# Patient Record
Sex: MALE | Race: WHITE | NOT HISPANIC OR LATINO | Employment: OTHER | URBAN - METROPOLITAN AREA
[De-identification: names, ages, dates, MRNs, and addresses within clinical notes are randomized per-mention and may not be internally consistent; named-entity substitution may affect disease eponyms.]

---

## 2017-01-10 ENCOUNTER — ALLSCRIPTS OFFICE VISIT (OUTPATIENT)
Dept: OTHER | Facility: OTHER | Age: 82
End: 2017-01-10

## 2017-02-15 ENCOUNTER — ALLSCRIPTS OFFICE VISIT (OUTPATIENT)
Dept: OTHER | Facility: OTHER | Age: 82
End: 2017-02-15

## 2017-04-17 ENCOUNTER — ALLSCRIPTS OFFICE VISIT (OUTPATIENT)
Dept: OTHER | Facility: OTHER | Age: 82
End: 2017-04-17

## 2017-07-06 ENCOUNTER — TRANSCRIBE ORDERS (OUTPATIENT)
Dept: ADMINISTRATIVE | Facility: HOSPITAL | Age: 82
End: 2017-07-06

## 2017-07-06 ENCOUNTER — APPOINTMENT (OUTPATIENT)
Dept: LAB | Facility: HOSPITAL | Age: 82
End: 2017-07-06
Attending: INTERNAL MEDICINE
Payer: COMMERCIAL

## 2017-07-06 DIAGNOSIS — I10 ESSENTIAL (PRIMARY) HYPERTENSION: ICD-10-CM

## 2017-07-06 DIAGNOSIS — E11.9 TYPE 2 DIABETES MELLITUS WITHOUT COMPLICATIONS (HCC): ICD-10-CM

## 2017-07-06 DIAGNOSIS — J44.9 CHRONIC OBSTRUCTIVE PULMONARY DISEASE (HCC): ICD-10-CM

## 2017-07-06 LAB
ALBUMIN SERPL BCP-MCNC: 3.6 G/DL (ref 3.5–5)
ALP SERPL-CCNC: 53 U/L (ref 46–116)
ALT SERPL W P-5'-P-CCNC: 33 U/L (ref 12–78)
ANION GAP SERPL CALCULATED.3IONS-SCNC: 10 MMOL/L (ref 4–13)
AST SERPL W P-5'-P-CCNC: 17 U/L (ref 5–45)
BILIRUB SERPL-MCNC: 0.6 MG/DL (ref 0.2–1)
BUN SERPL-MCNC: 15 MG/DL (ref 5–25)
CALCIUM SERPL-MCNC: 9 MG/DL (ref 8.3–10.1)
CHLORIDE SERPL-SCNC: 107 MMOL/L (ref 100–108)
CHOLEST SERPL-MCNC: 165 MG/DL (ref 50–200)
CO2 SERPL-SCNC: 25 MMOL/L (ref 21–32)
CREAT SERPL-MCNC: 1.23 MG/DL (ref 0.6–1.3)
CREAT UR-MCNC: 133 MG/DL
ERYTHROCYTE [DISTWIDTH] IN BLOOD BY AUTOMATED COUNT: 14.9 % (ref 11.6–15.1)
EST. AVERAGE GLUCOSE BLD GHB EST-MCNC: 140 MG/DL
GFR SERPL CREATININE-BSD FRML MDRD: 56.5 ML/MIN/1.73SQ M
GLUCOSE P FAST SERPL-MCNC: 143 MG/DL (ref 65–99)
HBA1C MFR BLD: 6.5 % (ref 4.2–6.3)
HCT VFR BLD AUTO: 47.7 % (ref 36.5–49.3)
HDLC SERPL-MCNC: 51 MG/DL (ref 40–60)
HGB BLD-MCNC: 15.7 G/DL (ref 12–17)
LDLC SERPL CALC-MCNC: 96 MG/DL (ref 0–100)
MCH RBC QN AUTO: 28.7 PG (ref 26.8–34.3)
MCHC RBC AUTO-ENTMCNC: 32.9 G/DL (ref 31.4–37.4)
MCV RBC AUTO: 87 FL (ref 82–98)
MICROALBUMIN UR-MCNC: 146 MG/L (ref 0–20)
MICROALBUMIN/CREAT 24H UR: 110 MG/G CREATININE (ref 0–30)
PLATELET # BLD AUTO: 201 THOUSANDS/UL (ref 149–390)
PMV BLD AUTO: 10.2 FL (ref 8.9–12.7)
POTASSIUM SERPL-SCNC: 4.1 MMOL/L (ref 3.5–5.3)
PROT SERPL-MCNC: 6.8 G/DL (ref 6.4–8.2)
RBC # BLD AUTO: 5.47 MILLION/UL (ref 3.88–5.62)
SODIUM SERPL-SCNC: 142 MMOL/L (ref 136–145)
TRIGL SERPL-MCNC: 91 MG/DL
TSH SERPL DL<=0.05 MIU/L-ACNC: 0.87 UIU/ML (ref 0.36–3.74)
WBC # BLD AUTO: 7.22 THOUSAND/UL (ref 4.31–10.16)

## 2017-07-06 PROCEDURE — 83036 HEMOGLOBIN GLYCOSYLATED A1C: CPT

## 2017-07-06 PROCEDURE — 84443 ASSAY THYROID STIM HORMONE: CPT

## 2017-07-06 PROCEDURE — 80061 LIPID PANEL: CPT

## 2017-07-06 PROCEDURE — 85027 COMPLETE CBC AUTOMATED: CPT

## 2017-07-06 PROCEDURE — 36415 COLL VENOUS BLD VENIPUNCTURE: CPT

## 2017-07-06 PROCEDURE — 82043 UR ALBUMIN QUANTITATIVE: CPT

## 2017-07-06 PROCEDURE — 80053 COMPREHEN METABOLIC PANEL: CPT

## 2017-07-06 PROCEDURE — 82570 ASSAY OF URINE CREATININE: CPT

## 2017-07-11 ENCOUNTER — ALLSCRIPTS OFFICE VISIT (OUTPATIENT)
Dept: OTHER | Facility: OTHER | Age: 82
End: 2017-07-11

## 2017-07-11 DIAGNOSIS — E11.9 TYPE 2 DIABETES MELLITUS WITHOUT COMPLICATIONS (HCC): ICD-10-CM

## 2017-07-11 DIAGNOSIS — J44.9 CHRONIC OBSTRUCTIVE PULMONARY DISEASE (HCC): ICD-10-CM

## 2017-08-01 ENCOUNTER — TRANSCRIBE ORDERS (OUTPATIENT)
Dept: ADMINISTRATIVE | Facility: HOSPITAL | Age: 82
End: 2017-08-01

## 2017-08-01 ENCOUNTER — HOSPITAL ENCOUNTER (OUTPATIENT)
Dept: RADIOLOGY | Facility: HOSPITAL | Age: 82
Discharge: HOME | End: 2017-08-01
Attending: INTERNAL MEDICINE
Payer: COMMERCIAL

## 2017-08-01 DIAGNOSIS — J44.9 CHRONIC OBSTRUCTIVE PULMONARY DISEASE (HCC): ICD-10-CM

## 2017-08-01 PROCEDURE — 71020 HB CHEST X-RAY 2VW FRONTAL&LATL: CPT

## 2017-08-03 ENCOUNTER — GENERIC CONVERSION - ENCOUNTER (OUTPATIENT)
Dept: OTHER | Facility: OTHER | Age: 82
End: 2017-08-03

## 2017-10-16 ENCOUNTER — ALLSCRIPTS OFFICE VISIT (OUTPATIENT)
Dept: OTHER | Facility: OTHER | Age: 82
End: 2017-10-16

## 2017-11-01 NOTE — PROGRESS NOTES
Assessment    1  Chronic obstructive pulmonary disease (496) (J44 9)    Plan  Chronic obstructive pulmonary disease    · Advair Diskus 250-50 MCG/DOSE Inhalation Aerosol Powder Breath Activated;INHALE 1 PUFF EVERY 12 HOURS   Rx By: Sydnee Montejo; Dispense: 90 Days ; #:3 X 60 Aerosol Powder Breath Activated Disp Pack; Refill: 3;For: Chronic obstructive pulmonary disease; CHELLE = N; Verified Transmission to [facility] #[id number] Last Updated By: System, [employer]; 10/16/2017 11:46:29 AM    Results/Data  * XR CHEST PA & LATERAL 01Aug2017 08:19AM [facility] Order Number: [id number]     Test Name Result Flag Reference   XR CHEST PA & LATERAL (Report)       CHEST DUAL ENERGY   INDICATION: COPD  Former smoker quit 15 years ago  COMPARISON: 12/27/2016   VIEWS: PA (including soft tissue and bone algorithms) and lateral projections; 5 images   FINDINGS:      Cardiomediastinal silhouette appears unremarkable  Minor left basilar atelectasis  Previously seen left mid lung field infiltrative density has resolved  No pneumothorax or pleural effusion  Visualized osseous structures appear within normal limits for the patient's age  IMPRESSION:   Minor left basilar atelectasis  Previously seen left mid lung field infiltrative density has resolved  Workstation performed: [id number]   Signed by: Joseph North MD  8/2/17     Discussion/Summary  Discussion Summary:   Mr Donna Dominguez is doing well, his vitals are stable and his symptoms as well  He does not complain of any wheezing or dyspnea except with exertion  He will continue on the Advair and Spiriva and use his rescue as needed  He is up to date on his pneumonia vaccine and got his flu shot last week  I will see him in 4 months or sooner if needed  Counseling Documentation With Imm: The patient was counseled regarding  Goals and Barriers: The patient has the current Goals: Maintain health  The patent has the current Barriers: None identified     Patient's Capacity to Self-Care: Patient is able to Self-Care  Active Problems    1  Active advance directive (V49 89) (Z78 9)   2  Chronic obstructive pulmonary disease (496) (J44 9)   3  Diabetes mellitus type 2, uncomplicated (799 82) (R95 8)   4  History of BPH (V13 89) (Z87 438)   5  HTN (hypertension) (401 9) (I10)   6  Denied: History of Mental health problem   7  Need for immunization against influenza (V04 81) (Z23)   8  CHI (obstructive sleep apnea) (327 23) (G47 33)   9  Denied: History of Substance abuse    Chief Complaint  Chief Complaint Chronic Condition [facility]: Patient is here today for follow up of chronic conditions described in HPI  History of Present Illness  HPI: Mr Gamal De Souza is doing fairly well with regrads to his breathing  He is still struggling with dyspnea on exertion and feels it is worse than 2-3 years  He uses the Advair and Spiriva every day faithfully  He does need to use his rescue inhaler occasionally in the early AM when he awakens  he is not awakening in the Am or at night  He is using the oxygen at night occasionally when he has nasal congestion  He denies any leg swelling or chest pain  Review of Systems  Complete-Male Pulm:  Constitutional: No fever or chills, feels well, no tiredness, no recent weight gain or weight loss  Eyes: no complaints of vision problems  ENT: no rhinitis, no PND, no epistaxis  Cardiovascular: no palpitations, no chest pain  Respiratory: as noted in HPI  Gastrointestinal: no complaints of esophageal reflux, no abdominal pain  Genitourinary: no urinary retention  Musculoskeletal: no arthralgias, no joint swelling, no myalgias  Integumentary: no rash, no lesions  Neurological: no headache, no fainting, no weakness  Psychiatric: no anxiety, no depression  Hematologic/Lymphatic: no complaints of swollen glands  ROS Reviewed:   ROS reviewed  Past Medical History  1  History of herpes zoster (V12 09) (Z86 19)   2   History of hypertension (V12 59) (Z86 79)   3  History of mycoplasma pneumonia (V12 61) (Z87 01)   4  History of obesity (V13 89) (Z86 39)   5  History of osteoarthritis (V13 4) (Z87 39)   6  History of renal calculi (V13 01) (Z87 442)   7  Denied: History of Mental health problem   8  History of Screening for colon cancer (V76 51) (Z12 11)   9  History of Screening for prostate cancer (V76 44) (Z12 5)   10  Denied: History of Substance abuse    Surgical History  1  History of Hemorrhoidectomy   2  History of Intracapsular Cataract Extract W/ Insert Intraocular Lens Prosthesis   3  History of Neck Surgery  Surgical History Reviewed: The surgical history was reviewed and updated today  Family History  Mother    1  Family history of Breast Cancer (V16 3)   2  Family history of Family Health Status Of Mother -    3  Family history of cerebral aneurysm (V17 1) (Z82 49)  Father    4  Family history of Father  At Age 80  Family History    5  Denied: Family history of substance abuse   6  Denied: Family history of Mental problem  Family History Reviewed: The family history was reviewed and updated today  Social History     · Active advance directive (V49 89) (Z78 9)   · Alcohol Use (History)   · 2 to 3 beers a month   · Cultural background   ·    · Daily Coffee Consumption (1  Cups/Day)   · Denied: History of Exercise habits   · Former smoker (S53 72) (J81 508)   · Quit 15 years ago ()   Smoked for 30 years a pack to a pack and a half of cigarettes a    day  · Good dental hygiene   · Living Situation: Supportive and safe   · Racial background   · white   · Spouse/family support  Social History Reviewed: The social history was reviewed and updated today  The social history was reviewed and is unchanged  Current Meds   1  Advair Diskus 250-50 MCG/DOSE Inhalation Aerosol Powder Breath Activated; USE 1 INHALATION TWICE A DAY  RINSE MOUTH AFTER USE;  Therapy: 59PPL1952 to (86 585002)  Requested for: 82PUL2041; Last Rx:37Qdm5152 Ordered   2  Amlodipine Besy-Benazepril HCl - 5-20 MG Oral Capsule; TAKE ONE CAPSULE BY MOUTH TWICE A DAY; Therapy: 74QTJ4305 to (Evaluate:25Apr2018)  Requested for: 30Apr2017; Last Rx:30Apr2017 Ordered   3  Doxazosin Mesylate 8 MG Oral Tablet; take 1 tablet by mouth every day; Therapy: 98BDH8576 to (Evaluate:05Jan2018)  Requested for: 03FAD6821; Last Rx:10Jan2017 Ordered   4  Furosemide 40 MG Oral Tablet; Take 1 or 2 tabs daily; Therapy: 64ETR2164 to (Evaluate:26Mar2017)  Requested for: 76EIG3437; Last Rx:73Nge0732 Ordered   5  Labetalol HCl - 300 MG Oral Tablet; take 1 tablet by mouth twice a day; Therapy: 60BVP6393 to )  Requested for: 32QVL4281; Last Rx:73Oes0828 Ordered   6  MetFORMIN HCl - 500 MG Oral Tablet; Take 1 tablet daily; Therapy: 21Jan2016 to (Evaluate:05Jan2018)  Requested for: 59PBE4434; Last Rx:10Jan2017 Ordered   7  ProAir  (90 Base) MCG/ACT Inhalation Aerosol Solution; INHALE 2 PUFFS EVERY 4-6 HOURS AS NEEDED; Therapy: 77RVQ9647 to (Last Rx:59Nsx8253)  Requested for: 77CJW2858 Ordered   8  Spiriva Respimat 2 5 MCG/ACT Inhalation Aerosol Solution; INHALE 2 PUFFS ONCE DAILY; Therapy: 61AVT1804 to (Yvrose Oconnor)  Requested for: 36Jat6341; Last Rx:87Ujh0350 Ordered  Medication List Reviewed: The medication list was reviewed and updated today  Allergies  1  No Known Drug Allergies  2  No Known Environmental Allergies   3  No Known Food Allergies    Vitals  Vital Signs    Recorded: 48PTL5655 11:16AM   Temperature 97 6 F   Heart Rate 85   Respiration 18   Systolic 686   Diastolic 84   Height 6 ft 1 5 in   Weight 313 lb    BMI Calculated 40 74   BSA Calculated 2 62   O2 Saturation 92, RA       Physical Exam   Constitutional  General appearance: No acute distress, well appearing and well nourished  Ears, Nose, Mouth, and Throat  Nasal mucosa, septum, and turbinates: Normal without edema or erythema     Lips, teeth, and gums: Normal, good dentition  Oropharynx: Normal with no erythema, edema, exudate or lesions  Neck  Neck: Supple, symmetric, trachea midline, no masses  Jugular veins: Normal    Pulmonary  Auscultation of lungs: Clear to auscultation, no rales, no crackles, no wheezing  Cardiovascular  Auscultation of heart: Normal rate and rhythm, normal S1 and S2, no murmurs  Examination of extremities for edema and/or varicosities: Normal    Abdomen  Abdomen: Soft, non-tender  Lymphatic  Palpation of lymph nodes in neck: No lymphadenopathy  Musculoskeletal  Gait and station: Normal    Digits and nails: Normal without clubbing or cyanosis  Neurologic  Mental Status: Normal  Not confused, no evidence of dementia, good comprehension, good concentration  Skin  Skin and subcutaneous tissue: Limited exam shows no rash     Psychiatric  Orientation to person, place and time: Normal    Mood and affect: Normal        Signatures   Electronically signed by : Wali Meneses DO; Oct 16 2017 11:48AM EST                       (Author)

## 2017-11-08 ENCOUNTER — GENERIC CONVERSION - ENCOUNTER (OUTPATIENT)
Dept: OTHER | Facility: OTHER | Age: 82
End: 2017-11-08

## 2017-12-27 ENCOUNTER — GENERIC CONVERSION - ENCOUNTER (OUTPATIENT)
Dept: OTHER | Facility: OTHER | Age: 82
End: 2017-12-27

## 2018-01-09 NOTE — RESULT NOTES
Verified Results  * XR CHEST PA & LATERAL 67EQQ2770 10:14AM Gillian Has Order Number: [id number]     Test Name Result Flag Reference   XR CHEST PA & LATERAL (Report)     CHEST     INDICATION: Shortness of breath, COPD  COMPARISON: 1/18/2016     VIEWS: Frontal (2) and lateral projections; 3 images     FINDINGS:     The cardiomediastinal silhouette is unremarkable  The lungs are slightly hyperinflated compatible with COPD  There is linear density in the left upper lobe in the mid lung zone which is favored to represent atelectasis as opposed to pneumonia  Confluent shadows versus infiltrate in the posterior left    lung base on lateral view only  The lungs are otherwise clear  No pleural effusions  Bony thorax unremarkable  IMPRESSION:     COPD  Confluent shadows versus infiltrate posterior left lung base and linear density in the left midlung, favored to represent atelectasis  Short interval follow-up chest films in 1-2 weeks recommended         ##sigslh##sigslh     ##fuslh01##fuslh01       Workstation performed: [id number]     Signed by:   Shanti Braga DO   12/27/16

## 2018-01-10 ENCOUNTER — TRANSCRIBE ORDERS (OUTPATIENT)
Dept: ADMINISTRATIVE | Facility: HOSPITAL | Age: 83
End: 2018-01-10

## 2018-01-10 ENCOUNTER — APPOINTMENT (OUTPATIENT)
Dept: LAB | Facility: HOSPITAL | Age: 83
End: 2018-01-10
Attending: INTERNAL MEDICINE
Payer: COMMERCIAL

## 2018-01-10 DIAGNOSIS — E11.9 TYPE 2 DIABETES MELLITUS WITHOUT COMPLICATIONS (HCC): ICD-10-CM

## 2018-01-10 DIAGNOSIS — E13.8 DIABETES MELLITUS OF OTHER TYPE WITH COMPLICATION, UNSPECIFIED LONG TERM INSULIN USE STATUS: ICD-10-CM

## 2018-01-10 DIAGNOSIS — E13.8 DIABETES MELLITUS OF OTHER TYPE WITH COMPLICATION, UNSPECIFIED LONG TERM INSULIN USE STATUS: Primary | ICD-10-CM

## 2018-01-10 LAB
ALBUMIN SERPL BCP-MCNC: 3.7 G/DL (ref 3.5–5)
ALP SERPL-CCNC: 56 U/L (ref 46–116)
ALT SERPL W P-5'-P-CCNC: 45 U/L (ref 12–78)
ANION GAP SERPL CALCULATED.3IONS-SCNC: 7 MMOL/L (ref 4–13)
AST SERPL W P-5'-P-CCNC: 11 U/L (ref 5–45)
BILIRUB SERPL-MCNC: 0.6 MG/DL (ref 0.2–1)
BUN SERPL-MCNC: 14 MG/DL (ref 5–25)
CALCIUM SERPL-MCNC: 9.1 MG/DL (ref 8.3–10.1)
CHLORIDE SERPL-SCNC: 105 MMOL/L (ref 100–108)
CHOLEST SERPL-MCNC: 181 MG/DL (ref 50–200)
CO2 SERPL-SCNC: 28 MMOL/L (ref 21–32)
CREAT SERPL-MCNC: 1.21 MG/DL (ref 0.6–1.3)
CREAT UR-MCNC: 190 MG/DL
ERYTHROCYTE [DISTWIDTH] IN BLOOD BY AUTOMATED COUNT: 14.3 % (ref 11.6–15.1)
EST. AVERAGE GLUCOSE BLD GHB EST-MCNC: 154 MG/DL
GFR SERPL CREATININE-BSD FRML MDRD: 56 ML/MIN/1.73SQ M
GLUCOSE P FAST SERPL-MCNC: 160 MG/DL (ref 65–99)
HBA1C MFR BLD: 7 % (ref 4.2–6.3)
HCT VFR BLD AUTO: 49.4 % (ref 36.5–49.3)
HDLC SERPL-MCNC: 52 MG/DL (ref 40–60)
HGB BLD-MCNC: 16.2 G/DL (ref 12–17)
LDLC SERPL CALC-MCNC: 107 MG/DL (ref 0–100)
MCH RBC QN AUTO: 28.8 PG (ref 26.8–34.3)
MCHC RBC AUTO-ENTMCNC: 32.8 G/DL (ref 31.4–37.4)
MCV RBC AUTO: 88 FL (ref 82–98)
MICROALBUMIN UR-MCNC: 259 MG/L (ref 0–20)
MICROALBUMIN/CREAT 24H UR: 136 MG/G CREATININE (ref 0–30)
PLATELET # BLD AUTO: 216 THOUSANDS/UL (ref 149–390)
PMV BLD AUTO: 10 FL (ref 8.9–12.7)
POTASSIUM SERPL-SCNC: 3.9 MMOL/L (ref 3.5–5.3)
PROT SERPL-MCNC: 7.1 G/DL (ref 6.4–8.2)
RBC # BLD AUTO: 5.63 MILLION/UL (ref 3.88–5.62)
SODIUM SERPL-SCNC: 140 MMOL/L (ref 136–145)
TRIGL SERPL-MCNC: 111 MG/DL
WBC # BLD AUTO: 7.88 THOUSAND/UL (ref 4.31–10.16)

## 2018-01-10 PROCEDURE — 80053 COMPREHEN METABOLIC PANEL: CPT

## 2018-01-10 PROCEDURE — 85027 COMPLETE CBC AUTOMATED: CPT

## 2018-01-10 PROCEDURE — 82043 UR ALBUMIN QUANTITATIVE: CPT | Performed by: INTERNAL MEDICINE

## 2018-01-10 PROCEDURE — 80061 LIPID PANEL: CPT

## 2018-01-10 PROCEDURE — 83036 HEMOGLOBIN GLYCOSYLATED A1C: CPT

## 2018-01-10 PROCEDURE — 36415 COLL VENOUS BLD VENIPUNCTURE: CPT

## 2018-01-10 PROCEDURE — 82570 ASSAY OF URINE CREATININE: CPT | Performed by: INTERNAL MEDICINE

## 2018-01-10 NOTE — RESULT NOTES
Message   Call patient, labs OK        Verified Results  (1) CBC/ PLT (NO DIFF) 20IFL8353 07:30AM [facility]     Test Name Result Flag Reference   HEMATOCRIT 46 0 %  36 5-49 3   HEMOGLOBIN 15 0 g/dL  12 0-17 0   MCHC 32 6 g/dL  31 4-37 4   MCH 28 5 pg  26 8-34 3   MCV 87 fL  82-98   PLATELET COUNT 877 Thousands/uL  149-390   RBC COUNT 5 27 Million/uL  3 88-5 62   RDW 14 7 %  11 6-15 1   WBC COUNT 6 55 Thousand/uL  4 31-10 16   MPV 10 1 fL  8 9-12 7     (1) COMPREHENSIVE METABOLIC PANEL 85PRV5397 06:08EY [employer]     Test Name Result Flag Reference   GLUCOSE,RANDM 135 mg/dL     If the patient is fasting, the ADA then defines impaired fasting glucose as > 100 mg/dL and diabetes as > or equal to 123 mg/dL  SODIUM 141 mmol/L  136-145   POTASSIUM 3 7 mmol/L  3 5-5 3   CHLORIDE 106 mmol/L  100-108   CARBON DIOXIDE 24 mmol/L  21-32   ANION GAP (CALC) 11 mmol/L  4-13   BLOOD UREA NITROGEN 17 mg/dL  5-25   CREATININE 1 02 mg/dL  0 60-1 30   Standardized to IDMS reference method   CALCIUM 8 7 mg/dL  8 3-10 1   BILI, TOTAL 0 50 mg/dL  0 20-1 00   ALK PHOSPHATAS 48 U/L     ALT (SGPT) 33 U/L  12-78   AST(SGOT) 18 U/L  5-45   ALBUMIN 3 6 g/dL  3 5-5 0   TOTAL PROTEIN 6 6 g/dL  6 4-8 2   eGFR Non-African American      >60 0 ml/min/1 73sq    Number: [id number] Patient Instructions: This bloodwork is non-fasting  Please drink two glasses of water morning of bloodwork  National Kidney Disease Education Program recommendations are as follows:  GFR calculation is accurate only with a steady state creatinine  Chronic Kidney disease less than 60 ml/min/1 73 sq  meters  Kidney failure less than 15 ml/min/1 73 sq  meters       (1) MICROALBUMIN CREATININE RATIO, RANDOM URINE 83Vlu3041 07:30AM [employer]     Test Name Result Flag Reference   MICROALBUMIN/ CREAT R 43 mg/g creatinine H 0-30   MICROALBUMIN,URINE 64 5 mg/L H 0 0-20 0   CREATININE URINE 149 0 mg/dL       (1) LIPID PANEL FASTING W DIRECT LDL REFLEX 40NFB3481 07:30AM Christi Stover     Test Name Result Flag Reference   CHOLESTEROL 182 mg/dL     LDL CHOLESTEROL CALCULATED 115 mg/dL H 0-100   - Patient Instructions: This is a fasting blood test  Water, black tea or black coffee only after 9:00pm the night before test   Drink 2 glasses of water the morning of test      Number: [id number] Patient Instructions: This bloodwork is non-fasting  Please drink two glasses of water morning of bloodwork  Triglyceride:         Normal              <150 mg/dl       Borderline High    150-199 mg/dl       High               200-499 mg/dl       Very High          >499 mg/dl  Cholesterol:         Desirable        <200 mg/dl      Borderline High  200-239 mg/dl      High             >239 mg/dl  HDL Cholesterol:        High    >59 mg/dL      Low     <41 mg/dL  LDL Cholesterol:        Optimal          <100 mg/dl        Near Optimal     100-129 mg/dl        Above Optimal          Borderline High   130-159 mg/dl          High              160-189 mg/dl          Very High        >189 mg/dl  LDL CALCULATED:    This screening LDL is a calculated result  It does not have the accuracy of the Direct Measured LDL in the monitoring of patients with hyperlipidemia and/or statin therapy  Direct Measure LDL (MJI757) must be ordered separately in these patients  TRIGLYCERIDES 99 mg/dL  <=150   Specimen collection should occur prior to N-Acetylcysteine or Metamizole administration due to the potential for falsely depressed results  HDL,DIRECT 47 mg/dL  40-60   Specimen collection should occur prior to Metamizole administration due to the potential for falsely depressed results  (1) TSH 56LPW7556 07:30AM Christi Stover     Test Name Result Flag Reference   TSH 0 821 uIU/mL  0 358-3 740   - Patient Instructions: This bloodwork is non-fasting  Please drink two glasses of water morning of bloodwork  Number: [id number] Patient Instructions:  This bloodwork is non-fasting  Please drink two glasses of water morning of bloodwork  Patients undergoing fluorescein dye angiography may retain small amounts of fluorescein in the body for 48-72 hours post procedure  Samples containing fluorescein can produce falsely depressed TSH values  If the patient had this procedure,a specimen should be resubmitted post fluorescein clearance

## 2018-01-11 NOTE — PROGRESS NOTES
Plan    1  DSMT/MNT Time Record; Status:Complete;   Done: 61JAI4170 12:00AM    Discussion/Summary    PATIENT EDUCATION RECORD   Living Well with Diabetes Class 2 Education Content: Macronutrients, Carbohydrate sources, What one serving of carbohydrate equals, Effects of diet on blood glucose levels, effects of carbohydrates on blood glucose levels, Basics of meal planning: balance, portions, and meal times, Measurements, Reading food labels to determine carbohydrates       History of Present Illness  Patient attended Living Well with Diabetes class 2        Results/Data  Encounter Results   DSMT/MNT Time Record 39Dcq5216 12:00AM Sasha Scott     Test Name Result Flag Reference   Date of Service 4/13/2016     Start - Stop Time 6:00-8:00PM     Total MInutes 120 minutes     Group Or Individual Instruction DSMT-G       Future Appointments    Date/Time Provider Specialty Site   04/20/2016 06:00 PM Trung Gallardo RD Diabetes Educator [facility] ENDOCRINOLOGY   04/27/2016 06:00 PM Sasha Scott RD, [place] Diabetes Educator [facility] ENDOCRINOLOGY     Signatures   Electronically signed by : Natalie Askew, [facility]; Apr 14 2016 11:19AM EST                       (Author)    Electronically signed by : Winton Mortimer, M D ; Apr 14 2016  1:46PM EST

## 2018-01-12 NOTE — PROGRESS NOTES
Plan    1  DSMT/MNT Time Record; Status:Complete;   Done: 31NSS9615 06:00PM    Discussion/Summary    PATIENT EDUCATION RECORD   Living Well with Diabetes Class 1 Education Content: What is diabetes, Types of diabetes, How is diabetes diagnosed, Management skills, Role of exercise, Glucose monitoring, Target range goals        History of Present Illness  Patient attended LWD class 1 this evening  End of Encounter Meds    1  Advair Diskus 250-50 MCG/DOSE Inhalation Aerosol Powder Breath Activated; USE 1   INHALATION TWICE A DAY  RINSE MOUTH AFTER USE; Therapy: 50SLZ7500 to (Evaluate:13Oct2015)  Requested for: 63Vgn8505; Last   Rx:37Axz2563 Ordered   2  Advair Diskus 250-50 MCG/DOSE Inhalation Aerosol Powder Breath Activated; USE 1   PUFF EVERY 12 HOURS; Therapy: 00FAZ9876 to (Evaluate:29Jan2017)  Requested for: 52TNR1014; Last   Rx:71Xva9098 Ordered   3  Albuterol Sulfate (2 5 MG/3ML) 0 083% Inhalation Nebulization Solution; USE 1 UNIT   DOSE EVERY 4-6 HOURS AS NEEDED FOR WHEEZING ; Therapy: 49PMC3981 to (Burgess Santana)  Requested for: 14XUU2153; Last   Rx:12Jun2015 Ordered   4  Spiriva HandiHaler 18 MCG Inhalation Capsule; INHALE CONTENTS OF 1 CAPSULE   ONCE DAILY; Therapy: 55Xjm7094 to (Evaluate:05Jan2017)  Requested for: 93OPN1883; Last   Rx:11Jan2016 Ordered   5  Spiriva Respimat 2 5 MCG/ACT Inhalation Aerosol Solution; INHALE 2 PUFFS ONCE   DAILY; Therapy: 97RZI3115 to (Evaluate:29Jan2017)  Requested for: 75NOM6044; Last   Rx:69Fmw0338 Ordered   6  Ventolin  (90 Base) MCG/ACT Inhalation Aerosol Solution; INHALE 2 PUFFS   EVERY 4-6 HOURS AS NEEDED; Therapy: 98URR6660 to [phone])  Requested for: 29BZJ4307; Last   Rx:50Xvx1118 Ordered    7  MetFORMIN HCl - 500 MG Oral Tablet; Take 1 tablet daily; Therapy: 21Jan2016 to (Evaluate:19Jan2017)  Requested for: 39ZJJ6565; Last   Rx:25Jan2016 Ordered    8   Doxazosin Mesylate 8 MG Oral Tablet; take 1 tablet by mouth every day; Therapy: 06RFR7475 to (Evaluate:97Qtv2015)  Requested for: 69MSK8484; Last   Rx:69Otq5496 Ordered    9  Amlodipine Besy-Benazepril HCl - 5-20 MG Oral Capsule; TAKE ONE CAPSULE BY   MOUTH TWICE A DAY; Therapy: 49EST3199 to (Maple Bless)  Requested for: 28UCD8325; Last   Rx:91Tiz7003 Ordered   10  Labetalol HCl - 300 MG Oral Tablet; take 1 tablet by mouth twice a day; Therapy: 93MPH9986 to (Precilla Spark)  Requested for: 05OOQ5604; Last    Rx:03Mar2016 Ordered    11  Furosemide 40 MG Oral Tablet; Take 1 or 2 tabs daily  Requested for: 27Vfs9893;  Last    Rx:11Juj2951 Ordered    Future Appointments    Date/Time Provider Specialty Site   04/20/2016 06:00 PM Tayler Bejarano RD Diabetes Educator [facility] ENDOCRINOLOGY   04/13/2016 06:00 PM Grecia Boudreaux RD, [place] Diabetes Educator [facility] ENDOCRINOLOGY   04/27/2016 06:00 PM Grecia Boudreaux RD, [place] Diabetes Educator [facility] ENDOCRINOLOGY     Signatures   Electronically signed by : Corinne Yuan RD; Apr 7 2016 11:10AM EST                       (Author)    Electronically signed by : JONES Chavez ; Apr 7 2016  1:02PM EST

## 2018-01-12 NOTE — MISCELLANEOUS
Message  Pt called the office this morning complaining of increased cough and chest congestion  He notes his oxygen saturations were 83-84% last evening and RA sats at appointment last week were 93%  He sounded short of breath over the phone at times with conversation  He also wore his oxygen all night and even some this morning, which seemed to help  I recommended he be seen today for evaluation  He was given an appointment for 1PM with Dr John Mary  I have asked he have a CXR prior  He was instructed to go to the ER if his symptoms worsened  Plan  Shortness of breath    · * XR CHEST PA & LATERAL; Status:Active;  Requested for:86Hld0610;     Signatures   Electronically signed by : Yumiko Enriquez, [facility]; Dec 27 2016  9:35AM EST                       (Author)

## 2018-01-13 NOTE — RESULT NOTES
Verified Results  (1) HEMOGLOBIN A1C 62IPG0791 07:37AM Yasmeen Rios   TW Order Number: [id number]      5 7-6 4% impaired fasting glucose  >=6 5% diagnosis of diabetes    Falsely low levels are seen in conditions linked to short RBC life span-  hemolytic anemia, and splenomegaly  Falsely elevated levels are seen in situations where there is an increased production of RBC- receipt of erythropoietin or blood transfusions  Adopted from ADA-Clinical Practice Recommendations     Test Name Result Flag Reference   HEMOGLOBIN A1C 6 9 % H 4 0-5 6   EST  AVG   GLUCOSE 151 mg/dl

## 2018-01-13 NOTE — PROGRESS NOTES
Plan    1  DSMT/MNT Time Record; Status:Complete;   Done: 92KSP1082 10:24AM    Discussion/Summary    PATIENT EDUCATION RECORD   Living Well with Diabetes Class 4 Education Content: Types of cholesterol, Dietary sources of cholesterol, Types of fat, Types of fiber, Reading food labels- in depth, Healthy choices when dining out        History of Present Illness  Patient attended Living Well with Diabetes class 4        Results/Data  Encounter Results   DSMT/MNT Time Record 05JTY3794 10:24AM Mehul Houser     Test Name Result Flag Reference   Date of Service 4/27/2016     Start - Stop Time 6:00-7:30PM     Total MInutes 90 minutes     Group Or Individual Instruction DSMT-G       Signatures   Electronically signed by : Reva Flaherty; Apr 28 2016 10:26AM EST                       (Author)    Electronically signed by : JONES Foley ; Apr 28 2016 10:55AM EST

## 2018-01-13 NOTE — RESULT NOTES
Verified Results  * XR CHEST PA & LATERAL 33Pwo7675 08:19AM Vicky LOPEZ Order Number: [id number]     Test Name Result Flag Reference   XR CHEST PA & LATERAL (Report)     CHEST DUAL ENERGY     INDICATION: COPD  Former smoker quit 15 years ago  COMPARISON: 12/27/2016     VIEWS: PA (including soft tissue and bone algorithms) and lateral projections; 5 images     FINDINGS:        Cardiomediastinal silhouette appears unremarkable  Minor left basilar atelectasis  Previously seen left mid lung field infiltrative density has resolved  No pneumothorax or pleural effusion  Visualized osseous structures appear within normal limits for the patient's age  IMPRESSION:     Minor left basilar atelectasis  Previously seen left mid lung field infiltrative density has resolved  Workstation performed: [id number]     Signed by:    Joseph North MD   8/2/17

## 2018-01-13 NOTE — RESULT NOTES
Message   Call patient, labs OK   glyco     Verified Results  (1) HEMOGLOBIN A1C 58Pju6685 07:30AM Kaley Hart Order Number: [id number]   Order Number: [id number]     Test Name Result Flag Reference   HEMOGLOBIN A1C 6 6 % H 4 2-6 3   EST  AVG   GLUCOSE 143 mg/dl

## 2018-01-14 VITALS
HEART RATE: 72 BPM | BODY MASS INDEX: 38.83 KG/M2 | SYSTOLIC BLOOD PRESSURE: 120 MMHG | DIASTOLIC BLOOD PRESSURE: 78 MMHG | HEIGHT: 73 IN | TEMPERATURE: 98.2 F | WEIGHT: 293 LBS

## 2018-01-14 VITALS
BODY MASS INDEX: 39.36 KG/M2 | WEIGHT: 297 LBS | HEIGHT: 73 IN | DIASTOLIC BLOOD PRESSURE: 78 MMHG | SYSTOLIC BLOOD PRESSURE: 132 MMHG | HEART RATE: 76 BPM

## 2018-01-14 VITALS
HEIGHT: 74 IN | BODY MASS INDEX: 40.17 KG/M2 | SYSTOLIC BLOOD PRESSURE: 144 MMHG | TEMPERATURE: 97.6 F | DIASTOLIC BLOOD PRESSURE: 84 MMHG | RESPIRATION RATE: 18 BRPM | HEART RATE: 85 BPM | OXYGEN SATURATION: 92 % | WEIGHT: 313 LBS

## 2018-01-14 VITALS
DIASTOLIC BLOOD PRESSURE: 78 MMHG | BODY MASS INDEX: 38.51 KG/M2 | HEIGHT: 74 IN | SYSTOLIC BLOOD PRESSURE: 120 MMHG | WEIGHT: 300.06 LBS | HEART RATE: 80 BPM | TEMPERATURE: 98 F

## 2018-01-15 VITALS
RESPIRATION RATE: 18 BRPM | SYSTOLIC BLOOD PRESSURE: 120 MMHG | TEMPERATURE: 96.8 F | HEIGHT: 74 IN | OXYGEN SATURATION: 95 % | DIASTOLIC BLOOD PRESSURE: 80 MMHG | WEIGHT: 300 LBS | HEART RATE: 87 BPM | BODY MASS INDEX: 38.5 KG/M2

## 2018-01-16 ENCOUNTER — ALLSCRIPTS OFFICE VISIT (OUTPATIENT)
Dept: OTHER | Facility: OTHER | Age: 83
End: 2018-01-16

## 2018-01-16 NOTE — PROGRESS NOTES
Plan    1  DSMT/MNT Time Record; Status:Complete;   Done: 20Apr2016 06:00PM    Discussion/Summary    PATIENT EDUCATION RECORD   Living Well with Diabetes Class 3 Education Content:Oral/Injectable medication, Prevention, Short-term complications, Long-term complications, Foot care, Sick day management (illness and stress), Travel       Chief Complaint  Magali Escalante attended class this evening  End of Encounter Meds    1  Advair Diskus 250-50 MCG/DOSE Inhalation Aerosol Powder Breath Activated; USE 1   INHALATION TWICE A DAY  RINSE MOUTH AFTER USE; Therapy: 34JNH6499 to (Evaluate:13Oct2015)  Requested for: 01Hwm5454; Last   Rx:34Myb2626 Ordered   2  Advair Diskus 250-50 MCG/DOSE Inhalation Aerosol Powder Breath Activated; USE 1   PUFF EVERY 12 HOURS; Therapy: 66KPJ3729 to (Evaluate:29Jan2017)  Requested for: 65GXQ3735; Last   Rx:15For6562 Ordered   3  Albuterol Sulfate (2 5 MG/3ML) 0 083% Inhalation Nebulization Solution; USE 1 UNIT   DOSE EVERY 4-6 HOURS AS NEEDED FOR WHEEZING ; Therapy: 32GHE9168 to (Flora Camarillo)  Requested for: 77THJ1604; Last   Rx:12Jun2015 Ordered   4  Spiriva HandiHaler 18 MCG Inhalation Capsule; INHALE CONTENTS OF 1 CAPSULE   ONCE DAILY; Therapy: 09Hew6325 to (Evaluate:05Jan2017)  Requested for: 37WLA2481; Last   Rx:11Jan2016 Ordered   5  Spiriva Respimat 2 5 MCG/ACT Inhalation Aerosol Solution; INHALE 2 PUFFS ONCE   DAILY; Therapy: 99GFK5771 to (Evaluate:29Jan2017)  Requested for: 28IJS6207; Last   Rx:27Rsx2509 Ordered   6  Ventolin  (90 Base) MCG/ACT Inhalation Aerosol Solution; INHALE 2 PUFFS   EVERY 4-6 HOURS AS NEEDED; Therapy: 01KGN5002 to )  Requested for: 72XOH5118; Last   Rx:79Yab1805 Ordered    7  MetFORMIN HCl - 500 MG Oral Tablet; Take 1 tablet daily; Therapy: 21Jan2016 to (Evaluate:19Jan2017)  Requested for: 05ZNV2813; Last   Rx:25Jan2016 Ordered    8  Doxazosin Mesylate 8 MG Oral Tablet; take 1 tablet by mouth every day;    Therapy: 08XDA9979 to (Evaluate:49Mhz0662)  Requested for: 03TWS1720; Last   Rx:45Mkm9709 Ordered    9  Amlodipine Besy-Benazepril HCl - 5-20 MG Oral Capsule; TAKE ONE CAPSULE BY   MOUTH TWICE A DAY; Therapy: 86OID5523 to (Elizabeth Morgan)  Requested for: 53IVF1772; Last   Rx:35Cad8545 Ordered   10  Labetalol HCl - 300 MG Oral Tablet; take 1 tablet by mouth twice a day; Therapy: 46JGX2868 to ([id number])  Requested for: 08VHM9756; Last    Rx:03Mar2016 Ordered    11  Furosemide 40 MG Oral Tablet; Take 1 or 2 tabs daily  Requested for: 99Kmq5883;  Last    Rx:95Wvp9006 Ordered    Future Appointments    Date/Time Provider Specialty Site   04/27/2016 06:00 PM Lauryn Alejo RD, LDN Diabetes Educator [facility]     Signatures   Electronically signed by : Alona Jones RD; Apr 27 2016  8:04AM EST                       (Author)    Electronically signed by : JONES Young ; Apr 27 2016 10:23AM EST

## 2018-01-16 NOTE — PROGRESS NOTES
Plan    1  DSMT/MNT Time Record; Status:Complete;   Done: 45KCQ9156 02:26PM    Discussion/Summary    PATIENT EDUCATION RECORD   Indication for Services: type 2 Diabetes Mellitus  He is ready to learn  He has no barriers to learning  Diabetes Disease Process:   He understands the pathophysiology of diabetes: Method: Instruction  Response: Verbalizes Understanding   Discussed patient's type of diabetes: Method: Instruction  Response: Verbalizes Understanding   Discussed diagnosis criteria: Method: Instruction  Response: Verbalizes Understanding   Discussed treatment goals: Method: Instruction  Response: Verbalizes Understanding   Healthy Eating:   Discussed general nutrition topics: Method: Instruction  Response: Needs Review   Being Active:   Discussed limited activity due to COPD  Method:      Reviewed meter options: Method: Instruction  His blood glucose targets are: Pre-meal target ,130, Post-meal target < 180 and HS target <140  Monitoring:   Discussed option for monitoring SMBG: Method: Instruction  Response: Verbalizes Understanding  Discussed option for monitoring HgbA1c: Method: Instruction  Response: Verbalizes Understanding  Discussed target blood glucose ranges: Method: Instruction  Response: Verbalizes Understanding  Discussed target hemoglobin A1c: Method: Instruction  Response: Verbalizes Understanding  Response: Verbalizes Understanding  Discussed Finger stick testing: Method: Instruction  Response: Verbalizes Understanding  Discussed proper stick techniques: Method: Instruction  Response: Verbalizes Understanding  Discussed alternate site testing: Method: Instruction  Response: Verbalizes Understanding  Discussed testing times: Method: Instruction  Response: Verbalizes Understanding  Discussed safe lancet disposal: Method: Instruction  Response: Verbalizes Understanding  Discussed meter : Method: Instruction  Response: Verbalizes Understanding     Discussed options for record keeping: Method: Instruction  Response: Verbalizes Understanding  Discussed reporting of readings to M D : Method: Instruction  Response: Verbalizes Understanding  He was given a Verio meter  He was instructed how to use the meter  Taking Medication:   Discussed Oral Medication[de-identified] Method: Instruction  Response: Needs Review  Stated name,dose, and timing of oral meds  Method: Instruction  Response: Needs Review  Stated side effects/precautions with diabetes meds  Method: Instruction  Response: Needs Review  Discussed medication safety  Method: Instruction  Response: Needs Review  He is taking  biguanides  Education Plan/Path:  He needs the Living Well Class  Will attend April class at [facility] Complaint  Recently diagnosed with type 2 diabetes  History of Present Illness  History of COPD, sleep apnea  Recently diagnosed with type 2 diabetes  He has been started on Metformin  He is upset about diagnosis of diabetes but is willing to make changes  Recent A1c 6 9  He physical activity is severly limited can walk 100 ft only then he is short of breath  He does have an O2 concentrator that he uses occasionally  He was resistant to test his glucose but is no agree able to test a few times a week at various times  He will be attending class in April at the [facility] location  Active Problems    1  Chest pain, atypical (786 59) (R07 89)   2  Chronic obstructive pulmonary disease (496) (J44 9)   3  Diabetes mellitus type 2, uncomplicated (635 98) (V68 9)   4  Easy fatigability (780 79) (R53 83)   5  Elevated fasting blood sugar (790 21) (R73 01)   6  History of BPH (V13 89) (Z87 438)   7  HTN (hypertension) (401 9) (I10)   8  CHI (obstructive sleep apnea) (327 23) (G47 33)   9  Shortness of breath (786 05) (R06 02)   10  Watery eyes (375 20) (H04 203)    Past Medical History    1  History of herpes zoster (V12 09) (Z86 19)   2   History of hypertension (V12 59) (Z86 79)   3  History of mycoplasma pneumonia (V12 61) (Z87 01)   4  History of obesity (V13 89) (Z86 39)   5  History of osteoarthritis (V13 4) (Z87 39)   6  History of renal calculi (V13 01) (Z87 442)   7  History of Screening for colon cancer (V76 51) (Z12 11)   8  History of Screening for prostate cancer (V76 44) (Z12 5)    Surgical History    1  History of Hemorrhoidectomy   2  History of Intracapsular Cataract Extract W/ Insert Intraocular Lens Prosthesis   3  History of Neck Surgery    Family History    1  Family history of Breast Cancer (V16 3)   2  Family history of Family Health Status Of Mother -    3  Family history of cerebral aneurysm (V17 1) (Z82 49)    4  Family history of Father  At Age 80    Social History    · Alcohol Use (History)   · Cultural background   · Daily Coffee Consumption (1  Cups/Day)   · Former smoker (L07 92) (T36 568)   · Racial background    Current Meds   1  Advair Diskus 250-50 MCG/DOSE Inhalation Aerosol Powder Breath Activated; USE 1   INHALATION TWICE A DAY  RINSE MOUTH AFTER USE; Therapy: 34NPL1636 to (Evaluate:2015)  Requested for: 58Mxq9759; Last   Rx:11Yuj5257 Ordered   2  Advair Diskus 250-50 MCG/DOSE Inhalation Aerosol Powder Breath Activated; USE 1   PUFF EVERY 12 HOURS; Therapy: 92OUF4557 to (Evaluate:2017)  Requested for: 30ORC3180; Last   Rx:70Qrw6027 Ordered   3  Albuterol Sulfate (2 5 MG/3ML) 0 083% Inhalation Nebulization Solution; USE 1 UNIT   DOSE EVERY 4-6 HOURS AS NEEDED FOR WHEEZING ; Therapy: 76QUJ0104 to (Annmarie Parker)  Requested for: 05HHC4164; Last   Rx:2015 Ordered   4  Amlodipine Besy-Benazepril HCl - 5-20 MG Oral Capsule; TAKE ONE CAPSULE BY   MOUTH TWICE A DAY; Therapy: 17UDM5127 to (Maximilian Myles)  Requested for: 08MQW1467; Last   Rx:32Qnf7715 Ordered   5  Doxazosin Mesylate 8 MG Oral Tablet; take 1 tablet by mouth every day;    Therapy: 35MAZ3964 to (Evaluate:2017)  Requested for: 64JKK2980; Last   Rx:78Kwu5537 Ordered   6  Furosemide 40 MG Oral Tablet; Take 1 or 2 tabs daily  Requested for: 34Brs5502; Last   Rx:71Huh2849 Ordered   7  Labetalol HCl - 300 MG Oral Tablet; take 1 tablet by mouth twice a day; Therapy: 82COI9684 to (Matt Coy)  Requested for: 35BSS1919; Last   Rx:13Mar2015 Ordered   8  MetFORMIN HCl - 500 MG Oral Tablet; Take 1 tablet daily; Therapy: 21Jan2016 to (Evaluate:19Jan2017)  Requested for: 17IGE0727; Last   Rx:25Jan2016 Ordered   9  Spiriva HandiHaler 18 MCG Inhalation Capsule; INHALE CONTENTS OF 1 CAPSULE   ONCE DAILY; Therapy: 15Owg3466 to (Evaluate:05Jan2017)  Requested for: 85UJP5067; Last   Rx:11Jan2016 Ordered   10  Spiriva Respimat 2 5 MCG/ACT Inhalation Aerosol Solution; INHALE 2 PUFFS ONCE    DAILY; Therapy: 66TNJ6870 to (Evaluate:29Jan2017)  Requested for: 13EDU7907; Last    Rx:08Jzn3299 Ordered   11  Ventolin  (90 Base) MCG/ACT Inhalation Aerosol Solution; INHALE 2 PUFFS    EVERY 4-6 HOURS AS NEEDED; Therapy: 99ARD1670 to )  Requested for: 51HFG0346; Last    Rx:77Mrc2828 Ordered    Allergies    1  No Known Drug Allergies    2  No Known Environmental Allergies   3  No Known Food Allergies    Results/Data  Encounter Results   DSMT/MNT Time Record 88UBK2653 02:26PM Haily Crown     Test Name Result Flag Reference   Date of Service 3/2/16     Start - Stop Time 9:45 am - 10 am     Total MInutes 75     Group Or Individual Instruction ind       End of Encounter Meds    1  Advair Diskus 250-50 MCG/DOSE Inhalation Aerosol Powder Breath Activated; USE 1   INHALATION TWICE A DAY  RINSE MOUTH AFTER USE; Therapy: 49VSW6402 to (Evaluate:13Oct2015)  Requested for: 17Bww3332; Last   Rx:58Iav4564 Ordered   2  Advair Diskus 250-50 MCG/DOSE Inhalation Aerosol Powder Breath Activated; USE 1   PUFF EVERY 12 HOURS; Therapy: 34DKX7505 to (Evaluate:29Jan2017)  Requested for: 45VZF2737; Last   Rx:87Wcq3543 Ordered   3   Albuterol Sulfate (2 5 MG/3ML) 0 083% Inhalation Nebulization Solution; USE 1 UNIT   DOSE EVERY 4-6 HOURS AS NEEDED FOR WHEEZING ; Therapy: 38RIB0244 to (Leodan Gee)  Requested for: 90AON3915; Last   Rx:12Jun2015 Ordered   4  Spiriva HandiHaler 18 MCG Inhalation Capsule; INHALE CONTENTS OF 1 CAPSULE   ONCE DAILY; Therapy: 15Wxg8251 to (Evaluate:05Jan2017)  Requested for: 58GRK0558; Last   Rx:11Jan2016 Ordered   5  Spiriva Respimat 2 5 MCG/ACT Inhalation Aerosol Solution; INHALE 2 PUFFS ONCE   DAILY; Therapy: 32KAJ4953 to (Evaluate:29Jan2017)  Requested for: 89IXH3221; Last   Rx:16Ree7796 Ordered   6  Ventolin  (90 Base) MCG/ACT Inhalation Aerosol Solution; INHALE 2 PUFFS   EVERY 4-6 HOURS AS NEEDED; Therapy: 47UYL3907 to )  Requested for: 95TMZ5622; Last   Rx:28Wok4608 Ordered    7  MetFORMIN HCl - 500 MG Oral Tablet; Take 1 tablet daily; Therapy: 21Jan2016 to (Evaluate:19Jan2017)  Requested for: 17DCR1406; Last   Rx:25Jan2016 Ordered    8  Doxazosin Mesylate 8 MG Oral Tablet; take 1 tablet by mouth every day; Therapy: 49ASQ6326 to (Evaluate:28Jan2017)  Requested for: 08RMN0219; Last   Rx:13Esg0235 Ordered    9  Amlodipine Besy-Benazepril HCl - 5-20 MG Oral Capsule; TAKE ONE CAPSULE BY   MOUTH TWICE A DAY; Therapy: 31PYP2018 to (Rachid Parsons)  Requested for: 02BZU8933; Last   Rx:44Amb6147 Ordered   10  Labetalol HCl - 300 MG Oral Tablet; take 1 tablet by mouth twice a day; Therapy: 29TOA3554 to (Meri Burkitt)  Requested for: 69AAW4294; Last    Rx:13Mar2015 Ordered    11  Furosemide 40 MG Oral Tablet; Take 1 or 2 tabs daily  Requested for: 69Utp3474; Last    Rx:38Uto1248 Ordered    Signatures   Electronically signed by : TERRY Mcmullen;  Mar  2 2016  2:46PM EST                       (Author)    Electronically signed by : JONES Martinez ; Mar  2 2016  4:10PM EST

## 2018-01-17 NOTE — PROGRESS NOTES
Assessment   1  Chronic obstructive pulmonary disease, unspecified COPD type (496) (J44 9)   2  Diabetes mellitus type 2, uncomplicated (233 43) (G59 3)   3  HTN (hypertension) (401 9) (I10)   4  CHI (obstructive sleep apnea) (327 23) (G47 33)   · does not use jask-has some O2 for hs    5  Cervicalgia (723 1) (M54 2)    Plan   Cervicalgia    · DiazePAM 5 MG Oral Tablet; TAKE 1 TABLET  AS NEEDED for neck spasm  Chronic obstructive pulmonary disease, unspecified COPD type    · Follow-up visit in 6 months Evaluation and Treatment  Follow-up  Status: Hold For - Scheduling     Requested for: 89NTP6267  Diabetes mellitus type 2, uncomplicated    · (1) HEMOGLOBIN A1C; Status:Active; Requested URU:52SNH1808;   PMH: History of shortness of breath    · Furosemide 40 MG Oral Tablet; Take 1 or 2 tabs daily    Discussion/Summary   Self Referrals:    Self Referrals: Yes      Chief Complaint   Chief Complaint Free Text Note Form: Pt here for f/u today  I sent in his lasix to pharm  cur labs in chart  dk   sl pulm red martin since last here  History of Present Illness   HPI: Diabetes-good control  Does not check sugars  does watch diet       is stable  perhaps a bit worse than a few years ago  Up to date on shots     reviewed with the patient    Obstructive Sleep Apnea (Follow-Up): The patient is being seen for follow-up of obstructive sleep apnea and does not wear sleep mask  wear O2 at bedtime ;  The patient reports doing well  Hypertension (Follow-Up): The patient presents for follow-up of essential hypertension  The patient states he has been doing well with his blood pressure control since the last visit  Symptoms:      Review of Systems   Complete-Male:      Constitutional: No fever or chills, feels well, no tiredness, no recent weight gain or weight loss  Cardiovascular: No complaints of slow heart rate, no fast heart rate, no chest pain, no palpitations, no leg claudication, no lower extremity  Respiratory: as noted in HPI  Gastrointestinal: No complaints of abdominal pain, no constipation, no nausea or vomiting, no diarrhea or bloody stools  Genitourinary: No complaints of dysuria, no incontinence, no hesitancy, no nocturia, no genital lesion, no testicular pain  Integumentary: Gets spasm in the neck for which he has diazepam in the past       Active Problems   1  Active advance directive (V49 89) (Z78 9)   2  Chronic obstructive pulmonary disease, unspecified COPD type (496) (J44 9)   3  Diabetes mellitus type 2, uncomplicated (547 58) (V70 9)   4  History of BPH (V13 89) (Z87 438)   5  HTN (hypertension) (401 9) (I10)   6  CHI (obstructive sleep apnea) (327 23) (G47 33)    Past Medical History   1  History of herpes zoster (V12 09) (Z86 19)   2  History of hypertension (V12 59) (Z86 79)   3  History of mycoplasma pneumonia (V12 61) (Z87 01)   4  History of obesity (V12 29) (Z86 39)   5  History of osteoarthritis (V13 4) (Z87 39)   6  History of renal calculi (V13 01) (Z87 442)   7  History of Screening for colon cancer (V76 51) (Z12 11)   8  History of Screening for prostate cancer (V76 44) (Z12 5)    Surgical History   1  History of Hemorrhoidectomy   2  History of Intracapsular Cataract Extract W/ Insert Intraocular Lens Prosthesis   3  History of Neck Surgery    Family History   Mother    1  Family history of Breast Cancer (V16 3)   2  Family history of Family Health Status Of Mother -    3  Family history of cerebral aneurysm (V17 1) (Z82 49)  Father    4  Family history of Father  At Age 80  Family History    5  Denied: Family history of substance abuse   6   Denied: Family history of Mental problem    Social History    · Active advance directive (V49 89) (Z78 9)   · Alcohol Use (History)   · Cultural background   · Daily Coffee Consumption (1  Cups/Day)   · Denied: History of Exercise habits   · Former smoker (V15 82) (K 179)   · Good dental hygiene   · Living Situation: Supportive and safe   · Racial background   · Spouse/family support    Current Meds    1  Advair Diskus 250-50 MCG/DOSE Inhalation Aerosol Powder Breath Activated; INHALE 1 PUFF     EVERY 12 HOURS; Therapy: 95HVH9883 to (Evaluate:11Oct2018)  Requested for: 24CJX1685; Last Rx:16Oct2017     Ordered   2  Amlodipine Besy-Benazepril HCl - 5-20 MG Oral Capsule; TAKE ONE CAPSULE BY MOUTH TWICE A     DAY; Therapy: 02BBO2632 to (Evaluate:25Apr2018)  Requested for: 90Vez8281; Last Rx:30Apr2017     Ordered   3  Doxazosin Mesylate 8 MG Oral Tablet; take 1 tablet by mouth every day; Therapy: 53SPV6396 to (Evaluate:05Jan2018)  Requested for: 35ISL1676; Last Rx:10Jan2017     Ordered   4  Furosemide 40 MG Oral Tablet; Take 1 or 2 tabs daily; Therapy: 81MGI9621 to (Evaluate:26Mar2017)  Requested for: 87GDL1344; Last Rx:71Toi4884     Ordered   5  Labetalol HCl - 300 MG Oral Tablet; take 1 tablet by mouth twice a day; Therapy: 42MSF0553 to 02) [phone])  Requested for: 40EEY6645; Last Rx:08Nha1005     Ordered   6  MetFORMIN HCl - 500 MG Oral Tablet; Take 1 tablet daily; Therapy: 79LBE2970 to (Evaluate:03Jan2019)  Requested for: 10DOT0771; Last Rx:08Jan2018     Ordered   7  ProAir  (90 Base) MCG/ACT Inhalation Aerosol Solution; INHALE 2 PUFFS EVERY 4-6     HOURS AS NEEDED; Therapy: 50LKZ3911 to (Last Rx:02Cmc7187)  Requested for: 78HJK9576 Ordered   8  Spiriva Respimat 2 5 MCG/ACT Inhalation Aerosol Solution; INHALE 2 PUFFS ONCE DAILY; Therapy: 43JGF2380 to (Evaluate:91Hwt7618)  Requested for: 82Qao5969; Last Rx:69Nhd1693     Ordered  Medication List Reviewed: The medication list was reviewed and updated today  Allergies   1  No Known Drug Allergies  2  No Known Environmental Allergies   3   No Known Food Allergies    Vitals   Vital Signs    Recorded: 97MHC9145 09:12AM Recorded: 24TVV2065 08:56AM   Heart Rate  74   Respiration  16   Systolic 465 829, LUE, Sitting Diastolic 78 78, LUE, Sitting   BP CUFF SIZE  Large   Height  6 ft 1 5 in   Weight  306 lb    BMI Calculated  39 82   BSA Calculated  2 59     Physical Exam        Constitutional      General appearance: No acute distress, well appearing and well nourished  Pulmonary      Auscultation of lungs: Abnormal  -- dec BS  Cardiovascular      Auscultation of heart: Normal rate and rhythm, normal S1 and S2, without murmurs  -- plus 1 edema  Abdomen      Abdomen: Non-tender, no masses  Liver and spleen: No hepatomegaly or splenomegaly  Musculoskeletal      Inspection/palpation of joints, bones, and muscles: Abnormal  -- some dwec ROM of the neck  Additional Exam:  no thyromegaly or bruits           Signatures    Electronically signed by : JONES Maya ; Jan 16 2018  9:18AM EST                       (Author)

## 2018-01-18 NOTE — RESULT NOTES
Message   Call chest x-ray okay I'm still waiting to see the hemoglobin A1c     Verified Results  * XR CHEST PA & LATERAL 34DAR7530 05:23PM Sammie Singh Order Number: [id number]   Performing Comments: Right-sided chest pain     Test Name Result Flag Reference   XR CHEST PA & LATERAL (Report)     CHEST      INDICATION: Right-sided chest pain for 10 days  History of COPD  COMPARISON: 6/10/2015     VIEWS: Frontal and lateral projections; 2 images     FINDINGS:        Cardiomediastinal silhouette appears unremarkable  The lungs are hyperlucent suggesting COPD  Stable calcified granuloma in the left lower lung zone  Stable elevated left hemidiaphragm  No pneumothorax or pleural effusion  Visualized osseous structures appear within normal limits for the patient's age  IMPRESSION:     COPD  No active pulmonary disease  Signed by:    Maria Esther Wade MD   1/18/16

## 2018-01-19 NOTE — PROGRESS NOTES
Assessment   1  Chronic obstructive pulmonary disease, unspecified COPD type (496) (J44 9)   2  Diabetes mellitus type 2, uncomplicated (224 75) (Z26 6)   3  HTN (hypertension) (401 9) (I10)   4  CHI (obstructive sleep apnea) (327 23) (G47 33)   · does not use jask-has some O2 for hs    5  Cervicalgia (723 1) (M54 2)    Plan   Cervicalgia    · DiazePAM 5 MG Oral Tablet; TAKE 1 TABLET  AS NEEDED for neck spasm  Diabetes mellitus type 2, uncomplicated    · (1) HEMOGLOBIN A1C; Status:Active; Requested DANITA:[id number];   PMH: History of shortness of breath    · Furosemide 40 MG Oral Tablet; Take 1 or 2 tabs daily    Active Problems   1  Active advance directive (V49 89) (Z78 9)   2  Cervicalgia (723 1) (M54 2)   3  Chronic obstructive pulmonary disease, unspecified COPD type (496) (J44 9)   4  Diabetes mellitus type 2, uncomplicated (418 07) (N69 4)   5  History of BPH (V13 89) (Z87 438)   6  HTN (hypertension) (401 9) (I10)   7   CHI (obstructive sleep apnea) (327 23) (G47 33)    Past Medical History    · History of herpes zoster (V12 09) (Z86 19)   · History of hypertension (V12 59) (Z86 79)   · History of mycoplasma pneumonia (V12 61) (Z87 01)   · History of obesity (V12 29) (Z86 39)   · History of osteoarthritis (V13 4) (Z87 39)   · History of renal calculi (V13 01) (Z87 442)   · History of Screening for colon cancer (V76 51) (Z12 11)   · History of Screening for prostate cancer (V76 44) (Z12 5)    Surgical History    · History of Hemorrhoidectomy   · History of Intracapsular Cataract Extract W/ Insert Intraocular Lens Prosthesis   · History of Neck Surgery    Family History   Mother    · Family history of Breast Cancer (V16 3)   · Family history of Family Health Status Of Mother -    · Family history of cerebral aneurysm (V17 1) (Z82 49)  Father    · Family history of Father  At Age 80  Family History    · Denied: Family history of substance abuse   · Denied: Family history of Mental problem    Social History    · Active advance directive (V49 89) (Z78 9)   · Alcohol Use (History)   · 2 to 3 beers a month   · Cultural background   ·    · Daily Coffee Consumption (1  Cups/Day)   · Denied: History of Exercise habits   · Former smoker (M44 36) (O83 535)   · Quit 15 years ago (1998)   Smoked for 30 years a pack to a pack and a half of cigarettes a        day  · Good dental hygiene   · Living Situation: Supportive and safe   · Racial background   · white   · Spouse/family support    Current Meds    1  Advair Diskus 250-50 MCG/DOSE Inhalation Aerosol Powder Breath Activated; INHALE     1 PUFF EVERY 12 HOURS; Therapy: 78RXA1220 to (Evaluate:11Oct2018)  Requested for: 13JDW1222; Last     Rx:16Oct2017 Ordered   2  Amlodipine Besy-Benazepril HCl - 5-20 MG Oral Capsule; TAKE ONE CAPSULE BY     MOUTH TWICE A DAY; Therapy: 95ABA8544 to (Evaluate:25Apr2018)  Requested for: 30Apr2017; Last     Rx:30Apr2017 Ordered   3  Doxazosin Mesylate 8 MG Oral Tablet; take 1 tablet by mouth every day; Therapy: 63BVY8591 to (Evaluate:05Jan2018)  Requested for: 99NAQ2215; Last     Rx:10Jan2017 Ordered   4  Furosemide 40 MG Oral Tablet; Take 1 or 2 tabs daily; Therapy: 17BVI7824 to (Evaluate:26Mar2017)  Requested for: 03UZN1099; Last     Rx:01Gal6431 Ordered   5  Labetalol HCl - 300 MG Oral Tablet; take 1 tablet by mouth twice a day; Therapy: 30ORP3416 to [phone])  Requested for: 32DSM4093; Last     Rx:04Trx3174 Ordered   6  MetFORMIN HCl - 500 MG Oral Tablet; Take 1 tablet daily; Therapy: 52VFB1526 to (Evaluate:03Jan2019)  Requested for: 07XPN0646; Last     Rx:08Jan2018 Ordered   7  ProAir  (90 Base) MCG/ACT Inhalation Aerosol Solution; INHALE 2 PUFFS     EVERY 4-6 HOURS AS NEEDED; Therapy: 19ZEB7091 to (Last Rx:81Fuy8253)  Requested for: 20NFA6951 Ordered   8  Spiriva Respimat 2 5 MCG/ACT Inhalation Aerosol Solution; INHALE 2 PUFFS ONCE     DAILY;      Therapy: 17MGK5695 to (Evaluate:32Fuk1242)  Requested for: 94Gpw6185; Last     Rx:00Tec7454 Ordered    Allergies   1  No Known Drug Allergies  2  No Known Environmental Allergies   3  No Known Food Allergies    Vitals    Recorded: 74BZC5016 09:12AM Recorded: 49YAU6317 08:56AM   Heart Rate  74   Respiration  16   Systolic 506 352, LUE, Sitting   Diastolic 78 78, LUE, Sitting   BP CUFF SIZE  Large   Height  6 ft 1 5 in   Weight  306 lb    BMI Calculated  39 82   BSA Calculated  2 59     End of Encounter Meds   1  DiazePAM 5 MG Oral Tablet; TAKE 1 TABLET  AS NEEDED for neck spasm; Therapy: 33XKV1995 to (Evaluate:15Feb2018); Last Rx:16Jan2018 Ordered  2  MetFORMIN HCl - 500 MG Oral Tablet; Take 1 tablet daily; Therapy: 58HMY6290 to (Evaluate:03Jan2019)  Requested for: 50XSG0672; Last     Rx:08Jan2018 Ordered  3  Doxazosin Mesylate 8 MG Oral Tablet; take 1 tablet by mouth every day; Therapy: 93JQZ7175 to (Evaluate:05Jan2018)  Requested for: 56MJK4151; Last     Rx:10Jan2017 Ordered  4  Amlodipine Besy-Benazepril HCl - 5-20 MG Oral Capsule; TAKE ONE CAPSULE BY     MOUTH TWICE A DAY; Therapy: 41VSO4851 to (Evaluate:25Apr2018)  Requested for: 21Hzj3334; Last     Rx:30Apr2017 Ordered   5  Labetalol HCl - 300 MG Oral Tablet; take 1 tablet by mouth twice a day; Therapy: 38RNS0171 to 9397 8822)  Requested for: 76NFW5661; Last     Rx:47Muh5703 Ordered  6  Advair Diskus 250-50 MCG/DOSE Inhalation Aerosol Powder Breath Activated; INHALE     1 PUFF EVERY 12 HOURS; Therapy: 58AAX1889 to (Evaluate:11Oct2018)  Requested for: 10UWG9198; Last     Rx:16Oct2017 Ordered   7  ProAir  (90 Base) MCG/ACT Inhalation Aerosol Solution; INHALE 2 PUFFS     EVERY 4-6 HOURS AS NEEDED; Therapy: 98ZBU1836 to (Last Rx:83Zaw8982)  Requested for: 51FXH2377 Ordered   8  Spiriva Respimat 2 5 MCG/ACT Inhalation Aerosol Solution; INHALE 2 PUFFS ONCE     DAILY;      Therapy: 03YNF1473 to (Evaluate:06Rqa7846)  Requested for: [phone]; Last Rx: 13OEO2024 Ordered  9  Furosemide 40 MG Oral Tablet; Take 1 or 2 tabs daily; Therapy: 94EKU2917 to (Evaluate:11Jan2019)  Requested for: 44APY5147;  Last     Rx:16Jan2018 Ordered    Signatures    Electronically signed by : JONES Levin ; Jan 18 2018  6:03PM EST

## 2018-01-22 ENCOUNTER — GENERIC CONVERSION - ENCOUNTER (OUTPATIENT)
Dept: OTHER | Facility: OTHER | Age: 83
End: 2018-01-22

## 2018-01-23 VITALS
DIASTOLIC BLOOD PRESSURE: 78 MMHG | HEIGHT: 74 IN | WEIGHT: 306 LBS | RESPIRATION RATE: 16 BRPM | SYSTOLIC BLOOD PRESSURE: 130 MMHG | HEART RATE: 74 BPM | BODY MASS INDEX: 39.27 KG/M2

## 2018-01-23 NOTE — MISCELLANEOUS
Message   Date: 27 Dec 2017 9:30 AM EST, Recorded By: Joana Brown For: Jennifer Loser: Yamila Ross, Self   Phone: [phone] (Home)   Reason: Renew Medication   this is an FYI - PT called yelling at us because he's calling stating he's out of his Spiriva and it's what keeping him ALIVE i informed him that from the time the patient calls me it can take UPTO 72 hrs for the medication to go thru he states in 3 yrs he has always called when he's out of meds and it has never been an issue of waiting 3 days they just immediately call it in  i informed him that this has always been the policy  He states that he will have some words for the Dr  on his next visit   Gregory did send his medication         Signatures   Electronically signed by : Yu Dunaway, ; Dec 27 2017  9:44AM EST                       (Author)

## 2018-01-23 NOTE — CONSULTS
Active Problems    1  Active advance directive (V49 89) (Z78 9)   2  Cervicalgia (723 1) (M54 2)   3  Chronic obstructive pulmonary disease, unspecified COPD type (496) (J44 9)   4  Diabetes mellitus type 2, uncomplicated (899 85) (I14 3)   5  History of BPH (V13 89) (Z87 438)   6  HTN (hypertension) (401 9) (I10)   7  CHI (obstructive sleep apnea) (327 23) (G47 33)    Past Medical History    · History of herpes zoster (V12 09) (Z86 19)   · History of hypertension (V12 59) (Z86 79)   · History of mycoplasma pneumonia (V12 61) (Z87 01)   · History of obesity (V12 29) (Z86 39)   · History of osteoarthritis (V13 4) (Z87 39)   · History of renal calculi (V13 01) (Z87 442)   · History of Screening for colon cancer (V76 51) (Z12 11)   · History of Screening for prostate cancer (V76 44) (Z12 5)    Surgical History    · History of Hemorrhoidectomy   · History of Intracapsular Cataract Extract W/ Insert Intraocular Lens Prosthesis   · History of Neck Surgery    Family History    · Family history of Breast Cancer (V16 3)   · Family history of Family Health Status Of Mother -    · Family history of cerebral aneurysm (V17 1) (Z82 49)    · Family history of Father  At Age 80    · Denied: Family history of substance abuse   · Denied: Family history of Mental problem    Social History    · Active advance directive (V49 89) (Z78 9)   · Alcohol Use (History)   · 2 to 3 beers a month   · Cultural background   ·    · Daily Coffee Consumption (1  Cups/Day)   · Denied: History of Exercise habits   · Former smoker (V15 82) (M4 198)   · Quit 15 years ago ()   Smoked for 30 years a pack to a pack and a half of cigarettes a      day  · Good dental hygiene   · Living Situation: Supportive and safe   · Racial background   · white   · Spouse/family support    Current Meds   1  Advair Diskus 250-50 MCG/DOSE Inhalation Aerosol Powder Breath Activated; INHALE 1   PUFF EVERY 12 HOURS;    Therapy: 17UNW6175 to (Evaluate:11Oct2018)  Requested for: 70BLC3505; Last   Rx:37Sdj6134 Ordered   2  Amlodipine Besy-Benazepril HCl - 5-20 MG Oral Capsule; TAKE ONE CAPSULE BY   MOUTH TWICE A DAY; Therapy: 74YUH3351 to (Evaluate:25Apr2018)  Requested for: 73Sfe3081; Last   Rx:30Apr2017 Ordered   3  Doxazosin Mesylate 8 MG Oral Tablet; take 1 tablet by mouth every day; Therapy: 01SHJ4741 to (Evaluate:05Jan2018)  Requested for: 27ROV8717; Last   Rx:10Jan2017 Ordered   4  Furosemide 40 MG Oral Tablet; Take 1 or 2 tabs daily; Therapy: 28EHO4317 to (Evaluate:26Mar2017)  Requested for: 05NPH8559; Last   Rx:69Snn8963 Ordered   5  Labetalol HCl - 300 MG Oral Tablet; take 1 tablet by mouth twice a day; Therapy: 88BKT0683 to )  Requested for: 36PBR1126; Last   Rx:70Dod1613 Ordered   6  MetFORMIN HCl - 500 MG Oral Tablet; Take 1 tablet daily; Therapy: 66BCI7318 to (Evaluate:03Jan2019)  Requested for: 11OLV1777; Last   Rx:08Jan2018 Ordered   7  ProAir  (90 Base) MCG/ACT Inhalation Aerosol Solution; INHALE 2 PUFFS   EVERY 4-6 HOURS AS NEEDED; Therapy: 10XZR8360 to (Last Rx:24Efh6776)  Requested for: 96CUM3563 Ordered   8  Spiriva Respimat 2 5 MCG/ACT Inhalation Aerosol Solution; INHALE 2 PUFFS ONCE   DAILY; Therapy: 68ECS0021 to (Evaluate:23Sep2018)  Requested for: 74Pwv5907; Last   Rx:58Suj5511 Ordered    Allergies    1  No Known Drug Allergies    2  No Known Environmental Allergies   3  No Known Food Allergies    Vitals  Signs    Systolic: 841  Diastolic: 78   Heart Rate: 74  Respiration: 16  Systolic: 523, LUE, Sitting  Diastolic: 78, LUE, Sitting  BP Cuff Size: Large  Height: 6 ft 1 5 in  Weight: 306 lb   BMI Calculated: 39 82  BSA Calculated: 2 59    Assessment    1  Chronic obstructive pulmonary disease, unspecified COPD type (496) (J44 9)   2  Diabetes mellitus type 2, uncomplicated (728 67) (A33 3)   3  HTN (hypertension) (401 9) (I10)   4   CHI (obstructive sleep apnea) (327 23) (G47 33)   · does not use jask-has some O2 for hs    5  Cervicalgia (723 1) (M54 2)    Plan  Cervicalgia    · DiazePAM 5 MG Oral Tablet; TAKE 1 TABLET  AS NEEDED for neck spasm  Diabetes mellitus type 2, uncomplicated    · (1) HEMOGLOBIN A1C; Status:Active; Requested IGX:34RCC9947;   PMH: History of shortness of breath    · Furosemide 40 MG Oral Tablet; Take 1 or 2 tabs daily    End of Encounter Meds    1  DiazePAM 5 MG Oral Tablet; TAKE 1 TABLET  AS NEEDED for neck spasm; Therapy: 83CGN4484 to (Evaluate:41Jmd9297); Last Rx:16Jan2018 Ordered    2  MetFORMIN HCl - 500 MG Oral Tablet; Take 1 tablet daily; Therapy: 75SQM0931 to (Evaluate:03Jan2019)  Requested for: 89QMP0123; Last   Rx:08Jan2018 Ordered    3  Doxazosin Mesylate 8 MG Oral Tablet; take 1 tablet by mouth every day; Therapy: 83OVA0720 to (Evaluate:05Jan2018)  Requested for: 44BQE5084; Last   Rx:10Jan2017 Ordered    4  Amlodipine Besy-Benazepril HCl - 5-20 MG Oral Capsule; TAKE ONE CAPSULE BY   MOUTH TWICE A DAY; Therapy: 93VTZ9872 to (Evaluate:25Apr2018)  Requested for: 23Nka3244; Last   Rx:30Apr2017 Ordered   5  Labetalol HCl - 300 MG Oral Tablet; take 1 tablet by mouth twice a day; Therapy: 81ASC6276 to 9397 8822)  Requested for: 37SRG5075; Last   Rx:00Wcm9267 Ordered    6  Advair Diskus 250-50 MCG/DOSE Inhalation Aerosol Powder Breath Activated; INHALE 1   PUFF EVERY 12 HOURS; Therapy: 22BKZ6910 to (Evaluate:11Oct2018)  Requested for: 52ZSX0579; Last   Rx:16Oct2017 Ordered   7  ProAir  (90 Base) MCG/ACT Inhalation Aerosol Solution; INHALE 2 PUFFS   EVERY 4-6 HOURS AS NEEDED; Therapy: 48QKX4648 to (Last Rx:01Mcg0192)  Requested for: 96MFI2217 Ordered   8  Spiriva Respimat 2 5 MCG/ACT Inhalation Aerosol Solution; INHALE 2 PUFFS ONCE   DAILY; Therapy: 12TGD7575 to (Evaluate:83Awe5810)  Requested for: 37Gby1917; Last   Rx:23Ous1894 Ordered    9  Furosemide 40 MG Oral Tablet; Take 1 or 2 tabs daily;    Therapy: 09GUX4086 to (Evaluate:11Jan2019) Requested for: 88TIE8418;  Last   Rx:17Liy6209 Ordered    Signatures   Electronically signed by : JONES Del Angel ; Jan 18 2018  6:03PM EST

## 2018-01-29 DIAGNOSIS — I10 ESSENTIAL HYPERTENSION: Primary | ICD-10-CM

## 2018-01-29 RX ORDER — DOXAZOSIN 8 MG/1
8 TABLET ORAL
Qty: 90 TABLET | Refills: 3 | Status: SHIPPED | OUTPATIENT
Start: 2018-01-29 | End: 2018-11-01 | Stop reason: SDUPTHER

## 2018-02-23 DIAGNOSIS — I10 ESSENTIAL HYPERTENSION, BENIGN: Primary | ICD-10-CM

## 2018-02-23 RX ORDER — LABETALOL 300 MG/1
300 TABLET, FILM COATED ORAL 2 TIMES DAILY
Qty: 180 TABLET | Refills: 3 | Status: SHIPPED | OUTPATIENT
Start: 2018-02-23 | End: 2019-02-21 | Stop reason: SDUPTHER

## 2018-03-14 ENCOUNTER — OFFICE VISIT (OUTPATIENT)
Dept: PULMONOLOGY | Facility: HOSPITAL | Age: 83
End: 2018-03-14
Payer: COMMERCIAL

## 2018-03-14 VITALS
RESPIRATION RATE: 16 BRPM | TEMPERATURE: 97.2 F | DIASTOLIC BLOOD PRESSURE: 84 MMHG | HEIGHT: 73 IN | WEIGHT: 312 LBS | BODY MASS INDEX: 41.35 KG/M2 | HEART RATE: 94 BPM | SYSTOLIC BLOOD PRESSURE: 142 MMHG | OXYGEN SATURATION: 93 %

## 2018-03-14 DIAGNOSIS — J44.9 CHRONIC OBSTRUCTIVE PULMONARY DISEASE, UNSPECIFIED COPD TYPE (HCC): Primary | ICD-10-CM

## 2018-03-14 PROCEDURE — 99214 OFFICE O/P EST MOD 30 MIN: CPT | Performed by: INTERNAL MEDICINE

## 2018-03-14 RX ORDER — DIAZEPAM 5 MG/1
TABLET ORAL
COMMUNITY
Start: 2018-01-16 | End: 2021-03-02

## 2018-03-14 RX ORDER — AMLODIPINE BESYLATE AND BENAZEPRIL HYDROCHLORIDE 5; 20 MG/1; MG/1
1 CAPSULE ORAL 2 TIMES DAILY
COMMUNITY
Start: 2015-05-15 | End: 2018-04-27 | Stop reason: SDUPTHER

## 2018-03-14 RX ORDER — LABETALOL 300 MG/1
1 TABLET, FILM COATED ORAL 2 TIMES DAILY
COMMUNITY
Start: 2015-03-13 | End: 2018-07-26 | Stop reason: SDUPTHER

## 2018-03-14 RX ORDER — DOXAZOSIN 8 MG/1
1 TABLET ORAL DAILY
COMMUNITY
Start: 2015-02-09 | End: 2018-07-26 | Stop reason: SDUPTHER

## 2018-03-14 RX ORDER — FUROSEMIDE 40 MG/1
TABLET ORAL
COMMUNITY
Start: 2016-09-27 | End: 2019-06-05 | Stop reason: SDUPTHER

## 2018-03-14 NOTE — PROGRESS NOTES
Assessment/Plan:    Chronic obstructive pulmonary disease [facility])  Mr Syl Herrera and I spent time today discussing alternatives to his current inhaled regimen  We discussed the staples of COPD therapy including inhaled corticosteroid long-acting beta agonist and long-acting muscarinic agents  We discussed the differences between Advair and Spiriva versus Anoro  At this time is quite stable and has had no exacerbations despite his severe disease we decided to stay on Advair and Spiriva  Will follow up with him in 6 months  He is up-to-date on his flu and pneumonia vaccine       Diagnoses and all orders for this visit:    Chronic obstructive pulmonary disease, unspecified COPD type ([facility] [address] )    Other orders  -     fluticasone-salmeterol (ADVAIR DISKUS) 250-50 mcg/dose inhaler; Inhale  -     labetalol (NORMODYNE) 300 mg tablet; Take 1 tablet by mouth 2 (two) times a day  -     doxazosin (CARDURA) 8 MG tablet; Take 1 tablet by mouth daily  -     Tiotropium Bromide Monohydrate (SPIRIVA RESPIMAT) 2 5 MCG/ACT AERS; Inhale 2 puffs daily  -     metFORMIN (GLUCOPHAGE) 500 mg tablet; Take 1 tablet by mouth daily  -     furosemide (LASIX) 40 mg tablet; Take by mouth  -     diazepam (VALIUM) 5 mg tablet; Take by mouth  -     amLODIPine-benazepril (LOTREL 5-20) 5-20 MG per capsule; Take 1 capsule by mouth 2 (two) times a day          Subjective:      Patient ID: Lupillo Culver is a 81 y o  male  HPI    The following portions of the patient's history were reviewed and updated as appropriate: allergies, current medications, past family history, past medical history, past social history, past surgical history and problem list     Review of Systems   Constitutional: Negative  HENT: Negative  Eyes: Negative  Respiratory: Positive for shortness of breath  Cardiovascular: Negative  Gastrointestinal: Negative  Endocrine: Negative  Genitourinary: Negative  Allergic/Immunologic: Negative  Neurological: Negative  Hematological: Negative  Psychiatric/Behavioral: Negative  Objective:      /84 (BP Location: Left arm, Patient Position: Sitting, Cuff Size: Adult)   Pulse 94   Temp (!) 97 2 °F (36 2 °C) (Tympanic)   Resp 16   Ht 6' 1" (1 854 m)   Wt (!) 142 kg (312 lb)   SpO2 93% Comment: ra  BMI 41 16 kg/m²          Physical Exam   Constitutional: He is oriented to person, place, and time  He appears well-developed and well-nourished  HENT:   Head: Normocephalic  Eyes: Pupils are equal, round, and reactive to light  Neck: Neck supple  Cardiovascular: Normal rate  Pulmonary/Chest: Effort normal  No respiratory distress  He has no wheezes  He has no rales  Abdominal: Soft  Musculoskeletal: Normal range of motion  He exhibits edema  Neurological: He is alert and oriented to person, place, and time  Skin: Skin is warm and dry

## 2018-03-14 NOTE — ASSESSMENT & PLAN NOTE
Mr  Domingo Ramirez and I spent time today discussing alternatives to his current inhaled regimen  We discussed the staples of COPD therapy including inhaled corticosteroid long-acting beta agonist and long-acting muscarinic agents  We discussed the differences between Advair and Spiriva versus Anoro  At this time is quite stable and has had no exacerbations despite his severe disease we decided to stay on Advair and Spiriva  Will follow up with him in 6 months    He is up-to-date on his flu and pneumonia vaccine

## 2018-04-27 DIAGNOSIS — I10 ESSENTIAL HYPERTENSION: Primary | ICD-10-CM

## 2018-04-27 RX ORDER — AMLODIPINE BESYLATE AND BENAZEPRIL HYDROCHLORIDE 5; 20 MG/1; MG/1
CAPSULE ORAL
Qty: 180 CAPSULE | Refills: 3 | Status: SHIPPED | OUTPATIENT
Start: 2018-04-27 | End: 2019-04-18 | Stop reason: SDUPTHER

## 2018-07-23 ENCOUNTER — TRANSCRIBE ORDERS (OUTPATIENT)
Dept: ADMINISTRATIVE | Facility: HOSPITAL | Age: 83
End: 2018-07-23

## 2018-07-23 ENCOUNTER — APPOINTMENT (OUTPATIENT)
Dept: LAB | Facility: HOSPITAL | Age: 83
End: 2018-07-23
Attending: INTERNAL MEDICINE
Payer: COMMERCIAL

## 2018-07-23 DIAGNOSIS — E13.9 DIABETES MELLITUS OF OTHER TYPE WITHOUT COMPLICATION, UNSPECIFIED WHETHER LONG TERM INSULIN USE (HCC): ICD-10-CM

## 2018-07-23 DIAGNOSIS — E13.9 DIABETES MELLITUS OF OTHER TYPE WITHOUT COMPLICATION, UNSPECIFIED WHETHER LONG TERM INSULIN USE (HCC): Primary | ICD-10-CM

## 2018-07-23 LAB
EST. AVERAGE GLUCOSE BLD GHB EST-MCNC: 166 MG/DL
HBA1C MFR BLD: 7.4 % (ref 4.2–6.3)

## 2018-07-23 PROCEDURE — 36415 COLL VENOUS BLD VENIPUNCTURE: CPT

## 2018-07-23 PROCEDURE — 83036 HEMOGLOBIN GLYCOSYLATED A1C: CPT

## 2018-07-26 ENCOUNTER — OFFICE VISIT (OUTPATIENT)
Dept: FAMILY MEDICINE CLINIC | Facility: HOSPITAL | Age: 83
End: 2018-07-26
Payer: COMMERCIAL

## 2018-07-26 VITALS
HEIGHT: 73 IN | DIASTOLIC BLOOD PRESSURE: 80 MMHG | SYSTOLIC BLOOD PRESSURE: 122 MMHG | WEIGHT: 301 LBS | BODY MASS INDEX: 39.89 KG/M2 | HEART RATE: 77 BPM | OXYGEN SATURATION: 92 %

## 2018-07-26 DIAGNOSIS — J43.8 OTHER EMPHYSEMA (HCC): Primary | ICD-10-CM

## 2018-07-26 DIAGNOSIS — I10 ESSENTIAL HYPERTENSION: ICD-10-CM

## 2018-07-26 DIAGNOSIS — E11.9 TYPE 2 DIABETES MELLITUS WITHOUT COMPLICATION, WITHOUT LONG-TERM CURRENT USE OF INSULIN (HCC): ICD-10-CM

## 2018-07-26 DIAGNOSIS — G47.33 OSA (OBSTRUCTIVE SLEEP APNEA): ICD-10-CM

## 2018-07-26 PROCEDURE — 1101F PT FALLS ASSESS-DOCD LE1/YR: CPT | Performed by: INTERNAL MEDICINE

## 2018-07-26 PROCEDURE — 3725F SCREEN DEPRESSION PERFORMED: CPT | Performed by: INTERNAL MEDICINE

## 2018-07-26 PROCEDURE — 99214 OFFICE O/P EST MOD 30 MIN: CPT | Performed by: INTERNAL MEDICINE

## 2018-07-26 PROCEDURE — 3079F DIAST BP 80-89 MM HG: CPT | Performed by: INTERNAL MEDICINE

## 2018-07-26 PROCEDURE — 3074F SYST BP LT 130 MM HG: CPT | Performed by: INTERNAL MEDICINE

## 2018-07-26 RX ORDER — ALBUTEROL SULFATE 90 UG/1
2 AEROSOL, METERED RESPIRATORY (INHALATION)
COMMUNITY
Start: 2017-07-11 | End: 2018-10-08 | Stop reason: SDUPTHER

## 2018-07-26 NOTE — PROGRESS NOTES
Assessment/Plan:       Diagnoses and all orders for this visit:    Other emphysema ([address] )    Type 2 diabetes mellitus without complication, without long-term current use of insulin (HCC)  -     metFORMIN (GLUCOPHAGE) 500 mg tablet; Take 1 tablet (500 mg total) by mouth 2 (two) times a day with meals  -     Hemoglobin A1C; Future  -     Basic metabolic panel; Future  -     Ambulatory referral to Ophthalmology; Future    Essential hypertension    CHI (obstructive sleep apnea)    Other orders  -     albuterol (PROAIR HFA) 90 mcg/act inhaler; Inhale 2 puffs          All of the above diagnoses have been assessed  Additional COMMENTS/PLAN: Need to get better control of DM  Will see back in 3 mo    Attention diabetes  Eye exam referral has been given as well  Subjective:      Patient ID: Chris Gary is a 81 y o  male  HPI     DM-Not checking sugars  Has not been less careful with diet  Will be doubling the metformen  Claims really not watch his diet as well  COPD-This has been somehwat worse the last few months  He is holding on to some  More edema  Has not been using every day  Not wearing O2 at night  Hypertension  Patient is here for follow-up of elevated blood pressure  He is  adherent to a low-salt diet  Patient denies headache, dizziness or lightheadedness Cardiovascular risk factors: diabetes mellitus  History of target organ damage: none  The following portions of the patient's history were revised and updated as appropriate: Problem list, allergies, med list, FH, SH, Past medical and surgical histories      Current Outpatient Prescriptions   Medication Sig Dispense Refill    albuterol (PROAIR HFA) 90 mcg/act inhaler Inhale 2 puffs      amLODIPine-benazepril (LOTREL 5-20) 5-20 MG per capsule TAKE ONE CAPSULE BY MOUTH TWICE A  capsule 3    diazepam (VALIUM) 5 mg tablet Take by mouth      doxazosin (CARDURA) 8 MG tablet Take 1 tablet (8 mg total) by mouth daily at bedtime 90 tablet 3    fluticasone-salmeterol (ADVAIR DISKUS) 250-50 mcg/dose inhaler Inhale      furosemide (LASIX) 40 mg tablet Take by mouth      labetalol (NORMODYNE) 300 mg tablet Take 1 tablet (300 mg total) by mouth 2 (two) times a day 180 tablet 3    metFORMIN (GLUCOPHAGE) 500 mg tablet Take 1 tablet (500 mg total) by mouth 2 (two) times a day with meals 90 tablet 3    Tiotropium Bromide Monohydrate (SPIRIVA RESPIMAT) 2 5 MCG/ACT AERS Inhale 2 puffs daily       No current facility-administered medications for this visit  Review of Systems   Constitutional: Negative  Respiratory: Negative  Cardiovascular: Negative  Gastrointestinal: Negative  Genitourinary: Negative  Objective:    /80 (BP Location: Left arm, Patient Position: Sitting, Cuff Size: Large)   Pulse 77   Ht 6' 1" (1 854 m)   Wt (!) 137 kg (301 lb)   SpO2 92%   BMI 39 71 kg/m²      Physical Exam   Constitutional: He appears well-developed and well-nourished  Neck: No JVD present  Cardiovascular: Normal rate and regular rhythm  Pulmonary/Chest:   Dec bs -no wheezes   Abdominal: Soft  Bowel sounds are normal    Musculoskeletal: He exhibits edema (plus 2)  Vitals reviewed          Appointment on 07/23/2018   Component Date Value Ref Range Status    Hemoglobin A1C 07/23/2018 7 4* 4 2 - 6 3 % Final    EAG 07/23/2018 166  mg/dl Final         Natalia Manuel MD

## 2018-08-07 LAB
LEFT EYE DIABETIC RETINOPATHY: NORMAL
RIGHT EYE DIABETIC RETINOPATHY: NORMAL

## 2018-08-27 ENCOUNTER — OFFICE VISIT (OUTPATIENT)
Dept: FAMILY MEDICINE CLINIC | Facility: HOSPITAL | Age: 83
End: 2018-08-27
Payer: COMMERCIAL

## 2018-08-27 ENCOUNTER — APPOINTMENT (OUTPATIENT)
Dept: LAB | Facility: HOSPITAL | Age: 83
End: 2018-08-27
Attending: INTERNAL MEDICINE
Payer: COMMERCIAL

## 2018-08-27 VITALS
TEMPERATURE: 98.2 F | WEIGHT: 302 LBS | BODY MASS INDEX: 40.02 KG/M2 | HEART RATE: 80 BPM | HEIGHT: 73 IN | RESPIRATION RATE: 16 BRPM | SYSTOLIC BLOOD PRESSURE: 148 MMHG | DIASTOLIC BLOOD PRESSURE: 78 MMHG

## 2018-08-27 DIAGNOSIS — M79.671 RIGHT FOOT PAIN: Primary | ICD-10-CM

## 2018-08-27 DIAGNOSIS — E11.9 TYPE 2 DIABETES MELLITUS WITHOUT COMPLICATION, WITHOUT LONG-TERM CURRENT USE OF INSULIN (HCC): ICD-10-CM

## 2018-08-27 DIAGNOSIS — M10.9 GOUT OF RIGHT FOOT, UNSPECIFIED CAUSE, UNSPECIFIED CHRONICITY: ICD-10-CM

## 2018-08-27 DIAGNOSIS — R60.9 EDEMA, UNSPECIFIED TYPE: ICD-10-CM

## 2018-08-27 DIAGNOSIS — E11.9 CONTROLLED TYPE 2 DIABETES MELLITUS WITHOUT COMPLICATION, WITHOUT LONG-TERM CURRENT USE OF INSULIN (HCC): ICD-10-CM

## 2018-08-27 LAB
ANION GAP SERPL CALCULATED.3IONS-SCNC: 6 MMOL/L (ref 4–13)
BUN SERPL-MCNC: 17 MG/DL (ref 5–25)
CALCIUM SERPL-MCNC: 9.1 MG/DL (ref 8.3–10.1)
CHLORIDE SERPL-SCNC: 104 MMOL/L (ref 100–108)
CO2 SERPL-SCNC: 28 MMOL/L (ref 21–32)
CREAT SERPL-MCNC: 1.36 MG/DL (ref 0.6–1.3)
EST. AVERAGE GLUCOSE BLD GHB EST-MCNC: 163 MG/DL
GFR SERPL CREATININE-BSD FRML MDRD: 48 ML/MIN/1.73SQ M
GLUCOSE P FAST SERPL-MCNC: 168 MG/DL (ref 65–99)
HBA1C MFR BLD: 7.3 % (ref 4.2–6.3)
POTASSIUM SERPL-SCNC: 3.9 MMOL/L (ref 3.5–5.3)
SODIUM SERPL-SCNC: 138 MMOL/L (ref 136–145)
URATE SERPL-MCNC: 7.6 MG/DL (ref 4.2–8)

## 2018-08-27 PROCEDURE — 83036 HEMOGLOBIN GLYCOSYLATED A1C: CPT

## 2018-08-27 PROCEDURE — 84550 ASSAY OF BLOOD/URIC ACID: CPT

## 2018-08-27 PROCEDURE — 1160F RVW MEDS BY RX/DR IN RCRD: CPT | Performed by: PHYSICIAN ASSISTANT

## 2018-08-27 PROCEDURE — 3008F BODY MASS INDEX DOCD: CPT | Performed by: PHYSICIAN ASSISTANT

## 2018-08-27 PROCEDURE — 36415 COLL VENOUS BLD VENIPUNCTURE: CPT

## 2018-08-27 PROCEDURE — 80048 BASIC METABOLIC PNL TOTAL CA: CPT

## 2018-08-27 PROCEDURE — 99213 OFFICE O/P EST LOW 20 MIN: CPT | Performed by: PHYSICIAN ASSISTANT

## 2018-08-27 RX ORDER — BLOOD-GLUCOSE METER
EACH MISCELLANEOUS 2 TIMES DAILY
Qty: 1 KIT | Refills: 1 | Status: SHIPPED | OUTPATIENT
Start: 2018-08-27 | End: 2021-06-03

## 2018-08-27 RX ORDER — LANCETS 30 GAUGE
EACH MISCELLANEOUS 2 TIMES DAILY
Qty: 100 EACH | Refills: 5 | Status: SHIPPED | OUTPATIENT
Start: 2018-08-27 | End: 2021-06-03

## 2018-08-27 NOTE — PROGRESS NOTES
Assessment/Plan:         Diagnoses and all orders for this visit:    Foot pain/edema-  DO NOT SUSPECT GOUT  Controlled type 2 diabetes mellitus without complication, without long-term current use of insulin ([facility])  -     Blood Glucose Monitoring Suppl (Rachel Mejia) w/Device KIT; by Does not apply route 2 (two) times a day  -     glucose blood (ONETOUCH VERIO) test strip; 1 each by Other route 2 (two) times a day Use as instructed  -     Lancets MISC; by Does not apply route 2 (two) times a day          Subjective:      Patient ID: Bebe Palafox is a 81 y o  male  81year old white male c/o swelling affecting both feet past few months  Swelling increased past weekend, and had small toe  pain, right foot, since last night; No recent injuries  Went to diabetes clinic about 2 years ago  Does not have monitor for glucose testing  Just had blood test for HgA1C      Had burger Saturday, and chili last night  Review of Systems   Constitutional: Negative for chills, diaphoresis, fatigue and fever  Respiratory: Positive for cough and shortness of breath  Has COPD, admits to mouth breathing on occasion  Controlled with inhalers  Cardiovascular: Positive for leg swelling  Negative for chest pain and palpitations  Gastrointestinal: Negative for abdominal pain, nausea and vomiting  Musculoskeletal: Negative for back pain, neck pain and neck stiffness  Neurological: Positive for numbness  Objective:      /78   Pulse 80   Temp 98 2 °F (36 8 °C) (Tympanic)   Resp 16   Ht 6' 1" (1 854 m)   Wt (!) 137 kg (302 lb)   BMI 39 84 kg/m²          Physical Exam   Constitutional: He is oriented to person, place, and time  He appears well-developed and well-nourished  No distress  Very obese  HENT:   Head: Normocephalic and atraumatic  Cardiovascular: Normal rate, regular rhythm and normal heart sounds      Pulmonary/Chest: Effort normal and breath sounds normal  No respiratory distress  He has no wheezes  He has no rales  Musculoskeletal: He exhibits edema  He exhibits no tenderness  Peripheral pulses good, edema noted lower ext  1+  No increased erythema or tenderness affecting feet/toes-  Bilaterally  Neurological: He is alert and oriented to person, place, and time  Skin: He is not diaphoretic  Psychiatric:   Somewhat confused, had some memory deficits  Nursing note and vitals reviewed

## 2018-08-27 NOTE — PATIENT INSTRUCTIONS
Patient to continue Ibuprofen  Recommend taking Lasix regularly  Discussed possible causes of gout, although I do not suspect gout at this time, will still order uric acid level  Patient encouraged to monitor glucose  Do not suspect sx  Are side effects of any medications he is currently taking, patient re-assured  Recommend increasing water only for the next few days, and elevating feet, plus avoiding very hot weather  Follow up with Dr Ness Du in October, (patient under impression has November apt ), told to get apt  Card  Call for blood test results in one week

## 2018-10-02 ENCOUNTER — OFFICE VISIT (OUTPATIENT)
Dept: PULMONOLOGY | Facility: HOSPITAL | Age: 83
End: 2018-10-02
Payer: COMMERCIAL

## 2018-10-02 VITALS
TEMPERATURE: 97.9 F | SYSTOLIC BLOOD PRESSURE: 200 MMHG | OXYGEN SATURATION: 92 % | HEART RATE: 92 BPM | BODY MASS INDEX: 40.82 KG/M2 | WEIGHT: 308 LBS | HEIGHT: 73 IN | DIASTOLIC BLOOD PRESSURE: 126 MMHG

## 2018-10-02 DIAGNOSIS — J44.9 CHRONIC OBSTRUCTIVE PULMONARY DISEASE, UNSPECIFIED COPD TYPE (HCC): Primary | ICD-10-CM

## 2018-10-02 DIAGNOSIS — I10 ESSENTIAL HYPERTENSION: ICD-10-CM

## 2018-10-02 DIAGNOSIS — G47.33 OSA (OBSTRUCTIVE SLEEP APNEA): ICD-10-CM

## 2018-10-02 PROCEDURE — 94010 BREATHING CAPACITY TEST: CPT | Performed by: INTERNAL MEDICINE

## 2018-10-02 PROCEDURE — 99214 OFFICE O/P EST MOD 30 MIN: CPT | Performed by: INTERNAL MEDICINE

## 2018-10-02 NOTE — ASSESSMENT & PLAN NOTE
I reviewed Mr kolb spirometry from today and his FEV1 has actually increased to 49% predicted at 1 57 L  This is improved since 4 years ago when he was closer to 35% predicted  I am concerned that he has basilar crackles today as well as some increased leg edema and high blood pressure  I have ordered a 2D echocardiogram and he will follow up with Dr Ruby Bonds in November  I will notify him if anything needs to be done prior to that  He will continue on his diuretic regiment and his antihypertensives  I recheck his blood pressure prior to him leaving and was 164/82  He will continue on the Advair and Spiriva and uses rescue inhaler as needed  He is up-to-date on his flu and pneumonia vaccines

## 2018-10-02 NOTE — PROGRESS NOTES
Assessment/Plan:    Chronic obstructive pulmonary disease ([facility] 75 )  I reviewed Mr kolb spirometry from today and his FEV1 has actually increased to 49% predicted at 1 57 L  This is improved since 4 years ago when he was closer to 35% predicted  I am concerned that he has basilar crackles today as well as some increased leg edema and high blood pressure  I have ordered a 2D echocardiogram and he will follow up with Dr Leonid Waddell in November  I will notify him if anything needs to be done prior to that  He will continue on his diuretic regiment and his antihypertensives  I recheck his blood pressure prior to him leaving and was 164/82  He will continue on the Advair and Spiriva and uses rescue inhaler as needed  He is up-to-date on his flu and pneumonia vaccines  Diagnoses and all orders for this visit:    Chronic obstructive pulmonary disease, unspecified COPD type ([facility] )  -     POCT spirometry  -     Echo complete with contrast if indicated; Future  -     tiotropium (SPIRIVA RESPIMAT) 2 5 MCG/ACT AERS inhaler; Inhale 2 puffs daily  -     fluticasone-salmeterol (ADVAIR DISKUS) 250-50 mcg/dose inhaler; Inhale 1 puff 2 (two) times a day    CHI (obstructive sleep apnea)    Essential hypertension          Subjective:      Patient ID: Raphael Mooney is a 81 y o  male  Mr Regina Conway was visibly upset today about some things that happened to him throughout the course of the day  During the course of his visit he became less anxious and his blood pressure consequently decreased  He does state that he feels his breathing is worse this year than last but this is a very gradual decline  He notices it because he cannot vacuum his bedroom without resting and cannot make his whole bed without resting and he could do this last year  He denies any wheezing any frequent use of his rescue inhaler  He denies any mucus production cough    He does have leg swelling which gets better but does not go away with his Lasix         The following portions of the patient's history were reviewed and updated as appropriate: allergies, current medications, past family history, past medical history, past social history, past surgical history and problem list     Review of Systems   Constitutional: Negative  HENT: Negative  Eyes: Negative  Respiratory: Positive for shortness of breath  Cardiovascular: Positive for leg swelling  Gastrointestinal: Negative  Endocrine: Negative  Genitourinary: Negative  Allergic/Immunologic: Negative  Neurological: Negative  Hematological: Negative  Psychiatric/Behavioral: Negative  Objective:      BP (!) 200/126 (BP Location: Left arm, Patient Position: Sitting, Cuff Size: Standard)   Pulse 92   Temp 97 9 °F (36 6 °C) (Tympanic)   Ht 6' 1" (1 854 m)   Wt (!) 140 kg (308 lb)   SpO2 92%   BMI 40 64 kg/m²          Physical Exam   Constitutional: He is oriented to person, place, and time  He appears well-developed and well-nourished  HENT:   Head: Normocephalic  Eyes: Pupils are equal, round, and reactive to light  Neck: Neck supple  Cardiovascular: Normal rate  Pulmonary/Chest: Effort normal  No respiratory distress  He has no wheezes  He has rales  Abdominal: Soft  Musculoskeletal: Normal range of motion  He exhibits edema  Neurological: He is alert and oriented to person, place, and time  Skin: Skin is warm and dry

## 2018-10-05 ENCOUNTER — HOSPITAL ENCOUNTER (OUTPATIENT)
Dept: NON INVASIVE DIAGNOSTICS | Facility: HOSPITAL | Age: 83
Discharge: HOME | End: 2018-10-05
Attending: INTERNAL MEDICINE
Payer: COMMERCIAL

## 2018-10-05 DIAGNOSIS — J44.9 CHRONIC OBSTRUCTIVE PULMONARY DISEASE, UNSPECIFIED COPD TYPE (HCC): ICD-10-CM

## 2018-10-05 PROCEDURE — 93306 TTE W/DOPPLER COMPLETE: CPT

## 2018-10-05 PROCEDURE — 93306 TTE W/DOPPLER COMPLETE: CPT | Performed by: INTERNAL MEDICINE

## 2018-10-08 DIAGNOSIS — J44.9 CHRONIC OBSTRUCTIVE PULMONARY DISEASE, UNSPECIFIED COPD TYPE (HCC): Primary | ICD-10-CM

## 2018-10-08 RX ORDER — ALBUTEROL SULFATE 90 UG/1
2 AEROSOL, METERED RESPIRATORY (INHALATION) EVERY 4 HOURS PRN
Qty: 1 INHALER | Refills: 5 | Status: SHIPPED | OUTPATIENT
Start: 2018-10-08 | End: 2021-06-09 | Stop reason: SDUPTHER

## 2018-10-26 ENCOUNTER — TELEPHONE (OUTPATIENT)
Dept: FAMILY MEDICINE CLINIC | Facility: HOSPITAL | Age: 83
End: 2018-10-26

## 2018-10-26 DIAGNOSIS — E11.9 TYPE 2 DIABETES MELLITUS WITHOUT COMPLICATION, WITHOUT LONG-TERM CURRENT USE OF INSULIN (HCC): Primary | ICD-10-CM

## 2018-10-29 ENCOUNTER — APPOINTMENT (OUTPATIENT)
Dept: LAB | Facility: HOSPITAL | Age: 83
End: 2018-10-29
Attending: INTERNAL MEDICINE
Payer: COMMERCIAL

## 2018-10-29 DIAGNOSIS — E11.9 TYPE 2 DIABETES MELLITUS WITHOUT COMPLICATION, WITHOUT LONG-TERM CURRENT USE OF INSULIN (HCC): ICD-10-CM

## 2018-10-29 LAB
EST. AVERAGE GLUCOSE BLD GHB EST-MCNC: 157 MG/DL
HBA1C MFR BLD: 7.1 % (ref 4.2–6.3)

## 2018-10-29 PROCEDURE — 83036 HEMOGLOBIN GLYCOSYLATED A1C: CPT

## 2018-10-29 PROCEDURE — 36415 COLL VENOUS BLD VENIPUNCTURE: CPT

## 2018-11-01 ENCOUNTER — OFFICE VISIT (OUTPATIENT)
Dept: FAMILY MEDICINE CLINIC | Facility: HOSPITAL | Age: 83
End: 2018-11-01
Payer: COMMERCIAL

## 2018-11-01 VITALS
SYSTOLIC BLOOD PRESSURE: 120 MMHG | HEIGHT: 73 IN | WEIGHT: 295 LBS | BODY MASS INDEX: 39.1 KG/M2 | HEART RATE: 93 BPM | DIASTOLIC BLOOD PRESSURE: 82 MMHG

## 2018-11-01 DIAGNOSIS — Z23 NEED FOR PNEUMOCOCCAL VACCINATION: ICD-10-CM

## 2018-11-01 DIAGNOSIS — I10 ESSENTIAL HYPERTENSION: ICD-10-CM

## 2018-11-01 DIAGNOSIS — J41.0 SIMPLE CHRONIC BRONCHITIS (HCC): Primary | ICD-10-CM

## 2018-11-01 DIAGNOSIS — G47.33 OSA (OBSTRUCTIVE SLEEP APNEA): ICD-10-CM

## 2018-11-01 DIAGNOSIS — E11.9 TYPE 2 DIABETES MELLITUS WITHOUT COMPLICATION, WITHOUT LONG-TERM CURRENT USE OF INSULIN (HCC): ICD-10-CM

## 2018-11-01 PROCEDURE — 3008F BODY MASS INDEX DOCD: CPT | Performed by: INTERNAL MEDICINE

## 2018-11-01 PROCEDURE — G0009 ADMIN PNEUMOCOCCAL VACCINE: HCPCS

## 2018-11-01 PROCEDURE — 3074F SYST BP LT 130 MM HG: CPT | Performed by: INTERNAL MEDICINE

## 2018-11-01 PROCEDURE — 1036F TOBACCO NON-USER: CPT | Performed by: INTERNAL MEDICINE

## 2018-11-01 PROCEDURE — 90732 PPSV23 VACC 2 YRS+ SUBQ/IM: CPT

## 2018-11-01 PROCEDURE — 4040F PNEUMOC VAC/ADMIN/RCVD: CPT

## 2018-11-01 PROCEDURE — 3079F DIAST BP 80-89 MM HG: CPT | Performed by: INTERNAL MEDICINE

## 2018-11-01 PROCEDURE — 99214 OFFICE O/P EST MOD 30 MIN: CPT | Performed by: INTERNAL MEDICINE

## 2018-11-01 RX ORDER — DOXAZOSIN 8 MG/1
8 TABLET ORAL
Qty: 90 TABLET | Refills: 3 | Status: SHIPPED | OUTPATIENT
Start: 2018-11-01 | End: 2019-01-24 | Stop reason: SDUPTHER

## 2018-11-01 RX ORDER — DOXAZOSIN 8 MG/1
8 TABLET ORAL
Qty: 90 TABLET | Refills: 3 | Status: CANCELLED | OUTPATIENT
Start: 2018-11-01

## 2018-11-01 NOTE — PROGRESS NOTES
Assessment/Plan:       Diagnoses and all orders for this visit:    Simple chronic bronchitis (HCC)    Essential hypertension  -     doxazosin (CARDURA) 8 MG tablet; Take 1 tablet (8 mg total) by mouth daily at bedtime  -     CBC; Future  -     Comprehensive metabolic panel; Future    CHI (obstructive sleep apnea)    Need for pneumococcal vaccination  -     PNEUMOCOCCAL POLYSACCHARIDE VACCINE 23-VALENT =>1YO SQ IM    Type 2 diabetes mellitus without complication, without long-term current use of insulin (HCC)  -     Hemoglobin A1C; Future  -     Microalbumin / creatinine urine ratio  -     Lipid Panel with Direct LDL reflex; Future    Other orders  -     Cancel: doxazosin (CARDURA) 8 MG tablet; Take 1 tablet (8 mg total) by mouth daily at bedtime          All of the above diagnoses have been assessed  Additional COMMENTS/PLAN: Full BW and MC wellness at the next OV      Subjective:      Patient ID: Ahsan Luis is a 81 y o  male  HPI           Diabetes Type II - Current symptoms include: none  Patient denies hyperglycemia and hypoglycemia   Appropriate labs have been ordered and reviewed  Home sugars: BGs consistently in an acceptable range  Current treatment reviewed  Compliance with diet good  Exercise - no  NOt checking  Hypertension  Patient is here for follow-up of elevated blood pressure  He is  adherent to a low-salt diet  Patient denies headache, dizziness or lightheadedness Cardiovascular risk factors: diabetes mellitus  History of target organ damage: none  COPD  This has been stable  But, has noticed that does have some TRIMBLE  PFT has been stable      Current Outpatient Prescriptions   Medication Sig Dispense Refill    albuterol (PROAIR HFA) 90 mcg/act inhaler Inhale 2 puffs every 4 (four) hours as needed for wheezing 1 Inhaler 5    amLODIPine-benazepril (LOTREL 5-20) 5-20 MG per capsule TAKE ONE CAPSULE BY MOUTH TWICE A  capsule 3    Blood Glucose Monitoring Suppl ([facility]in Obrien) w/Device KIT by Does not apply route 2 (two) times a day 1 kit 1    diazepam (VALIUM) 5 mg tablet Take by mouth Keeps on hand for cervical neck pain as needed   doxazosin (CARDURA) 8 MG tablet Take 1 tablet (8 mg total) by mouth daily at bedtime 90 tablet 3    fluticasone-salmeterol (ADVAIR DISKUS) 250-50 mcg/dose inhaler Inhale 1 puff 2 (two) times a day 3 Inhaler 3    furosemide (LASIX) 40 mg tablet Take by mouth 1 tab as needed, but takes most days       glucose blood (ONETOUCH VERIO) test strip 1 each by Other route 2 (two) times a day Use as instructed 100 each 5    labetalol (NORMODYNE) 300 mg tablet Take 1 tablet (300 mg total) by mouth 2 (two) times a day 180 tablet 3    Lancets MISC by Does not apply route 2 (two) times a day 100 each 5    metFORMIN (GLUCOPHAGE) 500 mg tablet Take 1 tablet (500 mg total) by mouth 2 (two) times a day with meals 90 tablet 3    tiotropium (SPIRIVA RESPIMAT) 2 5 MCG/ACT AERS inhaler Inhale 2 puffs daily 3 Inhaler 3     No current facility-administered medications for this visit  Review of Systems   Musculoskeletal:        Edema   All other systems reviewed and are negative  Objective:    /82 (BP Location: Left arm, Patient Position: Sitting, Cuff Size: Large)   Pulse 93   Ht 6' 1" (1 854 m)   Wt 134 kg (295 lb)   BMI 38 92 kg/m²      Physical Exam   Constitutional: He appears well-developed and well-nourished  obese   Neck: JVD present  Cardiovascular: Normal rate and regular rhythm  Pulmonary/Chest: Effort normal and breath sounds normal    Dec BS   Abdominal: Soft  Bowel sounds are normal    Musculoskeletal: He exhibits no edema  Vitals reviewed          Appointment on 10/29/2018   Component Date Value Ref Range Status    Hemoglobin A1C 10/29/2018 7 1* 4 2 - 6 3 % Final    EAG 10/29/2018 157  mg/dl Final         Neftaly Jackson MD

## 2019-01-24 DIAGNOSIS — I10 ESSENTIAL HYPERTENSION: ICD-10-CM

## 2019-01-24 RX ORDER — DOXAZOSIN 8 MG/1
8 TABLET ORAL
Qty: 90 TABLET | Refills: 3 | Status: SHIPPED | OUTPATIENT
Start: 2019-01-24 | End: 2019-11-18 | Stop reason: SDUPTHER

## 2019-02-21 ENCOUNTER — TELEPHONE (OUTPATIENT)
Dept: OTHER | Facility: OTHER | Age: 84
End: 2019-02-21

## 2019-02-21 DIAGNOSIS — E11.9 TYPE 2 DIABETES MELLITUS WITHOUT COMPLICATION, WITHOUT LONG-TERM CURRENT USE OF INSULIN (HCC): ICD-10-CM

## 2019-02-21 DIAGNOSIS — I10 ESSENTIAL HYPERTENSION, BENIGN: ICD-10-CM

## 2019-02-21 RX ORDER — LABETALOL 300 MG/1
300 TABLET, FILM COATED ORAL 2 TIMES DAILY
Qty: 180 TABLET | Refills: 3 | Status: SHIPPED | OUTPATIENT
Start: 2019-02-21 | End: 2020-02-28

## 2019-03-14 ENCOUNTER — APPOINTMENT (OUTPATIENT)
Dept: LAB | Facility: HOSPITAL | Age: 84
End: 2019-03-14
Attending: INTERNAL MEDICINE
Payer: COMMERCIAL

## 2019-03-14 DIAGNOSIS — I10 ESSENTIAL HYPERTENSION: ICD-10-CM

## 2019-03-14 DIAGNOSIS — E11.9 TYPE 2 DIABETES MELLITUS WITHOUT COMPLICATION, WITHOUT LONG-TERM CURRENT USE OF INSULIN (HCC): ICD-10-CM

## 2019-03-14 LAB
ALBUMIN SERPL BCP-MCNC: 3.7 G/DL (ref 3.5–5)
ALP SERPL-CCNC: 62 U/L (ref 46–116)
ALT SERPL W P-5'-P-CCNC: 41 U/L (ref 12–78)
ANION GAP SERPL CALCULATED.3IONS-SCNC: 9 MMOL/L (ref 4–13)
AST SERPL W P-5'-P-CCNC: 19 U/L (ref 5–45)
BILIRUB SERPL-MCNC: 0.6 MG/DL (ref 0.2–1)
BUN SERPL-MCNC: 20 MG/DL (ref 5–25)
CALCIUM SERPL-MCNC: 8.6 MG/DL (ref 8.3–10.1)
CHLORIDE SERPL-SCNC: 102 MMOL/L (ref 100–108)
CHOLEST SERPL-MCNC: 185 MG/DL (ref 50–200)
CO2 SERPL-SCNC: 29 MMOL/L (ref 21–32)
CREAT SERPL-MCNC: 1.21 MG/DL (ref 0.6–1.3)
CREAT UR-MCNC: 183 MG/DL
ERYTHROCYTE [DISTWIDTH] IN BLOOD BY AUTOMATED COUNT: 13.8 % (ref 11.6–15.1)
EST. AVERAGE GLUCOSE BLD GHB EST-MCNC: 169 MG/DL
GFR SERPL CREATININE-BSD FRML MDRD: 55 ML/MIN/1.73SQ M
GLUCOSE P FAST SERPL-MCNC: 160 MG/DL (ref 65–99)
HBA1C MFR BLD: 7.5 % (ref 4.2–6.3)
HCT VFR BLD AUTO: 48.9 % (ref 36.5–49.3)
HDLC SERPL-MCNC: 47 MG/DL (ref 40–60)
HGB BLD-MCNC: 15.9 G/DL (ref 12–17)
LDLC SERPL CALC-MCNC: 117 MG/DL (ref 0–100)
MCH RBC QN AUTO: 29.3 PG (ref 26.8–34.3)
MCHC RBC AUTO-ENTMCNC: 32.5 G/DL (ref 31.4–37.4)
MCV RBC AUTO: 90 FL (ref 82–98)
MICROALBUMIN UR-MCNC: 326 MG/L (ref 0–20)
MICROALBUMIN/CREAT 24H UR: 178 MG/G CREATININE (ref 0–30)
PLATELET # BLD AUTO: 213 THOUSANDS/UL (ref 149–390)
PMV BLD AUTO: 10.3 FL (ref 8.9–12.7)
POTASSIUM SERPL-SCNC: 4.4 MMOL/L (ref 3.5–5.3)
PROT SERPL-MCNC: 6.9 G/DL (ref 6.4–8.2)
RBC # BLD AUTO: 5.43 MILLION/UL (ref 3.88–5.62)
SODIUM SERPL-SCNC: 140 MMOL/L (ref 136–145)
TRIGL SERPL-MCNC: 105 MG/DL
WBC # BLD AUTO: 7.43 THOUSAND/UL (ref 4.31–10.16)

## 2019-03-14 PROCEDURE — 36415 COLL VENOUS BLD VENIPUNCTURE: CPT

## 2019-03-14 PROCEDURE — 80053 COMPREHEN METABOLIC PANEL: CPT

## 2019-03-14 PROCEDURE — 85027 COMPLETE CBC AUTOMATED: CPT

## 2019-03-14 PROCEDURE — 80061 LIPID PANEL: CPT

## 2019-03-14 PROCEDURE — 82043 UR ALBUMIN QUANTITATIVE: CPT | Performed by: INTERNAL MEDICINE

## 2019-03-14 PROCEDURE — 82570 ASSAY OF URINE CREATININE: CPT | Performed by: INTERNAL MEDICINE

## 2019-03-14 PROCEDURE — 83036 HEMOGLOBIN GLYCOSYLATED A1C: CPT

## 2019-03-18 ENCOUNTER — OFFICE VISIT (OUTPATIENT)
Dept: PULMONOLOGY | Facility: HOSPITAL | Age: 84
End: 2019-03-18
Payer: COMMERCIAL

## 2019-03-18 VITALS
SYSTOLIC BLOOD PRESSURE: 124 MMHG | HEIGHT: 73 IN | WEIGHT: 305 LBS | DIASTOLIC BLOOD PRESSURE: 82 MMHG | TEMPERATURE: 97.5 F | BODY MASS INDEX: 40.42 KG/M2 | OXYGEN SATURATION: 94 % | HEART RATE: 81 BPM

## 2019-03-18 DIAGNOSIS — E11.9 TYPE 2 DIABETES MELLITUS WITHOUT COMPLICATION, WITHOUT LONG-TERM CURRENT USE OF INSULIN (HCC): Primary | ICD-10-CM

## 2019-03-18 DIAGNOSIS — G47.33 OSA (OBSTRUCTIVE SLEEP APNEA): ICD-10-CM

## 2019-03-18 PROBLEM — J44.9 COPD (CHRONIC OBSTRUCTIVE PULMONARY DISEASE) (HCC): Status: ACTIVE | Noted: 2019-03-18

## 2019-03-18 PROCEDURE — 99214 OFFICE O/P EST MOD 30 MIN: CPT | Performed by: INTERNAL MEDICINE

## 2019-03-18 NOTE — PROGRESS NOTES
Assessment/Plan:    COPD (chronic obstructive pulmonary disease) ([facility] )  Mr Colton Pratt is stable with regards to his COPD  His symptoms are doing quite well on his current regimen of Advair and Spiriva  We talked a lot today about cost unfortunately these 2 medications are costing him about 500 hours a month  I explained to him the Advair and at his next refill we can order and the generic form  We also talked about Trelogy as an alternative  He took information and will talk to his pharmacist about cost   He is up-to-date on his flu and pneumonia shots  His blood pressure is much better in the office today and he has no edema  Diagnoses and all orders for this visit:    Type 2 diabetes mellitus without complication, without long-term current use of insulin ([facility])    CHI (obstructive sleep apnea)          Subjective:      Patient ID: Merline Broach is a 81 y o  male  Mr Colton Pratt is doing quite well, he denies any shortness of breath cough  He denies any chest pain or leg swelling  He has been taking his Advair and Spiriva as prescribed  He denies any use of rescue inhalers  The following portions of the patient's history were reviewed and updated as appropriate: allergies, current medications, past family history, past medical history, past social history, past surgical history and problem list     Review of Systems   Constitutional: Negative  HENT: Negative  Eyes: Negative  Respiratory: Negative for shortness of breath  Cardiovascular: Negative  Gastrointestinal: Negative  Endocrine: Negative  Genitourinary: Negative  Allergic/Immunologic: Negative  Neurological: Negative  Hematological: Negative  Psychiatric/Behavioral: Negative            Objective:      /82 (BP Location: Left arm, Patient Position: Sitting, Cuff Size: Standard)   Pulse 81   Temp 97 5 °F (36 4 °C) (Tympanic)   Ht 6' 1" (1 854 m)   Wt (!) 138 kg (305 lb)   SpO2 94%   BMI 40 24 kg/m² Physical Exam   Constitutional: He is oriented to person, place, and time  He appears well-developed and well-nourished  HENT:   Head: Normocephalic  Eyes: Pupils are equal, round, and reactive to light  Neck: Normal range of motion  Neck supple  Cardiovascular: Normal rate  No murmur heard  Pulmonary/Chest: Effort normal and breath sounds normal  No respiratory distress  He has no wheezes  He has no rales  Abdominal: Soft  Musculoskeletal: Normal range of motion  He exhibits no edema  Neurological: He is alert and oriented to person, place, and time  Skin: Skin is warm and dry

## 2019-03-18 NOTE — ASSESSMENT & PLAN NOTE
Mr  Lacie Noyola is stable with regards to his COPD  His symptoms are doing quite well on his current regimen of Advair and Spiriva  We talked a lot today about cost unfortunately these 2 medications are costing him about 500 hours a month  I explained to him the Advair and at his next refill we can order and the generic form  We also talked about Trelogy as an alternative  He took information and will talk to his pharmacist about cost   He is up-to-date on his flu and pneumonia shots  His blood pressure is much better in the office today and he has no edema

## 2019-03-19 ENCOUNTER — OFFICE VISIT (OUTPATIENT)
Dept: FAMILY MEDICINE CLINIC | Facility: HOSPITAL | Age: 84
End: 2019-03-19
Payer: COMMERCIAL

## 2019-03-19 VITALS
OXYGEN SATURATION: 93 % | HEIGHT: 73 IN | BODY MASS INDEX: 39.76 KG/M2 | WEIGHT: 300 LBS | SYSTOLIC BLOOD PRESSURE: 130 MMHG | DIASTOLIC BLOOD PRESSURE: 80 MMHG | HEART RATE: 72 BPM

## 2019-03-19 DIAGNOSIS — E11.9 TYPE 2 DIABETES MELLITUS WITHOUT COMPLICATION, WITHOUT LONG-TERM CURRENT USE OF INSULIN (HCC): ICD-10-CM

## 2019-03-19 DIAGNOSIS — I10 ESSENTIAL HYPERTENSION: ICD-10-CM

## 2019-03-19 DIAGNOSIS — L03.116 CELLULITIS OF LEFT LOWER EXTREMITY: ICD-10-CM

## 2019-03-19 DIAGNOSIS — E66.09 CLASS 2 OBESITY DUE TO EXCESS CALORIES WITHOUT SERIOUS COMORBIDITY WITH BODY MASS INDEX (BMI) OF 39.0 TO 39.9 IN ADULT: Primary | ICD-10-CM

## 2019-03-19 DIAGNOSIS — J41.0 SIMPLE CHRONIC BRONCHITIS (HCC): ICD-10-CM

## 2019-03-19 PROBLEM — J44.9 COPD (CHRONIC OBSTRUCTIVE PULMONARY DISEASE) (HCC): Status: RESOLVED | Noted: 2019-03-18 | Resolved: 2019-03-19

## 2019-03-19 PROBLEM — E66.812 CLASS 2 OBESITY DUE TO EXCESS CALORIES WITHOUT SERIOUS COMORBIDITY WITH BODY MASS INDEX (BMI) OF 39.0 TO 39.9 IN ADULT: Status: ACTIVE | Noted: 2019-03-19

## 2019-03-19 PROCEDURE — 3075F SYST BP GE 130 - 139MM HG: CPT | Performed by: INTERNAL MEDICINE

## 2019-03-19 PROCEDURE — 3079F DIAST BP 80-89 MM HG: CPT | Performed by: INTERNAL MEDICINE

## 2019-03-19 PROCEDURE — 1036F TOBACCO NON-USER: CPT | Performed by: INTERNAL MEDICINE

## 2019-03-19 PROCEDURE — 99214 OFFICE O/P EST MOD 30 MIN: CPT | Performed by: INTERNAL MEDICINE

## 2019-03-19 RX ORDER — CEPHALEXIN 500 MG/1
500 CAPSULE ORAL EVERY 8 HOURS SCHEDULED
Qty: 15 CAPSULE | Refills: 0 | Status: SHIPPED | OUTPATIENT
Start: 2019-03-19 | End: 2019-03-24

## 2019-03-19 NOTE — PROGRESS NOTES
Assessment/Plan:       Diagnoses and all orders for this visit:    Class 2 obesity due to excess calories without serious comorbidity with body mass index (BMI) of 39 0 to 39 9 in adult    Simple chronic bronchitis (HCC)    Essential hypertension    Type 2 diabetes mellitus without complication, without long-term current use of insulin (HCC)  -     Hemoglobin A1C; Future    Cellulitis of left lower extremity  -     cephalexin (KEFLEX) 500 mg capsule; Take 1 capsule (500 mg total) by mouth every 8 (eight) hours for 5 days          All of the above diagnoses have been assessed  Additional COMMENTS/PLAN: Labs reviewed and discussed with the patient  Subjective:      Patient ID: Tien Sutton is a 81 y o  male  HPI     DM-this is fair control-does not check sugars  Will try to watch diet  COPD-this is stable over the winter  No sign SOB or phlegm  HTN-compliant with diet and salt restirction  Obesity-will try to dec calories  The following portions of the patient's history were revised and updated as appropriate: Problem list, allergies, med list, FH, SH, Past medical and surgical histories  Current Outpatient Medications   Medication Sig Dispense Refill    albuterol (PROAIR HFA) 90 mcg/act inhaler Inhale 2 puffs every 4 (four) hours as needed for wheezing 1 Inhaler 5    amLODIPine-benazepril (LOTREL 5-20) 5-20 MG per capsule TAKE ONE CAPSULE BY MOUTH TWICE A  capsule 3    Blood Glucose Monitoring Suppl ([facility]) w/Device KIT by Does not apply route 2 (two) times a day 1 kit 1    diazepam (VALIUM) 5 mg tablet Take by mouth Keeps on hand for cervical neck pain as needed         doxazosin (CARDURA) 8 MG tablet Take 1 tablet (8 mg total) by mouth daily at bedtime 90 tablet 3    fluticasone-salmeterol (ADVAIR DISKUS) 250-50 mcg/dose inhaler Inhale 1 puff 2 (two) times a day 3 Inhaler 3    furosemide (LASIX) 40 mg tablet Take by mouth 1 tab as needed, but takes most days       glucose blood (ONETOUCH VERIO) test strip 1 each by Other route 2 (two) times a day Use as instructed 100 each 5    labetalol (NORMODYNE) 300 mg tablet Take 1 tablet (300 mg total) by mouth 2 (two) times a day 180 tablet 3    Lancets MISC by Does not apply route 2 (two) times a day 100 each 5    metFORMIN (GLUCOPHAGE) 500 mg tablet Take 1 tablet (500 mg total) by mouth 2 (two) times a day with meals 180 tablet 3    tiotropium (SPIRIVA RESPIMAT) 2 5 MCG/ACT AERS inhaler Inhale 2 puffs daily 3 Inhaler 3    cephalexin (KEFLEX) 500 mg capsule Take 1 capsule (500 mg total) by mouth every 8 (eight) hours for 5 days 15 capsule 0     No current facility-administered medications for this visit  Review of Systems   Constitutional: Negative  Respiratory: Negative  Cardiovascular: Negative  Gastrointestinal: Negative  Genitourinary: Negative  Skin:        Has lesion on the left leg  Objective:    /80 (BP Location: Left arm, Patient Position: Sitting, Cuff Size: Large)   Pulse 72   Ht 6' 1" (1 854 m)   Wt 136 kg (300 lb)   SpO2 93%   BMI 39 58 kg/m²      Physical Exam   Constitutional: He appears well-developed and well-nourished  Neck: No thyromegaly present  Cardiovascular: Normal rate and regular rhythm  Pulmonary/Chest: Effort normal and breath sounds normal    Musculoskeletal: Edema: plus 2  Skin:   Left medial leg has abrasion with some smiles surrounding cellulitis   Vitals reviewed          Appointment on 03/14/2019   Component Date Value Ref Range Status    WBC 03/14/2019 7 43  4 31 - 10 16 Thousand/uL Final    RBC 03/14/2019 5 43  3 88 - 5 62 Million/uL Final    Hemoglobin 03/14/2019 15 9  12 0 - 17 0 g/dL Final    Hematocrit 03/14/2019 48 9  36 5 - 49 3 % Final    MCV 03/14/2019 90  82 - 98 fL Final    MCH 03/14/2019 29 3  26 8 - 34 3 pg Final    MCHC 03/14/2019 32 5  31 4 - 37 4 g/dL Final    RDW 03/14/2019 13 8  11 6 - 15 1 % Final    Platelets 31/86/1672 213  149 - 390 Thousands/uL Final    MPV 03/14/2019 10 3  8 9 - 12 7 fL Final    Sodium 03/14/2019 140  136 - 145 mmol/L Final    Potassium 03/14/2019 4 4  3 5 - 5 3 mmol/L Final    Chloride 03/14/2019 102  100 - 108 mmol/L Final    CO2 03/14/2019 29  21 - 32 mmol/L Final    ANION GAP 03/14/2019 9  4 - 13 mmol/L Final    BUN 03/14/2019 20  5 - 25 mg/dL Final    Creatinine 03/14/2019 1 21  0 60 - 1 30 mg/dL Final    Standardized to IDMS reference method    Glucose, Fasting 03/14/2019 160* 65 - 99 mg/dL Final      Specimen collection should occur prior to Sulfasalazine administration due to the potential for falsely depressed results  Specimen collection should occur prior to Sulfapyridine administration due to the potential for falsely elevated results   Calcium 03/14/2019 8 6  8 3 - 10 1 mg/dL Final    AST 03/14/2019 19  5 - 45 U/L Final      Specimen collection should occur prior to Sulfasalazine administration due to the potential for falsely depressed results   ALT 03/14/2019 41  12 - 78 U/L Final      Specimen collection should occur prior to Sulfasalazine administration due to the potential for falsely depressed results       Alkaline Phosphatase 03/14/2019 62  46 - 116 U/L Final    Total Protein 03/14/2019 6 9  6 4 - 8 2 g/dL Final    Albumin 03/14/2019 3 7  3 5 - 5 0 g/dL Final    Total Bilirubin 03/14/2019 0 60  0 20 - 1 00 mg/dL Final    eGFR 03/14/2019 55  ml/min/1 73sq m Final    Hemoglobin A1C 03/14/2019 7 5* 4 2 - 6 3 % Final    EAG 03/14/2019 169  mg/dl Final    Cholesterol 03/14/2019 185  50 - 200 mg/dL Final      Cholesterol:       Desirable         <200 mg/dl       Borderline         200-239 mg/dl       High              >239           Triglycerides 03/14/2019 105  <=150 mg/dL Final      Triglyceride:     Normal          <150 mg/dl     Borderline High 150-199 mg/dl     High            200-499 mg/dl        Very High       >499 mg/dl    Specimen collection should occur prior to N-Acetylcysteine or Metamizole administration due to the potential for falsely depressed results   HDL, Direct 03/14/2019 47  40 - 60 mg/dL Final      HDL Cholesterol:       High    >60 mg/dL       Low     <41 mg/dL  Specimen collection should occur prior to Metamizole administration due to the potential for falsley depressed results   LDL Calculated 03/14/2019 117* 0 - 100 mg/dL Final      LDL Cholesterol:     Optimal           <100 mg/dl     Near Optimal      100-129 mg/dl     Above Optimal       Borderline High 130-159 mg/dl       High            160-189 mg/dl       Very High       >189 mg/dl         This screening LDL is a calculated result  It does not have the accuracy of the Direct Measured LDL in the monitoring of patients with hyperlipidemia and/or statin therapy  Direct Measure LDL (HXD231) must be ordered separately in these patients           Benita Jaffe MD

## 2019-04-18 DIAGNOSIS — I10 ESSENTIAL HYPERTENSION: ICD-10-CM

## 2019-04-18 RX ORDER — AMLODIPINE BESYLATE AND BENAZEPRIL HYDROCHLORIDE 5; 20 MG/1; MG/1
1 CAPSULE ORAL 2 TIMES DAILY
Qty: 180 CAPSULE | Refills: 3 | Status: SHIPPED | OUTPATIENT
Start: 2019-04-18 | End: 2020-04-30

## 2019-04-18 RX ORDER — AMLODIPINE BESYLATE AND BENAZEPRIL HYDROCHLORIDE 5; 20 MG/1; MG/1
CAPSULE ORAL
Qty: 180 CAPSULE | Refills: 3 | Status: SHIPPED | OUTPATIENT
Start: 2019-04-18 | End: 2019-08-06 | Stop reason: SDUPTHER

## 2019-04-30 DIAGNOSIS — J44.9 CHRONIC OBSTRUCTIVE PULMONARY DISEASE, UNSPECIFIED COPD TYPE (HCC): Primary | ICD-10-CM

## 2019-06-05 DIAGNOSIS — I10 ESSENTIAL HYPERTENSION: Primary | ICD-10-CM

## 2019-06-05 RX ORDER — FUROSEMIDE 40 MG/1
TABLET ORAL
Qty: 180 TABLET | Refills: 1 | Status: SHIPPED | OUTPATIENT
Start: 2019-06-05 | End: 2019-11-17 | Stop reason: HOSPADM

## 2019-07-23 ENCOUNTER — OFFICE VISIT (OUTPATIENT)
Dept: PULMONOLOGY | Facility: HOSPITAL | Age: 84
End: 2019-07-23
Payer: COMMERCIAL

## 2019-07-23 VITALS
BODY MASS INDEX: 40.69 KG/M2 | WEIGHT: 307 LBS | HEART RATE: 104 BPM | HEIGHT: 73 IN | DIASTOLIC BLOOD PRESSURE: 86 MMHG | OXYGEN SATURATION: 89 % | SYSTOLIC BLOOD PRESSURE: 140 MMHG

## 2019-07-23 DIAGNOSIS — G47.33 OSA (OBSTRUCTIVE SLEEP APNEA): ICD-10-CM

## 2019-07-23 DIAGNOSIS — J44.9 MODERATE COPD (CHRONIC OBSTRUCTIVE PULMONARY DISEASE) (HCC): ICD-10-CM

## 2019-07-23 DIAGNOSIS — E11.9 TYPE 2 DIABETES MELLITUS WITHOUT COMPLICATION, WITHOUT LONG-TERM CURRENT USE OF INSULIN (HCC): Primary | ICD-10-CM

## 2019-07-23 PROCEDURE — 99214 OFFICE O/P EST MOD 30 MIN: CPT | Performed by: INTERNAL MEDICINE

## 2019-07-23 NOTE — ASSESSMENT & PLAN NOTE
Mr  Mal Boucher continues to remain noncompliant for his mild sleep apnea  He does uses oxygen at night on occasion when he is having a bad day with his breathing

## 2019-07-23 NOTE — PROGRESS NOTES
Assessment/Plan:    CHI (obstructive sleep apnea)  Mr Sulma Blas continues to remain noncompliant for his mild sleep apnea  He does uses oxygen at night on occasion when he is having a bad day with his breathing  Moderate COPD (chronic obstructive pulmonary disease) ([facility] )  Mr Sulma Blas is stable with regards to his breathing  He has had no escalation of symptoms since his last visit  He has transition to trilogy as monotherapy for his COPD and feels it is working nicely  He uses the ProAir very infrequently  He does take his Lasix from intermittently if he is going out  He denies any chest pain chest tightness or wheeze  Diagnoses and all orders for this visit:    Type 2 diabetes mellitus without complication, without long-term current use of insulin ([facility])    CHI (obstructive sleep apnea)    Moderate COPD (chronic obstructive pulmonary disease) ([facility])          Subjective:      Patient ID: Bebe Palafox is a 81 y o  male  Mr Sulma Blas is been doing well with regards to his breathing  He is a little bit short of breath today because he had to walk in the hot humid air 1st to our old office and 2nd from a non handicapped parking lot  He denies any wheeze chest pain or cough  He has been able to do his activities of normal living with some increased dyspnea over the past 5-7 years  The following portions of the patient's history were reviewed and updated as appropriate: allergies, current medications, past family history, past medical history, past social history, past surgical history and problem list     Review of Systems   Constitutional: Negative  HENT: Negative  Eyes: Negative  Respiratory: Positive for shortness of breath  Cardiovascular: Negative  Gastrointestinal: Negative  Endocrine: Negative  Genitourinary: Negative  Allergic/Immunologic: Negative  Neurological: Negative  Hematological: Negative  Psychiatric/Behavioral: Negative            Objective:      BP 140/86 (BP Location: Left arm, Patient Position: Sitting, Cuff Size: Standard)   Pulse 104   Ht 6' 1" (1 854 m)   Wt (!) 139 kg (307 lb)   SpO2 (!) 89%   BMI 40 50 kg/m²          Physical Exam   Constitutional: He is oriented to person, place, and time  He appears well-developed and well-nourished  HENT:   Head: Normocephalic  Eyes: Pupils are equal, round, and reactive to light  Neck: Neck supple  Cardiovascular: Normal rate  Pulmonary/Chest: Effort normal  No respiratory distress  He has no wheezes  He has no rales  Abdominal: Soft  Musculoskeletal: Normal range of motion  He exhibits no edema  Neurological: He is alert and oriented to person, place, and time  Skin: Skin is warm and dry

## 2019-07-23 NOTE — ASSESSMENT & PLAN NOTE
Mr Raul Quezada is stable with regards to his breathing  He has had no escalation of symptoms since his last visit  He has transition to trilogy as monotherapy for his COPD and feels it is working nicely  He uses the ProAir very infrequently  He does take his Lasix from intermittently if he is going out  He denies any chest pain chest tightness or wheeze

## 2019-08-02 ENCOUNTER — APPOINTMENT (OUTPATIENT)
Dept: LAB | Facility: HOSPITAL | Age: 84
End: 2019-08-02
Attending: INTERNAL MEDICINE
Payer: COMMERCIAL

## 2019-08-02 DIAGNOSIS — E11.9 TYPE 2 DIABETES MELLITUS WITHOUT COMPLICATION, WITHOUT LONG-TERM CURRENT USE OF INSULIN (HCC): ICD-10-CM

## 2019-08-02 LAB
EST. AVERAGE GLUCOSE BLD GHB EST-MCNC: 177 MG/DL
HBA1C MFR BLD: 7.8 % (ref 4.2–6.3)

## 2019-08-02 PROCEDURE — 36415 COLL VENOUS BLD VENIPUNCTURE: CPT

## 2019-08-02 PROCEDURE — 83036 HEMOGLOBIN GLYCOSYLATED A1C: CPT

## 2019-08-06 ENCOUNTER — OFFICE VISIT (OUTPATIENT)
Dept: FAMILY MEDICINE CLINIC | Facility: HOSPITAL | Age: 84
End: 2019-08-06
Payer: COMMERCIAL

## 2019-08-06 VITALS
HEIGHT: 71 IN | SYSTOLIC BLOOD PRESSURE: 120 MMHG | DIASTOLIC BLOOD PRESSURE: 78 MMHG | WEIGHT: 303.4 LBS | BODY MASS INDEX: 42.48 KG/M2 | OXYGEN SATURATION: 95 % | HEART RATE: 68 BPM

## 2019-08-06 DIAGNOSIS — J44.9 MODERATE COPD (CHRONIC OBSTRUCTIVE PULMONARY DISEASE) (HCC): ICD-10-CM

## 2019-08-06 DIAGNOSIS — I10 ESSENTIAL HYPERTENSION: Primary | ICD-10-CM

## 2019-08-06 DIAGNOSIS — E11.9 TYPE 2 DIABETES MELLITUS WITHOUT COMPLICATION, WITHOUT LONG-TERM CURRENT USE OF INSULIN (HCC): ICD-10-CM

## 2019-08-06 PROCEDURE — 99214 OFFICE O/P EST MOD 30 MIN: CPT | Performed by: INTERNAL MEDICINE

## 2019-08-06 PROCEDURE — 3074F SYST BP LT 130 MM HG: CPT | Performed by: INTERNAL MEDICINE

## 2019-08-06 PROCEDURE — G0439 PPPS, SUBSEQ VISIT: HCPCS | Performed by: INTERNAL MEDICINE

## 2019-08-06 PROCEDURE — 1125F AMNT PAIN NOTED PAIN PRSNT: CPT | Performed by: INTERNAL MEDICINE

## 2019-08-06 PROCEDURE — 3078F DIAST BP <80 MM HG: CPT | Performed by: INTERNAL MEDICINE

## 2019-08-06 PROCEDURE — 1170F FXNL STATUS ASSESSED: CPT | Performed by: INTERNAL MEDICINE

## 2019-08-06 PROCEDURE — 1036F TOBACCO NON-USER: CPT | Performed by: INTERNAL MEDICINE

## 2019-08-06 NOTE — PROGRESS NOTES
Assessment and Plan:     Problem List Items Addressed This Visit     None      Visit Diagnoses     Medicare annual wellness visit, subsequent    -  Primary         History of Present Illness:     Patient presents for Medicare Annual Wellness visit    Patient Care Team:  Sonia Villalobos MD as PCP - General  MD Sonia Marsh MD Estelita Bevels,      Problem List:     Patient Active Problem List   Diagnosis    Simple chronic bronchitis ([facility] )    Type 2 diabetes mellitus without complication, without long-term current use of insulin ([facility] )    Essential hypertension    CHI (obstructive sleep apnea)    Moderate COPD (chronic obstructive pulmonary disease) ([facility] )    Class 2 obesity due to excess calories without serious comorbidity with body mass index (BMI) of 39 0 to 39 9 in adult      Past Medical and Surgical History:     Past Medical History:   Diagnosis Date    Herpes zoster     Hypertension     Mycoplasma pneumonia     Obesity     Osteoarthritis     Renal calculi      Past Surgical History:   Procedure Laterality Date    CATARACT EXTRACTION      with insert intraoculat lens prosthesis    HEMORRHOID SURGERY      NECK SURGERY      for bone spur      Family History:     Family History   Problem Relation Age of Onset    Breast cancer Mother     Stroke Mother     Pneumonia Father     Substance Abuse Neg Hx     Mental illness Neg Hx       Social History:     Social History     Tobacco Use   Smoking Status Former Smoker    Packs/day: 2 00    Years: 40 00    Pack years: 80 00    Types: Cigarettes    Start date:     Last attempt to quit:     Years since quittin 6   Smokeless Tobacco Never Used     Social History     Substance and Sexual Activity   Alcohol Use Yes    Alcohol/week: 1 0 standard drinks    Types: 1 Cans of beer per week    Frequency: 2-4 times a month    Drinks per session: 1 or 2     Social History     Substance and Sexual Activity   Drug Use No Medications and Allergies:     Current Outpatient Medications   Medication Sig Dispense Refill    albuterol (PROAIR HFA) 90 mcg/act inhaler Inhale 2 puffs every 4 (four) hours as needed for wheezing 1 Inhaler 5    amLODIPine-benazepril (LOTREL 5-20) 5-20 MG per capsule Take 1 capsule by mouth 2 (two) times a day 180 capsule 3    Blood Glucose Monitoring Suppl (ONETOUCH VERIO) w/Device KIT by Does not apply route 2 (two) times a day 1 kit 1    diazepam (VALIUM) 5 mg tablet Take by mouth Keeps on hand for cervical neck pain as needed   doxazosin (CARDURA) 8 MG tablet Take 1 tablet (8 mg total) by mouth daily at bedtime 90 tablet 3    fluticasone-umeclidinium-vilanterol (TRELEGY ELLIPTA) 100-62 5-25 MCG/INH inhaler Inhale 1 puff daily Rinse mouth after use  3 Inhaler 3    furosemide (LASIX) 40 mg tablet TAKE 1 OR 2 TABS DAILY 180 tablet 1    glucose blood (ONETOUCH VERIO) test strip 1 each by Other route 2 (two) times a day Use as instructed 100 each 5    labetalol (NORMODYNE) 300 mg tablet Take 1 tablet (300 mg total) by mouth 2 (two) times a day 180 tablet 3    Lancets MISC by Does not apply route 2 (two) times a day 100 each 5    metFORMIN (GLUCOPHAGE) 500 mg tablet Take 1 tablet (500 mg total) by mouth 2 (two) times a day with meals 180 tablet 3     No current facility-administered medications for this visit  No Known Allergies   Immunizations:     Immunization History   Administered Date(s) Administered    INFLUENZA 10/09/2018    Influenza Split High Dose Preservative Free IM 09/14/2015, 01/14/2016, 10/13/2017    Influenza TIV (IM) 11/01/2013, 12/16/2014    Pneumococcal Conjugate 13-Valent 07/05/2016    Pneumococcal Polysaccharide PPV23 11/01/2018      Medicare Screening Tests and Risk Assessments:     Edda Saleem is here for his Subsequent Wellness visit  Health Risk Assessment:  Patient rates overall health as fair  Patient feels that their physical health rating is Same   Eyesight was rated as Same  Hearing was rated as Same  Patient feels that their emotional and mental health rating is Same  Pain experienced by patient in the last 7 days has been None  Patient states that he has experienced no weight loss or gain in last 6 months  (Additional comments: COPD is worse)    Emotional/Mental Health:  Patient has not been feeling nervous/anxious  PHQ-9 Depression Screening:    Frequency of the following problems over the past two weeks:      1  Little interest or pleasure in doing things: 0 - not at all      2  Feeling down, depressed, or hopeless: 0 - not at all  PHQ-2 Score: 0          Broken Bones/Falls: Fall Risk Assessment:    In the past year, patient has experienced: No history of falling in past year          Bladder/Bowel:  Patient has not leaked urine accidently in the last six months  Patient reports no loss of bowel control  Immunizations:  Patient has had a flu vaccination within the last year  Patient has received a pneumonia shot  Patient has received a shingles shot  Patient has not received tetanus/diphtheria shot  Home Safety:  Patient does not have trouble with stairs inside or outside of their home  Patient currently reports that there are no safety hazards present in home, working smoke alarms, no working carbon monoxide detectors  Preventative Screenings:   no prostate cancer screen performed, no colon cancer screen completed, cholesterol screen completed, 3/14/2019  glaucoma eye exam completed,     Nutrition:  Current diet: Regular and No Added Salt with servings of the following:    Medications:  Patient is currently taking over-the-counter supplements  List of OTC medications includes: nasal spray  Patient is able to manage medications  Lifestyle Choices:  Patient reports no tobacco use  Patient has smoked or used tobacco in the past   Patient has stopped his tobacco use          Tobacco use quit date: quit 20 years ago  Patient reports alcohol use         Alcohol use per week: 1-2 per week  Patient drives a vehicle  Patient wears seat belt  Current level of exercise of physical activity described by patient as: minimal due to COPD  Activities of Daily Living:  Can get out of bed by his or her self, able to dress self, able to make own meals, able to do own shopping, able to bathe self, can do own laundry/housekeeping, can manage own money, pay bills and track expenses    Previous Hospitalizations:  No hospitalization or ED visit in past 12 months        Advanced Directives:  Patient has decided on a power of   Patient has spoken to designated power of   Patient has completed advanced directive  Preventative Screening/Counseling:      Cardiovascular:      General: Screening Current          Diabetes:      General: Screening Current          Colorectal Cancer:      General: Screening Not Indicated          Prostate Cancer:      General: Screening Not Indicated          Osteoporosis:      General: Screening Not Indicated          AAA:      General: Screening Not Indicated          Glaucoma:      General: Screening Current          HIV:      General: Screening Not Indicated          Hepatitis C:      General: Screening Not Indicated        Advanced Directives:   Patient has living will for healthcare, has durable POA for healthcare, patient has an advanced directive  Immunizations:      Shingrix: Risks & Benefits Discussed            BMI Counseling: Body mass index is 42 01 kg/m²  Discussed the patient's BMI with him  The BMI is above average  BMI counseling and education was provided to the patient  Nutrition recommendations include reducing portion sizes

## 2019-08-06 NOTE — PROGRESS NOTES
Assessment/Plan:       Diagnoses and all orders for this visit:    Essential hypertension  -     Basic metabolic panel; Future    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)    Type 2 diabetes mellitus without complication, without long-term current use of insulin (HCC)  -     Hemoglobin A1C; Future  -     Basic metabolic panel; Future          All of the above diagnoses have been assessed  Additional COMMENTS/PLAN: Labs reviewed and discussed with the patient  Subjective:      Patient ID: Justin Randolph is a 81 y o  male  HPI     DM-this could be under better control  Admits to dietary indiscretion  Hypertension  Patient is here for follow-up of elevated blood pressure  He is  adherent to a low-salt diet  Patient denies headache, dizziness or lightheadedness Cardiovascular risk factors: diabetes mellitus and dyslipidemia  History of target organ damage: none  Hyperlipidemia- The patient is compliant with diet and taking meds  The patient understands the efficacy of the treatment in vascular prevention  The following portions of the patient's history were revised and updated as appropriate: Problem list, allergies, med list, FH, SH, Past medical and surgical histories  Current Outpatient Medications   Medication Sig Dispense Refill    albuterol (PROAIR HFA) 90 mcg/act inhaler Inhale 2 puffs every 4 (four) hours as needed for wheezing 1 Inhaler 5    amLODIPine-benazepril (LOTREL 5-20) 5-20 MG per capsule Take 1 capsule by mouth 2 (two) times a day 180 capsule 3    Blood Glucose Monitoring Suppl (ONETOUCH VERIO) w/Device KIT by Does not apply route 2 (two) times a day 1 kit 1    diazepam (VALIUM) 5 mg tablet Take by mouth Keeps on hand for cervical neck pain as needed         doxazosin (CARDURA) 8 MG tablet Take 1 tablet (8 mg total) by mouth daily at bedtime 90 tablet 3    fluticasone-umeclidinium-vilanterol (TRELEGY ELLIPTA) 100-62 5-25 MCG/INH inhaler Inhale 1 puff daily Rinse mouth after use  3 Inhaler 3    furosemide (LASIX) 40 mg tablet TAKE 1 OR 2 TABS DAILY 180 tablet 1    glucose blood (ONETOUCH VERIO) test strip 1 each by Other route 2 (two) times a day Use as instructed 100 each 5    labetalol (NORMODYNE) 300 mg tablet Take 1 tablet (300 mg total) by mouth 2 (two) times a day 180 tablet 3    Lancets MISC by Does not apply route 2 (two) times a day 100 each 5    metFORMIN (GLUCOPHAGE) 500 mg tablet Take 1 tablet (500 mg total) by mouth 2 (two) times a day with meals 180 tablet 3     No current facility-administered medications for this visit  Review of Systems   Respiratory: Shortness of breath: tyler  Cardiovascular: Positive for leg swelling  Musculoskeletal:        Some knee pain   All other systems reviewed and are negative  Objective:    /78 (BP Location: Left arm, Patient Position: Sitting, Cuff Size: Large)   Pulse 68   Ht 5' 11 26" (1 81 m)   Wt (!) 138 kg (303 lb 6 4 oz)   SpO2 95%   BMI 42 01 kg/m²     BP Readings from Last 3 Encounters:   08/06/19 120/78   07/23/19 140/86   03/19/19 130/80                  Wt Readings from Last 3 Encounters:   08/06/19 (!) 138 kg (303 lb 6 4 oz)   07/23/19 (!) 139 kg (307 lb)   03/19/19 136 kg (300 lb)         Physical Exam   Constitutional: He appears well-developed and well-nourished  Neck: No JVD present  Cardiovascular: Normal rate and regular rhythm  Pulmonary/Chest:   Dec BS-no wheezes   Abdominal: Soft  Bowel sounds are normal    Musculoskeletal: Edema: trace  Vitals reviewed          Appointment on 08/02/2019   Component Date Value Ref Range Status    Hemoglobin A1C 08/02/2019 7 8* 4 2 - 6 3 % Final    EAG 08/02/2019 177  mg/dl Final         Fan Landis MD

## 2019-11-15 ENCOUNTER — APPOINTMENT (EMERGENCY)
Dept: RADIOLOGY | Facility: HOSPITAL | Age: 84
DRG: 189 | End: 2019-11-15
Payer: COMMERCIAL

## 2019-11-15 ENCOUNTER — HOSPITAL ENCOUNTER (INPATIENT)
Facility: HOSPITAL | Age: 84
LOS: 2 days | Discharge: HOME | DRG: 189 | End: 2019-11-17
Attending: EMERGENCY MEDICINE | Admitting: FAMILY MEDICINE
Payer: COMMERCIAL

## 2019-11-15 DIAGNOSIS — J44.1 COPD WITH ACUTE EXACERBATION (HCC): Primary | ICD-10-CM

## 2019-11-15 DIAGNOSIS — I50.32 CHRONIC DIASTOLIC CONGESTIVE HEART FAILURE (HCC): ICD-10-CM

## 2019-11-15 PROBLEM — J96.01 ACUTE RESPIRATORY FAILURE WITH HYPOXEMIA (HCC): Status: ACTIVE | Noted: 2019-11-15

## 2019-11-15 LAB
ALBUMIN SERPL BCP-MCNC: 3.5 G/DL (ref 3.5–5)
ALP SERPL-CCNC: 70 U/L (ref 46–116)
ALT SERPL W P-5'-P-CCNC: 32 U/L (ref 12–78)
ANION GAP SERPL CALCULATED.3IONS-SCNC: 8 MMOL/L (ref 4–13)
AST SERPL W P-5'-P-CCNC: 17 U/L (ref 5–45)
ATRIAL RATE: 88 BPM
BASOPHILS # BLD AUTO: 0.05 THOUSANDS/ΜL (ref 0–0.1)
BASOPHILS NFR BLD AUTO: 1 % (ref 0–1)
BILIRUB SERPL-MCNC: 0.5 MG/DL (ref 0.2–1)
BUN SERPL-MCNC: 14 MG/DL (ref 5–25)
CALCIUM SERPL-MCNC: 8.8 MG/DL (ref 8.3–10.1)
CHLORIDE SERPL-SCNC: 105 MMOL/L (ref 100–108)
CO2 SERPL-SCNC: 26 MMOL/L (ref 21–32)
CREAT SERPL-MCNC: 1.24 MG/DL (ref 0.6–1.3)
EOSINOPHIL # BLD AUTO: 0.3 THOUSAND/ΜL (ref 0–0.61)
EOSINOPHIL NFR BLD AUTO: 4 % (ref 0–6)
ERYTHROCYTE [DISTWIDTH] IN BLOOD BY AUTOMATED COUNT: 13.4 % (ref 11.6–15.1)
GFR SERPL CREATININE-BSD FRML MDRD: 53 ML/MIN/1.73SQ M
GLUCOSE SERPL-MCNC: 200 MG/DL (ref 65–140)
GLUCOSE SERPL-MCNC: 226 MG/DL (ref 65–140)
GLUCOSE SERPL-MCNC: 238 MG/DL (ref 65–140)
HCT VFR BLD AUTO: 47.6 % (ref 36.5–49.3)
HGB BLD-MCNC: 15.4 G/DL (ref 12–17)
IMM GRANULOCYTES # BLD AUTO: 0.04 THOUSAND/UL (ref 0–0.2)
IMM GRANULOCYTES NFR BLD AUTO: 1 % (ref 0–2)
LYMPHOCYTES # BLD AUTO: 1.1 THOUSANDS/ΜL (ref 0.6–4.47)
LYMPHOCYTES NFR BLD AUTO: 14 % (ref 14–44)
MCH RBC QN AUTO: 28.2 PG (ref 26.8–34.3)
MCHC RBC AUTO-ENTMCNC: 32.4 G/DL (ref 31.4–37.4)
MCV RBC AUTO: 87 FL (ref 82–98)
MONOCYTES # BLD AUTO: 0.64 THOUSAND/ΜL (ref 0.17–1.22)
MONOCYTES NFR BLD AUTO: 8 % (ref 4–12)
NEUTROPHILS # BLD AUTO: 5.62 THOUSANDS/ΜL (ref 1.85–7.62)
NEUTS SEG NFR BLD AUTO: 72 % (ref 43–75)
NRBC BLD AUTO-RTO: 0 /100 WBCS
NT-PROBNP SERPL-MCNC: 259 PG/ML
P AXIS: 42 DEGREES
PLATELET # BLD AUTO: 261 THOUSANDS/UL (ref 149–390)
PLATELET # BLD AUTO: 266 THOUSANDS/UL (ref 149–390)
PMV BLD AUTO: 10 FL (ref 8.9–12.7)
PMV BLD AUTO: 9.8 FL (ref 8.9–12.7)
POTASSIUM SERPL-SCNC: 4.1 MMOL/L (ref 3.5–5.3)
PR INTERVAL: 152 MS
PROCALCITONIN SERPL-MCNC: <0.05 NG/ML
PROT SERPL-MCNC: 7.2 G/DL (ref 6.4–8.2)
QRS AXIS: 11 DEGREES
QRSD INTERVAL: 80 MS
QT INTERVAL: 358 MS
QTC INTERVAL: 433 MS
RBC # BLD AUTO: 5.46 MILLION/UL (ref 3.88–5.62)
SODIUM SERPL-SCNC: 139 MMOL/L (ref 136–145)
T WAVE AXIS: 49 DEGREES
TROPONIN I SERPL-MCNC: <0.02 NG/ML
VENTRICULAR RATE: 88 BPM
WBC # BLD AUTO: 7.75 THOUSAND/UL (ref 4.31–10.16)

## 2019-11-15 PROCEDURE — 99285 EMERGENCY DEPT VISIT HI MDM: CPT

## 2019-11-15 PROCEDURE — 93005 ELECTROCARDIOGRAM TRACING: CPT

## 2019-11-15 PROCEDURE — 71046 X-RAY EXAM CHEST 2 VIEWS: CPT

## 2019-11-15 PROCEDURE — 99223 1ST HOSP IP/OBS HIGH 75: CPT | Performed by: FAMILY MEDICINE

## 2019-11-15 PROCEDURE — 84484 ASSAY OF TROPONIN QUANT: CPT | Performed by: PHYSICIAN ASSISTANT

## 2019-11-15 PROCEDURE — 84145 PROCALCITONIN (PCT): CPT | Performed by: FAMILY MEDICINE

## 2019-11-15 PROCEDURE — 85049 AUTOMATED PLATELET COUNT: CPT | Performed by: FAMILY MEDICINE

## 2019-11-15 PROCEDURE — 94664 DEMO&/EVAL PT USE INHALER: CPT

## 2019-11-15 PROCEDURE — 94760 N-INVAS EAR/PLS OXIMETRY 1: CPT

## 2019-11-15 PROCEDURE — 93010 ELECTROCARDIOGRAM REPORT: CPT | Performed by: INTERNAL MEDICINE

## 2019-11-15 PROCEDURE — 83880 ASSAY OF NATRIURETIC PEPTIDE: CPT | Performed by: PHYSICIAN ASSISTANT

## 2019-11-15 PROCEDURE — 85025 COMPLETE CBC W/AUTO DIFF WBC: CPT | Performed by: PHYSICIAN ASSISTANT

## 2019-11-15 PROCEDURE — 96374 THER/PROPH/DIAG INJ IV PUSH: CPT

## 2019-11-15 PROCEDURE — 80053 COMPREHEN METABOLIC PANEL: CPT | Performed by: PHYSICIAN ASSISTANT

## 2019-11-15 PROCEDURE — 99285 EMERGENCY DEPT VISIT HI MDM: CPT | Performed by: PHYSICIAN ASSISTANT

## 2019-11-15 PROCEDURE — 94640 AIRWAY INHALATION TREATMENT: CPT

## 2019-11-15 PROCEDURE — 82948 REAGENT STRIP/BLOOD GLUCOSE: CPT

## 2019-11-15 PROCEDURE — 36415 COLL VENOUS BLD VENIPUNCTURE: CPT | Performed by: PHYSICIAN ASSISTANT

## 2019-11-15 RX ORDER — IPRATROPIUM BROMIDE AND ALBUTEROL SULFATE 2.5; .5 MG/3ML; MG/3ML
3 SOLUTION RESPIRATORY (INHALATION) ONCE
Status: COMPLETED | OUTPATIENT
Start: 2019-11-15 | End: 2019-11-15

## 2019-11-15 RX ORDER — METHYLPREDNISOLONE SODIUM SUCCINATE 40 MG/ML
40 INJECTION, POWDER, LYOPHILIZED, FOR SOLUTION INTRAMUSCULAR; INTRAVENOUS EVERY 8 HOURS SCHEDULED
Status: DISCONTINUED | OUTPATIENT
Start: 2019-11-15 | End: 2019-11-15

## 2019-11-15 RX ORDER — ALBUTEROL SULFATE 2.5 MG/3ML
2.5 SOLUTION RESPIRATORY (INHALATION) EVERY 4 HOURS PRN
Status: DISCONTINUED | OUTPATIENT
Start: 2019-11-15 | End: 2019-11-17 | Stop reason: HOSPADM

## 2019-11-15 RX ORDER — DOXAZOSIN MESYLATE 1 MG/1
8 TABLET ORAL
Status: DISCONTINUED | OUTPATIENT
Start: 2019-11-15 | End: 2019-11-17 | Stop reason: HOSPADM

## 2019-11-15 RX ORDER — LISINOPRIL 20 MG/1
20 TABLET ORAL DAILY
Status: DISCONTINUED | OUTPATIENT
Start: 2019-11-15 | End: 2019-11-17 | Stop reason: HOSPADM

## 2019-11-15 RX ORDER — METHYLPREDNISOLONE SODIUM SUCCINATE 125 MG/2ML
125 INJECTION, POWDER, LYOPHILIZED, FOR SOLUTION INTRAMUSCULAR; INTRAVENOUS ONCE
Status: COMPLETED | OUTPATIENT
Start: 2019-11-15 | End: 2019-11-15

## 2019-11-15 RX ORDER — METHYLPREDNISOLONE SODIUM SUCCINATE 40 MG/ML
40 INJECTION, POWDER, LYOPHILIZED, FOR SOLUTION INTRAMUSCULAR; INTRAVENOUS EVERY 8 HOURS SCHEDULED
Status: DISCONTINUED | OUTPATIENT
Start: 2019-11-15 | End: 2019-11-16

## 2019-11-15 RX ORDER — FUROSEMIDE 40 MG/1
40 TABLET ORAL DAILY
Status: DISCONTINUED | OUTPATIENT
Start: 2019-11-15 | End: 2019-11-17 | Stop reason: HOSPADM

## 2019-11-15 RX ORDER — AMLODIPINE BESYLATE 5 MG/1
5 TABLET ORAL DAILY
Status: DISCONTINUED | OUTPATIENT
Start: 2019-11-15 | End: 2019-11-17 | Stop reason: HOSPADM

## 2019-11-15 RX ADMIN — INSULIN LISPRO 3 UNITS: 100 INJECTION, SOLUTION INTRAVENOUS; SUBCUTANEOUS at 18:09

## 2019-11-15 RX ADMIN — DOXAZOSIN 8 MG: 1 TABLET ORAL at 21:26

## 2019-11-15 RX ADMIN — ENOXAPARIN SODIUM 40 MG: 40 INJECTION SUBCUTANEOUS at 14:01

## 2019-11-15 RX ADMIN — LABETALOL HCL 300 MG: 200 TABLET, FILM COATED ORAL at 18:08

## 2019-11-15 RX ADMIN — FUROSEMIDE 40 MG: 40 TABLET ORAL at 14:01

## 2019-11-15 RX ADMIN — METHYLPREDNISOLONE SODIUM SUCCINATE 125 MG: 125 INJECTION, POWDER, FOR SOLUTION INTRAMUSCULAR; INTRAVENOUS at 11:09

## 2019-11-15 RX ADMIN — AZITHROMYCIN MONOHYDRATE 500 MG: 500 INJECTION, POWDER, LYOPHILIZED, FOR SOLUTION INTRAVENOUS at 14:01

## 2019-11-15 RX ADMIN — IPRATROPIUM BROMIDE AND ALBUTEROL SULFATE 3 ML: 2.5; .5 SOLUTION RESPIRATORY (INHALATION) at 11:12

## 2019-11-15 RX ADMIN — METFORMIN HYDROCHLORIDE 500 MG: 500 TABLET ORAL at 18:08

## 2019-11-15 RX ADMIN — LISINOPRIL 20 MG: 20 TABLET ORAL at 14:38

## 2019-11-15 RX ADMIN — METHYLPREDNISOLONE SODIUM SUCCINATE 40 MG: 40 INJECTION, POWDER, FOR SOLUTION INTRAMUSCULAR; INTRAVENOUS at 21:35

## 2019-11-15 NOTE — H&P
H&P Exam - Alejandra Villegas 81 y o  male MRN: [id number]    Unit/Bed#: ANIYA Encounter: [id number]        Acute respiratory failure with hypoxemia [facility])  Assessment & Plan  Patient desaturated to 84% with ambulation in ED  Maintain o2 sats 90-92%   Ambulatory pulse ox in am     COPD with acute exacerbation (HCC)  Assessment & Plan  Initiate solumedrol 40mg IV q 12   Frequent neb treatments via respiratory protocol  Airway clearance protocol  Incentive janessa  Check and trend pro-calcitonin to rule out LRTI   Utilize Azithromycin for antiinflammatory properties     CHI (obstructive sleep apnea)  Assessment & Plan  Per Pulmonary outpatient notes, he's non compliant with treatment at home     Essential hypertension  Assessment & Plan  Continue prior to admission antihypertensive regimen in the form of   Labetalol 300mg PO BID  Lasix 40mg PO daily  Cardura 8 mg PO daily  Norvasc 5mg / Benazepril 20mg PO daily       Type 2 diabetes mellitus without complication, without long-term current use of insulin (HCC)  Assessment & Plan  Lab Results   Component Value Date    HGBA1C 7 8 (H) 08/02/2019     Well controlled  Continue metformin 500mg PO BID   Initiate insulin sliding scale with accuchecks ac and HS algorith 3 sliding scale coverage         History of Present Illness   Patient is a pleasant 64-year-old male with past medical history significant for COPD, and obstructive sleep apnea presents to the ED at the request of his podiatrist with a chief complaint of shortness of breath  Patient was at his Podiatry appointment when he noted dyspnea on exertion from his car to the office  He was referred to the emergency room for evaluation where he was found to be dyspneic, and desaturated to 84% on room air with ambulation  The patient states that he has been having increasing nasal congestion, he denies any productive cough or fevers/chills  Review of Systems   HENT: Positive for congestion      Respiratory: Positive for shortness of breath and wheezing  All other systems reviewed and are negative  Historical Information   Past Medical History:   Diagnosis Date    [facility]     Diabetes mellitus [facility]     Herpes zoster     Hypertension     Mycoplasma pneumonia     Obesity     Osteoarthritis     Renal calculi      Past Surgical History:   Procedure Laterality Date    CATARACT EXTRACTION      with insert intraoculat lens prosthesis    HEMORRHOID SURGERY      NECK SURGERY      for bone spur     Social History   Social History     Substance and Sexual Activity   Alcohol Use Yes    Alcohol/week: 1 0 standard drinks    Types: 1 Cans of beer per week    Frequency: 2-4 times a month    Drinks per session: 1 or 2     Social History     Substance and Sexual Activity   Drug Use No     Social History     Tobacco Use   Smoking Status Former Smoker    Packs/day: 2 00    Years: 40 00    Pack years: 80 00    Types: Cigarettes    Start date:     Last attempt to quit:     Years since quittin 8   Smokeless Tobacco Never Used     Family History: non-contributory    Meds/Allergies   all medications and allergies reviewed  No Known Allergies    Objective   First Vitals:   Blood Pressure: 135/85 (11/15/19 1005)  Pulse: 89 (11/15/19 1004)  Temperature: 98 °F (36 7 °C) (11/15/19 1027)  Respirations: 22 (11/15/19 1005)  SpO2: 90 % (11/15/19 1004)    Current Vitals:   Blood Pressure: (!) 175/84 (11/15/19 1300)  Pulse: 96 (11/15/19 1300)  Temperature: 98 °F (36 7 °C) (11/15/19 1027)  Respirations: 18 (11/15/19 1300)  SpO2: 93 % (11/15/19 1300)    No intake or output data in the 24 hours ending 11/15/19 1314    Invasive Devices     Peripheral Intravenous Line            Peripheral IV 11/15/19 Left Antecubital less than 1 day                Physical Exam   Constitutional: He is oriented to person, place, and time  He appears well-developed     HENT:   Head: Normocephalic and atraumatic  Eyes: No scleral icterus  Neck: Normal range of motion  Neck supple  Cardiovascular: Normal rate and regular rhythm  Exam reveals no friction rub  Pulmonary/Chest: He has wheezes  Abdominal: Soft  Bowel sounds are normal  He exhibits no distension  There is no tenderness  There is no guarding  Musculoskeletal: Normal range of motion  He exhibits no edema  Neurological: He is alert and oriented to person, place, and time  No cranial nerve deficit  Coordination normal    Skin: Skin is warm  He is not diaphoretic  No erythema  Lab Results:   Lab Results   Component Value Date    WBC 7 75 11/15/2019    HGB 15 4 11/15/2019    HCT 47 6 11/15/2019    MCV 87 11/15/2019     11/15/2019     Lab Results   Component Value Date     09/18/2015    SODIUM 139 11/15/2019    K 4 1 11/15/2019     11/15/2019    CO2 26 11/15/2019    ANIONGAP 11 09/18/2015    AGAP 8 11/15/2019    BUN 14 11/15/2019    CREATININE 1 24 11/15/2019    GLUC 200 (H) 11/15/2019    GLUF 160 (H) 03/14/2019    CALCIUM 8 8 11/15/2019    AST 17 11/15/2019    ALT 32 11/15/2019    ALKPHOS 70 11/15/2019    PROT 7 1 09/18/2015    TP 7 2 11/15/2019    BILITOT 0 52 09/18/2015    TBILI 0 50 11/15/2019    EGFR 53 11/15/2019       Imaging:   XR chest 2 views   ED Interpretation   Unremarkable chest      Final Result      No acute cardiopulmonary disease              Workstation performed: [id number]             EKG, Pathology, and Other Studies: Sinus    Code Status: No Order  Advance Directive and Living Will:      Power of :    POLST:      Counseling / Coordination of Care:

## 2019-11-15 NOTE — ASSESSMENT & PLAN NOTE
Lab Results   Component Value Date    HGBA1C 7 8 (H) 08/02/2019       Hemoglobin A1c of 7 8 in August of this year  Continue metformin  Initiate insulin sliding scale with accuchecks ac and HS algorith 3 sliding scale coverage   Blood sugar is elevated due to steroid-add mealtime insulin

## 2019-11-15 NOTE — PLAN OF CARE
Problem: PAIN - ADULT  Goal: Verbalizes/displays adequate comfort level or baseline comfort level  Description  Interventions:  - Encourage patient to monitor pain and request assistance  - Assess pain using appropriate pain scale  - Administer analgesics based on type and severity of pain and evaluate response  - Implement non-pharmacological measures as appropriate and evaluate response  - Consider cultural and social influences on pain and pain management  - Notify physician/advanced practitioner if interventions unsuccessful or patient reports new pain  Outcome: Progressing     Problem: SAFETY ADULT  Goal: Patient will remain free of falls  Description  INTERVENTIONS:  - Assess patient frequently for physical needs  -  Identify cognitive and physical deficits and behaviors that affect risk of falls    -  [place] fall precautions as indicated by assessment   - Educate patient/family on patient safety including physical limitations  - Instruct patient to call for assistance with activity based on assessment  - Modify environment to reduce risk of injury  - Consider OT/PT consult to assist with strengthening/mobility  Outcome: Progressing  Goal: Maintain or return to baseline ADL function  Description  INTERVENTIONS:  -  Assess patient's ability to carry out ADLs; assess patient's baseline for ADL function and identify physical deficits which impact ability to perform ADLs (bathing, care of mouth/teeth, toileting, grooming, dressing, etc )  - Assess/evaluate cause of self-care deficits   - Assess range of motion  - Assess patient's mobility; develop plan if impaired  - Assess patient's need for assistive devices and provide as appropriate  - Encourage maximum independence but intervene and supervise when necessary  - Involve family in performance of ADLs  - Assess for home care needs following discharge   - Consider OT consult to assist with ADL evaluation and planning for discharge  - Provide patient education as appropriate  Outcome: Progressing  Goal: Maintain or return mobility status to optimal level  Description  INTERVENTIONS:  - Assess patient's baseline mobility status (ambulation, transfers, stairs, etc )    - Identify cognitive and physical deficits and behaviors that affect mobility  - Identify mobility aids required to assist with transfers and/or ambulation (gait belt, sit-to-stand, lift, walker, cane, etc )  - [place] fall precautions as indicated by assessment  - Record patient progress and toleration of activity level on Mobility SBAR; progress patient to next Phase/Stage  - Instruct patient to call for assistance with activity based on assessment  - Consider rehabilitation consult to assist with strengthening/weightbearing, etc   Outcome: Progressing     Problem: DISCHARGE PLANNING  Goal: Discharge to home or other facility with appropriate resources  Description  INTERVENTIONS:  - Identify barriers to discharge w/patient and caregiver  - Arrange for needed discharge resources and transportation as appropriate  - Identify discharge learning needs (meds, wound care, etc )  - Arrange for interpretive services to assist at discharge as needed  - Refer to Case Management Department for coordinating discharge planning if the patient needs post-hospital services based on physician/advanced practitioner order or complex needs related to functional status, cognitive ability, or social support system  Outcome: Progressing     Problem: Knowledge Deficit  Goal: Patient/family/caregiver demonstrates understanding of disease process, treatment plan, medications, and discharge instructions  Description  Complete learning assessment and assess knowledge base    Interventions:  - Provide teaching at level of understanding  - Provide teaching via preferred learning methods  Outcome: Progressing     Problem: RESPIRATORY - ADULT  Goal: Achieves optimal ventilation and oxygenation  Description  INTERVENTIONS:  - Assess for changes in respiratory status  - Assess for changes in mentation and behavior  - Position to facilitate oxygenation and minimize respiratory effort  - Oxygen administered by appropriate delivery if ordered  - Initiate smoking cessation education as indicated  - Encourage broncho-pulmonary hygiene including cough, deep breathe, Incentive Spirometry  - Assess the need for suctioning and aspirate as needed  - Assess and instruct to report SOB or any respiratory difficulty  - Respiratory Therapy support as indicated  Outcome: Progressing

## 2019-11-15 NOTE — ASSESSMENT & PLAN NOTE
Initiate solumedrol 40mg IV q 8   Frequent neb treatments via respiratory protocol  Airway clearance protocol  Incentive janessa  Check and trend pro-calcitonin to rule out LRTI   Continue with the azithromycin  Pulmonology consult  Patient wants to go home as soon as possible-continue with the respiratory treatment and steroids for today and re-evaluate  It appears that the patient at baseline spends a lot of time sitting in a chair and as soon as his tries to move he gets short of breath-recommended pulmonary rehab as an outpatient

## 2019-11-15 NOTE — ASSESSMENT & PLAN NOTE
Per Pulmonary outpatient notes, he's non compliant with treatment at home   Patient reported that he is planning to changes diet and lose weight

## 2019-11-15 NOTE — ED PROVIDER NOTES
History  Chief Complaint   Patient presents with    Shortness of Breath     sob with walking     20-year-old male with history of COPD, non-insulin-dependent diabetes, hypertension, and obesity presents to the emergency department for evaluation of exertional shortness of breath beginning this morning  Patient states he presented to a routine outpatient podiatry visit where he discovered significant shortness of breath simply walking from his car to the lobby  He states that this has never happened before  He did not use his rescue inhaler, and admits to not taking his Lasix this morning  He denies associated fever, chills, sweats, chest pain, nausea, vomiting  Denies increase in lower leg edema  No recent ill contacts  Denies recent prolonged immobilization, international travel, or surgical procedures  On exam, patient is conscious, alert and oriented in no apparent distress  He displays no conversational dyspnea, although does appear to have occasional pursed lip breathing  Oxygen saturation is variable between 90 and 92% on room air, patient does not have a home oxygen requirement  Remainder of vital signs are unremarkable  Pulmonary exam reveals prolonged expiratory phase with mild wheezing, predominantly in the bibasilar regions  There are no rales or crackles  Cardiac exam is without murmurs, rubs, or gallops  He has trace pitting edema to bilateral lower extremities  Peripheral pulses are 2+ and symmetric globally  A/P:  Exertional dyspnea  Likely acute COPD exacerbation based on wheezing on lung exam, however will consider acute CHF in the presence of and not taking his daily Lasix  Will obtain cardiac workup, BNP, chest x-ray, EKG  Patient is in no distress while at rest, will consider ambulatory oxygen saturation and nebulized albuterol as needed          Prior to Admission Medications   Prescriptions Last Dose Informant Patient Reported? Taking?    Blood Glucose Monitoring Suppl (Mark Gamboa) w/Device KIT  Self No No   Sig: by Does not apply route 2 (two) times a day   Lancets MISC  Self No No   Sig: by Does not apply route 2 (two) times a day   albuterol (PROAIR HFA) 90 mcg/act inhaler  Self No No   Sig: Inhale 2 puffs every 4 (four) hours as needed for wheezing   amLODIPine-benazepril (LOTREL 5-20) 5-20 MG per capsule  Self No No   Sig: Take 1 capsule by mouth 2 (two) times a day   diazepam (VALIUM) 5 mg tablet  Self Yes No   Sig: Take by mouth Keeps on hand for cervical neck pain as needed  doxazosin (CARDURA) 8 MG tablet  Self No No   Sig: Take 1 tablet (8 mg total) by mouth daily at bedtime   fluticasone-umeclidinium-vilanterol (TRELEGY ELLIPTA) 100-62 5-25 MCG/INH inhaler  Self No No   Sig: Inhale 1 puff daily Rinse mouth after use  furosemide (LASIX) 40 mg tablet  Self No No   Sig: TAKE 1 OR 2 TABS DAILY   glucose blood (ONETOUCH VERIO) test strip  Self No No   Si each by Other route 2 (two) times a day Use as instructed   labetalol (NORMODYNE) 300 mg tablet  Self No No   Sig: Take 1 tablet (300 mg total) by mouth 2 (two) times a day   metFORMIN (GLUCOPHAGE) 500 mg tablet  Self No No   Sig: Take 1 tablet (500 mg total) by mouth 2 (two) times a day with meals      Facility-Administered Medications: None       Past Medical History:   Diagnosis Date    [facility]chronic [facility]     Diabetes mellitus [facility]     Herpes zoster     Hypertension     Mycoplasma pneumonia     Obesity     Osteoarthritis     Renal calculi        Past Surgical History:   Procedure Laterality Date    CATARACT EXTRACTION      with insert intraoculat lens prosthesis    HEMORRHOID SURGERY      NECK SURGERY      for bone spur       Family History   Problem Relation Age of Onset    Breast cancer Mother     Stroke Mother     Pneumonia Father     Substance Abuse Neg Hx     Mental illness Neg Hx      I have reviewed and agree with the history as documented      Social History     Tobacco Use    Smoking status: Former Smoker     Packs/day: 2 00     Years: 40 00     Pack years: 80 00     Types: Cigarettes     Start date:      Last attempt to quit:      Years since quittin 8    Smokeless tobacco: Never Used   Substance Use Topics    Alcohol use: Yes     Alcohol/week: 1 0 standard drinks     Types: 1 Cans of beer per week     Frequency: 2-4 times a month     Drinks per session: 1 or 2    Drug use: No        Review of Systems   Constitutional: Negative for chills, diaphoresis, fatigue and fever  HENT: Negative for congestion  Eyes: Negative for visual disturbance  Respiratory: Positive for shortness of breath (primarily exertional)  Negative for cough and chest tightness  Cardiovascular: Negative for chest pain, palpitations and leg swelling  Gastrointestinal: Negative for abdominal pain, diarrhea, nausea and vomiting  Genitourinary: Negative for dysuria, flank pain and frequency  Musculoskeletal: Negative for arthralgias and myalgias  Skin: Negative for color change, rash and wound  Allergic/Immunologic: Negative for immunocompromised state  Neurological: Negative for dizziness and light-headedness  Hematological: Does not bruise/bleed easily  Psychiatric/Behavioral: Negative for confusion  The patient is not nervous/anxious  Physical Exam  Physical Exam   Constitutional: He is oriented to person, place, and time  He appears well-developed and well-nourished  He does not appear ill  No distress  HENT:   Head: Normocephalic and atraumatic  Mouth/Throat: Oropharynx is clear and moist    Eyes: Pupils are equal, round, and reactive to light  No scleral icterus  Neck: No JVD present  Cardiovascular: Normal rate and regular rhythm  Exam reveals no gallop and no friction rub  No murmur heard  Pulmonary/Chest: No respiratory distress  He has wheezes in the right lower field and the left lower field  He has no rales     Prolonged expiratory phase   Abdominal: Soft  He exhibits no distension and no mass  There is no tenderness  There is no guarding  Musculoskeletal:        Right lower leg: He exhibits edema (trace)  Left lower leg: He exhibits edema (trace)  Neurological: He is alert and oriented to person, place, and time  Skin: Skin is warm and dry  Capillary refill takes less than 2 seconds  He is not diaphoretic  Psychiatric: He has a normal mood and affect  His behavior is normal  His mood appears not anxious  Vitals reviewed        Vital Signs  ED Triage Vitals   Temperature Pulse Respirations Blood Pressure SpO2   11/15/19 1027 11/15/19 1004 11/15/19 1005 11/15/19 1005 11/15/19 1004   98 °F (36 7 °C) 89 22 135/85 90 %      Temp Source Heart Rate Source Patient Position - Orthostatic VS BP Location FiO2 (%)   11/15/19 1320 11/15/19 1100 11/15/19 1100 11/15/19 1100 --   Oral Monitor Lying Right arm       Pain Score       --                  Vitals:    11/15/19 1230 11/15/19 1300 11/15/19 1320 11/15/19 1528   BP:  (!) 175/84 (!) 177/93 170/88   Pulse: 83 96 (!) 106 103   Patient Position - Orthostatic VS:  Lying Sitting Sitting         Visual Acuity      ED Medications  Medications   doxazosin (CARDURA) tablet 8 mg (has no administration in time range)   furosemide (LASIX) tablet 40 mg (40 mg Oral Given 11/15/19 1401)   labetalol (NORMODYNE) tablet 300 mg (has no administration in time range)   metFORMIN (GLUCOPHAGE) tablet 500 mg (has no administration in time range)   enoxaparin (LOVENOX) subcutaneous injection 40 mg (40 mg Subcutaneous Given 11/15/19 1401)   insulin lispro (HumaLOG) 100 units/mL subcutaneous injection 1-6 Units (has no administration in time range)   azithromycin (ZITHROMAX) 500 mg in sodium chloride 0 9% 250mL IVPB 500 mg (500 mg Intravenous New Bag 11/15/19 1401)   methylPREDNISolone sodium succinate (Solu-MEDROL) injection 40 mg (has no administration in time range)   amLODIPine (NORVASC) tablet 5 mg (5 mg Oral Not Given 11/15/19 1402)   lisinopril (ZESTRIL) tablet 20 mg (20 mg Oral Given 11/15/19 1438)   albuterol inhalation solution 2 5 mg (has no administration in time range)   ipratropium-albuterol (DUO-NEB) 0 5-2 5 mg/3 mL inhalation solution 3 mL (3 mL Nebulization Given 11/15/19 1112)   methylPREDNISolone sodium succinate (Solu-MEDROL) injection 125 mg (125 mg Intravenous Given 11/15/19 1109)       Diagnostic Studies  Results Reviewed     Procedure Component Value Units Date/Time    Procalcitonin [[id number]] Collected:  11/15/19 1430    Lab Status:   In process Specimen:  Blood from Arm, Right Updated:  11/15/19 1434    Platelet count [[id number]]  (Normal) Collected:  11/15/19 1413    Lab Status:  Final result Specimen:  Blood from Hand, Left Updated:  11/15/19 1426     Platelets 865 Thousands/uL      MPV 9 8 fL     NT-BNP PRO [[id number]]  (Normal) Collected:  11/15/19 1021    Lab Status:  Final result Specimen:  Blood from Arm, Left Updated:  11/15/19 1055     NT-proBNP 259 pg/mL     Comprehensive metabolic panel [[id number]]  (Abnormal) Collected:  11/15/19 1021    Lab Status:  Final result Specimen:  Blood from Arm, Left Updated:  11/15/19 1054     Sodium 139 mmol/L      Potassium 4 1 mmol/L      Chloride 105 mmol/L      CO2 26 mmol/L      ANION GAP 8 mmol/L      BUN 14 mg/dL      Creatinine 1 24 mg/dL      Glucose 200 mg/dL      Calcium 8 8 mg/dL      AST 17 U/L      ALT 32 U/L      Alkaline Phosphatase 70 U/L      Total Protein 7 2 g/dL      Albumin 3 5 g/dL      Total Bilirubin 0 50 mg/dL      eGFR 53 ml/min/1 73sq m     [place]:       Tim guidelines for Chronic Kidney Disease (CKD):     Stage 1 with normal or high GFR (GFR > 90 mL/min/1 73 square meters)    Stage 2 Mild CKD (GFR = 60-89 mL/min/1 73 square meters)    Stage 3A Moderate CKD (GFR = 45-59 mL/min/1 73 square meters)    Stage 3B Moderate CKD (GFR = 30-44 mL/min/1 73 square meters)    Stage 4 Severe CKD (GFR = 15-29 mL/min/1 73 square meters)    Stage 5 End Stage CKD (GFR <15 mL/min/1 73 square meters)  Note: GFR calculation is accurate only with a steady state creatinine    Troponin I [[id number]]  (Normal) Collected:  11/15/19 1021    Lab Status:  Final result Specimen:  Blood from Arm, Left Updated:  11/15/19 1051     Troponin I <0 02 ng/mL     CBC and differential [[id number]] Collected:  11/15/19 1021    Lab Status:  Final result Specimen:  Blood from Arm, Left Updated:  11/15/19 1032     WBC 7 75 Thousand/uL      RBC 5 46 Million/uL      Hemoglobin 15 4 g/dL      Hematocrit 47 6 %      MCV 87 fL      MCH 28 2 pg      MCHC 32 4 g/dL      RDW 13 4 %      MPV 10 0 fL      Platelets 411 Thousands/uL      nRBC 0 /100 WBCs      Neutrophils Relative 72 %      Immat GRANS % 1 %      Lymphocytes Relative 14 %      Monocytes Relative 8 %      Eosinophils Relative 4 %      Basophils Relative 1 %      Neutrophils Absolute 5 62 Thousands/µL      Immature Grans Absolute 0 04 Thousand/uL      Lymphocytes Absolute 1 10 Thousands/µL      Monocytes Absolute 0 64 Thousand/µL      Eosinophils Absolute 0 30 Thousand/µL      Basophils Absolute 0 05 Thousands/µL                  XR chest 2 views   ED Interpretation by Evelia Ricketts PA-C (11/15 1038)   Unremarkable chest      Final Result by Javier Nuñez MD (11/15 1143)      No acute cardiopulmonary disease              Workstation performed: [id number]                    Procedures  ECG 12 Lead Documentation Only  Date/Time: 11/15/2019 10:10 AM  Performed by: Evelia Ricketts PA-C  Authorized by: Evelia Ricketts PA-C     Indications / Diagnosis:  TRIMBLE  ECG reviewed by me, the ED Provider: yes    Patient location:  ED  Previous ECG:     Previous ECG:  Compared to current    Similarity:  No change  Interpretation:     Interpretation: normal    Rate:     ECG rate:  88    ECG rate assessment: normal    Rhythm:     Rhythm: sinus rhythm    Ectopy:     Ectopy: none    QRS:     QRS axis:  Normal QRS intervals:  Normal  Conduction:     Conduction: normal    ST segments:     ST segments:  Normal  T waves:     T waves: normal    Comments:      QTc 433           ED Course  ED Course as of Nov 15 1630   Fri Nov 15, 2019   1108 Patient notably dyspneic with exertion, lowest O2 sat 88% on RA  Will reassess after Duoneb and IV solumedrol      1256 Repeat ambulatory test: no improvement, still with significant increased WOB and tachypnea, o2 sat 84%  MDM  Number of Diagnoses or Management Options  COPD with acute exacerbation [facility]): new and requires workup  Diagnosis management comments: Patient remained persistently hypoxic with markedly increased work of breathing on exertion despite nebulizer treatments and IV steroids  Will admit to Medicine for ongoing evaluation and management  No clinical evidence of pneumonia, chest x-ray is clear  Amount and/or Complexity of Data Reviewed  Clinical lab tests: ordered and reviewed  Tests in the radiology section of CPT®: ordered and reviewed  Tests in the medicine section of CPT®: ordered and reviewed  Review and summarize past medical records: yes  Discuss the patient with other providers: yes  Independent visualization of images, tracings, or specimens: yes        Disposition  Final diagnoses:   COPD with acute exacerbation ([address] )     Time reflects when diagnosis was documented in both MDM as applicable and the Disposition within this note     Time User Action Codes Description Comment    11/15/2019  1:02 PM Liseth Wilson Add [J44 1] COPD with acute exacerbation [facility])       ED Disposition     ED Disposition Condition Date/Time Comment    Admit Stable Fri Nov 15, 2019  1:02 PM Case was discussed with Dr Alessio Cruz and the patient's admission status was agreed to be Admission Status: inpatient status to the service of Dr Alessio Cruz           Follow-up Information    None         Current Discharge Medication List      CONTINUE these medications which have NOT CHANGED    Details   albuterol (PROAIR HFA) 90 mcg/act inhaler Inhale 2 puffs every 4 (four) hours as needed for wheezing  Qty: 1 Inhaler, Refills: 5    Associated Diagnoses: Chronic obstructive pulmonary disease, unspecified COPD type ([facility])      amLODIPine-benazepril (LOTREL 5-20) 5-20 MG per capsule Take 1 capsule by mouth 2 (two) times a day  Qty: 180 capsule, Refills: 3    Associated Diagnoses: Essential hypertension      Blood Glucose Monitoring Suppl Darleen Santacruz) w/Device KIT by Does not apply route 2 (two) times a day  Qty: 1 kit, Refills: 1    Comments: E11 9  Dm type 2  Associated Diagnoses: Controlled type 2 diabetes mellitus without complication, without long-term current use of insulin ([facility])      diazepam (VALIUM) 5 mg tablet Take by mouth Keeps on hand for cervical neck pain as needed  doxazosin (CARDURA) 8 MG tablet Take 1 tablet (8 mg total) by mouth daily at bedtime  Qty: 90 tablet, Refills: 3    Associated Diagnoses: Essential hypertension      fluticasone-umeclidinium-vilanterol (TRELEGY ELLIPTA) 100-62 5-25 MCG/INH inhaler Inhale 1 puff daily Rinse mouth after use    Qty: 3 Inhaler, Refills: 3    Associated Diagnoses: Chronic obstructive pulmonary disease, unspecified COPD type ([facility])      furosemide (LASIX) 40 mg tablet TAKE 1 OR 2 TABS DAILY  Qty: 180 tablet, Refills: 1    Associated Diagnoses: Essential hypertension      glucose blood (ONETOUCH VERIO) test strip 1 each by Other route 2 (two) times a day Use as instructed  Qty: 100 each, Refills: 5    Comments: Dx type 2 DM    E11 9  Associated Diagnoses: Controlled type 2 diabetes mellitus without complication, without long-term current use of insulin ([facility])      labetalol (NORMODYNE) 300 mg tablet Take 1 tablet (300 mg total) by mouth 2 (two) times a day  Qty: 180 tablet, Refills: 3    Associated Diagnoses: Essential hypertension, benign      Lancets MISC by Does not apply route 2 (two) times a day  Qty: 100 each, Refills: 5    Comments: Dx---E11 9  Type 2 dm  GIVE LANCETS FOR ONE TOUCH VERIO MACHINE  Associated Diagnoses: Controlled type 2 diabetes mellitus without complication, without long-term current use of insulin ([facility])      metFORMIN (GLUCOPHAGE) 500 mg tablet Take 1 tablet (500 mg total) by mouth 2 (two) times a day with meals  Qty: 180 tablet, Refills: 3    Associated Diagnoses: Type 2 diabetes mellitus without complication, without long-term current use of insulin ([facility] )           No discharge procedures on file      ED Provider  Electronically Signed by           Alfredo Mai PA-C  11/15/19 5392

## 2019-11-15 NOTE — ASSESSMENT & PLAN NOTE
Continue prior to admission antihypertensive regimen in the form of   Labetalol 300mg PO BID  Lasix 40mg PO daily  Cardura 8 mg PO daily  Norvasc 5mg / Benazepril 20mg PO daily

## 2019-11-16 PROBLEM — I50.32 CHRONIC DIASTOLIC CONGESTIVE HEART FAILURE (HCC): Status: ACTIVE | Noted: 2019-11-16

## 2019-11-16 LAB
ANION GAP SERPL CALCULATED.3IONS-SCNC: 7 MMOL/L (ref 4–13)
BASOPHILS # BLD AUTO: 0.01 THOUSANDS/ΜL (ref 0–0.1)
BASOPHILS NFR BLD AUTO: 0 % (ref 0–1)
BUN SERPL-MCNC: 19 MG/DL (ref 5–25)
CALCIUM SERPL-MCNC: 9.1 MG/DL (ref 8.3–10.1)
CHLORIDE SERPL-SCNC: 103 MMOL/L (ref 100–108)
CO2 SERPL-SCNC: 26 MMOL/L (ref 21–32)
CREAT SERPL-MCNC: 1.23 MG/DL (ref 0.6–1.3)
EOSINOPHIL # BLD AUTO: 0 THOUSAND/ΜL (ref 0–0.61)
EOSINOPHIL NFR BLD AUTO: 0 % (ref 0–6)
ERYTHROCYTE [DISTWIDTH] IN BLOOD BY AUTOMATED COUNT: 13.2 % (ref 11.6–15.1)
GFR SERPL CREATININE-BSD FRML MDRD: 54 ML/MIN/1.73SQ M
GLUCOSE SERPL-MCNC: 149 MG/DL (ref 65–140)
GLUCOSE SERPL-MCNC: 197 MG/DL (ref 65–140)
GLUCOSE SERPL-MCNC: 269 MG/DL (ref 65–140)
GLUCOSE SERPL-MCNC: 278 MG/DL (ref 65–140)
GLUCOSE SERPL-MCNC: 287 MG/DL (ref 65–140)
HCT VFR BLD AUTO: 45.2 % (ref 36.5–49.3)
HGB BLD-MCNC: 14.5 G/DL (ref 12–17)
IMM GRANULOCYTES # BLD AUTO: 0.04 THOUSAND/UL (ref 0–0.2)
IMM GRANULOCYTES NFR BLD AUTO: 1 % (ref 0–2)
LYMPHOCYTES # BLD AUTO: 0.73 THOUSANDS/ΜL (ref 0.6–4.47)
LYMPHOCYTES NFR BLD AUTO: 9 % (ref 14–44)
MAGNESIUM SERPL-MCNC: 1.9 MG/DL (ref 1.6–2.6)
MCH RBC QN AUTO: 27.8 PG (ref 26.8–34.3)
MCHC RBC AUTO-ENTMCNC: 32.1 G/DL (ref 31.4–37.4)
MCV RBC AUTO: 87 FL (ref 82–98)
MONOCYTES # BLD AUTO: 0.26 THOUSAND/ΜL (ref 0.17–1.22)
MONOCYTES NFR BLD AUTO: 3 % (ref 4–12)
NEUTROPHILS # BLD AUTO: 6.92 THOUSANDS/ΜL (ref 1.85–7.62)
NEUTS SEG NFR BLD AUTO: 87 % (ref 43–75)
NRBC BLD AUTO-RTO: 0 /100 WBCS
PLATELET # BLD AUTO: 247 THOUSANDS/UL (ref 149–390)
PMV BLD AUTO: 10.3 FL (ref 8.9–12.7)
POTASSIUM SERPL-SCNC: 4.2 MMOL/L (ref 3.5–5.3)
PROCALCITONIN SERPL-MCNC: <0.05 NG/ML
RBC # BLD AUTO: 5.21 MILLION/UL (ref 3.88–5.62)
SODIUM SERPL-SCNC: 136 MMOL/L (ref 136–145)
WBC # BLD AUTO: 7.96 THOUSAND/UL (ref 4.31–10.16)

## 2019-11-16 PROCEDURE — G8980 MOBILITY D/C STATUS: HCPCS

## 2019-11-16 PROCEDURE — G8979 MOBILITY GOAL STATUS: HCPCS

## 2019-11-16 PROCEDURE — 99223 1ST HOSP IP/OBS HIGH 75: CPT | Performed by: INTERNAL MEDICINE

## 2019-11-16 PROCEDURE — 84145 PROCALCITONIN (PCT): CPT | Performed by: FAMILY MEDICINE

## 2019-11-16 PROCEDURE — 94761 N-INVAS EAR/PLS OXIMETRY MLT: CPT

## 2019-11-16 PROCEDURE — 85025 COMPLETE CBC W/AUTO DIFF WBC: CPT | Performed by: FAMILY MEDICINE

## 2019-11-16 PROCEDURE — G8978 MOBILITY CURRENT STATUS: HCPCS

## 2019-11-16 PROCEDURE — 99232 SBSQ HOSP IP/OBS MODERATE 35: CPT | Performed by: FAMILY MEDICINE

## 2019-11-16 PROCEDURE — 94760 N-INVAS EAR/PLS OXIMETRY 1: CPT

## 2019-11-16 PROCEDURE — 82948 REAGENT STRIP/BLOOD GLUCOSE: CPT

## 2019-11-16 PROCEDURE — 94668 MNPJ CHEST WALL SBSQ: CPT

## 2019-11-16 PROCEDURE — 80048 BASIC METABOLIC PNL TOTAL CA: CPT | Performed by: FAMILY MEDICINE

## 2019-11-16 PROCEDURE — 83735 ASSAY OF MAGNESIUM: CPT | Performed by: FAMILY MEDICINE

## 2019-11-16 PROCEDURE — 97163 PT EVAL HIGH COMPLEX 45 MIN: CPT

## 2019-11-16 RX ADMIN — DOXAZOSIN 8 MG: 1 TABLET ORAL at 21:26

## 2019-11-16 RX ADMIN — FUROSEMIDE 40 MG: 40 TABLET ORAL at 08:23

## 2019-11-16 RX ADMIN — LABETALOL HCL 300 MG: 200 TABLET, FILM COATED ORAL at 08:25

## 2019-11-16 RX ADMIN — METFORMIN HYDROCHLORIDE 500 MG: 500 TABLET ORAL at 17:19

## 2019-11-16 RX ADMIN — METHYLPREDNISOLONE SODIUM SUCCINATE 40 MG: 40 INJECTION, POWDER, FOR SOLUTION INTRAMUSCULAR; INTRAVENOUS at 05:04

## 2019-11-16 RX ADMIN — METFORMIN HYDROCHLORIDE 500 MG: 500 TABLET ORAL at 08:24

## 2019-11-16 RX ADMIN — ENOXAPARIN SODIUM 40 MG: 40 INJECTION SUBCUTANEOUS at 08:23

## 2019-11-16 RX ADMIN — INSULIN LISPRO 4 UNITS: 100 INJECTION, SOLUTION INTRAVENOUS; SUBCUTANEOUS at 08:26

## 2019-11-16 RX ADMIN — LISINOPRIL 20 MG: 20 TABLET ORAL at 08:25

## 2019-11-16 RX ADMIN — LABETALOL HCL 300 MG: 200 TABLET, FILM COATED ORAL at 17:19

## 2019-11-16 RX ADMIN — INSULIN LISPRO 4 UNITS: 100 INJECTION, SOLUTION INTRAVENOUS; SUBCUTANEOUS at 12:56

## 2019-11-16 RX ADMIN — AMLODIPINE BESYLATE 5 MG: 5 TABLET ORAL at 08:24

## 2019-11-16 NOTE — PLAN OF CARE
Problem: PAIN - ADULT  Goal: Verbalizes/displays adequate comfort level or baseline comfort level  Description  Interventions:  - Encourage patient to monitor pain and request assistance  - Assess pain using appropriate pain scale  - Administer analgesics based on type and severity of pain and evaluate response  - Implement non-pharmacological measures as appropriate and evaluate response  - Consider cultural and social influences on pain and pain management  - Notify physician/advanced practitioner if interventions unsuccessful or patient reports new pain  11/16/2019 0749 by Viji Griffin RN  Outcome: Progressing  11/15/2019 1830 by Viji Griffin RN  Outcome: Progressing     Problem: SAFETY ADULT  Goal: Patient will remain free of falls  Description  INTERVENTIONS:  - Assess patient frequently for physical needs  -  Identify cognitive and physical deficits and behaviors that affect risk of falls    -  [place] fall precautions as indicated by assessment   - Educate patient/family on patient safety including physical limitations  - Instruct patient to call for assistance with activity based on assessment  - Modify environment to reduce risk of injury  - Consider OT/PT consult to assist with strengthening/mobility  11/16/2019 0749 by Viji Griffin RN  Outcome: Progressing  11/15/2019 1830 by Viji Griffin RN  Outcome: Progressing  Goal: Maintain or return to baseline ADL function  Description  INTERVENTIONS:  -  Assess patient's ability to carry out ADLs; assess patient's baseline for ADL function and identify physical deficits which impact ability to perform ADLs (bathing, care of mouth/teeth, toileting, grooming, dressing, etc )  - Assess/evaluate cause of self-care deficits   - Assess range of motion  - Assess patient's mobility; develop plan if impaired  - Assess patient's need for assistive devices and provide as appropriate  - Encourage maximum independence but intervene and supervise when necessary  - Involve family in performance of ADLs  - Assess for home care needs following discharge   - Consider OT consult to assist with ADL evaluation and planning for discharge  - Provide patient education as appropriate  11/16/2019 0749 by Antonia Blake RN  Outcome: Progressing  11/15/2019 1830 by Antonia Blake RN  Outcome: Progressing  Goal: Maintain or return mobility status to optimal level  Description  INTERVENTIONS:  - Assess patient's baseline mobility status (ambulation, transfers, stairs, etc )    - Identify cognitive and physical deficits and behaviors that affect mobility  - Identify mobility aids required to assist with transfers and/or ambulation (gait belt, sit-to-stand, lift, walker, cane, etc )  - [place] fall precautions as indicated by assessment  - Record patient progress and toleration of activity level on Mobility SBAR; progress patient to next Phase/Stage  - Instruct patient to call for assistance with activity based on assessment  - Consider rehabilitation consult to assist with strengthening/weightbearing, etc   11/16/2019 0749 by Antonia Blake RN  Outcome: Progressing  11/15/2019 1830 by Antonia Blake RN  Outcome: Progressing     Problem: DISCHARGE PLANNING  Goal: Discharge to home or other facility with appropriate resources  Description  INTERVENTIONS:  - Identify barriers to discharge w/patient and caregiver  - Arrange for needed discharge resources and transportation as appropriate  - Identify discharge learning needs (meds, wound care, etc )  - Arrange for interpretive services to assist at discharge as needed  - Refer to Case Management Department for coordinating discharge planning if the patient needs post-hospital services based on physician/advanced practitioner order or complex needs related to functional status, cognitive ability, or social support system  11/16/2019 0749 by Antonia Blake RN  Outcome: Progressing  11/15/2019 1830 by Antonia Blake RN  Outcome: Progressing     Problem: Knowledge Deficit  Goal: Patient/family/caregiver demonstrates understanding of disease process, treatment plan, medications, and discharge instructions  Description  Complete learning assessment and assess knowledge base  Interventions:  - Provide teaching at level of understanding  - Provide teaching via preferred learning methods  11/16/2019 0749 by Brenda Nguyen RN  Outcome: Progressing  11/15/2019 1830 by Brenda Nguyen RN  Outcome: Progressing     Problem: RESPIRATORY - ADULT  Goal: Achieves optimal ventilation and oxygenation  Description  INTERVENTIONS:  - Assess for changes in respiratory status  - Assess for changes in mentation and behavior  - Position to facilitate oxygenation and minimize respiratory effort  - Oxygen administered by appropriate delivery if ordered  - Initiate smoking cessation education as indicated  - Encourage broncho-pulmonary hygiene including cough, deep breathe, Incentive Spirometry  - Assess the need for suctioning and aspirate as needed  - Assess and instruct to report SOB or any respiratory difficulty  - Respiratory Therapy support as indicated  11/16/2019 0749 by Brenda Nguyen RN  Outcome: Progressing  11/15/2019 1830 by Brenda Nguyen RN  Outcome: Progressing     Problem: Potential for Falls  Goal: Patient will remain free of falls  Description  INTERVENTIONS:  - Assess patient frequently for physical needs  -  Identify cognitive and physical deficits and behaviors that affect risk of falls    -  [place] fall precautions as indicated by assessment   - Educate patient/family on patient safety including physical limitations  - Instruct patient to call for assistance with activity based on assessment  - Modify environment to reduce risk of injury  - Consider OT/PT consult to assist with strengthening/mobility  11/16/2019 0749 by Brenda Nguyen RN  Outcome: Progressing  11/15/2019 1830 by Brenda Nguyen RN  Outcome: Progressing

## 2019-11-16 NOTE — RESPIRATORY THERAPY NOTE
Home Oxygen Qualifying Test       Patient name: Cynthia Osborne        : 1935   Date of Test:  2019  Diagnosis:      Home Oxygen Test:    Medicare Guidelines require item(s) 1-5 on all ambulatory patients or 1 and 2 on non-ambulatory patients  1   Baseline SPO2 on Room Air at rest 97 %  2   SPO2 during exercise on Room Air 97-85 %  During exercise monitor SpO2  If SPO2 increases >=89% with ambulation do not add supplemental             oxygen  If <= 88% on room air add O2 via NC and titrate patient  Patient must be ambulated with O2 and titrated to > 88% with exertion  3   SPO2 on Oxygen at rest 98 % 2 lpm     4   SPO2 during exercise on Oxygen  92% a liter flow of 4 lpm     5   Exercise performed:          walking          [x]  Supplemental Home Oxygen is indicated  []  Client does not qualify for home oxygen        Respiratory Additional Notes-   Casimiro Mireles, RT

## 2019-11-16 NOTE — PHYSICAL THERAPY NOTE
PT eval   11/16/19 1505   Note Type   Note type Eval only   Pain Assessment   Pain Assessment No/denies pain   Pain Score No Pain   Home Living   Type of Home House   Home Layout Two level; Able to live on main level with bedroom/bathroom   Home Equipment   (02 for night)   Prior Function   Level of [place] Independent with ADLs and functional mobility   Lives With Spouse   Receives Help From Family   ADL Assistance Independent   IADLs Needs assistance   Falls in the last 6 months 0   Vocational Retired   Comments mainly sedentary admitts to TRIMBLE   Restrictions/Precautions   [facility] Bearing Precautions Per Order No   Other Precautions O2   General   Additional Pertinent History copd, DM   Family/Caregiver Present No   Cognition   Overall Cognitive Status WFL   Arousal/Participation Alert   Orientation Level Oriented X4   Memory Within functional limits   Following Commands Follows one step commands without difficulty   RUE Assessment   RUE Assessment WFL   LUE Assessment   LUE Assessment WFL   RLE Assessment   RLE Assessment WFL   LLE Assessment   LLE Assessment WFL   Coordination   Movements are Fluid and Coordinated 1   Proprioception   RLE Proprioception Grossly intact   LLE Proprioception Grossly Intact   Bed Mobility   Rolling R 7  Independent   Rolling L 7  Independent   Supine to Sit 7  Independent   Sit to Supine 7  Independent   Transfers   Sit to Stand 7  Independent   Stand to Sit 7  Independent   Stand pivot 7  Independent   Ambulation/Elevation   Gait pattern Inconsistent anup  (elevated shoulders)   Gait Assistance 7  Independent   Assistive Device None   Distance 80'x2   Balance   Static Sitting Normal   Dynamic Sitting Normal   Static Standing Good   Dynamic Standing Good   Ambulatory Good   Endurance Deficit   Endurance Deficit Yes   Endurance Deficit Description 02 sat drops-requires 4L 02 for ambulation roomair sat at rest 92%   Activity Tolerance   Activity Tolerance Patient limited by fatigue   Nurse Made Aware yes   Assessment   Prognosis Good   Problem List Decreased endurance   Assessment Pt presents as a functional ambulator wihtout device for shor tlevel distances  Requires 02 for any exhertion but reluctant to wear same  Focused on lossing weight adn changing eating habits to better control DM  offered info on energy conservation adn home TO services pt declines  Relates only willing to focus on 1 cahgne at a time  Also recommended pulmonary rehab-pt also declines states he will consider at a later time once he loses weight    Delcines any furhter intervention  Hoping for d/c tomorrow   d/c PT   Barriers to Discharge   (medical status)   Goals   Patient Goals go home   PT Treatment Day   (d/c PT)   Plan   PT Frequency   (d/c PT)   Recommendation   Recommendation   (home OT for energy conservation -pt declines)   PT - OK to Discharge Yes   Barthel Index   Feeding 10   Bathing 5   Grooming Score 5   Dressing Score 10   Bladder Score 10   Bowels Score 10   Toilet Use Score 10   Transfers (Bed/Chair) Score 15   Mobility (Level Surface) Score 0   Stairs Score 5   Barthel Index Score 80   Tarsha Ulloa, PT

## 2019-11-16 NOTE — CONSULTS
Pulmonary Consultation   Daysi Prater 81 y o  male MRN: [id number]  Unit/Bed#: [address] [place] [address] [id number] Encounter: [id number]      Reason for consultation: COPD exacerbation    Requesting physician: Dr Bari Garza    Impression/Recommendations:   Pt is a 82 yo M w/ pmhx sig for moderate COPD, CHI not on NIMV who presents with shortness of breath which is chronic  The patient is chronically dyspneic with no acute changes  Moderate COPD - No acute exacerbation  -  HOME REGIMEN - Trelegy; ProAir  -  Discontinue azithromycin day 2/5  -  Discontinue Solumedrol 40mg IV q8h  -  Albuterol nebulizer q4h PRN  -  Encourage pulmonary rehab    Obstructive Sleep Apnea  -  Non compliant with NIMV  -  Uses supplemental oxygen occasionally    Diabetes  -  Elevated blood glucose; likely worsened by steroids  -  Management per primary team    Diastolic heart failure  -  Recommend continued diuresis  -  Outpatient echocardiogram    Plan of care discussed with SLIM  History of Present Illness   HPI:  Daysi Prater is a 81 y o  male with past medical history significant for moderate Chronic Obstructive Pulmonary Disease, , obesity, diastolic heart failure who presents with shortness of breath  The patient is accompanied by his son in the room who reports that he was presenting to his podiatrist who noted that he was dyspneic  The patient reports his breathing at the time of admission was no different than his typical breathing pattern  He reports he is typically able to walk 60-70 feet without becoming severely short of breath  He denies any new cough, congestion, fevers or chills  He does have lower extremity edema  The patient reports he is post use Lasix at home but uses approximately 5/7 days  He does not routinely weigh himself  Review of systems:  12 point review of systems was completed and was otherwise negative except as listed in HPI        Historical Information   Past Medical History:   Diagnosis Date    [facility] [address] [facility]     Diabetes mellitus ([facility] )     Herpes zoster     Hypertension     Mycoplasma pneumonia     Obesity     Osteoarthritis     Renal calculi      Past Surgical History:   Procedure Laterality Date    CATARACT EXTRACTION      with insert intraoculat lens prosthesis    HEMORRHOID SURGERY      NECK SURGERY      for bone spur     Family History   Problem Relation Age of Onset   [facility] Breast cancer Mother     Stroke Mother     Pneumonia Father     Substance Abuse Neg Hx     Mental illness Neg Hx      Social History     Socioeconomic History    Marital status: /Civil Union     Spouse name: None    Number of children: None    Years of education: None    Highest education level: None   Occupational History    None   Social Needs    Financial resource strain: None    Food insecurity:     Worry: None     Inability: None    Transportation needs:     Medical: None     Non-medical: None   Tobacco Use    Smoking status: Former Smoker     Packs/day: 2 00     Years: 40 00     Pack years: 80 00     Types: Cigarettes     Start date:      Last attempt to quit:      Years since quittin 8    Smokeless tobacco: Never Used   Substance and Sexual Activity    Alcohol use:  Yes     Alcohol/week: 1 0 standard drinks     Types: 1 Cans of beer per week     Frequency: 2-4 times a month     Drinks per session: 1 or 2    Drug use: No    Sexual activity: None   Lifestyle    Physical activity:     Days per week: None     Minutes per session: None    Stress: None   Relationships    Social connections:     Talks on phone: None     Gets together: None     Attends Holiness service: None     Active member of club or organization: None     Attends meetings of clubs or organizations: None     Relationship status: None    Intimate partner violence:     Fear of current or ex partner: None     Emotionally abused: None     Physically abused: None     Forced sexual activity: None   Other Topics Concern    None   Social History Narrative    Active advance directive    Daily coffee consumption, 1 cup/day    Denied exercise habits    Good dental hygiene    Supportive and safe    Active family support     Meds/Allergies   Current Facility-Administered Medications   Medication Dose Route Frequency    albuterol inhalation solution 2 5 mg  2 5 mg Nebulization Q4H PRN    amLODIPine (NORVASC) tablet 5 mg  5 mg Oral Daily    azithromycin (ZITHROMAX) 500 mg in sodium chloride 0 9% 250mL IVPB 500 mg  500 mg Intravenous Q24H    doxazosin (CARDURA) tablet 8 mg  8 mg Oral HS    enoxaparin (LOVENOX) subcutaneous injection 40 mg  40 mg Subcutaneous Daily    furosemide (LASIX) tablet 40 mg  40 mg Oral Daily    insulin lispro (HumaLOG) 100 units/mL subcutaneous injection 1-6 Units  1-6 Units Subcutaneous TID AC    labetalol (NORMODYNE) tablet 300 mg  300 mg Oral BID    lisinopril (ZESTRIL) tablet 20 mg  20 mg Oral Daily    metFORMIN (GLUCOPHAGE) tablet 500 mg  500 mg Oral BID With Meals    methylPREDNISolone sodium succinate (Solu-MEDROL) injection 40 mg  40 mg Intravenous Q8H [address]     Medications Prior to Admission   Medication    albuterol (PROAIR HFA) 90 mcg/act inhaler    amLODIPine-benazepril (LOTREL 5-20) 5-20 MG per capsule    Blood Glucose Monitoring Suppl (ONETOUCH VERIO) w/Device KIT    diazepam (VALIUM) 5 mg tablet    doxazosin (CARDURA) 8 MG tablet    fluticasone-umeclidinium-vilanterol (TRELEGY ELLIPTA) 100-62 5-25 MCG/INH inhaler    furosemide (LASIX) 40 mg tablet    glucose blood (ONETOUCH VERIO) test strip    labetalol (NORMODYNE) 300 mg tablet    Lancets MISC    metFORMIN (GLUCOPHAGE) 500 mg tablet     No Known Allergies    Vitals: Blood pressure (!) 173/83, pulse 92, temperature 97 5 °F (36 4 °C), temperature source Oral, resp  rate 18, SpO2 90 % , RA, There is no height or weight on file to calculate BMI        Intake/Output Summary (Last 24 hours) at 11/16/2019 1130  Last data filed at 11/16/2019 0506  Gross per 24 hour   Intake    Output 1125 ml   Net -1125 ml       Physical Exam  General: Pleasant, Awake alert and oriented x 3, conversant without conversational dyspnea, NAD, normal affect  HEENT:  PERRL, Sclera noninjected, nonicteric OU, Nares patent, no nasal flaring, no nasal drainage, Mucous membranes, moist, no oral lesions, normal dentition  NECK: Trachea midline, no accessory muscle use, no stridor, no cervical or supraclavicular adenopathy, JVP not elevated  CARDIAC: diminished breath sounds; s1/S2, no m/r/g  PULM: decreased breath sounds   CHEST: No gross deformities, equal chest expansion on inspiration bilaterally  ABD: Normoactive bowel sounds, soft nontender, nondistended, no rebound, no rigidity, no guarding; obese  EXT: No cyanosis, no clubbing, 2+ LE edema, normal capillary refill  SKIN:  No rashes, no lesions  NEURO: no focal neurologic deficits, AAOx3, moving all extremities appropriately    Labs: I have personally reviewed pertinent lab results  Results from last 7 days   Lab Units 11/16/19  0536 11/15/19  1413 11/15/19  1021   WBC Thousand/uL 7 96  --  7 75   HEMOGLOBIN g/dL 14 5  --  15 4   HEMATOCRIT % 45 2  --  47 6   PLATELETS Thousands/uL 247 261 266   NEUTROS PCT % 87*  --  72   MONOS PCT % 3*  --  8      Results from last 7 days   Lab Units 11/16/19  0536 11/15/19  1021   POTASSIUM mmol/L 4 2 4 1   CHLORIDE mmol/L 103 105   CO2 mmol/L 26 26   BUN mg/dL 19 14   CREATININE mg/dL 1 23 1 24   CALCIUM mg/dL 9 1 8 8   ALK PHOS U/L  --  70   ALT U/L  --  32   AST U/L  --  17     Results from last 7 days   Lab Units 11/16/19  0536   MAGNESIUM mg/dL 1 9                  0   Lab Value Date/Time    TROPONINI <0 02 11/15/2019 1021       Imaging and other studies: I have personally reviewed pertinent reports  and I have personally reviewed pertinent films in PACS  CXR 11/15/2019:  No acute cardiopulmonary disease        Pulmonary function testing: I have personally reviewed the results  10/2/2018:  FEV1 1 57 49%    EKG, Pathology, and Other Studies: I have personally reviewed pertinent reports  and I have personally reviewed pertinent films in PACS  ECHO 10/5/2018:  LEFT VENTRICLE: Size was normal  Systolic function was normal  Ejection fraction was estimated to be 60 %  There were no regional wall motion abnormalities  Wall thickness was mildly increased  No evidence of apical thrombus  DOPPLER: Left  ventricular diastolic function parameters were normal   RIGHT VENTRICLE: The size was normal  Systolic function was normal  Wall thickness was normal   LEFT ATRIUM: The atrium was mildly dilated  RIGHT ATRIUM: Size was normal      Code Status: Level 1 - Full Code    Polina Duvall

## 2019-11-16 NOTE — PROGRESS NOTES
Progress Note - Ian Cleary 1935, 81 y o  male MRN: [id number]    Unit/Bed#: [address] [place], [place] [id number] Encounter: [id number]    Primary Care Provider: Kali Powell MD   Date and time admitted to hospital: 11/15/2019 10:02 AM        * Acute respiratory failure with hypoxemia [facility])  Assessment & Plan  Patient desaturated to 84% with ambulation in ED  Maintain o2 sats above 88%  Patient uses oxygen intermittently at home-by reviewing the records it appears that he has to use oxygen at nighttime and he is not compliant with it for his obstructive sleep apnea  Patient reported that he is planning to lose weight and changes diet that may help with his respiratory status      Class 2 obesity due to excess calories without serious comorbidity with body mass index (BMI) of 39 0 to 39 9 in adult  Assessment & Plan  · Patient reported that he is planning to make dietary changes and plan to lose weight    COPD with acute exacerbation (HCC)  Assessment & Plan  Initiate solumedrol 40mg IV q 8   Frequent neb treatments via respiratory protocol  Airway clearance protocol  Incentive janessa  Check and trend pro-calcitonin to rule out LRTI   Continue with the azithromycin  Pulmonology consult  Patient wants to go home as soon as possible-continue with the respiratory treatment and steroids for today and re-evaluate  It appears that the patient at baseline spends a lot of time sitting in a chair and as soon as his tries to move he gets short of breath-recommended pulmonary rehab as an outpatient    CHI (obstructive sleep apnea)  Assessment & Plan  Per Pulmonary outpatient notes, he's non compliant with treatment at home   Patient reported that he is planning to changes diet and lose weight    Essential hypertension  Assessment & Plan  Continue prior to admission antihypertensive regimen in the form of   Labetalol 300mg PO BID  Lasix 40mg PO daily  Cardura 8 mg PO daily  Norvasc 5mg / Benazepril 20mg PO daily       Type 2 diabetes mellitus without complication, without long-term current use of insulin ([facility])  Assessment & Plan  Lab Results   Component Value Date    HGBA1C 7 8 (H) 2019       Hemoglobin A1c of 7 8 in August of this year  Continue metformin  Initiate insulin sliding scale with accuchecks ac and HS algorith 3 sliding scale coverage   Blood sugar is elevated due to steroid-add mealtime insulin      VTE Pharmacologic Prophylaxis:   Pharmacologic: Enoxaparin (Lovenox)  Mechanical VTE Prophylaxis in Place: Yes    Patient Centered Rounds: I have performed bedside rounds with nursing staff today  Discussions with Specialists or Other Care Team Provider pulmonology  Education and Discussions with Family / Patient[de-identified]  Family updated in the room      Time Spent for Care: 30 minutes  More than 50% of total time spent on counseling and coordination of care as described above  Current Length of Stay: 1 day(s)    Current Patient Status: Inpatient   Certification Statement: The patient will continue to require additional inpatient hospital stay due to COPD exacerbation    Discharge Plan:     Code Status: Level 1 - Full Code      Subjective:   Patient seen and examined  Patient reported that he has been on the oxygen for 18 hours and that was more he was on oxygen at all the times combined and he is not happy about it  Patient reported that usually spends a lot of time in chair and as soon as he starts to do something he gets short of breath  He had rehabilitation in the past and he does not know he got any better since he does not have a baseline at that time  According to the patient he is planning to change his diet and lose weight  Patient wants to go home as soon as possible  Patient also wants his trilegy inhaler and the his family is planning to bring it from home    Patient reported that he takes metformin at home    Objective:     Vitals:   Temp (24hrs), Av 7 °F (36 5 °C), Min:97 5 °F (36 4 °C), Max:97 9 °F (36 6 °C)    Temp:  [97 5 °F (36 4 °C)-97 9 °F (36 6 °C)] 97 5 °F (36 4 °C)  HR:  [] 92  Resp:  [17-20] 18  BP: (127-177)/(66-93) 173/83  SpO2:  [90 %-97 %] 90 %  There is no height or weight on file to calculate BMI  Input and Output Summary (last 24 hours): Intake/Output Summary (Last 24 hours) at 11/16/2019 1152  Last data filed at 11/16/2019 0506  Gross per 24 hour   Intake    Output 1125 ml   Net -1125 ml       Physical Exam:     Physical Exam   Constitutional: He appears well-developed  No distress  HENT:   Head: Normocephalic and atraumatic  Eyes: Left eye exhibits no discharge  Neck: No JVD present  Cardiovascular: Normal rate  Pulmonary/Chest: He has no wheezes  Severely Decreased breath sounds bilateral     Abdominal: Soft  Bowel sounds are normal  He exhibits no distension  Musculoskeletal: He exhibits no edema  Neurological: He is alert  No cranial nerve deficit  Coordination normal    Skin: Skin is warm  Additional Data:     Labs:    Results from last 7 days   Lab Units 11/16/19  0536   WBC Thousand/uL 7 96   HEMOGLOBIN g/dL 14 5   HEMATOCRIT % 45 2   PLATELETS Thousands/uL 247   NEUTROS PCT % 87*   LYMPHS PCT % 9*   MONOS PCT % 3*   EOS PCT % 0     Results from last 7 days   Lab Units 11/16/19  0536 11/15/19  1021   POTASSIUM mmol/L 4 2 4 1   CHLORIDE mmol/L 103 105   CO2 mmol/L 26 26   BUN mg/dL 19 14   CREATININE mg/dL 1 23 1 24   CALCIUM mg/dL 9 1 8 8   ALK PHOS U/L  --  70   ALT U/L  --  32   AST U/L  --  17           * I Have Reviewed All Lab Data Listed Above  * Additional Pertinent Lab Tests Reviewed:  [facility] Admission Reviewed    Imaging:    Imaging Reports Reviewed Today Include:   Imaging Personally Reviewed by Myself Includes:      Recent Cultures (last 7 days):           Last 24 Hours Medication List:     Current Facility-Administered Medications:  albuterol 2 5 mg Nebulization Q4H PRN Jody Benton MD   amLODIPine 5 mg Oral Daily Jody Jaylin Uribe MD   azithromycin 500 mg Intravenous Q24H Jody Benton MD   doxazosin 8 mg Oral HS Jody Benton MD   enoxaparin 40 mg Subcutaneous Daily Davion Chung MD   furosemide 40 mg Oral Daily Davion Chung MD   insulin lispro 1-6 Units Subcutaneous TID Stanford Lindsay MD   labetalol 300 mg Oral BID Jody Benton MD   lisinopril 20 mg Oral Daily Jody Benton MD   metFORMIN 500 mg Oral BID With Meals Davion Chung MD   methylPREDNISolone sodium succinate 40 mg Intravenous Eliazar Alicea MD        Today, Patient Was Seen By: Sherrie Nair MD    ** Please Note: Dictation voice to text software may have been used in the creation of this document   **

## 2019-11-16 NOTE — UTILIZATION REVIEW
Initial Clinical Review    Admission: Date/Time/Statement: Inpatient Admission Orders (From admission, onward)     Ordered        11/15/19 1303  Inpatient Admission (expected length of stay for this patient Order details is greater than two midnights)  Once                   Orders Placed This Encounter   Procedures    Inpatient Admission (expected length of stay for this patient Order details is greater than two midnights)     Standing Status:   Standing     Number of Occurrences:   1     Order Specific Question:   Admitting Physician     Answer:   Leo Chavez     Order Specific Question:   Level of Care     Answer:   Med Surg [16]     Order Specific Question:   Estimated length of stay     Answer:   More than 2 Midnights     Order Specific Question:   Certification     Answer:   I certify that inpatient services are medically necessary for this patient for a duration of greater than two midnights  See H&P and MD Progress Notes for additional information about the patient's course of treatment  ED Arrival Information     Expected Arrival Acuity Means of Arrival Escorted By Service Admission Type    - 11/15/2019 09:57 Urgent Wheelchair Other (Comment) General Medicine Urgent    Arrival Complaint    SOB        Chief Complaint   Patient presents with    Shortness of Breath     sob with walking     Assessment/Plan: 80 y/o male presents to ED from home with exertional SOB starting this morning  Reports not taking his lasix this morning  Occasional pursed lip breathing  RA O2 sat 90-92%  Hx COPD, not on home O2  On exam, B/L wheezes, prolonged expiratory phase, LE edema  Desaturated to 84% with ambulation  Admitted as inpatient due to acute respiratory failure with hypoxemia, acute COPD exacerbation  Continue IV solumedrol  Continue IV zithromax  Recheck ambulatory O2 sat in am   11/16  Continued SOB with minimal exertion  Severely decreased breath sounds on exam   Pulmonary consult    Continue IV steroids  Continues to desaturate to 85% on RA with ambulation      ED Triage Vitals   Temperature Pulse Respirations Blood Pressure SpO2   11/15/19 1027 11/15/19 1004 11/15/19 1005 11/15/19 1005 11/15/19 1004   98 °F (36 7 °C) 89 22 135/85 90 %      Temp Source Heart Rate Source Patient Position - Orthostatic VS BP Location FiO2 (%)   11/15/19 1320 11/15/19 1100 11/15/19 1100 11/15/19 1100 --   Oral Monitor Lying Right arm       Pain Score       11/15/19 1833       No Pain        Wt Readings from Last 1 Encounters:   08/06/19 (!) 138 kg (303 lb 6 4 oz)     Additional Vital Signs:   11/16/19 0730 97 5 °F (36 4 °C) 103 18 173/83Abnormal  94 % Nasal cannula 2L   11/15/19 1900 97 7 °F (36 5 °C) 106Abnormal  18 175/89Abnormal  93 % Nasal cannula 2L   11/15/19 1806  106Abnormal   145/85     11/15/19 1528 97 9 °F (36 6 °C) 103 18 170/88 92 % Nasal cannula 2L   11/15/19 1320 97 7 °F (36 5 °C) 106Abnormal  17 177/93Abnormal  90 % None (Room air)   11/15/19 1300  96 18 175/84Abnormal  93 % None (Room air)   11/15/19 1200  92 20 168/79 91 % None (Room air)   11/15/19 1130  78 20 139/67 97 % None (Room air)   11/15/19 1115  83 19 151/72 98 % Neb tx   11/15/19 1100  80 20 152/73 91 % None (Room air)       Pertinent Labs/Diagnostic Test Results:   Results from last 7 days   Lab Units 11/16/19  0536 11/15/19  1413 11/15/19  1021   WBC Thousand/uL 7 96  --  7 75   HEMOGLOBIN g/dL 14 5  --  15 4   HEMATOCRIT % 45 2  --  47 6   PLATELETS Thousands/uL 247 261 266   NEUTROS ABS Thousands/µL 6 92  --  5 62         Results from last 7 days   Lab Units 11/16/19  0536 11/15/19  1021   SODIUM mmol/L 136 139   POTASSIUM mmol/L 4 2 4 1   CHLORIDE mmol/L 103 105   CO2 mmol/L 26 26   ANION GAP mmol/L 7 8   BUN mg/dL 19 14   CREATININE mg/dL 1 23 1 24   EGFR ml/min/1 73sq m 54 53   CALCIUM mg/dL 9 1 8 8   MAGNESIUM mg/dL 1 9  --      Results from last 7 days   Lab Units 11/15/19  1021   AST U/L 17   ALT U/L 32   ALK PHOS U/L 70   TOTAL PROTEIN g/dL 7 2   ALBUMIN g/dL 3 5   TOTAL BILIRUBIN mg/dL 0 50     Results from last 7 days   Lab Units 11/16/19  1117 11/16/19  0722 11/15/19  1607 11/15/19  1346   POC GLUCOSE mg/dl 287* 278* 238* 226*     Results from last 7 days   Lab Units 11/16/19  0536 11/15/19  1021   GLUCOSE RANDOM mg/dL 269* 200*     Results from last 7 days   Lab Units 11/15/19  1021   TROPONIN I ng/mL <0 02     Results from last 7 days   Lab Units 11/15/19  1430   PROCALCITONIN ng/ml <0 05     Results from last 7 days   Lab Units 11/15/19  1021   NT-PRO BNP pg/mL 259     11/15 EKG:  Normal sinus rhythm  Nonspecific T wave abnormality    11/15 CXR:  Cardiomediastinal silhouette appears unremarkable    No acute disease  Redemonstration of calcified granuloma in the lingula and mild scarring in the left base      ED Treatment:   Medication Administration from 11/15/2019 0957 to 11/15/2019 1334       Date/Time Order Dose Route Action     11/15/2019 1112 ipratropium-albuterol (DUO-NEB) 0 5-2 5 mg/3 mL inhalation solution 3 mL 3 mL Nebulization Given     11/15/2019 1109 methylPREDNISolone sodium succinate (Solu-MEDROL) injection 125 mg 125 mg Intravenous Given        Past Medical History:   Diagnosis Date    COPD (chronic obstructive pulmonary disease) ([place] [address] )     Diabetes mellitus ([place] [address] )     Herpes zoster     Hypertension     Mycoplasma pneumonia     Obesity     Osteoarthritis     Renal calculi      Present on Admission:   Type 2 diabetes mellitus without complication, without long-term current use of insulin ([place] [address] )   COPD with acute exacerbation (HCC)   Essential hypertension   CHI (obstructive sleep apnea)      Admitting Diagnosis: SOB (shortness of breath) [R06 02]  COPD with acute exacerbation ([place] [address] ) [J44 1]  Age/Sex: 81 y o  male  Admission Orders:  Scheduled Medications:    Medications:  amLODIPine 5 mg Oral Daily   doxazosin 8 mg Oral HS   enoxaparin 40 mg Subcutaneous Daily   fluticasone-umeclidinium-vilanterol  Inhalation Daily   furosemide 40 mg Oral Daily   insulin lispro 1-6 Units Subcutaneous TID AC   labetalol 300 mg Oral BID   lisinopril 20 mg Oral Daily   metFORMIN 500 mg Oral BID With Meals   azithromycin (ZITHROMAX) 500 mg in sodium chloride 0 9% 250mL IVPB 500 mg   Dose: 500 mg  Freq: Every 24 hours Route: IV  Start: 11/15/19 1315 End: 11/16/19 1211  methylPREDNISolone sodium succinate (Solu-MEDROL) injection 40 mg   Dose: 40 mg  Freq: Every 8 hours scheduled Route: IV  Start: 11/15/19 2000 End: 11/16/19 1211    Continuous IV Infusions:     PRN Meds:    albuterol 2 5 mg Nebulization Q4H PRN       IP CONSULT TO PULMONOLOGY   Ambulatory O2 sat  VS  SCDs  PT/OT    Network Utilization Review Department  [email or web address]  ATTENTION: Please call with any questions or concerns to [phone] and carefully listen to the prompts so that you are directed to the right person  All voicemails are confidential   Caridad Mckinney all requests for admission clinical reviews, approved or denied determinations and any other requests to dedicated fax number below belonging to the campus where the patient is receiving treatment    FACILITY NAME UR FAX NUMBER   ADMISSION DENIALS (Administrative/Medical Necessity) [address] [facility] (Maternity/NICU/Pediatrics) [phone]   [facility] [address] [facility] [phone]   [facility] [address] [facility] [phone]   [place] [address] [facility] 500 [facility] [phone]

## 2019-11-17 VITALS
WEIGHT: 299.61 LBS | DIASTOLIC BLOOD PRESSURE: 78 MMHG | OXYGEN SATURATION: 93 % | TEMPERATURE: 97.5 F | HEART RATE: 85 BPM | RESPIRATION RATE: 18 BRPM | SYSTOLIC BLOOD PRESSURE: 142 MMHG | BODY MASS INDEX: 41.48 KG/M2

## 2019-11-17 LAB
GLUCOSE SERPL-MCNC: 180 MG/DL (ref 65–140)
GLUCOSE SERPL-MCNC: 196 MG/DL (ref 65–140)

## 2019-11-17 PROCEDURE — 99232 SBSQ HOSP IP/OBS MODERATE 35: CPT | Performed by: INTERNAL MEDICINE

## 2019-11-17 PROCEDURE — 94668 MNPJ CHEST WALL SBSQ: CPT

## 2019-11-17 PROCEDURE — 99239 HOSP IP/OBS DSCHRG MGMT >30: CPT | Performed by: INTERNAL MEDICINE

## 2019-11-17 PROCEDURE — 82948 REAGENT STRIP/BLOOD GLUCOSE: CPT

## 2019-11-17 RX ORDER — ECHINACEA PURPUREA EXTRACT 125 MG
1 TABLET ORAL AS NEEDED
Status: DISPENSED | OUTPATIENT
Start: 2019-11-17 | End: 2019-11-17

## 2019-11-17 RX ORDER — FUROSEMIDE 40 MG/1
40 TABLET ORAL 2 TIMES DAILY
Qty: 60 TABLET | Refills: 0 | Status: SHIPPED | OUTPATIENT
Start: 2019-11-17 | End: 2019-12-03

## 2019-11-17 RX ADMIN — INSULIN LISPRO 2 UNITS: 100 INJECTION, SOLUTION INTRAVENOUS; SUBCUTANEOUS at 08:13

## 2019-11-17 RX ADMIN — AMLODIPINE BESYLATE 5 MG: 5 TABLET ORAL at 08:12

## 2019-11-17 RX ADMIN — FUROSEMIDE 40 MG: 40 TABLET ORAL at 08:12

## 2019-11-17 RX ADMIN — LABETALOL HCL 300 MG: 200 TABLET, FILM COATED ORAL at 08:11

## 2019-11-17 RX ADMIN — METFORMIN HYDROCHLORIDE 500 MG: 500 TABLET ORAL at 08:12

## 2019-11-17 RX ADMIN — ENOXAPARIN SODIUM 40 MG: 40 INJECTION SUBCUTANEOUS at 08:11

## 2019-11-17 RX ADMIN — LISINOPRIL 20 MG: 20 TABLET ORAL at 08:12

## 2019-11-17 NOTE — ASSESSMENT & PLAN NOTE
Patient was treated with nebulizers, IV steroids for mild COPD exacerbation  HIs FEV1 was 49% as per his pulmonologist note on October 2, 2018  He has moderate COPD  Pulmonary consultant felt that bilateral time he saw patient,  the patient did not have COPD exacerbation  On day of discharge he has no expiratory wheezing or inspiratory crackles on physical examination  Oxygen saturation is 93% on room air  Patient has home oxygen that he uses with exertion  Patient is stable for discharge  I am discharging him with his usual inhalers and will ask him to see his primary care doctor within 1 week  I am not discharging him on tapering course of prednisone      I discussed the case with patient's wife and son at the bedside in detail

## 2019-11-17 NOTE — ASSESSMENT & PLAN NOTE
Patient was admitted with acute shortness of breath after he walked into the hospital to see his podiatrist   Patient was evaluated in the ER and after nebulizer treatments he was found to have persistently increased work of breathing and oxygen saturation of 84% on room air  He was placed on on oxygen, IV Solu-Medrol was given as well as azithromycin  The patient was admitted to the hospital   Chest x-ray showed no pneumonia, no focal infiltrates, no CHF, no pneumothorax  BNP was 259 and procalcitonins remained normal     In the hospital patient's condition improved and patient was seen by Pulmonary who felt that by that time patient had no COPD exacerbation  Antibiotics and steroids were discontinued

## 2019-11-17 NOTE — DISCHARGE SUMMARY
Discharge- Domenica [facility] 1935, 81 y o  male MRN: [id number]    Unit/Bed#: [address] [place], [place] [id number] Encounter: [id number]    Primary Care Provider: Joanne Mckinnon MD   Date and time admitted to hospital: 11/15/2019 10:02 AM        * Acute respiratory failure with hypoxemia [facility])  Assessment & Plan  Patient was admitted with acute shortness of breath after he walked into the hospital to see his podiatrist   Patient was evaluated in the ER and after nebulizer treatments he was found to have persistently increased work of breathing and oxygen saturation of 84% on room air  He was placed on on oxygen, IV Solu-Medrol was given as well as azithromycin  The patient was admitted to the hospital   Chest x-ray showed no pneumonia, no focal infiltrates, no CHF, no pneumothorax  BNP was 259 and procalcitonins remained normal     In the hospital patient's condition improved and patient was seen by Pulmonary who felt that by that time patient had no COPD exacerbation  Antibiotics and steroids were discontinued  COPD with acute exacerbation [facility])  Assessment & Plan  Patient was treated with nebulizers, IV steroids for mild COPD exacerbation  HIs FEV1 was 49% as per his pulmonologist note on October 2, 2018  He has moderate COPD  Pulmonary consultant felt that bilateral time he saw patient,  the patient did not have COPD exacerbation  On day of discharge he has no expiratory wheezing or inspiratory crackles on physical examination  Oxygen saturation is 93% on room air  Patient has home oxygen that he uses with exertion  Patient is stable for discharge  I am discharging him with his usual inhalers and will ask him to see his primary care doctor within 1 week  I am not discharging him on tapering course of prednisone      I discussed the case with patient's wife and son at the bedside in detail    Chronic diastolic congestive heart failure [facility])  Assessment & Plan  Wt Readings from Last 3 Encounters:   11/17/19 136 kg (299 lb 9 7 oz)   08/06/19 (!) 138 kg (303 lb 6 4 oz)   07/23/19 (!) 139 kg (307 lb)       Patient had trace lower leg edema on day of discharge  I asked patient to continue taking Lasix 40 mg p o  B i d  To avoid volume overload  Heart was regular rate and rhythm on day of discharge    Chest x-ray showed no CHF admission      Type 2 diabetes mellitus with stage 3 chronic kidney disease, without long-term current use of insulin [facility])  Assessment & Plan  Lab Results   Component Value Date    HGBA1C 7 8 (H) 08/02/2019       Hemoglobin A1c of 7 8 in August of this year  Patient had hyperglycemia in the hospital due to IV steroids that he was getting  Will Continue metformin as an outpatient  His EGFR is above 50, he has stage III chronic disease which is at baseline    Essential hypertension  Assessment & Plan  Blood pressure was controlled during the hospital stay,  Will continue Labetalol, Cardura, Norvasc and ACE-inhibitor as an outpatient                 Hospital Course:     Ivana Mckeon is a 81 y o  male patient who originally presented to the hospital on   Admission Orders (From admission, onward)     Ordered        11/15/19 1303  Inpatient Admission (expected length of stay for this patient Order details is greater than two midnights)  Once                  due to acute dyspnea after exertion    Please see above list of diagnoses and related plan for additional information  Condition at Discharge:  good      Discharge instructions/Information to patient and family:   See after visit summary for information provided to patient and family  Provisions for Follow-Up Care:  See after visit summary for information related to follow-up care and any pertinent home health orders  Disposition:     Home       Discharge Statement:  I spent 33 minutes discharging the patient  This time was spent on the day of discharge  I had direct contact with the patient on the day of discharge   Greater than 50% of the total time was spent examining patient, answering all patient questions, arranging and discussing plan of care with patient as well as directly providing post-discharge instructions  Additional time then spent on discharge activities  Discharge Medications:  See after visit summary for reconciled discharge medications provided to patient and family        ** Please Note: This note has been constructed using a voice recognition system **

## 2019-11-17 NOTE — PROGRESS NOTES
Pt non-compliant with O2  Pt pulled off O2  Spot check SPO2 = 88%  Replaced NC, pt stated I'm tired of this

## 2019-11-17 NOTE — ASSESSMENT & PLAN NOTE
Blood pressure was controlled during the hospital stay,  Will continue Labetalol, Cardura, Norvasc and ACE-inhibitor as an outpatient

## 2019-11-17 NOTE — ASSESSMENT & PLAN NOTE
Wt Readings from Last 3 Encounters:   11/17/19 136 kg (299 lb 9 7 oz)   08/06/19 (!) 138 kg (303 lb 6 4 oz)   07/23/19 (!) 139 kg (307 lb)       Patient had trace lower leg edema on day of discharge  I asked patient to continue taking Lasix 40 mg p o  B i d  To avoid volume overload      Heart was regular rate and rhythm on day of discharge    Chest x-ray showed no CHF admission

## 2019-11-17 NOTE — PROGRESS NOTES
Progress Note - Pulmonary   Denisha Crandall 81 y o  male MRN: [id number]  Unit/Bed#: [address] [place] [address] [id number] Encounter: [id number]    Assessment/Plan:  Pt is a 82 yo M w/ pmhx sig for moderate COPD, CHI not on NIMV who presents with shortness of breath which is chronic  The patient is chronically dyspneic with no acute changes       Moderate COPD - No acute exacerbation  -  HOME REGIMEN - Trelegy; ProAir  -  Continued on Trelegy  -  Albuterol nebulizer q4h PRN  -  Encourage pulmonary rehab following discharge  -  Pt does have supplemental oxygen at home currently; uses intermittently     Obstructive Sleep Apnea - mild  -  Non compliant with NIMV  -  Uses supplemental oxygen occasionally  -  May check nocturnal pulse oximetry as an outpatient     Diabetes  -  Elevated blood glucose; likely worsened by steroids   -  Improved since discontinuation of steroids  -  Management per primary team     Diastolic heart failure  -  Recommend continued diuresis  -  Outpatient echocardiogram  -  Consider checking daily weights; I/Os not recorded     Plan of care discussed with SLIM        Chief Complaint:   I can't walk as far as I used to  Subjective:   No acute events overnight  Pt had reported desaturations to 88%  He reports his breathing is stable  He denies chest pain, fevers, chills, nausea or vomiting  He feels his LE edema has improved  Objective:     Vitals: Blood pressure 142/78, pulse 85, temperature 97 5 °F (36 4 °C), temperature source Oral, resp  rate 18, weight 136 kg (299 lb 9 7 oz), SpO2 96 %  2L NC,Body mass index is 41 48 kg/m²  Intake/Output Summary (Last 24 hours) at 11/17/2019 0859  Last data filed at 11/17/2019 0101  Gross per 24 hour   Intake    Output 300 ml   Net -300 ml       Invasive Devices     Peripheral Intravenous Line            Peripheral IV 11/15/19 Left Antecubital 1 day                Physical Exam   Constitutional: He is oriented to person, place, and time   He appears well-developed and well-nourished  Non-toxic appearance  He does not appear ill  HENT:   Head: Normocephalic and atraumatic  Mouth/Throat: Oropharynx is clear and moist    Eyes: Pupils are equal, round, and reactive to light  EOM are normal    Neck: Normal range of motion  Neck supple  Cardiovascular: Normal rate, regular rhythm, normal heart sounds and intact distal pulses  Pulmonary/Chest: Effort normal  He has decreased breath sounds  He exhibits no tenderness and no laceration  Abdominal: Soft  There is no rebound and no guarding  obese   Musculoskeletal: Normal range of motion  Right lower leg: He exhibits edema  Left lower leg: He exhibits edema  Neurological: He is alert and oriented to person, place, and time  Skin: Skin is warm and dry  Psychiatric: He has a normal mood and affect  His behavior is normal    Vitals reviewed  Labs: I have personally reviewed pertinent lab results  Lab Results   Component Value Date    SODIUM 136 11/16/2019    K 4 2 11/16/2019     11/16/2019    CO2 26 11/16/2019    BUN 19 11/16/2019    CREATININE 1 23 11/16/2019    GLUC 269 (H) 11/16/2019    CALCIUM 9 1 11/16/2019     Lab Results   Component Value Date    WBC 7 96 11/16/2019    HGB 14 5 11/16/2019    HCT 45 2 11/16/2019    MCV 87 11/16/2019     11/16/2019     Imaging and other studies: I have personally reviewed pertinent reports  and I have personally reviewed pertinent films in PACS  CXR 11/15/2019:  No acute intrathoracic disease

## 2019-11-17 NOTE — PLAN OF CARE
Problem: PAIN - ADULT  Goal: Verbalizes/displays adequate comfort level or baseline comfort level  Description  Interventions:  - Encourage patient to monitor pain and request assistance  - Assess pain using appropriate pain scale  - Administer analgesics based on type and severity of pain and evaluate response  - Implement non-pharmacological measures as appropriate and evaluate response  - Consider cultural and social influences on pain and pain management  - Notify physician/advanced practitioner if interventions unsuccessful or patient reports new pain  Outcome: Progressing     Problem: SAFETY ADULT  Goal: Patient will remain free of falls  Description  INTERVENTIONS:  - Assess patient frequently for physical needs  -  Identify cognitive and physical deficits and behaviors that affect risk of falls    -  [place] fall precautions as indicated by assessment   - Educate patient/family on patient safety including physical limitations  - Instruct patient to call for assistance with activity based on assessment  - Modify environment to reduce risk of injury  - Consider OT/PT consult to assist with strengthening/mobility  Outcome: Progressing  Goal: Maintain or return to baseline ADL function  Description  INTERVENTIONS:  -  Assess patient's ability to carry out ADLs; assess patient's baseline for ADL function and identify physical deficits which impact ability to perform ADLs (bathing, care of mouth/teeth, toileting, grooming, dressing, etc )  - Assess/evaluate cause of self-care deficits   - Assess range of motion  - Assess patient's mobility; develop plan if impaired  - Assess patient's need for assistive devices and provide as appropriate  - Encourage maximum independence but intervene and supervise when necessary  - Involve family in performance of ADLs  - Assess for home care needs following discharge   - Consider OT consult to assist with ADL evaluation and planning for discharge  - Provide patient education as appropriate  Outcome: Progressing  Goal: Maintain or return mobility status to optimal level  Description  INTERVENTIONS:  - Assess patient's baseline mobility status (ambulation, transfers, stairs, etc )    - Identify cognitive and physical deficits and behaviors that affect mobility  - Identify mobility aids required to assist with transfers and/or ambulation (gait belt, sit-to-stand, lift, walker, cane, etc )  - [place] fall precautions as indicated by assessment  - Record patient progress and toleration of activity level on Mobility SBAR; progress patient to next Phase/Stage  - Instruct patient to call for assistance with activity based on assessment  - Consider rehabilitation consult to assist with strengthening/weightbearing, etc   Outcome: Progressing     Problem: DISCHARGE PLANNING  Goal: Discharge to home or other facility with appropriate resources  Description  INTERVENTIONS:  - Identify barriers to discharge w/patient and caregiver  - Arrange for needed discharge resources and transportation as appropriate  - Identify discharge learning needs (meds, wound care, etc )  - Arrange for interpretive services to assist at discharge as needed  - Refer to Case Management Department for coordinating discharge planning if the patient needs post-hospital services based on physician/advanced practitioner order or complex needs related to functional status, cognitive ability, or social support system  Outcome: Progressing     Problem: Knowledge Deficit  Goal: Patient/family/caregiver demonstrates understanding of disease process, treatment plan, medications, and discharge instructions  Description  Complete learning assessment and assess knowledge base    Interventions:  - Provide teaching at level of understanding  - Provide teaching via preferred learning methods  Outcome: Progressing     Problem: RESPIRATORY - ADULT  Goal: Achieves optimal ventilation and oxygenation  Description  INTERVENTIONS:  - Assess for changes in respiratory status  - Assess for changes in mentation and behavior  - Position to facilitate oxygenation and minimize respiratory effort  - Oxygen administered by appropriate delivery if ordered  - Initiate smoking cessation education as indicated  - Encourage broncho-pulmonary hygiene including cough, deep breathe, Incentive Spirometry  - Assess the need for suctioning and aspirate as needed  - Assess and instruct to report SOB or any respiratory difficulty  - Respiratory Therapy support as indicated  Outcome: Progressing     Problem: Potential for Falls  Goal: Patient will remain free of falls  Description  INTERVENTIONS:  - Assess patient frequently for physical needs  -  Identify cognitive and physical deficits and behaviors that affect risk of falls    -  [place] fall precautions as indicated by assessment   - Educate patient/family on patient safety including physical limitations  - Instruct patient to call for assistance with activity based on assessment  - Modify environment to reduce risk of injury  - Consider OT/PT consult to assist with strengthening/mobility  Outcome: Progressing

## 2019-11-17 NOTE — PLAN OF CARE
Problem: PAIN - ADULT  Goal: Verbalizes/displays adequate comfort level or baseline comfort level  Description  Interventions:  - Encourage patient to monitor pain and request assistance  - Assess pain using appropriate pain scale  - Administer analgesics based on type and severity of pain and evaluate response  - Implement non-pharmacological measures as appropriate and evaluate response  - Consider cultural and social influences on pain and pain management  - Notify physician/advanced practitioner if interventions unsuccessful or patient reports new pain  Outcome: Adequate for Discharge     Problem: SAFETY ADULT  Goal: Patient will remain free of falls  Description  INTERVENTIONS:  - Assess patient frequently for physical needs  -  Identify cognitive and physical deficits and behaviors that affect risk of falls    -  [place] fall precautions as indicated by assessment   - Educate patient/family on patient safety including physical limitations  - Instruct patient to call for assistance with activity based on assessment  - Modify environment to reduce risk of injury  - Consider OT/PT consult to assist with strengthening/mobility  Outcome: Adequate for Discharge  Goal: Maintain or return to baseline ADL function  Description  INTERVENTIONS:  -  Assess patient's ability to carry out ADLs; assess patient's baseline for ADL function and identify physical deficits which impact ability to perform ADLs (bathing, care of mouth/teeth, toileting, grooming, dressing, etc )  - Assess/evaluate cause of self-care deficits   - Assess range of motion  - Assess patient's mobility; develop plan if impaired  - Assess patient's need for assistive devices and provide as appropriate  - Encourage maximum independence but intervene and supervise when necessary  - Involve family in performance of ADLs  - Assess for home care needs following discharge   - Consider OT consult to assist with ADL evaluation and planning for discharge  - Provide patient education as appropriate  Outcome: Adequate for Discharge  Goal: Maintain or return mobility status to optimal level  Description  INTERVENTIONS:  - Assess patient's baseline mobility status (ambulation, transfers, stairs, etc )    - Identify cognitive and physical deficits and behaviors that affect mobility  - Identify mobility aids required to assist with transfers and/or ambulation (gait belt, sit-to-stand, lift, walker, cane, etc )  - [place] fall precautions as indicated by assessment  - Record patient progress and toleration of activity level on Mobility SBAR; progress patient to next Phase/Stage  - Instruct patient to call for assistance with activity based on assessment  - Consider rehabilitation consult to assist with strengthening/weightbearing, etc   Outcome: Adequate for Discharge     Problem: DISCHARGE PLANNING  Goal: Discharge to home or other facility with appropriate resources  Description  INTERVENTIONS:  - Identify barriers to discharge w/patient and caregiver  - Arrange for needed discharge resources and transportation as appropriate  - Identify discharge learning needs (meds, wound care, etc )  - Arrange for interpretive services to assist at discharge as needed  - Refer to Case Management Department for coordinating discharge planning if the patient needs post-hospital services based on physician/advanced practitioner order or complex needs related to functional status, cognitive ability, or social support system  Outcome: Adequate for Discharge     Problem: Knowledge Deficit  Goal: Patient/family/caregiver demonstrates understanding of disease process, treatment plan, medications, and discharge instructions  Description  Complete learning assessment and assess knowledge base    Interventions:  - Provide teaching at level of understanding  - Provide teaching via preferred learning methods  Outcome: Adequate for Discharge     Problem: RESPIRATORY - ADULT  Goal: Achieves optimal ventilation and oxygenation  Description  INTERVENTIONS:  - Assess for changes in respiratory status  - Assess for changes in mentation and behavior  - Position to facilitate oxygenation and minimize respiratory effort  - Oxygen administered by appropriate delivery if ordered  - Initiate smoking cessation education as indicated  - Encourage broncho-pulmonary hygiene including cough, deep breathe, Incentive Spirometry  - Assess the need for suctioning and aspirate as needed  - Assess and instruct to report SOB or any respiratory difficulty  - Respiratory Therapy support as indicated  Outcome: Adequate for Discharge     Problem: Potential for Falls  Goal: Patient will remain free of falls  Description  INTERVENTIONS:  - Assess patient frequently for physical needs  -  Identify cognitive and physical deficits and behaviors that affect risk of falls    -  [place] fall precautions as indicated by assessment   - Educate patient/family on patient safety including physical limitations  - Instruct patient to call for assistance with activity based on assessment  - Modify environment to reduce risk of injury  - Consider OT/PT consult to assist with strengthening/mobility  Outcome: Adequate for Discharge

## 2019-11-17 NOTE — ASSESSMENT & PLAN NOTE
Lab Results   Component Value Date    HGBA1C 7 8 (H) 08/02/2019       Hemoglobin A1c of 7 8 in August of this year  Patient had hyperglycemia in the hospital due to IV steroids that he was getting  Will Continue metformin as an outpatient    His EGFR is above 50, he has stage III chronic disease which is at baseline

## 2019-11-18 DIAGNOSIS — E11.9 CONTROLLED TYPE 2 DIABETES MELLITUS WITHOUT COMPLICATION, WITHOUT LONG-TERM CURRENT USE OF INSULIN (HCC): ICD-10-CM

## 2019-11-18 DIAGNOSIS — I10 ESSENTIAL HYPERTENSION: ICD-10-CM

## 2019-11-18 RX ORDER — DOXAZOSIN 8 MG/1
8 TABLET ORAL
Qty: 90 TABLET | Refills: 3 | Status: SHIPPED | OUTPATIENT
Start: 2019-11-18 | End: 2020-01-30

## 2019-11-19 ENCOUNTER — PATIENT OUTREACH (OUTPATIENT)
Dept: FAMILY MEDICINE CLINIC | Facility: HOSPITAL | Age: 84
End: 2019-11-19

## 2019-11-19 ENCOUNTER — TRANSITIONAL CARE MANAGEMENT (OUTPATIENT)
Dept: FAMILY MEDICINE CLINIC | Facility: HOSPITAL | Age: 84
End: 2019-11-19

## 2019-11-19 DIAGNOSIS — Z71.89 COMPLEX CARE COORDINATION: Primary | ICD-10-CM

## 2019-11-19 NOTE — UTILIZATION REVIEW
Notification of Discharge  This is a Notification of Discharge from our facility [address]  Please be advised that this patient has been discharge from our facility  Below you will find the admission and discharge date and time including the patients disposition  PRESENTATION DATE: 11/15/2019 10:02 AM  OBS ADMISSION DATE:   IP ADMISSION DATE: 11/15/19 1303   DISCHARGE DATE: 11/17/2019 12:26 PM  DISPOSITION: Home/Self Care Home/Self Care   Admission Orders listed below:  Admission Orders (From admission, onward)     Ordered        11/15/19 1303  Inpatient Admission (expected length of stay for this patient Order details is greater than two midnights)  Once                   Please contact the UR Department if additional information is required to close this patient's authorization/case  Bryan Aldrich  Network Utilization Review Department  Main: [phone] x carefully listen to the prompts  All voicemails are confidential   [email or web address]  Send all requests for admission clinical reviews, approved or denied determinations and any other requests to dedicated fax number below belonging to the campus where the patient is receiving treatment    List of dedicated fax numbers:  [address] DENIALS (Administrative/Medical Necessity) [phone]   [address] (Maternity/NICU/Pediatrics) [phone]   [facility] [phone]   Matt Ferris [phone]   Rosio Watkins [phone]   [facility] [facility] [phone]   Caroline Yosts [address] [phone]

## 2019-12-03 ENCOUNTER — OFFICE VISIT (OUTPATIENT)
Dept: FAMILY MEDICINE CLINIC | Facility: HOSPITAL | Age: 84
End: 2019-12-03
Payer: COMMERCIAL

## 2019-12-03 VITALS
WEIGHT: 291 LBS | BODY MASS INDEX: 40.74 KG/M2 | SYSTOLIC BLOOD PRESSURE: 130 MMHG | DIASTOLIC BLOOD PRESSURE: 78 MMHG | HEIGHT: 71 IN | HEART RATE: 102 BPM | OXYGEN SATURATION: 93 %

## 2019-12-03 DIAGNOSIS — J44.1 COPD WITH ACUTE EXACERBATION (HCC): Primary | ICD-10-CM

## 2019-12-03 DIAGNOSIS — N18.30 TYPE 2 DIABETES MELLITUS WITH STAGE 3 CHRONIC KIDNEY DISEASE, WITHOUT LONG-TERM CURRENT USE OF INSULIN (HCC): ICD-10-CM

## 2019-12-03 DIAGNOSIS — E11.22 TYPE 2 DIABETES MELLITUS WITH STAGE 3 CHRONIC KIDNEY DISEASE, WITHOUT LONG-TERM CURRENT USE OF INSULIN (HCC): ICD-10-CM

## 2019-12-03 DIAGNOSIS — I50.32 CHRONIC DIASTOLIC CONGESTIVE HEART FAILURE (HCC): ICD-10-CM

## 2019-12-03 PROBLEM — J96.01 ACUTE RESPIRATORY FAILURE WITH HYPOXEMIA (HCC): Status: RESOLVED | Noted: 2019-11-15 | Resolved: 2019-12-03

## 2019-12-03 PROCEDURE — 1160F RVW MEDS BY RX/DR IN RCRD: CPT | Performed by: INTERNAL MEDICINE

## 2019-12-03 PROCEDURE — 99214 OFFICE O/P EST MOD 30 MIN: CPT | Performed by: INTERNAL MEDICINE

## 2019-12-03 RX ORDER — FUROSEMIDE 80 MG
80 TABLET ORAL DAILY
Qty: 90 TABLET | Refills: 3 | Status: SHIPPED | OUTPATIENT
Start: 2019-12-03 | End: 2020-01-30

## 2019-12-03 NOTE — PROGRESS NOTES
Assessment/Plan:       Diagnoses and all orders for this visit:    COPD with acute exacerbation ([facility] [address] )    Type 2 diabetes mellitus with stage 3 chronic kidney disease, without long-term current use of insulin (HCC)  -     Hemoglobin A1C; Future    Chronic diastolic congestive heart failure (HCC)  -     Basic metabolic panel; Future  -     furosemide (LASIX) 80 mg tablet; Take 1 tablet (80 mg total) by mouth daily          All of the above diagnoses have been assessed  Additional COMMENTS/PLAN: will see back in 6 weeks with attn to BMP, glyco and weight      Subjective:      Patient ID: Dorn Koyanagi is a 81 y o  male  HPI     Seen back after admission for COPD exacerbation  Was admitted and is doing better  Off the steroids  Has not been doing many sugars  Has started on strict diet  Has lost sign weight  HTN-compliant with salt restriction and meds  The following portions of the patient's history were revised and updated as appropriate: Problem list, allergies, med list, FH, SH, Past medical and surgical histories  Current Outpatient Medications   Medication Sig Dispense Refill    albuterol (PROAIR HFA) 90 mcg/act inhaler Inhale 2 puffs every 4 (four) hours as needed for wheezing 1 Inhaler 5    amLODIPine-benazepril (LOTREL 5-20) 5-20 MG per capsule Take 1 capsule by mouth 2 (two) times a day 180 capsule 3    Blood Glucose Monitoring Suppl (ONETOUCH VERIO) w/Device KIT by Does not apply route 2 (two) times a day 1 kit 1    diazepam (VALIUM) 5 mg tablet Take by mouth Keeps on hand for cervical neck pain as needed   doxazosin (CARDURA) 8 MG tablet Take 1 tablet (8 mg total) by mouth daily at bedtime 90 tablet 3    fluticasone-umeclidinium-vilanterol (TRELEGY ELLIPTA) 100-62 5-25 MCG/INH inhaler Inhale 1 puff daily Rinse mouth after use   3 Inhaler 3    furosemide (LASIX) 80 mg tablet Take 1 tablet (80 mg total) by mouth daily 90 tablet 3    glucose blood (ONETOUCH VERIO) test strip 1 each by Other route 2 (two) times a day Use as instructed 100 each 11    labetalol (NORMODYNE) 300 mg tablet Take 1 tablet (300 mg total) by mouth 2 (two) times a day 180 tablet 3    Lancets MISC by Does not apply route 2 (two) times a day 100 each 5    metFORMIN (GLUCOPHAGE) 500 mg tablet Take 1 tablet (500 mg total) by mouth 2 (two) times a day with meals 180 tablet 3     No current facility-administered medications for this visit  Review of Systems   All other systems reviewed and are negative  Objective:    /78 (BP Location: Left arm, Patient Position: Sitting, Cuff Size: Large)   Pulse 102   Ht 5' 11" (1 803 m)   Wt 132 kg (291 lb)   SpO2 93%   BMI 40 59 kg/m²     BP Readings from Last 3 Encounters:   12/03/19 130/78   11/17/19 142/78   08/06/19 120/78                  Wt Readings from Last 3 Encounters:   12/03/19 132 kg (291 lb)   11/17/19 136 kg (299 lb 9 7 oz)   08/06/19 (!) 138 kg (303 lb 6 4 oz)         Physical Exam   Constitutional: He appears well-developed and well-nourished  Neck: No JVD present  Cardiovascular: Normal rate and regular rhythm  Pulmonary/Chest:   Dec BS -no wheezes   Abdominal: Soft  Bowel sounds are normal    Musculoskeletal: Edema: plus2  No visits with results within 2 Week(s) from this visit     Latest known visit with results is:   Admission on 11/15/2019, Discharged on 11/17/2019   Component Date Value Ref Range Status    WBC 11/15/2019 7 75  4 31 - 10 16 Thousand/uL Final    RBC 11/15/2019 5 46  3 88 - 5 62 Million/uL Final    Hemoglobin 11/15/2019 15 4  12 0 - 17 0 g/dL Final    Hematocrit 11/15/2019 47 6  36 5 - 49 3 % Final    MCV 11/15/2019 87  82 - 98 fL Final    MCH 11/15/2019 28 2  26 8 - 34 3 pg Final    MCHC 11/15/2019 32 4  31 4 - 37 4 g/dL Final    RDW 11/15/2019 13 4  11 6 - 15 1 % Final    MPV 11/15/2019 10 0  8 9 - 12 7 fL Final    Platelets 13/79/4774 266  149 - 390 Thousands/uL Final    nRBC 11/15/2019 0 /100 WBCs Final    Neutrophils Relative 11/15/2019 72  43 - 75 % Final    Immat GRANS % 11/15/2019 1  0 - 2 % Final    Lymphocytes Relative 11/15/2019 14  14 - 44 % Final    Monocytes Relative 11/15/2019 8  4 - 12 % Final    Eosinophils Relative 11/15/2019 4  0 - 6 % Final    Basophils Relative 11/15/2019 1  0 - 1 % Final    Neutrophils Absolute 11/15/2019 5 62  1 85 - 7 62 Thousands/µL Final    Immature Grans Absolute 11/15/2019 0 04  0 00 - 0 20 Thousand/uL Final    Lymphocytes Absolute 11/15/2019 1 10  0 60 - 4 47 Thousands/µL Final    Monocytes Absolute 11/15/2019 0 64  0 17 - 1 22 Thousand/µL Final    Eosinophils Absolute 11/15/2019 0 30  0 00 - 0 61 Thousand/µL Final    Basophils Absolute 11/15/2019 0 05  0 00 - 0 10 Thousands/µL Final    Sodium 11/15/2019 139  136 - 145 mmol/L Final    Potassium 11/15/2019 4 1  3 5 - 5 3 mmol/L Final    Chloride 11/15/2019 105  100 - 108 mmol/L Final    CO2 11/15/2019 26  21 - 32 mmol/L Final    ANION GAP 11/15/2019 8  4 - 13 mmol/L Final    BUN 11/15/2019 14  5 - 25 mg/dL Final    Creatinine 11/15/2019 1 24  0 60 - 1 30 mg/dL Final    Standardized to IDMS reference method    Glucose 11/15/2019 200* 65 - 140 mg/dL Final      If the patient is fasting, the ADA then defines impaired fasting glucose as > 100 mg/dL and diabetes as > or equal to 123 mg/dL  Specimen collection should occur prior to Sulfasalazine administration due to the potential for falsely depressed results  Specimen collection should occur prior to Sulfapyridine administration due to the potential for falsely elevated results   Calcium 11/15/2019 8 8  8 3 - 10 1 mg/dL Final    AST 11/15/2019 17  5 - 45 U/L Final      Specimen collection should occur prior to Sulfasalazine administration due to the potential for falsely depressed results       ALT 11/15/2019 32  12 - 78 U/L Final      Specimen collection should occur prior to Sulfasalazine administration due to the potential for falsely depressed results   Alkaline Phosphatase 11/15/2019 70  46 - 116 U/L Final    Total Protein 11/15/2019 7 2  6 4 - 8 2 g/dL Final    Albumin 11/15/2019 3 5  3 5 - 5 0 g/dL Final    Total Bilirubin 11/15/2019 0 50  0 20 - 1 00 mg/dL Final    eGFR 11/15/2019 53  ml/min/1 73sq m Final    Troponin I 11/15/2019 <0 02  <=0 04 ng/mL Final    3Autovalidation override  [employer] Chemistry analyzer 99% cutoff is > 0 04 ng/mL in network labs     o cTnI 99% cutoff is useful only when applied to patients in the clinical setting of myocardial ischemia   o cTnI 99% cutoff should be interpreted in the context of clinical history, ECG findings and possibly cardiac imaging to establish correct diagnosis  o cTnI 99% cutoff may be suggestive but clearly not indicative of a coronary event without the clinical setting of myocardial ischemia   NT-proBNP 11/15/2019 259  <450 pg/mL Final    Platelets 20/15/8560 261  149 - 390 Thousands/uL Final    MPV 11/15/2019 9 8  8 9 - 12 7 fL Final    Procalcitonin 11/15/2019 <0 05  <=0 25 ng/ml Final    Comment: Suspected Lower Respiratory Tract Infection (LRTI):  - LESS than or EQUAL to 0 25 ng/mL:   low likelihood for bacterial LRTI; antibiotics DISCOURAGED   - GREATER than 0 25 ng/mL:   increased likelihood for bacterial LRTI; antibiotics ENCOURAGED  Suspected Sepsis:  - Strongly consider initiating antibiotics in ALL UNSTABLE patients  - LESS than or EQUAL to 0 5 ng/mL:   low likelihood for bacterial sepsis; antibiotics DISCOURAGED   - GREATER than 0 5 ng/mL:   increased likelihood for bacterial sepsis; antibiotics ENCOURAGED   - GREATER than 2 ng/mL:   high risk for severe sepsis / septic shock; antibiotics strongly ENCOURAGED  Decisions on antibiotic use should not be based solely on Procalcitonin (PCT) levels  If PCT is low but uncertainty exists with stopping antibiotics, repeat PCT in 6-24 hours to confirm the low level   If antibiotics are administered (regardless if initial PCT was high or low), repeat PCT every 1-2 days to consider early antibiotic cessation (when GREATER                            than 80% decrease from the peak OR when PCT drops below designated cutoffs, whichever comes first), so long as the infection is NOT one that typically requires prolonged treatment durations (e g , bone/joint infections, endocarditis, Staph  aureus bacteremia)      Situations of FALSE-POSITIVE Procalcitonin values:  1) Newborns < 67 hours old  2) Massive stress from severe trauma / burns, major surgery, acute pancreatitis, cardiogenic / hemorrhagic shock, sickle cell crisis, or other organ perfusion abnormalities  3) Malaria and some Candidal infections  4) Treatment with agents that stimulate cytokines (e g , OKT3, anti-lymphocyte globulins, alemtuzumab, IL-2, granulocyte transfusion [NOT GCSFs])  5) Chronic renal disease causes elevated baseline levels (consider GREATER than 0 75 ng/mL as an abnormal cut-off); initiating HD/CRRT may cause transient decreases  6) Paraneoplastic syndromes from medullary thyroid or SCLC, some forms of vasculitis, and acute [id number]                            disease    Situations of FALSE-NEGATIVE Procalcitonin values:  1) Too early in clinical course for PCT to have reached its peak (may repeat in 6-24 hours to confirm low level)  2) Localized infection WITHOUT systemic (SIRS / sepsis) response (e g , an abscess, osteomyelitis, cystitis)  3) Mycobacteria (e g , Tuberculosis, MAC)  4) Cystic fibrosis exacerbations      POC Glucose 11/15/2019 226* 65 - 140 mg/dl Final    POC Glucose 11/15/2019 238* 65 - 140 mg/dl Final    Ventricular Rate 11/15/2019 88  BPM Final    Atrial Rate 11/15/2019 88  BPM Final    UT Interval 11/15/2019 152  ms Final    QRSD Interval 11/15/2019 80  ms Final    QT Interval 11/15/2019 358  ms Final    QTC Interval 11/15/2019 433  ms Final    P Axis 11/15/2019 42  degrees Final    QRS Axis 11/15/2019 11  degrees Final    T Wave Axis 11/15/2019 49  degrees Final    Sodium 11/16/2019 136  136 - 145 mmol/L Final    Potassium 11/16/2019 4 2  3 5 - 5 3 mmol/L Final    Chloride 11/16/2019 103  100 - 108 mmol/L Final    CO2 11/16/2019 26  21 - 32 mmol/L Final    ANION GAP 11/16/2019 7  4 - 13 mmol/L Final    BUN 11/16/2019 19  5 - 25 mg/dL Final    Creatinine 11/16/2019 1 23  0 60 - 1 30 mg/dL Final    Standardized to IDMS reference method    Glucose 11/16/2019 269* 65 - 140 mg/dL Final      If the patient is fasting, the ADA then defines impaired fasting glucose as > 100 mg/dL and diabetes as > or equal to 123 mg/dL  Specimen collection should occur prior to Sulfasalazine administration due to the potential for falsely depressed results  Specimen collection should occur prior to Sulfapyridine administration due to the potential for falsely elevated results      Calcium 11/16/2019 9 1  8 3 - 10 1 mg/dL Final    eGFR 11/16/2019 54  ml/min/1 73sq m Final    Magnesium 11/16/2019 1 9  1 6 - 2 6 mg/dL Final    WBC 11/16/2019 7 96  4 31 - 10 16 Thousand/uL Final    RBC 11/16/2019 5 21  3 88 - 5 62 Million/uL Final    Hemoglobin 11/16/2019 14 5  12 0 - 17 0 g/dL Final    Hematocrit 11/16/2019 45 2  36 5 - 49 3 % Final    MCV 11/16/2019 87  82 - 98 fL Final    MCH 11/16/2019 27 8  26 8 - 34 3 pg Final    MCHC 11/16/2019 32 1  31 4 - 37 4 g/dL Final    RDW 11/16/2019 13 2  11 6 - 15 1 % Final    MPV 11/16/2019 10 3  8 9 - 12 7 fL Final    Platelets 12/77/4853 247  149 - 390 Thousands/uL Final    nRBC 11/16/2019 0  /100 WBCs Final    Neutrophils Relative 11/16/2019 87* 43 - 75 % Final    Immat GRANS % 11/16/2019 1  0 - 2 % Final    Lymphocytes Relative 11/16/2019 9* 14 - 44 % Final    Monocytes Relative 11/16/2019 3* 4 - 12 % Final    Eosinophils Relative 11/16/2019 0  0 - 6 % Final    Basophils Relative 11/16/2019 0  0 - 1 % Final    Neutrophils Absolute 11/16/2019 6 92  1 85 - 7 62 Thousands/µL Final    Immature Grans Absolute 11/16/2019 0 04  0 00 - 0 20 Thousand/uL Final    Lymphocytes Absolute 11/16/2019 0 73  0 60 - 4 47 Thousands/µL Final    Monocytes Absolute 11/16/2019 0 26  0 17 - 1 22 Thousand/µL Final    Eosinophils Absolute 11/16/2019 0 00  0 00 - 0 61 Thousand/µL Final    Basophils Absolute 11/16/2019 0 01  0 00 - 0 10 Thousands/µL Final    Procalcitonin 11/16/2019 <0 05  <=0 25 ng/ml Final    Comment: Suspected Lower Respiratory Tract Infection (LRTI):  - LESS than or EQUAL to 0 25 ng/mL:   low likelihood for bacterial LRTI; antibiotics DISCOURAGED   - GREATER than 0 25 ng/mL:   increased likelihood for bacterial LRTI; antibiotics ENCOURAGED  Suspected Sepsis:  - Strongly consider initiating antibiotics in ALL UNSTABLE patients  - LESS than or EQUAL to 0 5 ng/mL:   low likelihood for bacterial sepsis; antibiotics DISCOURAGED   - GREATER than 0 5 ng/mL:   increased likelihood for bacterial sepsis; antibiotics ENCOURAGED   - GREATER than 2 ng/mL:   high risk for severe sepsis / septic shock; antibiotics strongly ENCOURAGED  Decisions on antibiotic use should not be based solely on Procalcitonin (PCT) levels  If PCT is low but uncertainty exists with stopping antibiotics, repeat PCT in 6-24 hours to confirm the low level  If antibiotics are administered (regardless if initial PCT was high or low), repeat PCT every 1-2 days to consider early antibiotic cessation (when GREATER                            than 80% decrease from the peak OR when PCT drops below designated cutoffs, whichever comes first), so long as the infection is NOT one that typically requires prolonged treatment durations (e g , bone/joint infections, endocarditis, Staph  aureus bacteremia)      Situations of FALSE-POSITIVE Procalcitonin values:  1) Newborns < 67 hours old  2) Massive stress from severe trauma / burns, major surgery, acute pancreatitis, cardiogenic / hemorrhagic shock, sickle cell crisis, or other organ perfusion abnormalities  3) Malaria and some Candidal infections  4) Treatment with agents that stimulate cytokines (e g , OKT3, anti-lymphocyte globulins, alemtuzumab, IL-2, granulocyte transfusion [NOT GCSFs])  5) Chronic renal disease causes elevated baseline levels (consider GREATER than 0 75 ng/mL as an abnormal cut-off); initiating HD/CRRT may cause transient decreases  6) Paraneoplastic syndromes from medullary thyroid or SCLC, some forms of vasculitis, and acute [id number]                            disease    Situations of FALSE-NEGATIVE Procalcitonin values:  1) Too early in clinical course for PCT to have reached its peak (may repeat in 6-24 hours to confirm low level)  2) Localized infection WITHOUT systemic (SIRS / sepsis) response (e g , an abscess, osteomyelitis, cystitis)  3) Mycobacteria (e g , Tuberculosis, MAC)  4) Cystic fibrosis exacerbations      POC Glucose 11/16/2019 278* 65 - 140 mg/dl Final    POC Glucose 11/16/2019 287* 65 - 140 mg/dl Final    POC Glucose 11/16/2019 149* 65 - 140 mg/dl Final    POC Glucose 11/16/2019 197* 65 - 140 mg/dl Final    POC Glucose 11/17/2019 196* 65 - 140 mg/dl Final    POC Glucose 11/17/2019 180* 65 - 140 mg/dl Final         Anna Ceron MD

## 2019-12-10 ENCOUNTER — OFFICE VISIT (OUTPATIENT)
Dept: PULMONOLOGY | Facility: HOSPITAL | Age: 84
End: 2019-12-10
Payer: COMMERCIAL

## 2019-12-10 VITALS
SYSTOLIC BLOOD PRESSURE: 116 MMHG | BODY MASS INDEX: 40.6 KG/M2 | HEIGHT: 71 IN | DIASTOLIC BLOOD PRESSURE: 80 MMHG | HEART RATE: 80 BPM | TEMPERATURE: 97.7 F | OXYGEN SATURATION: 93 % | WEIGHT: 290 LBS

## 2019-12-10 DIAGNOSIS — E11.22 TYPE 2 DIABETES MELLITUS WITH STAGE 3 CHRONIC KIDNEY DISEASE, WITHOUT LONG-TERM CURRENT USE OF INSULIN (HCC): ICD-10-CM

## 2019-12-10 DIAGNOSIS — G47.33 OSA (OBSTRUCTIVE SLEEP APNEA): ICD-10-CM

## 2019-12-10 DIAGNOSIS — I50.32 CHRONIC DIASTOLIC CONGESTIVE HEART FAILURE (HCC): Primary | ICD-10-CM

## 2019-12-10 DIAGNOSIS — N18.30 TYPE 2 DIABETES MELLITUS WITH STAGE 3 CHRONIC KIDNEY DISEASE, WITHOUT LONG-TERM CURRENT USE OF INSULIN (HCC): ICD-10-CM

## 2019-12-10 PROBLEM — J44.9 MODERATE COPD (CHRONIC OBSTRUCTIVE PULMONARY DISEASE) (HCC): Status: ACTIVE | Noted: 2019-12-10

## 2019-12-10 PROCEDURE — 99214 OFFICE O/P EST MOD 30 MIN: CPT | Performed by: INTERNAL MEDICINE

## 2019-12-10 NOTE — ASSESSMENT & PLAN NOTE
Mr Estela Brian had a brief hospitalization in November and since that time has recovered nicely  In fact he has use it as an opportunity to change his lifestyle  He started exercising daily and eating better and has lost 13 lb  This is improved his breathing quite a bit  He will maintain on trilogy Lasix and uses albuterol as needed  He is up-to-date on his flu and pneumonia vaccines  We did discuss goals of care today

## 2019-12-10 NOTE — PROGRESS NOTES
Assessment/Plan: Moderate COPD (chronic obstructive pulmonary disease) [facility])  Mr Curly Simons had a brief hospitalization in November and since that time has recovered nicely  In fact he has use it as an opportunity to change his lifestyle  He started exercising daily and eating better and has lost 13 lb  This is improved his breathing quite a bit  He will maintain on trilogy Lasix and uses albuterol as needed  He is up-to-date on his flu and pneumonia vaccines  We did discuss goals of care today  Diagnoses and all orders for this visit:    Chronic diastolic congestive heart failure ([address] )    Type 2 diabetes mellitus with stage 3 chronic kidney disease, without long-term current use of insulin ([facility])    CHI (obstructive sleep apnea)          Subjective:      Patient ID: Zev Luna is a 81 y o  male  Mr  Curly Simons was hospitalized for several days back in November for what appears to be a slight COPD exacerbation  Since that time he has used as a nidus for changing his lifestyle  He has watched his weight and start exercising and has successfully lost 13 lb  He is not having any shortness of breath is not complaining of any cough or wheeze  He is using his trilogy and rarely needs to uses rescue inhaler  He is using his Lasix 80 mg a day and maintaining good fluid balance  He wears his oxygen intermittently throughout the day and certainly at night  The following portions of the patient's history were reviewed and updated as appropriate: allergies, current medications, past family history, past medical history, past social history, past surgical history and problem list     Review of Systems   Constitutional: Negative  HENT: Negative  Eyes: Negative  Respiratory: Negative for shortness of breath  Cardiovascular: Negative  Gastrointestinal: Negative  Endocrine: Negative  Genitourinary: Negative  Allergic/Immunologic: Negative  Neurological: Negative      Hematological: Negative  Psychiatric/Behavioral: Negative  Objective:      /80 (BP Location: Left arm, Patient Position: Sitting, Cuff Size: Standard)   Pulse 80   Temp 97 7 °F (36 5 °C) (Tympanic)   Ht 5' 11" (1 803 m)   Wt 132 kg (290 lb)   SpO2 93%   BMI 40 45 kg/m²          Physical Exam   Constitutional: He is oriented to person, place, and time  He appears well-developed and well-nourished  HENT:   Head: Normocephalic  Eyes: Pupils are equal, round, and reactive to light  Neck: Neck supple  Cardiovascular: Normal rate  Pulmonary/Chest: Effort normal  No respiratory distress  He has no wheezes  He has no rales  Abdominal: Soft  Musculoskeletal: Normal range of motion  He exhibits no edema  Neurological: He is alert and oriented to person, place, and time  Skin: Skin is warm and dry

## 2020-01-28 ENCOUNTER — APPOINTMENT (OUTPATIENT)
Dept: LAB | Facility: HOSPITAL | Age: 85
End: 2020-01-28
Attending: INTERNAL MEDICINE
Payer: COMMERCIAL

## 2020-01-28 DIAGNOSIS — N18.30 TYPE 2 DIABETES MELLITUS WITH STAGE 3 CHRONIC KIDNEY DISEASE, WITHOUT LONG-TERM CURRENT USE OF INSULIN (HCC): Primary | ICD-10-CM

## 2020-01-28 DIAGNOSIS — E11.22 TYPE 2 DIABETES MELLITUS WITH STAGE 3 CHRONIC KIDNEY DISEASE, WITHOUT LONG-TERM CURRENT USE OF INSULIN (HCC): ICD-10-CM

## 2020-01-28 DIAGNOSIS — N18.30 TYPE 2 DIABETES MELLITUS WITH STAGE 3 CHRONIC KIDNEY DISEASE, WITHOUT LONG-TERM CURRENT USE OF INSULIN (HCC): ICD-10-CM

## 2020-01-28 DIAGNOSIS — I50.32 CHRONIC DIASTOLIC CONGESTIVE HEART FAILURE (HCC): ICD-10-CM

## 2020-01-28 DIAGNOSIS — E11.22 TYPE 2 DIABETES MELLITUS WITH STAGE 3 CHRONIC KIDNEY DISEASE, WITHOUT LONG-TERM CURRENT USE OF INSULIN (HCC): Primary | ICD-10-CM

## 2020-01-28 LAB
ANION GAP SERPL CALCULATED.3IONS-SCNC: 5 MMOL/L (ref 4–13)
BUN SERPL-MCNC: 40 MG/DL (ref 5–25)
CALCIUM SERPL-MCNC: 9.8 MG/DL (ref 8.3–10.1)
CHLORIDE SERPL-SCNC: 110 MMOL/L (ref 100–108)
CO2 SERPL-SCNC: 27 MMOL/L (ref 21–32)
CREAT SERPL-MCNC: 1.86 MG/DL (ref 0.6–1.3)
EST. AVERAGE GLUCOSE BLD GHB EST-MCNC: 151 MG/DL
GFR SERPL CREATININE-BSD FRML MDRD: 33 ML/MIN/1.73SQ M
GLUCOSE P FAST SERPL-MCNC: 134 MG/DL (ref 65–99)
HBA1C MFR BLD: 6.9 % (ref 4.2–6.3)
POTASSIUM SERPL-SCNC: 4.6 MMOL/L (ref 3.5–5.3)
SODIUM SERPL-SCNC: 142 MMOL/L (ref 136–145)

## 2020-01-28 PROCEDURE — 80048 BASIC METABOLIC PNL TOTAL CA: CPT

## 2020-01-28 PROCEDURE — 36415 COLL VENOUS BLD VENIPUNCTURE: CPT

## 2020-01-28 PROCEDURE — 83036 HEMOGLOBIN GLYCOSYLATED A1C: CPT

## 2020-01-30 ENCOUNTER — OFFICE VISIT (OUTPATIENT)
Dept: FAMILY MEDICINE CLINIC | Facility: HOSPITAL | Age: 85
End: 2020-01-30
Payer: COMMERCIAL

## 2020-01-30 VITALS
HEIGHT: 71 IN | DIASTOLIC BLOOD PRESSURE: 60 MMHG | HEART RATE: 87 BPM | BODY MASS INDEX: 38.5 KG/M2 | SYSTOLIC BLOOD PRESSURE: 102 MMHG | WEIGHT: 275 LBS

## 2020-01-30 DIAGNOSIS — E11.22 TYPE 2 DIABETES MELLITUS WITH STAGE 3 CHRONIC KIDNEY DISEASE, WITHOUT LONG-TERM CURRENT USE OF INSULIN (HCC): ICD-10-CM

## 2020-01-30 DIAGNOSIS — I10 ESSENTIAL HYPERTENSION: ICD-10-CM

## 2020-01-30 DIAGNOSIS — N18.30 TYPE 2 DIABETES MELLITUS WITH STAGE 3 CHRONIC KIDNEY DISEASE, WITHOUT LONG-TERM CURRENT USE OF INSULIN (HCC): ICD-10-CM

## 2020-01-30 DIAGNOSIS — J44.9 CHRONIC OBSTRUCTIVE PULMONARY DISEASE, UNSPECIFIED COPD TYPE (HCC): ICD-10-CM

## 2020-01-30 DIAGNOSIS — J44.9 MODERATE COPD (CHRONIC OBSTRUCTIVE PULMONARY DISEASE) (HCC): ICD-10-CM

## 2020-01-30 DIAGNOSIS — I50.32 CHRONIC DIASTOLIC CONGESTIVE HEART FAILURE (HCC): Primary | ICD-10-CM

## 2020-01-30 PROCEDURE — 3078F DIAST BP <80 MM HG: CPT | Performed by: INTERNAL MEDICINE

## 2020-01-30 PROCEDURE — 99214 OFFICE O/P EST MOD 30 MIN: CPT | Performed by: INTERNAL MEDICINE

## 2020-01-30 PROCEDURE — 3725F SCREEN DEPRESSION PERFORMED: CPT | Performed by: INTERNAL MEDICINE

## 2020-01-30 PROCEDURE — 3074F SYST BP LT 130 MM HG: CPT | Performed by: INTERNAL MEDICINE

## 2020-01-30 PROCEDURE — 1036F TOBACCO NON-USER: CPT | Performed by: INTERNAL MEDICINE

## 2020-01-30 PROCEDURE — 1160F RVW MEDS BY RX/DR IN RCRD: CPT | Performed by: INTERNAL MEDICINE

## 2020-01-30 RX ORDER — FUROSEMIDE 80 MG
40 TABLET ORAL DAILY
Qty: 90 TABLET | Refills: 3 | Status: SHIPPED | OUTPATIENT
Start: 2020-01-30 | End: 2020-10-13 | Stop reason: DRUGHIGH

## 2020-01-30 NOTE — PROGRESS NOTES
BMI Counseling: Body mass index is 38 35 kg/m²  The BMI is above normal  Nutrition recommendations include reducing portion sizes

## 2020-01-30 NOTE — PROGRESS NOTES
Assessment/Plan:       Diagnoses and all orders for this visit:    Chronic diastolic congestive heart failure (HCC)  -     furosemide (LASIX) 80 mg tablet; Take 0 5 tablets (40 mg total) by mouth daily  -     Basic metabolic panel; Future    Essential hypertension    Moderate COPD (chronic obstructive pulmonary disease) ([facility])    Type 2 diabetes mellitus with stage 3 chronic kidney disease, without long-term current use of insulin ([facility])    Chronic obstructive pulmonary disease, unspecified COPD type ([facility] )  -     fluticasone-umeclidinium-vilanterol (TRELEGY) 100-62 5-25 MCG/INH inhaler; Inhale 1 puff daily Rinse mouth after use  All of the above diagnoses have been assessed  Additional COMMENTS/PLAN: will need to cut meds  Will see back in 6 weeks with attn to BP      Subjective:      Patient ID: Zev Luna is a 81 y o  male  HPI     CHF-this is better-Has lost sign weight  COPD -this is stable  DM-this is under good control  Glyco good  The following portions of the patient's history were revised and updated as appropriate: Problem list, allergies, med list, FH, SH, Past medical and surgical histories  Current Outpatient Medications   Medication Sig Dispense Refill    albuterol (PROAIR HFA) 90 mcg/act inhaler Inhale 2 puffs every 4 (four) hours as needed for wheezing 1 Inhaler 5    amLODIPine-benazepril (LOTREL 5-20) 5-20 MG per capsule Take 1 capsule by mouth 2 (two) times a day 180 capsule 3    Blood Glucose Monitoring Suppl (ONETOUCH VERIO) w/Device KIT by Does not apply route 2 (two) times a day 1 kit 1    diazepam (VALIUM) 5 mg tablet Take by mouth Keeps on hand for cervical neck pain as needed   fluticasone-umeclidinium-vilanterol (TRELEGY) 100-62 5-25 MCG/INH inhaler Inhale 1 puff daily Rinse mouth after use   3 Inhaler 3    furosemide (LASIX) 80 mg tablet Take 0 5 tablets (40 mg total) by mouth daily 90 tablet 3    glucose blood (ONETOUCH VERIO) test strip 1 each by Other route 2 (two) times a day Use as instructed 100 each 11    labetalol (NORMODYNE) 300 mg tablet Take 1 tablet (300 mg total) by mouth 2 (two) times a day 180 tablet 3    Lancets MISC by Does not apply route 2 (two) times a day 100 each 5    metFORMIN (GLUCOPHAGE) 500 mg tablet Take 1 tablet (500 mg total) by mouth 2 (two) times a day with meals 180 tablet 3     No current facility-administered medications for this visit  Review of Systems   All other systems reviewed and are negative  Objective:    /60 (BP Location: Left arm, Patient Position: Sitting, Cuff Size: Large)   Pulse 87   Ht 5' 11" (1 803 m)   Wt 125 kg (275 lb)   BMI 38 35 kg/m²     BP Readings from Last 3 Encounters:   01/30/20 102/60   12/10/19 116/80   12/03/19 130/78                  Wt Readings from Last 3 Encounters:   01/30/20 125 kg (275 lb)   12/10/19 132 kg (290 lb)   12/03/19 132 kg (291 lb)         Physical Exam   Constitutional: He appears well-developed and well-nourished  Neck: No JVD present  Cardiovascular: Normal rate and regular rhythm  Pulmonary/Chest:   Dec BS- no wheezes  Abdominal: Soft  Bowel sounds are normal    Musculoskeletal: He exhibits no edema  Vitals reviewed  Appointment on 01/28/2020   Component Date Value Ref Range Status    Sodium 01/28/2020 142  136 - 145 mmol/L Final    Potassium 01/28/2020 4 6  3 5 - 5 3 mmol/L Final    Chloride 01/28/2020 110* 100 - 108 mmol/L Final    CO2 01/28/2020 27  21 - 32 mmol/L Final    ANION GAP 01/28/2020 5  4 - 13 mmol/L Final    BUN 01/28/2020 40* 5 - 25 mg/dL Final    Creatinine 01/28/2020 1 86* 0 60 - 1 30 mg/dL Final    Standardized to IDMS reference method    Glucose, Fasting 01/28/2020 134* 65 - 99 mg/dL Final      Specimen collection should occur prior to Sulfasalazine administration due to the potential for falsely depressed results   Specimen collection should occur prior to Sulfapyridine administration due to the potential for falsely elevated results      Calcium 01/28/2020 9 8  8 3 - 10 1 mg/dL Final    eGFR 01/28/2020 33  ml/min/1 73sq m Final    Hemoglobin A1C 01/28/2020 6 9* 4 2 - 6 3 % Final    EAG 01/28/2020 151  mg/dl Final         Jailyn Dewey MD

## 2020-01-30 NOTE — PATIENT INSTRUCTIONS
Stop the doxazosin at nighttime    Decrease furosemide (Lasix) to 40 mg daily  Repeat BMP in 2 weeks  Blood test    [facility]

## 2020-02-14 ENCOUNTER — LAB (OUTPATIENT)
Dept: LAB | Facility: HOSPITAL | Age: 85
End: 2020-02-14
Attending: INTERNAL MEDICINE
Payer: COMMERCIAL

## 2020-02-14 DIAGNOSIS — I50.32 CHRONIC DIASTOLIC CONGESTIVE HEART FAILURE (HCC): ICD-10-CM

## 2020-02-14 LAB
ANION GAP SERPL CALCULATED.3IONS-SCNC: 8 MMOL/L (ref 4–13)
BUN SERPL-MCNC: 34 MG/DL (ref 5–25)
CALCIUM SERPL-MCNC: 9.7 MG/DL (ref 8.3–10.1)
CHLORIDE SERPL-SCNC: 110 MMOL/L (ref 100–108)
CO2 SERPL-SCNC: 24 MMOL/L (ref 21–32)
CREAT SERPL-MCNC: 1.68 MG/DL (ref 0.6–1.3)
GFR SERPL CREATININE-BSD FRML MDRD: 37 ML/MIN/1.73SQ M
GLUCOSE P FAST SERPL-MCNC: 132 MG/DL (ref 65–99)
POTASSIUM SERPL-SCNC: 4.4 MMOL/L (ref 3.5–5.3)
SODIUM SERPL-SCNC: 142 MMOL/L (ref 136–145)

## 2020-02-14 PROCEDURE — 80048 BASIC METABOLIC PNL TOTAL CA: CPT

## 2020-02-14 PROCEDURE — 36415 COLL VENOUS BLD VENIPUNCTURE: CPT

## 2020-02-28 DIAGNOSIS — I10 ESSENTIAL HYPERTENSION, BENIGN: ICD-10-CM

## 2020-02-28 DIAGNOSIS — E11.9 TYPE 2 DIABETES MELLITUS WITHOUT COMPLICATION, WITHOUT LONG-TERM CURRENT USE OF INSULIN (HCC): ICD-10-CM

## 2020-02-28 RX ORDER — LABETALOL 300 MG/1
TABLET, FILM COATED ORAL
Qty: 180 TABLET | Refills: 3 | Status: SHIPPED | OUTPATIENT
Start: 2020-02-28 | End: 2021-02-03 | Stop reason: SDUPTHER

## 2020-03-27 DIAGNOSIS — I10 ESSENTIAL HYPERTENSION: ICD-10-CM

## 2020-03-27 RX ORDER — FUROSEMIDE 40 MG/1
TABLET ORAL
Qty: 180 TABLET | Refills: 1 | Status: SHIPPED | OUTPATIENT
Start: 2020-03-27 | End: 2020-10-13 | Stop reason: SDUPTHER

## 2020-04-20 DIAGNOSIS — J44.9 CHRONIC OBSTRUCTIVE PULMONARY DISEASE, UNSPECIFIED COPD TYPE (HCC): ICD-10-CM

## 2020-04-30 DIAGNOSIS — I10 ESSENTIAL HYPERTENSION: ICD-10-CM

## 2020-04-30 RX ORDER — AMLODIPINE BESYLATE AND BENAZEPRIL HYDROCHLORIDE 5; 20 MG/1; MG/1
CAPSULE ORAL
Qty: 180 CAPSULE | Refills: 3 | Status: SHIPPED | OUTPATIENT
Start: 2020-04-30 | End: 2021-04-05 | Stop reason: SDUPTHER

## 2020-05-12 ENCOUNTER — TELEPHONE (OUTPATIENT)
Dept: PULMONOLOGY | Facility: CLINIC | Age: 85
End: 2020-05-12

## 2020-05-21 ENCOUNTER — LAB (OUTPATIENT)
Dept: LAB | Facility: HOSPITAL | Age: 85
End: 2020-05-21
Attending: INTERNAL MEDICINE
Payer: COMMERCIAL

## 2020-05-21 ENCOUNTER — OFFICE VISIT (OUTPATIENT)
Dept: FAMILY MEDICINE CLINIC | Facility: HOSPITAL | Age: 85
End: 2020-05-21
Payer: COMMERCIAL

## 2020-05-21 VITALS
WEIGHT: 268 LBS | SYSTOLIC BLOOD PRESSURE: 122 MMHG | TEMPERATURE: 98.4 F | BODY MASS INDEX: 37.52 KG/M2 | DIASTOLIC BLOOD PRESSURE: 80 MMHG | HEART RATE: 80 BPM | HEIGHT: 71 IN

## 2020-05-21 DIAGNOSIS — J44.9 MODERATE COPD (CHRONIC OBSTRUCTIVE PULMONARY DISEASE) (HCC): ICD-10-CM

## 2020-05-21 DIAGNOSIS — N40.1 BENIGN PROSTATIC HYPERPLASIA WITH URINARY HESITANCY: ICD-10-CM

## 2020-05-21 DIAGNOSIS — E11.22 TYPE 2 DIABETES MELLITUS WITH STAGE 3 CHRONIC KIDNEY DISEASE, WITHOUT LONG-TERM CURRENT USE OF INSULIN (HCC): ICD-10-CM

## 2020-05-21 DIAGNOSIS — R39.11 BENIGN PROSTATIC HYPERPLASIA WITH URINARY HESITANCY: ICD-10-CM

## 2020-05-21 DIAGNOSIS — I10 ESSENTIAL HYPERTENSION: ICD-10-CM

## 2020-05-21 DIAGNOSIS — N18.30 TYPE 2 DIABETES MELLITUS WITH STAGE 3 CHRONIC KIDNEY DISEASE, WITHOUT LONG-TERM CURRENT USE OF INSULIN (HCC): ICD-10-CM

## 2020-05-21 DIAGNOSIS — N18.30 STAGE 3 CHRONIC KIDNEY DISEASE (HCC): ICD-10-CM

## 2020-05-21 DIAGNOSIS — J41.0 SIMPLE CHRONIC BRONCHITIS (HCC): Primary | ICD-10-CM

## 2020-05-21 PROBLEM — J44.1 COPD WITH ACUTE EXACERBATION (HCC): Status: RESOLVED | Noted: 2019-03-18 | Resolved: 2020-05-21

## 2020-05-21 LAB
ALBUMIN SERPL BCP-MCNC: 3.9 G/DL (ref 3.5–5)
ALP SERPL-CCNC: 64 U/L (ref 46–116)
ALT SERPL W P-5'-P-CCNC: 21 U/L (ref 12–78)
ANION GAP SERPL CALCULATED.3IONS-SCNC: 5 MMOL/L (ref 4–13)
AST SERPL W P-5'-P-CCNC: 10 U/L (ref 5–45)
BILIRUB SERPL-MCNC: 0.66 MG/DL (ref 0.2–1)
BUN SERPL-MCNC: 27 MG/DL (ref 5–25)
CALCIUM SERPL-MCNC: 9.4 MG/DL (ref 8.3–10.1)
CHLORIDE SERPL-SCNC: 106 MMOL/L (ref 100–108)
CHOLEST SERPL-MCNC: 195 MG/DL (ref 50–200)
CO2 SERPL-SCNC: 27 MMOL/L (ref 21–32)
CREAT SERPL-MCNC: 1.58 MG/DL (ref 0.6–1.3)
CREAT UR-MCNC: 148 MG/DL
ERYTHROCYTE [DISTWIDTH] IN BLOOD BY AUTOMATED COUNT: 13.4 % (ref 11.6–15.1)
EST. AVERAGE GLUCOSE BLD GHB EST-MCNC: 131 MG/DL
GFR SERPL CREATININE-BSD FRML MDRD: 40 ML/MIN/1.73SQ M
GLUCOSE P FAST SERPL-MCNC: 134 MG/DL (ref 65–99)
HBA1C MFR BLD: 6.2 %
HCT VFR BLD AUTO: 47.2 % (ref 36.5–49.3)
HDLC SERPL-MCNC: 48 MG/DL
HGB BLD-MCNC: 14.9 G/DL (ref 12–17)
LDLC SERPL CALC-MCNC: 122 MG/DL (ref 0–100)
MCH RBC QN AUTO: 28.5 PG (ref 26.8–34.3)
MCHC RBC AUTO-ENTMCNC: 31.6 G/DL (ref 31.4–37.4)
MCV RBC AUTO: 90 FL (ref 82–98)
MICROALBUMIN UR-MCNC: 144 MG/L (ref 0–20)
MICROALBUMIN/CREAT 24H UR: 97 MG/G CREATININE (ref 0–30)
PLATELET # BLD AUTO: 246 THOUSANDS/UL (ref 149–390)
PMV BLD AUTO: 10 FL (ref 8.9–12.7)
POTASSIUM SERPL-SCNC: 4 MMOL/L (ref 3.5–5.3)
PROT SERPL-MCNC: 7 G/DL (ref 6.4–8.2)
RBC # BLD AUTO: 5.22 MILLION/UL (ref 3.88–5.62)
SODIUM SERPL-SCNC: 138 MMOL/L (ref 136–145)
TRIGL SERPL-MCNC: 123 MG/DL
WBC # BLD AUTO: 8.89 THOUSAND/UL (ref 4.31–10.16)

## 2020-05-21 PROCEDURE — 4040F PNEUMOC VAC/ADMIN/RCVD: CPT | Performed by: INTERNAL MEDICINE

## 2020-05-21 PROCEDURE — 99214 OFFICE O/P EST MOD 30 MIN: CPT | Performed by: INTERNAL MEDICINE

## 2020-05-21 PROCEDURE — 3079F DIAST BP 80-89 MM HG: CPT | Performed by: INTERNAL MEDICINE

## 2020-05-21 PROCEDURE — 3066F NEPHROPATHY DOC TX: CPT | Performed by: INTERNAL MEDICINE

## 2020-05-21 PROCEDURE — 83036 HEMOGLOBIN GLYCOSYLATED A1C: CPT

## 2020-05-21 PROCEDURE — 82043 UR ALBUMIN QUANTITATIVE: CPT | Performed by: INTERNAL MEDICINE

## 2020-05-21 PROCEDURE — 36415 COLL VENOUS BLD VENIPUNCTURE: CPT

## 2020-05-21 PROCEDURE — 3074F SYST BP LT 130 MM HG: CPT | Performed by: INTERNAL MEDICINE

## 2020-05-21 PROCEDURE — 3008F BODY MASS INDEX DOCD: CPT | Performed by: INTERNAL MEDICINE

## 2020-05-21 PROCEDURE — 80053 COMPREHEN METABOLIC PANEL: CPT

## 2020-05-21 PROCEDURE — 3044F HG A1C LEVEL LT 7.0%: CPT | Performed by: INTERNAL MEDICINE

## 2020-05-21 PROCEDURE — 82570 ASSAY OF URINE CREATININE: CPT | Performed by: INTERNAL MEDICINE

## 2020-05-21 PROCEDURE — 80061 LIPID PANEL: CPT

## 2020-05-21 PROCEDURE — 85027 COMPLETE CBC AUTOMATED: CPT

## 2020-05-21 PROCEDURE — 1036F TOBACCO NON-USER: CPT | Performed by: INTERNAL MEDICINE

## 2020-05-21 PROCEDURE — 1160F RVW MEDS BY RX/DR IN RCRD: CPT | Performed by: INTERNAL MEDICINE

## 2020-05-21 RX ORDER — TAMSULOSIN HYDROCHLORIDE 0.4 MG/1
0.4 CAPSULE ORAL
Qty: 30 CAPSULE | Refills: 5 | Status: SHIPPED | OUTPATIENT
Start: 2020-05-21 | End: 2020-11-17 | Stop reason: SDUPTHER

## 2020-10-12 ENCOUNTER — LAB (OUTPATIENT)
Dept: LAB | Facility: HOSPITAL | Age: 85
End: 2020-10-12
Attending: INTERNAL MEDICINE
Payer: COMMERCIAL

## 2020-10-12 DIAGNOSIS — E11.22 TYPE 2 DIABETES MELLITUS WITH STAGE 3 CHRONIC KIDNEY DISEASE, WITHOUT LONG-TERM CURRENT USE OF INSULIN (HCC): ICD-10-CM

## 2020-10-12 DIAGNOSIS — N18.30 TYPE 2 DIABETES MELLITUS WITH STAGE 3 CHRONIC KIDNEY DISEASE, WITHOUT LONG-TERM CURRENT USE OF INSULIN (HCC): ICD-10-CM

## 2020-10-12 LAB
ANION GAP SERPL CALCULATED.3IONS-SCNC: 4 MMOL/L (ref 4–13)
BUN SERPL-MCNC: 28 MG/DL (ref 5–25)
CALCIUM SERPL-MCNC: 9.7 MG/DL (ref 8.3–10.1)
CHLORIDE SERPL-SCNC: 113 MMOL/L (ref 100–108)
CO2 SERPL-SCNC: 25 MMOL/L (ref 21–32)
CREAT SERPL-MCNC: 1.65 MG/DL (ref 0.6–1.3)
GFR SERPL CREATININE-BSD FRML MDRD: 38 ML/MIN/1.73SQ M
GLUCOSE P FAST SERPL-MCNC: 144 MG/DL (ref 65–99)
POTASSIUM SERPL-SCNC: 4.3 MMOL/L (ref 3.5–5.3)
SODIUM SERPL-SCNC: 142 MMOL/L (ref 136–145)

## 2020-10-12 PROCEDURE — 80048 BASIC METABOLIC PNL TOTAL CA: CPT

## 2020-10-12 PROCEDURE — 36415 COLL VENOUS BLD VENIPUNCTURE: CPT

## 2020-10-13 ENCOUNTER — OFFICE VISIT (OUTPATIENT)
Dept: FAMILY MEDICINE CLINIC | Facility: HOSPITAL | Age: 85
End: 2020-10-13
Payer: COMMERCIAL

## 2020-10-13 ENCOUNTER — LAB (OUTPATIENT)
Dept: LAB | Facility: HOSPITAL | Age: 85
End: 2020-10-13
Attending: INTERNAL MEDICINE
Payer: COMMERCIAL

## 2020-10-13 VITALS
WEIGHT: 281 LBS | BODY MASS INDEX: 39.34 KG/M2 | TEMPERATURE: 96.3 F | DIASTOLIC BLOOD PRESSURE: 80 MMHG | HEART RATE: 99 BPM | HEIGHT: 71 IN | SYSTOLIC BLOOD PRESSURE: 140 MMHG

## 2020-10-13 DIAGNOSIS — I50.32 CHRONIC DIASTOLIC CONGESTIVE HEART FAILURE (HCC): ICD-10-CM

## 2020-10-13 DIAGNOSIS — Z23 NEED FOR VACCINATION: Primary | ICD-10-CM

## 2020-10-13 DIAGNOSIS — N18.30 TYPE 2 DIABETES MELLITUS WITH STAGE 3 CHRONIC KIDNEY DISEASE, WITHOUT LONG-TERM CURRENT USE OF INSULIN, UNSPECIFIED WHETHER STAGE 3A OR 3B CKD (HCC): ICD-10-CM

## 2020-10-13 DIAGNOSIS — J41.0 SIMPLE CHRONIC BRONCHITIS (HCC): ICD-10-CM

## 2020-10-13 DIAGNOSIS — I10 ESSENTIAL HYPERTENSION: ICD-10-CM

## 2020-10-13 DIAGNOSIS — E11.22 TYPE 2 DIABETES MELLITUS WITH STAGE 3 CHRONIC KIDNEY DISEASE, WITHOUT LONG-TERM CURRENT USE OF INSULIN, UNSPECIFIED WHETHER STAGE 3A OR 3B CKD (HCC): ICD-10-CM

## 2020-10-13 LAB
EST. AVERAGE GLUCOSE BLD GHB EST-MCNC: 140 MG/DL
HBA1C MFR BLD: 6.5 %

## 2020-10-13 PROCEDURE — 90662 IIV NO PRSV INCREASED AG IM: CPT

## 2020-10-13 PROCEDURE — 1125F AMNT PAIN NOTED PAIN PRSNT: CPT | Performed by: INTERNAL MEDICINE

## 2020-10-13 PROCEDURE — 1036F TOBACCO NON-USER: CPT | Performed by: INTERNAL MEDICINE

## 2020-10-13 PROCEDURE — 1160F RVW MEDS BY RX/DR IN RCRD: CPT | Performed by: INTERNAL MEDICINE

## 2020-10-13 PROCEDURE — 83036 HEMOGLOBIN GLYCOSYLATED A1C: CPT

## 2020-10-13 PROCEDURE — 99214 OFFICE O/P EST MOD 30 MIN: CPT | Performed by: INTERNAL MEDICINE

## 2020-10-13 PROCEDURE — 1170F FXNL STATUS ASSESSED: CPT | Performed by: INTERNAL MEDICINE

## 2020-10-13 PROCEDURE — 3725F SCREEN DEPRESSION PERFORMED: CPT | Performed by: INTERNAL MEDICINE

## 2020-10-13 PROCEDURE — 36415 COLL VENOUS BLD VENIPUNCTURE: CPT

## 2020-10-13 PROCEDURE — 3079F DIAST BP 80-89 MM HG: CPT | Performed by: INTERNAL MEDICINE

## 2020-10-13 PROCEDURE — G0439 PPPS, SUBSEQ VISIT: HCPCS | Performed by: INTERNAL MEDICINE

## 2020-10-13 PROCEDURE — G0008 ADMIN INFLUENZA VIRUS VAC: HCPCS

## 2020-10-13 RX ORDER — FUROSEMIDE 40 MG/1
40 TABLET ORAL DAILY
Qty: 90 TABLET | Refills: 3 | Status: SHIPPED | OUTPATIENT
Start: 2020-10-13 | End: 2021-06-14

## 2020-11-17 DIAGNOSIS — R39.11 BENIGN PROSTATIC HYPERPLASIA WITH URINARY HESITANCY: ICD-10-CM

## 2020-11-17 DIAGNOSIS — N40.1 BENIGN PROSTATIC HYPERPLASIA WITH URINARY HESITANCY: ICD-10-CM

## 2020-11-18 RX ORDER — TAMSULOSIN HYDROCHLORIDE 0.4 MG/1
0.4 CAPSULE ORAL
Qty: 30 CAPSULE | Refills: 11 | Status: SHIPPED | OUTPATIENT
Start: 2020-11-18 | End: 2021-11-28

## 2021-01-20 DIAGNOSIS — Z23 ENCOUNTER FOR IMMUNIZATION: ICD-10-CM

## 2021-01-22 ENCOUNTER — IMMUNIZATIONS (OUTPATIENT)
Dept: FAMILY MEDICINE CLINIC | Facility: HOSPITAL | Age: 86
End: 2021-01-22

## 2021-01-22 DIAGNOSIS — Z23 ENCOUNTER FOR IMMUNIZATION: Primary | ICD-10-CM

## 2021-01-22 PROCEDURE — 91301 SARS-COV-2 / COVID-19 MRNA VACCINE (MODERNA) 100 MCG: CPT | Performed by: FAMILY MEDICINE

## 2021-01-22 PROCEDURE — 0011A SARS-COV-2 / COVID-19 MRNA VACCINE (MODERNA) 100 MCG: CPT | Performed by: FAMILY MEDICINE

## 2021-02-03 DIAGNOSIS — I10 ESSENTIAL HYPERTENSION, BENIGN: ICD-10-CM

## 2021-02-03 RX ORDER — LABETALOL 300 MG/1
300 TABLET, FILM COATED ORAL 2 TIMES DAILY
Qty: 180 TABLET | Refills: 3 | Status: SHIPPED | OUTPATIENT
Start: 2021-02-03 | End: 2021-03-02 | Stop reason: SDUPTHER

## 2021-02-17 ENCOUNTER — IMMUNIZATIONS (OUTPATIENT)
Dept: FAMILY MEDICINE CLINIC | Facility: HOSPITAL | Age: 86
End: 2021-02-17

## 2021-02-17 DIAGNOSIS — Z23 ENCOUNTER FOR IMMUNIZATION: Primary | ICD-10-CM

## 2021-02-17 PROCEDURE — 0012A SARS-COV-2 / COVID-19 MRNA VACCINE (MODERNA) 100 MCG: CPT

## 2021-02-17 PROCEDURE — 91301 SARS-COV-2 / COVID-19 MRNA VACCINE (MODERNA) 100 MCG: CPT

## 2021-02-18 ENCOUNTER — TELEPHONE (OUTPATIENT)
Dept: FAMILY MEDICINE CLINIC | Facility: HOSPITAL | Age: 86
End: 2021-02-18

## 2021-02-18 DIAGNOSIS — E11.22 TYPE 2 DIABETES MELLITUS WITH STAGE 3 CHRONIC KIDNEY DISEASE, WITHOUT LONG-TERM CURRENT USE OF INSULIN, UNSPECIFIED WHETHER STAGE 3A OR 3B CKD (HCC): Primary | ICD-10-CM

## 2021-02-18 DIAGNOSIS — N18.30 TYPE 2 DIABETES MELLITUS WITH STAGE 3 CHRONIC KIDNEY DISEASE, WITHOUT LONG-TERM CURRENT USE OF INSULIN, UNSPECIFIED WHETHER STAGE 3A OR 3B CKD (HCC): Primary | ICD-10-CM

## 2021-03-02 ENCOUNTER — OFFICE VISIT (OUTPATIENT)
Dept: FAMILY MEDICINE CLINIC | Facility: HOSPITAL | Age: 86
End: 2021-03-02
Payer: COMMERCIAL

## 2021-03-02 VITALS
SYSTOLIC BLOOD PRESSURE: 145 MMHG | WEIGHT: 287 LBS | BODY MASS INDEX: 40.18 KG/M2 | OXYGEN SATURATION: 94 % | DIASTOLIC BLOOD PRESSURE: 78 MMHG | HEART RATE: 105 BPM | HEIGHT: 71 IN

## 2021-03-02 DIAGNOSIS — E66.01 OBESITY, MORBID (HCC): ICD-10-CM

## 2021-03-02 DIAGNOSIS — I50.32 CHRONIC DIASTOLIC CONGESTIVE HEART FAILURE (HCC): ICD-10-CM

## 2021-03-02 DIAGNOSIS — E11.22 TYPE 2 DIABETES MELLITUS WITH STAGE 3 CHRONIC KIDNEY DISEASE, WITHOUT LONG-TERM CURRENT USE OF INSULIN, UNSPECIFIED WHETHER STAGE 3A OR 3B CKD (HCC): ICD-10-CM

## 2021-03-02 DIAGNOSIS — I10 ESSENTIAL HYPERTENSION, BENIGN: ICD-10-CM

## 2021-03-02 DIAGNOSIS — I10 ESSENTIAL HYPERTENSION: Primary | ICD-10-CM

## 2021-03-02 DIAGNOSIS — J44.9 CHRONIC OBSTRUCTIVE PULMONARY DISEASE, UNSPECIFIED COPD TYPE (HCC): ICD-10-CM

## 2021-03-02 DIAGNOSIS — N18.30 TYPE 2 DIABETES MELLITUS WITH STAGE 3 CHRONIC KIDNEY DISEASE, WITHOUT LONG-TERM CURRENT USE OF INSULIN, UNSPECIFIED WHETHER STAGE 3A OR 3B CKD (HCC): ICD-10-CM

## 2021-03-02 DIAGNOSIS — J44.9 MODERATE COPD (CHRONIC OBSTRUCTIVE PULMONARY DISEASE) (HCC): ICD-10-CM

## 2021-03-02 DIAGNOSIS — E11.9 TYPE 2 DIABETES MELLITUS WITHOUT COMPLICATION, WITHOUT LONG-TERM CURRENT USE OF INSULIN (HCC): ICD-10-CM

## 2021-03-02 PROCEDURE — 1036F TOBACCO NON-USER: CPT | Performed by: INTERNAL MEDICINE

## 2021-03-02 PROCEDURE — 99214 OFFICE O/P EST MOD 30 MIN: CPT | Performed by: INTERNAL MEDICINE

## 2021-03-02 PROCEDURE — 3078F DIAST BP <80 MM HG: CPT | Performed by: INTERNAL MEDICINE

## 2021-03-02 PROCEDURE — 1160F RVW MEDS BY RX/DR IN RCRD: CPT | Performed by: INTERNAL MEDICINE

## 2021-03-02 PROCEDURE — 3077F SYST BP >= 140 MM HG: CPT | Performed by: INTERNAL MEDICINE

## 2021-03-02 RX ORDER — LABETALOL 300 MG/1
300 TABLET, FILM COATED ORAL 2 TIMES DAILY
Qty: 180 TABLET | Refills: 3 | Status: SHIPPED | OUTPATIENT
Start: 2021-03-02 | End: 2022-02-21

## 2021-03-02 NOTE — PROGRESS NOTES
BMI Counseling: Body mass index is 40 03 kg/m²  The BMI is above normal  Nutrition recommendations include reducing portion sizes

## 2021-03-02 NOTE — PROGRESS NOTES
Assessment/Plan:       Diagnoses and all orders for this visit:    Essential hypertension    Type 2 diabetes mellitus without complication, without long-term current use of insulin ([facility])  -     metFORMIN (GLUCOPHAGE) 500 mg tablet; Take 1 tablet (500 mg total) by mouth 2 (two) times a day with meals  -     CBC; Future  -     Comprehensive metabolic panel; Future  -     Hemoglobin A1C; Future  -     Microalbumin / creatinine urine ratio    Essential hypertension, benign  -     labetalol (NORMODYNE) 300 mg tablet; Take 1 tablet (300 mg total) by mouth 2 (two) times a day    Chronic obstructive pulmonary disease, unspecified COPD type ([facility] )  -     fluticasone-umeclidinium-vilanterol (TRELEGY) 100-62 5-25 MCG/INH inhaler; Inhale 1 puff daily Rinse mouth after use  Chronic diastolic congestive heart failure (HCC)    Type 2 diabetes mellitus with stage 3 chronic kidney disease, without long-term current use of insulin, unspecified whether stage 3a or 3b CKD ([facility])    Obesity, morbid ([facility])    Moderate COPD (chronic obstructive pulmonary disease) ([facility] )          All of the above diagnoses have been assessed  Additional COMMENTS/PLAN: needs to lose weight  Subjective:      Patient ID: Arnulfo Katz is a 81 y o  male  HPI     DM-compliant with meds and diet  Did not get glyco     Congestive Heart Failure  Patient presents for re-evaluation of congestive heart failure  The patient is compliant with dietary restriction of fluids and sodium  There is self-monitoring of weight  The patient denies significant dyspnea, orthopnea or edema  Has gained weight back  COPD-compliant with meds  Perhaps sl worse  The following portions of the patient's history were revised and updated as appropriate: Problem list, allergies, med list, FH, SH, Past medical and surgical histories      Current Outpatient Medications   Medication Sig Dispense Refill    albuterol (PROAIR HFA) 90 mcg/act inhaler Inhale 2 puffs every 4 (four) hours as needed for wheezing 1 Inhaler 5    amLODIPine-benazepril (LOTREL 5-20) 5-20 MG per capsule TAKE 1 CAPSULE BY MOUTH TWICE A  capsule 3    Blood Glucose Monitoring Suppl (Sania Godwin) w/Device KIT by Does not apply route 2 (two) times a day 1 kit 1    fluticasone-umeclidinium-vilanterol (TRELEGY) 100-62 5-25 MCG/INH inhaler Inhale 1 puff daily Rinse mouth after use  3 Inhaler 3    furosemide (LASIX) 40 mg tablet Take 1 tablet (40 mg total) by mouth daily 90 tablet 3    glucose blood (ONETOUCH VERIO) test strip 1 each by Other route 2 (two) times a day Use as instructed 100 each 11    labetalol (NORMODYNE) 300 mg tablet Take 1 tablet (300 mg total) by mouth 2 (two) times a day 180 tablet 3    Lancets MISC by Does not apply route 2 (two) times a day 100 each 5    metFORMIN (GLUCOPHAGE) 500 mg tablet Take 1 tablet (500 mg total) by mouth 2 (two) times a day with meals 180 tablet 3    tamsulosin (FLOMAX) 0 4 mg Take 1 capsule (0 4 mg total) by mouth daily with dinner 30 capsule 11     No current facility-administered medications for this visit  Review of Systems   All other systems reviewed and are negative  Objective:    BP (!) 180/110 (BP Location: Left arm, Patient Position: Sitting, Cuff Size: Standard)   Pulse 105   Ht 5' 11" (1 803 m)   Wt 130 kg (287 lb)   SpO2 94%   BMI 40 03 kg/m²     BP Readings from Last 3 Encounters:   03/02/21 (!) 180/110   10/13/20 140/80   05/21/20 122/80                  Wt Readings from Last 3 Encounters:   03/02/21 130 kg (287 lb)   10/13/20 127 kg (281 lb)   05/21/20 122 kg (268 lb)         Physical Exam  Vitals signs reviewed  Constitutional:       Appearance: He is obese  Cardiovascular:      Rate and Rhythm: Normal rate and regular rhythm  Pulses:           Dorsalis pedis pulses are 1+ on the right side and 1+ on the left side  Posterior tibial pulses are 1+ on the right side and 1+ on the left side     Pulmonary: Effort: Pulmonary effort is normal       Comments: Dec BS  Abdominal:      General: Abdomen is flat  Bowel sounds are normal       Palpations: Abdomen is soft  Musculoskeletal:      Comments: Plus 2 edema   Feet:      Right foot:      Skin integrity: No ulcer, skin breakdown, erythema, warmth, callus or dry skin  Left foot:      Skin integrity: No ulcer, skin breakdown, erythema, warmth, callus or dry skin  Neurological:      Mental Status: He is alert  Patient's shoes and socks removed  Right Foot/Ankle   Right Foot Inspection  Skin Exam: skin normal and skin intact no dry skin, no warmth, no callus, no erythema, no maceration, no abnormal color, no pre-ulcer, no ulcer and no callus                          Toe Exam: ROM and strength within normal limits  Sensory       Monofilament testing: intact  Vascular    The right DP pulse is 1+  The right PT pulse is 1+  Left Foot/Ankle  Left Foot Inspection  Skin Exam: skin normal and skin intactno dry skin, no warmth, no erythema, no maceration, normal color, no pre-ulcer, no ulcer and no callus                         Toe Exam: ROM and strength within normal limits                   Sensory       Monofilament: intact  Vascular    The left DP pulse is 1+  The left PT pulse is 1+  Assign Risk Category:  ; ;        Risk: 0      No visits with results within 2 Week(s) from this visit  Latest known visit with results is:   Lab on 10/13/2020   Component Date Value Ref Range Status    Hemoglobin A1C 10/13/2020 6 5* Normal 3 8-5 6%; PreDiabetic 5 7-6 4%; Diabetic >=6 5%; Glycemic control for adults with diabetes <7 0% % Final    EAG 10/13/2020 140  mg/dl Final         Meredith Kat MD    Some or all of this note was generated with a voice recognition dictation system and therefore my contain grammatical or spelling errors

## 2021-03-09 ENCOUNTER — TELEPHONE (OUTPATIENT)
Dept: FAMILY MEDICINE CLINIC | Facility: HOSPITAL | Age: 86
End: 2021-03-09

## 2021-04-05 DIAGNOSIS — I10 ESSENTIAL HYPERTENSION: ICD-10-CM

## 2021-04-05 RX ORDER — AMLODIPINE BESYLATE AND BENAZEPRIL HYDROCHLORIDE 5; 20 MG/1; MG/1
CAPSULE ORAL
Qty: 180 CAPSULE | Refills: 3 | Status: SHIPPED | OUTPATIENT
Start: 2021-04-05 | End: 2021-08-03

## 2021-05-19 ENCOUNTER — OFFICE VISIT (OUTPATIENT)
Dept: FAMILY MEDICINE CLINIC | Facility: HOSPITAL | Age: 86
End: 2021-05-19
Payer: COMMERCIAL

## 2021-05-19 VITALS
HEART RATE: 84 BPM | WEIGHT: 285 LBS | OXYGEN SATURATION: 93 % | DIASTOLIC BLOOD PRESSURE: 80 MMHG | BODY MASS INDEX: 39.9 KG/M2 | SYSTOLIC BLOOD PRESSURE: 150 MMHG | RESPIRATION RATE: 20 BRPM | HEIGHT: 71 IN

## 2021-05-19 DIAGNOSIS — R60.9 EDEMA, UNSPECIFIED TYPE: ICD-10-CM

## 2021-05-19 DIAGNOSIS — E11.22 TYPE 2 DIABETES MELLITUS WITH STAGE 3 CHRONIC KIDNEY DISEASE, WITHOUT LONG-TERM CURRENT USE OF INSULIN, UNSPECIFIED WHETHER STAGE 3A OR 3B CKD (HCC): Primary | ICD-10-CM

## 2021-05-19 DIAGNOSIS — N18.30 TYPE 2 DIABETES MELLITUS WITH STAGE 3 CHRONIC KIDNEY DISEASE, WITHOUT LONG-TERM CURRENT USE OF INSULIN, UNSPECIFIED WHETHER STAGE 3A OR 3B CKD (HCC): Primary | ICD-10-CM

## 2021-05-19 DIAGNOSIS — I50.32 CHRONIC DIASTOLIC CONGESTIVE HEART FAILURE (HCC): ICD-10-CM

## 2021-05-19 LAB — SL AMB POCT HEMOGLOBIN AIC: 7.1 (ref ?–6.5)

## 2021-05-19 PROCEDURE — 83036 HEMOGLOBIN GLYCOSYLATED A1C: CPT | Performed by: INTERNAL MEDICINE

## 2021-05-19 PROCEDURE — 99214 OFFICE O/P EST MOD 30 MIN: CPT | Performed by: INTERNAL MEDICINE

## 2021-05-19 NOTE — ASSESSMENT & PLAN NOTE
Wt Readings from Last 3 Encounters:   05/19/21 129 kg (285 lb)   03/02/21 130 kg (287 lb)   10/13/20 127 kg (281 lb)       Weight is 285 today and has +1 edema of feet  Usual weight acc to patient is 280  He does not weigh daily  See AVS for recommendations to patient  Double the dose of lasix for 7 days  Recheck in office soon

## 2021-05-19 NOTE — PROGRESS NOTES
Subjective:   Chief Complaint   Patient presents with    Edema     bilat feet edema        Patient ID: Aida Carpio is a 81 y o  male  Acute visit today  Patient notes feet and ankles swelling over past number of days  He denies any CP and states he is chronically short of breath due to COPD  He has prior hx of CHF both systolic and diastolic  He is sedentary due to weight and lung disease  He was concerned about blood sugars and A1C was done  Result is 7 1 which is acceptable for elderly patient and he was encouraged to continue usual regimen  The following portions of the patient's history were reviewed and updated as appropriate: allergies, current medications, past family history, past medical history, past social history, past surgical history and problem list     Review of Systems   Constitutional: Positive for fatigue  Respiratory: Positive for shortness of breath  Negative for chest tightness  Cardiovascular: Positive for leg swelling  Negative for chest pain  All other systems reviewed and are negative  Current Outpatient Medications on File Prior to Visit   Medication Sig Dispense Refill    albuterol (PROAIR HFA) 90 mcg/act inhaler Inhale 2 puffs every 4 (four) hours as needed for wheezing 1 Inhaler 5    amLODIPine-benazepril (LOTREL 5-20) 5-20 MG per capsule 1 po  capsule 3    fluticasone-umeclidinium-vilanterol (TRELEGY) 100-62 5-25 MCG/INH inhaler Inhale 1 puff daily Rinse mouth after use   3 Inhaler 3    furosemide (LASIX) 40 mg tablet Take 1 tablet (40 mg total) by mouth daily 90 tablet 3    labetalol (NORMODYNE) 300 mg tablet Take 1 tablet (300 mg total) by mouth 2 (two) times a day 180 tablet 3    metFORMIN (GLUCOPHAGE) 500 mg tablet Take 1 tablet (500 mg total) by mouth 2 (two) times a day with meals 180 tablet 3    tamsulosin (FLOMAX) 0 4 mg Take 1 capsule (0 4 mg total) by mouth daily with dinner 30 capsule 11    Blood Glucose Monitoring Suppl (Carla Hurtado) w/Device KIT by Does not apply route 2 (two) times a day 1 kit 1    glucose blood (ONETOUCH VERIO) test strip 1 each by Other route 2 (two) times a day Use as instructed 100 each 11    Lancets MISC by Does not apply route 2 (two) times a day 100 each 5     No current facility-administered medications on file prior to visit  Objective:  Vitals:    05/19/21 0825   BP: 150/80   Pulse: 84   Resp: 20   SpO2: 93%   Weight: 129 kg (285 lb)   Height: 5' 11" (1 803 m)      Physical Exam  Constitutional:       General: He is not in acute distress  HENT:      Head: Normocephalic  Cardiovascular:      Rate and Rhythm: Normal rate and regular rhythm  Pulmonary:      Effort: Pulmonary effort is normal       Breath sounds: No wheezing, rhonchi or rales  Comments: Decreased breath sounds bilateral bases  Abdominal:      Palpations: Abdomen is soft  Comments: obese   Musculoskeletal: Normal range of motion  General: Swelling (both feet and shins ) present  Skin:     Findings: No rash  Neurological:      Mental Status: He is alert  Assessment/Plan:    Type 2 diabetes mellitus with stage 3 chronic kidney disease, without long-term current use of insulin (HCC)    Lab Results   Component Value Date    HGBA1C 7 1 (A) 05/19/2021   done in office  Acceptable  No change to regimen    Chronic diastolic congestive heart failure (HCC)  Wt Readings from Last 3 Encounters:   05/19/21 129 kg (285 lb)   03/02/21 130 kg (287 lb)   10/13/20 127 kg (281 lb)       Weight is 285 today and has +1 edema of feet  Usual weight acc to patient is 280  He does not weigh daily  See AVS for recommendations to patient  Double the dose of lasix for 7 days  Recheck in office soon         Diagnoses and all orders for this visit:    Type 2 diabetes mellitus with stage 3 chronic kidney disease, without long-term current use of insulin, unspecified whether stage 3a or 3b CKD (HCC)  -     POCT hemoglobin A1c    Edema, unspecified type    Chronic diastolic congestive heart failure ([facility] )

## 2021-05-19 NOTE — ASSESSMENT & PLAN NOTE
Lab Results   Component Value Date    HGBA1C 7 1 (A) 05/19/2021   done in office  Acceptable    No change to regimen

## 2021-05-19 NOTE — PATIENT INSTRUCTIONS
For swelling of feet and legs, no salt,  Stop all salt in diet  Walk, exercise and move muscles in the legs  Compression hose can help  For now, increase furosemide to 40 mg twice a day for 1 week, then resume 40 mg daily  HgA1c is 7 1  This is acceptable  No change to diabetes regimen

## 2021-06-02 ENCOUNTER — TELEPHONE (OUTPATIENT)
Dept: FAMILY MEDICINE CLINIC | Facility: HOSPITAL | Age: 86
End: 2021-06-02

## 2021-06-02 ENCOUNTER — HOSPITAL ENCOUNTER (EMERGENCY)
Facility: HOSPITAL | Age: 86
Discharge: HOME | End: 2021-06-02
Attending: EMERGENCY MEDICINE
Payer: COMMERCIAL

## 2021-06-02 VITALS
SYSTOLIC BLOOD PRESSURE: 178 MMHG | OXYGEN SATURATION: 94 % | BODY MASS INDEX: 39.9 KG/M2 | RESPIRATION RATE: 18 BRPM | TEMPERATURE: 98.5 F | WEIGHT: 285 LBS | HEIGHT: 71 IN | HEART RATE: 97 BPM | DIASTOLIC BLOOD PRESSURE: 88 MMHG

## 2021-06-02 DIAGNOSIS — I10 HYPERTENSION: ICD-10-CM

## 2021-06-02 DIAGNOSIS — R09.81 CHRONIC NASAL CONGESTION: Primary | ICD-10-CM

## 2021-06-02 LAB — SARS-COV-2 RNA RESP QL NAA+PROBE: NEGATIVE

## 2021-06-02 PROCEDURE — U0003 INFECTIOUS AGENT DETECTION BY NUCLEIC ACID (DNA OR RNA); SEVERE ACUTE RESPIRATORY SYNDROME CORONAVIRUS 2 (SARS-COV-2) (CORONAVIRUS DISEASE [COVID-19]), AMPLIFIED PROBE TECHNIQUE, MAKING USE OF HIGH THROUGHPUT TECHNOLOGIES AS DESCRIBED BY CMS-2020-01-R: HCPCS | Performed by: EMERGENCY MEDICINE

## 2021-06-02 PROCEDURE — 99285 EMERGENCY DEPT VISIT HI MDM: CPT

## 2021-06-02 PROCEDURE — U0005 INFEC AGEN DETEC AMPLI PROBE: HCPCS | Performed by: EMERGENCY MEDICINE

## 2021-06-02 PROCEDURE — 99284 EMERGENCY DEPT VISIT MOD MDM: CPT | Performed by: EMERGENCY MEDICINE

## 2021-06-02 RX ORDER — FLUTICASONE PROPIONATE 50 MCG
1 SPRAY, SUSPENSION (ML) NASAL ONCE
Status: COMPLETED | OUTPATIENT
Start: 2021-06-02 | End: 2021-06-02

## 2021-06-02 RX ORDER — FLUTICASONE PROPIONATE 50 MCG
1 SPRAY, SUSPENSION (ML) NASAL DAILY
Status: DISCONTINUED | OUTPATIENT
Start: 2021-06-02 | End: 2021-06-02

## 2021-06-02 RX ADMIN — FLUTICASONE PROPIONATE 1 SPRAY: 50 SPRAY, METERED NASAL at 13:43

## 2021-06-02 NOTE — ED PROVIDER NOTES
History  Chief Complaint   Patient presents with    Shortness of Breath     Patient comes to the ER with a "stuffy nose" and being short of breath  Patient has history of COPD and is concerned that he has been using too much Afrin for the past several weeks  Patient is a 81-year-old male with a past medical history significant for hypertension, diabetes, chronic kidney disease stage 3, moderate chronic obstructive pulmonary disease, chronic diastolic congestive heart failure, morbid obesity who presents with chronic congestion  Patient reports that for over 1 month he has been using Afrin daily for nasal congestion  Recently, he read the back of the bottle which stated that he shouldn't use Afrin for more than 3 days secondary to the potential for rebound congestion that will be worse  As such, patient stopped using the afrin a few days ago and his congestion became markedly worsened  Patient states that he struggles the most at night because he sleep flat, but when he is so congested, he has trouble breathing which is already problematic at baseline from his COPD  The congestion is clear  Denies fevers, cough, change in color of sputum, headache, neck pain or stiffness  Review of medical records shows that the patient has an appointment on 6/3 with his PCP, but did call earlier today and was asking for recommendations for treatment of congestion as he believes his chronic use of afrin is making his problem worse  Per notes, he was recommended to try coricidin as well as warm water and saline flushes  Prior to Admission Medications   Prescriptions Last Dose Informant Patient Reported? Taking?    Blood Glucose Monitoring Suppl (ONETOUCH VERIO) w/Device KIT  Self No No   Sig: by Does not apply route 2 (two) times a day   Lancets MISC  Self No No   Sig: by Does not apply route 2 (two) times a day   albuterol (PROAIR HFA) 90 mcg/act inhaler  Self No No   Sig: Inhale 2 puffs every 4 (four) hours as needed for wheezing   amLODIPine-benazepril (LOTREL 5-20) 5-20 MG per capsule   No No   Si po BID   fluticasone-umeclidinium-vilanterol (TRELEGY) 100-62 5-25 MCG/INH inhaler   No No   Sig: Inhale 1 puff daily Rinse mouth after use  furosemide (LASIX) 40 mg tablet   No No   Sig: Take 1 tablet (40 mg total) by mouth daily   glucose blood (ONETOUCH VERIO) test strip  Self No No   Si each by Other route 2 (two) times a day Use as instructed   labetalol (NORMODYNE) 300 mg tablet   No No   Sig: Take 1 tablet (300 mg total) by mouth 2 (two) times a day   metFORMIN (GLUCOPHAGE) 500 mg tablet   No No   Sig: Take 1 tablet (500 mg total) by mouth 2 (two) times a day with meals   tamsulosin (FLOMAX) 0 4 mg   No No   Sig: Take 1 capsule (0 4 mg total) by mouth daily with dinner      Facility-Administered Medications: None       Past Medical History:   Diagnosis Date    [facility]     [facility]     Herpes zoster     Hypertension     Mycoplasma pneumonia     Obesity     Osteoarthritis     Renal calculi        Past Surgical History:   Procedure Laterality Date    CATARACT EXTRACTION      with insert intraoculat lens prosthesis    HEMORRHOID SURGERY      NECK SURGERY      for bone spur       Family History   Problem Relation Age of Onset    Breast cancer Mother     Stroke Mother     Pneumonia Father     Substance Abuse Neg Hx     Mental illness Neg Hx      I have reviewed and agree with the history as documented  E-Cigarette/Vaping    E-Cigarette Use Never User      E-Cigarette/Vaping Substances     Social History     Tobacco Use    Smoking status: Former Smoker     Packs/day: 2 00     Years: 40 00     Pack years: 80 00     Types: Cigarettes     Start date:      Quit date:      Years since quittin 4    Smokeless tobacco: Never Used   Substance Use Topics    Alcohol use:  Yes     Alcohol/week: 1 0 standard drinks     Types: 1 Cans of beer per week     Frequency: 2-4 times a month     Drinks per session: 1 or 2    Drug use: No       Review of Systems   Constitutional: Negative for chills and fever  HENT: Positive for congestion and postnasal drip  Negative for rhinorrhea, sinus pressure and sinus pain  Eyes: Negative for photophobia and visual disturbance  Respiratory: Positive for shortness of breath (at nighttime when laying flat)  Negative for cough and wheezing  Cardiovascular: Negative for chest pain and palpitations  Gastrointestinal: Negative for abdominal pain, constipation, diarrhea, nausea and vomiting  Genitourinary: Negative for dysuria, flank pain and hematuria  Musculoskeletal: Negative for back pain and neck pain  Skin: Negative for color change and pallor  Neurological: Negative for dizziness, weakness, light-headedness, numbness and headaches  Physical Exam  Physical Exam  Vitals signs and nursing note reviewed  Constitutional:       General: He is not in acute distress  Appearance: Normal appearance  He is well-developed  He is obese  He is not ill-appearing, toxic-appearing or diaphoretic  HENT:      Head: Normocephalic and atraumatic  Nose: Mucosal edema, congestion and rhinorrhea present  Rhinorrhea is clear  Right Nostril: No septal hematoma  Left Nostril: No septal hematoma  Right Turbinates: Enlarged and swollen  Not pale  Left Turbinates: Enlarged and swollen  Not pale  Right Sinus: No maxillary sinus tenderness or frontal sinus tenderness  Left Sinus: No maxillary sinus tenderness or frontal sinus tenderness  Mouth/Throat:      Mouth: Mucous membranes are moist    Eyes:      Conjunctiva/sclera: Conjunctivae normal       Pupils: Pupils are equal, round, and reactive to light  Neck:      Musculoskeletal: Normal range of motion and neck supple  Cardiovascular:      Rate and Rhythm: Normal rate and regular rhythm  Pulses: Normal pulses  Heart sounds: Normal heart sounds  No murmur  Pulmonary:      Effort: Pulmonary effort is normal  No respiratory distress  Breath sounds: Normal breath sounds  No stridor  No decreased breath sounds, wheezing, rhonchi or rales  Chest:      Chest wall: No tenderness  Abdominal:      General: Bowel sounds are normal  There is no distension  Palpations: Abdomen is soft  Tenderness: There is no abdominal tenderness  There is no guarding or rebound  Musculoskeletal:      Right lower leg: Edema (1+ BL LE edema) present  Left lower leg: Edema present  Skin:     General: Skin is warm and dry  Neurological:      General: No focal deficit present  Mental Status: He is alert and oriented to person, place, and time  Mental status is at baseline  Psychiatric:         Mood and Affect: Mood normal          Behavior: Behavior normal          Vital Signs  ED Triage Vitals [06/02/21 1309]   Temperature Pulse Respirations Blood Pressure SpO2   98 5 °F (36 9 °C) 96 20 (!) 200/91 92 %      Temp Source Heart Rate Source Patient Position - Orthostatic VS BP Location FiO2 (%)   Oral Monitor Lying Right arm --      Pain Score       No Pain           Vitals:    06/02/21 1309 06/02/21 1330   BP: (!) 200/91 (!) 178/88   Pulse: 96 97   Patient Position - Orthostatic VS: Lying Sitting         Visual Acuity      ED Medications  Medications   fluticasone (FLONASE) 50 mcg/act nasal spray 1 spray (1 spray Each Nare Given 6/2/21 1343)       Diagnostic Studies  Results Reviewed     Procedure Component Value Units Date/Time    Novel Coronavirus (Covid-19),PCR [facility] - 24 Hour Routine [[id number]] Collected: 06/02/21 1333    Lab Status:  In process Specimen: Nares from Nose Updated: 06/02/21 1337                 No orders to display              Procedures  Procedures         ED Course                                           MDM  Number of Diagnoses or Management Options  Chronic nasal congestion:   Hypertension: Diagnosis management comments: Assessment and Plan:   81year old M presenting with self-induced congestion with friable and irritated nasal mucosa secondary to chronic afrin use  Recommended continued discontinuation of afrin, starting nasal saline spray and flonase, using a humidifier, and sleeping propped with a few pillows to prevent such significant post-nasal drip  Secondary to how irritated patient's nasal mucosa is bilaterally (R>L) recommended follow up with ENT  Patient already has an appointment with PCP tomorrow which I recommended he keep  Will screen for COVID with the chronic congestion, though unlikely  Does not appear to be in acute COPD exacerbation  Did discuss chronic HTN with patient as his BP is elevated in the ER, but he is also currently anxious and upset regarding the situation- will be following up with PCP tomorrow  Disposition  Final diagnoses:   Chronic nasal congestion   Hypertension     Time reflects when diagnosis was documented in both MDM as applicable and the Disposition within this note     Time User Action Codes Description Comment    6/2/2021  1:27 PM Alissa Watkins [R09 81] Chronic nasal congestion     6/2/2021  1:35 PM Alissa Watkins [I10] Hypertension       ED Disposition     ED Disposition Condition Date/Time Comment    Discharge Stable Wed Jun 2, 2021  1:27 PM Cesar Damon discharge to home/self care              Follow-up Information     Follow up With Specialties Details Why Contact Info Additional Information    Timi Petersen MD Internal Medicine Go in 1 day for re-evaluation [facility]  [address] [facility] [address] [address] [facility] Emergency Department Emergency Medicine Go to  As needed, If symptoms worsen, for re-evaluation [address] [place],[id number]  [address] [facility] Emergency Department, [address] [place] [address], [facility] l[facility] Stan [address] Any Noriega MD Otolaryngology Schedule an appointment as soon as possible for a visit in 1 week for re-evaluation [address] [place] [address] Dr Ondina Dyer [address] [facility]  [phone]             Discharge Medication List as of 6/2/2021  1:38 PM      CONTINUE these medications which have NOT CHANGED    Details   albuterol (PROAIR HFA) 90 mcg/act inhaler Inhale 2 puffs every 4 (four) hours as needed for wheezing, Starting Mon 10/8/2018, Normal      amLODIPine-benazepril (LOTREL 5-20) 5-20 MG per capsule 1 po BID, Normal      Blood Glucose Monitoring Suppl (Luis Steven) w/Device KIT by Does not apply route 2 (two) times a day, Starting Mon 8/27/2018, Normal      fluticasone-umeclidinium-vilanterol (TRELEGY) 100-62 5-25 MCG/INH inhaler Inhale 1 puff daily Rinse mouth after use , Starting Tue 3/2/2021, Normal      furosemide (LASIX) 40 mg tablet Take 1 tablet (40 mg total) by mouth daily, Starting Tue 10/13/2020, Normal      glucose blood (ONETOUCH VERIO) test strip 1 each by Other route 2 (two) times a day Use as instructed, Starting Mon 11/18/2019, Normal      labetalol (NORMODYNE) 300 mg tablet Take 1 tablet (300 mg total) by mouth 2 (two) times a day, Starting Tue 3/2/2021, Normal      Lancets MISC by Does not apply route 2 (two) times a day, Starting Mon 8/27/2018, Print      metFORMIN (GLUCOPHAGE) 500 mg tablet Take 1 tablet (500 mg total) by mouth 2 (two) times a day with meals, Starting Tue 3/2/2021, Normal      tamsulosin (FLOMAX) 0 4 mg Take 1 capsule (0 4 mg total) by mouth daily with dinner, Starting Wed 11/18/2020, Normal           No discharge procedures on file      PDMP Review     None          ED Provider  Electronically Signed by           Cathi Austin DO  06/02/21 6109

## 2021-06-02 NOTE — TELEPHONE ENCOUNTER
Try coracedin for congestion, he can ask the pharmacist as well  Also warm water and salt nasal flushes, please discuss with patient

## 2021-06-02 NOTE — DISCHARGE INSTRUCTIONS
STOP using AFRIN  Please use the flonase and saline nasal spray to keep your nasal mucosa moist and minimally irritated  It will take time for your congestion to improve secondary to the chronic afrin use  Please follow up with the Ear, Nose, and Throat doctor for further evaluation and keep your appointment with your primary care doctor tomorrow  You were also swabbed for COVID today and you will be called with results in the next 24-48 hours  Please socially isolate until your results return

## 2021-06-02 NOTE — TELEPHONE ENCOUNTER
pt called he has appt 6/3 with carlita  He said he has been dealing with alot of congestion and has been using afrin but he read on the box that afrin can make the congestion worse and cause your body to get addicted to the afrin  Patient is asking if another dr here can suggest something he can do before he sees carlita tomorrow to help him with the congestion       Gela Bardales

## 2021-06-03 ENCOUNTER — OFFICE VISIT (OUTPATIENT)
Dept: FAMILY MEDICINE CLINIC | Facility: HOSPITAL | Age: 86
End: 2021-06-03
Payer: COMMERCIAL

## 2021-06-03 VITALS
SYSTOLIC BLOOD PRESSURE: 130 MMHG | BODY MASS INDEX: 39.76 KG/M2 | WEIGHT: 284 LBS | HEART RATE: 102 BPM | OXYGEN SATURATION: 95 % | DIASTOLIC BLOOD PRESSURE: 78 MMHG | HEIGHT: 71 IN

## 2021-06-03 DIAGNOSIS — I50.32 CHRONIC DIASTOLIC CONGESTIVE HEART FAILURE (HCC): ICD-10-CM

## 2021-06-03 DIAGNOSIS — J44.9 MODERATE COPD (CHRONIC OBSTRUCTIVE PULMONARY DISEASE) (HCC): ICD-10-CM

## 2021-06-03 DIAGNOSIS — J31.0 RHINITIS MEDICAMENTOSA: ICD-10-CM

## 2021-06-03 DIAGNOSIS — J41.0 SIMPLE CHRONIC BRONCHITIS (HCC): Primary | ICD-10-CM

## 2021-06-03 DIAGNOSIS — E66.01 OBESITY, MORBID (HCC): ICD-10-CM

## 2021-06-03 DIAGNOSIS — N18.30 TYPE 2 DIABETES MELLITUS WITH STAGE 3 CHRONIC KIDNEY DISEASE, WITHOUT LONG-TERM CURRENT USE OF INSULIN, UNSPECIFIED WHETHER STAGE 3A OR 3B CKD (HCC): ICD-10-CM

## 2021-06-03 DIAGNOSIS — E11.22 TYPE 2 DIABETES MELLITUS WITH STAGE 3 CHRONIC KIDNEY DISEASE, WITHOUT LONG-TERM CURRENT USE OF INSULIN, UNSPECIFIED WHETHER STAGE 3A OR 3B CKD (HCC): ICD-10-CM

## 2021-06-03 DIAGNOSIS — T48.5X5A RHINITIS MEDICAMENTOSA: ICD-10-CM

## 2021-06-03 PROCEDURE — 99214 OFFICE O/P EST MOD 30 MIN: CPT | Performed by: INTERNAL MEDICINE

## 2021-06-03 RX ORDER — FLUTICASONE PROPIONATE 50 MCG
1 SPRAY, SUSPENSION (ML) NASAL 2 TIMES DAILY
COMMUNITY
End: 2021-06-03 | Stop reason: SDUPTHER

## 2021-06-03 RX ORDER — FLUTICASONE PROPIONATE 50 MCG
1 SPRAY, SUSPENSION (ML) NASAL 2 TIMES DAILY
Qty: 15.8 ML | Refills: 3 | Status: SHIPPED | OUTPATIENT
Start: 2021-06-03 | End: 2021-06-10 | Stop reason: SDUPTHER

## 2021-06-03 NOTE — PATIENT INSTRUCTIONS
OTC Claritin take in AM    flonase 2 times per day    IF weight goes up with fluid or you have swelling, double the Lasix for next 2 days

## 2021-06-03 NOTE — PROGRESS NOTES
Assessment/Plan:       Diagnoses and all orders for this visit:    Simple chronic bronchitis ([address] )    Type 2 diabetes mellitus with stage 3 chronic kidney disease, without long-term current use of insulin, unspecified whether stage 3a or 3b CKD (HCC)    Obesity, morbid (HCC)    Chronic diastolic congestive heart failure (HCC)    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)    Rhinitis medicamentosa  -     fluticasone (FLONASE) 50 mcg/act nasal spray; 1 spray into each nostril 2 (two) times a day    Other orders  -     Discontinue: fluticasone (FLONASE) 50 mcg/act nasal spray; 1 spray into each nostril 2 (two) times a day          All of the above diagnoses have been assessed  Additional COMMENTS/PLAN: pt has apt next month, may needs to go to j80 mg lasix  Subjective:      Patient ID: Hrapreet Goldman is a 81 y o  male  HPI     Patient in the emergency room yesterday with significant rhinorrhea  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   Nasal spray during the allergy season  ALso has noted weight gain  Some edema,some PND  The following portions of the patient's history were revised and updated as appropriate: Problem list, allergies, med list, FH, SH, Past medical and surgical histories  Current Outpatient Medications   Medication Sig Dispense Refill    albuterol (PROAIR HFA) 90 mcg/act inhaler Inhale 2 puffs every 4 (four) hours as needed for wheezing 1 Inhaler 5    amLODIPine-benazepril (LOTREL 5-20) 5-20 MG per capsule 1 po  capsule 3    fluticasone (FLONASE) 50 mcg/act nasal spray 1 spray into each nostril 2 (two) times a day 15 8 mL 3    fluticasone-umeclidinium-vilanterol (TRELEGY) 100-62 5-25 MCG/INH inhaler Inhale 1 puff daily Rinse mouth after use   3 Inhaler 3    furosemide (LASIX) 40 mg tablet Take 1 tablet (40 mg total) by mouth daily 90 tablet 3    labetalol (NORMODYNE) 300 mg tablet Take 1 tablet (300 mg total) by mouth 2 (two) times a day 180 tablet 3    metFORMIN (GLUCOPHAGE) 500 mg tablet Take 1 tablet (500 mg total) by mouth 2 (two) times a day with meals 180 tablet 3    tamsulosin (FLOMAX) 0 4 mg Take 1 capsule (0 4 mg total) by mouth daily with dinner 30 capsule 11     No current facility-administered medications for this visit  Review of Systems   All other systems reviewed and are negative  Objective:    /78 (BP Location: Left arm, Patient Position: Sitting, Cuff Size: Large)   Pulse 102   Ht 5' 11" (1 803 m)   Wt 129 kg (284 lb)   SpO2 95%   BMI 39 61 kg/m²     BP Readings from Last 3 Encounters:   06/03/21 130/78   06/02/21 (!) 178/88   05/19/21 150/80                  Wt Readings from Last 3 Encounters:   06/03/21 129 kg (284 lb)   06/02/21 129 kg (285 lb)   05/19/21 129 kg (285 lb)         Physical Exam  Constitutional:       Appearance: He is obese  Cardiovascular:      Rate and Rhythm: Normal rate and regular rhythm  Pulmonary:      Comments: Dec BS  Abdominal:      General: Abdomen is flat  Bowel sounds are normal       Palpations: Abdomen is soft  Musculoskeletal:      Comments: Plus 2 edema   Neurological:      Mental Status: He is alert  Admission on 06/02/2021, Discharged on 06/02/2021   Component Date Value Ref Range Status    SARS-CoV-2 06/02/2021 Negative  Negative Final         Chip Smith MD    Some or all of this note was generated with a voice recognition dictation system and therefore my contain grammatical or spelling errors

## 2021-06-09 DIAGNOSIS — J44.9 CHRONIC OBSTRUCTIVE PULMONARY DISEASE, UNSPECIFIED COPD TYPE (HCC): ICD-10-CM

## 2021-06-09 RX ORDER — ALBUTEROL SULFATE 90 UG/1
2 AEROSOL, METERED RESPIRATORY (INHALATION) EVERY 4 HOURS PRN
Qty: 18 G | Refills: 3 | Status: SHIPPED | OUTPATIENT
Start: 2021-06-09 | End: 2022-07-19

## 2021-06-10 DIAGNOSIS — T48.5X5A RHINITIS MEDICAMENTOSA: ICD-10-CM

## 2021-06-10 DIAGNOSIS — J31.0 RHINITIS MEDICAMENTOSA: ICD-10-CM

## 2021-06-10 RX ORDER — FLUTICASONE PROPIONATE 50 MCG
1 SPRAY, SUSPENSION (ML) NASAL 2 TIMES DAILY
Qty: 15.8 ML | Refills: 3 | Status: SHIPPED | OUTPATIENT
Start: 2021-06-10 | End: 2021-09-02 | Stop reason: SDUPTHER

## 2021-06-14 ENCOUNTER — HOSPITAL ENCOUNTER (OUTPATIENT)
Dept: RADIOLOGY | Facility: HOSPITAL | Age: 86
Discharge: HOME | End: 2021-06-14
Attending: INTERNAL MEDICINE
Payer: COMMERCIAL

## 2021-06-14 ENCOUNTER — OFFICE VISIT (OUTPATIENT)
Dept: FAMILY MEDICINE CLINIC | Facility: HOSPITAL | Age: 86
End: 2021-06-14
Payer: COMMERCIAL

## 2021-06-14 VITALS
BODY MASS INDEX: 40.74 KG/M2 | HEIGHT: 71 IN | SYSTOLIC BLOOD PRESSURE: 142 MMHG | HEART RATE: 88 BPM | OXYGEN SATURATION: 95 % | WEIGHT: 291 LBS | DIASTOLIC BLOOD PRESSURE: 80 MMHG

## 2021-06-14 DIAGNOSIS — I50.33 ACUTE ON CHRONIC DIASTOLIC (CONGESTIVE) HEART FAILURE (HCC): ICD-10-CM

## 2021-06-14 DIAGNOSIS — J44.9 MODERATE COPD (CHRONIC OBSTRUCTIVE PULMONARY DISEASE) (HCC): Primary | ICD-10-CM

## 2021-06-14 DIAGNOSIS — I50.32 CHRONIC DIASTOLIC CONGESTIVE HEART FAILURE (HCC): ICD-10-CM

## 2021-06-14 DIAGNOSIS — I10 ESSENTIAL HYPERTENSION: ICD-10-CM

## 2021-06-14 PROCEDURE — 71046 X-RAY EXAM CHEST 2 VIEWS: CPT

## 2021-06-14 PROCEDURE — 3077F SYST BP >= 140 MM HG: CPT | Performed by: INTERNAL MEDICINE

## 2021-06-14 PROCEDURE — 1160F RVW MEDS BY RX/DR IN RCRD: CPT | Performed by: INTERNAL MEDICINE

## 2021-06-14 PROCEDURE — 3079F DIAST BP 80-89 MM HG: CPT | Performed by: INTERNAL MEDICINE

## 2021-06-14 PROCEDURE — 99214 OFFICE O/P EST MOD 30 MIN: CPT | Performed by: INTERNAL MEDICINE

## 2021-06-14 PROCEDURE — 1036F TOBACCO NON-USER: CPT | Performed by: INTERNAL MEDICINE

## 2021-06-14 RX ORDER — FUROSEMIDE 40 MG/1
80 TABLET ORAL DAILY
Qty: 90 TABLET | Refills: 3 | Status: SHIPPED | OUTPATIENT
Start: 2021-06-14 | End: 2021-06-14

## 2021-06-14 RX ORDER — POTASSIUM CHLORIDE 20 MEQ/1
20 TABLET, EXTENDED RELEASE ORAL DAILY
Qty: 30 TABLET | Refills: 3 | Status: SHIPPED | OUTPATIENT
Start: 2021-06-14 | End: 2021-09-07 | Stop reason: SDUPTHER

## 2021-06-14 RX ORDER — FUROSEMIDE 80 MG
80 TABLET ORAL DAILY
Qty: 90 TABLET | Refills: 3 | Status: SHIPPED | OUTPATIENT
Start: 2021-06-14 | End: 2021-10-27 | Stop reason: HOSPADM

## 2021-06-14 NOTE — PROGRESS NOTES
Assessment/Plan:       Diagnoses and all orders for this visit:    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)    Essential hypertension  -     Discontinue: furosemide (LASIX) 40 mg tablet; Take 2 tablets (80 mg total) by mouth daily  -     furosemide (LASIX) 80 mg tablet; Take 1 tablet (80 mg total) by mouth daily    Chronic diastolic congestive heart failure (HCC)    Acute on chronic diastolic (congestive) heart failure (HCC)  -     CBC; Future  -     Basic metabolic panel; Future  -     NT-BNP PRO; Future  -     XR chest pa & lateral; Future  -     furosemide (LASIX) 80 mg tablet; Take 1 tablet (80 mg total) by mouth daily  -     potassium chloride (K-DUR,KLOR-CON) 20 mEq tablet; Take 1 tablet (20 mEq total) by mouth daily          All of the above diagnoses have been assessed  Additional COMMENTS/PLAN:  Will see the patient back in 1 week      Subjective:      Patient ID: Merline Broach is a 81 y o  male  HPI     Pt has had increasing SOB for the last 2 weeks  Pt has noted weight gain  No no productive cough  Has developed PND and orthopnea  Perhaps has some dietary indiscretion over the last couple days  But generally speaking compliant with that  Has noted increasing edema  The following portions of the patient's history were revised and updated as appropriate: Problem list, allergies, med list, FH, SH, Past medical and surgical histories  Current Outpatient Medications   Medication Sig Dispense Refill    albuterol (ProAir HFA) 90 mcg/act inhaler Inhale 2 puffs every 4 (four) hours as needed for wheezing 18 g 3    amLODIPine-benazepril (LOTREL 5-20) 5-20 MG per capsule 1 po  capsule 3    fluticasone (FLONASE) 50 mcg/act nasal spray 1 spray into each nostril 2 (two) times a day 15 8 mL 3    fluticasone-umeclidinium-vilanterol (TRELEGY) 100-62 5-25 MCG/INH inhaler Inhale 1 puff daily Rinse mouth after use   3 Inhaler 3    furosemide (LASIX) 80 mg tablet Take 1 tablet (80 mg total) by mouth daily 90 tablet 3    labetalol (NORMODYNE) 300 mg tablet Take 1 tablet (300 mg total) by mouth 2 (two) times a day 180 tablet 3    metFORMIN (GLUCOPHAGE) 500 mg tablet Take 1 tablet (500 mg total) by mouth 2 (two) times a day with meals 180 tablet 3    tamsulosin (FLOMAX) 0 4 mg Take 1 capsule (0 4 mg total) by mouth daily with dinner 30 capsule 11    potassium chloride (K-DUR,KLOR-CON) 20 mEq tablet Take 1 tablet (20 mEq total) by mouth daily 30 tablet 3     No current facility-administered medications for this visit  Review of Systems   All other systems reviewed and are negative  Objective:    /80 (BP Location: Left arm, Patient Position: Sitting, Cuff Size: Standard)   Pulse 88   Ht 5' 11" (1 803 m)   Wt 132 kg (291 lb)   SpO2 95%   BMI 40 59 kg/m²     BP Readings from Last 3 Encounters:   06/14/21 142/80   06/03/21 130/78   06/02/21 (!) 178/88                  Wt Readings from Last 3 Encounters:   06/14/21 132 kg (291 lb)   06/03/21 129 kg (284 lb)   06/02/21 129 kg (285 lb)         Physical Exam  Vitals reviewed  Constitutional:       Appearance: He is obese  Neck:      Comments: jvd  Cardiovascular:      Rate and Rhythm: Normal rate and regular rhythm  Pulmonary:      Breath sounds: Rales present  Abdominal:      General: Abdomen is flat  Bowel sounds are normal       Palpations: Abdomen is soft  Musculoskeletal:      Comments: Plus three edema   Neurological:      Mental Status: He is alert  Admission on 06/02/2021, Discharged on 06/02/2021   Component Date Value Ref Range Status    SARS-CoV-2 06/02/2021 Negative  Negative Final         Timi Petersen MD    Some or all of this note was generated with a voice recognition dictation system and therefore my contain grammatical or spelling errors

## 2021-06-14 NOTE — PATIENT INSTRUCTIONS
Get blood work and chest x-ray now    Make Lasix 80 mg twice a day for the next 2 days then 80 mg daily    Get potassium prescription and  Lasix prescription

## 2021-06-15 ENCOUNTER — LAB (OUTPATIENT)
Dept: LAB | Facility: HOSPITAL | Age: 86
End: 2021-06-15
Attending: INTERNAL MEDICINE
Payer: COMMERCIAL

## 2021-06-15 DIAGNOSIS — I50.33 ACUTE ON CHRONIC DIASTOLIC (CONGESTIVE) HEART FAILURE (HCC): ICD-10-CM

## 2021-06-15 LAB
ANION GAP SERPL CALCULATED.3IONS-SCNC: 6 MMOL/L (ref 4–13)
BUN SERPL-MCNC: 35 MG/DL (ref 5–25)
CALCIUM SERPL-MCNC: 9.3 MG/DL (ref 8.3–10.1)
CHLORIDE SERPL-SCNC: 110 MMOL/L (ref 100–108)
CO2 SERPL-SCNC: 23 MMOL/L (ref 21–32)
CREAT SERPL-MCNC: 1.7 MG/DL (ref 0.6–1.3)
ERYTHROCYTE [DISTWIDTH] IN BLOOD BY AUTOMATED COUNT: 13.8 % (ref 11.6–15.1)
GFR SERPL CREATININE-BSD FRML MDRD: 36 ML/MIN/1.73SQ M
GLUCOSE SERPL-MCNC: 139 MG/DL (ref 65–140)
HCT VFR BLD AUTO: 43.9 % (ref 36.5–49.3)
HGB BLD-MCNC: 14.2 G/DL (ref 12–17)
MCH RBC QN AUTO: 29.2 PG (ref 26.8–34.3)
MCHC RBC AUTO-ENTMCNC: 32.3 G/DL (ref 31.4–37.4)
MCV RBC AUTO: 90 FL (ref 82–98)
NT-PROBNP SERPL-MCNC: 172 PG/ML
PLATELET # BLD AUTO: 220 THOUSANDS/UL (ref 149–390)
PMV BLD AUTO: 10.4 FL (ref 8.9–12.7)
POTASSIUM SERPL-SCNC: 4.4 MMOL/L (ref 3.5–5.3)
RBC # BLD AUTO: 4.86 MILLION/UL (ref 3.88–5.62)
SODIUM SERPL-SCNC: 139 MMOL/L (ref 136–145)
WBC # BLD AUTO: 7.53 THOUSAND/UL (ref 4.31–10.16)

## 2021-06-15 PROCEDURE — 36415 COLL VENOUS BLD VENIPUNCTURE: CPT

## 2021-06-15 PROCEDURE — 83880 ASSAY OF NATRIURETIC PEPTIDE: CPT

## 2021-06-15 PROCEDURE — 80048 BASIC METABOLIC PNL TOTAL CA: CPT

## 2021-06-15 PROCEDURE — 85027 COMPLETE CBC AUTOMATED: CPT

## 2021-06-16 ENCOUNTER — TELEPHONE (OUTPATIENT)
Dept: FAMILY MEDICINE CLINIC | Facility: HOSPITAL | Age: 86
End: 2021-06-16

## 2021-06-16 NOTE — TELEPHONE ENCOUNTER
----- Message from Chris Uriarte MD sent at 6/16/2021  2:27 PM EDT -----  Regarding: FW:  Call car and blood work OK   Ask how he is   ----- Message -----  From: Interface, Radiology Results In  Sent: 6/15/2021   9:30 AM EDT  To: Chris Uriarte MD

## 2021-06-16 NOTE — TELEPHONE ENCOUNTER
Patient states that he is doing a little bit better  The Lasix is helping and he is also using his exercise machine 3-4 times per day

## 2021-06-17 ENCOUNTER — RA CDI HCC (OUTPATIENT)
Dept: OTHER | Facility: HOSPITAL | Age: 86
End: 2021-06-17

## 2021-06-17 NOTE — PROGRESS NOTES
[facility]  coding opportunities             Chart reviewed, (number of) suggestions sent to provider: 2        Provider Rejected Suggestions for: E11 65, I11 0            Patients insurance company: [employer]     Visit status: Patient arrived for their scheduled appointment        [place] [address]  coding opportunities        6/21     Chart reviewed, (number of) suggestions sent to provider: 2    E11 65  Type 2 diabetes mellitus with hyperglycemia - most recent A1c in 5/21 was 7 1    I11 0  Hypertensive heart disease with heart failure-  Combination code per ICD10 denoting the link  Between hypertension and CHF                  Patients insurance company: [employer]

## 2021-06-21 ENCOUNTER — OFFICE VISIT (OUTPATIENT)
Dept: FAMILY MEDICINE CLINIC | Facility: HOSPITAL | Age: 86
End: 2021-06-21
Payer: COMMERCIAL

## 2021-06-21 VITALS
OXYGEN SATURATION: 93 % | BODY MASS INDEX: 39.9 KG/M2 | HEART RATE: 85 BPM | HEIGHT: 71 IN | SYSTOLIC BLOOD PRESSURE: 140 MMHG | WEIGHT: 285 LBS | DIASTOLIC BLOOD PRESSURE: 78 MMHG

## 2021-06-21 DIAGNOSIS — J41.0 SIMPLE CHRONIC BRONCHITIS (HCC): Primary | ICD-10-CM

## 2021-06-21 DIAGNOSIS — J44.9 MODERATE COPD (CHRONIC OBSTRUCTIVE PULMONARY DISEASE) (HCC): ICD-10-CM

## 2021-06-21 PROCEDURE — 99214 OFFICE O/P EST MOD 30 MIN: CPT | Performed by: INTERNAL MEDICINE

## 2021-06-21 RX ORDER — GLIMEPIRIDE 2 MG/1
2 TABLET ORAL
Qty: 30 TABLET | Refills: 5 | Status: SHIPPED | OUTPATIENT
Start: 2021-06-21 | End: 2021-12-06

## 2021-06-21 RX ORDER — PREDNISONE 10 MG/1
10 TABLET ORAL
Qty: 60 TABLET | Refills: 2 | Status: SHIPPED | OUTPATIENT
Start: 2021-06-21 | End: 2021-09-20 | Stop reason: SDUPTHER

## 2021-06-21 NOTE — PROGRESS NOTES
Assessment/Plan:       Diagnoses and all orders for this visit:    Simple chronic bronchitis (HCC)  -     predniSONE 10 mg tablet; Take 1 tablet (10 mg total) by mouth titrated Three daily for 3 days 2 daily for 3 days then 1 and half tabs daily  -     glimepiride (AMARYL) 2 mg tablet; Take 1 tablet (2 mg total) by mouth daily with breakfast    Moderate COPD (chronic obstructive pulmonary disease) ([facility] )  -     Ambulatory referral to Pulmonology; Future  -     predniSONE 10 mg tablet; Take 1 tablet (10 mg total) by mouth titrated Three daily for 3 days 2 daily for 3 days then 1 and half tabs daily  -     glimepiride (AMARYL) 2 mg tablet; Take 1 tablet (2 mg total) by mouth daily with breakfast          All of the above diagnoses have been assessed  Additional COMMENTS/PLAN: will place order for ASAP consult with his previous pulmonologist     Will place on steroids as well as add sulfonylurea and have him monitor his blood sugars b i d   Patient does have appointment about 3-4 weeks with me  Subjective:      Patient ID: Monica Cintron is a 81 y o  male  HPI     Pt still worse with regards to breathing  Still SOB  BNP was normal CXR unremarkable  Pt has lost 6 lbs  The following portions of the patient's history were revised and updated as appropriate: Problem list, allergies, med list, FH, SH, Past medical and surgical histories  Current Outpatient Medications   Medication Sig Dispense Refill    albuterol (ProAir HFA) 90 mcg/act inhaler Inhale 2 puffs every 4 (four) hours as needed for wheezing 18 g 3    amLODIPine-benazepril (LOTREL 5-20) 5-20 MG per capsule 1 po  capsule 3    fluticasone (FLONASE) 50 mcg/act nasal spray 1 spray into each nostril 2 (two) times a day 15 8 mL 3    fluticasone-umeclidinium-vilanterol (TRELEGY) 100-62 5-25 MCG/INH inhaler Inhale 1 puff daily Rinse mouth after use   3 Inhaler 3    furosemide (LASIX) 80 mg tablet Take 1 tablet (80 mg total) by mouth daily 90 tablet 3    labetalol (NORMODYNE) 300 mg tablet Take 1 tablet (300 mg total) by mouth 2 (two) times a day 180 tablet 3    metFORMIN (GLUCOPHAGE) 500 mg tablet Take 1 tablet (500 mg total) by mouth 2 (two) times a day with meals 180 tablet 3    potassium chloride (K-DUR,KLOR-CON) 20 mEq tablet Take 1 tablet (20 mEq total) by mouth daily 30 tablet 3    tamsulosin (FLOMAX) 0 4 mg Take 1 capsule (0 4 mg total) by mouth daily with dinner 30 capsule 11    glimepiride (AMARYL) 2 mg tablet Take 1 tablet (2 mg total) by mouth daily with breakfast 30 tablet 5    predniSONE 10 mg tablet Take 1 tablet (10 mg total) by mouth titrated Three daily for 3 days 2 daily for 3 days then 1 and half tabs daily 60 tablet 2     No current facility-administered medications for this visit  Review of Systems   All other systems reviewed and are negative  Objective:    /78 (BP Location: Left arm, Patient Position: Sitting, Cuff Size: Standard)   Pulse 85   Ht 5' 11" (1 803 m)   Wt 129 kg (285 lb)   SpO2 93%   BMI 39 75 kg/m²     BP Readings from Last 3 Encounters:   06/21/21 140/78   06/14/21 142/80   06/03/21 130/78                  Wt Readings from Last 3 Encounters:   06/21/21 129 kg (285 lb)   06/14/21 132 kg (291 lb)   06/03/21 129 kg (284 lb)         Physical Exam  Vitals reviewed  Constitutional:       Appearance: He is obese  Neck:      Comments: No JVD  Cardiovascular:      Rate and Rhythm: Normal rate and regular rhythm  Pulmonary:      Comments: Dec BS-few forced end exp wheezes  Musculoskeletal:      Right lower leg: No edema             Lab on 06/15/2021   Component Date Value Ref Range Status    WBC 06/15/2021 7 53  4 31 - 10 16 Thousand/uL Final    RBC 06/15/2021 4 86  3 88 - 5 62 Million/uL Final    Hemoglobin 06/15/2021 14 2  12 0 - 17 0 g/dL Final    Hematocrit 06/15/2021 43 9  36 5 - 49 3 % Final    MCV 06/15/2021 90  82 - 98 fL Final    MCH 06/15/2021 29 2  26 8 - 34 3 pg Final    MCHC 06/15/2021 32 3  31 4 - 37 4 g/dL Final    RDW 06/15/2021 13 8  11 6 - 15 1 % Final    Platelets 79/40/5404 220  149 - 390 Thousands/uL Final    MPV 06/15/2021 10 4  8 9 - 12 7 fL Final    Sodium 06/15/2021 139  136 - 145 mmol/L Final    Potassium 06/15/2021 4 4  3 5 - 5 3 mmol/L Final    Chloride 06/15/2021 110* 100 - 108 mmol/L Final    CO2 06/15/2021 23  21 - 32 mmol/L Final    ANION GAP 06/15/2021 6  4 - 13 mmol/L Final    BUN 06/15/2021 35* 5 - 25 mg/dL Final    Creatinine 06/15/2021 1 70* 0 60 - 1 30 mg/dL Final    Standardized to IDMS reference method    Glucose 06/15/2021 139  65 - 140 mg/dL Final    If the patient is fasting, the ADA then defines impaired fasting glucose as > 100 mg/dL and diabetes as > or equal to 123 mg/dL  Specimen collection should occur prior to Sulfasalazine administration due to the potential for falsely depressed results  Specimen collection should occur prior to Sulfapyridine administration due to the potential for falsely elevated results   Calcium 06/15/2021 9 3  8 3 - 10 1 mg/dL Final    eGFR 06/15/2021 36  ml/min/1 73sq m Final    NT-proBNP 06/15/2021 172  <450 pg/mL Final         Karin Lou MD    Some or all of this note was generated with a voice recognition dictation system and therefore my contain grammatical or spelling errors

## 2021-06-23 ENCOUNTER — OFFICE VISIT (OUTPATIENT)
Dept: PULMONOLOGY | Facility: CLINIC | Age: 86
End: 2021-06-23
Payer: COMMERCIAL

## 2021-06-23 VITALS
BODY MASS INDEX: 40.04 KG/M2 | HEIGHT: 71 IN | SYSTOLIC BLOOD PRESSURE: 150 MMHG | WEIGHT: 286 LBS | OXYGEN SATURATION: 92 % | TEMPERATURE: 96.5 F | DIASTOLIC BLOOD PRESSURE: 76 MMHG | HEART RATE: 99 BPM

## 2021-06-23 DIAGNOSIS — N18.30 TYPE 2 DIABETES MELLITUS WITH STAGE 3 CHRONIC KIDNEY DISEASE, WITHOUT LONG-TERM CURRENT USE OF INSULIN, UNSPECIFIED WHETHER STAGE 3A OR 3B CKD (HCC): ICD-10-CM

## 2021-06-23 DIAGNOSIS — J44.9 MODERATE COPD (CHRONIC OBSTRUCTIVE PULMONARY DISEASE) (HCC): Primary | ICD-10-CM

## 2021-06-23 DIAGNOSIS — E11.22 TYPE 2 DIABETES MELLITUS WITH STAGE 3 CHRONIC KIDNEY DISEASE, WITHOUT LONG-TERM CURRENT USE OF INSULIN, UNSPECIFIED WHETHER STAGE 3A OR 3B CKD (HCC): ICD-10-CM

## 2021-06-23 DIAGNOSIS — G47.33 OSA (OBSTRUCTIVE SLEEP APNEA): ICD-10-CM

## 2021-06-23 PROCEDURE — 1160F RVW MEDS BY RX/DR IN RCRD: CPT | Performed by: INTERNAL MEDICINE

## 2021-06-23 PROCEDURE — 3077F SYST BP >= 140 MM HG: CPT | Performed by: INTERNAL MEDICINE

## 2021-06-23 PROCEDURE — 3078F DIAST BP <80 MM HG: CPT | Performed by: INTERNAL MEDICINE

## 2021-06-23 PROCEDURE — 99214 OFFICE O/P EST MOD 30 MIN: CPT | Performed by: INTERNAL MEDICINE

## 2021-06-23 PROCEDURE — 1036F TOBACCO NON-USER: CPT | Performed by: INTERNAL MEDICINE

## 2021-06-23 NOTE — PATIENT INSTRUCTIONS
Continue the prednisone as prescribed  Continue Trelegy once a day  Use nebulizer with albuterol at night if short of breath or decreased oxygen  Increase Flonase to 2 sprays twice a day IF nasal congestions returns

## 2021-06-23 NOTE — ASSESSMENT & PLAN NOTE
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 which is very difficult to overcome  During that time frame he also had a COPD exacerbation  All of this has led to him sleeping in a chair which is exacerbated his lower extremity swelling  I reviewed his most recent x-ray and BNP which showed no evidence of heart failure  He has upped his Lasix and is keeping an eye on his creatinine with lab work  With regards to his COPD exacerbation his lungs are clear today I think he is adequately treated with a new courage him to continue his prednisone taper as prescribed  His nasal symptoms have much improved and he will continue taking Flonase indefinitely for now  I told him he could increase his Flonase to 2 sprays in each nostril twice a day if his nasal congestion worsens  I told him he could increase his albuterol to q h s  if his shortness of breath throughout the day this exacerbated despite trilogy and/or his oxygen levels decreased despite his trilogy  He will maintain his follow-up appointment in August so we can assess his response to this treatment

## 2021-06-23 NOTE — PROGRESS NOTES
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 which is very difficult to overcome  During that time frame he also had a COPD exacerbation  All of this has led to him sleeping in a chair which is exacerbated his lower extremity swelling  I reviewed his most recent x-ray and BNP which showed no evidence of heart failure  He has upped his Lasix and is keeping an eye on his creatinine with lab work  With regards to his COPD exacerbation his lungs are clear today I think he is adequately treated with a new courage him to continue his prednisone taper as prescribed  His nasal symptoms have much improved and he will continue taking Flonase indefinitely for now  I told him he could increase his Flonase to 2 sprays in each nostril twice a day if his nasal congestion worsens  I told him he could increase his albuterol to q h s  if his shortness of breath throughout the day this exacerbated despite trilogy and/or his oxygen levels decreased despite his trilogy  He will maintain his follow-up appointment in August so we can assess his response to this treatment  Type 2 diabetes mellitus with stage 3 chronic kidney disease, without long-term current use of insulin ([facility])    Lab Results   Component Value Date    HGBA1C 7 1 (A) 05/19/2021     Patient is aware that steroids can increase his glucose levels and is currently taking Amaryl per his family doctor to counteract this         Diagnoses and all orders for this visit:    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)    CHI (obstructive sleep apnea)    Type 2 diabetes mellitus with stage 3 chronic kidney disease, without long-term current use of insulin, unspecified whether stage 3a or 3b CKD ([facility] )          Subjective:      Patient ID: Tena Ortega is a 81 y o  male  Mr Syl Herrera is suffering with what sounds like a COPD exacerbation over the past several weeks  It all started with upper respiratory infection and he was unable to breathe through his nose  He took over-the-counter Afrin but was unable to stop using it  When he finally realized that his nose was addicted to [address] he tried to stop and had  Severe rebound and increased shortness of breath  He had wheezing and some mild hypoxia  He recently saw his family doctor started him on prednisone and Flonase which has helped him dramatically  The following portions of the patient's history were reviewed and updated as appropriate: allergies, current medications, past family history, past medical history, past social history, past surgical history and problem list     Review of Systems   Constitutional: Negative  HENT: Negative  Eyes: Negative  Respiratory: Negative for shortness of breath  Cardiovascular: Negative  Gastrointestinal: Negative  Endocrine: Negative  Genitourinary: Negative  Allergic/Immunologic: Negative  Neurological: Negative  Hematological: Negative  Psychiatric/Behavioral: Negative  Objective:      /76 (BP Location: Left arm, Patient Position: Sitting, Cuff Size: Standard)   Pulse 99   Temp (!) 96 5 °F (35 8 °C) (Tympanic)   Ht 5' 11" (1 803 m)   Wt 130 kg (286 lb)   SpO2 92%   BMI 39 89 kg/m²          Physical Exam  Constitutional:       Appearance: He is well-developed  HENT:      Head: Normocephalic  Eyes:      Pupils: Pupils are equal, round, and reactive to light  Cardiovascular:      Rate and Rhythm: Normal rate  Pulmonary:      Effort: Pulmonary effort is normal  No respiratory distress  Breath sounds: No wheezing or rales  Abdominal:      Palpations: Abdomen is soft  Musculoskeletal:         General: Normal range of motion  Cervical back: Neck supple  Skin:     General: Skin is warm and dry  Neurological:      Mental Status: He is alert and oriented to person, place, and time

## 2021-06-23 NOTE — ASSESSMENT & PLAN NOTE
Lab Results   Component Value Date    HGBA1C 7 1 (A) 05/19/2021     Patient is aware that steroids can increase his glucose levels and is currently taking Amaryl per his family doctor to counteract this

## 2021-06-24 ENCOUNTER — TELEPHONE (OUTPATIENT)
Dept: FAMILY MEDICINE CLINIC | Facility: HOSPITAL | Age: 86
End: 2021-06-24

## 2021-07-12 ENCOUNTER — RA CDI HCC (OUTPATIENT)
Dept: OTHER | Facility: HOSPITAL | Age: 86
End: 2021-07-12

## 2021-07-15 ENCOUNTER — OFFICE VISIT (OUTPATIENT)
Dept: FAMILY MEDICINE CLINIC | Facility: HOSPITAL | Age: 86
End: 2021-07-15
Payer: COMMERCIAL

## 2021-07-15 VITALS
HEIGHT: 71 IN | BODY MASS INDEX: 41.27 KG/M2 | SYSTOLIC BLOOD PRESSURE: 120 MMHG | OXYGEN SATURATION: 93 % | WEIGHT: 294.8 LBS | HEART RATE: 86 BPM | DIASTOLIC BLOOD PRESSURE: 78 MMHG

## 2021-07-15 DIAGNOSIS — R60.0 LOCALIZED EDEMA: ICD-10-CM

## 2021-07-15 DIAGNOSIS — I50.32 CHRONIC DIASTOLIC CONGESTIVE HEART FAILURE (HCC): ICD-10-CM

## 2021-07-15 DIAGNOSIS — J44.9 MODERATE COPD (CHRONIC OBSTRUCTIVE PULMONARY DISEASE) (HCC): ICD-10-CM

## 2021-07-15 DIAGNOSIS — N18.30 TYPE 2 DIABETES MELLITUS WITH STAGE 3 CHRONIC KIDNEY DISEASE, WITHOUT LONG-TERM CURRENT USE OF INSULIN, UNSPECIFIED WHETHER STAGE 3A OR 3B CKD (HCC): ICD-10-CM

## 2021-07-15 DIAGNOSIS — J41.0 SIMPLE CHRONIC BRONCHITIS (HCC): Primary | ICD-10-CM

## 2021-07-15 DIAGNOSIS — E11.22 TYPE 2 DIABETES MELLITUS WITH STAGE 3 CHRONIC KIDNEY DISEASE, WITHOUT LONG-TERM CURRENT USE OF INSULIN, UNSPECIFIED WHETHER STAGE 3A OR 3B CKD (HCC): ICD-10-CM

## 2021-07-15 PROCEDURE — 99214 OFFICE O/P EST MOD 30 MIN: CPT | Performed by: INTERNAL MEDICINE

## 2021-07-15 NOTE — PROGRESS NOTES
Assessment/Plan:       Diagnoses and all orders for this visit:    Simple chronic bronchitis (HCC)    Chronic diastolic congestive heart failure (HCC)  -     CBC; Future  -     Comprehensive metabolic panel; Future  -     Microalbumin / creatinine urine ratio  -     Hemoglobin A1C; Future    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)    Localized edema    Type 2 diabetes mellitus with stage 3 chronic kidney disease, without long-term current use of insulin, unspecified whether stage 3a or 3b CKD ([facility] )  -     CBC; Future  -     Comprehensive metabolic panel; Future  -     Microalbumin / creatinine urine ratio  -     Hemoglobin A1C; Future          All of the above diagnoses have been assessed  Additional COMMENTS/PLAN:  Patient is instructed on fluid restriction  He does need to resume his edema  He has an appoint with Pulmonary in a few weeks and let them determine what to do with prednisone in the meantime will keep 10mg daily  Medicare wellness exam next time with full blood work prior to that  Subjective:      Patient ID: Servando Swain is a 81 y o  male  HPI     COPD -to f/u on this  Is better since being placed on steroids  Has minded the humidity  Is on 10mg prednisone  Has gained some weight  CHF-is on 80mg   Would prefer to be on this dose  The following portions of the patient's history were revised and updated as appropriate: Problem list, allergies, med list, FH, SH, Past medical and surgical histories      Current Outpatient Medications   Medication Sig Dispense Refill    albuterol (ProAir HFA) 90 mcg/act inhaler Inhale 2 puffs every 4 (four) hours as needed for wheezing 18 g 3    amLODIPine-benazepril (LOTREL 5-20) 5-20 MG per capsule 1 po  capsule 3    fluticasone (FLONASE) 50 mcg/act nasal spray 1 spray into each nostril 2 (two) times a day 15 8 mL 3    fluticasone-umeclidinium-vilanterol (TRELEGY) 100-62 5-25 MCG/INH inhaler Inhale 1 puff daily Rinse mouth after use  3 Inhaler 3    furosemide (LASIX) 80 mg tablet Take 1 tablet (80 mg total) by mouth daily 90 tablet 3    glimepiride (AMARYL) 2 mg tablet Take 1 tablet (2 mg total) by mouth daily with breakfast 30 tablet 5    labetalol (NORMODYNE) 300 mg tablet Take 1 tablet (300 mg total) by mouth 2 (two) times a day 180 tablet 3    metFORMIN (GLUCOPHAGE) 500 mg tablet Take 1 tablet (500 mg total) by mouth 2 (two) times a day with meals 180 tablet 3    potassium chloride (K-DUR,KLOR-CON) 20 mEq tablet Take 1 tablet (20 mEq total) by mouth daily 30 tablet 3    predniSONE 10 mg tablet Take 1 tablet (10 mg total) by mouth titrated Three daily for 3 days 2 daily for 3 days then 1 and half tabs daily 60 tablet 2    tamsulosin (FLOMAX) 0 4 mg Take 1 capsule (0 4 mg total) by mouth daily with dinner 30 capsule 11     No current facility-administered medications for this visit  Review of Systems   All other systems reviewed and are negative  Objective:    /78   Pulse 86   Ht 5' 11" (1 803 m)   Wt 134 kg (294 lb 12 8 oz)   SpO2 93%   BMI 41 12 kg/m²     BP Readings from Last 3 Encounters:   07/15/21 120/78   06/23/21 150/76   06/21/21 140/78                  Wt Readings from Last 3 Encounters:   07/15/21 134 kg (294 lb 12 8 oz)   06/23/21 130 kg (286 lb)   06/21/21 129 kg (285 lb)         Physical Exam  Vitals reviewed  Cardiovascular:      Rate and Rhythm: Normal rate and regular rhythm  Pulmonary:      Effort: Pulmonary effort is normal       Comments: Dec bs but no wheezes  Musculoskeletal:      Right lower leg: Right lower leg edema: plus 2  No visits with results within 2 Week(s) from this visit     Latest known visit with results is:   Lab on 06/15/2021   Component Date Value Ref Range Status    WBC 06/15/2021 7 53  4 31 - 10 16 Thousand/uL Final    RBC 06/15/2021 4 86  3 88 - 5 62 Million/uL Final    Hemoglobin 06/15/2021 14 2  12 0 - 17 0 g/dL Final    Hematocrit 06/15/2021 43 9  36 5 - 49 3 % Final    MCV 06/15/2021 90  82 - 98 fL Final    MCH 06/15/2021 29 2  26 8 - 34 3 pg Final    MCHC 06/15/2021 32 3  31 4 - 37 4 g/dL Final    RDW 06/15/2021 13 8  11 6 - 15 1 % Final    Platelets 26/27/5013 220  149 - 390 Thousands/uL Final    MPV 06/15/2021 10 4  8 9 - 12 7 fL Final    Sodium 06/15/2021 139  136 - 145 mmol/L Final    Potassium 06/15/2021 4 4  3 5 - 5 3 mmol/L Final    Chloride 06/15/2021 110* 100 - 108 mmol/L Final    CO2 06/15/2021 23  21 - 32 mmol/L Final    ANION GAP 06/15/2021 6  4 - 13 mmol/L Final    BUN 06/15/2021 35* 5 - 25 mg/dL Final    Creatinine 06/15/2021 1 70* 0 60 - 1 30 mg/dL Final    Standardized to IDMS reference method    Glucose 06/15/2021 139  65 - 140 mg/dL Final    If the patient is fasting, the ADA then defines impaired fasting glucose as > 100 mg/dL and diabetes as > or equal to 123 mg/dL  Specimen collection should occur prior to Sulfasalazine administration due to the potential for falsely depressed results  Specimen collection should occur prior to Sulfapyridine administration due to the potential for falsely elevated results   Calcium 06/15/2021 9 3  8 3 - 10 1 mg/dL Final    eGFR 06/15/2021 36  ml/min/1 73sq m Final    NT-proBNP 06/15/2021 172  <450 pg/mL Final         Adri Saravia MD    Some or all of this note was generated with a voice recognition dictation system and therefore my contain grammatical or spelling errors

## 2021-07-30 ENCOUNTER — TELEPHONE (OUTPATIENT)
Dept: PULMONOLOGY | Facility: CLINIC | Age: 86
End: 2021-07-30

## 2021-07-30 NOTE — TELEPHONE ENCOUNTER
Patient called having difficulty breathing more than usual  He would like to be seen sooner than his 8/24 appointment   Please advise [phone]

## 2021-07-30 NOTE — TELEPHONE ENCOUNTER
Spoke with patient  He said he has been SOB on exertion for a couple weeks now  He said he is unable to lay flat at night and has been sleeping sitting up in a chair  He said he can only sleep for about 3 hours  He said he will wheeze from time to time when he is out of breath  Denies cough and chest tightness  He has oxygen to use as needed, but says he has not needed it because when he becomes SOB he will rest for one to two minutes and then he is fine  He has been using his Trelegy and he is on Prednisone 15 mg daily  He is wondering if he maybe he needs something to take BID  He does not have a nebulizer  He does have an appt Monday with his PCP  He is asking for something sooner with Dr Collin Wang  I advised him she does not have anything soon, but that I would send a note over to see if she wants him to come in and be seen with someone else of just order something and then follow up at his appt

## 2021-08-02 ENCOUNTER — OFFICE VISIT (OUTPATIENT)
Dept: FAMILY MEDICINE CLINIC | Facility: HOSPITAL | Age: 86
End: 2021-08-02
Payer: COMMERCIAL

## 2021-08-02 ENCOUNTER — TELEPHONE (OUTPATIENT)
Dept: FAMILY MEDICINE CLINIC | Facility: HOSPITAL | Age: 86
End: 2021-08-02

## 2021-08-02 ENCOUNTER — APPOINTMENT (OUTPATIENT)
Dept: LAB | Facility: HOSPITAL | Age: 86
End: 2021-08-02
Attending: INTERNAL MEDICINE
Payer: COMMERCIAL

## 2021-08-02 VITALS
HEART RATE: 95 BPM | DIASTOLIC BLOOD PRESSURE: 78 MMHG | RESPIRATION RATE: 20 BRPM | HEIGHT: 71 IN | OXYGEN SATURATION: 95 % | WEIGHT: 295 LBS | SYSTOLIC BLOOD PRESSURE: 148 MMHG | BODY MASS INDEX: 41.3 KG/M2

## 2021-08-02 DIAGNOSIS — S81.802A OPEN WOUND OF LEFT LOWER LEG, INITIAL ENCOUNTER: Primary | ICD-10-CM

## 2021-08-02 DIAGNOSIS — E11.22 TYPE 2 DIABETES MELLITUS WITH STAGE 3 CHRONIC KIDNEY DISEASE, WITHOUT LONG-TERM CURRENT USE OF INSULIN, UNSPECIFIED WHETHER STAGE 3A OR 3B CKD (HCC): ICD-10-CM

## 2021-08-02 DIAGNOSIS — N18.30 TYPE 2 DIABETES MELLITUS WITH STAGE 3 CHRONIC KIDNEY DISEASE, WITHOUT LONG-TERM CURRENT USE OF INSULIN, UNSPECIFIED WHETHER STAGE 3A OR 3B CKD (HCC): ICD-10-CM

## 2021-08-02 DIAGNOSIS — J44.9 MODERATE COPD (CHRONIC OBSTRUCTIVE PULMONARY DISEASE) (HCC): ICD-10-CM

## 2021-08-02 DIAGNOSIS — E11.9 TYPE 2 DIABETES MELLITUS WITHOUT COMPLICATION, WITHOUT LONG-TERM CURRENT USE OF INSULIN (HCC): ICD-10-CM

## 2021-08-02 DIAGNOSIS — I50.32 CHRONIC DIASTOLIC CONGESTIVE HEART FAILURE (HCC): ICD-10-CM

## 2021-08-02 LAB
ALBUMIN SERPL BCP-MCNC: 3.6 G/DL (ref 3.5–5)
ALP SERPL-CCNC: 64 U/L (ref 46–116)
ALT SERPL W P-5'-P-CCNC: 30 U/L (ref 12–78)
ANION GAP SERPL CALCULATED.3IONS-SCNC: 8 MMOL/L (ref 4–13)
AST SERPL W P-5'-P-CCNC: 11 U/L (ref 5–45)
BILIRUB SERPL-MCNC: 0.71 MG/DL (ref 0.2–1)
BUN SERPL-MCNC: 40 MG/DL (ref 5–25)
CALCIUM SERPL-MCNC: 9.4 MG/DL (ref 8.3–10.1)
CHLORIDE SERPL-SCNC: 106 MMOL/L (ref 100–108)
CO2 SERPL-SCNC: 24 MMOL/L (ref 21–32)
CREAT SERPL-MCNC: 1.95 MG/DL (ref 0.6–1.3)
CREAT UR-MCNC: 110 MG/DL
ERYTHROCYTE [DISTWIDTH] IN BLOOD BY AUTOMATED COUNT: 14.4 % (ref 11.6–15.1)
EST. AVERAGE GLUCOSE BLD GHB EST-MCNC: 131 MG/DL
GFR SERPL CREATININE-BSD FRML MDRD: 30 ML/MIN/1.73SQ M
GLUCOSE P FAST SERPL-MCNC: 132 MG/DL (ref 65–99)
HBA1C MFR BLD: 6.2 %
HCT VFR BLD AUTO: 46.2 % (ref 36.5–49.3)
HGB BLD-MCNC: 14.7 G/DL (ref 12–17)
MCH RBC QN AUTO: 29.2 PG (ref 26.8–34.3)
MCHC RBC AUTO-ENTMCNC: 31.8 G/DL (ref 31.4–37.4)
MCV RBC AUTO: 92 FL (ref 82–98)
MICROALBUMIN UR-MCNC: 91.2 MG/L (ref 0–20)
MICROALBUMIN/CREAT 24H UR: 83 MG/G CREATININE (ref 0–30)
PLATELET # BLD AUTO: 235 THOUSANDS/UL (ref 149–390)
PMV BLD AUTO: 10 FL (ref 8.9–12.7)
POTASSIUM SERPL-SCNC: 5 MMOL/L (ref 3.5–5.3)
PROT SERPL-MCNC: 7.1 G/DL (ref 6.4–8.2)
RBC # BLD AUTO: 5.03 MILLION/UL (ref 3.88–5.62)
SODIUM SERPL-SCNC: 138 MMOL/L (ref 136–145)
WBC # BLD AUTO: 9.65 THOUSAND/UL (ref 4.31–10.16)

## 2021-08-02 PROCEDURE — 99214 OFFICE O/P EST MOD 30 MIN: CPT | Performed by: PHYSICIAN ASSISTANT

## 2021-08-02 PROCEDURE — 80053 COMPREHEN METABOLIC PANEL: CPT

## 2021-08-02 PROCEDURE — 82570 ASSAY OF URINE CREATININE: CPT | Performed by: INTERNAL MEDICINE

## 2021-08-02 PROCEDURE — 36415 COLL VENOUS BLD VENIPUNCTURE: CPT

## 2021-08-02 PROCEDURE — 82043 UR ALBUMIN QUANTITATIVE: CPT | Performed by: INTERNAL MEDICINE

## 2021-08-02 PROCEDURE — 83036 HEMOGLOBIN GLYCOSYLATED A1C: CPT

## 2021-08-02 PROCEDURE — 85027 COMPLETE CBC AUTOMATED: CPT

## 2021-08-02 NOTE — PROGRESS NOTES
Assessment/Plan:           Problem List Items Addressed This Visit        Endocrine    Type 2 diabetes mellitus with stage 3 chronic kidney disease, without long-term current use of insulin ([facility] ) - Primary    Relevant Orders    Ambulatory referral to Wound Care       Respiratory    Moderate COPD (chronic obstructive pulmonary disease) ([facility] )    Relevant Orders    Ambulatory referral to Wound Care      Other Visit Diagnoses     Open wound of left lower leg, initial encounter   Area cleaned with peroxide, Mupirocin placed on wound,   Then area bandaged  Patient can remove bandage   Tomorrow afternoon, and keep open for rest of day   Can cover on occasion or while sleeping-  F/u   With wound care  Relevant Orders    Ambulatory referral to Wound Care            Subjective:      Patient ID: Norbert Ashley is a 81 y o  male  C/o open wound, lower left leg since past Friday  No known injuries, or recent falls  Recently started prednisone for COPD sx  Has been more fatigued, and having more SOB  Sees pulmonary specialist          Review of Systems   Constitutional: Positive for fatigue  Negative for chills, diaphoresis and fever  Respiratory: Positive for chest tightness and shortness of breath  Negative for cough  Neurological: Negative for dizziness, light-headedness, numbness and headaches  Objective:      /78   Pulse 95   Resp 20   Ht 5' 11" (1 803 m)   Wt 134 kg (295 lb)   SpO2 95%   BMI 41 14 kg/m²          Physical Exam  Vitals and nursing note reviewed  Constitutional:       General: He is not in acute distress  Appearance: He is obese  He is not ill-appearing, toxic-appearing or diaphoretic  Pulmonary:      Effort: Pulmonary effort is normal       Comments: Gets SOB when walking around, with mask on; claims mask is causing worsening SOB  Skin:     Findings: Erythema present        Comments: Open wound noted left lower leg, with swelling of entire lower left ext  And left foot  No skin discoloration around open wound, and no abnormal discharge noted  Wound is about 4 cm in diameter, circular  Neurological:      Mental Status: He is alert

## 2021-08-03 ENCOUNTER — OFFICE VISIT (OUTPATIENT)
Dept: FAMILY MEDICINE CLINIC | Facility: HOSPITAL | Age: 86
End: 2021-08-03
Payer: COMMERCIAL

## 2021-08-03 VITALS
DIASTOLIC BLOOD PRESSURE: 78 MMHG | WEIGHT: 295 LBS | HEART RATE: 91 BPM | BODY MASS INDEX: 41.14 KG/M2 | SYSTOLIC BLOOD PRESSURE: 122 MMHG | OXYGEN SATURATION: 95 %

## 2021-08-03 DIAGNOSIS — I10 ESSENTIAL HYPERTENSION: ICD-10-CM

## 2021-08-03 DIAGNOSIS — S81.802D WOUND OF LEFT LOWER EXTREMITY, SUBSEQUENT ENCOUNTER: Primary | ICD-10-CM

## 2021-08-03 DIAGNOSIS — R60.0 LOCALIZED EDEMA: ICD-10-CM

## 2021-08-03 PROBLEM — S81.802A WOUND OF LEFT LEG: Status: ACTIVE | Noted: 2021-08-03

## 2021-08-03 PROCEDURE — 1160F RVW MEDS BY RX/DR IN RCRD: CPT | Performed by: INTERNAL MEDICINE

## 2021-08-03 PROCEDURE — 3074F SYST BP LT 130 MM HG: CPT | Performed by: INTERNAL MEDICINE

## 2021-08-03 PROCEDURE — 3078F DIAST BP <80 MM HG: CPT | Performed by: INTERNAL MEDICINE

## 2021-08-03 PROCEDURE — 1036F TOBACCO NON-USER: CPT | Performed by: INTERNAL MEDICINE

## 2021-08-03 PROCEDURE — 1101F PT FALLS ASSESS-DOCD LE1/YR: CPT | Performed by: INTERNAL MEDICINE

## 2021-08-03 PROCEDURE — 3288F FALL RISK ASSESSMENT DOCD: CPT | Performed by: INTERNAL MEDICINE

## 2021-08-03 PROCEDURE — 99214 OFFICE O/P EST MOD 30 MIN: CPT | Performed by: INTERNAL MEDICINE

## 2021-08-03 RX ORDER — VALSARTAN 320 MG/1
320 TABLET ORAL DAILY
Qty: 30 TABLET | Refills: 5 | Status: SHIPPED | OUTPATIENT
Start: 2021-08-03 | End: 2021-10-21 | Stop reason: ALTCHOICE

## 2021-08-03 NOTE — PROGRESS NOTES
Assessment/Plan:       Diagnoses and all orders for this visit:    Wound of left lower extremity, subsequent encounter  -     mupirocin (BACTROBAN) 2 % ointment; Apply topically daily    Localized edema    Essential hypertension  -     valsartan (DIOVAN) 320 MG tablet; Take 1 tablet (320 mg total) by mouth daily          All of the above diagnoses have been assessed  Additional COMMENTS/PLAN: will see back in 1 week       Subjective:      Patient ID: Ramon Choudhury is a 81 y o  male  HPI     Here to f/u on wound of LLE  Dx by my PA yesterday  The following portions of the patient's history were revised and updated as appropriate: Problem list, allergies, med list, FH, SH, Past medical and surgical histories  Current Outpatient Medications   Medication Sig Dispense Refill    albuterol (ProAir HFA) 90 mcg/act inhaler Inhale 2 puffs every 4 (four) hours as needed for wheezing 18 g 3    fluticasone (FLONASE) 50 mcg/act nasal spray 1 spray into each nostril 2 (two) times a day 15 8 mL 3    fluticasone-umeclidinium-vilanterol (TRELEGY) 100-62 5-25 MCG/INH inhaler Inhale 1 puff daily Rinse mouth after use   3 Inhaler 3    furosemide (LASIX) 80 mg tablet Take 1 tablet (80 mg total) by mouth daily 90 tablet 3    glimepiride (AMARYL) 2 mg tablet Take 1 tablet (2 mg total) by mouth daily with breakfast 30 tablet 5    labetalol (NORMODYNE) 300 mg tablet Take 1 tablet (300 mg total) by mouth 2 (two) times a day 180 tablet 3    metFORMIN (GLUCOPHAGE) 500 mg tablet Take 1 tablet (500 mg total) by mouth 2 (two) times a day with meals 180 tablet 3    potassium chloride (K-DUR,KLOR-CON) 20 mEq tablet Take 1 tablet (20 mEq total) by mouth daily 30 tablet 3    predniSONE 10 mg tablet Take 1 tablet (10 mg total) by mouth titrated Three daily for 3 days 2 daily for 3 days then 1 and half tabs daily 60 tablet 2    tamsulosin (FLOMAX) 0 4 mg Take 1 capsule (0 4 mg total) by mouth daily with dinner 30 capsule 11    mupirocin (BACTROBAN) 2 % ointment Apply topically daily 30 g 1    valsartan (DIOVAN) 320 MG tablet Take 1 tablet (320 mg total) by mouth daily 30 tablet 5     No current facility-administered medications for this visit  Review of Systems   All other systems reviewed and are negative  Objective:    /78   Pulse 91   Wt 134 kg (295 lb)   SpO2 95%   BMI 41 14 kg/m²     BP Readings from Last 3 Encounters:   08/03/21 122/78   08/02/21 148/78   07/15/21 120/78                  Wt Readings from Last 3 Encounters:   08/03/21 134 kg (295 lb)   08/02/21 134 kg (295 lb)   07/15/21 134 kg (294 lb 12 8 oz)         Physical Exam  Musculoskeletal:      Comments: There is +2 edema both lower extremities   Skin:     Comments: Examination of the left leg reveals approximately 4 cm blister that has been denuded  There is no evidence of cellulitis  Appointment on 08/02/2021   Component Date Value Ref Range Status    Hemoglobin A1C 08/02/2021 6 2* Normal 3 8-5 6%; PreDiabetic 5 7-6 4%;  Diabetic >=6 5%; Glycemic control for adults with diabetes <7 0% % Final    EAG 08/02/2021 131  mg/dl Final    WBC 08/02/2021 9 65  4 31 - 10 16 Thousand/uL Final    RBC 08/02/2021 5 03  3 88 - 5 62 Million/uL Final    Hemoglobin 08/02/2021 14 7  12 0 - 17 0 g/dL Final    Hematocrit 08/02/2021 46 2  36 5 - 49 3 % Final    MCV 08/02/2021 92  82 - 98 fL Final    MCH 08/02/2021 29 2  26 8 - 34 3 pg Final    MCHC 08/02/2021 31 8  31 4 - 37 4 g/dL Final    RDW 08/02/2021 14 4  11 6 - 15 1 % Final    Platelets 91/85/3442 235  149 - 390 Thousands/uL Final    MPV 08/02/2021 10 0  8 9 - 12 7 fL Final    Sodium 08/02/2021 138  136 - 145 mmol/L Final    Potassium 08/02/2021 5 0  3 5 - 5 3 mmol/L Final    Chloride 08/02/2021 106  100 - 108 mmol/L Final    CO2 08/02/2021 24  21 - 32 mmol/L Final    ANION GAP 08/02/2021 8  4 - 13 mmol/L Final    BUN 08/02/2021 40* 5 - 25 mg/dL Final    Creatinine 08/02/2021 1 95* 0 60 - 1 30 mg/dL Final    Standardized to IDMS reference method    Glucose, Fasting 08/02/2021 132* 65 - 99 mg/dL Final    Specimen collection should occur prior to Sulfasalazine administration due to the potential for falsely depressed results  Specimen collection should occur prior to Sulfapyridine administration due to the potential for falsely elevated results   Calcium 08/02/2021 9 4  8 3 - 10 1 mg/dL Final    AST 08/02/2021 11  5 - 45 U/L Final    Specimen collection should occur prior to Sulfasalazine administration due to the potential for falsely depressed results   ALT 08/02/2021 30  12 - 78 U/L Final    Specimen collection should occur prior to Sulfasalazine and/or Sulfapyridine administration due to the potential for falsely depressed results   Alkaline Phosphatase 08/02/2021 64  46 - 116 U/L Final    Total Protein 08/02/2021 7 1  6 4 - 8 2 g/dL Final    Albumin 08/02/2021 3 6  3 5 - 5 0 g/dL Final    Total Bilirubin 08/02/2021 0 71  0 20 - 1 00 mg/dL Final    Use of this assay is not recommended for patients undergoing treatment with eltrombopag due to the potential for falsely elevated results   eGFR 08/02/2021 30  ml/min/1 73sq m Final         Vinicius Edwards MD    Some or all of this note was generated with a voice recognition dictation system and therefore my contain grammatical or spelling errors

## 2021-08-12 ENCOUNTER — OFFICE VISIT (OUTPATIENT)
Dept: FAMILY MEDICINE CLINIC | Facility: HOSPITAL | Age: 86
End: 2021-08-12
Payer: COMMERCIAL

## 2021-08-12 VITALS
SYSTOLIC BLOOD PRESSURE: 138 MMHG | DIASTOLIC BLOOD PRESSURE: 78 MMHG | WEIGHT: 289 LBS | BODY MASS INDEX: 40.31 KG/M2 | HEART RATE: 101 BPM | OXYGEN SATURATION: 95 %

## 2021-08-12 DIAGNOSIS — S81.802D WOUND OF LEFT LOWER EXTREMITY, SUBSEQUENT ENCOUNTER: Primary | ICD-10-CM

## 2021-08-12 PROCEDURE — 99213 OFFICE O/P EST LOW 20 MIN: CPT | Performed by: INTERNAL MEDICINE

## 2021-08-12 PROCEDURE — 3725F SCREEN DEPRESSION PERFORMED: CPT | Performed by: INTERNAL MEDICINE

## 2021-08-12 NOTE — PROGRESS NOTES
Assessment/Plan:       Diagnoses and all orders for this visit:    Wound of left lower extremity, subsequent encounter          All of the above diagnoses have been assessed  Additional COMMENTS/PLAN:  Will try Vaseline gauze and reassess in 3 weeks  Subjective:      Patient ID: Bebe Palafox is a 81 y o  male  HPI     Here to f/u on wound  This is better  Has lost weight and less edema  The following portions of the patient's history were revised and updated as appropriate: Problem list, allergies, med list, FH, SH, Past medical and surgical histories  Current Outpatient Medications   Medication Sig Dispense Refill    albuterol (ProAir HFA) 90 mcg/act inhaler Inhale 2 puffs every 4 (four) hours as needed for wheezing 18 g 3    fluticasone (FLONASE) 50 mcg/act nasal spray 1 spray into each nostril 2 (two) times a day 15 8 mL 3    fluticasone-umeclidinium-vilanterol (TRELEGY) 100-62 5-25 MCG/INH inhaler Inhale 1 puff daily Rinse mouth after use   3 Inhaler 3    furosemide (LASIX) 80 mg tablet Take 1 tablet (80 mg total) by mouth daily 90 tablet 3    glimepiride (AMARYL) 2 mg tablet Take 1 tablet (2 mg total) by mouth daily with breakfast 30 tablet 5    labetalol (NORMODYNE) 300 mg tablet Take 1 tablet (300 mg total) by mouth 2 (two) times a day 180 tablet 3    metFORMIN (GLUCOPHAGE) 500 mg tablet Take 1 tablet (500 mg total) by mouth 2 (two) times a day with meals 180 tablet 3    mupirocin (BACTROBAN) 2 % ointment Apply topically daily 30 g 1    potassium chloride (K-DUR,KLOR-CON) 20 mEq tablet Take 1 tablet (20 mEq total) by mouth daily 30 tablet 3    predniSONE 10 mg tablet Take 1 tablet (10 mg total) by mouth titrated Three daily for 3 days 2 daily for 3 days then 1 and half tabs daily 60 tablet 2    tamsulosin (FLOMAX) 0 4 mg Take 1 capsule (0 4 mg total) by mouth daily with dinner 30 capsule 11    valsartan (DIOVAN) 320 MG tablet Take 1 tablet (320 mg total) by mouth daily 30 tablet 5     No current facility-administered medications for this visit  Review of Systems   All other systems reviewed and are negative  Objective:    /78   Pulse 101   Wt 131 kg (289 lb)   SpO2 95%   BMI 40 31 kg/m²     BP Readings from Last 3 Encounters:   08/12/21 138/78   08/03/21 122/78   08/02/21 148/78                  Wt Readings from Last 3 Encounters:   08/12/21 131 kg (289 lb)   08/03/21 134 kg (295 lb)   08/02/21 134 kg (295 lb)         Physical Exam  Skin:     Comments: Examination of the wound in left medial ankle shows it to be clean on sagittal is no evidence of infection  There is some granulation around the periphery of the ulcer  Is still about 2 and half by 3 cm  Appointment on 08/02/2021   Component Date Value Ref Range Status    Hemoglobin A1C 08/02/2021 6 2* Normal 3 8-5 6%; PreDiabetic 5 7-6 4%;  Diabetic >=6 5%; Glycemic control for adults with diabetes <7 0% % Final    EAG 08/02/2021 131  mg/dl Final    WBC 08/02/2021 9 65  4 31 - 10 16 Thousand/uL Final    RBC 08/02/2021 5 03  3 88 - 5 62 Million/uL Final    Hemoglobin 08/02/2021 14 7  12 0 - 17 0 g/dL Final    Hematocrit 08/02/2021 46 2  36 5 - 49 3 % Final    MCV 08/02/2021 92  82 - 98 fL Final    MCH 08/02/2021 29 2  26 8 - 34 3 pg Final    MCHC 08/02/2021 31 8  31 4 - 37 4 g/dL Final    RDW 08/02/2021 14 4  11 6 - 15 1 % Final    Platelets 90/88/6459 235  149 - 390 Thousands/uL Final    MPV 08/02/2021 10 0  8 9 - 12 7 fL Final    Sodium 08/02/2021 138  136 - 145 mmol/L Final    Potassium 08/02/2021 5 0  3 5 - 5 3 mmol/L Final    Chloride 08/02/2021 106  100 - 108 mmol/L Final    CO2 08/02/2021 24  21 - 32 mmol/L Final    ANION GAP 08/02/2021 8  4 - 13 mmol/L Final    BUN 08/02/2021 40* 5 - 25 mg/dL Final    Creatinine 08/02/2021 1 95* 0 60 - 1 30 mg/dL Final    Standardized to IDMS reference method    Glucose, Fasting 08/02/2021 132* 65 - 99 mg/dL Final    Specimen collection should occur prior to Sulfasalazine administration due to the potential for falsely depressed results  Specimen collection should occur prior to Sulfapyridine administration due to the potential for falsely elevated results   Calcium 08/02/2021 9 4  8 3 - 10 1 mg/dL Final    AST 08/02/2021 11  5 - 45 U/L Final    Specimen collection should occur prior to Sulfasalazine administration due to the potential for falsely depressed results   ALT 08/02/2021 30  12 - 78 U/L Final    Specimen collection should occur prior to Sulfasalazine and/or Sulfapyridine administration due to the potential for falsely depressed results   Alkaline Phosphatase 08/02/2021 64  46 - 116 U/L Final    Total Protein 08/02/2021 7 1  6 4 - 8 2 g/dL Final    Albumin 08/02/2021 3 6  3 5 - 5 0 g/dL Final    Total Bilirubin 08/02/2021 0 71  0 20 - 1 00 mg/dL Final    Use of this assay is not recommended for patients undergoing treatment with eltrombopag due to the potential for falsely elevated results   eGFR 08/02/2021 30  ml/min/1 73sq m Final         Radha Beasley MD    Some or all of this note was generated with a voice recognition dictation system and therefore my contain grammatical or spelling errors

## 2021-08-24 ENCOUNTER — OFFICE VISIT (OUTPATIENT)
Dept: PULMONOLOGY | Facility: CLINIC | Age: 86
End: 2021-08-24
Payer: COMMERCIAL

## 2021-08-24 VITALS
WEIGHT: 289 LBS | HEART RATE: 87 BPM | HEIGHT: 71 IN | TEMPERATURE: 97.4 F | BODY MASS INDEX: 40.46 KG/M2 | OXYGEN SATURATION: 97 % | DIASTOLIC BLOOD PRESSURE: 70 MMHG | SYSTOLIC BLOOD PRESSURE: 154 MMHG

## 2021-08-24 DIAGNOSIS — T48.5X5A RHINITIS MEDICAMENTOSA: ICD-10-CM

## 2021-08-24 DIAGNOSIS — J43.9 PULMONARY EMPHYSEMA, UNSPECIFIED EMPHYSEMA TYPE (HCC): Primary | ICD-10-CM

## 2021-08-24 DIAGNOSIS — J31.0 RHINITIS MEDICAMENTOSA: ICD-10-CM

## 2021-08-24 DIAGNOSIS — J44.9 MODERATE COPD (CHRONIC OBSTRUCTIVE PULMONARY DISEASE) (HCC): ICD-10-CM

## 2021-08-24 DIAGNOSIS — I50.32 CHRONIC DIASTOLIC CONGESTIVE HEART FAILURE (HCC): ICD-10-CM

## 2021-08-24 PROCEDURE — 99215 OFFICE O/P EST HI 40 MIN: CPT | Performed by: INTERNAL MEDICINE

## 2021-08-24 RX ORDER — FLUTICASONE FUROATE, UMECLIDINIUM BROMIDE AND VILANTEROL TRIFENATATE 200; 62.5; 25 UG/1; UG/1; UG/1
1 POWDER RESPIRATORY (INHALATION) DAILY
Qty: 60 BLISTER | Refills: 5 | Status: SHIPPED | OUTPATIENT
Start: 2021-08-24 | End: 2021-10-19 | Stop reason: SDUPTHER

## 2021-08-24 NOTE — ASSESSMENT & PLAN NOTE
Mr Tapan Kaba is back to his baseline after his deescalation from Afrin  Unfortunately he still suffers from some significant chronic shortness of breath  This is likely multifactorial but could be related to some inadequately treated COPD  I escalated his trilogy to the 200 dose to take once daily  Unfortunately he is decompensated with regards to his heart failure but has chronic kidney disease at precludes increasing his diuretic  His primary care physician manages his kidney disease and chronic heart failure

## 2021-08-24 NOTE — PROGRESS NOTES
Assessment/Plan: Moderate COPD (chronic obstructive pulmonary disease) ([facility] [address] )    Mr Raul Quezada is back to his baseline after his deescalation from Afrin  Unfortunately he still suffers from some significant chronic shortness of breath  This is likely multifactorial but could be related to some inadequately treated COPD  I escalated his trilogy to the 200 dose to take once daily  Unfortunately he is decompensated with regards to his heart failure but has chronic kidney disease at precludes increasing his diuretic  His primary care physician manages his kidney disease and chronic heart failure  Chronic diastolic congestive heart failure (HCC)  Wt Readings from Last 3 Encounters:   08/24/21 131 kg (289 lb)   08/12/21 131 kg (289 lb)   08/03/21 134 kg (295 lb)         Lung sounds are clear most recent chest x-ray was normal, patient has increased lower extremity swelling maintained on Lasix  Rhinitis medicamentosa    I asked Mr Raul Quezada to escalate his fluticasone to 2 sprays in each nostril twice daily  Diagnoses and all orders for this visit:    Pulmonary emphysema, unspecified emphysema type ([facility] [address] )  -     fluticasone-umeclidinium-vilanterol (Trelegy Ellipta) 200-62 5-25 MCG/INH AEPB inhaler; Inhale 1 puff daily Rinse mouth after use  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)    Chronic diastolic congestive heart failure (HCC)    Rhinitis medicamentosa          Subjective:      Patient ID: Regla Ellington is a 81 y o  male  Mr Raul Quezada is having some increased shortness of breath over the past several months  He feels short of breath when he walks  He is using his trilogy 1 puff daily and not needing to use his rescue inhaler nebulizer  He has lower extremity swelling and nasal congestion        The following portions of the patient's history were reviewed and updated as appropriate: allergies, current medications, past family history, past medical history, past social history, past surgical history and problem list     Review of Systems   Constitutional: Negative  HENT: Negative  Eyes: Negative  Respiratory: Negative for shortness of breath  Cardiovascular: Negative  Gastrointestinal: Negative  Endocrine: Negative  Genitourinary: Negative  Allergic/Immunologic: Negative  Neurological: Negative  Hematological: Negative  Psychiatric/Behavioral: Negative  Objective:      /70 (BP Location: Left arm, Patient Position: Sitting)   Pulse 87   Temp (!) 97 4 °F (36 3 °C)   Ht 5' 11" (1 803 m)   Wt 131 kg (289 lb)   SpO2 97%   BMI 40 31 kg/m²          Physical Exam  Constitutional:       Appearance: He is well-developed  HENT:      Head: Normocephalic  Eyes:      Pupils: Pupils are equal, round, and reactive to light  Cardiovascular:      Rate and Rhythm: Normal rate  Pulmonary:      Effort: Pulmonary effort is normal  No respiratory distress  Breath sounds: No wheezing or rales  Abdominal:      Palpations: Abdomen is soft  Musculoskeletal:         General: Normal range of motion  Cervical back: Neck supple  Skin:     General: Skin is warm and dry  Neurological:      Mental Status: He is alert and oriented to person, place, and time

## 2021-08-24 NOTE — PATIENT INSTRUCTIONS
Increase the Trelegy to 200mg one puff once a day  Increase fluticasone nasal spray to 2 sprays in each nostril twice a day

## 2021-08-24 NOTE — ASSESSMENT & PLAN NOTE
Wt Readings from Last 3 Encounters:   08/24/21 131 kg (289 lb)   08/12/21 131 kg (289 lb)   08/03/21 134 kg (295 lb)         Lung sounds are clear most recent chest x-ray was normal, patient has increased lower extremity swelling maintained on Lasix

## 2021-09-02 ENCOUNTER — OFFICE VISIT (OUTPATIENT)
Dept: FAMILY MEDICINE CLINIC | Facility: HOSPITAL | Age: 86
End: 2021-09-02
Payer: COMMERCIAL

## 2021-09-02 VITALS
HEART RATE: 96 BPM | BODY MASS INDEX: 41.56 KG/M2 | OXYGEN SATURATION: 96 % | WEIGHT: 298 LBS | DIASTOLIC BLOOD PRESSURE: 78 MMHG | SYSTOLIC BLOOD PRESSURE: 122 MMHG

## 2021-09-02 DIAGNOSIS — E11.22 TYPE 2 DIABETES MELLITUS WITH STAGE 3 CHRONIC KIDNEY DISEASE, WITHOUT LONG-TERM CURRENT USE OF INSULIN, UNSPECIFIED WHETHER STAGE 3A OR 3B CKD (HCC): Primary | ICD-10-CM

## 2021-09-02 DIAGNOSIS — J41.0 SIMPLE CHRONIC BRONCHITIS (HCC): ICD-10-CM

## 2021-09-02 DIAGNOSIS — I50.32 CHRONIC DIASTOLIC CONGESTIVE HEART FAILURE (HCC): ICD-10-CM

## 2021-09-02 DIAGNOSIS — J31.0 RHINITIS MEDICAMENTOSA: ICD-10-CM

## 2021-09-02 DIAGNOSIS — I10 ESSENTIAL HYPERTENSION: ICD-10-CM

## 2021-09-02 DIAGNOSIS — T48.5X5A RHINITIS MEDICAMENTOSA: ICD-10-CM

## 2021-09-02 DIAGNOSIS — N18.30 TYPE 2 DIABETES MELLITUS WITH STAGE 3 CHRONIC KIDNEY DISEASE, WITHOUT LONG-TERM CURRENT USE OF INSULIN, UNSPECIFIED WHETHER STAGE 3A OR 3B CKD (HCC): Primary | ICD-10-CM

## 2021-09-02 PROCEDURE — 99214 OFFICE O/P EST MOD 30 MIN: CPT | Performed by: INTERNAL MEDICINE

## 2021-09-02 PROCEDURE — 1160F RVW MEDS BY RX/DR IN RCRD: CPT | Performed by: INTERNAL MEDICINE

## 2021-09-02 PROCEDURE — 1036F TOBACCO NON-USER: CPT | Performed by: INTERNAL MEDICINE

## 2021-09-02 PROCEDURE — 3074F SYST BP LT 130 MM HG: CPT | Performed by: INTERNAL MEDICINE

## 2021-09-02 PROCEDURE — 3078F DIAST BP <80 MM HG: CPT | Performed by: INTERNAL MEDICINE

## 2021-09-02 RX ORDER — PREDNISONE 2.5 MG
2.5 TABLET ORAL
Qty: 60 TABLET | Refills: 3 | Status: SHIPPED | OUTPATIENT
Start: 2021-09-02 | End: 2021-10-15

## 2021-09-02 RX ORDER — FLUTICASONE PROPIONATE 50 MCG
2 SPRAY, SUSPENSION (ML) NASAL 2 TIMES DAILY
Qty: 15.8 ML | Refills: 3 | Status: SHIPPED | OUTPATIENT
Start: 2021-09-02 | End: 2021-09-13 | Stop reason: SDUPTHER

## 2021-09-02 NOTE — PROGRESS NOTES
Assessment/Plan:       Diagnoses and all orders for this visit:    Type 2 diabetes mellitus with stage 3 chronic kidney disease, without long-term current use of insulin, unspecified whether stage 3a or 3b CKD (HCC)    Rhinitis medicamentosa  -     fluticasone (FLONASE) 50 mcg/act nasal spray; 2 sprays into each nostril 2 (two) times a day    Essential hypertension    Chronic diastolic congestive heart failure (HCC)    Simple chronic bronchitis (HCC)  -     predniSONE 2 5 mg tablet; Take 1 tablet (2 5 mg total) by mouth titrated          All of the above diagnoses have been assessed  Additional COMMENTS/PLAN:  Patient has appointment in October will discuss boosters shot at that time  He will be on 5 mg of prednisone at that time will decide what to do from there  Subjective:      Patient ID: Vargas Rodgers is a 81 y o  male  HPI     COPD-this is better  Saw pulm and inhaler increased  Still 10mg daily of prednisone  HTN-compliant with meds and salt restriction  SKin tear-this resolved  DM-this is stable  Congestive Heart Failure  Patient presents for re-evaluation of congestive heart failure  The patient is compliant with dietary restriction of fluids and sodium  There is self-monitoring of weight  The patient denies significant dyspnea, orthopnea or edema  The following portions of the patient's history were revised and updated as appropriate: Problem list, allergies, med list, FH, SH, Past medical and surgical histories  Current Outpatient Medications   Medication Sig Dispense Refill    albuterol (ProAir HFA) 90 mcg/act inhaler Inhale 2 puffs every 4 (four) hours as needed for wheezing 18 g 3    fluticasone (FLONASE) 50 mcg/act nasal spray 2 sprays into each nostril 2 (two) times a day 15 8 mL 3    fluticasone-umeclidinium-vilanterol (Trelegy Ellipta) 200-62 5-25 MCG/INH AEPB inhaler Inhale 1 puff daily Rinse mouth after use   60 blister 5    furosemide (LASIX) 80 mg tablet Take 1 tablet (80 mg total) by mouth daily 90 tablet 3    glimepiride (AMARYL) 2 mg tablet Take 1 tablet (2 mg total) by mouth daily with breakfast 30 tablet 5    labetalol (NORMODYNE) 300 mg tablet Take 1 tablet (300 mg total) by mouth 2 (two) times a day 180 tablet 3    metFORMIN (GLUCOPHAGE) 500 mg tablet Take 1 tablet (500 mg total) by mouth 2 (two) times a day with meals 180 tablet 3    potassium chloride (K-DUR,KLOR-CON) 20 mEq tablet Take 1 tablet (20 mEq total) by mouth daily 30 tablet 3    predniSONE 10 mg tablet Take 1 tablet (10 mg total) by mouth titrated Three daily for 3 days 2 daily for 3 days then 1 and half tabs daily 60 tablet 2    tamsulosin (FLOMAX) 0 4 mg Take 1 capsule (0 4 mg total) by mouth daily with dinner 30 capsule 11    valsartan (DIOVAN) 320 MG tablet Take 1 tablet (320 mg total) by mouth daily 30 tablet 5    mupirocin (BACTROBAN) 2 % ointment Apply topically daily (Patient not taking: Reported on 9/2/2021) 30 g 1    predniSONE 2 5 mg tablet Take 1 tablet (2 5 mg total) by mouth titrated 60 tablet 3     No current facility-administered medications for this visit  Review of Systems   All other systems reviewed and are negative  Objective:    /78   Pulse 96   Wt 135 kg (298 lb)   SpO2 96%   BMI 41 56 kg/m²     BP Readings from Last 3 Encounters:   09/02/21 122/78   08/24/21 154/70   08/12/21 138/78                  Wt Readings from Last 3 Encounters:   09/02/21 135 kg (298 lb)   08/24/21 131 kg (289 lb)   08/12/21 131 kg (289 lb)         Physical Exam  Vitals reviewed  Constitutional:       Appearance: He is obese  Cardiovascular:      Rate and Rhythm: Normal rate and regular rhythm  Pulmonary:      Effort: Pulmonary effort is normal       Comments: Decreased breath sounds no wheezes  Abdominal:      General: Abdomen is flat  Bowel sounds are normal       Palpations: Abdomen is soft     Musculoskeletal:      Comments: Plus two   Neurological: Mental Status: He is alert  No visits with results within 2 Week(s) from this visit  Latest known visit with results is:   Appointment on 08/02/2021   Component Date Value Ref Range Status    Hemoglobin A1C 08/02/2021 6 2* Normal 3 8-5 6%; PreDiabetic 5 7-6 4%; Diabetic >=6 5%; Glycemic control for adults with diabetes <7 0% % Final    EAG 08/02/2021 131  mg/dl Final    WBC 08/02/2021 9 65  4 31 - 10 16 Thousand/uL Final    RBC 08/02/2021 5 03  3 88 - 5 62 Million/uL Final    Hemoglobin 08/02/2021 14 7  12 0 - 17 0 g/dL Final    Hematocrit 08/02/2021 46 2  36 5 - 49 3 % Final    MCV 08/02/2021 92  82 - 98 fL Final    MCH 08/02/2021 29 2  26 8 - 34 3 pg Final    MCHC 08/02/2021 31 8  31 4 - 37 4 g/dL Final    RDW 08/02/2021 14 4  11 6 - 15 1 % Final    Platelets 64/93/2879 235  149 - 390 Thousands/uL Final    MPV 08/02/2021 10 0  8 9 - 12 7 fL Final    Sodium 08/02/2021 138  136 - 145 mmol/L Final    Potassium 08/02/2021 5 0  3 5 - 5 3 mmol/L Final    Chloride 08/02/2021 106  100 - 108 mmol/L Final    CO2 08/02/2021 24  21 - 32 mmol/L Final    ANION GAP 08/02/2021 8  4 - 13 mmol/L Final    BUN 08/02/2021 40* 5 - 25 mg/dL Final    Creatinine 08/02/2021 1 95* 0 60 - 1 30 mg/dL Final    Standardized to IDMS reference method    Glucose, Fasting 08/02/2021 132* 65 - 99 mg/dL Final    Specimen collection should occur prior to Sulfasalazine administration due to the potential for falsely depressed results  Specimen collection should occur prior to Sulfapyridine administration due to the potential for falsely elevated results   Calcium 08/02/2021 9 4  8 3 - 10 1 mg/dL Final    AST 08/02/2021 11  5 - 45 U/L Final    Specimen collection should occur prior to Sulfasalazine administration due to the potential for falsely depressed results       ALT 08/02/2021 30  12 - 78 U/L Final    Specimen collection should occur prior to Sulfasalazine and/or Sulfapyridine administration due to the potential for falsely depressed results   Alkaline Phosphatase 08/02/2021 64  46 - 116 U/L Final    Total Protein 08/02/2021 7 1  6 4 - 8 2 g/dL Final    Albumin 08/02/2021 3 6  3 5 - 5 0 g/dL Final    Total Bilirubin 08/02/2021 0 71  0 20 - 1 00 mg/dL Final    Use of this assay is not recommended for patients undergoing treatment with eltrombopag due to the potential for falsely elevated results   eGFR 08/02/2021 30  ml/min/1 73sq m Final         Danyelle Gonzalez MD    Some or all of this note was generated with a voice recognition dictation system and therefore my contain grammatical or spelling errors

## 2021-09-02 NOTE — PATIENT INSTRUCTIONS
Stay on 80 mg Lasix daily  If your weight goes up taken extra half a tab for 3 days  Prednisone 10 mg daily for 3 weeks  Then 7 5 mg daily for 4 weeks then 5 mg daily and hold there

## 2021-09-07 DIAGNOSIS — I50.33 ACUTE ON CHRONIC DIASTOLIC (CONGESTIVE) HEART FAILURE (HCC): ICD-10-CM

## 2021-09-07 RX ORDER — POTASSIUM CHLORIDE 20 MEQ/1
20 TABLET, EXTENDED RELEASE ORAL DAILY
Qty: 30 TABLET | Refills: 5 | Status: SHIPPED | OUTPATIENT
Start: 2021-09-07 | End: 2022-03-28 | Stop reason: SDUPTHER

## 2021-09-13 DIAGNOSIS — T48.5X5A RHINITIS MEDICAMENTOSA: ICD-10-CM

## 2021-09-13 DIAGNOSIS — J31.0 RHINITIS MEDICAMENTOSA: ICD-10-CM

## 2021-09-13 RX ORDER — FLUTICASONE PROPIONATE 50 MCG
2 SPRAY, SUSPENSION (ML) NASAL 2 TIMES DAILY
Qty: 15.8 ML | Refills: 3 | Status: SHIPPED | OUTPATIENT
Start: 2021-09-13 | End: 2021-09-23 | Stop reason: SDUPTHER

## 2021-09-20 DIAGNOSIS — J41.0 SIMPLE CHRONIC BRONCHITIS (HCC): ICD-10-CM

## 2021-09-20 DIAGNOSIS — J44.9 MODERATE COPD (CHRONIC OBSTRUCTIVE PULMONARY DISEASE) (HCC): ICD-10-CM

## 2021-09-21 RX ORDER — PREDNISONE 10 MG/1
10 TABLET ORAL DAILY
Qty: 60 TABLET | Refills: 2 | Status: SHIPPED | OUTPATIENT
Start: 2021-09-21 | End: 2021-10-15

## 2021-09-23 DIAGNOSIS — J31.0 RHINITIS MEDICAMENTOSA: ICD-10-CM

## 2021-09-23 DIAGNOSIS — T48.5X5A RHINITIS MEDICAMENTOSA: ICD-10-CM

## 2021-09-23 RX ORDER — FLUTICASONE PROPIONATE 50 MCG
2 SPRAY, SUSPENSION (ML) NASAL 2 TIMES DAILY
Qty: 15.8 ML | Refills: 3 | Status: ON HOLD | OUTPATIENT
Start: 2021-09-23 | End: 2022-05-14

## 2021-09-29 ENCOUNTER — OFFICE VISIT (OUTPATIENT)
Dept: FAMILY MEDICINE CLINIC | Facility: HOSPITAL | Age: 86
End: 2021-09-29
Payer: COMMERCIAL

## 2021-09-29 VITALS
HEART RATE: 92 BPM | BODY MASS INDEX: 41.86 KG/M2 | WEIGHT: 299 LBS | SYSTOLIC BLOOD PRESSURE: 152 MMHG | OXYGEN SATURATION: 97 % | DIASTOLIC BLOOD PRESSURE: 72 MMHG | HEIGHT: 71 IN

## 2021-09-29 DIAGNOSIS — I50.32 CHRONIC DIASTOLIC CONGESTIVE HEART FAILURE (HCC): ICD-10-CM

## 2021-09-29 DIAGNOSIS — R23.8 BULLOUS LESION: ICD-10-CM

## 2021-09-29 DIAGNOSIS — E66.01 OBESITY, MORBID (HCC): ICD-10-CM

## 2021-09-29 DIAGNOSIS — R60.0 LOCALIZED EDEMA: Primary | ICD-10-CM

## 2021-09-29 PROCEDURE — 1036F TOBACCO NON-USER: CPT | Performed by: INTERNAL MEDICINE

## 2021-09-29 PROCEDURE — 3077F SYST BP >= 140 MM HG: CPT | Performed by: INTERNAL MEDICINE

## 2021-09-29 PROCEDURE — 1160F RVW MEDS BY RX/DR IN RCRD: CPT | Performed by: INTERNAL MEDICINE

## 2021-09-29 PROCEDURE — 3078F DIAST BP <80 MM HG: CPT | Performed by: INTERNAL MEDICINE

## 2021-09-29 PROCEDURE — 99214 OFFICE O/P EST MOD 30 MIN: CPT | Performed by: INTERNAL MEDICINE

## 2021-09-29 RX ORDER — FUROSEMIDE 20 MG/1
TABLET ORAL
Qty: 30 TABLET | Refills: 1 | Status: SHIPPED | OUTPATIENT
Start: 2021-09-29 | End: 2021-10-15

## 2021-09-29 NOTE — PATIENT INSTRUCTIONS
Taper off prednisone and stop if able  Taper from 7 5 mg to 5 mg daily for 2 weeks, 2 5 mg daily for 2 weeks  Strictly no salt diet, choose low sodium foods  Do not add any salt to meals or to cooking  Can increase lasix by an extra 20 mg dose for 2 or 3 days when legs are swollen  Then resume usual 80 mg dose  Use compression socks to wear daily  And try to pump the muscles of the legs at least twice daily     If able, elevate legs each evening

## 2021-09-29 NOTE — PROGRESS NOTES
Subjective:   Chief Complaint   Patient presents with    Leg Swelling     PT has swelling and woke this morning with a half dollar water blister on his lower leg  Patient ID: Aj Carbone is a 81 y o  male  Presents for swelling of legs, L>R and recurrent bullous lesions which occur frequently  Note that patient has multiple comorbidities, has obesity and is sedentary in his lifestyle  There is no sign of active infection  The following portions of the patient's history were reviewed and updated as appropriate: allergies, current medications, past family history, past medical history, past social history, past surgical history and problem list     Review of Systems   Constitutional: Positive for fatigue  Cardiovascular: Positive for leg swelling  Musculoskeletal: Positive for arthralgias and back pain  Skin:        Blister like lesions on posterior shin left leg   Neurological: Positive for weakness  Hematological: Negative for adenopathy  Current Outpatient Medications on File Prior to Visit   Medication Sig Dispense Refill    albuterol (ProAir HFA) 90 mcg/act inhaler Inhale 2 puffs every 4 (four) hours as needed for wheezing 18 g 3    fluticasone (FLONASE) 50 mcg/act nasal spray 2 sprays into each nostril 2 (two) times a day 15 8 mL 3    fluticasone-umeclidinium-vilanterol (Trelegy Ellipta) 200-62 5-25 MCG/INH AEPB inhaler Inhale 1 puff daily Rinse mouth after use   60 blister 5    furosemide (LASIX) 80 mg tablet Take 1 tablet (80 mg total) by mouth daily 90 tablet 3    glimepiride (AMARYL) 2 mg tablet Take 1 tablet (2 mg total) by mouth daily with breakfast 30 tablet 5    labetalol (NORMODYNE) 300 mg tablet Take 1 tablet (300 mg total) by mouth 2 (two) times a day 180 tablet 3    metFORMIN (GLUCOPHAGE) 500 mg tablet Take 1 tablet (500 mg total) by mouth 2 (two) times a day with meals 180 tablet 3    potassium chloride (K-DUR,KLOR-CON) 20 mEq tablet Take 1 tablet (20 mEq total) by mouth daily 30 tablet 5    predniSONE 10 mg tablet Take 1 tablet (10 mg total) by mouth daily Or as directed 60 tablet 2    predniSONE 2 5 mg tablet Take 1 tablet (2 5 mg total) by mouth titrated 60 tablet 3    tamsulosin (FLOMAX) 0 4 mg Take 1 capsule (0 4 mg total) by mouth daily with dinner 30 capsule 11    valsartan (DIOVAN) 320 MG tablet Take 1 tablet (320 mg total) by mouth daily 30 tablet 5    mupirocin (BACTROBAN) 2 % ointment Apply topically daily (Patient not taking: Reported on 9/2/2021) 30 g 1     No current facility-administered medications on file prior to visit  Objective:  Vitals:    09/29/21 1114   BP: 152/72   Pulse: 92   SpO2: 97%   Weight: 136 kg (299 lb)   Height: 5' 11" (1 803 m)      Physical Exam  Constitutional:       General: He is not in acute distress  HENT:      Head: Normocephalic  Cardiovascular:      Rate and Rhythm: Normal rate and regular rhythm  Pulmonary:      Effort: Pulmonary effort is normal       Breath sounds: Normal breath sounds  Musculoskeletal:         General: Normal range of motion  Right lower leg: Edema present  Left lower leg: Edema present  Skin:     Findings: Lesion (sizeable blister like lesion on left posterior calf, no erythema or pus) present  No rash  Neurological:      Mental Status: He is alert  Assessment/Plan:    Obesity, morbid (HCC)  BMI is 41 and weight, obstructive compression of arteries in veins from this, sedentary lifestyle all contribute to edema and blisters of LEs  Bullous lesion  No infection present  Recurrent problem likely due to weight, poor diet and CHF, sedentary lifestyle, vascular insuffiency and aging    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

## 2021-10-15 ENCOUNTER — LAB (OUTPATIENT)
Dept: LAB | Facility: HOSPITAL | Age: 86
End: 2021-10-15
Attending: INTERNAL MEDICINE
Payer: COMMERCIAL

## 2021-10-15 ENCOUNTER — TELEPHONE (OUTPATIENT)
Dept: FAMILY MEDICINE CLINIC | Facility: HOSPITAL | Age: 86
End: 2021-10-15

## 2021-10-15 ENCOUNTER — OFFICE VISIT (OUTPATIENT)
Dept: FAMILY MEDICINE CLINIC | Facility: HOSPITAL | Age: 86
End: 2021-10-15
Payer: COMMERCIAL

## 2021-10-15 VITALS
HEIGHT: 71 IN | BODY MASS INDEX: 41.07 KG/M2 | DIASTOLIC BLOOD PRESSURE: 60 MMHG | HEART RATE: 88 BPM | WEIGHT: 293.4 LBS | SYSTOLIC BLOOD PRESSURE: 100 MMHG | OXYGEN SATURATION: 93 %

## 2021-10-15 DIAGNOSIS — R60.0 LOCALIZED EDEMA: ICD-10-CM

## 2021-10-15 DIAGNOSIS — R23.8 BULLOUS LESION: ICD-10-CM

## 2021-10-15 DIAGNOSIS — J41.0 SIMPLE CHRONIC BRONCHITIS (HCC): ICD-10-CM

## 2021-10-15 DIAGNOSIS — Z23 NEED FOR VACCINATION: Primary | ICD-10-CM

## 2021-10-15 DIAGNOSIS — I50.32 CHRONIC DIASTOLIC CONGESTIVE HEART FAILURE (HCC): ICD-10-CM

## 2021-10-15 DIAGNOSIS — J44.9 MODERATE COPD (CHRONIC OBSTRUCTIVE PULMONARY DISEASE) (HCC): ICD-10-CM

## 2021-10-15 LAB
ANION GAP SERPL CALCULATED.3IONS-SCNC: 10 MMOL/L (ref 4–13)
BUN SERPL-MCNC: 49 MG/DL (ref 5–25)
CALCIUM SERPL-MCNC: 8.7 MG/DL (ref 8.3–10.1)
CHLORIDE SERPL-SCNC: 106 MMOL/L (ref 100–108)
CO2 SERPL-SCNC: 24 MMOL/L (ref 21–32)
CREAT SERPL-MCNC: 2.24 MG/DL (ref 0.6–1.3)
GFR SERPL CREATININE-BSD FRML MDRD: 26 ML/MIN/1.73SQ M
GLUCOSE P FAST SERPL-MCNC: 104 MG/DL (ref 65–99)
POTASSIUM SERPL-SCNC: 4.7 MMOL/L (ref 3.5–5.3)
SODIUM SERPL-SCNC: 140 MMOL/L (ref 136–145)

## 2021-10-15 PROCEDURE — 80048 BASIC METABOLIC PNL TOTAL CA: CPT

## 2021-10-15 PROCEDURE — 3074F SYST BP LT 130 MM HG: CPT | Performed by: INTERNAL MEDICINE

## 2021-10-15 PROCEDURE — 90662 IIV NO PRSV INCREASED AG IM: CPT

## 2021-10-15 PROCEDURE — 3078F DIAST BP <80 MM HG: CPT | Performed by: INTERNAL MEDICINE

## 2021-10-15 PROCEDURE — 99214 OFFICE O/P EST MOD 30 MIN: CPT | Performed by: INTERNAL MEDICINE

## 2021-10-15 PROCEDURE — 36415 COLL VENOUS BLD VENIPUNCTURE: CPT

## 2021-10-15 PROCEDURE — G0008 ADMIN INFLUENZA VIRUS VAC: HCPCS

## 2021-10-15 RX ORDER — PREDNISONE 10 MG/1
10 TABLET ORAL DAILY
Qty: 90 TABLET | Refills: 2 | Status: SHIPPED | OUTPATIENT
Start: 2021-10-15 | End: 2022-05-15

## 2021-10-19 ENCOUNTER — LAB (OUTPATIENT)
Dept: LAB | Facility: HOSPITAL | Age: 86
DRG: 291 | End: 2021-10-19
Attending: INTERNAL MEDICINE
Payer: COMMERCIAL

## 2021-10-19 DIAGNOSIS — J43.9 PULMONARY EMPHYSEMA, UNSPECIFIED EMPHYSEMA TYPE (HCC): ICD-10-CM

## 2021-10-19 DIAGNOSIS — I10 ESSENTIAL HYPERTENSION: ICD-10-CM

## 2021-10-19 DIAGNOSIS — I10 ESSENTIAL HYPERTENSION: Primary | ICD-10-CM

## 2021-10-19 LAB
ANION GAP SERPL CALCULATED.3IONS-SCNC: 6 MMOL/L (ref 4–13)
BUN SERPL-MCNC: 50 MG/DL (ref 5–25)
CALCIUM SERPL-MCNC: 9.6 MG/DL (ref 8.3–10.1)
CHLORIDE SERPL-SCNC: 109 MMOL/L (ref 100–108)
CO2 SERPL-SCNC: 25 MMOL/L (ref 21–32)
CREAT SERPL-MCNC: 2.4 MG/DL (ref 0.6–1.3)
GFR SERPL CREATININE-BSD FRML MDRD: 24 ML/MIN/1.73SQ M
GLUCOSE SERPL-MCNC: 108 MG/DL (ref 65–140)
POTASSIUM SERPL-SCNC: 4.6 MMOL/L (ref 3.5–5.3)
SODIUM SERPL-SCNC: 140 MMOL/L (ref 136–145)

## 2021-10-19 PROCEDURE — 36415 COLL VENOUS BLD VENIPUNCTURE: CPT

## 2021-10-19 PROCEDURE — 80048 BASIC METABOLIC PNL TOTAL CA: CPT

## 2021-10-19 RX ORDER — FLUTICASONE FUROATE, UMECLIDINIUM BROMIDE AND VILANTEROL TRIFENATATE 200; 62.5; 25 UG/1; UG/1; UG/1
1 POWDER RESPIRATORY (INHALATION) DAILY
Qty: 180 BLISTER | Refills: 0 | Status: SHIPPED | OUTPATIENT
Start: 2021-10-19 | End: 2021-10-19 | Stop reason: SDUPTHER

## 2021-10-19 RX ORDER — FLUTICASONE FUROATE, UMECLIDINIUM BROMIDE AND VILANTEROL TRIFENATATE 200; 62.5; 25 UG/1; UG/1; UG/1
1 POWDER RESPIRATORY (INHALATION) DAILY
Qty: 180 BLISTER | Refills: 3 | Status: SHIPPED | OUTPATIENT
Start: 2021-10-19 | End: 2022-10-14

## 2021-10-21 ENCOUNTER — OFFICE VISIT (OUTPATIENT)
Dept: FAMILY MEDICINE CLINIC | Facility: HOSPITAL | Age: 86
End: 2021-10-21
Payer: COMMERCIAL

## 2021-10-21 ENCOUNTER — HOSPITAL ENCOUNTER (INPATIENT)
Facility: HOSPITAL | Age: 86
LOS: 6 days | Discharge: HOME HEALTH | DRG: 291 | End: 2021-10-27
Attending: EMERGENCY MEDICINE | Admitting: INTERNAL MEDICINE
Payer: COMMERCIAL

## 2021-10-21 ENCOUNTER — APPOINTMENT (EMERGENCY)
Dept: RADIOLOGY | Facility: HOSPITAL | Age: 86
DRG: 291 | End: 2021-10-21
Payer: COMMERCIAL

## 2021-10-21 ENCOUNTER — APPOINTMENT (EMERGENCY)
Dept: NON INVASIVE DIAGNOSTICS | Facility: HOSPITAL | Age: 86
DRG: 291 | End: 2021-10-21
Payer: COMMERCIAL

## 2021-10-21 VITALS
HEIGHT: 71 IN | DIASTOLIC BLOOD PRESSURE: 78 MMHG | BODY MASS INDEX: 41.02 KG/M2 | WEIGHT: 293 LBS | HEART RATE: 86 BPM | SYSTOLIC BLOOD PRESSURE: 148 MMHG | OXYGEN SATURATION: 96 %

## 2021-10-21 DIAGNOSIS — Z00.00 MEDICARE ANNUAL WELLNESS VISIT, SUBSEQUENT: Primary | ICD-10-CM

## 2021-10-21 DIAGNOSIS — N18.9 ACUTE KIDNEY INJURY SUPERIMPOSED ON CHRONIC KIDNEY DISEASE (HCC): ICD-10-CM

## 2021-10-21 DIAGNOSIS — N17.9 ACUTE KIDNEY INJURY SUPERIMPOSED ON CHRONIC KIDNEY DISEASE (HCC): ICD-10-CM

## 2021-10-21 DIAGNOSIS — I50.33 ACUTE ON CHRONIC DIASTOLIC CONGESTIVE HEART FAILURE (HCC): Primary | ICD-10-CM

## 2021-10-21 LAB
ALBUMIN SERPL BCP-MCNC: 3.7 G/DL (ref 3.5–5)
ALP SERPL-CCNC: 57 U/L (ref 46–116)
ALT SERPL W P-5'-P-CCNC: 25 U/L (ref 12–78)
ANION GAP SERPL CALCULATED.3IONS-SCNC: 7 MMOL/L (ref 4–13)
AST SERPL W P-5'-P-CCNC: 12 U/L (ref 5–45)
BASOPHILS # BLD AUTO: 0.02 THOUSANDS/ΜL (ref 0–0.1)
BASOPHILS NFR BLD AUTO: 0 % (ref 0–1)
BILIRUB SERPL-MCNC: 0.4 MG/DL (ref 0.2–1)
BUN SERPL-MCNC: 44 MG/DL (ref 5–25)
CALCIUM SERPL-MCNC: 8.6 MG/DL (ref 8.3–10.1)
CHLORIDE SERPL-SCNC: 107 MMOL/L (ref 100–108)
CO2 SERPL-SCNC: 29 MMOL/L (ref 21–32)
CREAT SERPL-MCNC: 2.01 MG/DL (ref 0.6–1.3)
EOSINOPHIL # BLD AUTO: 0.03 THOUSAND/ΜL (ref 0–0.61)
EOSINOPHIL NFR BLD AUTO: 0 % (ref 0–6)
ERYTHROCYTE [DISTWIDTH] IN BLOOD BY AUTOMATED COUNT: 13.8 % (ref 11.6–15.1)
FLUAV RNA RESP QL NAA+PROBE: NEGATIVE
FLUBV RNA RESP QL NAA+PROBE: NEGATIVE
GFR SERPL CREATININE-BSD FRML MDRD: 29 ML/MIN/1.73SQ M
GLUCOSE SERPL-MCNC: 103 MG/DL (ref 65–140)
HCT VFR BLD AUTO: 43.6 % (ref 36.5–49.3)
HGB BLD-MCNC: 13.7 G/DL (ref 12–17)
IMM GRANULOCYTES # BLD AUTO: 0.05 THOUSAND/UL (ref 0–0.2)
IMM GRANULOCYTES NFR BLD AUTO: 1 % (ref 0–2)
LYMPHOCYTES # BLD AUTO: 1.1 THOUSANDS/ΜL (ref 0.6–4.47)
LYMPHOCYTES NFR BLD AUTO: 11 % (ref 14–44)
MCH RBC QN AUTO: 29 PG (ref 26.8–34.3)
MCHC RBC AUTO-ENTMCNC: 31.4 G/DL (ref 31.4–37.4)
MCV RBC AUTO: 92 FL (ref 82–98)
MONOCYTES # BLD AUTO: 0.51 THOUSAND/ΜL (ref 0.17–1.22)
MONOCYTES NFR BLD AUTO: 5 % (ref 4–12)
NEUTROPHILS # BLD AUTO: 8.1 THOUSANDS/ΜL (ref 1.85–7.62)
NEUTS SEG NFR BLD AUTO: 83 % (ref 43–75)
NRBC BLD AUTO-RTO: 0 /100 WBCS
NT-PROBNP SERPL-MCNC: 269 PG/ML
PLATELET # BLD AUTO: 218 THOUSANDS/UL (ref 149–390)
PMV BLD AUTO: 10.5 FL (ref 8.9–12.7)
POTASSIUM SERPL-SCNC: 4.7 MMOL/L (ref 3.5–5.3)
PROT SERPL-MCNC: 7.1 G/DL (ref 6.4–8.2)
RBC # BLD AUTO: 4.73 MILLION/UL (ref 3.88–5.62)
RSV RNA RESP QL NAA+PROBE: NEGATIVE
SARS-COV-2 RNA RESP QL NAA+PROBE: NEGATIVE
SODIUM SERPL-SCNC: 143 MMOL/L (ref 136–145)
TROPONIN I SERPL-MCNC: <0.02 NG/ML
WBC # BLD AUTO: 9.81 THOUSAND/UL (ref 4.31–10.16)

## 2021-10-21 PROCEDURE — 1036F TOBACCO NON-USER: CPT | Performed by: INTERNAL MEDICINE

## 2021-10-21 PROCEDURE — 1160F RVW MEDS BY RX/DR IN RCRD: CPT | Performed by: INTERNAL MEDICINE

## 2021-10-21 PROCEDURE — 93971 EXTREMITY STUDY: CPT | Performed by: SURGERY

## 2021-10-21 PROCEDURE — 3725F SCREEN DEPRESSION PERFORMED: CPT | Performed by: INTERNAL MEDICINE

## 2021-10-21 PROCEDURE — 93005 ELECTROCARDIOGRAM TRACING: CPT

## 2021-10-21 PROCEDURE — G0439 PPPS, SUBSEQ VISIT: HCPCS | Performed by: INTERNAL MEDICINE

## 2021-10-21 PROCEDURE — 83880 ASSAY OF NATRIURETIC PEPTIDE: CPT | Performed by: EMERGENCY MEDICINE

## 2021-10-21 PROCEDURE — 85025 COMPLETE CBC W/AUTO DIFF WBC: CPT | Performed by: EMERGENCY MEDICINE

## 2021-10-21 PROCEDURE — 1170F FXNL STATUS ASSESSED: CPT | Performed by: INTERNAL MEDICINE

## 2021-10-21 PROCEDURE — 0241U HB NFCT DS VIR RESP RNA 4 TRGT: CPT | Performed by: EMERGENCY MEDICINE

## 2021-10-21 PROCEDURE — 99285 EMERGENCY DEPT VISIT HI MDM: CPT

## 2021-10-21 PROCEDURE — 80053 COMPREHEN METABOLIC PANEL: CPT | Performed by: EMERGENCY MEDICINE

## 2021-10-21 PROCEDURE — 84484 ASSAY OF TROPONIN QUANT: CPT | Performed by: EMERGENCY MEDICINE

## 2021-10-21 PROCEDURE — 36415 COLL VENOUS BLD VENIPUNCTURE: CPT | Performed by: EMERGENCY MEDICINE

## 2021-10-21 PROCEDURE — 3288F FALL RISK ASSESSMENT DOCD: CPT | Performed by: INTERNAL MEDICINE

## 2021-10-21 PROCEDURE — 99285 EMERGENCY DEPT VISIT HI MDM: CPT | Performed by: EMERGENCY MEDICINE

## 2021-10-21 PROCEDURE — 71045 X-RAY EXAM CHEST 1 VIEW: CPT

## 2021-10-21 PROCEDURE — 99222 1ST HOSP IP/OBS MODERATE 55: CPT | Performed by: INTERNAL MEDICINE

## 2021-10-21 PROCEDURE — 93971 EXTREMITY STUDY: CPT

## 2021-10-21 RX ORDER — PREDNISONE 10 MG/1
10 TABLET ORAL DAILY
Status: DISCONTINUED | OUTPATIENT
Start: 2021-10-22 | End: 2021-10-27 | Stop reason: HOSPADM

## 2021-10-21 RX ORDER — ACETAMINOPHEN 325 MG/1
650 TABLET ORAL EVERY 4 HOURS PRN
Status: DISCONTINUED | OUTPATIENT
Start: 2021-10-21 | End: 2021-10-27 | Stop reason: HOSPADM

## 2021-10-21 RX ORDER — LANOLIN ALCOHOL/MO/W.PET/CERES
3 CREAM (GRAM) TOPICAL
Status: DISCONTINUED | OUTPATIENT
Start: 2021-10-21 | End: 2021-10-27 | Stop reason: HOSPADM

## 2021-10-21 RX ORDER — TAMSULOSIN HYDROCHLORIDE 0.4 MG/1
0.4 CAPSULE ORAL
Status: DISCONTINUED | OUTPATIENT
Start: 2021-10-21 | End: 2021-10-27 | Stop reason: HOSPADM

## 2021-10-21 RX ORDER — ONDANSETRON 2 MG/ML
4 INJECTION INTRAMUSCULAR; INTRAVENOUS EVERY 6 HOURS PRN
Status: DISCONTINUED | OUTPATIENT
Start: 2021-10-21 | End: 2021-10-27 | Stop reason: HOSPADM

## 2021-10-21 RX ORDER — ECHINACEA PURPUREA EXTRACT 125 MG
1 TABLET ORAL
Status: DISCONTINUED | OUTPATIENT
Start: 2021-10-21 | End: 2021-10-27 | Stop reason: HOSPADM

## 2021-10-21 RX ORDER — BUMETANIDE 0.25 MG/ML
1 INJECTION, SOLUTION INTRAMUSCULAR; INTRAVENOUS 2 TIMES DAILY
Status: DISCONTINUED | OUTPATIENT
Start: 2021-10-21 | End: 2021-10-22

## 2021-10-21 RX ORDER — HEPARIN SODIUM 5000 [USP'U]/ML
5000 INJECTION, SOLUTION INTRAVENOUS; SUBCUTANEOUS EVERY 8 HOURS SCHEDULED
Status: DISCONTINUED | OUTPATIENT
Start: 2021-10-21 | End: 2021-10-27 | Stop reason: HOSPADM

## 2021-10-21 RX ORDER — ALBUTEROL SULFATE 90 UG/1
2 AEROSOL, METERED RESPIRATORY (INHALATION) EVERY 4 HOURS PRN
Status: DISCONTINUED | OUTPATIENT
Start: 2021-10-21 | End: 2021-10-27 | Stop reason: HOSPADM

## 2021-10-21 RX ORDER — FLUTICASONE PROPIONATE 50 MCG
2 SPRAY, SUSPENSION (ML) NASAL 2 TIMES DAILY PRN
Status: DISCONTINUED | OUTPATIENT
Start: 2021-10-21 | End: 2021-10-27 | Stop reason: HOSPADM

## 2021-10-21 RX ADMIN — BUMETANIDE 1 MG: 0.25 INJECTION, SOLUTION INTRAMUSCULAR; INTRAVENOUS at 18:47

## 2021-10-21 RX ADMIN — HEPARIN SODIUM 5000 UNITS: 5000 INJECTION INTRAVENOUS; SUBCUTANEOUS at 22:30

## 2021-10-21 RX ADMIN — TAMSULOSIN HYDROCHLORIDE 0.4 MG: 0.4 CAPSULE ORAL at 18:15

## 2021-10-21 RX ADMIN — LABETALOL HYDROCHLORIDE 300 MG: 200 TABLET, FILM COATED ORAL at 18:15

## 2021-10-21 RX ADMIN — Medication 3 MG: at 22:30

## 2021-10-22 ENCOUNTER — APPOINTMENT (INPATIENT)
Dept: NON INVASIVE DIAGNOSTICS | Facility: HOSPITAL | Age: 86
DRG: 291 | End: 2021-10-22
Payer: COMMERCIAL

## 2021-10-22 PROBLEM — N18.32 STAGE 3B CHRONIC KIDNEY DISEASE (HCC): Status: ACTIVE | Noted: 2021-10-21

## 2021-10-22 PROBLEM — N18.32 STAGE 3B CHRONIC KIDNEY DISEASE (HCC): Status: RESOLVED | Noted: 2021-10-21 | Resolved: 2021-10-22

## 2021-10-22 PROBLEM — L97.921 SKIN ULCER OF LEFT LOWER LEG, LIMITED TO BREAKDOWN OF SKIN (HCC): Status: ACTIVE | Noted: 2021-10-22

## 2021-10-22 LAB
ANION GAP SERPL CALCULATED.3IONS-SCNC: 11 MMOL/L (ref 4–13)
AORTIC ROOT: 3.4 CM
APICAL FOUR CHAMBER EJECTION FRACTION: 68 %
ATRIAL RATE: 69 BPM
BUN SERPL-MCNC: 41 MG/DL (ref 5–25)
CALCIUM SERPL-MCNC: 8.9 MG/DL (ref 8.3–10.1)
CHLORIDE SERPL-SCNC: 105 MMOL/L (ref 100–108)
CO2 SERPL-SCNC: 25 MMOL/L (ref 21–32)
CREAT SERPL-MCNC: 1.89 MG/DL (ref 0.6–1.3)
DOP CALC LVOT AREA: 3.14 CM2
DOP CALC LVOT DIAMETER: 2 CM
E WAVE DECELERATION TIME: 303 MS
ERYTHROCYTE [DISTWIDTH] IN BLOOD BY AUTOMATED COUNT: 13.8 % (ref 11.6–15.1)
FRACTIONAL SHORTENING: 35 % (ref 28–44)
GFR SERPL CREATININE-BSD FRML MDRD: 32 ML/MIN/1.73SQ M
GLUCOSE SERPL-MCNC: 101 MG/DL (ref 65–140)
GLUCOSE SERPL-MCNC: 119 MG/DL (ref 65–140)
GLUCOSE SERPL-MCNC: 127 MG/DL (ref 65–140)
GLUCOSE SERPL-MCNC: 155 MG/DL (ref 65–140)
HCT VFR BLD AUTO: 42.3 % (ref 36.5–49.3)
HGB BLD-MCNC: 13.2 G/DL (ref 12–17)
INTERVENTRICULAR SEPTUM IN DIASTOLE (PARASTERNAL SHORT AXIS VIEW): 1.4 CM
LEFT INTERNAL DIMENSION IN SYSTOLE: 3.1 CM (ref 2.1–4)
LEFT VENTRICULAR INTERNAL DIMENSION IN DIASTOLE: 4.8 CM (ref 16.08–23.98)
LEFT VENTRICULAR POSTERIOR WALL IN END DIASTOLE: 1.3 CM
LEFT VENTRICULAR STROKE VOLUME: 68 ML
LV EF: 60 %
MCH RBC QN AUTO: 29.2 PG (ref 26.8–34.3)
MCHC RBC AUTO-ENTMCNC: 31.2 G/DL (ref 31.4–37.4)
MCV RBC AUTO: 94 FL (ref 82–98)
MV E'TISSUE VEL-SEP: 8 CM/S
MV PEAK A VEL: 0.85 M/S
MV PEAK E VEL: 67 CM/S
MV STENOSIS PRESSURE HALF TIME: 0 MS
P AXIS: 78 DEGREES
PLATELET # BLD AUTO: 191 THOUSANDS/UL (ref 149–390)
PMV BLD AUTO: 10.3 FL (ref 8.9–12.7)
POTASSIUM SERPL-SCNC: 4.1 MMOL/L (ref 3.5–5.3)
PR INTERVAL: 164 MS
QRS AXIS: 37 DEGREES
QRSD INTERVAL: 84 MS
QT INTERVAL: 374 MS
QTC INTERVAL: 400 MS
RBC # BLD AUTO: 4.52 MILLION/UL (ref 3.88–5.62)
RIGHT VENTRICLE ID DIMENSION: 3.9 CM
SL CV LV EF: 65
SL CV PED ECHO LEFT VENTRICLE DIASTOLIC VOLUME (MOD BIPLANE) 2D: 106 ML
SL CV PED ECHO LEFT VENTRICLE SYSTOLIC VOLUME (MOD BIPLANE) 2D: 38 ML
SODIUM SERPL-SCNC: 141 MMOL/L (ref 136–145)
T WAVE AXIS: 70 DEGREES
TRANSVERSE AORTIC ARCH: 2.07 CM
TRICUSPID VALVE S': 0.7 CM/S
VENTRICULAR RATE: 69 BPM
WBC # BLD AUTO: 7.97 THOUSAND/UL (ref 4.31–10.16)
Z-SCORE OF LEFT VENTRICULAR DIMENSION IN END SYSTOLE: -14.09

## 2021-10-22 PROCEDURE — C8929 TTE W OR WO FOL WCON,DOPPLER: HCPCS

## 2021-10-22 PROCEDURE — 99223 1ST HOSP IP/OBS HIGH 75: CPT | Performed by: INTERNAL MEDICINE

## 2021-10-22 PROCEDURE — 93010 ELECTROCARDIOGRAM REPORT: CPT | Performed by: INTERNAL MEDICINE

## 2021-10-22 PROCEDURE — 99232 SBSQ HOSP IP/OBS MODERATE 35: CPT | Performed by: INTERNAL MEDICINE

## 2021-10-22 PROCEDURE — B24BZZZ ULTRASONOGRAPHY OF HEART WITH AORTA: ICD-10-PCS | Performed by: INTERNAL MEDICINE

## 2021-10-22 PROCEDURE — 93306 TTE W/DOPPLER COMPLETE: CPT | Performed by: INTERNAL MEDICINE

## 2021-10-22 PROCEDURE — 80048 BASIC METABOLIC PNL TOTAL CA: CPT | Performed by: INTERNAL MEDICINE

## 2021-10-22 PROCEDURE — 85027 COMPLETE CBC AUTOMATED: CPT | Performed by: INTERNAL MEDICINE

## 2021-10-22 PROCEDURE — 36415 COLL VENOUS BLD VENIPUNCTURE: CPT | Performed by: INTERNAL MEDICINE

## 2021-10-22 PROCEDURE — 82948 REAGENT STRIP/BLOOD GLUCOSE: CPT

## 2021-10-22 RX ORDER — POTASSIUM CHLORIDE 20 MEQ/1
20 TABLET, EXTENDED RELEASE ORAL DAILY
Status: DISCONTINUED | OUTPATIENT
Start: 2021-10-22 | End: 2021-10-27 | Stop reason: HOSPADM

## 2021-10-22 RX ORDER — BUMETANIDE 0.25 MG/ML
2 INJECTION, SOLUTION INTRAMUSCULAR; INTRAVENOUS 2 TIMES DAILY
Status: DISCONTINUED | OUTPATIENT
Start: 2021-10-22 | End: 2021-10-23

## 2021-10-22 RX ORDER — TRAMADOL HYDROCHLORIDE 50 MG/1
50 TABLET ORAL EVERY 6 HOURS PRN
Status: DISCONTINUED | OUTPATIENT
Start: 2021-10-22 | End: 2021-10-27 | Stop reason: HOSPADM

## 2021-10-22 RX ADMIN — LABETALOL HYDROCHLORIDE 300 MG: 200 TABLET, FILM COATED ORAL at 17:36

## 2021-10-22 RX ADMIN — Medication 3 MG: at 21:08

## 2021-10-22 RX ADMIN — BUMETANIDE 2 MG: 0.25 INJECTION, SOLUTION INTRAMUSCULAR; INTRAVENOUS at 17:36

## 2021-10-22 RX ADMIN — PERFLUTREN 0.8 ML/MIN: 6.52 INJECTION, SUSPENSION INTRAVENOUS at 10:32

## 2021-10-22 RX ADMIN — ACETAMINOPHEN 650 MG: 325 TABLET, FILM COATED ORAL at 02:09

## 2021-10-22 RX ADMIN — LABETALOL HYDROCHLORIDE 300 MG: 200 TABLET, FILM COATED ORAL at 11:51

## 2021-10-22 RX ADMIN — TAMSULOSIN HYDROCHLORIDE 0.4 MG: 0.4 CAPSULE ORAL at 15:38

## 2021-10-22 RX ADMIN — FLUTICASONE FUROATE, UMECLIDINIUM BROMIDE AND VILANTEROL TRIFENATATE 1 PUFF: 200; 62.5; 25 POWDER RESPIRATORY (INHALATION) at 11:48

## 2021-10-22 RX ADMIN — SALINE NASAL SPRAY 1 SPRAY: 1.5 SOLUTION NASAL at 02:04

## 2021-10-22 RX ADMIN — HEPARIN SODIUM 5000 UNITS: 5000 INJECTION INTRAVENOUS; SUBCUTANEOUS at 05:14

## 2021-10-22 RX ADMIN — PREDNISONE 10 MG: 10 TABLET ORAL at 11:47

## 2021-10-22 RX ADMIN — HEPARIN SODIUM 5000 UNITS: 5000 INJECTION INTRAVENOUS; SUBCUTANEOUS at 21:08

## 2021-10-22 RX ADMIN — TRAMADOL HYDROCHLORIDE 50 MG: 50 TABLET ORAL at 21:09

## 2021-10-22 RX ADMIN — POTASSIUM CHLORIDE 20 MEQ: 1500 TABLET, EXTENDED RELEASE ORAL at 11:48

## 2021-10-22 RX ADMIN — BUMETANIDE 2 MG: 0.25 INJECTION, SOLUTION INTRAMUSCULAR; INTRAVENOUS at 11:48

## 2021-10-22 RX ADMIN — HEPARIN SODIUM 5000 UNITS: 5000 INJECTION INTRAVENOUS; SUBCUTANEOUS at 14:08

## 2021-10-22 RX ADMIN — FLUTICASONE PROPIONATE 2 SPRAY: 50 SPRAY, METERED NASAL at 23:43

## 2021-10-23 LAB
ANION GAP SERPL CALCULATED.3IONS-SCNC: 12 MMOL/L (ref 4–13)
BUN SERPL-MCNC: 38 MG/DL (ref 5–25)
CALCIUM SERPL-MCNC: 9 MG/DL (ref 8.3–10.1)
CHLORIDE SERPL-SCNC: 104 MMOL/L (ref 100–108)
CO2 SERPL-SCNC: 24 MMOL/L (ref 21–32)
CREAT SERPL-MCNC: 1.97 MG/DL (ref 0.6–1.3)
ERYTHROCYTE [DISTWIDTH] IN BLOOD BY AUTOMATED COUNT: 13.7 % (ref 11.6–15.1)
GFR SERPL CREATININE-BSD FRML MDRD: 30 ML/MIN/1.73SQ M
GLUCOSE SERPL-MCNC: 111 MG/DL (ref 65–140)
GLUCOSE SERPL-MCNC: 117 MG/DL (ref 65–140)
GLUCOSE SERPL-MCNC: 117 MG/DL (ref 65–140)
GLUCOSE SERPL-MCNC: 134 MG/DL (ref 65–140)
HCT VFR BLD AUTO: 42.2 % (ref 36.5–49.3)
HGB BLD-MCNC: 13.2 G/DL (ref 12–17)
MCH RBC QN AUTO: 29.1 PG (ref 26.8–34.3)
MCHC RBC AUTO-ENTMCNC: 31.3 G/DL (ref 31.4–37.4)
MCV RBC AUTO: 93 FL (ref 82–98)
PLATELET # BLD AUTO: 202 THOUSANDS/UL (ref 149–390)
PMV BLD AUTO: 10.4 FL (ref 8.9–12.7)
POTASSIUM SERPL-SCNC: 3.9 MMOL/L (ref 3.5–5.3)
RBC # BLD AUTO: 4.53 MILLION/UL (ref 3.88–5.62)
SODIUM SERPL-SCNC: 140 MMOL/L (ref 136–145)
WBC # BLD AUTO: 7.76 THOUSAND/UL (ref 4.31–10.16)

## 2021-10-23 PROCEDURE — 99232 SBSQ HOSP IP/OBS MODERATE 35: CPT | Performed by: INTERNAL MEDICINE

## 2021-10-23 PROCEDURE — 82948 REAGENT STRIP/BLOOD GLUCOSE: CPT

## 2021-10-23 PROCEDURE — 80048 BASIC METABOLIC PNL TOTAL CA: CPT | Performed by: INTERNAL MEDICINE

## 2021-10-23 PROCEDURE — 85027 COMPLETE CBC AUTOMATED: CPT | Performed by: INTERNAL MEDICINE

## 2021-10-23 RX ORDER — BUMETANIDE 0.25 MG/ML
2 INJECTION, SOLUTION INTRAMUSCULAR; INTRAVENOUS 2 TIMES DAILY
Status: DISCONTINUED | OUTPATIENT
Start: 2021-10-24 | End: 2021-10-26

## 2021-10-23 RX ADMIN — PREDNISONE 10 MG: 10 TABLET ORAL at 09:00

## 2021-10-23 RX ADMIN — LABETALOL HYDROCHLORIDE 300 MG: 200 TABLET, FILM COATED ORAL at 09:00

## 2021-10-23 RX ADMIN — TRAMADOL HYDROCHLORIDE 50 MG: 50 TABLET ORAL at 23:15

## 2021-10-23 RX ADMIN — HEPARIN SODIUM 5000 UNITS: 5000 INJECTION INTRAVENOUS; SUBCUTANEOUS at 13:19

## 2021-10-23 RX ADMIN — LABETALOL HYDROCHLORIDE 300 MG: 200 TABLET, FILM COATED ORAL at 17:22

## 2021-10-23 RX ADMIN — TRAMADOL HYDROCHLORIDE 50 MG: 50 TABLET ORAL at 07:36

## 2021-10-23 RX ADMIN — BUMETANIDE 2 MG: 0.25 INJECTION, SOLUTION INTRAMUSCULAR; INTRAVENOUS at 08:54

## 2021-10-23 RX ADMIN — HEPARIN SODIUM 5000 UNITS: 5000 INJECTION INTRAVENOUS; SUBCUTANEOUS at 05:16

## 2021-10-23 RX ADMIN — POTASSIUM CHLORIDE 20 MEQ: 1500 TABLET, EXTENDED RELEASE ORAL at 09:00

## 2021-10-23 RX ADMIN — TAMSULOSIN HYDROCHLORIDE 0.4 MG: 0.4 CAPSULE ORAL at 16:23

## 2021-10-23 RX ADMIN — FLUTICASONE PROPIONATE 2 SPRAY: 50 SPRAY, METERED NASAL at 08:54

## 2021-10-23 RX ADMIN — Medication 3 MG: at 23:15

## 2021-10-23 RX ADMIN — HEPARIN SODIUM 5000 UNITS: 5000 INJECTION INTRAVENOUS; SUBCUTANEOUS at 23:15

## 2021-10-23 RX ADMIN — FLUTICASONE FUROATE, UMECLIDINIUM BROMIDE AND VILANTEROL TRIFENATATE 1 PUFF: 200; 62.5; 25 POWDER RESPIRATORY (INHALATION) at 09:06

## 2021-10-23 RX ADMIN — FLUTICASONE PROPIONATE 2 SPRAY: 50 SPRAY, METERED NASAL at 23:15

## 2021-10-24 PROBLEM — M79.672 HEEL PAIN, BILATERAL: Status: ACTIVE | Noted: 2021-10-24

## 2021-10-24 PROBLEM — M79.671 HEEL PAIN, BILATERAL: Status: ACTIVE | Noted: 2021-10-24

## 2021-10-24 PROBLEM — R09.02 HYPOXIA: Status: ACTIVE | Noted: 2021-10-24

## 2021-10-24 LAB
ANION GAP SERPL CALCULATED.3IONS-SCNC: 12 MMOL/L (ref 4–13)
BUN SERPL-MCNC: 37 MG/DL (ref 5–25)
CALCIUM SERPL-MCNC: 9 MG/DL (ref 8.3–10.1)
CHLORIDE SERPL-SCNC: 104 MMOL/L (ref 100–108)
CO2 SERPL-SCNC: 24 MMOL/L (ref 21–32)
CREAT SERPL-MCNC: 1.83 MG/DL (ref 0.6–1.3)
ERYTHROCYTE [DISTWIDTH] IN BLOOD BY AUTOMATED COUNT: 13.6 % (ref 11.6–15.1)
GFR SERPL CREATININE-BSD FRML MDRD: 33 ML/MIN/1.73SQ M
GLUCOSE SERPL-MCNC: 102 MG/DL (ref 65–140)
GLUCOSE SERPL-MCNC: 107 MG/DL (ref 65–140)
GLUCOSE SERPL-MCNC: 121 MG/DL (ref 65–140)
HCT VFR BLD AUTO: 40.6 % (ref 36.5–49.3)
HGB BLD-MCNC: 12.7 G/DL (ref 12–17)
MCH RBC QN AUTO: 29 PG (ref 26.8–34.3)
MCHC RBC AUTO-ENTMCNC: 31.3 G/DL (ref 31.4–37.4)
MCV RBC AUTO: 93 FL (ref 82–98)
PLATELET # BLD AUTO: 192 THOUSANDS/UL (ref 149–390)
PMV BLD AUTO: 10.5 FL (ref 8.9–12.7)
POTASSIUM SERPL-SCNC: 3.7 MMOL/L (ref 3.5–5.3)
RBC # BLD AUTO: 4.38 MILLION/UL (ref 3.88–5.62)
SODIUM SERPL-SCNC: 140 MMOL/L (ref 136–145)
WBC # BLD AUTO: 7.56 THOUSAND/UL (ref 4.31–10.16)

## 2021-10-24 PROCEDURE — 82948 REAGENT STRIP/BLOOD GLUCOSE: CPT

## 2021-10-24 PROCEDURE — 94761 N-INVAS EAR/PLS OXIMETRY MLT: CPT

## 2021-10-24 PROCEDURE — 80048 BASIC METABOLIC PNL TOTAL CA: CPT | Performed by: PHYSICIAN ASSISTANT

## 2021-10-24 PROCEDURE — 99232 SBSQ HOSP IP/OBS MODERATE 35: CPT | Performed by: INTERNAL MEDICINE

## 2021-10-24 PROCEDURE — 85027 COMPLETE CBC AUTOMATED: CPT | Performed by: PHYSICIAN ASSISTANT

## 2021-10-24 RX ORDER — FLUTICASONE FUROATE AND VILANTEROL 100; 25 UG/1; UG/1
1 POWDER RESPIRATORY (INHALATION) DAILY
Status: DISCONTINUED | OUTPATIENT
Start: 2021-10-24 | End: 2021-10-25

## 2021-10-24 RX ORDER — GABAPENTIN 100 MG/1
100 CAPSULE ORAL 3 TIMES DAILY
Status: DISCONTINUED | OUTPATIENT
Start: 2021-10-24 | End: 2021-10-27 | Stop reason: HOSPADM

## 2021-10-24 RX ADMIN — BUMETANIDE 2 MG: 0.25 INJECTION, SOLUTION INTRAMUSCULAR; INTRAVENOUS at 17:15

## 2021-10-24 RX ADMIN — TAMSULOSIN HYDROCHLORIDE 0.4 MG: 0.4 CAPSULE ORAL at 15:40

## 2021-10-24 RX ADMIN — TRAMADOL HYDROCHLORIDE 50 MG: 50 TABLET ORAL at 22:14

## 2021-10-24 RX ADMIN — HEPARIN SODIUM 5000 UNITS: 5000 INJECTION INTRAVENOUS; SUBCUTANEOUS at 05:57

## 2021-10-24 RX ADMIN — HEPARIN SODIUM 5000 UNITS: 5000 INJECTION INTRAVENOUS; SUBCUTANEOUS at 14:04

## 2021-10-24 RX ADMIN — POTASSIUM CHLORIDE 20 MEQ: 1500 TABLET, EXTENDED RELEASE ORAL at 08:43

## 2021-10-24 RX ADMIN — ACETAMINOPHEN 650 MG: 325 TABLET, FILM COATED ORAL at 22:14

## 2021-10-24 RX ADMIN — GABAPENTIN 100 MG: 100 CAPSULE ORAL at 21:02

## 2021-10-24 RX ADMIN — GABAPENTIN 100 MG: 100 CAPSULE ORAL at 15:40

## 2021-10-24 RX ADMIN — PREDNISONE 10 MG: 10 TABLET ORAL at 08:43

## 2021-10-24 RX ADMIN — FLUTICASONE FUROATE AND VILANTEROL TRIFENATATE 1 PUFF: 100; 25 POWDER RESPIRATORY (INHALATION) at 12:46

## 2021-10-24 RX ADMIN — LABETALOL HYDROCHLORIDE 300 MG: 200 TABLET, FILM COATED ORAL at 17:15

## 2021-10-24 RX ADMIN — LABETALOL HYDROCHLORIDE 300 MG: 200 TABLET, FILM COATED ORAL at 08:43

## 2021-10-24 RX ADMIN — HEPARIN SODIUM 5000 UNITS: 5000 INJECTION INTRAVENOUS; SUBCUTANEOUS at 22:14

## 2021-10-24 RX ADMIN — GABAPENTIN 100 MG: 100 CAPSULE ORAL at 12:46

## 2021-10-24 RX ADMIN — Medication 3 MG: at 22:14

## 2021-10-24 RX ADMIN — BUMETANIDE 2 MG: 0.25 INJECTION, SOLUTION INTRAMUSCULAR; INTRAVENOUS at 08:43

## 2021-10-25 PROBLEM — E66.01 MORBID OBESITY DUE TO EXCESS CALORIES (HCC): Status: ACTIVE | Noted: 2021-10-25

## 2021-10-25 LAB
GLUCOSE SERPL-MCNC: 140 MG/DL (ref 65–140)
GLUCOSE SERPL-MCNC: 185 MG/DL (ref 65–140)
GLUCOSE SERPL-MCNC: 97 MG/DL (ref 65–140)

## 2021-10-25 PROCEDURE — 97165 OT EVAL LOW COMPLEX 30 MIN: CPT

## 2021-10-25 PROCEDURE — 82948 REAGENT STRIP/BLOOD GLUCOSE: CPT

## 2021-10-25 PROCEDURE — 99233 SBSQ HOSP IP/OBS HIGH 50: CPT | Performed by: INTERNAL MEDICINE

## 2021-10-25 PROCEDURE — 99232 SBSQ HOSP IP/OBS MODERATE 35: CPT | Performed by: INTERNAL MEDICINE

## 2021-10-25 PROCEDURE — 97162 PT EVAL MOD COMPLEX 30 MIN: CPT

## 2021-10-25 RX ORDER — FLUTICASONE FUROATE AND VILANTEROL 200; 25 UG/1; UG/1
1 POWDER RESPIRATORY (INHALATION) DAILY
Status: DISCONTINUED | OUTPATIENT
Start: 2021-10-25 | End: 2021-10-27 | Stop reason: HOSPADM

## 2021-10-25 RX ADMIN — GABAPENTIN 100 MG: 100 CAPSULE ORAL at 21:00

## 2021-10-25 RX ADMIN — FLUTICASONE FUROATE AND VILANTEROL TRIFENATATE 1 PUFF: 200; 25 POWDER RESPIRATORY (INHALATION) at 13:47

## 2021-10-25 RX ADMIN — HEPARIN SODIUM 5000 UNITS: 5000 INJECTION INTRAVENOUS; SUBCUTANEOUS at 13:37

## 2021-10-25 RX ADMIN — ACETAMINOPHEN 650 MG: 325 TABLET, FILM COATED ORAL at 22:17

## 2021-10-25 RX ADMIN — FLUTICASONE FUROATE AND VILANTEROL TRIFENATATE 1 PUFF: 100; 25 POWDER RESPIRATORY (INHALATION) at 09:12

## 2021-10-25 RX ADMIN — LABETALOL HYDROCHLORIDE 300 MG: 200 TABLET, FILM COATED ORAL at 17:32

## 2021-10-25 RX ADMIN — BUMETANIDE 2 MG: 0.25 INJECTION, SOLUTION INTRAMUSCULAR; INTRAVENOUS at 17:31

## 2021-10-25 RX ADMIN — HEPARIN SODIUM 5000 UNITS: 5000 INJECTION INTRAVENOUS; SUBCUTANEOUS at 06:26

## 2021-10-25 RX ADMIN — LABETALOL HYDROCHLORIDE 300 MG: 200 TABLET, FILM COATED ORAL at 09:12

## 2021-10-25 RX ADMIN — GABAPENTIN 100 MG: 100 CAPSULE ORAL at 17:00

## 2021-10-25 RX ADMIN — TAMSULOSIN HYDROCHLORIDE 0.4 MG: 0.4 CAPSULE ORAL at 16:30

## 2021-10-25 RX ADMIN — FLUTICASONE PROPIONATE 2 SPRAY: 50 SPRAY, METERED NASAL at 23:32

## 2021-10-25 RX ADMIN — BUMETANIDE 2 MG: 0.25 INJECTION, SOLUTION INTRAMUSCULAR; INTRAVENOUS at 09:12

## 2021-10-25 RX ADMIN — Medication 3 MG: at 22:17

## 2021-10-25 RX ADMIN — TRAMADOL HYDROCHLORIDE 50 MG: 50 TABLET ORAL at 22:17

## 2021-10-25 RX ADMIN — PREDNISONE 10 MG: 10 TABLET ORAL at 09:12

## 2021-10-25 RX ADMIN — POTASSIUM CHLORIDE 20 MEQ: 1500 TABLET, EXTENDED RELEASE ORAL at 09:12

## 2021-10-25 RX ADMIN — GABAPENTIN 100 MG: 100 CAPSULE ORAL at 09:12

## 2021-10-25 RX ADMIN — HEPARIN SODIUM 5000 UNITS: 5000 INJECTION INTRAVENOUS; SUBCUTANEOUS at 22:17

## 2021-10-26 LAB
ANION GAP SERPL CALCULATED.3IONS-SCNC: 12 MMOL/L (ref 4–13)
BUN SERPL-MCNC: 38 MG/DL (ref 5–25)
CALCIUM SERPL-MCNC: 8.9 MG/DL (ref 8.3–10.1)
CHLORIDE SERPL-SCNC: 104 MMOL/L (ref 100–108)
CO2 SERPL-SCNC: 23 MMOL/L (ref 21–32)
CREAT SERPL-MCNC: 1.89 MG/DL (ref 0.6–1.3)
GFR SERPL CREATININE-BSD FRML MDRD: 32 ML/MIN/1.73SQ M
GLUCOSE SERPL-MCNC: 112 MG/DL (ref 65–140)
GLUCOSE SERPL-MCNC: 117 MG/DL (ref 65–140)
GLUCOSE SERPL-MCNC: 172 MG/DL (ref 65–140)
GLUCOSE SERPL-MCNC: 98 MG/DL (ref 65–140)
POTASSIUM SERPL-SCNC: 3.8 MMOL/L (ref 3.5–5.3)
SODIUM SERPL-SCNC: 139 MMOL/L (ref 136–145)

## 2021-10-26 PROCEDURE — 99232 SBSQ HOSP IP/OBS MODERATE 35: CPT | Performed by: INTERNAL MEDICINE

## 2021-10-26 PROCEDURE — 80048 BASIC METABOLIC PNL TOTAL CA: CPT | Performed by: NURSE PRACTITIONER

## 2021-10-26 PROCEDURE — 82948 REAGENT STRIP/BLOOD GLUCOSE: CPT

## 2021-10-26 RX ORDER — TORSEMIDE 20 MG/1
40 TABLET ORAL 2 TIMES DAILY
Status: DISCONTINUED | OUTPATIENT
Start: 2021-10-26 | End: 2021-10-27

## 2021-10-26 RX ADMIN — TRAMADOL HYDROCHLORIDE 50 MG: 50 TABLET ORAL at 22:28

## 2021-10-26 RX ADMIN — GABAPENTIN 100 MG: 100 CAPSULE ORAL at 09:47

## 2021-10-26 RX ADMIN — LABETALOL HYDROCHLORIDE 300 MG: 200 TABLET, FILM COATED ORAL at 17:01

## 2021-10-26 RX ADMIN — HEPARIN SODIUM 5000 UNITS: 5000 INJECTION INTRAVENOUS; SUBCUTANEOUS at 05:06

## 2021-10-26 RX ADMIN — POTASSIUM CHLORIDE 20 MEQ: 1500 TABLET, EXTENDED RELEASE ORAL at 09:47

## 2021-10-26 RX ADMIN — Medication 3 MG: at 21:06

## 2021-10-26 RX ADMIN — ACETAMINOPHEN 650 MG: 325 TABLET, FILM COATED ORAL at 22:28

## 2021-10-26 RX ADMIN — LABETALOL HYDROCHLORIDE 300 MG: 200 TABLET, FILM COATED ORAL at 09:46

## 2021-10-26 RX ADMIN — FLUTICASONE PROPIONATE 2 SPRAY: 50 SPRAY, METERED NASAL at 06:32

## 2021-10-26 RX ADMIN — BUMETANIDE 2 MG: 0.25 INJECTION, SOLUTION INTRAMUSCULAR; INTRAVENOUS at 09:47

## 2021-10-26 RX ADMIN — TAMSULOSIN HYDROCHLORIDE 0.4 MG: 0.4 CAPSULE ORAL at 16:38

## 2021-10-26 RX ADMIN — TORSEMIDE 40 MG: 20 TABLET ORAL at 21:06

## 2021-10-26 RX ADMIN — GABAPENTIN 100 MG: 100 CAPSULE ORAL at 21:06

## 2021-10-26 RX ADMIN — HEPARIN SODIUM 5000 UNITS: 5000 INJECTION INTRAVENOUS; SUBCUTANEOUS at 21:07

## 2021-10-26 RX ADMIN — PREDNISONE 10 MG: 10 TABLET ORAL at 09:47

## 2021-10-26 RX ADMIN — GABAPENTIN 100 MG: 100 CAPSULE ORAL at 16:38

## 2021-10-26 RX ADMIN — FLUTICASONE FUROATE AND VILANTEROL TRIFENATATE 1 PUFF: 200; 25 POWDER RESPIRATORY (INHALATION) at 09:57

## 2021-10-26 RX ADMIN — HEPARIN SODIUM 5000 UNITS: 5000 INJECTION INTRAVENOUS; SUBCUTANEOUS at 14:24

## 2021-10-27 VITALS
HEART RATE: 81 BPM | SYSTOLIC BLOOD PRESSURE: 123 MMHG | BODY MASS INDEX: 40.18 KG/M2 | DIASTOLIC BLOOD PRESSURE: 57 MMHG | TEMPERATURE: 97 F | OXYGEN SATURATION: 92 % | RESPIRATION RATE: 18 BRPM | HEIGHT: 71 IN | WEIGHT: 287 LBS

## 2021-10-27 LAB
ANION GAP SERPL CALCULATED.3IONS-SCNC: 12 MMOL/L (ref 4–13)
BUN SERPL-MCNC: 43 MG/DL (ref 5–25)
CALCIUM SERPL-MCNC: 9 MG/DL (ref 8.3–10.1)
CHLORIDE SERPL-SCNC: 103 MMOL/L (ref 100–108)
CO2 SERPL-SCNC: 24 MMOL/L (ref 21–32)
CREAT SERPL-MCNC: 2.1 MG/DL (ref 0.6–1.3)
GFR SERPL CREATININE-BSD FRML MDRD: 28 ML/MIN/1.73SQ M
GLUCOSE SERPL-MCNC: 105 MG/DL (ref 65–140)
GLUCOSE SERPL-MCNC: 111 MG/DL (ref 65–140)
GLUCOSE SERPL-MCNC: 112 MG/DL (ref 65–140)
POTASSIUM SERPL-SCNC: 3.9 MMOL/L (ref 3.5–5.3)
SODIUM SERPL-SCNC: 139 MMOL/L (ref 136–145)

## 2021-10-27 PROCEDURE — 82948 REAGENT STRIP/BLOOD GLUCOSE: CPT

## 2021-10-27 PROCEDURE — 94761 N-INVAS EAR/PLS OXIMETRY MLT: CPT

## 2021-10-27 PROCEDURE — 80048 BASIC METABOLIC PNL TOTAL CA: CPT | Performed by: NURSE PRACTITIONER

## 2021-10-27 PROCEDURE — 99239 HOSP IP/OBS DSCHRG MGMT >30: CPT | Performed by: INTERNAL MEDICINE

## 2021-10-27 PROCEDURE — 99232 SBSQ HOSP IP/OBS MODERATE 35: CPT | Performed by: INTERNAL MEDICINE

## 2021-10-27 RX ORDER — TORSEMIDE 20 MG/1
40 TABLET ORAL 2 TIMES DAILY
Status: DISCONTINUED | OUTPATIENT
Start: 2021-10-28 | End: 2021-10-27 | Stop reason: HOSPADM

## 2021-10-27 RX ORDER — TORSEMIDE 20 MG/1
40 TABLET ORAL 2 TIMES DAILY
Qty: 120 TABLET | Refills: 0 | Status: SHIPPED | OUTPATIENT
Start: 2021-10-28 | End: 2021-11-09 | Stop reason: SDUPTHER

## 2021-10-27 RX ADMIN — GABAPENTIN 100 MG: 100 CAPSULE ORAL at 09:43

## 2021-10-27 RX ADMIN — HEPARIN SODIUM 5000 UNITS: 5000 INJECTION INTRAVENOUS; SUBCUTANEOUS at 05:05

## 2021-10-27 RX ADMIN — FLUTICASONE PROPIONATE 2 SPRAY: 50 SPRAY, METERED NASAL at 05:06

## 2021-10-27 RX ADMIN — LABETALOL HYDROCHLORIDE 300 MG: 200 TABLET, FILM COATED ORAL at 09:43

## 2021-10-27 RX ADMIN — POTASSIUM CHLORIDE 20 MEQ: 1500 TABLET, EXTENDED RELEASE ORAL at 09:43

## 2021-10-27 RX ADMIN — PREDNISONE 10 MG: 10 TABLET ORAL at 09:43

## 2021-10-27 RX ADMIN — FLUTICASONE FUROATE AND VILANTEROL TRIFENATATE 1 PUFF: 200; 25 POWDER RESPIRATORY (INHALATION) at 09:45

## 2021-10-28 ENCOUNTER — TRANSITIONAL CARE MANAGEMENT (OUTPATIENT)
Dept: FAMILY MEDICINE CLINIC | Facility: HOSPITAL | Age: 86
End: 2021-10-28

## 2021-10-28 LAB
DME PARACHUTE DELIVERY DATE ACTUAL: NORMAL
DME PARACHUTE DELIVERY DATE ACTUAL: NORMAL
DME PARACHUTE DELIVERY DATE EXPECTED: NORMAL
DME PARACHUTE DELIVERY DATE EXPECTED: NORMAL
DME PARACHUTE DELIVERY DATE REQUESTED: NORMAL
DME PARACHUTE DELIVERY DATE REQUESTED: NORMAL
DME PARACHUTE ITEM DESCRIPTION: NORMAL
DME PARACHUTE ORDER STATUS: NORMAL
DME PARACHUTE ORDER STATUS: NORMAL
DME PARACHUTE SUPPLIER NAME: NORMAL
DME PARACHUTE SUPPLIER NAME: NORMAL
DME PARACHUTE SUPPLIER PHONE: NORMAL
DME PARACHUTE SUPPLIER PHONE: NORMAL

## 2021-10-29 ENCOUNTER — TELEPHONE (OUTPATIENT)
Dept: FAMILY MEDICINE CLINIC | Facility: HOSPITAL | Age: 86
End: 2021-10-29

## 2021-11-01 ENCOUNTER — OFFICE VISIT (OUTPATIENT)
Dept: FAMILY MEDICINE CLINIC | Facility: HOSPITAL | Age: 86
End: 2021-11-01
Payer: COMMERCIAL

## 2021-11-01 ENCOUNTER — LAB (OUTPATIENT)
Dept: LAB | Facility: HOSPITAL | Age: 86
End: 2021-11-01
Attending: INTERNAL MEDICINE
Payer: COMMERCIAL

## 2021-11-01 VITALS
HEART RATE: 91 BPM | HEIGHT: 71 IN | OXYGEN SATURATION: 96 % | WEIGHT: 286 LBS | SYSTOLIC BLOOD PRESSURE: 130 MMHG | DIASTOLIC BLOOD PRESSURE: 80 MMHG | BODY MASS INDEX: 40.04 KG/M2

## 2021-11-01 DIAGNOSIS — L97.429 HEEL ULCERATION, LEFT, WITH UNSPECIFIED SEVERITY (HCC): ICD-10-CM

## 2021-11-01 DIAGNOSIS — E11.22 TYPE 2 DIABETES MELLITUS WITH STAGE 3 CHRONIC KIDNEY DISEASE, WITHOUT LONG-TERM CURRENT USE OF INSULIN, UNSPECIFIED WHETHER STAGE 3A OR 3B CKD (HCC): ICD-10-CM

## 2021-11-01 DIAGNOSIS — J44.9 MODERATE COPD (CHRONIC OBSTRUCTIVE PULMONARY DISEASE) (HCC): ICD-10-CM

## 2021-11-01 DIAGNOSIS — E11.22 TYPE 2 DIABETES MELLITUS WITH STAGE 3 CHRONIC KIDNEY DISEASE, WITHOUT LONG-TERM CURRENT USE OF INSULIN, UNSPECIFIED WHETHER STAGE 3A OR 3B CKD (HCC): Primary | ICD-10-CM

## 2021-11-01 DIAGNOSIS — N18.30 TYPE 2 DIABETES MELLITUS WITH STAGE 3 CHRONIC KIDNEY DISEASE, WITHOUT LONG-TERM CURRENT USE OF INSULIN, UNSPECIFIED WHETHER STAGE 3A OR 3B CKD (HCC): Primary | ICD-10-CM

## 2021-11-01 DIAGNOSIS — N18.30 TYPE 2 DIABETES MELLITUS WITH STAGE 3 CHRONIC KIDNEY DISEASE, WITHOUT LONG-TERM CURRENT USE OF INSULIN, UNSPECIFIED WHETHER STAGE 3A OR 3B CKD (HCC): ICD-10-CM

## 2021-11-01 DIAGNOSIS — N17.9 ACUTE KIDNEY INJURY SUPERIMPOSED ON CHRONIC KIDNEY DISEASE (HCC): ICD-10-CM

## 2021-11-01 DIAGNOSIS — N18.9 ACUTE KIDNEY INJURY SUPERIMPOSED ON CHRONIC KIDNEY DISEASE (HCC): ICD-10-CM

## 2021-11-01 DIAGNOSIS — I50.33 ACUTE ON CHRONIC DIASTOLIC CONGESTIVE HEART FAILURE (HCC): ICD-10-CM

## 2021-11-01 DIAGNOSIS — R60.0 LOCALIZED EDEMA: Primary | ICD-10-CM

## 2021-11-01 PROBLEM — M79.672 HEEL PAIN, BILATERAL: Status: RESOLVED | Noted: 2021-10-24 | Resolved: 2021-11-01

## 2021-11-01 PROBLEM — M79.671 HEEL PAIN, BILATERAL: Status: RESOLVED | Noted: 2021-10-24 | Resolved: 2021-11-01

## 2021-11-01 LAB
ANION GAP SERPL CALCULATED.3IONS-SCNC: 7 MMOL/L (ref 4–13)
BUN SERPL-MCNC: 56 MG/DL (ref 5–25)
CALCIUM SERPL-MCNC: 9.3 MG/DL (ref 8.3–10.1)
CHLORIDE SERPL-SCNC: 106 MMOL/L (ref 100–108)
CO2 SERPL-SCNC: 24 MMOL/L (ref 21–32)
CREAT SERPL-MCNC: 2.43 MG/DL (ref 0.6–1.3)
GFR SERPL CREATININE-BSD FRML MDRD: 23 ML/MIN/1.73SQ M
GLUCOSE SERPL-MCNC: 130 MG/DL (ref 65–140)
POTASSIUM SERPL-SCNC: 4.7 MMOL/L (ref 3.5–5.3)
SODIUM SERPL-SCNC: 137 MMOL/L (ref 136–145)

## 2021-11-01 PROCEDURE — 80048 BASIC METABOLIC PNL TOTAL CA: CPT

## 2021-11-01 PROCEDURE — 99496 TRANSJ CARE MGMT HIGH F2F 7D: CPT | Performed by: INTERNAL MEDICINE

## 2021-11-01 PROCEDURE — 1111F DSCHRG MED/CURRENT MED MERGE: CPT | Performed by: INTERNAL MEDICINE

## 2021-11-01 PROCEDURE — 36415 COLL VENOUS BLD VENIPUNCTURE: CPT

## 2021-11-01 RX ORDER — BLOOD SUGAR DIAGNOSTIC
STRIP MISCELLANEOUS
Qty: 100 STRIP | Refills: 3 | Status: SHIPPED | OUTPATIENT
Start: 2021-11-01

## 2021-11-01 RX ORDER — TRAMADOL HYDROCHLORIDE 50 MG/1
50 TABLET ORAL EVERY 8 HOURS PRN
Qty: 20 TABLET | Refills: 1 | Status: SHIPPED | OUTPATIENT
Start: 2021-11-01 | End: 2022-03-15

## 2021-11-02 ENCOUNTER — TELEPHONE (OUTPATIENT)
Dept: FAMILY MEDICINE CLINIC | Facility: HOSPITAL | Age: 86
End: 2021-11-02

## 2021-11-02 DIAGNOSIS — R79.89 ELEVATED SERUM CREATININE: Primary | ICD-10-CM

## 2021-11-04 ENCOUNTER — OFFICE VISIT (OUTPATIENT)
Dept: WOUND CARE | Facility: HOSPITAL | Age: 86
End: 2021-11-04
Payer: COMMERCIAL

## 2021-11-04 VITALS
WEIGHT: 288 LBS | TEMPERATURE: 97.4 F | SYSTOLIC BLOOD PRESSURE: 132 MMHG | BODY MASS INDEX: 39.01 KG/M2 | DIASTOLIC BLOOD PRESSURE: 84 MMHG | HEIGHT: 72 IN | RESPIRATION RATE: 20 BRPM | HEART RATE: 80 BPM

## 2021-11-04 DIAGNOSIS — R60.0 LOCALIZED EDEMA: Primary | ICD-10-CM

## 2021-11-04 PROBLEM — L97.921 SKIN ULCER OF LEFT LOWER LEG, LIMITED TO BREAKDOWN OF SKIN (HCC): Status: RESOLVED | Noted: 2021-10-22 | Resolved: 2021-11-04

## 2021-11-04 PROCEDURE — 99212 OFFICE O/P EST SF 10 MIN: CPT | Performed by: PODIATRIST

## 2021-11-04 PROCEDURE — 99203 OFFICE O/P NEW LOW 30 MIN: CPT | Performed by: PODIATRIST

## 2021-11-05 ENCOUNTER — LAB (OUTPATIENT)
Dept: LAB | Facility: HOSPITAL | Age: 86
End: 2021-11-05
Attending: INTERNAL MEDICINE
Payer: COMMERCIAL

## 2021-11-05 DIAGNOSIS — R79.89 ELEVATED SERUM CREATININE: ICD-10-CM

## 2021-11-05 LAB
ANION GAP SERPL CALCULATED.3IONS-SCNC: 8 MMOL/L (ref 4–13)
BUN SERPL-MCNC: 62 MG/DL (ref 5–25)
CALCIUM SERPL-MCNC: 9.5 MG/DL (ref 8.3–10.1)
CHLORIDE SERPL-SCNC: 106 MMOL/L (ref 100–108)
CO2 SERPL-SCNC: 24 MMOL/L (ref 21–32)
CREAT SERPL-MCNC: 2.18 MG/DL (ref 0.6–1.3)
GFR SERPL CREATININE-BSD FRML MDRD: 27 ML/MIN/1.73SQ M
GLUCOSE P FAST SERPL-MCNC: 92 MG/DL (ref 65–99)
POTASSIUM SERPL-SCNC: 4.4 MMOL/L (ref 3.5–5.3)
SODIUM SERPL-SCNC: 138 MMOL/L (ref 136–145)

## 2021-11-05 PROCEDURE — 80048 BASIC METABOLIC PNL TOTAL CA: CPT

## 2021-11-05 PROCEDURE — 36415 COLL VENOUS BLD VENIPUNCTURE: CPT

## 2021-11-08 DIAGNOSIS — N18.30 TYPE 2 DIABETES MELLITUS WITH STAGE 3 CHRONIC KIDNEY DISEASE, WITHOUT LONG-TERM CURRENT USE OF INSULIN, UNSPECIFIED WHETHER STAGE 3A OR 3B CKD (HCC): Primary | ICD-10-CM

## 2021-11-08 DIAGNOSIS — E11.22 TYPE 2 DIABETES MELLITUS WITH STAGE 3 CHRONIC KIDNEY DISEASE, WITHOUT LONG-TERM CURRENT USE OF INSULIN, UNSPECIFIED WHETHER STAGE 3A OR 3B CKD (HCC): Primary | ICD-10-CM

## 2021-11-09 ENCOUNTER — OFFICE VISIT (OUTPATIENT)
Dept: CARDIOLOGY CLINIC | Facility: CLINIC | Age: 86
End: 2021-11-09
Payer: COMMERCIAL

## 2021-11-09 VITALS
HEART RATE: 88 BPM | DIASTOLIC BLOOD PRESSURE: 72 MMHG | BODY MASS INDEX: 40.1 KG/M2 | SYSTOLIC BLOOD PRESSURE: 140 MMHG | HEIGHT: 71 IN | WEIGHT: 286.4 LBS

## 2021-11-09 DIAGNOSIS — E66.01 MORBID OBESITY DUE TO EXCESS CALORIES (HCC): ICD-10-CM

## 2021-11-09 DIAGNOSIS — I10 ESSENTIAL HYPERTENSION: ICD-10-CM

## 2021-11-09 DIAGNOSIS — G47.33 OSA (OBSTRUCTIVE SLEEP APNEA): ICD-10-CM

## 2021-11-09 DIAGNOSIS — I50.32 CHRONIC DIASTOLIC HEART FAILURE (HCC): Primary | ICD-10-CM

## 2021-11-09 DIAGNOSIS — R09.02 HYPOXIA: ICD-10-CM

## 2021-11-09 DIAGNOSIS — I50.33 ACUTE ON CHRONIC DIASTOLIC CONGESTIVE HEART FAILURE (HCC): ICD-10-CM

## 2021-11-09 PROCEDURE — 99214 OFFICE O/P EST MOD 30 MIN: CPT | Performed by: NURSE PRACTITIONER

## 2021-11-09 RX ORDER — TORSEMIDE 20 MG/1
TABLET ORAL
Qty: 120 TABLET | Refills: 0
Start: 2021-11-09 | End: 2021-11-19 | Stop reason: SDUPTHER

## 2021-11-19 ENCOUNTER — OFFICE VISIT (OUTPATIENT)
Dept: PULMONOLOGY | Facility: HOSPITAL | Age: 86
End: 2021-11-19
Payer: COMMERCIAL

## 2021-11-19 VITALS
RESPIRATION RATE: 20 BRPM | HEIGHT: 71 IN | TEMPERATURE: 97.1 F | DIASTOLIC BLOOD PRESSURE: 80 MMHG | HEART RATE: 91 BPM | WEIGHT: 285 LBS | OXYGEN SATURATION: 92 % | BODY MASS INDEX: 39.9 KG/M2 | SYSTOLIC BLOOD PRESSURE: 148 MMHG

## 2021-11-19 DIAGNOSIS — I50.33 ACUTE ON CHRONIC DIASTOLIC CONGESTIVE HEART FAILURE (HCC): ICD-10-CM

## 2021-11-19 DIAGNOSIS — G47.33 OSA (OBSTRUCTIVE SLEEP APNEA): ICD-10-CM

## 2021-11-19 DIAGNOSIS — J31.0 RHINITIS MEDICAMENTOSA: Primary | ICD-10-CM

## 2021-11-19 DIAGNOSIS — T48.5X5A RHINITIS MEDICAMENTOSA: Primary | ICD-10-CM

## 2021-11-19 DIAGNOSIS — J44.9 MODERATE COPD (CHRONIC OBSTRUCTIVE PULMONARY DISEASE) (HCC): ICD-10-CM

## 2021-11-19 PROCEDURE — 1036F TOBACCO NON-USER: CPT | Performed by: INTERNAL MEDICINE

## 2021-11-19 PROCEDURE — 1160F RVW MEDS BY RX/DR IN RCRD: CPT | Performed by: INTERNAL MEDICINE

## 2021-11-19 PROCEDURE — 99214 OFFICE O/P EST MOD 30 MIN: CPT | Performed by: INTERNAL MEDICINE

## 2021-11-19 RX ORDER — LORATADINE 10 MG/1
10 TABLET ORAL DAILY
Qty: 90 TABLET | Refills: 3 | Status: SHIPPED | OUTPATIENT
Start: 2021-11-19 | End: 2022-03-15

## 2021-11-19 RX ORDER — TORSEMIDE 20 MG/1
TABLET ORAL
Qty: 270 TABLET | Refills: 3 | Status: SHIPPED | OUTPATIENT
Start: 2021-11-19 | End: 2022-05-15

## 2021-11-28 DIAGNOSIS — N40.1 BENIGN PROSTATIC HYPERPLASIA WITH URINARY HESITANCY: ICD-10-CM

## 2021-11-28 DIAGNOSIS — R39.11 BENIGN PROSTATIC HYPERPLASIA WITH URINARY HESITANCY: ICD-10-CM

## 2021-11-28 RX ORDER — TAMSULOSIN HYDROCHLORIDE 0.4 MG/1
CAPSULE ORAL
Qty: 90 CAPSULE | Refills: 3 | Status: SHIPPED | OUTPATIENT
Start: 2021-11-28

## 2021-12-06 DIAGNOSIS — J41.0 SIMPLE CHRONIC BRONCHITIS (HCC): ICD-10-CM

## 2021-12-06 DIAGNOSIS — J44.9 MODERATE COPD (CHRONIC OBSTRUCTIVE PULMONARY DISEASE) (HCC): ICD-10-CM

## 2021-12-06 RX ORDER — GLIMEPIRIDE 2 MG/1
TABLET ORAL
Qty: 90 TABLET | Refills: 1 | Status: SHIPPED | OUTPATIENT
Start: 2021-12-06 | End: 2022-04-07

## 2021-12-13 ENCOUNTER — OFFICE VISIT (OUTPATIENT)
Dept: FAMILY MEDICINE CLINIC | Facility: HOSPITAL | Age: 86
End: 2021-12-13
Payer: COMMERCIAL

## 2021-12-13 VITALS
HEIGHT: 71 IN | HEART RATE: 83 BPM | OXYGEN SATURATION: 94 % | BODY MASS INDEX: 40.46 KG/M2 | SYSTOLIC BLOOD PRESSURE: 130 MMHG | DIASTOLIC BLOOD PRESSURE: 78 MMHG | WEIGHT: 289 LBS

## 2021-12-13 DIAGNOSIS — R60.0 LOCALIZED EDEMA: ICD-10-CM

## 2021-12-13 DIAGNOSIS — N18.30 TYPE 2 DIABETES MELLITUS WITH STAGE 3 CHRONIC KIDNEY DISEASE, WITHOUT LONG-TERM CURRENT USE OF INSULIN, UNSPECIFIED WHETHER STAGE 3A OR 3B CKD (HCC): ICD-10-CM

## 2021-12-13 DIAGNOSIS — E11.22 TYPE 2 DIABETES MELLITUS WITH STAGE 3 CHRONIC KIDNEY DISEASE, WITHOUT LONG-TERM CURRENT USE OF INSULIN, UNSPECIFIED WHETHER STAGE 3A OR 3B CKD (HCC): ICD-10-CM

## 2021-12-13 DIAGNOSIS — J41.0 SIMPLE CHRONIC BRONCHITIS (HCC): Primary | ICD-10-CM

## 2021-12-13 DIAGNOSIS — E66.01 MORBID OBESITY DUE TO EXCESS CALORIES (HCC): ICD-10-CM

## 2021-12-13 PROCEDURE — 99214 OFFICE O/P EST MOD 30 MIN: CPT | Performed by: INTERNAL MEDICINE

## 2021-12-28 ENCOUNTER — LAB (OUTPATIENT)
Dept: LAB | Facility: HOSPITAL | Age: 86
End: 2021-12-28
Attending: INTERNAL MEDICINE
Payer: COMMERCIAL

## 2021-12-28 ENCOUNTER — OFFICE VISIT (OUTPATIENT)
Dept: CARDIOLOGY CLINIC | Facility: CLINIC | Age: 86
End: 2021-12-28
Payer: COMMERCIAL

## 2021-12-28 VITALS
HEIGHT: 71 IN | WEIGHT: 297.2 LBS | SYSTOLIC BLOOD PRESSURE: 168 MMHG | HEART RATE: 76 BPM | DIASTOLIC BLOOD PRESSURE: 74 MMHG | OXYGEN SATURATION: 92 % | BODY MASS INDEX: 41.61 KG/M2

## 2021-12-28 DIAGNOSIS — E11.22 TYPE 2 DIABETES MELLITUS WITH STAGE 3 CHRONIC KIDNEY DISEASE, WITHOUT LONG-TERM CURRENT USE OF INSULIN, UNSPECIFIED WHETHER STAGE 3A OR 3B CKD (HCC): ICD-10-CM

## 2021-12-28 DIAGNOSIS — N18.30 TYPE 2 DIABETES MELLITUS WITH STAGE 3 CHRONIC KIDNEY DISEASE, WITHOUT LONG-TERM CURRENT USE OF INSULIN, UNSPECIFIED WHETHER STAGE 3A OR 3B CKD (HCC): ICD-10-CM

## 2021-12-28 DIAGNOSIS — I50.32 CHRONIC DIASTOLIC CONGESTIVE HEART FAILURE (HCC): Primary | ICD-10-CM

## 2021-12-28 DIAGNOSIS — I10 ESSENTIAL HYPERTENSION: ICD-10-CM

## 2021-12-28 DIAGNOSIS — J44.9 MODERATE COPD (CHRONIC OBSTRUCTIVE PULMONARY DISEASE) (HCC): ICD-10-CM

## 2021-12-28 LAB
ALBUMIN SERPL BCP-MCNC: 3.8 G/DL (ref 3.5–5)
ALP SERPL-CCNC: 62 U/L (ref 46–116)
ALT SERPL W P-5'-P-CCNC: 23 U/L (ref 12–78)
ANION GAP SERPL CALCULATED.3IONS-SCNC: 8 MMOL/L (ref 4–13)
AST SERPL W P-5'-P-CCNC: 9 U/L (ref 5–45)
BILIRUB SERPL-MCNC: 0.36 MG/DL (ref 0.2–1)
BUN SERPL-MCNC: 60 MG/DL (ref 5–25)
CALCIUM SERPL-MCNC: 10.2 MG/DL (ref 8.3–10.1)
CHLORIDE SERPL-SCNC: 109 MMOL/L (ref 100–108)
CO2 SERPL-SCNC: 23 MMOL/L (ref 21–32)
CREAT SERPL-MCNC: 2.24 MG/DL (ref 0.6–1.3)
GFR SERPL CREATININE-BSD FRML MDRD: 25 ML/MIN/1.73SQ M
GLUCOSE SERPL-MCNC: 107 MG/DL (ref 65–140)
POTASSIUM SERPL-SCNC: 4.6 MMOL/L (ref 3.5–5.3)
PROT SERPL-MCNC: 7.2 G/DL (ref 6.4–8.2)
SODIUM SERPL-SCNC: 140 MMOL/L (ref 136–145)

## 2021-12-28 PROCEDURE — 1160F RVW MEDS BY RX/DR IN RCRD: CPT | Performed by: INTERNAL MEDICINE

## 2021-12-28 PROCEDURE — 3078F DIAST BP <80 MM HG: CPT | Performed by: INTERNAL MEDICINE

## 2021-12-28 PROCEDURE — 1036F TOBACCO NON-USER: CPT | Performed by: INTERNAL MEDICINE

## 2021-12-28 PROCEDURE — 99214 OFFICE O/P EST MOD 30 MIN: CPT | Performed by: INTERNAL MEDICINE

## 2021-12-28 PROCEDURE — 3077F SYST BP >= 140 MM HG: CPT | Performed by: INTERNAL MEDICINE

## 2021-12-28 PROCEDURE — 80053 COMPREHEN METABOLIC PANEL: CPT

## 2021-12-28 PROCEDURE — 36415 COLL VENOUS BLD VENIPUNCTURE: CPT

## 2022-01-09 DIAGNOSIS — I10 ESSENTIAL HYPERTENSION: ICD-10-CM

## 2022-01-10 RX ORDER — FUROSEMIDE 40 MG/1
TABLET ORAL
Qty: 90 TABLET | Refills: 3 | Status: SHIPPED | OUTPATIENT
Start: 2022-01-10 | End: 2022-03-07 | Stop reason: ALTCHOICE

## 2022-02-20 DIAGNOSIS — I10 ESSENTIAL HYPERTENSION, BENIGN: ICD-10-CM

## 2022-02-21 RX ORDER — LABETALOL 300 MG/1
TABLET, FILM COATED ORAL
Qty: 180 TABLET | Refills: 3 | Status: SHIPPED | OUTPATIENT
Start: 2022-02-21 | End: 2022-04-18 | Stop reason: SDUPTHER

## 2022-03-07 ENCOUNTER — TELEPHONE (OUTPATIENT)
Dept: FAMILY MEDICINE CLINIC | Facility: HOSPITAL | Age: 87
End: 2022-03-07

## 2022-03-07 NOTE — TELEPHONE ENCOUNTER
Pt c/o feet swelling up  Is concerned as it is more swollen than in the past  Is having blotching on skin  Has appt 3/17 but would like to possibly get in sooner   PCB

## 2022-03-15 ENCOUNTER — OFFICE VISIT (OUTPATIENT)
Dept: FAMILY MEDICINE CLINIC | Facility: HOSPITAL | Age: 87
End: 2022-03-15
Payer: COMMERCIAL

## 2022-03-15 ENCOUNTER — LAB (OUTPATIENT)
Dept: LAB | Facility: HOSPITAL | Age: 87
End: 2022-03-15
Attending: INTERNAL MEDICINE
Payer: COMMERCIAL

## 2022-03-15 ENCOUNTER — HOSPITAL ENCOUNTER (OUTPATIENT)
Dept: RADIOLOGY | Facility: HOSPITAL | Age: 87
Discharge: HOME | End: 2022-03-15
Attending: INTERNAL MEDICINE
Payer: COMMERCIAL

## 2022-03-15 VITALS
DIASTOLIC BLOOD PRESSURE: 74 MMHG | SYSTOLIC BLOOD PRESSURE: 118 MMHG | HEART RATE: 76 BPM | OXYGEN SATURATION: 95 % | WEIGHT: 293 LBS | BODY MASS INDEX: 40.87 KG/M2

## 2022-03-15 DIAGNOSIS — J44.9 MODERATE COPD (CHRONIC OBSTRUCTIVE PULMONARY DISEASE) (HCC): ICD-10-CM

## 2022-03-15 DIAGNOSIS — E11.22 TYPE 2 DIABETES MELLITUS WITH STAGE 3 CHRONIC KIDNEY DISEASE, WITHOUT LONG-TERM CURRENT USE OF INSULIN, UNSPECIFIED WHETHER STAGE 3A OR 3B CKD (HCC): Primary | ICD-10-CM

## 2022-03-15 DIAGNOSIS — I50.32 CHRONIC DIASTOLIC CONGESTIVE HEART FAILURE (HCC): ICD-10-CM

## 2022-03-15 DIAGNOSIS — E66.01 MORBID OBESITY DUE TO EXCESS CALORIES (HCC): ICD-10-CM

## 2022-03-15 DIAGNOSIS — J41.0 SIMPLE CHRONIC BRONCHITIS (HCC): ICD-10-CM

## 2022-03-15 DIAGNOSIS — N18.30 TYPE 2 DIABETES MELLITUS WITH STAGE 3 CHRONIC KIDNEY DISEASE, WITHOUT LONG-TERM CURRENT USE OF INSULIN, UNSPECIFIED WHETHER STAGE 3A OR 3B CKD (HCC): Primary | ICD-10-CM

## 2022-03-15 DIAGNOSIS — N18.4 CHRONIC RENAL DISEASE, STAGE IV (HCC): ICD-10-CM

## 2022-03-15 LAB
CHOLEST SERPL-MCNC: 191 MG/DL
CREAT UR-MCNC: 75.2 MG/DL
ERYTHROCYTE [DISTWIDTH] IN BLOOD BY AUTOMATED COUNT: 14 % (ref 11.6–15.1)
EST. AVERAGE GLUCOSE BLD GHB EST-MCNC: 126 MG/DL
HBA1C MFR BLD: 6 %
HCT VFR BLD AUTO: 45 % (ref 36.5–49.3)
HDLC SERPL-MCNC: 60 MG/DL
HGB BLD-MCNC: 14.4 G/DL (ref 12–17)
LDLC SERPL CALC-MCNC: 106 MG/DL (ref 0–100)
MCH RBC QN AUTO: 29.1 PG (ref 26.8–34.3)
MCHC RBC AUTO-ENTMCNC: 32 G/DL (ref 31.4–37.4)
MCV RBC AUTO: 91 FL (ref 82–98)
MICROALBUMIN UR-MCNC: 51.7 MG/L (ref 0–20)
MICROALBUMIN/CREAT 24H UR: 69 MG/G CREATININE (ref 0–30)
NT-PROBNP SERPL-MCNC: 189 PG/ML
PLATELET # BLD AUTO: 214 THOUSANDS/UL (ref 149–390)
PMV BLD AUTO: 10 FL (ref 8.9–12.7)
RBC # BLD AUTO: 4.94 MILLION/UL (ref 3.88–5.62)
TRIGL SERPL-MCNC: 127 MG/DL
WBC # BLD AUTO: 9.79 THOUSAND/UL (ref 4.31–10.16)

## 2022-03-15 PROCEDURE — 71046 X-RAY EXAM CHEST 2 VIEWS: CPT

## 2022-03-15 PROCEDURE — 82043 UR ALBUMIN QUANTITATIVE: CPT | Performed by: INTERNAL MEDICINE

## 2022-03-15 PROCEDURE — 36415 COLL VENOUS BLD VENIPUNCTURE: CPT

## 2022-03-15 PROCEDURE — 83880 ASSAY OF NATRIURETIC PEPTIDE: CPT

## 2022-03-15 PROCEDURE — 85027 COMPLETE CBC AUTOMATED: CPT

## 2022-03-15 PROCEDURE — 83036 HEMOGLOBIN GLYCOSYLATED A1C: CPT

## 2022-03-15 PROCEDURE — 99214 OFFICE O/P EST MOD 30 MIN: CPT | Performed by: INTERNAL MEDICINE

## 2022-03-15 PROCEDURE — 80061 LIPID PANEL: CPT

## 2022-03-15 PROCEDURE — 82570 ASSAY OF URINE CREATININE: CPT | Performed by: INTERNAL MEDICINE

## 2022-03-15 NOTE — PROGRESS NOTES
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)  -     XR chest pa & lateral; Future    Chronic diastolic congestive heart failure (HCC)  -     NT-BNP PRO; Future    Chronic renal disease, stage IV ([address] )          All of the above diagnoses have been assessed  Additional COMMENTS/PLAN:  Patient will get a a BMP ordered to his meds other already pending  Chest x-ray be done as well  He does appear to be fluid overloaded  However he is already pretty much getting close to being maxed out on diuretics  Patient be seen back in 4 weeks with attention to the symptoms pending on what I decided to after I see his blood work and chest x-ray  Subjective:      Patient ID: Tena Ortega is a 81 y o  male  HPI     Last few weeks he has had increasing TRIMBLE  Has not been watching weight  Also noting some bruising  The following portions of the patient's history were revised and updated as appropriate: Problem list, allergies, med list, FH, SH, Past medical and surgical histories  Current Outpatient Medications   Medication Sig Dispense Refill    albuterol (ProAir HFA) 90 mcg/act inhaler Inhale 2 puffs every 4 (four) hours as needed for wheezing 18 g 3    fluticasone (FLONASE) 50 mcg/act nasal spray 2 sprays into each nostril 2 (two) times a day 15 8 mL 3    fluticasone-umeclidinium-vilanterol (Trelegy Ellipta) 200-62 5-25 MCG/INH AEPB inhaler Inhale 1 puff daily Rinse mouth after use   180 blister 3    glimepiride (AMARYL) 2 mg tablet TAKE 1 TABLET BY MOUTH DAILY WITH BREAKFAST 90 tablet 1    glucose blood (OneTouch Verio) test strip Use one new strip daily DX:E11 9 100 strip 3    labetalol (NORMODYNE) 300 mg tablet TAKE 1 TABLET BY MOUTH TWICE A  tablet 3    melatonin 1 mg Take 5 mg by mouth daily at bedtime      metFORMIN (GLUCOPHAGE) 500 mg tablet Take 1 tablet (500 mg total) by mouth 2 (two) times a day with meals 180 tablet 3    potassium chloride (K-DUR,KLOR-CON) 20 mEq tablet Take 1 tablet (20 mEq total) by mouth daily 30 tablet 5    predniSONE 10 mg tablet Take 1 tablet (10 mg total) by mouth daily Or as directed 90 tablet 2    tamsulosin (FLOMAX) 0 4 mg TAKE 1 CAPSULE BY MOUTH EVERY DAY WITH DINNER 90 capsule 3    torsemide (DEMADEX) 20 mg tablet Take 2 pills ( 40 mg) in the am, 20 mg in the pm ( 1 tab) 270 tablet 3     No current facility-administered medications for this visit  Review of Systems   All other systems reviewed and are negative  Objective:    /74   Pulse 76   Wt 133 kg (293 lb)   SpO2 95%   BMI 40 87 kg/m²     BP Readings from Last 3 Encounters:   03/15/22 118/74   12/28/21 168/74   12/13/21 130/78                  Wt Readings from Last 3 Encounters:   03/15/22 133 kg (293 lb)   12/28/21 135 kg (297 lb 3 2 oz)   12/13/21 131 kg (289 lb)         Physical Exam  Vitals reviewed  Constitutional:       Appearance: He is obese  Neck:      Comments: No JVD  Cardiovascular:      Rate and Rhythm: Normal rate and regular rhythm  Pulmonary:      Breath sounds: Wheezing present  Comments: Dec BS bilaterally  Abdominal:      General: Abdomen is flat  Bowel sounds are normal       Palpations: Abdomen is soft  Musculoskeletal:      Comments: Plus 2 edema   Skin:     General: Skin is warm and dry  Neurological:      General: No focal deficit present  Mental Status: He is alert and oriented to person, place, and time  No visits with results within 2 Week(s) from this visit     Latest known visit with results is:   Lab on 12/28/2021   Component Date Value Ref Range Status    Sodium 12/28/2021 140  136 - 145 mmol/L Final    Potassium 12/28/2021 4 6  3 5 - 5 3 mmol/L Final    Chloride 12/28/2021 109* 100 - 108 mmol/L Final    CO2 12/28/2021 23  21 - 32 mmol/L Final    ANION GAP 12/28/2021 8  4 - 13 mmol/L Final    BUN 12/28/2021 60* 5 - 25 mg/dL Final    Creatinine 12/28/2021 2 24* 0 60 - 1 30 mg/dL Final    Standardized to IDMS reference method    Glucose 12/28/2021 107  65 - 140 mg/dL Final    If the patient is fasting, the ADA then defines impaired fasting glucose as > 100 mg/dL and diabetes as > or equal to 123 mg/dL  Specimen collection should occur prior to Sulfasalazine administration due to the potential for falsely depressed results  Specimen collection should occur prior to Sulfapyridine administration due to the potential for falsely elevated results   Calcium 12/28/2021 10 2* 8 3 - 10 1 mg/dL Final    AST 12/28/2021 9  5 - 45 U/L Final    Specimen collection should occur prior to Sulfasalazine administration due to the potential for falsely depressed results   ALT 12/28/2021 23  12 - 78 U/L Final    Specimen collection should occur prior to Sulfasalazine and/or Sulfapyridine administration due to the potential for falsely depressed results   Alkaline Phosphatase 12/28/2021 62  46 - 116 U/L Final    Total Protein 12/28/2021 7 2  6 4 - 8 2 g/dL Final    Albumin 12/28/2021 3 8  3 5 - 5 0 g/dL Final    Total Bilirubin 12/28/2021 0 36  0 20 - 1 00 mg/dL Final    Use of this assay is not recommended for patients undergoing treatment with eltrombopag due to the potential for falsely elevated results   eGFR 12/28/2021 25  ml/min/1 73sq m Final         Evelyn Villatoro MD    Some or all of this note was generated with a voice recognition dictation system and therefore my contain grammatical or spelling errors

## 2022-03-16 NOTE — RESULT ENCOUNTER NOTE
Call pt  I sent zaroxolyn to his pharmacy  2 5 mg one day per week  He should have repeat BMP before next apt

## 2022-03-18 ENCOUNTER — OFFICE VISIT (OUTPATIENT)
Dept: PULMONOLOGY | Facility: HOSPITAL | Age: 87
End: 2022-03-18
Payer: COMMERCIAL

## 2022-03-18 VITALS
WEIGHT: 293 LBS | SYSTOLIC BLOOD PRESSURE: 130 MMHG | DIASTOLIC BLOOD PRESSURE: 80 MMHG | TEMPERATURE: 97.1 F | HEART RATE: 87 BPM | HEIGHT: 71 IN | OXYGEN SATURATION: 94 % | RESPIRATION RATE: 16 BRPM | BODY MASS INDEX: 41.02 KG/M2

## 2022-03-18 DIAGNOSIS — J44.9 MODERATE COPD (CHRONIC OBSTRUCTIVE PULMONARY DISEASE) (HCC): Primary | ICD-10-CM

## 2022-03-18 DIAGNOSIS — G47.33 OSA (OBSTRUCTIVE SLEEP APNEA): ICD-10-CM

## 2022-03-18 DIAGNOSIS — I50.32 CHRONIC DIASTOLIC CONGESTIVE HEART FAILURE (HCC): ICD-10-CM

## 2022-03-18 PROCEDURE — 99215 OFFICE O/P EST HI 40 MIN: CPT | Performed by: INTERNAL MEDICINE

## 2022-03-18 NOTE — ASSESSMENT & PLAN NOTE
Wt Readings from Last 3 Encounters:   03/18/22 133 kg (293 lb)   03/15/22 133 kg (293 lb)   12/28/21 135 kg (297 lb 3 2 oz)     I reviewed his chest x-ray and blood work with him today  I also reviewed the Dr Jonah Trevizo would like to start him on Zaroxolyn 1 pill per week and that the script is waiting for him  I have given him a script for a basic metabolic panel to get prior to his next visit with Dr Marino Cutler

## 2022-03-18 NOTE — PROGRESS NOTES
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)  Tony continues to struggle with dyspnea with exertion more related to volume  He has no wheezing today  He will maintain on trilogy 1 puff daily in addition to his albuterol as needed  He has not been using it but does maintain on 10 mg of prednisone  CHI (obstructive sleep apnea)  Continue nocturnal oxygen  Chronic diastolic congestive heart failure (HCC)  Wt Readings from Last 3 Encounters:   03/18/22 133 kg (293 lb)   03/15/22 133 kg (293 lb)   12/28/21 135 kg (297 lb 3 2 oz)     I reviewed his chest x-ray and blood work with him today  I also reviewed the Dr Lyn Mendoza would like to start him on Zaroxolyn 1 pill per week and that the script is waiting for him  I have given him a script for a basic metabolic panel to get prior to his next visit with Dr Marquis Peña  Diagnoses and all orders for this visit:    Moderate COPD (chronic obstructive pulmonary disease) ([address] )  -     Basic metabolic panel; Future    CHI (obstructive sleep apnea)    Chronic diastolic congestive heart failure (HCC)          Subjective:      Patient ID: Susan Jones is a 81 y o  male  Mr Tanesha Lara states he has had more trouble with his breathing in the past month  He gets dyspneic with exertion more than his baseline  He denies any cough wheeze fever chills  He does note increased leg swelling  The following portions of the patient's history were reviewed and updated as appropriate: allergies, current medications, past family history, past medical history, past social history, past surgical history and problem list     Review of Systems   Constitutional: Negative  HENT: Negative  Eyes: Negative  Respiratory: Positive for shortness of breath  Cardiovascular: Negative  Gastrointestinal: Negative  Endocrine: Negative  Genitourinary: Negative  Allergic/Immunologic: Negative  Neurological: Negative      Hematological: Negative  Psychiatric/Behavioral: Negative  Objective:      /80 (BP Location: Left arm, Patient Position: Sitting, Cuff Size: Standard)   Pulse 87   Temp (!) 97 1 °F (36 2 °C) (Tympanic)   Resp 16   Ht 5' 11" (1 803 m)   Wt 133 kg (293 lb)   SpO2 94%   BMI 40 87 kg/m²          Physical Exam  Constitutional:       Appearance: He is well-developed  HENT:      Head: Normocephalic  Eyes:      Pupils: Pupils are equal, round, and reactive to light  Cardiovascular:      Rate and Rhythm: Normal rate  Heart sounds: No murmur heard  Pulmonary:      Effort: Pulmonary effort is normal  No respiratory distress  Breath sounds: Normal breath sounds  No wheezing or rales  Abdominal:      Palpations: Abdomen is soft  Musculoskeletal:         General: Normal range of motion  Cervical back: Normal range of motion and neck supple  Right lower leg: Edema present  Left lower leg: Edema present  Skin:     General: Skin is warm and dry  Neurological:      Mental Status: He is alert and oriented to person, place, and time

## 2022-03-18 NOTE — ASSESSMENT & PLAN NOTE
Mee Chowdhurydomingo continues to struggle with dyspnea with exertion more related to volume  He has no wheezing today  He will maintain on trilogy 1 puff daily in addition to his albuterol as needed  He has not been using it but does maintain on 10 mg of prednisone

## 2022-03-21 ENCOUNTER — TELEPHONE (OUTPATIENT)
Dept: FAMILY MEDICINE CLINIC | Facility: HOSPITAL | Age: 87
End: 2022-03-21

## 2022-03-21 DIAGNOSIS — J44.9 CHRONIC OBSTRUCTIVE PULMONARY DISEASE, UNSPECIFIED COPD TYPE (HCC): ICD-10-CM

## 2022-03-21 NOTE — TELEPHONE ENCOUNTER
PATIENT RECEIVED LETTER FROM [employer] THAT STATES HE NEEDS TO COMPLETE A 6 MIN WALK TEST TO CONTINE TO QUALIFY FOR HOME OXYGEN - CAN HE HAVE THAT DONE IN OFFICE DURING HIS NEXT APPOINTMENT WITH DR Lindsey Jade?       PATIENT ALSO STATES THAT HE NEEDS A REFILL ON HIS PROAIR INHALER - PLEASE SEND TO [facility]

## 2022-03-23 ENCOUNTER — PROCEDURE VISIT (OUTPATIENT)
Dept: PULMONOLOGY | Facility: HOSPITAL | Age: 87
End: 2022-03-23
Payer: COMMERCIAL

## 2022-03-23 VITALS
TEMPERATURE: 96.9 F | OXYGEN SATURATION: 95 % | BODY MASS INDEX: 39.76 KG/M2 | SYSTOLIC BLOOD PRESSURE: 120 MMHG | WEIGHT: 284 LBS | HEIGHT: 71 IN | DIASTOLIC BLOOD PRESSURE: 80 MMHG | HEART RATE: 86 BPM

## 2022-03-23 DIAGNOSIS — G47.33 OSA (OBSTRUCTIVE SLEEP APNEA): ICD-10-CM

## 2022-03-23 DIAGNOSIS — I50.32 CHRONIC DIASTOLIC CONGESTIVE HEART FAILURE (HCC): ICD-10-CM

## 2022-03-23 DIAGNOSIS — J96.11 CHRONIC RESPIRATORY FAILURE WITH HYPOXIA (HCC): ICD-10-CM

## 2022-03-23 DIAGNOSIS — N18.30 STAGE 3 CHRONIC KIDNEY DISEASE, UNSPECIFIED WHETHER STAGE 3A OR 3B CKD (HCC): ICD-10-CM

## 2022-03-23 DIAGNOSIS — J44.9 MODERATE COPD (CHRONIC OBSTRUCTIVE PULMONARY DISEASE) (HCC): Primary | ICD-10-CM

## 2022-03-23 PROCEDURE — 99214 OFFICE O/P EST MOD 30 MIN: CPT | Performed by: PHYSICIAN ASSISTANT

## 2022-03-23 PROCEDURE — 94618 PULMONARY STRESS TESTING: CPT | Performed by: PHYSICIAN ASSISTANT

## 2022-03-23 PROCEDURE — 1160F RVW MEDS BY RX/DR IN RCRD: CPT | Performed by: PHYSICIAN ASSISTANT

## 2022-03-23 PROCEDURE — 3079F DIAST BP 80-89 MM HG: CPT | Performed by: PHYSICIAN ASSISTANT

## 2022-03-23 PROCEDURE — 3074F SYST BP LT 130 MM HG: CPT | Performed by: PHYSICIAN ASSISTANT

## 2022-03-23 PROCEDURE — 1036F TOBACCO NON-USER: CPT | Performed by: PHYSICIAN ASSISTANT

## 2022-03-23 NOTE — PROGRESS NOTES
Assessment & Plan:      1  Moderate COPD (chronic obstructive pulmonary disease) ([facility] [address] )  Diagnostic Sleep Study   2  Chronic respiratory failure with hypoxia (HCC)  POCT 6 minute walk   3  CHI (obstructive sleep apnea)  Diagnostic Sleep Study   4  Chronic diastolic congestive heart failure (HCC)  Diagnostic Sleep Study   5  Stage 3 chronic kidney disease, unspecified whether stage 3a or 3b CKD (HCC)         · 6 minute walk today completed per insurance guidelines  This did not demonstrate any need for supplemental oxygen at rest or with exertion  I suspect that there is underlying nocturnal hypoxia along possibly associated with CHI  Obstructive Sleep Apnea Etiology pathogenesis discussed with the patient in detail  Patient has signs and symptoms of obstructive sleep apnea  He will undergo an all night polysomnogram   Consequences of untreated sleep apnea including excessive daytime sleepiness and increased risk for myocardial infarction stroke atrial fibrillation discussed with the patient  Testing procedure was discussed in detail  It would be beneficial for the patient to continue using oxygen nocturnally until we receive sleep study results  · Continue diuretics as prescribed by PCP  Seem to be working well  · Continue Trelegy Ellipta 1 puff daily, albuterol as needed  · Patient maintained on prednisone 10 mg daily per his primary pulmonologist, Dr Osorio Devlin    Subjective:     Patient ID: Ann Epstein is a 81 y o  male  Chief Complaint:    Vandana Garcia is a pleasant 81-year-old male with past medical history including COPD, chronic hypoxic respiratory failure, CHF, morbid obesity presenting for follow-up and 6 minute walk  Patient has chronic dyspnea on exertion which seems to be improving after being started on a new diuretic  He has already lost 14 lb and notes improved exercise endurance and significantly improved lower extremity edema    He has a pulse oximeter at home which he regularly checks, he says the oxygen saturations are consistently between 93 and 94% without using his 2 L  He rarely uses the oxygen during the day or night  He does admit to signs symptoms of obstructive sleep apnea including multiple nocturnal disturbances and excessive daytime sleepiness  He says that he is forgetful and cannot remember if he has had a sleep study before  He quit smoking over 20 years ago and has greater than 80 pack year history  The following portions of the patient's history were reviewed in this encounter and updated as appropriate: Past medical, social, surgical, family, allergies    Review of Systems   Constitutional: Positive for fatigue  Respiratory: Positive for cough and shortness of breath  Cardiovascular: Positive for leg swelling  Psychiatric/Behavioral: Positive for sleep disturbance  All other systems reviewed and are negative  Objective:  Vitals:    03/23/22 1411   BP: 120/80   BP Location: Left arm   Patient Position: Sitting   Cuff Size: Standard   Pulse: 86   Temp: (!) 96 9 °F (36 1 °C)   TempSrc: Tympanic   SpO2: 95%   Weight: 129 kg (284 lb)   Height: 5' 11" (1 803 m)       Physical Exam  Vitals and nursing note reviewed  Constitutional:       General: He is not in acute distress  Appearance: He is well-developed  He is not diaphoretic  HENT:      Head: Normocephalic and atraumatic  Right Ear: External ear normal       Left Ear: External ear normal       Nose: Nose normal    Eyes:      General: No scleral icterus  Right eye: No discharge  Left eye: No discharge  Conjunctiva/sclera: Conjunctivae normal    Neck:      Trachea: No tracheal deviation  Cardiovascular:      Rate and Rhythm: Normal rate and regular rhythm  Heart sounds: Normal heart sounds  No murmur heard  No friction rub  No gallop  Pulmonary:      Effort: Pulmonary effort is normal  No respiratory distress  Breath sounds: No stridor  No wheezing        Comments: Decreased breath sounds  Abdominal:      General: There is no distension  Tenderness: There is no guarding  Musculoskeletal:         General: No tenderness or deformity  Normal range of motion  Cervical back: Normal range of motion and neck supple  Right lower leg: Edema present  Left lower leg: Edema present  Skin:     General: Skin is warm and dry  Coloration: Skin is not pale  Findings: No erythema or rash  Neurological:      Mental Status: He is alert and oriented to person, place, and time  Cranial Nerves: No cranial nerve deficit  Motor: No abnormal muscle tone  Psychiatric:         Behavior: Behavior normal          Thought Content:  Thought content normal          Judgment: Judgment normal          Lab Review:   Lab on 03/15/2022   Component Date Value    NT-proBNP 03/15/2022 189

## 2022-03-28 DIAGNOSIS — I50.33 ACUTE ON CHRONIC DIASTOLIC (CONGESTIVE) HEART FAILURE (HCC): ICD-10-CM

## 2022-03-28 RX ORDER — POTASSIUM CHLORIDE 20 MEQ/1
20 TABLET, EXTENDED RELEASE ORAL DAILY
Qty: 30 TABLET | Refills: 5 | Status: ON HOLD | OUTPATIENT
Start: 2022-03-28 | End: 2022-05-15 | Stop reason: SDUPTHER

## 2022-04-07 DIAGNOSIS — J44.9 MODERATE COPD (CHRONIC OBSTRUCTIVE PULMONARY DISEASE) (HCC): ICD-10-CM

## 2022-04-07 DIAGNOSIS — E11.9 TYPE 2 DIABETES MELLITUS WITHOUT COMPLICATION, WITHOUT LONG-TERM CURRENT USE OF INSULIN (HCC): ICD-10-CM

## 2022-04-07 DIAGNOSIS — J41.0 SIMPLE CHRONIC BRONCHITIS (HCC): ICD-10-CM

## 2022-04-07 RX ORDER — GLIMEPIRIDE 2 MG/1
TABLET ORAL
Qty: 90 TABLET | Refills: 1 | Status: SHIPPED | OUTPATIENT
Start: 2022-04-07 | End: 2022-04-07

## 2022-04-07 RX ORDER — GLIMEPIRIDE 2 MG/1
TABLET ORAL
Qty: 90 TABLET | Refills: 1 | Status: SHIPPED | OUTPATIENT
Start: 2022-04-07 | End: 2022-04-14

## 2022-04-14 ENCOUNTER — OFFICE VISIT (OUTPATIENT)
Dept: FAMILY MEDICINE CLINIC | Facility: HOSPITAL | Age: 87
End: 2022-04-14
Payer: COMMERCIAL

## 2022-04-14 ENCOUNTER — APPOINTMENT (OUTPATIENT)
Dept: LAB | Facility: HOSPITAL | Age: 87
End: 2022-04-14
Attending: INTERNAL MEDICINE
Payer: COMMERCIAL

## 2022-04-14 VITALS
HEART RATE: 83 BPM | OXYGEN SATURATION: 98 % | BODY MASS INDEX: 40.31 KG/M2 | SYSTOLIC BLOOD PRESSURE: 136 MMHG | DIASTOLIC BLOOD PRESSURE: 80 MMHG | WEIGHT: 289 LBS

## 2022-04-14 DIAGNOSIS — N18.30 TYPE 2 DIABETES MELLITUS WITH STAGE 3 CHRONIC KIDNEY DISEASE, WITHOUT LONG-TERM CURRENT USE OF INSULIN, UNSPECIFIED WHETHER STAGE 3A OR 3B CKD (HCC): ICD-10-CM

## 2022-04-14 DIAGNOSIS — E11.22 TYPE 2 DIABETES MELLITUS WITH STAGE 3 CHRONIC KIDNEY DISEASE, WITHOUT LONG-TERM CURRENT USE OF INSULIN, UNSPECIFIED WHETHER STAGE 3A OR 3B CKD (HCC): ICD-10-CM

## 2022-04-14 DIAGNOSIS — R60.0 LOCALIZED EDEMA: ICD-10-CM

## 2022-04-14 DIAGNOSIS — J41.0 SIMPLE CHRONIC BRONCHITIS (HCC): Primary | ICD-10-CM

## 2022-04-14 DIAGNOSIS — J44.9 MODERATE COPD (CHRONIC OBSTRUCTIVE PULMONARY DISEASE) (HCC): ICD-10-CM

## 2022-04-14 DIAGNOSIS — S91.302A OPEN WOUND OF LEFT HEEL, INITIAL ENCOUNTER: ICD-10-CM

## 2022-04-14 PROBLEM — R09.02 HYPOXIA: Status: RESOLVED | Noted: 2021-10-24 | Resolved: 2022-04-14

## 2022-04-14 PROBLEM — J31.0 RHINITIS MEDICAMENTOSA: Status: RESOLVED | Noted: 2021-06-03 | Resolved: 2022-04-14

## 2022-04-14 PROBLEM — T48.5X5A RHINITIS MEDICAMENTOSA: Status: RESOLVED | Noted: 2021-06-03 | Resolved: 2022-04-14

## 2022-04-14 LAB
ANION GAP SERPL CALCULATED.3IONS-SCNC: 6 MMOL/L (ref 4–13)
BUN SERPL-MCNC: 60 MG/DL (ref 5–25)
CALCIUM SERPL-MCNC: 9.7 MG/DL (ref 8.3–10.1)
CHLORIDE SERPL-SCNC: 104 MMOL/L (ref 100–108)
CO2 SERPL-SCNC: 28 MMOL/L (ref 21–32)
CREAT SERPL-MCNC: 2.36 MG/DL (ref 0.6–1.3)
GFR SERPL CREATININE-BSD FRML MDRD: 24 ML/MIN/1.73SQ M
GLUCOSE P FAST SERPL-MCNC: 133 MG/DL (ref 65–99)
POTASSIUM SERPL-SCNC: 4.1 MMOL/L (ref 3.5–5.3)
SODIUM SERPL-SCNC: 138 MMOL/L (ref 136–145)

## 2022-04-14 PROCEDURE — 1036F TOBACCO NON-USER: CPT | Performed by: INTERNAL MEDICINE

## 2022-04-14 PROCEDURE — 1160F RVW MEDS BY RX/DR IN RCRD: CPT | Performed by: INTERNAL MEDICINE

## 2022-04-14 PROCEDURE — 36415 COLL VENOUS BLD VENIPUNCTURE: CPT

## 2022-04-14 PROCEDURE — 99214 OFFICE O/P EST MOD 30 MIN: CPT | Performed by: INTERNAL MEDICINE

## 2022-04-14 PROCEDURE — 80048 BASIC METABOLIC PNL TOTAL CA: CPT

## 2022-04-14 NOTE — PROGRESS NOTES
Assessment/Plan:       Diagnoses and all orders for this visit:    Simple chronic bronchitis ([facility] )    Type 2 diabetes mellitus with stage 3 chronic kidney disease, without long-term current use of insulin, unspecified whether stage 3a or 3b CKD ([facility] )  -     Basic metabolic panel; Future  -     Hemoglobin A1C; Future    Localized edema    Open wound of left heel, initial encounter  -     Ambulatory Referral to Wound Care; Future          All of the above diagnoses have been assessed  Additional COMMENTS/PLAN: May increase the zaroxolyn     Does not qualify for O2 at rest and exertion  Patient will be seen back in 2 months  Any increases Zaroxolyn to 2 times per week also stop the glimepiride  If he needs a sulfonylurea will use glipizide  Subjective:      Patient ID: Tiffany Mcghee is a 81 y o  male  HPI     Follow-up with edema  Did originally have some loss of weight with the metolazone  However then wait to go up and developed some more edema with a blister that popped on the left heel area  TRIMBLE- this is about the same  DM-this is reasonable control  Will try off the glimeride  Insurance will not pay for this any longer  The following portions of the patient's history were revised and updated as appropriate: Problem list, allergies, med list, FH, SH, Past medical and surgical histories  Current Outpatient Medications   Medication Sig Dispense Refill    albuterol (ProAir HFA) 90 mcg/act inhaler Inhale 2 puffs every 4 (four) hours as needed for wheezing 18 g 3    fluticasone (FLONASE) 50 mcg/act nasal spray 2 sprays into each nostril 2 (two) times a day 15 8 mL 3    fluticasone-umeclidinium-vilanterol (Trelegy Ellipta) 200-62 5-25 MCG/INH AEPB inhaler Inhale 1 puff daily Rinse mouth after use   180 blister 3    glucose blood (OneTouch Verio) test strip Use one new strip daily DX:E11 9 100 strip 3    labetalol (NORMODYNE) 300 mg tablet TAKE 1 TABLET BY MOUTH TWICE A DAY 180 tablet 3    melatonin 1 mg Take 5 mg by mouth daily at bedtime      metFORMIN (GLUCOPHAGE) 500 mg tablet TAKE 1 TABLET (500 MG TOTAL) BY MOUTH 2 (TWO) TIMES A DAY WITH MEALS 180 tablet 0    metolazone (ZAROXOLYN) 2 5 mg tablet Take 1 tablet (2 5 mg total) by mouth once a week Or as directed 10 tablet 3    potassium chloride (K-DUR,KLOR-CON) 20 mEq tablet Take 1 tablet (20 mEq total) by mouth daily 30 tablet 5    predniSONE 10 mg tablet Take 1 tablet (10 mg total) by mouth daily Or as directed 90 tablet 2    tamsulosin (FLOMAX) 0 4 mg TAKE 1 CAPSULE BY MOUTH EVERY DAY WITH DINNER 90 capsule 3    torsemide (DEMADEX) 20 mg tablet Take 2 pills ( 40 mg) in the am, 20 mg in the pm ( 1 tab) 270 tablet 3     No current facility-administered medications for this visit  Review of Systems   All other systems reviewed and are negative  Objective:    /80   Pulse 83   Wt 131 kg (289 lb)   SpO2 98%   BMI 40 31 kg/m²     BP Readings from Last 3 Encounters:   04/14/22 136/80   03/23/22 120/80   03/18/22 130/80                  Wt Readings from Last 3 Encounters:   04/14/22 131 kg (289 lb)   03/23/22 129 kg (284 lb)   03/18/22 133 kg (293 lb)         Physical Exam  Vitals reviewed  Constitutional:       Appearance: He is obese  Neck:      Comments: No JVD  Cardiovascular:      Rate and Rhythm: Normal rate and regular rhythm  Pulmonary:      Comments: Dec BS  Abdominal:      General: Abdomen is flat  Palpations: Abdomen is soft  Musculoskeletal:      Comments: Plus two edema  The left heel has a posterior which opened with no erythema or evidence of infection  Neurological:      Mental Status: He is alert  No visits with results within 2 Week(s) from this visit     Latest known visit with results is:   Lab on 03/15/2022   Component Date Value Ref Range Status    NT-proBNP 03/15/2022 189  <450 pg/mL Final         Jefe Barbosa MD    Some or all of this note was generated with a voice recognition dictation system and therefore my contain grammatical or spelling errors

## 2022-04-14 NOTE — PATIENT INSTRUCTIONS
Did blood work 3-7 days before next appointment    Make an appointment with wound care     Keep  fluids to less than 2 quarts per day

## 2022-04-18 DIAGNOSIS — I10 ESSENTIAL HYPERTENSION, BENIGN: ICD-10-CM

## 2022-04-18 RX ORDER — LABETALOL 300 MG/1
300 TABLET, FILM COATED ORAL 2 TIMES DAILY
Qty: 180 TABLET | Refills: 3 | Status: SHIPPED | OUTPATIENT
Start: 2022-04-18 | End: 2022-07-14

## 2022-04-20 ENCOUNTER — OFFICE VISIT (OUTPATIENT)
Dept: WOUND CARE | Facility: HOSPITAL | Age: 87
End: 2022-04-20
Payer: COMMERCIAL

## 2022-04-20 VITALS
HEART RATE: 88 BPM | BODY MASS INDEX: 40.6 KG/M2 | HEIGHT: 71 IN | WEIGHT: 290 LBS | SYSTOLIC BLOOD PRESSURE: 138 MMHG | RESPIRATION RATE: 18 BRPM | DIASTOLIC BLOOD PRESSURE: 78 MMHG

## 2022-04-20 DIAGNOSIS — E11.40 TYPE 2 DIABETES MELLITUS WITH DIABETIC NEUROPATHY, WITHOUT LONG-TERM CURRENT USE OF INSULIN (HCC): ICD-10-CM

## 2022-04-20 DIAGNOSIS — L89.622 STAGE II PRESSURE ULCER OF LEFT HEEL (HCC): Primary | ICD-10-CM

## 2022-04-20 PROCEDURE — 99213 OFFICE O/P EST LOW 20 MIN: CPT | Performed by: PODIATRIST

## 2022-04-20 RX ORDER — LIDOCAINE 40 MG/G
CREAM TOPICAL ONCE
Status: COMPLETED | OUTPATIENT
Start: 2022-04-20 | End: 2022-04-20

## 2022-04-20 RX ADMIN — LIDOCAINE: 40 CREAM TOPICAL at 13:21

## 2022-04-20 NOTE — PATIENT INSTRUCTIONS
Orders Placed This Encounter   Procedures    Wound cleansing and dressings     Wound care to left foot    Wash your hands with soap and water  Remove old dressing, discard into plastic bag and place in trash  Cleanse the wound with soap and water prior to applying a clean dressing  Do not use tissue or cotton balls  Do not scrub the wound  Pat dry using gauze  Shower YES  Apply MOISTURIZER to skin surrounding wound  Apply Mupirocin ointment to wound site  Cover with ALLEVYN WITH BORDER  CHANGE 3XWEEK    DONE TODAY    Compression Stocking SPANDAGRIP F    Remove compression stockings every night HS and re-apply first thing qAM  Follow daily skin care as instructed  Avoid prolonged standing in one place  Elevate leg(s) above the level of the heart when sitting or as much as possible       Return in 3 weeks     Standing Status:   Future     Standing Expiration Date:   4/20/2023

## 2022-04-20 NOTE — PROGRESS NOTES
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 [address] )         - encourage patient to continue with regular diabetic foot care in the future          Procedures    Plan:     Wound 04/20/22 Pressure Injury Ankle Left;Lateral (Active)   Wound Image Images linked 04/20/22 1356   Wound Description Epithelialization;Granulation tissue 04/20/22 1305   Linnette-wound Assessment Dry;Edema; Other (Comment) (fluid filled blister) 04/20/22 1305   Wound Length (cm) 6 5 cm 04/20/22 1305   Wound Width (cm) 2 8 cm 04/20/22 1305   Wound Depth (cm) 0 1 cm 04/20/22 1305   Wound Surface Area (cm^2) 18 2 cm^2 04/20/22 1305   Wound Volume (cm^3) 1 82 cm^3 04/20/22 1305   Calculated Wound Volume (cm^3) 1 82 cm^3 04/20/22 1305   Drainage Amount Moderate 04/20/22 1305   Drainage Description Serosanguineous 04/20/22 1305   Non-staged Wound Description Full thickness 04/20/22 1305   Dressing Status Intact 04/20/22 1305       Wound 04/20/22 Pressure Injury Ankle Left;Lateral (Active)   Date First Assessed/Time First Assessed: 04/20/22 1302   Primary Wound Type: Pressure Injury  Location: Ankle  Wound Location Orientation: Left;Lateral       [REMOVED] Wound 11/15/19 Laceration Toe (Comment  which one) Medial;Inner (Removed)   Resolved Date/Resolved Time: 04/20/22 1301  Date First Assessed/Time First Assessed: 11/15/19 1610   Pre-Existing Wound:  Yes Traumatic Wound Type: Laceration  Location: (c) Toe (Comment  which one)  Wound Location Orientation: Medial;Inner  Wound Krunal    [REMOVED] Wound 11/16/19 Toe (Comment  which one) Anterior (Removed)   Resolved Date/Resolved Time: 04/20/22 1301  Date First Assessed/Time First Assessed: 11/16/19 1800   Pre-Existing Wound: No  Location: (c) Toe (Comment  which one)  Wound Location Orientation: Anterior  Wound Description (Comments): Red wound base  In    [REMOVED] Wound 10/26/21 Leg Left (Removed)   Resolved Date/Resolved Time: 04/20/22 1301  Date First Assessed/Time First Assessed: 10/26/21 1057   Pre-Existing Wound: Yes  Location: Leg  Wound Location Orientation: Left  Wound Description (Comments): CHF weeping blister  Dressing Status: Old drai    [REMOVED] Wound 10/26/21 Other (Comment) Tibial Left;Posterior (Removed)   Resolved Date/Resolved Time: 04/20/22 1301  Date First Assessed/Time First Assessed: 10/26/21 1058   Pre-Existing Wound: Yes  Traumatic Wound Type: (c) Other (Comment)  Location: Tibial  Wound Location Orientation: Left;Posterior  Dressing Status: Minnie  [REMOVED] Wound 10/26/21 Pressure Injury Skin tear Heel Left (Removed)   Resolved Date/Resolved Time: 04/20/22 1301  Date First Assessed/Time First Assessed: 10/26/21 1059   Pre-Existing Wound: Yes  Primary Wound Type: Pressure Injury  Traumatic Wound Type: Skin tear  Location: Heel  Wound Location Orientation: Left  Wound    [REMOVED] Wound 10/26/21 Pressure Injury Heel Right (Removed)   Resolved Date/Resolved Time: 04/20/22 1302  Date First Assessed/Time First Assessed: 10/26/21 1100   Pre-Existing Wound: Yes  Primary Wound Type: Pressure Injury  Location: Heel  Wound Location Orientation: Right  Wound Description (Comments): pressur    Subjective:           81year-old DM2 presents with blister on his left ankle which started approximately 10-14 days ago    Patient has significant history of diabetes with neuropathy and chronic lower extremity edema  Wife popped blister with needle and is concerned with possible infection  Patient was seen by his primary care last week who gave him a prescription for mupirocin ointment with a wound care referral   Patient is known to me and is seen on a regular basis for diabetic foot care  Patient reports he sleeps in a recliner with his legs dependent resting his heels on the ground  Denies any fever or shortness of breath  Denies any increasing pain/discomfort  The following portions of the patient's history were reviewed and updated as appropriate: allergies, current medications, past family history, past medical history, past social history, past surgical history and problem list     Review of Systems   Constitutional: Negative for chills and fever  HENT: Negative for ear pain and sore throat  Eyes: Negative for pain and visual disturbance  Respiratory: Positive for shortness of breath  Negative for cough and chest tightness  Cardiovascular: Positive for leg swelling  Negative for chest pain and palpitations  Gastrointestinal: Negative for abdominal pain and vomiting  Genitourinary: Negative for dysuria and hematuria  Musculoskeletal: Negative for arthralgias and back pain  Skin: Positive for wound (Erythematous open area noted on posterior lateral aspect of left heel/ankle  )  Negative for color change and rash  Neurological: Positive for numbness  Negative for seizures and syncope  All other systems reviewed and are negative  Objective:       Wound 04/20/22 Pressure Injury Ankle Left;Lateral (Active)   Wound Image Images linked 04/20/22 1356   Wound Description Epithelialization;Granulation tissue 04/20/22 1305   Linnette-wound Assessment Dry;Edema; Other (Comment) (fluid filled blister) 04/20/22 1305   Wound Length (cm) 6 5 cm 04/20/22 1305   Wound Width (cm) 2 8 cm 04/20/22 1305   Wound Depth (cm) 0 1 cm 04/20/22 1305   Wound Surface Area (cm^2) 18 2 cm^2 04/20/22 1305   Wound Volume (cm^3) 1 82 cm^3 04/20/22 1305   Calculated Wound Volume (cm^3) 1 82 cm^3 04/20/22 1305   Drainage Amount Moderate 04/20/22 1305   Drainage Description Serosanguineous 04/20/22 1305   Non-staged Wound Description Full thickness 04/20/22 1305   Dressing Status Intact 04/20/22 1305                 /78   Pulse 88   Resp 18   Ht 5' 11" (1 803 m)   Wt 132 kg (290 lb)   BMI 40 45 kg/m²     Physical Exam  Constitutional:       Appearance: Normal appearance  HENT:      Head: Normocephalic  Right Ear: Tympanic membrane normal       Left Ear: Tympanic membrane normal       Nose: No congestion  Mouth/Throat:      Mouth: Mucous membranes are moist       Pharynx: No oropharyngeal exudate or posterior oropharyngeal erythema  Eyes:      Extraocular Movements: Extraocular movements intact  Conjunctiva/sclera: Conjunctivae normal       Pupils: Pupils are equal, round, and reactive to light  Cardiovascular:      Rate and Rhythm: Normal rate and regular rhythm  Pulses:           Dorsalis pedis pulses are detected w/ Doppler on the right side and detected w/ Doppler on the left side  Posterior tibial pulses are detected w/ Doppler on the right side and detected w/ Doppler on the left side  Pulmonary:      Effort: Pulmonary effort is normal    Abdominal:      Palpations: Abdomen is soft  Musculoskeletal:      Right lower leg: Edema present  Left lower leg: Edema present  Right foot: Decreased range of motion  Left foot: Decreased range of motion  Feet:    Feet:      Right foot:      Protective Sensation: 8 sites tested  0 sites sensed  Toenail Condition: Right toenails are abnormally thick  Fungal disease present  Left foot:      Protective Sensation: 8 sites tested  0 sites sensed        Skin integrity: Ulcer (Partial depth breakdown posterior lateral aspect of left heel consistent with stage II pressure ulcer, developed as blister  No evidence of infection  , only scant serous drainage noted ) and blister present  Toenail Condition: Left toenails are abnormally thick  Fungal disease present  Skin:     General: Skin is warm and dry  Capillary Refill: Capillary refill takes 2 to 3 seconds  Coloration: Skin is not pale  Findings: No bruising, erythema, lesion or rash  Neurological:      Mental Status: He is alert  Cranial Nerves: No cranial nerve deficit  Sensory: Sensory deficit present  Motor: No weakness  Gait: Gait normal       Deep Tendon Reflexes: Reflexes normal    Psychiatric:         Mood and Affect: Mood normal          Behavior: Behavior normal          Judgment: Judgment normal            Wound Instructions:  Orders Placed This Encounter   Procedures    Wound cleansing and dressings     Wound care to left foot    Wash your hands with soap and water  Remove old dressing, discard into plastic bag and place in trash  Cleanse the wound with soap and water prior to applying a clean dressing  Do not use tissue or cotton balls  Do not scrub the wound  Pat dry using gauze  Shower YES  Apply MOISTURIZER to skin surrounding wound  Apply Mupirocin ointment to wound site  Cover with ALLEVYN WITH BORDER  CHANGE 3XWEEK    DONE TODAY    Compression Stocking SPANDAGRIP F    Remove compression stockings every night HS and re-apply first thing qAM  Follow daily skin care as instructed  Avoid prolonged standing in one place  Elevate leg(s) above the level of the heart when sitting or as much as possible  Return in 3 weeks     Standing Status:   Future     Standing Expiration Date:   4/20/2023        Diagnosis ICD-10-CM Associated Orders   1  Stage II pressure ulcer of left heel ([facility])  L89 622 lidocaine (LMX) 4 % cream     Wound cleansing and dressings   2   Type 2 diabetes mellitus with diabetic neuropathy, without long-term current use of insulin ([facility])  E11 40

## 2022-05-11 ENCOUNTER — HOSPITAL ENCOUNTER (INPATIENT)
Facility: HOSPITAL | Age: 87
LOS: 4 days | Discharge: HOME | DRG: 291 | End: 2022-05-15
Attending: EMERGENCY MEDICINE | Admitting: HOSPITALIST
Payer: COMMERCIAL

## 2022-05-11 ENCOUNTER — OFFICE VISIT (OUTPATIENT)
Dept: WOUND CARE | Facility: HOSPITAL | Age: 87
DRG: 291 | End: 2022-05-11
Payer: COMMERCIAL

## 2022-05-11 ENCOUNTER — APPOINTMENT (EMERGENCY)
Dept: RADIOLOGY | Facility: HOSPITAL | Age: 87
DRG: 291 | End: 2022-05-11
Payer: COMMERCIAL

## 2022-05-11 VITALS
SYSTOLIC BLOOD PRESSURE: 140 MMHG | TEMPERATURE: 96.9 F | DIASTOLIC BLOOD PRESSURE: 70 MMHG | RESPIRATION RATE: 28 BRPM | HEART RATE: 92 BPM

## 2022-05-11 DIAGNOSIS — R06.02 SHORTNESS OF BREATH: Primary | ICD-10-CM

## 2022-05-11 DIAGNOSIS — S91.302A NON HEALING LEFT HEEL WOUND: ICD-10-CM

## 2022-05-11 DIAGNOSIS — I50.33 ACUTE ON CHRONIC DIASTOLIC (CONGESTIVE) HEART FAILURE (HCC): ICD-10-CM

## 2022-05-11 DIAGNOSIS — I50.31 ACUTE DIASTOLIC CONGESTIVE HEART FAILURE (HCC): ICD-10-CM

## 2022-05-11 DIAGNOSIS — I50.32 CHRONIC DIASTOLIC CONGESTIVE HEART FAILURE (HCC): ICD-10-CM

## 2022-05-11 DIAGNOSIS — L89.622 STAGE II PRESSURE ULCER OF LEFT HEEL (HCC): Primary | ICD-10-CM

## 2022-05-11 DIAGNOSIS — R93.89 CHEST X-RAY ABNORMALITY: ICD-10-CM

## 2022-05-11 DIAGNOSIS — J44.9 MODERATE COPD (CHRONIC OBSTRUCTIVE PULMONARY DISEASE) (HCC): ICD-10-CM

## 2022-05-11 DIAGNOSIS — R91.1 PULMONARY NODULE: ICD-10-CM

## 2022-05-11 DIAGNOSIS — R60.0 LOCALIZED EDEMA: ICD-10-CM

## 2022-05-11 DIAGNOSIS — E11.40 TYPE 2 DIABETES MELLITUS WITH DIABETIC NEUROPATHY, WITHOUT LONG-TERM CURRENT USE OF INSULIN (HCC): ICD-10-CM

## 2022-05-11 DIAGNOSIS — I50.9 CHF EXACERBATION (HCC): ICD-10-CM

## 2022-05-11 DIAGNOSIS — R60.0 BILATERAL LOWER EXTREMITY EDEMA: ICD-10-CM

## 2022-05-11 PROBLEM — J18.9 PNEUMONIA: Status: ACTIVE | Noted: 2022-05-11

## 2022-05-11 LAB
2HR DELTA HS TROPONIN: 2 NG/L
ALBUMIN SERPL BCP-MCNC: 3.5 G/DL (ref 3.5–5)
ALP SERPL-CCNC: 65 U/L (ref 46–116)
ALT SERPL W P-5'-P-CCNC: 20 U/L (ref 12–78)
ANION GAP SERPL CALCULATED.3IONS-SCNC: 11 MMOL/L (ref 4–13)
AST SERPL W P-5'-P-CCNC: 11 U/L (ref 5–45)
BASOPHILS # BLD AUTO: 0.02 THOUSANDS/ΜL (ref 0–0.1)
BASOPHILS NFR BLD AUTO: 0 % (ref 0–1)
BILIRUB SERPL-MCNC: 0.4 MG/DL (ref 0.2–1)
BUN SERPL-MCNC: 70 MG/DL (ref 5–25)
CALCIUM SERPL-MCNC: 8.8 MG/DL (ref 8.3–10.1)
CARDIAC TROPONIN I PNL SERPL HS: 16 NG/L
CARDIAC TROPONIN I PNL SERPL HS: 18 NG/L
CHLORIDE SERPL-SCNC: 101 MMOL/L (ref 100–108)
CO2 SERPL-SCNC: 25 MMOL/L (ref 21–32)
CREAT SERPL-MCNC: 2.48 MG/DL (ref 0.6–1.3)
EOSINOPHIL # BLD AUTO: 0.02 THOUSAND/ΜL (ref 0–0.61)
EOSINOPHIL NFR BLD AUTO: 0 % (ref 0–6)
ERYTHROCYTE [DISTWIDTH] IN BLOOD BY AUTOMATED COUNT: 13.7 % (ref 11.6–15.1)
FLUAV RNA RESP QL NAA+PROBE: NEGATIVE
FLUBV RNA RESP QL NAA+PROBE: NEGATIVE
GFR SERPL CREATININE-BSD FRML MDRD: 22 ML/MIN/1.73SQ M
GLUCOSE SERPL-MCNC: 145 MG/DL (ref 65–140)
GLUCOSE SERPL-MCNC: 211 MG/DL (ref 65–140)
HCT VFR BLD AUTO: 45.2 % (ref 36.5–49.3)
HGB BLD-MCNC: 14.4 G/DL (ref 12–17)
IMM GRANULOCYTES # BLD AUTO: 0.09 THOUSAND/UL (ref 0–0.2)
IMM GRANULOCYTES NFR BLD AUTO: 1 % (ref 0–2)
LYMPHOCYTES # BLD AUTO: 0.95 THOUSANDS/ΜL (ref 0.6–4.47)
LYMPHOCYTES NFR BLD AUTO: 8 % (ref 14–44)
MCH RBC QN AUTO: 29.1 PG (ref 26.8–34.3)
MCHC RBC AUTO-ENTMCNC: 31.9 G/DL (ref 31.4–37.4)
MCV RBC AUTO: 91 FL (ref 82–98)
MONOCYTES # BLD AUTO: 0.58 THOUSAND/ΜL (ref 0.17–1.22)
MONOCYTES NFR BLD AUTO: 5 % (ref 4–12)
NEUTROPHILS # BLD AUTO: 10.44 THOUSANDS/ΜL (ref 1.85–7.62)
NEUTS SEG NFR BLD AUTO: 86 % (ref 43–75)
NRBC BLD AUTO-RTO: 0 /100 WBCS
NT-PROBNP SERPL-MCNC: 449 PG/ML
PLATELET # BLD AUTO: 212 THOUSANDS/UL (ref 149–390)
PMV BLD AUTO: 9.6 FL (ref 8.9–12.7)
POTASSIUM SERPL-SCNC: 4 MMOL/L (ref 3.5–5.3)
PROCALCITONIN SERPL-MCNC: 0.13 NG/ML
PROT SERPL-MCNC: 6.8 G/DL (ref 6.4–8.2)
RBC # BLD AUTO: 4.95 MILLION/UL (ref 3.88–5.62)
RSV RNA RESP QL NAA+PROBE: NEGATIVE
SARS-COV-2 RNA RESP QL NAA+PROBE: NEGATIVE
SODIUM SERPL-SCNC: 137 MMOL/L (ref 136–145)
WBC # BLD AUTO: 12.1 THOUSAND/UL (ref 4.31–10.16)

## 2022-05-11 PROCEDURE — 84145 PROCALCITONIN (PCT): CPT

## 2022-05-11 PROCEDURE — 83880 ASSAY OF NATRIURETIC PEPTIDE: CPT | Performed by: PHYSICIAN ASSISTANT

## 2022-05-11 PROCEDURE — 99223 1ST HOSP IP/OBS HIGH 75: CPT | Performed by: PHYSICIAN ASSISTANT

## 2022-05-11 PROCEDURE — 82948 REAGENT STRIP/BLOOD GLUCOSE: CPT

## 2022-05-11 PROCEDURE — 99285 EMERGENCY DEPT VISIT HI MDM: CPT | Performed by: PHYSICIAN ASSISTANT

## 2022-05-11 PROCEDURE — 99212 OFFICE O/P EST SF 10 MIN: CPT | Performed by: PODIATRIST

## 2022-05-11 PROCEDURE — 84484 ASSAY OF TROPONIN QUANT: CPT | Performed by: EMERGENCY MEDICINE

## 2022-05-11 PROCEDURE — 85025 COMPLETE CBC W/AUTO DIFF WBC: CPT | Performed by: EMERGENCY MEDICINE

## 2022-05-11 PROCEDURE — 99285 EMERGENCY DEPT VISIT HI MDM: CPT

## 2022-05-11 PROCEDURE — 36415 COLL VENOUS BLD VENIPUNCTURE: CPT

## 2022-05-11 PROCEDURE — 80053 COMPREHEN METABOLIC PANEL: CPT | Performed by: EMERGENCY MEDICINE

## 2022-05-11 PROCEDURE — 71046 X-RAY EXAM CHEST 2 VIEWS: CPT

## 2022-05-11 PROCEDURE — 0241U HB NFCT DS VIR RESP RNA 4 TRGT: CPT | Performed by: PHYSICIAN ASSISTANT

## 2022-05-11 PROCEDURE — 93005 ELECTROCARDIOGRAM TRACING: CPT

## 2022-05-11 RX ORDER — INSULIN LISPRO 100 [IU]/ML
1-5 INJECTION, SOLUTION INTRAVENOUS; SUBCUTANEOUS
Status: DISCONTINUED | OUTPATIENT
Start: 2022-05-11 | End: 2022-05-15 | Stop reason: HOSPADM

## 2022-05-11 RX ORDER — INSULIN LISPRO 100 [IU]/ML
1-6 INJECTION, SOLUTION INTRAVENOUS; SUBCUTANEOUS
Status: DISCONTINUED | OUTPATIENT
Start: 2022-05-12 | End: 2022-05-11

## 2022-05-11 RX ORDER — FLUTICASONE FUROATE AND VILANTEROL 200; 25 UG/1; UG/1
1 POWDER RESPIRATORY (INHALATION) DAILY
Status: DISCONTINUED | OUTPATIENT
Start: 2022-05-12 | End: 2022-05-15 | Stop reason: HOSPADM

## 2022-05-11 RX ORDER — CEFTRIAXONE 2 G/50ML
2000 INJECTION, SOLUTION INTRAVENOUS EVERY 24 HOURS
Status: DISCONTINUED | OUTPATIENT
Start: 2022-05-11 | End: 2022-05-11

## 2022-05-11 RX ORDER — METOLAZONE 2.5 MG/1
2.5 TABLET ORAL WEEKLY
Status: DISCONTINUED | OUTPATIENT
Start: 2022-05-13 | End: 2022-05-11

## 2022-05-11 RX ORDER — AZITHROMYCIN 500 MG/1
500 TABLET, FILM COATED ORAL EVERY 24 HOURS
Status: DISCONTINUED | OUTPATIENT
Start: 2022-05-11 | End: 2022-05-11

## 2022-05-11 RX ORDER — INSULIN LISPRO 100 [IU]/ML
1-6 INJECTION, SOLUTION INTRAVENOUS; SUBCUTANEOUS
Status: DISCONTINUED | OUTPATIENT
Start: 2022-05-12 | End: 2022-05-15 | Stop reason: HOSPADM

## 2022-05-11 RX ORDER — ECHINACEA PURPUREA EXTRACT 125 MG
1 TABLET ORAL
Status: DISCONTINUED | OUTPATIENT
Start: 2022-05-11 | End: 2022-05-15 | Stop reason: HOSPADM

## 2022-05-11 RX ORDER — TORSEMIDE 20 MG/1
20 TABLET ORAL DAILY
Status: DISCONTINUED | OUTPATIENT
Start: 2022-05-11 | End: 2022-05-11

## 2022-05-11 RX ORDER — INSULIN LISPRO 100 [IU]/ML
1-5 INJECTION, SOLUTION INTRAVENOUS; SUBCUTANEOUS
Status: DISCONTINUED | OUTPATIENT
Start: 2022-05-11 | End: 2022-05-11

## 2022-05-11 RX ORDER — TORSEMIDE 20 MG/1
40 TABLET ORAL DAILY
Status: DISCONTINUED | OUTPATIENT
Start: 2022-05-12 | End: 2022-05-11

## 2022-05-11 RX ORDER — HEPARIN SODIUM 5000 [USP'U]/ML
5000 INJECTION, SOLUTION INTRAVENOUS; SUBCUTANEOUS EVERY 8 HOURS SCHEDULED
Status: DISCONTINUED | OUTPATIENT
Start: 2022-05-11 | End: 2022-05-15 | Stop reason: HOSPADM

## 2022-05-11 RX ORDER — FLUTICASONE PROPIONATE 50 MCG
2 SPRAY, SUSPENSION (ML) NASAL 2 TIMES DAILY
Status: DISCONTINUED | OUTPATIENT
Start: 2022-05-11 | End: 2022-05-15 | Stop reason: HOSPADM

## 2022-05-11 RX ORDER — TAMSULOSIN HYDROCHLORIDE 0.4 MG/1
0.4 CAPSULE ORAL
Status: DISCONTINUED | OUTPATIENT
Start: 2022-05-11 | End: 2022-05-15 | Stop reason: HOSPADM

## 2022-05-11 RX ORDER — FUROSEMIDE 10 MG/ML
80 INJECTION INTRAMUSCULAR; INTRAVENOUS
Status: DISCONTINUED | OUTPATIENT
Start: 2022-05-11 | End: 2022-05-12

## 2022-05-11 RX ORDER — ECHINACEA PURPUREA EXTRACT 125 MG
2 TABLET ORAL ONCE
Status: DISCONTINUED | OUTPATIENT
Start: 2022-05-11 | End: 2022-05-11

## 2022-05-11 RX ORDER — GUAIFENESIN 600 MG
600 TABLET, EXTENDED RELEASE 12 HR ORAL 2 TIMES DAILY
Status: DISCONTINUED | OUTPATIENT
Start: 2022-05-11 | End: 2022-05-15 | Stop reason: HOSPADM

## 2022-05-11 RX ORDER — LIDOCAINE HYDROCHLORIDE 40 MG/ML
5 SOLUTION TOPICAL ONCE
Status: DISCONTINUED | OUTPATIENT
Start: 2022-05-11 | End: 2022-05-11

## 2022-05-11 RX ORDER — POTASSIUM CHLORIDE 20 MEQ/1
20 TABLET, EXTENDED RELEASE ORAL DAILY
Status: DISCONTINUED | OUTPATIENT
Start: 2022-05-12 | End: 2022-05-12

## 2022-05-11 RX ORDER — MAGNESIUM HYDROXIDE/ALUMINUM HYDROXICE/SIMETHICONE 120; 1200; 1200 MG/30ML; MG/30ML; MG/30ML
30 SUSPENSION ORAL EVERY 6 HOURS PRN
Status: DISCONTINUED | OUTPATIENT
Start: 2022-05-11 | End: 2022-05-15 | Stop reason: HOSPADM

## 2022-05-11 RX ORDER — ACETAMINOPHEN 325 MG/1
650 TABLET ORAL EVERY 6 HOURS PRN
Status: DISCONTINUED | OUTPATIENT
Start: 2022-05-11 | End: 2022-05-15 | Stop reason: HOSPADM

## 2022-05-11 RX ADMIN — Medication 5 MG: at 21:33

## 2022-05-11 RX ADMIN — TAMSULOSIN HYDROCHLORIDE 0.4 MG: 0.4 CAPSULE ORAL at 20:08

## 2022-05-11 RX ADMIN — AZITHROMYCIN 500 MG: 500 TABLET, FILM COATED ORAL at 20:07

## 2022-05-11 RX ADMIN — CEFTRIAXONE 2000 MG: 2 INJECTION, SOLUTION INTRAVENOUS at 20:07

## 2022-05-11 RX ADMIN — LABETALOL HYDROCHLORIDE 300 MG: 200 TABLET, FILM COATED ORAL at 20:07

## 2022-05-11 RX ADMIN — FUROSEMIDE 80 MG: 10 INJECTION, SOLUTION INTRAMUSCULAR; INTRAVENOUS at 21:31

## 2022-05-11 RX ADMIN — LIDOCAINE HYDROCHLORIDE 5 ML: 40 SOLUTION TOPICAL at 13:31

## 2022-05-11 RX ADMIN — GUAIFENESIN 600 MG: 600 TABLET ORAL at 21:33

## 2022-05-11 RX ADMIN — FLUTICASONE PROPIONATE 2 SPRAY: 50 SPRAY, METERED NASAL at 21:33

## 2022-05-11 RX ADMIN — HEPARIN SODIUM 5000 UNITS: 5000 INJECTION INTRAVENOUS; SUBCUTANEOUS at 21:33

## 2022-05-11 NOTE — ASSESSMENT & PLAN NOTE
· Chest x-ray showing findings suspicious for new right mild lower lobe pneumonia  · Patient with no signs of pneumonia  · Procalcitonin negative  · Stop ABX

## 2022-05-11 NOTE — PROGRESS NOTES
Patient ID: Mark Yan is a 81 y o  male Date of Birth 1935     Chief Complaint  Chief Complaint   Patient presents with    Follow Up Wound Care Visit     Left heel wound       Allergies  Patient has no known allergies  Assessment:    Stage II pressure ulcer of left heel ([facility])  -     Discontinue: lidocaine (XYLOCAINE) 4 % topical solution 5 mL  -     Cancel: Wound cleansing and dressings; Future  -     Wound cleansing and dressings; Future    Type 2 diabetes mellitus with diabetic neuropathy, without long-term current use of insulin (HCC)    Localized edema            Procedures    Plan:  · Patient's primary care notified shortness of breath and increased lower extremity edema, recommended patient go to ER for evaluation  · Continue to elevate legs as much as possible and follow up in approximately 2 weeks  · Instructed to call immediately if any signs of infection are noted  · Follow-up in 2 weeks upon discharge from hospital should he be admitted after going to the ER  Wound 04/20/22 Pressure Injury Ankle Left;Lateral (Active)   Wound Image Images linked 05/11/22 1322   Wound Description Pink;Yellow;Slough 05/11/22 1321   Wound Length (cm) 1 1 cm 05/11/22 1321   Wound Width (cm) 1 3 cm 05/11/22 1321   Wound Depth (cm) 0 1 cm 05/11/22 1321   Wound Surface Area (cm^2) 1 43 cm^2 05/11/22 1321   Wound Volume (cm^3) 0 143 cm^3 05/11/22 1321   Calculated Wound Volume (cm^3) 0 14 cm^3 05/11/22 1321   Change in Wound Size % 92 31 05/11/22 1321   Drainage Amount Small 05/11/22 1321   Drainage Description Serosanguineous; Yellow 05/11/22 1321   Non-staged Wound Description Full thickness 05/11/22 1321   Dressing Status Intact 05/11/22 1321       Wound 04/20/22 Pressure Injury Ankle Left;Lateral (Active)   Date First Assessed/Time First Assessed: 04/20/22 1302   Primary Wound Type: Pressure Injury  Location: Ankle  Wound Location Orientation: Left;Lateral       [REMOVED] Wound 11/15/19 Laceration Toe (Comment  which one) Medial;Inner (Removed)   Resolved Date/Resolved Time: 04/20/22 1301  Date First Assessed/Time First Assessed: 11/15/19 1610   Pre-Existing Wound: Yes  Traumatic Wound Type: Laceration  Location: (c) Toe (Comment  which one)  Wound Location Orientation: Medial;Inner  Wound Krunal    [REMOVED] Wound 11/16/19 Toe (Comment  which one) Anterior (Removed)   Resolved Date/Resolved Time: 04/20/22 1301  Date First Assessed/Time First Assessed: 11/16/19 1800   Pre-Existing Wound: No  Location: (c) Toe (Comment  which one)  Wound Location Orientation: Anterior  Wound Description (Comments): Red wound base  In    [REMOVED] Wound 10/26/21 Leg Left (Removed)   Resolved Date/Resolved Time: 04/20/22 1301  Date First Assessed/Time First Assessed: 10/26/21 1057   Pre-Existing Wound: Yes  Location: Leg  Wound Location Orientation: Left  Wound Description (Comments): CHF weeping blister  Dressing Status: Old drai    [REMOVED] Wound 10/26/21 Other (Comment) Tibial Left;Posterior (Removed)   Resolved Date/Resolved Time: 04/20/22 1301  Date First Assessed/Time First Assessed: 10/26/21 1058   Pre-Existing Wound: Yes  Traumatic Wound Type: (c) Other (Comment)  Location: Tibial  Wound Location Orientation: Left;Posterior  Dressing Status: Minnie  [REMOVED] Wound 10/26/21 Pressure Injury Skin tear Heel Left (Removed)   Resolved Date/Resolved Time: 04/20/22 1301  Date First Assessed/Time First Assessed: 10/26/21 1059   Pre-Existing Wound: Yes  Primary Wound Type: Pressure Injury  Traumatic Wound Type: Skin tear  Location: Heel  Wound Location Orientation: Left  Wound    [REMOVED] Wound 10/26/21 Pressure Injury Heel Right (Removed)   Resolved Date/Resolved Time: 04/20/22 1302  Date First Assessed/Time First Assessed: 10/26/21 1100   Pre-Existing Wound: Yes  Primary Wound Type: Pressure Injury  Location: Heel  Wound Location Orientation: Right  Wound Description (Comments): deja    Subjective:           81year-old DM2 presents for follow-up evaluation of left heel ulcer, noted new blister this morning on right leg  Reports increased swelling and shortness of breath over the past week  Denies any new pain or discomfort from either lower extremity  The following portions of the patient's history were reviewed and updated as appropriate: allergies, current medications, past family history, past medical history, past social history, past surgical history and problem list     Review of Systems   Constitutional: Negative for chills and fever  HENT: Negative for ear pain and sore throat  Eyes: Negative for pain and visual disturbance  Respiratory: Positive for shortness of breath  Negative for cough and chest tightness  Cardiovascular: Positive for leg swelling  Negative for chest pain and palpitations  Gastrointestinal: Negative for abdominal pain and vomiting  Genitourinary: Negative for dysuria and hematuria  Musculoskeletal: Negative for arthralgias and back pain  Skin: Positive for wound (Left heel/ankle wound notably improved, new area on right noted over the past week  [place] )  Negative for color change and rash  Neurological: Positive for numbness  Negative for seizures and syncope  All other systems reviewed and are negative  Objective:       Wound 04/20/22 Pressure Injury Ankle Left;Lateral (Active)   Wound Image Images linked 05/11/22 1322   Wound Description Pink;Yellow;Slough 05/11/22 1321   Wound Length (cm) 1 1 cm 05/11/22 1321   Wound Width (cm) 1 3 cm 05/11/22 1321   Wound Depth (cm) 0 1 cm 05/11/22 1321   Wound Surface Area (cm^2) 1 43 cm^2 05/11/22 1321   Wound Volume (cm^3) 0 143 cm^3 05/11/22 1321   Calculated Wound Volume (cm^3) 0 14 cm^3 05/11/22 1321   Change in Wound Size % 92 31 05/11/22 1321   Drainage Amount Small 05/11/22 1321   Drainage Description Serosanguineous; Yellow 05/11/22 1321   Non-staged Wound Description Full thickness 05/11/22 1321   Dressing Status Intact 05/11/22 1321               /70   Pulse 92   Temp (!) 96 9 °F (36 1 °C)   Resp (!) 28     Physical Exam  Constitutional:       Appearance: Normal appearance  HENT:      Head: Normocephalic  Right Ear: Tympanic membrane normal       Left Ear: Tympanic membrane normal       Nose: No congestion  Mouth/Throat:      Mouth: Mucous membranes are moist       Pharynx: No oropharyngeal exudate or posterior oropharyngeal erythema  Eyes:      Extraocular Movements: Extraocular movements intact  Conjunctiva/sclera: Conjunctivae normal       Pupils: Pupils are equal, round, and reactive to light  Cardiovascular:      Rate and Rhythm: Normal rate and regular rhythm  Pulses:           Dorsalis pedis pulses are detected w/ Doppler on the right side and detected w/ Doppler on the left side  Posterior tibial pulses are detected w/ Doppler on the right side and detected w/ Doppler on the left side  Pulmonary:      Comments: Notably short of breath  Abdominal:      Palpations: Abdomen is soft  Musculoskeletal:      Right lower leg: Edema present  Left lower leg: Edema present  Right foot: Decreased range of motion  Left foot: Decreased range of motion  Feet:    Feet:      Right foot:      Protective Sensation: 8 sites tested  0 sites sensed  Skin integrity: Blister (Right ankle blister noted to be dried up ) present  Toenail Condition: Right toenails are abnormally thick  Fungal disease present  Left foot:      Protective Sensation: 8 sites tested  0 sites sensed  Skin integrity: Ulcer (Partial depth breakdown posterior lateral aspect of left heel consistent with stage II pressure ulcer, developed as blister  No evidence of infection  , only scant serous drainage noted, overall dramatically improved ) and blister present  Toenail Condition: Left toenails are abnormally thick  Fungal disease present    Skin: General: Skin is warm and dry  Capillary Refill: Capillary refill takes 2 to 3 seconds  Coloration: Skin is not pale  Findings: No bruising, erythema, lesion or rash  Neurological:      Mental Status: He is alert  Cranial Nerves: No cranial nerve deficit  Sensory: Sensory deficit present  Motor: No weakness  Gait: Gait normal       Deep Tendon Reflexes: Reflexes normal    Psychiatric:         Mood and Affect: Mood normal          Behavior: Behavior normal          Judgment: Judgment normal            Wound Instructions:  Orders Placed This Encounter   Procedures    Wound cleansing and dressings     Wound care to left foot     Wash your hands with soap and water  Remove old dressing, discard into plastic bag and place in trash  Cleanse the wound with soap and water prior to applying a clean dressing  Do not use tissue or cotton balls  Do not scrub the wound  Pat dry using gauze  Shower YES  Apply MOISTURIZER to skin surrounding wound  Apply Mupirocin ointment to wound site  Cover with ALLEVYN WITH BORDER  CHANGE 3XWEEK     Done today at [facility]  Patient is going to the ER after visit due to SOB, and COPD to be assessed  Patient will return in 3 weeks        Standing Status:   Future     Standing Expiration Date:   5/11/2023        Diagnosis ICD-10-CM Associated Orders   1  Stage II pressure ulcer of left heel ([facility])  D16 982 Wound cleansing and dressings   2  Type 2 diabetes mellitus with diabetic neuropathy, without long-term current use of insulin ([facility])  E11 40    3   Localized edema  R60 0

## 2022-05-11 NOTE — ASSESSMENT & PLAN NOTE
Lab Results   Component Value Date    EGFR 22 05/11/2022    EGFR 24 04/14/2022    EGFR 25 12/28/2021    CREATININE 2 48 (H) 05/11/2022    CREATININE 2 36 (H) 04/14/2022    CREATININE 2 24 (H) 12/28/2021   · Patient with baseline creatinine in 2 2-2 3  · Will continue to monitor in setting of need for diuresis  · Avoid hypotension, nephrotoxic agents, NSAIDs

## 2022-05-11 NOTE — PATIENT INSTRUCTIONS
Orders Placed This Encounter   Procedures    Wound cleansing and dressings     Wound care to left foot     Wash your hands with soap and water  Remove old dressing, discard into plastic bag and place in trash  Cleanse the wound with soap and water prior to applying a clean dressing  Do not use tissue or cotton balls  Do not scrub the wound  Pat dry using gauze  Shower YES  Apply MOISTURIZER to skin surrounding wound  Apply Mupirocin ointment to wound site  Cover with [facility]     Done today at [facility]  Patient is going to the ER after visit due to SOB, and COPD to be assessed  Patient will call to schedule next appt          Standing Status:   Future     Standing Expiration Date:   5/11/2023

## 2022-05-11 NOTE — H&P
[place]  H&P- Dorn Koyanagi 1935, 81 y o  male MRN: [id number]  Unit/Bed#: -01 Encounter: [id number]  Primary Care Provider: Angelina Temple MD   Date and time admitted to hospital: 5/11/2022  3:55 PM    * Acute diastolic congestive heart failure ([address] )  Assessment & Plan  Wt Readings from Last 3 Encounters:   05/11/22 130 kg (287 lb 3 2 oz)   04/20/22 132 kg (290 lb)   04/14/22 131 kg (289 lb)     · Increased SOB x months and worse in last weeks, increasing BLE edema and lack of improvement with PO diuretics, PND and orthopnea    CXR:  Suspicious for new myalgias right lower lobe pneumonia  o No signs or symptoms of PNA - stop ABX   Troponins negative    ECHO 83/79: EF 23%, diastolic function mildly abnormal-grade 1 relaxation  o Repeat ECHO ordered   Home diuretic regimen: torsemide 40 mg in AM/20 mg in PM, metazolone 2 5 mg on Fridays  o Sometimes misses PM dose of torsemide  Metaozolone added for last 2-3 months   Start lasix 80 mg IV TID (first dose now)      Continue to monitor electrolytes and replete as needed   Daily weights, Strict I & Os   Cardiac diet, low sodium <2g, fluid restriction <1500   Cardiology consulted    Chest x-ray abnormality  Assessment & Plan  · Chest x-ray showing findings suspicious for new right mild lower lobe pneumonia  · Patient with no signs of pneumonia  · Procalcitonin negative  · Stop ABX    Stage 3 chronic kidney disease [facility])  Assessment & Plan  Lab Results   Component Value Date    EGFR 22 05/11/2022    EGFR 24 04/14/2022    EGFR 25 12/28/2021    CREATININE 2 48 (H) 05/11/2022    CREATININE 2 36 (H) 04/14/2022    CREATININE 2 24 (H) 12/28/2021   · Patient with baseline creatinine in 2 2-2 3  · Will continue to monitor in setting of need for diuresis  · Avoid hypotension, nephrotoxic agents, NSAIDs    Essential hypertension  Assessment & Plan  · Continue labetalol with hold parameters    Type 2 diabetes mellitus with stage 3 chronic kidney disease, without long-term current use of insulin ([facility])  Assessment & Plan  Lab Results   Component Value Date    HGBA1C 6 0 (H) 03/15/2022       No results for input(s): POCGLU in the last 72 hours  Blood Sugar Average: Last 72 hrs:  ·  patient on metformin at home - will hold while inpatient  · SSI ACHS  · Diabetic diet    Moderate COPD (chronic obstructive pulmonary disease) ([facility])  Assessment & Plan  · No evidence to suggest COPD exacerbation  · PRN albuterol  · Home trelegy - substitute for incruse     CHI (obstructive sleep apnea)  Assessment & Plan  · Does not use bipap or cpap  · Home oxygen PRN he had for 5 years - taken away given repeat 6 min walk did not show desat  · Needs sleep study outpt    Obesity, morbid ([address] )  Assessment & Plan  · Encourage healthy lifestyle changes  · Body mass index is 40 06 kg/m²  · outpatient sleep study    Non healing left heel wound  Assessment & Plan  · Follows with Dr Jaden Cuevas - saw this AM  · Apply Mupirocin ointment to wound site  · Cover with [address]  · CHANGE 3XWEEK    VTE Prophylaxis: Heparin  / sequential compression device   Code Status: Level 1 FULL CODE - discussed with patient and updated prior code status from DNR to FULL CODE after discussion with patient  Discussion with family: none    Anticipated Length of Stay:  Patient will be admitted on an Inpatient basis with an anticipated length of stay of  > 2 midnights  Justification for Hospital Stay:  CHF exacerbation    Total Time for Visit, including Counseling / Coordination of Care: 60 minutes  Greater than 50% of this total time spent on direct patient counseling and coordination of care      Chief Complaint:   Shortness of breath    History of Present Illness:    Parth Nicholas is a 81 y o  male history of chronic diastolic CHF, diabetes maintained on metformin, morbid obesity, COPD, hypertension who presents with increasing shortness of breath over the last several months which worsened over the last few weeks  The patient reports that he had been seen by his PCP and had been taking torsemide 40 mg in the morning and 20 mg at lunchtime  Sometimes he will forget to take the lunchtime dose  In March the patient was started on metolazone 2 5 mg weekly  He reports that initially he thought this was helping his bilateral lower extremity and breathing however over the last few weeks he cannot walk 40 ft without becoming increasingly short of breath and having to stop  Previously he was noted to sleep in a bed, he then got a different bed that was noted to elevate his head of the bed  Over the last few weeks he has been unable to sleep even in that position and has been sleeping in a recliner  He noted increasing bilateral lower extremity edema  He has not had any changes in his salt or dietary habits  He has not had any changes in his fluid intake  He went to see his podiatrist today and the podiatrist noted that patient was short of breath and was having increasing bilateral lower extremity edema and therefore was referred to the emergency department for further evaluation  He has no cough  He has no fever  Review of Systems:    Review of Systems   Constitutional: Positive for fatigue  Negative for chills, diaphoresis, fever and unexpected weight change  HENT: Positive for congestion  Negative for sore throat  Respiratory: Positive for shortness of breath  Negative for cough and chest tightness  Cardiovascular: Positive for leg swelling  Negative for chest pain and palpitations  Gastrointestinal: Negative for abdominal pain, diarrhea, nausea and vomiting  Genitourinary: Negative for dysuria  Musculoskeletal: Negative for myalgias  Neurological: Negative for dizziness, syncope, weakness, numbness and headaches  Psychiatric/Behavioral: Negative for confusion  All other systems reviewed and are negative        Past Medical and Surgical History:     Past Medical History:   Diagnosis Date    COPD [facility]chronic obstructive pulmonary disease) ([facility] )     Diabetes mellitus ([facility] )     Herpes zoster     Hypertension     Mycoplasma pneumonia     Obesity     Osteoarthritis     Renal calculi        Past Surgical History:   Procedure Laterality Date    CATARACT EXTRACTION      with insert intraoculat lens prosthesis    HEMORRHOID SURGERY      NECK SURGERY      for bone spur       Meds/Allergies:    Prior to Admission medications    Medication Sig Start Date End Date Taking? Authorizing Provider   albuterol (ProAir HFA) 90 mcg/act inhaler Inhale 2 puffs every 4 (four) hours as needed for wheezing 6/9/21   Herminia Rodriguez MD   fluticasone [facility]) 50 mcg/act nasal spray 2 sprays into each nostril 2 (two) times a day 9/23/21   Herminia Rodriguez MD   fluticasone-umeclidinium-vilanterol (Trelegy Ellipta) 200-62 5-25 MCG/INH AEPB inhaler Inhale 1 puff daily Rinse mouth after use   10/19/21 10/14/22  Herminia Rodriguez MD   glucose blood [facility]) test strip Use one new strip daily DX:E11 9 11/1/21   Herminia Rodriguez MD   labetalol (NORMODYNE) 300 mg tablet Take 1 tablet (300 mg total) by mouth 2 (two) times a day 4/18/22   Herminia Rodriguez MD   melatonin 1 mg Take 5 mg by mouth daily at bedtime    Historical Provider, MD   metFORMIN (GLUCOPHAGE) 500 mg tablet TAKE 1 TABLET (500 MG TOTAL) BY MOUTH 2 (TWO) TIMES A DAY WITH MEALS 4/7/22   Herminia Rodriguez MD   metolazone (ZAROXOLYN) 2 5 mg tablet Take 1 tablet (2 5 mg total) by mouth once a week Or as directed 3/16/22   Herminia Rodriguez MD   potassium chloride (K-DUR,KLOR-CON) 20 mEq tablet Take 1 tablet (20 mEq total) by mouth daily 3/28/22   Herminia Rodriguez MD   predniSONE 10 mg tablet Take 1 tablet (10 mg total) by mouth daily Or as directed 10/15/21   Herminia Rodriguez MD   tamsulosin (FLOMAX) 0 4 mg TAKE 1 CAPSULE BY MOUTH EVERY DAY WITH DINNER 11/28/21   Herminia Rodriguez MD   torsemide (DEMADEX) 20 mg tablet Take 2 pills ( 40 mg) in the am, 20 mg in the pm ( 1 tab) 21   Saray Artis DO     I have reviewed home medications with patient personally  Allergies: No Known Allergies    Social History:     Marital Status: /Civil Union   Occupation: retired  Patient Pre-hospital Living Situation: home with wife  Patient Pre-hospital Level of Mobility: walks  Patient Pre-hospital Diet Restrictions: none  Substance Use History:   Social History     Substance and Sexual Activity   Alcohol Use Yes    Alcohol/week: 1 0 standard drink    Types: 1 Cans of beer per week    Comment: 1-2 beers a months     Social History     Tobacco Use   Smoking Status Former Smoker    Packs/day: 2 00    Years: 42 00    Pack years: 84 00    Types: Cigarettes    Start date:     Quit date: 0    Years since quittin 3   Smokeless Tobacco Never Used     Social History     Substance and Sexual Activity   Drug Use No       Family History:    Family History   Problem Relation Age of Onset    Breast cancer Mother     Stroke Mother     Pneumonia Father     Substance Abuse Neg Hx     Mental illness Neg Hx        Physical Exam:     Vitals:   Blood Pressure: 134/83 (22)  Pulse: 93 (22)  Temperature: 98 5 °F (36 9 °C) (22)  Temp Source: Oral (22)  Respirations: 20 (22)  Weight - Scale: 130 kg (287 lb 3 2 oz) (22)  SpO2: 93 % (22)    Physical Exam  Vitals and nursing note reviewed  Constitutional:       General: He is not in acute distress  Appearance: Normal appearance  He is obese  He is not ill-appearing or diaphoretic  HENT:      Head: Normocephalic and atraumatic  Mouth/Throat:      Mouth: Mucous membranes are moist    Neck:      Comments: Unable to visualize JVD given body habitus  Cardiovascular:      Rate and Rhythm: Normal rate and regular rhythm  Heart sounds: No murmur heard  Pulmonary:      Effort: Pulmonary effort is normal       Breath sounds: No stridor   Rales (right base) present  No wheezing or rhonchi  Abdominal:      General: Bowel sounds are normal       Palpations: Abdomen is soft  Tenderness: There is no abdominal tenderness  There is no guarding  Musculoskeletal:      Right lower leg: Edema (3+ foot, 2+ leg) present  Left lower leg: Edema (3+ foot, 2+ leg) present  Skin:     General: Skin is warm and dry  Neurological:      Mental Status: He is alert  Psychiatric:         Mood and Affect: Mood normal          Behavior: Behavior normal            Additional Data:     Lab Results: I have personally reviewed pertinent reports  Results from last 7 days   Lab Units 05/11/22  1446   WBC Thousand/uL 12 10*   HEMOGLOBIN g/dL 14 4   HEMATOCRIT % 45 2   PLATELETS Thousands/uL 212   NEUTROS PCT % 86*   LYMPHS PCT % 8*   MONOS PCT % 5   EOS PCT % 0     Results from last 7 days   Lab Units 05/11/22  1446   SODIUM mmol/L 137   POTASSIUM mmol/L 4 0   CHLORIDE mmol/L 101   CO2 mmol/L 25   BUN mg/dL 70*   CREATININE mg/dL 2 48*   ANION GAP mmol/L 11   CALCIUM mg/dL 8 8   ALBUMIN g/dL 3 5   TOTAL BILIRUBIN mg/dL 0 40   ALK PHOS U/L 65   ALT U/L 20   AST U/L 11   GLUCOSE RANDOM mg/dL 211*                 Results from last 7 days   Lab Units 05/11/22  1446   PROCALCITONIN ng/ml 0 13       Imaging: I have personally reviewed pertinent reports  XR chest pa & lateral   Final Result by Terrell Hussein MD (05/11 1638)      Findings are suspicious for new mild right lower lobe pneumonia  Possible 1 4 cm nodule versus summation artifact in the vicinity of suspected pneumonia  Given the patient's smoking history a noncontrast CT is follow-up is advised when the patient is no longer acute to see if this finding persists in 2-4 weeks time  Dr Will Gerard was notified via tiger text              Workstation performed: [id number]             EKG, Pathology, and Other Studies Reviewed on Admission:   · EKG:  Normal sinus rhythm, inversions in V5 through V6    Allscripts / Epic Records Reviewed: Yes     ** Please Note: This note has been constructed using a voice recognition system   **

## 2022-05-11 NOTE — ED PROVIDER NOTES
History  Chief Complaint   Patient presents with    Foot Ulcer     Patient has been seeing wound center for a "fluid filled ulcer" on the foot  Patient states this is now healing, but now he has several new "fluid filled blisters" on b/l feet   Shortness of Breath     SOB worsening over several weeks  Patient is an 82 y/o male with a PMHx of COPD, diabetes mellitus, diastolic CHF, CKD 3, HTN and morbid obesity, presenting to the ED for evaluation of SOB and worsening b/l lower extremity edema  Patient reports worsening shortness of breath over the past 3-4 weeks  He says that he cannot take the trash bag from the kitchen to the garage without becoming significantly out of breath which is not his baseline  He also states that he cannot lay flat to sleep as he feels like he cannot breathe  He has noticed increased lower extremity edema over the past few weeks  He also mentions a pressure ulcer on the left heel as well as a new "blister" on the right heel that are managed by wound care  He feels that these may be related to the increased swelling in his lower extremities  He denies fevers, chills, cough, congestion, sore throat, chest pain, palpitations, abdominal pain, N/V/D or urinary symptoms  Prior to Admission Medications   Prescriptions Last Dose Informant Patient Reported? Taking? albuterol (ProAir HFA) 90 mcg/act inhaler  Self No No   Sig: Inhale 2 puffs every 4 (four) hours as needed for wheezing   fluticasone (FLONASE) 50 mcg/act nasal spray  Self No No   Si sprays into each nostril 2 (two) times a day   fluticasone-umeclidinium-vilanterol (Trelegy Ellipta) 200-62 5-25 MCG/INH AEPB inhaler  Self No No   Sig: Inhale 1 puff daily Rinse mouth after use     glucose blood (OneTouch Verio) test strip  Self No No   Sig: Use one new strip daily DX:E11 9   labetalol (NORMODYNE) 300 mg tablet   No No   Sig: Take 1 tablet (300 mg total) by mouth 2 (two) times a day   melatonin 1 mg  Self Yes No Sig: Take 5 mg by mouth daily at bedtime   metFORMIN (GLUCOPHAGE) 500 mg tablet   No No   Sig: TAKE 1 TABLET (500 MG TOTAL) BY MOUTH 2 (TWO) TIMES A DAY WITH MEALS   metolazone (ZAROXOLYN) 2 5 mg tablet  Self No No   Sig: Take 1 tablet (2 5 mg total) by mouth once a week Or as directed   potassium chloride (K-DUR,KLOR-CON) 20 mEq tablet   No No   Sig: Take 1 tablet (20 mEq total) by mouth daily   predniSONE 10 mg tablet  Self No No   Sig: Take 1 tablet (10 mg total) by mouth daily Or as directed   tamsulosin (FLOMAX) 0 4 mg  Self No No   Sig: TAKE 1 CAPSULE BY MOUTH EVERY DAY WITH DINNER   torsemide (DEMADEX) 20 mg tablet  Self No No   Sig: Take 2 pills ( 40 mg) in the am, 20 mg in the pm ( 1 tab)      Facility-Administered Medications Last Administration Doses Remaining   lidocaine (XYLOCAINE) 4 % topical solution 5 mL 2022  1:31 PM 0          Past Medical History:   Diagnosis Date    COPD (chronic obstructive pulmonary disease) (HCC)     Diabetes mellitus (HCC)     Herpes zoster     Hypertension     Mycoplasma pneumonia     Obesity     Osteoarthritis     Renal calculi        Past Surgical History:   Procedure Laterality Date    CATARACT EXTRACTION      with insert intraoculat lens prosthesis    HEMORRHOID SURGERY      NECK SURGERY      for bone spur       Family History   Problem Relation Age of Onset    Breast cancer Mother     Stroke Mother     Pneumonia Father     Substance Abuse Neg Hx     Mental illness Neg Hx      I have reviewed and agree with the history as documented      E-Cigarette/Vaping    E-Cigarette Use Never User      E-Cigarette/Vaping Substances    Nicotine No     THC No     CBD No     Flavoring No     Other No     Unknown No      Social History     Tobacco Use    Smoking status: Former Smoker     Packs/day: 2 00     Years: 42 00     Pack years: 84 00     Types: Cigarettes     Start date:      Quit date:      Years since quittin 3    Smokeless tobacco: Never Used   Vaping Use    Vaping Use: Never used   Substance Use Topics    Alcohol use: Yes     Alcohol/week: 1 0 standard drink     Types: 1 Cans of beer per week     Comment: 1-2 beers a months    Drug use: No       Review of Systems   Constitutional: Negative for chills, diaphoresis, fatigue and fever  HENT: Negative for congestion, ear pain, mouth sores, rhinorrhea, sinus pain, sore throat and trouble swallowing  Eyes: Negative for photophobia and visual disturbance  Respiratory: Positive for shortness of breath  Negative for cough, chest tightness and wheezing  Cardiovascular: Positive for leg swelling  Negative for chest pain and palpitations  Gastrointestinal: Negative for abdominal pain, blood in stool, constipation, diarrhea, nausea and vomiting  Genitourinary: Negative for dysuria, flank pain, frequency, hematuria and urgency  Musculoskeletal: Negative for arthralgias, back pain, gait problem, joint swelling, myalgias and neck pain  Skin: Positive for wound (bilateral heels)  Negative for color change, pallor and rash  Neurological: Negative for dizziness, syncope, speech difficulty, weakness, light-headedness, numbness and headaches  Psychiatric/Behavioral: Negative for confusion and sleep disturbance  All other systems reviewed and are negative  Physical Exam  Physical Exam  Vitals and nursing note reviewed  Constitutional:       General: He is awake  He is not in acute distress  Appearance: Normal appearance  He is well-developed  He is not ill-appearing or diaphoretic  HENT:      Head: Normocephalic and atraumatic  Right Ear: External ear normal       Left Ear: External ear normal       Nose: Nose normal       Mouth/Throat:      Lips: Pink  Mouth: Mucous membranes are moist    Eyes:      General: Lids are normal  No scleral icterus  Conjunctiva/sclera: Conjunctivae normal       Pupils: Pupils are equal, round, and reactive to light  Cardiovascular:      Rate and Rhythm: Normal rate and regular rhythm  Pulses: Normal pulses  Radial pulses are 2+ on the right side and 2+ on the left side  Heart sounds: Normal heart sounds, S1 normal and S2 normal    Pulmonary:      Effort: Pulmonary effort is normal  No accessory muscle usage  Breath sounds: Normal breath sounds  No stridor  No decreased breath sounds, wheezing, rhonchi or rales  Comments: Decreased bilaterally  No tachypnea, increased work of breathing or accessory muscle usage  SpO2 95% on room air  Abdominal:      General: Abdomen is flat  Bowel sounds are normal  There is no distension  Palpations: Abdomen is soft  Tenderness: There is no abdominal tenderness  There is no right CVA tenderness, left CVA tenderness, guarding or rebound  Musculoskeletal:      Cervical back: Full passive range of motion without pain, normal range of motion and neck supple  No signs of trauma  No pain with movement  Normal range of motion  Right lower le+ Pitting Edema present  Left lower le+ Pitting Edema present  Feet:    Lymphadenopathy:      Cervical: No cervical adenopathy  Skin:     General: Skin is warm and dry  Capillary Refill: Capillary refill takes less than 2 seconds  Coloration: Skin is not cyanotic, jaundiced or pale  Neurological:      Mental Status: He is alert and oriented to person, place, and time  GCS: GCS eye subscore is 4  GCS verbal subscore is 5  GCS motor subscore is 6  Cranial Nerves: No dysarthria or facial asymmetry  Gait: Gait normal    Psychiatric:         Attention and Perception: Attention normal          Mood and Affect: Mood normal          Speech: Speech normal          Behavior: Behavior normal  Behavior is cooperative           Vital Signs  ED Triage Vitals [22 1436]   Temperature Pulse Respirations Blood Pressure SpO2   98 2 °F (36 8 °C) 100 20 136/82 95 %      Temp Source Heart Rate Source Patient Position - Orthostatic VS BP Location FiO2 (%)   Temporal Monitor Sitting Right arm --      Pain Score       No Pain           Vitals:    05/11/22 1436 05/11/22 1601 05/11/22 1800   BP: 136/82 145/91    Pulse: 100 98 90   Patient Position - Orthostatic VS: Sitting           Visual Acuity      ED Medications  Medications - No data to display    Diagnostic Studies  Results Reviewed     Procedure Component Value Units Date/Time    HS Troponin I 2hr [[id number]]  (Normal) Collected: 05/11/22 1735    Lab Status: Final result Specimen: Blood from Hand, Right Updated: 05/11/22 1824     hs TnI 2hr 18 ng/L      Delta 2hr hsTnI 2 ng/L     Procalcitonin [[id number]]     Lab Status: No result Specimen: Blood     NT-BNP PRO [[id number]]  (Normal) Collected: 05/11/22 1446    Lab Status: Final result Specimen: Blood from Arm, Left Updated: 05/11/22 1804     NT-proBNP 449 pg/mL     HS Troponin I 4hr [[id number]]     Lab Status: No result Specimen: Blood     HS Troponin 0hr (reflex protocol) [[id number]]  (Normal) Collected: 05/11/22 1446    Lab Status: Final result Specimen: Blood from Arm, Left Updated: 05/11/22 1527     hs TnI 0hr 16 ng/L     Comprehensive metabolic panel [[id number]]  (Abnormal) Collected: 05/11/22 1446    Lab Status: Final result Specimen: Blood from Arm, Left Updated: 05/11/22 1525     Sodium 137 mmol/L      Potassium 4 0 mmol/L      Chloride 101 mmol/L      CO2 25 mmol/L      ANION GAP 11 mmol/L      BUN 70 mg/dL      Creatinine 2 48 mg/dL      Glucose 211 mg/dL      Calcium 8 8 mg/dL      AST 11 U/L      ALT 20 U/L      Alkaline Phosphatase 65 U/L      Total Protein 6 8 g/dL      Albumin 3 5 g/dL      Total Bilirubin 0 40 mg/dL      eGFR 22 ml/min/1 73sq m     Narrative:      Tim guidelines for Chronic Kidney Disease (CKD):     Stage 1 with normal or high GFR (GFR > 90 mL/min/1 73 square meters)    Stage 2 Mild CKD (GFR = 60-89 mL/min/1 73 square meters)    Stage 3A Moderate CKD (GFR = 45-59 mL/min/1 73 square meters)    Stage 3B Moderate CKD (GFR = 30-44 mL/min/1 73 square meters)    Stage 4 Severe CKD (GFR = 15-29 mL/min/1 73 square meters)    Stage 5 End Stage CKD (GFR <15 mL/min/1 73 square meters)  Note: GFR calculation is accurate only with a steady state creatinine    CBC and differential [[id number]]  (Abnormal) Collected: 05/11/22 1446    Lab Status: Final result Specimen: Blood from Arm, Left Updated: 05/11/22 1453     WBC 12 10 Thousand/uL      RBC 4 95 Million/uL      Hemoglobin 14 4 g/dL      Hematocrit 45 2 %      MCV 91 fL      MCH 29 1 pg      MCHC 31 9 g/dL      RDW 13 7 %      MPV 9 6 fL      Platelets 416 Thousands/uL      nRBC 0 /100 WBCs      Neutrophils Relative 86 %      Immat GRANS % 1 %      Lymphocytes Relative 8 %      Monocytes Relative 5 %      Eosinophils Relative 0 %      Basophils Relative 0 %      Neutrophils Absolute 10 44 Thousands/µL      Immature Grans Absolute 0 09 Thousand/uL      Lymphocytes Absolute 0 95 Thousands/µL      Monocytes Absolute 0 58 Thousand/µL      Eosinophils Absolute 0 02 Thousand/µL      Basophils Absolute 0 02 Thousands/µL                  XR chest pa & lateral   Final Result by Adrien Severin, MD (05/11 1638)      Findings are suspicious for new mild right lower lobe pneumonia  Possible 1 4 cm nodule versus summation artifact in the vicinity of suspected pneumonia  Given the patient's smoking history a noncontrast CT is follow-up is advised when the patient is no longer acute to see if this finding persists in 2-4 weeks time  Dr Steve Higgins was notified via tiger text              Workstation performed: [id number]                    Procedures  ECG 12 Lead Documentation Only    Date/Time: 5/11/2022 5:42 PM  Performed by: Nilsa Cabello PA-C  Authorized by: Nilsa Cabello PA-C     Indications / Diagnosis:  SOB  ECG reviewed by me, the ED Provider: yes    Patient location: ED  Previous ECG:     Previous ECG:  Compared to current    Comparison ECG info:  10/21/21    Similarity:  Changes noted    Comparison to cardiac monitor: Yes    Interpretation:     Interpretation: abnormal    Rate:     ECG rate:  84    ECG rate assessment: normal    Rhythm:     Rhythm: sinus rhythm    Ectopy:     Ectopy: none    QRS:     QRS axis:  Indeterminate    QRS intervals:  Normal  Conduction:     Conduction: normal    ST segments:     ST segments:  Normal  T waves:     T waves: inverted      Inverted:  V6 and V5  Comments:      No STEMI  QT/QTc 412/486             ED Course             HEART Risk Score    Flowsheet Row Most Recent Value   Heart Score Risk Calculator    History 1 Filed at: 05/11/2022 1841   ECG 1 Filed at: 05/11/2022 1841   Age 2 Filed at: 05/11/2022 1841   Risk Factors 2 Filed at: 05/11/2022 1841   Troponin 1 Filed at: 05/11/2022 1841   HEART Score 7 Filed at: 05/11/2022 1841                                      MDM  Number of Diagnoses or Management Options  Bilateral lower extremity edema  CHF exacerbation (HCC)  Pulmonary nodule  Shortness of breath  Diagnosis management comments: Patient is an 82 y/o male with a PMHx of COPD, diabetes mellitus, diastolic CHF, CKD 3, HTN and morbid obesity, presenting to the ED for evaluation of SOB and worsening b/l lower extremity edema  Patient has bilateral lower extremity edema exam as well as an elevated BNP compared to prior  CXR read by radiologist as suspicious for new right lower lobe pneumonia; however, patient has no cough, fevers, etc  Initial troponin 18, delta 20  EKG NSR with new T wave inversions in leads V5-6  Kidney function worsened from prior  Patient admitted to [facility] for continued evaluation and management  The management plan was discussed in detail with the patient at bedside and all questions were answered          Amount and/or Complexity of Data Reviewed  Clinical lab tests: ordered and reviewed  Tests in the radiology section of CPT®: ordered and reviewed  Discuss the patient with other providers: yes    Patient Progress  Patient progress: stable      Disposition  Final diagnoses:   Shortness of breath   Bilateral lower extremity edema   CHF exacerbation ([facility] [address] )   Pulmonary nodule     Time reflects when diagnosis was documented in both MDM as applicable and the Disposition within this note     Time User Action Codes Description Comment    5/11/2022  6:17 PM Lizzie Thompson Add [R06 02] Shortness of breath     5/11/2022  6:18 PM Lauren Green Add [R60 0] Bilateral lower extremity edema     5/11/2022  6:27 PM Lizzie Thompson Add [I50 9] CHF exacerbation ([facility] [address] )     5/11/2022  6:43 PM Xin Green Add [R91 1] Pulmonary nodule       ED Disposition     ED Disposition   Admit    Condition   Stable    Date/Time   Wed May 11, 2022  6:28 PM    Comment   Case was discussed with SHIRA and the patient's admission status was agreed to be Admission Status: inpatient status to the service of Dr Junito Jackson  Follow-up Information    None         Patient's Medications   Discharge Prescriptions    No medications on file       No discharge procedures on file      PDMP Review       Value Time User    PDMP Reviewed  Yes 10/27/2021 11:14 AM Colonel Herlinda MD          ED Provider  Electronically Signed by           Kassidy Cardoza PA-C  05/11/22 6569

## 2022-05-11 NOTE — ASSESSMENT & PLAN NOTE
· No evidence to suggest COPD exacerbation  · PRN albuterol  · Home trelegy - substitute for incruse

## 2022-05-11 NOTE — ASSESSMENT & PLAN NOTE
Wt Readings from Last 3 Encounters:   05/11/22 130 kg (287 lb 3 2 oz)   04/20/22 132 kg (290 lb)   04/14/22 131 kg (289 lb)     · Increased SOB x months and worse in last weeks, increasing BLE edema and lack of improvement with PO diuretics, PND and orthopnea    CXR:  Suspicious for new myalgias right lower lobe pneumonia  o No signs or symptoms of PNA - stop ABX   Troponins negative    ECHO 31/63: EF 74%, diastolic function mildly abnormal-grade 1 relaxation  o Repeat ECHO ordered   Home diuretic regimen: torsemide 40 mg in AM/20 mg in PM, metazolone 2 5 mg on Fridays  o Sometimes misses PM dose of torsemide  Metaozolone added for last 2-3 months   Start lasix 80 mg IV TID (first dose now)      Continue to monitor electrolytes and replete as needed   Daily weights, Strict I & Os   Cardiac diet, low sodium <2g, fluid restriction <1500  [facility] consulted

## 2022-05-11 NOTE — ASSESSMENT & PLAN NOTE
Lab Results   Component Value Date    HGBA1C 6 0 (H) 03/15/2022       No results for input(s): POCGLU in the last 72 hours      Blood Sugar Average: Last 72 hrs:  ·  patient on metformin at home - will hold while inpatient  · SSI ACHS  · Diabetic diet

## 2022-05-12 ENCOUNTER — APPOINTMENT (INPATIENT)
Dept: NON INVASIVE DIAGNOSTICS | Facility: HOSPITAL | Age: 87
DRG: 291 | End: 2022-05-12
Payer: COMMERCIAL

## 2022-05-12 PROBLEM — R65.10 SIRS (SYSTEMIC INFLAMMATORY RESPONSE SYNDROME) (HCC): Status: ACTIVE | Noted: 2022-05-12

## 2022-05-12 LAB
ANION GAP SERPL CALCULATED.3IONS-SCNC: 12 MMOL/L (ref 4–13)
AORTIC VALVE MEAN VELOCITY: 11.9 M/S
AV LVOT MEAN GRADIENT: 2 MMHG
AV LVOT PEAK GRADIENT: 4 MMHG
AV MEAN GRADIENT: 7 MMHG
AV PEAK GRADIENT: 13 MMHG
AV VELOCITY RATIO: 0.53
BUN SERPL-MCNC: 69 MG/DL (ref 5–25)
CALCIUM SERPL-MCNC: 9.3 MG/DL (ref 8.3–10.1)
CHLORIDE SERPL-SCNC: 101 MMOL/L (ref 100–108)
CO2 SERPL-SCNC: 26 MMOL/L (ref 21–32)
CREAT SERPL-MCNC: 2.43 MG/DL (ref 0.6–1.3)
DOP CALC AO PEAK VEL: 1.77 M/S
DOP CALC AO VTI: 29.32 CM
DOP CALC LVOT PEAK VEL VTI: 17.95 CM
DOP CALC LVOT PEAK VEL: 0.94 M/S
DOP CALC MV VTI: 18.73 CM
E WAVE DECELERATION TIME: 290 MS
GFR SERPL CREATININE-BSD FRML MDRD: 23 ML/MIN/1.73SQ M
GLUCOSE SERPL-MCNC: 139 MG/DL (ref 65–140)
GLUCOSE SERPL-MCNC: 168 MG/DL (ref 65–140)
GLUCOSE SERPL-MCNC: 173 MG/DL (ref 65–140)
GLUCOSE SERPL-MCNC: 175 MG/DL (ref 65–140)
GLUCOSE SERPL-MCNC: 195 MG/DL (ref 65–140)
LAAS-AP2: 24 CM2
LAAS-AP4: 21.2 CM2
MV E'TISSUE VEL-LAT: 9 CM/S
MV E'TISSUE VEL-SEP: 8 CM/S
MV MEAN GRADIENT: 1 MMHG
MV PEAK A VEL: 0.84 M/S
MV PEAK E VEL: 65 CM/S
MV PEAK GRADIENT: 4 MMHG
MV STENOSIS PRESSURE HALF TIME: 84 MS
MV VALVE AREA P 1/2 METHOD: 2.62 CM2
PA SYSTOLIC PRESSURE: 40 MMHG
POTASSIUM SERPL-SCNC: 3.4 MMOL/L (ref 3.5–5.3)
PROCALCITONIN SERPL-MCNC: 0.14 NG/ML
RIGHT ATRIAL 2D VOLUME: 38 ML
RIGHT ATRIUM AREA SYSTOLE A4C: 15.8 CM2
RIGHT VENTRICLE ID DIMENSION: 3.9 CM
SL CV LEFT ATRIUM LENGTH A2C: 5.7 CM
SL CV LV EF: 60
SODIUM SERPL-SCNC: 139 MMOL/L (ref 136–145)
TR MAX PG: 36 MMHG
TR PEAK VELOCITY: 3 M/S
TRICUSPID VALVE PEAK REGURGITATION VELOCITY: 2.99 M/S

## 2022-05-12 PROCEDURE — 99223 1ST HOSP IP/OBS HIGH 75: CPT | Performed by: INTERNAL MEDICINE

## 2022-05-12 PROCEDURE — 80048 BASIC METABOLIC PNL TOTAL CA: CPT | Performed by: PHYSICIAN ASSISTANT

## 2022-05-12 PROCEDURE — 82948 REAGENT STRIP/BLOOD GLUCOSE: CPT

## 2022-05-12 PROCEDURE — 84145 PROCALCITONIN (PCT): CPT

## 2022-05-12 PROCEDURE — C8929 TTE W OR WO FOL WCON,DOPPLER: HCPCS

## 2022-05-12 PROCEDURE — 93306 TTE W/DOPPLER COMPLETE: CPT | Performed by: INTERNAL MEDICINE

## 2022-05-12 PROCEDURE — 99232 SBSQ HOSP IP/OBS MODERATE 35: CPT | Performed by: INTERNAL MEDICINE

## 2022-05-12 RX ORDER — BUMETANIDE 0.25 MG/ML
1 INJECTION, SOLUTION INTRAMUSCULAR; INTRAVENOUS 2 TIMES DAILY
Status: DISCONTINUED | OUTPATIENT
Start: 2022-05-12 | End: 2022-05-13

## 2022-05-12 RX ORDER — POTASSIUM CHLORIDE 20 MEQ/1
40 TABLET, EXTENDED RELEASE ORAL DAILY
Status: DISCONTINUED | OUTPATIENT
Start: 2022-05-13 | End: 2022-05-13

## 2022-05-12 RX ADMIN — GUAIFENESIN 600 MG: 600 TABLET ORAL at 17:26

## 2022-05-12 RX ADMIN — FLUTICASONE PROPIONATE 2 SPRAY: 50 SPRAY, METERED NASAL at 08:04

## 2022-05-12 RX ADMIN — HEPARIN SODIUM 5000 UNITS: 5000 INJECTION INTRAVENOUS; SUBCUTANEOUS at 21:32

## 2022-05-12 RX ADMIN — PERFLUTREN 0.6 ML/MIN: 6.52 INJECTION, SUSPENSION INTRAVENOUS at 15:17

## 2022-05-12 RX ADMIN — FUROSEMIDE 80 MG: 10 INJECTION, SOLUTION INTRAMUSCULAR; INTRAVENOUS at 12:10

## 2022-05-12 RX ADMIN — LABETALOL HYDROCHLORIDE 300 MG: 200 TABLET, FILM COATED ORAL at 08:03

## 2022-05-12 RX ADMIN — POTASSIUM CHLORIDE 20 MEQ: 1500 TABLET, EXTENDED RELEASE ORAL at 08:03

## 2022-05-12 RX ADMIN — HEPARIN SODIUM 5000 UNITS: 5000 INJECTION INTRAVENOUS; SUBCUTANEOUS at 15:06

## 2022-05-12 RX ADMIN — HEPARIN SODIUM 5000 UNITS: 5000 INJECTION INTRAVENOUS; SUBCUTANEOUS at 05:02

## 2022-05-12 RX ADMIN — FLUTICASONE PROPIONATE 2 SPRAY: 50 SPRAY, METERED NASAL at 21:32

## 2022-05-12 RX ADMIN — FUROSEMIDE 80 MG: 10 INJECTION, SOLUTION INTRAMUSCULAR; INTRAVENOUS at 05:02

## 2022-05-12 RX ADMIN — INSULIN LISPRO 1 UNITS: 100 INJECTION, SOLUTION INTRAVENOUS; SUBCUTANEOUS at 08:04

## 2022-05-12 RX ADMIN — Medication 5 MG: at 21:32

## 2022-05-12 RX ADMIN — INSULIN LISPRO 2 UNITS: 100 INJECTION, SOLUTION INTRAVENOUS; SUBCUTANEOUS at 12:10

## 2022-05-12 RX ADMIN — BUMETANIDE 1 MG: 0.25 INJECTION, SOLUTION INTRAMUSCULAR; INTRAVENOUS at 17:26

## 2022-05-12 RX ADMIN — LABETALOL HYDROCHLORIDE 300 MG: 200 TABLET, FILM COATED ORAL at 17:26

## 2022-05-12 RX ADMIN — GUAIFENESIN 600 MG: 600 TABLET ORAL at 08:03

## 2022-05-12 RX ADMIN — UMECLIDINIUM 1 PUFF: 62.5 AEROSOL, POWDER ORAL at 08:05

## 2022-05-12 RX ADMIN — INSULIN LISPRO 1 UNITS: 100 INJECTION, SOLUTION INTRAVENOUS; SUBCUTANEOUS at 21:35

## 2022-05-12 RX ADMIN — TAMSULOSIN HYDROCHLORIDE 0.4 MG: 0.4 CAPSULE ORAL at 17:26

## 2022-05-12 RX ADMIN — FLUTICASONE FUROATE AND VILANTEROL TRIFENATATE 1 PUFF: 200; 25 POWDER RESPIRATORY (INHALATION) at 08:05

## 2022-05-12 NOTE — ASSESSMENT & PLAN NOTE
· Does not use bipap or cpap  · Home oxygen PRN he had for 5 years - taken away given repeat 6 min walk did not show desat  · Needs sleep study outpt

## 2022-05-12 NOTE — CASE MANAGEMENT
Case Management Assessment & Discharge Planning Note    Patient name Mark Yan  Location /-82 MRN [id number]  : 1935 Date 2022       Current Admission Date: 2022  Current Admission Diagnosis:Acute diastolic congestive heart failure [facility])   Patient Active Problem List    Diagnosis Date Noted    SIRS (systemic inflammatory response syndrome) ([facility] [address] ) 2022    Chest x-ray abnormality 2022    Non healing left heel wound 2022    Chronic renal disease, stage IV ([facility] [address] ) 03/15/2022    Morbid obesity due to excess calories ([facility] [address] ) 10/25/2021    Bullous lesion 2021    Localized edema 2021    Benign prostatic hyperplasia with urinary hesitancy 2020    Stage 3 chronic kidney disease ([facility] [address] ) 2020    Moderate COPD (chronic obstructive pulmonary disease) ([facility] [address] ) 12/10/2019    Acute diastolic congestive heart failure ([facility] [address] ) 2019    Obesity, morbid ([facility] [address] [facility] ) 2019    Simple chronic bronchitis ([facility] [address] ) 2018    Type 2 diabetes mellitus with stage 3 chronic kidney disease, without long-term current use of insulin ([facility] [address] ) 2016    Essential hypertension 2015    CHI (obstructive sleep apnea) 2014      LOS (days): 1  Geometric Mean LOS (GMLOS) (days): 3 80  Days to GMLOS:3     OBJECTIVE:    Risk of Unplanned Readmission Score: 14 69         Current admission status: Inpatient       Preferred Pharmacy:   [facility]pharm[facility] #[id number]ish, [address] [address]  Phone: [phone] Fax: [phone]    Primary Care Provider: Jesús Adamson MD    Primary Insurance: [facility]  Secondary Insurance:     ASSESSMENT:  Kait Barr Proxies    There are no active Health Care Proxies on file         Advance Directives  Does patient have a Health Care POA?: Yes  Does patient have Advance Directives?: Yes  Advance Directives: Living will, Power of  for health care  Primary Contact: [place] Sloanmargarita         Readmission Root Cause  30 Day Readmission: No    Patient Information  Admitted from[de-identified] Home (sent from poderity office)  Mental Status: Alert  During Assessment patient was accompanied by: Son  Assessment information provided by[de-identified] Son, Patient  Support Systems: Self, Spouse/significant other, Son  [place] of Residence: [address] [facility] do you live in?: [address] [facility] entry access options   Select all that apply : No steps to enter home  Type of Current Residence: 2 story home ([address], has 1st flooer set up )  Upon entering residence, is there a bedroom on the main floor (no further steps)?: Yes  Upon entering residence, is there a bathroom on the main floor (no further steps)?: Yes  In the last 12 months, was there a time when you were not able to pay the mortgage or rent on time?: No  In the last 12 months, how many places have you lived?: 1  In the last 12 months, was there a time when you did not have a steady place to sleep or slept in a shelter (including now)?: No  Living Arrangements: Lives w/ Spouse/significant other  Is patient a ?: Yes  Is patient active with [facility])?: No    Activities of Daily Living Prior to Admission  Functional Status: Independent  Completes ADLs independently?: Yes  Ambulates independently?: Yes  Does patient use assisted devices?: Yes  Assisted Devices (DME) used: Straight Cane  Does patient currently own DME?: Yes  What DME does the patient currently own?: [employer]  Does patient have a history of Outpatient Therapy (PT/OT)?: Yes  Does the patient have a history of Short-Term Rehab?: No  Does patient have a history of HHC?: No  Does patient currently have [facility]?: No         Patient Information Continued  Income Source: Pension/care home  Does patient have prescription coverage?: Yes  Within the past 12 months, you worried that your food would run out before you got the money to buy more : Never true  Within the past 12 months, the food you bought just didn't last and you didn't have money to get more : Never true  Does patient receive dialysis treatments?: No  Does patient have a history of substance abuse?: No  Does patient have a history of Mental Health Diagnosis?: No         Means of Transportation  Means of Transport to Appts[de-identified] Drives Self  In the past 12 months, has lack of transportation kept you from medical appointments or from getting medications?: No  In the past 12 months, has lack of transportation kept you from meetings, work, or from getting things needed for daily living?: No        DISCHARGE DETAILS:    Discharge planning discussed with[de-identified] patient and son, Omer Quintana        CM contacted family/caregiver?: Yes  Were Treatment Team discharge recommendations reviewed with patient/caregiver?: Yes  Did patient/caregiver verbalize understanding of patient care needs?: Yes  Were patient/caregiver advised of the risks associated with not following Treatment Team discharge recommendations?: Yes    Contacts  Patient Contacts: Aby Kincaid  Relationship to Patient[de-identified] Family  Contact Method: In Person  Reason/Outcome: Discharge [address]         Is the patient interested in [place] [address] at discharge?: No    DME Referral Provided  Referral made for DME?: No     Met with patient and his son Omer Quintana to review the role of CM and discussed discharge needs  Patient reports residing with his wife in a 2 story home with 0 [address]OOGIE,  He uses only the 1st floor  He recently started to uses a SPC as he did not feel steady  Will need PT/OT to evaluate when stable  He plans to return home at discharge  CM to follow

## 2022-05-12 NOTE — ASSESSMENT & PLAN NOTE
Lab Results   Component Value Date    EGFR 23 05/12/2022    EGFR 22 05/11/2022    EGFR 24 04/14/2022    CREATININE 2 43 (H) 05/12/2022    CREATININE 2 48 (H) 05/11/2022    CREATININE 2 36 (H) 04/14/2022   · Patient with baseline creatinine in 2 2-2 3  · Will continue to monitor in setting of need for diuresis  · Creatinine this morning is 2 43  · Avoid hypotension, nephrotoxic agents, NSAIDs

## 2022-05-12 NOTE — ASSESSMENT & PLAN NOTE
Lab Results   Component Value Date    HGBA1C 6 0 (H) 03/15/2022       Recent Labs     05/11/22  2131 05/12/22  0716   POCGLU 145* 173*       Blood Sugar Average: Last 72 hrs:  (P) 159

## 2022-05-12 NOTE — UTILIZATION REVIEW
Initial Clinical Review    Admission: Date/Time/Statement:   Admission Orders (From admission, onward)     Ordered        05/11/22 1830  Inpatient Admission  Once                      Orders Placed This Encounter   Procedures    Inpatient Admission     Standing Status:   Standing     Number of Occurrences:   1     Order Specific Question:   Level of Care     Answer:   Med Surg [16]     Order Specific Question:   Estimated length of stay     Answer:   More than 2 Midnights     Order Specific Question:   Certification     Answer:   I certify that inpatient services are medically necessary for this patient for a duration of greater than two midnights  See H&P and MD Progress Notes for additional information about the patient's course of treatment  ED Arrival Information     Expected   -    Arrival   5/11/2022 14:26    Acuity   Emergent            Means of arrival   Walk-In    Escorted by   Self    Service   Hospitalist    Admission type   Emergency            Arrival complaint   sob           Chief Complaint   Patient presents with    Foot Ulcer     Patient has been seeing wound center for a "fluid filled ulcer" on the foot  Patient states this is now healing, but now he has several new "fluid filled blisters" on b/l feet   Shortness of Breath     SOB worsening over several weeks  Initial Presentation: 81 y o  male from home to ED admitted inpatient due to acute diastolic CHF  PMH Of CHF, DM, COPD,  Hpt  Presented due to worsening shortness of breath starting 3 to 4 weeks prior to arrival, unable to lay flat and has increased edema  Has new blister on right heel and pressure ulcer on left heel which he follows with wound care  In exam lungs decreased breath sounds  Bilateral +2 edema  Pressure ulcer bilateral heels  NT-proBNP 449  Bun 70, creatinine 2 48 with baseline of 2 2 - 2 3   Wbc 12 10  CxR showed suspicion of pneumonia  Plan is hold further antibiotics  Start diuresis with Lasix IV  Sodium and fluid restriction  Consult cardiology     Date: 5/12/22    Day 2:  + dyspnea on exertion  Lungs decreased breath sounds at bases  Pedal edema  Bun 69  Creatinine 2 43  Glucose 145  - 195  Plan is hold home metformin  Continue accuchecks with SSI  Continue diuresis  5/12/22 per Cardiology - patient has acute on chronic CHF, hypertension, MYCHAL on CKD stage 3 and DM  Plan is switch Lasix to Bumex  Continue home medications  Update echo  ED Triage Vitals [05/11/22 1436]   Temperature Pulse Respirations Blood Pressure SpO2   98 2 °F (36 8 °C) 100 20 136/82 95 %      Temp Source Heart Rate Source Patient Position - Orthostatic VS BP Location FiO2 (%)   Temporal Monitor Sitting Right arm --      Pain Score       No Pain          Wt Readings from Last 1 Encounters:   05/12/22 130 kg (285 lb 15 oz)     Additional Vital Signs:   05/12/22 0756 -- -- -- -- -- 90 % None (Room air) --   05/12/22 07:01:36 97 5 °F (36 4 °C) 84 18 145/81 102 91 % -- Sitting   05/12/22 05:05:50 98 6 °F (37 °C) 90 18 111/68 82 95 % None (Room air) Sitting   05/12/22 0000 -- -- -- -- -- -- None (Room air) --   05/11/22 20:07:05 98 5 °F (36 9 °C) 93 20 134/83 100 93 % None (Room air) Sitting   05/11/22 1930 -- 95 24 Abnormal  128/70 92 96 % -- --   05/11/22 1800 -- 90 22 -- -- 95 % -- --   05/11/22 1601 -- 98 20 145/91 -- 95 % None (Room air)        Pertinent Labs/Diagnostic Test Results:   XR chest pa & lateral   Final Result by Julia Ward MD (05/11 1638)      Findings are suspicious for new mild right lower lobe pneumonia  Possible 1 4 cm nodule versus summation artifact in the vicinity of suspected pneumonia  Given the patient's smoking history a noncontrast CT is follow-up is advised when the patient is no longer acute to see if this finding persists in 2-4 weeks time  Dr Albaro Wheeler was notified via tiger text              Workstation performed: [id number]           5/11/22 ecg - Interpretation: abnormal     Rate:     ECG rate:  84     ECG rate assessment: normal     Rhythm:     Rhythm: sinus rhythm     Ectopy:     Ectopy: none     QRS:     QRS axis:  Indeterminate     QRS intervals:  Normal   Conduction:     Conduction: normal     ST segments:     ST segments:  Normal   T waves:     T waves: inverted       Inverted:  V6 and V5   Comments:      No STEMI  QT/QTc 412/486    5/12/22 echo Left Ventricle: Left ventricular cavity size is normal  Wall thickness is normal  The left ventricular ejection fraction is 60%  Systolic function is normal  Wall motion is normal  Diastolic function is mildly abnormal, consistent with grade I (abnormal) relaxation    Mitral Valve: There is mild annular calcification  There is mild regurgitation    Tricuspid Valve: There is mild regurgitation    Pulmonary Artery: The estimated pulmonary artery systolic pressure is 46 7 mmHg  The pulmonary artery systolic pressure is mildly increased        Results from last 7 days   Lab Units 05/11/22  2108   SARS-COV-2  Negative     Results from last 7 days   Lab Units 05/11/22  1446   WBC Thousand/uL 12 10*   HEMOGLOBIN g/dL 14 4   HEMATOCRIT % 45 2   PLATELETS Thousands/uL 212   NEUTROS ABS Thousands/µL 10 44*     Results from last 7 days   Lab Units 05/12/22  0502 05/11/22  1446   SODIUM mmol/L 139 137   POTASSIUM mmol/L 3 4* 4 0   CHLORIDE mmol/L 101 101   CO2 mmol/L 26 25   ANION GAP mmol/L 12 11   BUN mg/dL 69* 70*   CREATININE mg/dL 2 43* 2 48*   EGFR ml/min/1 73sq m 23 22   CALCIUM mg/dL 9 3 8 8     Results from last 7 days   Lab Units 05/11/22  1446   AST U/L 11   ALT U/L 20   ALK PHOS U/L 65   TOTAL PROTEIN g/dL 6 8   ALBUMIN g/dL 3 5   TOTAL BILIRUBIN mg/dL 0 40     Results from last 7 days   Lab Units 05/12/22  0716 05/11/22  2131   POC GLUCOSE mg/dl 173* 145*     Results from last 7 days   Lab Units 05/12/22  0502 05/11/22  1446   GLUCOSE RANDOM mg/dL 175* 211*     Results from last 7 days   Lab Units 05/11/22  1735 05/11/22  1446   HS TNI 0HR ng/L  --  16   HS TNI 2HR ng/L 18  --    HSTNI D2 ng/L 2  --      Results from last 7 days   Lab Units 05/12/22  0502 05/11/22  1446   PROCALCITONIN ng/ml 0 14 0 13     Results from last 7 days   Lab Units 05/11/22  1446   NT-PRO BNP pg/mL 449     Results from last 7 days   Lab Units 05/11/22  2108   INFLUENZA A PCR  Negative   INFLUENZA B PCR  Negative   RSV PCR  Negative       ED Treatment:   Medication Administration from 05/11/2022 1426 to 05/11/2022 1952     None        Past Medical History:   Diagnosis Date    COPD [facility]chronic obstructive pulmonary disease) ([facility] )     Diabetes mellitus ([facility])     Herpes zoster     Hypertension     Mycoplasma pneumonia     Obesity     Osteoarthritis     Renal calculi      Present on Admission:   Acute diastolic congestive heart failure (HCC)   Moderate COPD (chronic obstructive pulmonary disease) ([facility])   Stage 3 chronic kidney disease ([facility])   Essential hypertension   Type 2 diabetes mellitus with stage 3 chronic kidney disease, without long-term current use of insulin ([facility])   Obesity, morbid ([facility] )   CHI (obstructive sleep apnea)      Admitting Diagnosis: Shortness of breath [R06 02]  Pulmonary nodule [R91 1]  CHF exacerbation ([facility]) [I50 9]  Bilateral lower extremity edema [R60 0]  Age/Sex: 81 y o  male  Admission Orders: 5/11/22 1830 inpatient   Scheduled Medications:  fluticasone, 2 spray, Nasal, BID  fluticasone-vilanterol, 1 puff, Inhalation, Daily  furosemide, 80 mg, Intravenous, TID (diuretic) - dc 1254 on 5/12/22   guaiFENesin, 600 mg, Oral, BID  heparin (porcine), 5,000 Units, Subcutaneous, Q8H [address]  insulin lispro, 1-5 Units, Subcutaneous, HS  insulin lispro, 1-6 Units, Subcutaneous, TID AC  labetalol, 300 mg, Oral, BID  melatonin, 5 mg, Oral, HS  potassium chloride, 20 mEq, Oral, Daily  tamsulosin, 0 4 mg, Oral, Daily With Dinner  umeclidinium bromide, 1 puff, Inhalation, Daily    bumetanide (BUMEX) injection 1 mg  Dose: 1 mg  Freq: 2 times daily Route: IV  Start: 05/12/22 1800    azithromycin (ZITHROMAX) tablet 500 mg  Dose: 500 mg  Freq: Every 24 hours Route: PO  Start: 05/11/22 1900 End: 05/11/22 2053    cefTRIAXone (ROCEPHIN) IVPB (premix in dextrose) 2,000 mg 50 mL  Dose: 2,000 mg  Freq: Every 24 hours Route: IV  Indications of Use: PNEUMONIA  Last Dose: 2,000 mg (05/11/22 2007)  Start: 05/11/22 1900 End: 05/11/22 2053    Continuous IV Infusions: none      PRN Meds: not used   acetaminophen, 650 mg, Oral, Q6H PRN  aluminum-magnesium hydroxide-simethicone, 30 mL, Oral, Q6H PRN  sodium chloride, 1 spray, Each Nare, Q1H PRN    Wound care MWF left heel - Wash your hands with soap and water  Marlene Mcdonald old dressing, discard into plastic bag and place in trash   Cleanse the wound with soap and water prior to applying a clean dressing  Do not use tissue or cotton balls  Do not scrub the wound  Pat dry using gauze  Apply moisturizer to skin surrounding wound   Apply Mupirocin ointment to wound site  Cover with allevyn with border    Fluid restriction   IP CONSULT TO CARDIOLOGY    Network Utilization Review Department  ATTENTION: Please call with any questions or concerns to [phone] and carefully listen to the prompts so that you are directed to the right person  All voicemails are confidential   [place] all requests for admission clinical reviews, approved or denied determinations and any other requests to dedicated fax number below belonging to the campus where the patient is receiving treatment   List of dedicated fax numbers for the Facilities:  [address] DENIALS (Administrative/Medical Necessity) [phone]   [address] (Maternity/NICU/Pediatrics) [phone]   [address] [place] [address], [facility] 1527 [address] [address] [address]om [address] [facility] [place] [address] [phone]

## 2022-05-12 NOTE — ASSESSMENT & PLAN NOTE
· Follows with Dr Rebollar Offer - saw this AM  · Apply Mupirocin ointment to wound site  · Cover with ALLEVYN WITH BORDER  · CHANGE 3XWEEK

## 2022-05-12 NOTE — PLAN OF CARE
Problem: Potential for Falls  Goal: Patient will remain free of falls  Description: INTERVENTIONS:  - Educate patient/family on patient safety including physical limitations  - Instruct patient to call for assistance with activity   - Consult OT/PT to assist with strengthening/mobility   - Keep Call bell within reach  - Keep bed low and locked with side rails adjusted as appropriate  - Keep care items and personal belongings within reach  - Initiate and maintain comfort rounds  - Make Fall Risk Sign visible to staff  - Offer Toileting every 2 Hours, in advance of need  - Initiate/Maintain bed/ chair alarm  - Obtain necessary fall risk management equipment  - Apply yellow socks and bracelet for high fall risk patients  - Consider moving patient to room near nurses station  Outcome: Progressing     Problem: Prexisting or High Potential for Compromised Skin Integrity  Goal: Skin integrity is maintained or improved  Description: INTERVENTIONS:  - Identify patients at risk for skin breakdown  - Assess and monitor skin integrity  - Assess and monitor nutrition and hydration status  - Monitor labs   - Assess for incontinence   - Turn and reposition patient  - Assist with mobility/ambulation  - Relieve pressure over bony prominences  - Avoid friction and shearing  - Provide appropriate hygiene as needed including keeping skin clean and dry  - Evaluate need for skin moisturizer/barrier cream  - Collaborate with interdisciplinary team   - Patient/family teaching  - Consider wound care consult   Outcome: Progressing     Problem: METABOLIC, FLUID AND ELECTROLYTES - ADULT  Goal: Fluid balance maintained  Description: INTERVENTIONS:  - Monitor labs   - Monitor I/O and WT  - Instruct patient on fluid and nutrition as appropriate  - Assess for signs & symptoms of volume excess or deficit  Outcome: Progressing     Problem: PAIN - ADULT  Goal: Verbalizes/displays adequate comfort level or baseline comfort level  Description: Interventions:  - Encourage patient to monitor pain and request assistance  - Assess pain using appropriate pain scale  - Administer analgesics based on type and severity of pain and evaluate response  - Implement non-pharmacological measures as appropriate and evaluate response  - Consider cultural and social influences on pain and pain management  - Notify physician/advanced practitioner if interventions unsuccessful or patient reports new pain  Outcome: Progressing     Problem: INFECTION - ADULT  Goal: Absence or prevention of progression during hospitalization  Description: INTERVENTIONS:  - Assess and monitor for signs and symptoms of infection  - Monitor lab/diagnostic results  - Monitor all insertion sites, i e  indwelling lines, tubes, and drains  - Monitor endotracheal if appropriate and nasal secretions for changes in amount and color  - [place] appropriate cooling/warming therapies per order  - Administer medications as ordered  - Instruct and encourage patient and family to use good hand hygiene technique  - Identify and instruct in appropriate isolation precautions for identified infection/condition  Outcome: Progressing  Goal: Absence of fever/infection during neutropenic period  Description: INTERVENTIONS:  - Monitor WBC    Outcome: Progressing     Problem: SAFETY ADULT  Goal: Maintains/Returns to pre admission functional level  Description: INTERVENTIONS:  - Perform BMAT or MOVE assessment daily    - Set and communicate daily mobility goal to care team and patient/family/caregiver  - Collaborate with rehabilitation services on mobility goals if consulted  - Perform Range of Motion 3 times a day  - Reposition patient every 2 hours    - Dangle patient 3 times a day  - Stand patient 3 times a day  - Ambulate patient 3 times a day  - Out of bed to chair 3 times a day   - Out of bed for meals 3 times a day  - Out of bed for toileting  - Record patient progress and toleration of activity level Outcome: Progressing     Problem: DISCHARGE PLANNING  Goal: Discharge to home or other facility with appropriate resources  Description: INTERVENTIONS:  - Identify barriers to discharge w/patient and caregiver  - Arrange for needed discharge resources and transportation as appropriate  - Identify discharge learning needs (meds, wound care, etc )  - Arrange for interpretive services to assist at discharge as needed  - Refer to Case Management Department for coordinating discharge planning if the patient needs post-hospital services based on physician/advanced practitioner order or complex needs related to functional status, cognitive ability, or social support system  Outcome: Progressing

## 2022-05-12 NOTE — ASSESSMENT & PLAN NOTE
Lab Results   Component Value Date    HGBA1C 6 0 (H) 03/15/2022       Recent Labs     05/11/22  2131 05/12/22  0716   POCGLU 145* 173*       Blood Sugar Average: Last 72 hrs:  · (P) 159 patient on metformin at home - will hold while inpatient  · SSI ACHS  · Diabetic diet

## 2022-05-12 NOTE — ASSESSMENT & PLAN NOTE
Source of noninfectious origin probably in setting of acute on chronic diastolic heart failure  Noted to have tachypnea, tachycardia and leukocytosis on admission    Trend WBC and fever curve  Hold antibiotics

## 2022-05-12 NOTE — PLAN OF CARE
Problem: Potential for Falls  Goal: Patient will remain free of falls  Description: INTERVENTIONS:  - Educate patient/family on patient safety including physical limitations  - Instruct patient to call for assistance with activity   - Consult OT/PT to assist with strengthening/mobility   - Keep Call bell within reach  - Keep bed low and locked with side rails adjusted as appropriate  - Keep care items and personal belongings within reach  - Initiate and maintain comfort rounds  - Make Fall Risk Sign visible to staff  - Offer Toileting every 2 Hours, in advance of need  - Initiate/Maintain bed/ chair alarm  - Obtain necessary fall risk management equipment  - Apply yellow socks and bracelet for high fall risk patients  - Consider moving patient to room near nurses station  Outcome: Progressing     Problem: Prexisting or High Potential for Compromised Skin Integrity  Goal: Skin integrity is maintained or improved  Description: INTERVENTIONS:  - Identify patients at risk for skin breakdown  - Assess and monitor skin integrity  - Assess and monitor nutrition and hydration status  - Monitor labs   - Assess for incontinence   - Turn and reposition patient  - Assist with mobility/ambulation  - Relieve pressure over bony prominences  - Avoid friction and shearing  - Provide appropriate hygiene as needed including keeping skin clean and dry  - Evaluate need for skin moisturizer/barrier cream  - Collaborate with interdisciplinary team   - Patient/family teaching  - Consider wound care consult   Outcome: Progressing     Problem: METABOLIC, FLUID AND ELECTROLYTES - ADULT  Goal: Fluid balance maintained  Description: INTERVENTIONS:  - Monitor labs   - Monitor I/O and WT  - Instruct patient on fluid and nutrition as appropriate  - Assess for signs & symptoms of volume excess or deficit  Outcome: Progressing     Problem: PAIN - ADULT  Goal: Verbalizes/displays adequate comfort level or baseline comfort level  Description: Interventions:  - Encourage patient to monitor pain and request assistance  - Assess pain using appropriate pain scale  - Administer analgesics based on type and severity of pain and evaluate response  - Implement non-pharmacological measures as appropriate and evaluate response  - Consider cultural and social influences on pain and pain management  - Notify physician/advanced practitioner if interventions unsuccessful or patient reports new pain  Outcome: Progressing     Problem: INFECTION - ADULT  Goal: Absence or prevention of progression during hospitalization  Description: INTERVENTIONS:  - Assess and monitor for signs and symptoms of infection  - Monitor lab/diagnostic results  - Monitor all insertion sites, i e  indwelling lines, tubes, and drains  - Monitor endotracheal if appropriate and nasal secretions for changes in amount and color  - [place] appropriate cooling/warming therapies per order  - Administer medications as ordered  - Instruct and encourage patient and family to use good hand hygiene technique  - Identify and instruct in appropriate isolation precautions for identified infection/condition  Outcome: Progressing  Goal: Absence of fever/infection during neutropenic period  Description: INTERVENTIONS:  - Monitor WBC    Outcome: Progressing     Problem: SAFETY ADULT  Goal: Maintains/Returns to pre admission functional level  Description: INTERVENTIONS:  - Perform BMAT or MOVE assessment daily    - Set and communicate daily mobility goal to care team and patient/family/caregiver  - Collaborate with rehabilitation services on mobility goals if consulted  - Perform Range of Motion 3 times a day  - Reposition patient every 2 hours    - Dangle patient 3 times a day  - Stand patient 3 times a day  - Ambulate patient 3 times a day  - Out of bed to chair 3 times a day   - Out of bed for meals 3 times a day  - Out of bed for toileting  - Record patient progress and toleration of activity level Outcome: Progressing     Problem: DISCHARGE PLANNING  Goal: Discharge to home or other facility with appropriate resources  Description: INTERVENTIONS:  - Identify barriers to discharge w/patient and caregiver  - Arrange for needed discharge resources and transportation as appropriate  - Identify discharge learning needs (meds, wound care, etc )  - Arrange for interpretive services to assist at discharge as needed  - Refer to Case Management Department for coordinating discharge planning if the patient needs post-hospital services based on physician/advanced practitioner order or complex needs related to functional status, cognitive ability, or social support system  Outcome: Progressing     Problem: Knowledge Deficit  Goal: Patient/family/caregiver demonstrates understanding of disease process, treatment plan, medications, and discharge instructions  Description: Complete learning assessment and assess knowledge base    Interventions:  - Provide teaching at level of understanding  - Provide teaching via preferred learning methods  Outcome: Progressing

## 2022-05-12 NOTE — CONSULTS
Consultation - Cardiology Team One  Mary Ann Ortiz 81 y o  male MRN: [id number]  Unit/Bed#: -01 Encounter: [id number]    Inpatient consult to Cardiology  Consult performed by: CARLOS Daniel  Consult ordered by: Pasha Kaur PA-C          Physician Requesting Consult: Liv Ruby MD  Reason for Consult / Principal Problem: CHF    Assessment:    Acute on chronic diastolic HF  · To the ED with c/o progressively worsening TRIMBLE over the past 6 weeks which has now acutely worsened over the past few weeks; orthopnea noted as well as bilateral lower extremity edema  · No overt pulmonary edema/vascular congestion noted on chest x-ray  · proBNP 449  · Home med: torsemide 40 mg in the AM and 20 mg in the PM; metolazone 2 5 mg on Fridays  · IV lasix 80 mg x 1 dose given in ED last night now ordered as TID dosing; would change this to IV Bumex 2 mg BID as of this evening as he responded well to IV Bumex last admit  · I/O: total UO since admit is 2 L  · Weight this AM is 285 lbs; dry weight is unknown as he does not weigh himself  · TTE from 10/2021: EF 65%; no WMA; grade 1 DD; no major valvular abnormalities noted  · Repeat TTE pending  ·  continue daily weights with standing scale  ·  strict I/O documentation  ·  2 g sodium restricted diet with fluid restriction     Essential HTN  · SBP averaging 130's  · Meds: labetalol 300 mg BID     DM2  · HgbA1c 6 0  · Management per primary team     MYCHAL on CKD stage 3  · Baseline creat 2 2-2 3  · Creat on admit is 2 48  · Creat this AM is 2 43  · Monitor closely during IV diuresis    CHI  · Does not use CPAP      Plan/Recommendations:  · IV lasix 80 mg x 1 dose given in ED last night now ordered as TID dosing; would change this to IV Bumex 2 mg BID as of this evening as he responded well to IV Bumex last admit  · Continue remainder of home medications  · Repeat TTE pending          _________________________________________________________________      History of Present Illness   HPI: Iona Molina is a 81y o  year old male with PMH of chronic diastolic HF, DM2, COPD and HTN  He follows with cardiologist Dr Lazarus Silva and was last seen 12/2021  Patient comes to the ED at the urging of his podiatrist secondary to them noting bilateral lower extremity edema and noting that the patient was dyspneic with exertion and even slightly short of breath at rest during their appointment yesterday  The patient states he has been having progressively worsening bilateral lower extremity edema and dyspnea on exertion for at least the past 6 weeks at home  Patient states he informed his primary care doctor of this several weeks ago and was placed on metolazone 2 5 mg once a week  The patient states he does not feel like this medication necessary helped him and lower extremity edema and dyspnea on exertion continued to progress  The patient states that he had no intention of coming to the ER but was urged to come by his podiatrist   The patient tells me he has not been able to lie in bed for at least the last several months, he tells me secondary to nasal congestion  He states he has been sleeping in a recliner  Secondary to this he tells me he is unable to determine if he gets short of breath while lying flat  The patient does not weigh himself daily and is unable to tell me if he has gained any weight  He states he is compliant with his diuretics and compliant with a low-sodium diet  Documentation from internal medicine team on admission states that patient occasionally forgets to take his afternoon dose of diuretic but the patient denies this to me  In the ED chest x-ray with no overt evidence of pulmonary vascular congestion or pulmonary edema by my review  ProBNP is mildly elevated but patient is obese  EKG on admit is sinus rhythm with no acute ST/T-wave changes noted    Secondary to elevated proBNP and patient's complaints of lower extremity edema and dyspnea on exertion IV diuretics were started for presumed CHF exacerbation and Cardiology is consulted for the same  EKG reviewed personally:  EKG on admit is sinus rhythm with rate of 84, nonspecific ST/T-wave changes noted        Telemetry reviewed personally: not on tele        Review of Systems   Constitutional: Negative for chills, fever and malaise/fatigue  HENT: Negative for congestion  Cardiovascular: Positive for dyspnea on exertion, leg swelling and orthopnea  Negative for chest pain and palpitations  Respiratory: Negative for cough, shortness of breath (no SOB at rest) and wheezing  Endocrine: Negative  Hematologic/Lymphatic: Negative  Skin: Negative  Musculoskeletal: Negative  Gastrointestinal: Negative for bloating, abdominal pain, nausea and vomiting  Genitourinary: Negative  Neurological: Negative for dizziness and light-headedness  Psychiatric/Behavioral: Negative  All other systems reviewed and are negative      Historical Information   Past Medical History:   Diagnosis Date    [facility]chronic obstructive [facility] disease) ([facility] )     Diabetes mellitus ([facility] )     Herpes zoster     Hypertension     Mycoplasma pneumonia     Obesity     Osteoarthritis     Renal calculi      Past Surgical History:   Procedure Laterality Date    CATARACT EXTRACTION      with insert intraoculat lens prosthesis    HEMORRHOID SURGERY      NECK SURGERY      for bone spur     Social History     Substance and Sexual Activity   Alcohol Use Yes    Alcohol/week: 1 0 standard drink    Types: 1 Cans of beer per week    Comment: 1-2 beers a months     Social History     Substance and Sexual Activity   Drug Use No     Social History     Tobacco Use   Smoking Status Former Smoker    Packs/day: 2 00    Years: 42 00    Pack years: 84 00    Types: Cigarettes    Start date:     Quit date: 0    Years since quittin 3   Smokeless Tobacco Never Used     Family History:   Family History   Problem Relation Age of Onset    Breast cancer Mother     Stroke Mother     Pneumonia Father     Substance Abuse Neg Hx     Mental illness Neg Hx        Meds/Allergies   current meds:   Current Facility-Administered Medications   Medication Dose Route Frequency    acetaminophen (TYLENOL) tablet 650 mg  650 mg Oral Q6H PRN    aluminum-magnesium hydroxide-simethicone (MYLANTA) oral suspension 30 mL  30 mL Oral Q6H PRN    fluticasone (FLONASE) 50 mcg/act nasal spray 2 spray  2 spray Nasal BID    fluticasone-vilanterol (BREO ELLIPTA) 200-25 MCG/INH inhaler 1 puff  1 puff Inhalation Daily    furosemide (LASIX) injection 80 mg  80 mg Intravenous TID (diuretic)    guaiFENesin (MUCINEX) 12 hr tablet 600 mg  600 mg Oral BID    heparin (porcine) subcutaneous injection 5,000 Units  5,000 Units Subcutaneous Q8H [address]    insulin lispro (HumaLOG) 100 units/mL subcutaneous injection 1-5 Units  1-5 Units Subcutaneous HS    insulin lispro (HumaLOG) 100 units/mL subcutaneous injection 1-6 Units  1-6 Units Subcutaneous TID AC    labetalol (NORMODYNE) tablet 300 mg  300 mg Oral BID    melatonin tablet 5 mg  5 mg Oral HS    potassium chloride (K-DUR,KLOR-CON) CR tablet 20 mEq  20 mEq Oral Daily    sodium chloride (OCEAN) 0 65 % nasal spray 1 spray  1 spray Each Nare Q1H PRN    tamsulosin (FLOMAX) capsule 0 4 mg  0 4 mg Oral Daily With Dinner    umeclidinium bromide (INCRUSE ELLIPTA) 62 5 mcg/inh inhaler AEPB 1 puff  1 puff Inhalation Daily          No Known Allergies    Objective   Vitals: Blood pressure 145/81, pulse 84, temperature 97 5 °F (36 4 °C), temperature source Oral, resp  rate 18, height 5' 11" (1 803 m), weight 130 kg (285 lb 15 oz), SpO2 90 %  ,     Body mass index is 39 88 kg/m²  ,     Systolic (30JBQ), MARCIN:586 , Min:111 , EWW:975     Diastolic (00FEI), XHS:01, Min:68, Max:91    Wt Readings from Last 3 Encounters:   05/12/22 130 kg (285 lb 15 oz)   04/20/22 132 kg (290 lb)   04/14/22 131 kg (289 lb)      Lab Results   Component Value Date    CREATININE 2 43 (H) 2022    CREATININE 2 48 (H) 2022    CREATININE 2 36 (H) 2022             Intake/Output Summary (Last 24 hours) at 2022 1048  Last data filed at 2022 0956  Gross per 24 hour   Intake 500 ml   Output 1925 ml   Net -1425 ml     Weight (last 2 days)     Date/Time Weight    22 0500 130 (285 94)    22 0000 130 (287 2)    22 1800 130 (287 2)        Invasive Devices  Report    Peripheral Intravenous Line  Duration           Peripheral IV 22 Right Hand <1 day                  Physical Exam  Vitals reviewed  Constitutional:       General: He is not in acute distress  Appearance: He is obese  HENT:      Head: Normocephalic and atraumatic  Mouth/Throat:      Mouth: Mucous membranes are moist    Cardiovascular:      Rate and Rhythm: Normal rate and regular rhythm  Heart sounds: Normal heart sounds, S1 normal and S2 normal  No murmur heard  Pulmonary:      Effort: Pulmonary effort is normal  No respiratory distress  Comments: Scant fine crackles B/L lower lobes  Abdominal:      General: Bowel sounds are normal  There is no distension  Palpations: Abdomen is soft  Musculoskeletal:         General: Normal range of motion  Cervical back: Normal range of motion and neck supple  Right lower le+ Edema present  Left lower le+ Edema present  Skin:     General: Skin is warm and dry  Neurological:      Mental Status: He is alert and oriented to person, place, and time     Psychiatric:         Mood and Affect: Mood normal            LABORATORY RESULTS:      CBC with diff:   Results from last 7 days   Lab Units 22  1446   WBC Thousand/uL 12 10*   HEMOGLOBIN g/dL 14 4   HEMATOCRIT % 45 2   MCV fL 91   PLATELETS Thousands/uL 212   MCH pg 29 1   MCHC g/dL 31 9   RDW % 13 7   MPV fL 9 6   NRBC AUTO /100 WBCs 0       CMP:  Results from last 7 days   Lab Units 22  0502 22  1446   POTASSIUM mmol/L 3 4* 4 0   CHLORIDE mmol/L 101 101   CO2 mmol/L 26 25   BUN mg/dL 69* 70*   CREATININE mg/dL 2 43* 2 48*   CALCIUM mg/dL 9 3 8 8   AST U/L  --  11   ALT U/L  --  20   ALK PHOS U/L  --  65   EGFR ml/min/1 73sq m 23 22       BMP:  Results from last 7 days   Lab Units 22  0502 22  1446   POTASSIUM mmol/L 3 4* 4 0   CHLORIDE mmol/L 101 101   CO2 mmol/L 26 25   BUN mg/dL 69* 70*   CREATININE mg/dL 2 43* 2 48*   CALCIUM mg/dL 9 3 8 8          Lab Results   Component Value Date    NTBNP 449 2022    NTBNP 189 03/15/2022    NTBNP 269 10/21/2021                                  Lipid Profile:   Lab Results   Component Value Date    CHOL 173 2015    CHOL 160 2014     Lab Results   Component Value Date    HDL 60 03/15/2022    HDL 48 2020    HDL 47 2019     Lab Results   Component Value Date    LDLCALC 106 (H) 03/15/2022    LDLCALC 122 (H) 2020    LDLCALC 117 (H) 2019     Lab Results   Component Value Date    TRIG 127 03/15/2022    TRIG 123 2020    TRIG 105 2019         Cardiac testing:   Results for orders placed during the hospital encounter of 10/05/18    Echo complete with contrast if indicated    [address]    Transthoracic Echocardiogram  2D, M-mode, Doppler, and Color Doppler    Study date:  05-Oct-2018    Patient: Mara Contreras  MR number: [id number]  Account number: [de-identified]  : [id number]  Age: 81 years  Gender: Male  Status: Outpatient  Location: Echo lab  Height: 73 in  Weight: 307 3 lb  BP: 200/ 126 mmHg    Indications: Chronic obstructive pulmonary disease      Diagnoses: J44 9 - Chronic obstructive pulmonary disease, unspecified    Sonographer:  Rebecca Gonzáles RDCS  Primary Physician:  Jailyn Dewey MD  Referring Physician:  Prince Nelly DO  Group:  [id number]  Interpreting Physician:  Silvia Damon MD    SUMMARY    LEFT VENTRICLE:  Systolic function was normal  Ejection fraction was estimated to be 60 %  There were no regional wall motion abnormalities  Wall thickness was mildly increased  LEFT ATRIUM:  The atrium was mildly dilated  MITRAL VALVE:  There was mild regurgitation  TRICUSPID VALVE:  There was mild regurgitation  Pulmonary artery systolic pressure was within the normal range  AORTA:  There was mild dilatation of the ascending aorta  3 5 cm    HISTORY: PRIOR HISTORY: Obesity, sleep apnea, emphysema and leg swelling  Risk factors: hypertension, insulin-controlled diabetes  PROCEDURE: The procedure was performed in the echo lab  This was a routine study  The transthoracic approach was used  The study included complete 2D imaging, M-mode, complete spectral Doppler, and color Doppler  Images were obtained from  the parasternal, apical, subcostal, and suprasternal notch acoustic windows  Echocardiographic views were limited due to restricted patient mobility, poor acoustic window availability, decreased penetration, and lung interference  This was  a technically difficult study  LEFT VENTRICLE: Size was normal  Systolic function was normal  Ejection fraction was estimated to be 60 %  There were no regional wall motion abnormalities  Wall thickness was mildly increased  No evidence of apical thrombus  DOPPLER: Left  ventricular diastolic function parameters were normal     RIGHT VENTRICLE: The size was normal  Systolic function was normal  Wall thickness was normal     LEFT ATRIUM: The atrium was mildly dilated  RIGHT ATRIUM: Size was normal     MITRAL VALVE: Valve structure was normal  There was mild thickening  There was normal leaflet separation  DOPPLER: The transmitral velocity was within the normal range  There was no evidence for stenosis  There was mild regurgitation  AORTIC VALVE: The valve was trileaflet   Leaflets exhibited mildly increased thickness, mild calcification, and normal cuspal separation  DOPPLER: Transaortic velocity was within the normal range  There was no evidence for stenosis  There  was no significant regurgitation  TRICUSPID VALVE: The valve structure was normal  There was normal leaflet separation  DOPPLER: The transtricuspid velocity was within the normal range  There was no evidence for stenosis  There was mild regurgitation  Pulmonary artery  systolic pressure was within the normal range  PULMONIC VALVE: Leaflets exhibited normal thickness, no calcification, and normal cuspal separation  DOPPLER: The transpulmonic velocity was within the normal range  There was no significant regurgitation  PERICARDIUM: There was no pericardial effusion  The pericardium was normal in appearance  AORTA: The root exhibited normal size  There was mild dilatation of the ascending aorta  3 5 cm    SYSTEMIC VEINS: IVC: The inferior vena cava was normal in size  SYSTEM MEASUREMENT TABLES    2D  %FS: 27 45 %  Ao Diam: 3 19 cm  EDV(Teich): 84 88 ml  EF(Teich): 53 62 %  ESV(Teich): 39 37 ml  IVSd: 1 26 cm  LA Area: 16 64 cm2  LA Diam: 4 17 cm  LVEDV MOD A4C: 122 1 ml  LVEF MOD A4C: 59 42 %  LVESV MOD A4C: 49 55 ml  LVIDd: 4 34 cm  LVIDs: 3 15 cm  LVLd A4C: 8 55 cm  LVLs A4C: 7 57 cm  LVPWd: 1 33 cm  RA Area: 16 46 cm2  RVIDd: 4 41 cm  SV MOD A4C: 72 55 ml  SV(Teich): 45 51 ml    CW  TR Vmax: 2 47 m/s  TR maxP 4 mmHg    MM  TAPSE: 3 01 cm    PW  AVC: 360 33 ms  E': 0 07 m/s  E/E': 7 8  MV A Wu: 0 77 m/s  MV Dec Taliaferro: 1 85 m/s2  MV DecT: 278 1 ms  MV E Wu: 0 52 m/s  MV E/A Ratio: 0 67  MV PHT: 80 65 ms  MVA By PHT: 2 73 cm2    [employer] Accredited [facility]    Prepared and electronically signed by    Tamika Dale MD  Signed 05-Oct-2018 09:53:29    No results found for this or any previous visit  No valid procedures specified  No results found for this or any previous visit          Imaging: I have personally reviewed pertinent reports  XR chest pa & lateral    Result Date: 5/11/2022  Narrative: CHEST INDICATION:   sob  Trouble breathing COMPARISON:  3/15/2022; 8/1/2017 EXAM PERFORMED/VIEWS:  XR CHEST PA & LATERAL FINDINGS: Cardiomediastinal silhouette appears unremarkable  There is an infiltrate present right lower lobe  A nodular component is seen on one view only which could represent summation artifact of a vessel not clearly seen in the lateral view  Some flattening of the hemidiaphragms suggests hyperinflation Calcified granuloma is again identified on the left  Mild elevation left hemidiaphragm is unchanged  No pneumothorax or pleural effusion  Osseous structures appear within normal limits for patient age  Impression: Findings are suspicious for new mild right lower lobe pneumonia  Possible 1 4 cm nodule versus summation artifact in the vicinity of suspected pneumonia  Given the patient's smoking history a noncontrast CT is follow-up is advised when the patient is no longer acute to see if this finding persists in 2-4 weeks time  Dr Josseline Senior was notified via tiger text  Workstation performed: [id number]           Counseling / Coordination of Care  Total floor / unit time spent today 45 minutes  Greater than 50% of total time was spent with the patient and / or family counseling and / or coordination of care  A description of the counseling / coordination of care: Review of history, current assessment, development of a plan  Code Status: Level 1 - Full Code    ** Please Note: Dragon 360 Dictation voice to text software may have been used in the creation of this document   **

## 2022-05-12 NOTE — PLAN OF CARE
Problem: Potential for Falls  Goal: Patient will remain free of falls  Description: INTERVENTIONS:  - Educate patient/family on patient safety including physical limitations  - Instruct patient to call for assistance with activity   - Consult OT/PT to assist with strengthening/mobility   - Keep Call bell within reach  - Keep bed low and locked with side rails adjusted as appropriate  - Keep care items and personal belongings within reach  - Initiate and maintain comfort rounds  - Make Fall Risk Sign visible to staff  - Offer Toileting every 2 Hours, in advance of need  - Initiate/Maintain bed/ chair alarm  - Obtain necessary fall risk management equipment  - Apply yellow socks and bracelet for high fall risk patients  - Consider moving patient to room near nurses station  Outcome: Progressing     Problem: Prexisting or High Potential for Compromised Skin Integrity  Goal: Skin integrity is maintained or improved  Description: INTERVENTIONS:  - Identify patients at risk for skin breakdown  - Assess and monitor skin integrity  - Assess and monitor nutrition and hydration status  - Monitor labs   - Assess for incontinence   - Turn and reposition patient  - Assist with mobility/ambulation  - Relieve pressure over bony prominences  - Avoid friction and shearing  - Provide appropriate hygiene as needed including keeping skin clean and dry  - Evaluate need for skin moisturizer/barrier cream  - Collaborate with interdisciplinary team   - Patient/family teaching  - Consider wound care consult   Outcome: Progressing     Problem: METABOLIC, FLUID AND ELECTROLYTES - ADULT  Goal: Fluid balance maintained  Description: INTERVENTIONS:  - Monitor labs   - Monitor I/O and WT  - Instruct patient on fluid and nutrition as appropriate  - Assess for signs & symptoms of volume excess or deficit  Outcome: Progressing     Problem: PAIN - ADULT  Goal: Verbalizes/displays adequate comfort level or baseline comfort level  Description: Interventions:  - Encourage patient to monitor pain and request assistance  - Assess pain using appropriate pain scale  - Administer analgesics based on type and severity of pain and evaluate response  - Implement non-pharmacological measures as appropriate and evaluate response  - Consider cultural and social influences on pain and pain management  - Notify physician/advanced practitioner if interventions unsuccessful or patient reports new pain  Outcome: Progressing     Problem: INFECTION - ADULT  Goal: Absence or prevention of progression during hospitalization  Description: INTERVENTIONS:  - Assess and monitor for signs and symptoms of infection  - Monitor lab/diagnostic results  - Monitor all insertion sites, i e  indwelling lines, tubes, and drains  - Monitor endotracheal if appropriate and nasal secretions for changes in amount and color  - [place] appropriate cooling/warming therapies per order  - Administer medications as ordered  - Instruct and encourage patient and family to use good hand hygiene technique  - Identify and instruct in appropriate isolation precautions for identified infection/condition  Outcome: Progressing  Goal: Absence of fever/infection during neutropenic period  Description: INTERVENTIONS:  - Monitor WBC    Outcome: Progressing

## 2022-05-12 NOTE — PROGRESS NOTES
[place]  Progress Note - Tawana Aguayo 1935, 81 y o  male MRN: [id number]  Unit/Bed#: -01 Encounter: [id number]  Primary Care Provider: Raji Pereira MD   Date and time admitted to hospital: 5/11/2022  3:55 PM    SIRS (systemic inflammatory response syndrome) [facility])  Assessment & Plan  Source of noninfectious origin probably in setting of acute on chronic diastolic heart failure  Noted to have tachypnea, tachycardia and leukocytosis on admission  Trend WBC and fever curve  Hold antibiotics    Non healing left heel wound  Assessment & Plan  · Follows with Dr Eva Melendrez - saw this AM  · Apply Mupirocin ointment to wound site  · Cover with [address]  · CHANGE 3XWEEK    Chest x-ray abnormality  Assessment & Plan  · Chest x-ray showing findings suspicious for new right mild lower lobe pneumonia  · Patient with no signs of pneumonia  · Procalcitonin negative  · Stop ABX    Stage 3 chronic kidney disease [facility])  Assessment & Plan  Lab Results   Component Value Date    EGFR 23 05/12/2022    EGFR 22 05/11/2022    EGFR 24 04/14/2022    CREATININE 2 43 (H) 05/12/2022    CREATININE 2 48 (H) 05/11/2022    CREATININE 2 36 (H) 04/14/2022   · Patient with baseline creatinine in 2 2-2 3  · Will continue to monitor in setting of need for diuresis  · Creatinine this morning is 2 43  · Avoid hypotension, nephrotoxic agents, NSAIDs    Moderate COPD (chronic obstructive pulmonary disease) ([facility])  Assessment & Plan  · No evidence to suggest COPD exacerbation  · PRN albuterol  · Home trelegy - substitute for incruse     Obesity, morbid ([address] )  Assessment & Plan  · Encourage healthy lifestyle changes  Body mass index is 39 88 kg/m²    · outpatient sleep study    CHI (obstructive sleep apnea)  Assessment & Plan  · Does not use bipap or cpap  · Home oxygen PRN he had for 5 years - taken away given repeat 6 min walk did not show desat  · Needs sleep study outpt    Essential hypertension  Assessment & Plan  · Continue labetalol with hold parameters    Type 2 diabetes mellitus with stage 3 chronic kidney disease, without long-term current use of insulin [facility])  Assessment & Plan  Lab Results   Component Value Date    HGBA1C 6 0 (H) 03/15/2022       Recent Labs     05/11/22  2131 05/12/22  0716   POCGLU 145* 173*       Blood Sugar Average: Last 72 hrs:  · (P) 159 patient on metformin at home - will hold while inpatient  · SSI ACHS  · Diabetic diet    * Acute diastolic congestive heart failure (HCC)  Assessment & Plan  Wt Readings from Last 3 Encounters:   05/12/22 130 kg (285 lb 15 oz)   04/20/22 132 kg (290 lb)   04/14/22 131 kg (289 lb)     · Increased SOB x months and worse in last weeks, increasing BLE edema and lack of improvement with PO diuretics, PND and orthopnea    CXR:  Suspicious for new myalgias right lower lobe pneumonia  o No signs or symptoms of PNA - stop ABX   Troponins negative    ECHO 93/15: EF 45%, diastolic function mildly abnormal-grade 1 relaxation  o Repeat ECHO ordered   Home diuretic regimen: torsemide 40 mg in AM/20 mg in PM, metazolone 2 5 mg on Fridays  o Sometimes misses PM dose of torsemide  Metaozolone added for last 2-3 months   Continue on lasix 80 mg IV TID   Continue to monitor electrolytes and replete as needed   Daily weights, Strict I & Os   Cardiac diet, low sodium <2g, fluid restriction <1500   Cardiology consult      Labs & Imaging: I have personally reviewed pertinent reports  VTE Prophylaxis: in place  Code Status:   Level 1 - Full Code    Patient Centered Rounds: I have performed bedside rounds with nursing staff today  Discussions with Specialists or Other Care Team Provider:  Cardiology    Education and Discussions with Family / Patient:  Patient declined family update      Current Length of Stay: 1 day(s)    Current Patient Status: Inpatient   Certification Statement: The patient will continue to require additional inpatient hospital stay due to see my assessment and plan  Subjective:   Patient is seen and examined at bedside  Denies any chest pain  Has dyspnea on exertion  Denies any other complaints  Afebrile  No other complaints  All other ROS are negative  Objective:    Vitals: Blood pressure 145/81, pulse 84, temperature 97 5 °F (36 4 °C), resp  rate 18, height 5' 11" (1 803 m), weight 130 kg (285 lb 15 oz), SpO2 90 %  ,Body mass index is 39 88 kg/m²  SPO2 RA Rest    Flowsheet Row ED to Hosp-Admission (Current) from 5/11/2022 in [address] Med Surg Unit   SpO2 90 %   SpO2 Activity At Rest   O2 Device None (Room air)   O2 Flow Rate --        I&O:     Intake/Output Summary (Last 24 hours) at 5/12/2022 0801  Last data filed at 5/12/2022 0753  Gross per 24 hour   Intake 500 ml   Output 1725 ml   Net -1225 ml       Physical Exam:    General- Alert, sitting comfortably in chair  Not in any acute distress  Neck- Supple, No JVD  CVS- regular, S1 and S2 normal  Chest- Bilateral Air entry, decreased at bases  Abdomen- soft, nontender, not distended, no guarding or rigidity, BS+  Extremities-  has pedal edema, No calf tenderness                         Normal ROM in all extremities  CNS-   Alert, awake and orientedx3  No focal deficits present      Invasive Devices  Report    Peripheral Intravenous Line  Duration           Peripheral IV 05/11/22 Right Hand <1 day                      Social History  reviewed  Family History   Problem Relation Age of Onset   Chavo Barkley Breast cancer Mother     Stroke Mother     Pneumonia Father     Substance Abuse Neg Hx     Mental illness Neg Hx     reviewed    Meds:  Current Facility-Administered Medications   Medication Dose Route Frequency Provider Last Rate Last Admin    acetaminophen (TYLENOL) tablet 650 mg  650 mg Oral Q6H PRN Lisa Pierce PA-C        aluminum-magnesium hydroxide-simethicone (MYLANTA) oral suspension 30 mL  30 mL Oral Q6H PRN Lisa Pierce PA-C  fluticasone (FLONASE) 50 mcg/act nasal spray 2 spray  2 spray Nasal BID Arpan Cummings PA-C   2 spray at 05/11/22 2133    fluticasone-vilanterol (BREO ELLIPTA) 200-25 MCG/INH inhaler 1 puff  1 puff Inhalation Daily Arpan Cummings PA-C        furosemide (LASIX) injection 80 mg  80 mg Intravenous TID (diuretic) Landy You PA-C   80 mg at 05/12/22 0502    guaiFENesin (MUCINEX) 12 hr tablet 600 mg  600 mg Oral BID Arpan Cummings PA-C   600 mg at 05/11/22 2133    heparin (porcine) subcutaneous injection 5,000 Units  5,000 Units Subcutaneous [facility] Arpan Cummings PA-C   5,000 Units at 05/12/22 0502    insulin lispro (HumaLOG) 100 units/mL subcutaneous injection 1-5 Units  1-5 Units Subcutaneous HS Landy You PA-C        insulin lispro (HumaLOG) 100 units/mL subcutaneous injection 1-6 Units  1-6 Units Subcutaneous TID AC Landy You PA-C        labetalol (NORMODYNE) tablet 300 mg  300 mg Oral BID Arpan Cummings PA-C   300 mg at 05/11/22 2007    melatonin tablet 5 mg  5 mg Oral HS Arpan Cummings PA-C   5 mg at 05/11/22 2133    potassium chloride (K-DUR,KLOR-CON) CR tablet 20 mEq  20 mEq Oral Daily Arpan Cummings PA-C        sodium chloride (OCEAN) 0 65 % nasal spray 1 spray  1 spray Each Nare Q1H PRN Stan Barkley PA-C        tamsulosin (FLOMAX) capsule 0 4 mg  0 4 mg Oral Daily With Dominga Hill PA-C   0 4 mg at 05/11/22 2008    umeclidinium bromide (INCRUSE ELLIPTA) 62 5 mcg/inh inhaler AEPB 1 puff  1 puff Inhalation Daily Arpan Cummings PA-C          Facility-Administered Medications Prior to Admission   Medication    [COMPLETED] lidocaine (XYLOCAINE) 4 % topical solution 5 mL     Medications Prior to Admission   Medication    albuterol (ProAir HFA) 90 mcg/act inhaler    fluticasone (FLONASE) 50 mcg/act nasal spray    fluticasone-umeclidinium-vilanterol (Trelegy Ellipta) 200-62 5-25 MCG/INH AEPB inhaler    glucose blood (OneTouch Verio) test strip    labetalol (NORMODYNE) 300 mg tablet    Melatonin 5 MG CAPS    metFORMIN (GLUCOPHAGE) 500 mg tablet    metolazone (ZAROXOLYN) 2 5 mg tablet    potassium chloride (K-DUR,KLOR-CON) 20 mEq tablet    predniSONE 10 mg tablet    tamsulosin (FLOMAX) 0 4 mg    torsemide (DEMADEX) 20 mg tablet       Labs:  Results from last 7 days   Lab Units 05/11/22  1446   WBC Thousand/uL 12 10*   HEMOGLOBIN g/dL 14 4   HEMATOCRIT % 45 2   PLATELETS Thousands/uL 212   NEUTROS PCT % 86*   LYMPHS PCT % 8*   MONOS PCT % 5   EOS PCT % 0     Results from last 7 days   Lab Units 05/12/22  0502 05/11/22  1446   POTASSIUM mmol/L 3 4* 4 0   CHLORIDE mmol/L 101 101   CO2 mmol/L 26 25   BUN mg/dL 69* 70*   CREATININE mg/dL 2 43* 2 48*   CALCIUM mg/dL 9 3 8 8   ALK PHOS U/L  --  65   ALT U/L  --  20   AST U/L  --  11     Lab Results   Component Value Date    TROPONINI <0 02 10/21/2021    TROPONINI <0 02 11/15/2019         No results found for: Melissa Cassis, SPUTUMCULTUR      Imaging:  Results for orders placed during the hospital encounter of 10/21/21    XR chest 1 view portable    Narrative  CHEST    INDICATION:   Volume overload  COMPARISON:  6/14/2021    EXAM PERFORMED/VIEWS:  XR CHEST PORTABLE      FINDINGS:    Cardiomediastinal silhouette appears unremarkable  Mild right basilar opacity noted  Trace left pleural effusion  Osseous structures appear within normal limits for patient age  Impression  Right basilar opacity could reflect atelectasis or developing infiltrate  Workstation performed: [id number]    Results for orders placed during the hospital encounter of 05/11/22    XR chest pa & lateral    Narrative  CHEST    INDICATION:   sob  Trouble breathing    COMPARISON:  3/15/2022; 8/1/2017    EXAM PERFORMED/VIEWS:  XR CHEST PA & LATERAL      FINDINGS:    Cardiomediastinal silhouette appears unremarkable  There is an infiltrate present right lower lobe    A nodular component is seen on one view only which could represent summation artifact of a vessel not clearly seen in the lateral view  Some flattening of the hemidiaphragms suggests hyperinflation    Calcified granuloma is again identified on the left  Mild elevation left hemidiaphragm is unchanged  No pneumothorax or pleural effusion  Osseous structures appear within normal limits for patient age  Impression  Findings are suspicious for new mild right lower lobe pneumonia  Possible 1 4 cm nodule versus summation artifact in the vicinity of suspected pneumonia  Given the patient's smoking history a noncontrast CT is follow-up is advised when the patient is no longer acute to see if this finding persists in 2-4 weeks time  Dr Capri Leon was notified via tiger text          Workstation performed: [id number]      Last 24 Hours Medication List:   Current Facility-Administered Medications   Medication Dose Route Frequency Provider Last Rate    acetaminophen  650 mg Oral Q6H PRN Evertt Soda, PA-C      aluminum-magnesium hydroxide-simethicone  30 mL Oral Q6H PRN Evertt Soda, PA-C      fluticasone  2 spray Nasal BID Evertt Soda, PA-C      fluticasone-vilanterol  1 puff Inhalation Daily Evertt Soda, PA-C      furosemide  80 mg Intravenous TID (diuretic) LUCIA Zamora-ANGELINA      guaiFENesin  600 mg Oral BID Evertt Soda, PA-C      heparin (porcine)  5,000 Units Subcutaneous [facility] Evertt Soda, PA-C      insulin lispro  1-5 Units Subcutaneous HS Landy Diasiyoselin, PA-C      insulin lispro  1-6 Units Subcutaneous TID AC LUCIA Salter-C      labetalol  300 mg Oral BID Evertt Soda, PA-C      melatonin  5 mg Oral HS Evertt Soda, PA-C      potassium chloride  20 mEq Oral Daily Evertt Soda, PA-C      sodium chloride  1 spray Each Nare Q1H PRN Ray Cisneros PA-C      tamsulosin  0 4 mg Oral Daily With Vida Simon PA-C      umeclidinium bromide  1 puff Inhalation Daily Evertt Soda, PA-C          Today, Patient Was Seen By: Mya White MD    ** Please Note: Dictation voice to text software may have been used in the creation of this document   **

## 2022-05-12 NOTE — ASSESSMENT & PLAN NOTE
· Follows with Dr Mick Rivera - saw this AM  · Apply Mupirocin ointment to wound site  · Cover with ALLEVYN WITH BORDER  · CHANGE 3XWEEK

## 2022-05-13 LAB
ANION GAP SERPL CALCULATED.3IONS-SCNC: 15 MMOL/L (ref 4–13)
BASOPHILS # BLD AUTO: 0.05 THOUSANDS/ΜL (ref 0–0.1)
BASOPHILS NFR BLD AUTO: 1 % (ref 0–1)
BUN SERPL-MCNC: 66 MG/DL (ref 5–25)
CALCIUM SERPL-MCNC: 9.4 MG/DL (ref 8.3–10.1)
CHLORIDE SERPL-SCNC: 101 MMOL/L (ref 100–108)
CO2 SERPL-SCNC: 24 MMOL/L (ref 21–32)
CREAT SERPL-MCNC: 2.44 MG/DL (ref 0.6–1.3)
EOSINOPHIL # BLD AUTO: 0.13 THOUSAND/ΜL (ref 0–0.61)
EOSINOPHIL NFR BLD AUTO: 2 % (ref 0–6)
ERYTHROCYTE [DISTWIDTH] IN BLOOD BY AUTOMATED COUNT: 13.9 % (ref 11.6–15.1)
GFR SERPL CREATININE-BSD FRML MDRD: 23 ML/MIN/1.73SQ M
GLUCOSE SERPL-MCNC: 141 MG/DL (ref 65–140)
GLUCOSE SERPL-MCNC: 159 MG/DL (ref 65–140)
GLUCOSE SERPL-MCNC: 168 MG/DL (ref 65–140)
GLUCOSE SERPL-MCNC: 180 MG/DL (ref 65–140)
GLUCOSE SERPL-MCNC: 181 MG/DL (ref 65–140)
HCT VFR BLD AUTO: 44.5 % (ref 36.5–49.3)
HGB BLD-MCNC: 14.2 G/DL (ref 12–17)
IMM GRANULOCYTES # BLD AUTO: 0.06 THOUSAND/UL (ref 0–0.2)
IMM GRANULOCYTES NFR BLD AUTO: 1 % (ref 0–2)
LYMPHOCYTES # BLD AUTO: 1.37 THOUSANDS/ΜL (ref 0.6–4.47)
LYMPHOCYTES NFR BLD AUTO: 16 % (ref 14–44)
MAGNESIUM SERPL-MCNC: 2.4 MG/DL (ref 1.6–2.6)
MCH RBC QN AUTO: 28.9 PG (ref 26.8–34.3)
MCHC RBC AUTO-ENTMCNC: 31.9 G/DL (ref 31.4–37.4)
MCV RBC AUTO: 91 FL (ref 82–98)
MONOCYTES # BLD AUTO: 0.79 THOUSAND/ΜL (ref 0.17–1.22)
MONOCYTES NFR BLD AUTO: 9 % (ref 4–12)
NEUTROPHILS # BLD AUTO: 6.38 THOUSANDS/ΜL (ref 1.85–7.62)
NEUTS SEG NFR BLD AUTO: 71 % (ref 43–75)
NRBC BLD AUTO-RTO: 0 /100 WBCS
PLATELET # BLD AUTO: 184 THOUSANDS/UL (ref 149–390)
PMV BLD AUTO: 9.6 FL (ref 8.9–12.7)
POTASSIUM SERPL-SCNC: 3.4 MMOL/L (ref 3.5–5.3)
PROCALCITONIN SERPL-MCNC: 0.17 NG/ML
RBC # BLD AUTO: 4.91 MILLION/UL (ref 3.88–5.62)
SODIUM SERPL-SCNC: 140 MMOL/L (ref 136–145)
WBC # BLD AUTO: 8.78 THOUSAND/UL (ref 4.31–10.16)

## 2022-05-13 PROCEDURE — 83735 ASSAY OF MAGNESIUM: CPT | Performed by: INTERNAL MEDICINE

## 2022-05-13 PROCEDURE — 85025 COMPLETE CBC W/AUTO DIFF WBC: CPT | Performed by: INTERNAL MEDICINE

## 2022-05-13 PROCEDURE — 97535 SELF CARE MNGMENT TRAINING: CPT

## 2022-05-13 PROCEDURE — 97166 OT EVAL MOD COMPLEX 45 MIN: CPT

## 2022-05-13 PROCEDURE — 84145 PROCALCITONIN (PCT): CPT | Performed by: INTERNAL MEDICINE

## 2022-05-13 PROCEDURE — 80048 BASIC METABOLIC PNL TOTAL CA: CPT | Performed by: INTERNAL MEDICINE

## 2022-05-13 PROCEDURE — 99232 SBSQ HOSP IP/OBS MODERATE 35: CPT | Performed by: INTERNAL MEDICINE

## 2022-05-13 PROCEDURE — 97163 PT EVAL HIGH COMPLEX 45 MIN: CPT

## 2022-05-13 PROCEDURE — 82948 REAGENT STRIP/BLOOD GLUCOSE: CPT

## 2022-05-13 PROCEDURE — 97530 THERAPEUTIC ACTIVITIES: CPT

## 2022-05-13 RX ORDER — POTASSIUM CHLORIDE 20 MEQ/1
40 TABLET, EXTENDED RELEASE ORAL 2 TIMES DAILY WITH MEALS
Status: DISCONTINUED | OUTPATIENT
Start: 2022-05-14 | End: 2022-05-15 | Stop reason: HOSPADM

## 2022-05-13 RX ORDER — BUMETANIDE 0.25 MG/ML
1 INJECTION, SOLUTION INTRAMUSCULAR; INTRAVENOUS ONCE
Status: COMPLETED | OUTPATIENT
Start: 2022-05-13 | End: 2022-05-13

## 2022-05-13 RX ORDER — POTASSIUM CHLORIDE 20 MEQ/1
40 TABLET, EXTENDED RELEASE ORAL ONCE
Status: COMPLETED | OUTPATIENT
Start: 2022-05-13 | End: 2022-05-13

## 2022-05-13 RX ORDER — BUMETANIDE 0.25 MG/ML
2 INJECTION, SOLUTION INTRAMUSCULAR; INTRAVENOUS 2 TIMES DAILY
Status: DISCONTINUED | OUTPATIENT
Start: 2022-05-13 | End: 2022-05-15 | Stop reason: HOSPADM

## 2022-05-13 RX ADMIN — GUAIFENESIN 600 MG: 600 TABLET ORAL at 09:20

## 2022-05-13 RX ADMIN — UMECLIDINIUM 1 PUFF: 62.5 AEROSOL, POWDER ORAL at 09:23

## 2022-05-13 RX ADMIN — HEPARIN SODIUM 5000 UNITS: 5000 INJECTION INTRAVENOUS; SUBCUTANEOUS at 05:59

## 2022-05-13 RX ADMIN — TAMSULOSIN HYDROCHLORIDE 0.4 MG: 0.4 CAPSULE ORAL at 16:14

## 2022-05-13 RX ADMIN — POTASSIUM CHLORIDE 40 MEQ: 1500 TABLET, EXTENDED RELEASE ORAL at 11:46

## 2022-05-13 RX ADMIN — GUAIFENESIN 600 MG: 600 TABLET ORAL at 17:50

## 2022-05-13 RX ADMIN — POTASSIUM CHLORIDE 40 MEQ: 1500 TABLET, EXTENDED RELEASE ORAL at 09:20

## 2022-05-13 RX ADMIN — BUMETANIDE 1 MG: 0.25 INJECTION, SOLUTION INTRAMUSCULAR; INTRAVENOUS at 11:46

## 2022-05-13 RX ADMIN — FLUTICASONE PROPIONATE 2 SPRAY: 50 SPRAY, METERED NASAL at 21:00

## 2022-05-13 RX ADMIN — INSULIN LISPRO 1 UNITS: 100 INJECTION, SOLUTION INTRAVENOUS; SUBCUTANEOUS at 21:32

## 2022-05-13 RX ADMIN — INSULIN LISPRO 1 UNITS: 100 INJECTION, SOLUTION INTRAVENOUS; SUBCUTANEOUS at 11:54

## 2022-05-13 RX ADMIN — HEPARIN SODIUM 5000 UNITS: 5000 INJECTION INTRAVENOUS; SUBCUTANEOUS at 21:32

## 2022-05-13 RX ADMIN — BUMETANIDE 2 MG: 0.25 INJECTION, SOLUTION INTRAMUSCULAR; INTRAVENOUS at 17:44

## 2022-05-13 RX ADMIN — FLUTICASONE FUROATE AND VILANTEROL TRIFENATATE 1 PUFF: 200; 25 POWDER RESPIRATORY (INHALATION) at 09:23

## 2022-05-13 RX ADMIN — Medication 5 MG: at 21:32

## 2022-05-13 RX ADMIN — HEPARIN SODIUM 5000 UNITS: 5000 INJECTION INTRAVENOUS; SUBCUTANEOUS at 13:27

## 2022-05-13 RX ADMIN — LABETALOL HYDROCHLORIDE 300 MG: 200 TABLET, FILM COATED ORAL at 17:44

## 2022-05-13 RX ADMIN — MUPIROCIN: 20 OINTMENT TOPICAL at 13:27

## 2022-05-13 RX ADMIN — BUMETANIDE 1 MG: 0.25 INJECTION, SOLUTION INTRAMUSCULAR; INTRAVENOUS at 09:20

## 2022-05-13 RX ADMIN — INSULIN LISPRO 1 UNITS: 100 INJECTION, SOLUTION INTRAVENOUS; SUBCUTANEOUS at 09:23

## 2022-05-13 RX ADMIN — LABETALOL HYDROCHLORIDE 300 MG: 200 TABLET, FILM COATED ORAL at 09:20

## 2022-05-13 NOTE — ASSESSMENT & PLAN NOTE
Lab Results   Component Value Date    HGBA1C 6 0 (H) 03/15/2022       Recent Labs     05/12/22  1556 05/12/22  2115 05/13/22  0717 05/13/22  1112   POCGLU 139 168* 180* 168*       Blood Sugar Average: Last 72 hrs:  · (P) 166 [id number]   · Patient on metformin at home - will hold while inpatient, can resume on discharge  · SSI ACHS  · Diabetic diet

## 2022-05-13 NOTE — PROGRESS NOTES
General Cardiology   Progress Note -  Team One   Mary Ann Ortiz 81 y o  male MRN: [id number]    Unit/Bed#: -01 Encounter: [id number]    Assessment:    Acute on chronic diastolic HF  · Home med: torsemide 40 mg in the AM and 20 mg in the PM; metolazone 2 5 mg on Fridays  · IV lasix changed to IV Bumex 1 mg BID as of last evening's dose; would increase to 2 mg BID as of this AM; B/L LE edema is improving but still remains and his TRIMBLE is improving as well but not yet at baseline  · I/O: total UO in 24 hrs is 2 3 L; net negative 1 3 L  · Weight this AM is 284 lbs down from 285 lbs yesterday; dry weight is unknown as he does not weigh himself  · TTE from yesterday: EF 60%; grade 1 DD; mild MR  ·  continue daily weights with standing scale; strict I/O documentation; 2 g sodium restricted diet with fluid restriction     Essential HTN  · SBP averaging 120's  · Meds: labetalol 300 mg BID     DM2  · HgbA1c 6 0  · Management per primary team     MYCHAL on CKD stage 3  · Baseline creat 2 2-2 3  · Creat on admit is 2 48  · Creat this AM is 2 4 which is stable from yesterday  · Monitor closely during IV diuresis     CHI  · Does not use CPAP        Plan/Recommendations:  · IV lasix changed to IV Bumex 1 mg BID as of last evening's dose; would increase to 2 mg BID as of this AM; B/L LE edema is improving but still remains and his TRIMBLE is improving as well but not yet at baseline  · Continue remainder of home medications         _________________________________________________________________    Subjective  Patient feels his lower extremity edema is improving today  He has walked the davis several times today and continues to note dyspnea on exertion but states this improved since admission but still not yet at his baseline  Review of Systems   Constitutional: Negative for chills, fever and malaise/fatigue  HENT: Negative for congestion  Cardiovascular: Positive for dyspnea on exertion, leg swelling and orthopnea  Negative for chest pain and palpitations  Respiratory: Negative for cough, shortness of breath (no SOB at rest) and wheezing  Endocrine: Negative  Hematologic/Lymphatic: Negative  Skin: Negative  Musculoskeletal: Negative  Gastrointestinal: Negative for bloating, abdominal pain, nausea and vomiting  Genitourinary: Negative  Neurological: Negative for dizziness and light-headedness  Psychiatric/Behavioral: Negative  All other systems reviewed and are negative  Objective:   Vitals: Blood pressure 126/77, pulse 97, temperature 97 8 °F (36 6 °C), resp  rate 17, height 5' 11" (1 803 m), weight 129 kg (284 lb 11 2 oz), SpO2 92 %  ,     Wt Readings from Last 3 Encounters:   05/13/22 129 kg (284 lb 11 2 oz)   04/20/22 132 kg (290 lb)   04/14/22 131 kg (289 lb)        Lab Results   Component Value Date    CREATININE 2 44 (H) 05/13/2022    CREATININE 2 43 (H) 05/12/2022    CREATININE 2 48 (H) 05/11/2022         Body mass index is 39 71 kg/m²  ,     Systolic (01GIS), JKM:201 , Min:108 , TQW:129     Diastolic (99DHT), DDX:34, Min:52, Max:81          Intake/Output Summary (Last 24 hours) at 5/13/2022 1106  Last data filed at 5/13/2022 1001  Gross per 24 hour   Intake 1330 ml   Output 1895 ml   Net -565 ml     Weight (last 2 days)     Date/Time Weight    05/13/22 0517 129 (284 7)    05/12/22 1312 129 (285)    05/12/22 0500 130 (285 94)    05/12/22 0000 130 (287 2)    05/11/22 1800 130 (287 2)            Telemetry Review: Not on tele      Physical Exam  Vitals reviewed  Constitutional:       General: He is not in acute distress  Appearance: He is obese  HENT:      Head: Normocephalic and atraumatic  Mouth/Throat:      Mouth: Mucous membranes are moist    Cardiovascular:      Rate and Rhythm: Normal rate and regular rhythm  Heart sounds: Normal heart sounds, S1 normal and S2 normal  No murmur heard  Pulmonary:      Effort: Pulmonary effort is normal  No respiratory distress  Breath sounds: Examination of the right-lower field reveals rales  Examination of the left-lower field reveals rales  Rales present  Abdominal:      General: Bowel sounds are normal  There is no distension  Palpations: Abdomen is soft  Musculoskeletal:         General: Normal range of motion  Cervical back: Normal range of motion and neck supple  Right lower le+ Edema present  Left lower le+ Edema present  Skin:     General: Skin is warm and dry  Neurological:      Mental Status: He is alert and oriented to person, place, and time     Psychiatric:         Mood and Affect: Mood normal          LABORATORY RESULTS      CBC with diff:   Results from last 7 days   Lab Units 22  0524 22  1446   WBC Thousand/uL 8 78 12 10*   HEMOGLOBIN g/dL 14 2 14 4   HEMATOCRIT % 44 5 45 2   MCV fL 91 91   PLATELETS Thousands/uL 184 212   MCH pg 28 9 29 1   MCHC g/dL 31 9 31 9   RDW % 13 9 13 7   MPV fL 9 6 9 6   NRBC AUTO /100 WBCs 0 0       CMP:  Results from last 7 days   Lab Units 22  0522  05022  1446   POTASSIUM mmol/L 3 4* 3 4* 4 0   CHLORIDE mmol/L 101 101 101   CO2 mmol/L 24 26 25   BUN mg/dL 66* 69* 70*   CREATININE mg/dL 2 44* 2 43* 2 48*   CALCIUM mg/dL 9 4 9 3 8 8   AST U/L  --   --  11   ALT U/L  --   --  20   ALK PHOS U/L  --   --  65   EGFR ml/min/1 73sq m 23 23 22       BMP:  Results from last 7 days   Lab Units 22  0524 22  05022  1446   POTASSIUM mmol/L 3 4* 3 4* 4 0   CHLORIDE mmol/L 101 101 101   CO2 mmol/L 24 26 25   BUN mg/dL 66* 69* 70*   CREATININE mg/dL 2 44* 2 43* 2 48*   CALCIUM mg/dL 9 4 9 3 8 8       Lab Results   Component Value Date    NTBNP 449 2022    NTBNP 189 03/15/2022    NTBNP 269 10/21/2021             Results from last 7 days   Lab Units 22  0524   MAGNESIUM mg/dL 2 4                           Lipid Profile:   Lab Results   Component Value Date    CHOL 173 2015    CHOL 160 2014     Lab Results   Component Value Date    HDL 60 03/15/2022    HDL 48 2020    HDL 47 2019     Lab Results   Component Value Date    LDLCALC 106 (H) 03/15/2022    LDLCALC 122 (H) 2020    LDLCALC 117 (H) 2019     Lab Results   Component Value Date    TRIG 127 03/15/2022    TRIG 123 2020    TRIG 105 2019       Cardiac testing:   Results for orders placed during the hospital encounter of 10/05/18    Echo complete with contrast if indicated    Narrative  [address] [place] [address]    Transthoracic Echocardiogram  2D, M-mode, Doppler, and Color Doppler    Study date:  05-Oct-2018    Patient: Kamilah Bowens  MR number: [id number]  Account number: [de-identified]  : [id number]  Age: 81 years  Gender: Male  Status: Outpatient  Location: Echo lab  Height: 73 in  Weight: 307 3 lb  BP: 200/ 126 mmHg    Indications: Chronic obstructive pulmonary disease  Diagnoses: J44 9 - Chronic obstructive pulmonary disease, unspecified    Sonographer:  Betty Carey RDCS  Primary Physician:  Meredith Kat MD  Referring Physician:  Catherine Fitzpatrick DO  Group:  [id number]  Interpreting Physician:  Suzanna Funes MD    SUMMARY    LEFT VENTRICLE:  Systolic function was normal  Ejection fraction was estimated to be 60 %  There were no regional wall motion abnormalities  Wall thickness was mildly increased  LEFT ATRIUM:  The atrium was mildly dilated  MITRAL VALVE:  There was mild regurgitation  TRICUSPID VALVE:  There was mild regurgitation  Pulmonary artery systolic pressure was within the normal range  AORTA:  There was mild dilatation of the ascending aorta  3 5 cm    HISTORY: PRIOR HISTORY: Obesity, sleep apnea, emphysema and leg swelling  Risk factors: hypertension, insulin-controlled diabetes  PROCEDURE: The procedure was performed in the echo lab  This was a routine study  The transthoracic approach was used   The study included complete 2D imaging, M-mode, complete spectral Doppler, and color Doppler  Images were obtained from  the parasternal, apical, subcostal, and suprasternal notch acoustic windows  Echocardiographic views were limited due to restricted patient mobility, poor acoustic window availability, decreased penetration, and lung interference  This was  a technically difficult study  LEFT VENTRICLE: Size was normal  Systolic function was normal  Ejection fraction was estimated to be 60 %  There were no regional wall motion abnormalities  Wall thickness was mildly increased  No evidence of apical thrombus  DOPPLER: Left  ventricular diastolic function parameters were normal     RIGHT VENTRICLE: The size was normal  Systolic function was normal  Wall thickness was normal     LEFT ATRIUM: The atrium was mildly dilated  RIGHT ATRIUM: Size was normal     MITRAL VALVE: Valve structure was normal  There was mild thickening  There was normal leaflet separation  DOPPLER: The transmitral velocity was within the normal range  There was no evidence for stenosis  There was mild regurgitation  AORTIC VALVE: The valve was trileaflet  Leaflets exhibited mildly increased thickness, mild calcification, and normal cuspal separation  DOPPLER: Transaortic velocity was within the normal range  There was no evidence for stenosis  There  was no significant regurgitation  TRICUSPID VALVE: The valve structure was normal  There was normal leaflet separation  DOPPLER: The transtricuspid velocity was within the normal range  There was no evidence for stenosis  There was mild regurgitation  Pulmonary artery  systolic pressure was within the normal range  PULMONIC VALVE: Leaflets exhibited normal thickness, no calcification, and normal cuspal separation  DOPPLER: The transpulmonic velocity was within the normal range  There was no significant regurgitation  PERICARDIUM: There was no pericardial effusion   The pericardium was normal in appearance  AORTA: The root exhibited normal size  There was mild dilatation of the ascending aorta  3 5 cm    SYSTEMIC VEINS: IVC: The inferior vena cava was normal in size  SYSTEM MEASUREMENT TABLES    2D  %FS: 27 45 %  Ao Diam: 3 19 cm  EDV(Teich): 84 88 ml  EF(Teich): 53 62 %  ESV(Teich): 39 37 ml  IVSd: 1 26 cm  LA Area: 16 64 cm2  LA Diam: 4 17 cm  LVEDV MOD A4C: 122 1 ml  LVEF MOD A4C: 59 42 %  LVESV MOD A4C: 49 55 ml  LVIDd: 4 34 cm  LVIDs: 3 15 cm  LVLd A4C: 8 55 cm  LVLs A4C: 7 57 cm  LVPWd: 1 33 cm  RA Area: 16 46 cm2  RVIDd: 4 41 cm  SV MOD A4C: 72 55 ml  SV(Teich): 45 51 ml    CW  TR Vmax: 2 47 m/s  TR maxP 4 mmHg    MM  TAPSE: 3 01 cm    PW  AVC: 360 33 ms  E': 0 07 m/s  E/E': 7 8  MV A Wu: 0 77 m/s  MV Dec [place]: 1 85 m/s2  MV DecT: 278 1 ms  MV E Wu: 0 52 m/s  MV E/A Ratio: 0 67  MV PHT: 80 65 ms  MVA By PHT: 2 73 cm2    [employer] Accredited Echocardiography Laboratory    Prepared and electronically signed by    Carl Patino MD  Signed 05-Oct-2018 09:53:29    No results found for this or any previous visit  No results found for this or any previous visit  No valid procedures specified  No results found for this or any previous visit          Meds/Allergies   current meds:   Current Facility-Administered Medications   Medication Dose Route Frequency    acetaminophen (TYLENOL) tablet 650 mg  650 mg Oral Q6H PRN    aluminum-magnesium hydroxide-simethicone (MYLANTA) oral suspension 30 mL  30 mL Oral Q6H PRN    bumetanide (BUMEX) injection 2 mg  2 mg Intravenous BID    fluticasone (FLONASE) 50 mcg/act nasal spray 2 spray  2 spray Nasal BID    fluticasone-vilanterol (BREO ELLIPTA) 200-25 MCG/INH inhaler 1 puff  1 puff Inhalation Daily    guaiFENesin (MUCINEX) 12 hr tablet 600 mg  600 mg Oral BID    heparin (porcine) subcutaneous injection 5,000 Units  5,000 Units Subcutaneous Q8H [facility]    insulin lispro (HumaLOG) 100 units/mL subcutaneous injection 1-5 Units  1-5 Units Subcutaneous HS    insulin lispro (HumaLOG) 100 units/mL subcutaneous injection 1-6 Units  1-6 Units Subcutaneous TID AC    labetalol (NORMODYNE) tablet 300 mg  300 mg Oral BID    melatonin tablet 5 mg  5 mg Oral HS    [START ON 5/14/2022] potassium chloride (K-DUR,KLOR-CON) CR tablet 40 mEq  40 mEq Oral BID With Meals    sodium chloride (OCEAN) 0 65 % nasal spray 1 spray  1 spray Each Nare Q1H PRN    tamsulosin (FLOMAX) capsule 0 4 mg  0 4 mg Oral Daily With Dinner    umeclidinium bromide (INCRUSE ELLIPTA) 62 5 mcg/inh inhaler AEPB 1 puff  1 puff Inhalation Daily     Facility-Administered Medications Prior to Admission   Medication    [COMPLETED] lidocaine (XYLOCAINE) 4 % topical solution 5 mL     Medications Prior to Admission   Medication    albuterol (ProAir HFA) 90 mcg/act inhaler    fluticasone (FLONASE) 50 mcg/act nasal spray    fluticasone-umeclidinium-vilanterol (Trelegy Ellipta) 200-62 5-25 MCG/INH AEPB inhaler    glucose blood (OneTouch Verio) test strip    labetalol (NORMODYNE) 300 mg tablet    Melatonin 5 MG CAPS    metFORMIN (GLUCOPHAGE) 500 mg tablet    metolazone (ZAROXOLYN) 2 5 mg tablet    potassium chloride (K-DUR,KLOR-CON) 20 mEq tablet    predniSONE 10 mg tablet    tamsulosin (FLOMAX) 0 4 mg    torsemide (DEMADEX) 20 mg tablet            Counseling / Coordination of Care  Total floor / unit time spent today 20 minutes  Greater than 50% of total time was spent with the patient and / or family counseling and / or coordination of care  ** Please Note: Dragon 360 Dictation voice to text software may have been used in the creation of this document   **

## 2022-05-13 NOTE — ASSESSMENT & PLAN NOTE
Lab Results   Component Value Date    EGFR 23 05/13/2022    EGFR 23 05/12/2022    EGFR 22 05/11/2022    CREATININE 2 44 (H) 05/13/2022    CREATININE 2 43 (H) 05/12/2022    CREATININE 2 48 (H) 05/11/2022   · Patient with baseline creatinine in 2 2-2 3  · Will continue to monitor in setting of need for diuresis  · Creatinine this morning is 2 43  · Avoid hypotension, nephrotoxic agents, NSAIDs

## 2022-05-13 NOTE — ASSESSMENT & PLAN NOTE
· Follows with Dr Evalina Nageotte   · Apply Mupirocin ointment to wound site  · Cover with ALLEVYN WITH BORDER  · CHANGE 3XWEEK

## 2022-05-13 NOTE — PLAN OF CARE
Problem: Potential for Falls  Goal: Patient will remain free of falls  Description: INTERVENTIONS:  - Educate patient/family on patient safety including physical limitations  - Instruct patient to call for assistance with activity   - Consult OT/PT to assist with strengthening/mobility   - Keep Call bell within reach  - Keep bed low and locked with side rails adjusted as appropriate  - Keep care items and personal belongings within reach  - Initiate and maintain comfort rounds  - Make Fall Risk Sign visible to staff  - Offer Toileting every 2 Hours, in advance of need  - Initiate/Maintain bed/ chair alarm  - Obtain necessary fall risk management equipment  - Apply yellow socks and bracelet for high fall risk patients  - Consider moving patient to room near nurses station  Outcome: Progressing     Problem: Prexisting or High Potential for Compromised Skin Integrity  Goal: Skin integrity is maintained or improved  Description: INTERVENTIONS:  - Identify patients at risk for skin breakdown  - Assess and monitor skin integrity  - Assess and monitor nutrition and hydration status  - Monitor labs   - Assess for incontinence   - Turn and reposition patient  - Assist with mobility/ambulation  - Relieve pressure over bony prominences  - Avoid friction and shearing  - Provide appropriate hygiene as needed including keeping skin clean and dry  - Evaluate need for skin moisturizer/barrier cream  - Collaborate with interdisciplinary team   - Patient/family teaching  - Consider wound care consult   Outcome: Progressing     Problem: METABOLIC, FLUID AND ELECTROLYTES - ADULT  Goal: Fluid balance maintained  Description: INTERVENTIONS:  - Monitor labs   - Monitor I/O and WT  - Instruct patient on fluid and nutrition as appropriate  - Assess for signs & symptoms of volume excess or deficit  Outcome: Progressing     Problem: PAIN - ADULT  Goal: Verbalizes/displays adequate comfort level or baseline comfort level  Description: Interventions:  - Encourage patient to monitor pain and request assistance  - Assess pain using appropriate pain scale  - Administer analgesics based on type and severity of pain and evaluate response  - Implement non-pharmacological measures as appropriate and evaluate response  - Consider cultural and social influences on pain and pain management  - Notify physician/advanced practitioner if interventions unsuccessful or patient reports new pain  Outcome: Progressing     Problem: INFECTION - ADULT  Goal: Absence or prevention of progression during hospitalization  Description: INTERVENTIONS:  - Assess and monitor for signs and symptoms of infection  - Monitor lab/diagnostic results  - Monitor all insertion sites, i e  indwelling lines, tubes, and drains  - Monitor endotracheal if appropriate and nasal secretions for changes in amount and color  - [place] appropriate cooling/warming therapies per order  - Administer medications as ordered  - Instruct and encourage patient and family to use good hand hygiene technique  - Identify and instruct in appropriate isolation precautions for identified infection/condition  Outcome: Progressing  Goal: Absence of fever/infection during neutropenic period  Description: INTERVENTIONS:  - Monitor WBC    Outcome: Progressing     Problem: SAFETY ADULT  Goal: Maintains/Returns to pre admission functional level  Description: INTERVENTIONS:  - Perform BMAT or MOVE assessment daily    - Set and communicate daily mobility goal to care team and patient/family/caregiver  - Collaborate with rehabilitation services on mobility goals if consulted  - Perform Range of Motion 3 times a day  - Reposition patient every 2 hours    - Dangle patient 3 times a day  - Stand patient 3 times a day  - Ambulate patient 3 times a day  - Out of bed to chair 3 times a day   - Out of bed for meals 3 times a day  - Out of bed for toileting  - Record patient progress and toleration of activity level Outcome: Progressing     Problem: DISCHARGE PLANNING  Goal: Discharge to home or other facility with appropriate resources  Description: INTERVENTIONS:  - Identify barriers to discharge w/patient and caregiver  - Arrange for needed discharge resources and transportation as appropriate  - Identify discharge learning needs (meds, wound care, etc )  - Arrange for interpretive services to assist at discharge as needed  - Refer to Case Management Department for coordinating discharge planning if the patient needs post-hospital services based on physician/advanced practitioner order or complex needs related to functional status, cognitive ability, or social support system  Outcome: Progressing     Problem: Knowledge Deficit  Goal: Patient/family/caregiver demonstrates understanding of disease process, treatment plan, medications, and discharge instructions  Description: Complete learning assessment and assess knowledge base    Interventions:  - Provide teaching at level of understanding  - Provide teaching via preferred learning methods  Outcome: Progressing

## 2022-05-13 NOTE — PHYSICAL THERAPY NOTE
PT eval   05/13/22 1610   PT Last Visit   PT Visit Date 05/13/22   Note Type   Note type Evaluation   Pain Assessment   Pain Assessment Tool 0-10   Pain Score No Pain   Restrictions/Precautions   Weight Bearing Precautions Per Order No   Other Precautions Fluid restriction   Home Living   Type of [address] to live on main level with bedroom/bathroom; Two level   Prior Function   Level of [place] Independent with ADLs and functional mobility   Lives With Spouse   Receives Help From Family   ADL Assistance Independent   IADLs Independent   Falls in the last 6 months 0   Vocational Retired   General   Additional Pertinent History adm with CHF; PMH+ DM,HTN, Obesity, L heel woun, COPD, edema, CRD4   Family/Caregiver Present No   Cognition   Overall Cognitive Status WFL   Arousal/Participation Alert   Orientation Level Oriented X4   Memory Within functional limits   Following Commands Follows all commands and directions without difficulty   Subjective   Subjective I going to start a walking program, I try to avoid sodium   RUE Assessment   RUE Assessment WFL   LUE Assessment   LUE Assessment WFL   RLE Assessment   RLE Assessment WFL  (+ pedal edema)   LLE Assessment   LLE Assessment WFL  (+ Pedal edema)   Coordination   Movements are Fluid and Coordinated 1   Proprioception   RLE Proprioception Grossly intact   LLE Proprioception Grossly Intact   Bed Mobility   Additional Comments oob in chair   Transfers   Sit to Stand 6  Modified independent   Stand to Sit 6  Modified independent   Stand pivot 6  Modified independent   Additional items   (rw)   Ambulation/Elevation   Gait pattern WNL; Forward Flexion   Gait Assistance 6  Modified independent   Assistive Device Rolling walker   Distance 75'   Balance   Static Sitting Normal   Dynamic Sitting Good   Static Standing Good   Dynamic Standing Good   Ambulatory Good   Endurance Deficit   Endurance Deficit No   Activity Tolerance   Activity Tolerance Patient tolerated treatment well   Medical Staff Made Aware OT Cori   Nurse Made Aware RN Kerrie   Assessment   Prognosis Good   Assessment Pt presents as a functional ambulator with or without AD  Advised to use rw while here but acceptable to use cane or walking stick at d/c  Pt appears determined to start walking program after d/c  Advised to discuss with PCP  No skilledPT needs at this time   d/c PT   Barriers to Discharge   (medicla clearance)   Goals   Patient Goals get better   Plan   PT Frequency   (d/c PT)   Recommendation   PT Discharge Recommendation No rehabilitation needs   AM-PAC Basic Mobility Inpatient   Turning in Bed Without Bedrails 4   Lying on Back to Sitting on Edge of Flat Bed 4   Moving Bed to Chair 4   Standing Up From Chair 4   Walk in Room 4   Climb 3-5 Stairs 4   Basic Mobility Inpatient Raw Score 24   Basic Mobility Standardized Score 57 68   Highest Level Of Mobility   JH-HLM Goal 8: Walk 250 feet or more   JH-HLM Achieved 8: Walk 250 feet ot more   Modified [place] Scale   Modified [place] Scale 0   Tarsha Ulloa, PT

## 2022-05-13 NOTE — PROGRESS NOTES
[place]  Progress Note - James Meng 1935, 81 y o  male MRN: [id number]  Unit/Bed#: -Christina Encounter: [id number]  Primary Care Provider: Yolis Serra MD   Date and time admitted to hospital: 5/11/2022  3:55 PM    * Acute diastolic congestive heart failure (HCC)  Assessment & Plan  Wt Readings from Last 3 Encounters:   05/13/22 129 kg (284 lb 11 2 oz)   04/20/22 132 kg (290 lb)   04/14/22 131 kg (289 lb)     · Increased SOB x months and worse in last weeks, increasing BLE edema and lack of improvement with PO diuretics, PND and orthopnea    CXR:  Suspicious for new myalgias right lower lobe pneumonia  o No signs or symptoms of PNA - stop ABX   Troponins negative    ECHO 66/39: EF 00%, diastolic function mildly abnormal-grade 1 relaxation  o Repeat ECHO ordered   Home diuretic regimen: torsemide 40 mg in AM/20 mg in PM, metazolone 2 5 mg on Fridays- managed by patient's primary  o Sometimes misses PM dose of torsemide  Metaozolone added for last 2-3 months   On IV Bumex 1 mg b i d , increased to 2 mg b i d  Today   Continue to monitor electrolytes and replete as needed   Daily weights, Strict I & Os   Cardiac diet, low sodium <2g, fluid restriction <1500   Cardiology consulted, patient should have ongoing outpatient cardiac follow-up for diuretic management    SIRS (systemic inflammatory response syndrome) (HCC)  Assessment & Plan  · Source of noninfectious origin probably in setting of acute on chronic diastolic heart failure  Noted to have tachypnea, tachycardia and leukocytosis on admission    · Trend WBC and fever curve  · Hold antibiotics    Non healing left heel wound  Assessment & Plan  · Follows with Dr Treasure Franklin   · Apply Mupirocin ointment to wound site  · Cover with [address]  · CHANGE [id number]    Chest x-ray abnormality  Assessment & Plan  · Chest x-ray showing findings suspicious for new right mild lower lobe pneumonia  · Patient with no signs of pneumonia  · Procalcitonin negative  · Stop ABX    Stage 3 chronic kidney disease [facility])  Assessment & Plan  Lab Results   Component Value Date    EGFR 23 05/13/2022    EGFR 23 05/12/2022    EGFR 22 05/11/2022    CREATININE 2 44 (H) 05/13/2022    CREATININE 2 43 (H) 05/12/2022    CREATININE 2 48 (H) 05/11/2022   · Patient with baseline creatinine in 2 2-2 3  · Will continue to monitor in setting of need for diuresis  · Creatinine this morning is 2 43  · Avoid hypotension, nephrotoxic agents, NSAIDs    Moderate COPD (chronic obstructive pulmonary disease) ([facility])  Assessment & Plan  · No evidence to suggest COPD exacerbation  · PRN albuterol  · Home trelegy - substitute for incruse     Obesity, morbid ([address] )  Assessment & Plan  · Encourage healthy lifestyle changes  Body mass index is 39 71 kg/m²  · Outpatient sleep study    CHI (obstructive sleep apnea)  Assessment & Plan  · Does not use bipap or cpap  · Home oxygen PRN he had for 5 years - taken away given repeat 6 min walk did not show desat  · Needs sleep study outpt    Essential hypertension  Assessment & Plan  · Continue labetalol with hold parameters    Type 2 diabetes mellitus with stage 3 chronic kidney disease, without long-term current use of insulin [facility])  Assessment & Plan  Lab Results   Component Value Date    HGBA1C 6 0 (H) 03/15/2022       Recent Labs     05/12/22  1556 05/12/22  2115 05/13/22  0717 05/13/22  1112   POCGLU 139 168* 180* 168*       Blood Sugar Average: Last 72 hrs:  · (P) 166 [id number]   · Patient on metformin at home - will hold while inpatient, can resume on discharge  · SSI ACHS  · Diabetic diet      VTE Pharmacologic Prophylaxis: VTE Score: 6 High Risk (Score >/= 5) - Pharmacological DVT Prophylaxis Ordered: heparin  Sequential Compression Devices Ordered  Patient Centered Rounds: I performed bedside rounds with nursing staff today    Discussions with Specialists or Other Care Team Provider:  Discussed with Cardiology, continue a 2 mg IV Bumex b i d     Education and Discussions with Family / Patient: Updated  (son) at bedside  Time Spent for Care: 45 minutes  More than 50% of total time spent on counseling and coordination of care as described above  Current Length of Stay: 2 day(s)  Current Patient Status: Inpatient   Certification Statement: The patient will continue to require additional inpatient hospital stay due to Diuresis  Discharge Plan: Anticipate discharge in 24-48 hrs to home  Code Status: Level 1 - Full Code    Subjective:   Patient questions use of insulin while admitted, reviewed that we do not administer metformin or oral diabetic medications while admitted but he will resume these on discharge  Also reviewed that he needs to follow with Cardiology not just his PCP for diuretic management  Otherwise, says his breathing is stable though it did drop to the mid 80s while walking which concerned him  Will do home oxygen evaluation prior to discharge    Objective:     Vitals:   Temp (24hrs), Av °F (36 7 °C), Min:97 7 °F (36 5 °C), Max:98 3 °F (36 8 °C)    Temp:  [97 7 °F (36 5 °C)-98 3 °F (36 8 °C)] 98 3 °F (36 8 °C)  HR:  [68-97] 83  Resp:  [17-20] 20  BP: (108-126)/(52-77) 125/77  SpO2:  [91 %-93 %] 93 %  Body mass index is 39 71 kg/m²  Input and Output Summary (last 24 hours): Intake/Output Summary (Last 24 hours) at 2022 1508  Last data filed at 2022 1301  Gross per 24 hour   Intake 1330 ml   Output 1720 ml   Net -390 ml       Physical Exam:   Physical Exam  Vitals and nursing note reviewed  Constitutional:       General: He is not in acute distress  Appearance: Normal appearance  He is well-developed  HENT:      Head: Normocephalic and atraumatic  Eyes:      General: No scleral icterus  Conjunctiva/sclera: Conjunctivae normal    Cardiovascular:      Rate and Rhythm: Normal rate and regular rhythm  Heart sounds: Murmur heard     Pulmonary: Effort: Pulmonary effort is normal       Breath sounds: Examination of the right-lower field reveals decreased breath sounds  Examination of the left-lower field reveals decreased breath sounds  Decreased breath sounds and rales present  No wheezing or rhonchi  Abdominal:      General: There is no distension  Palpations: Abdomen is soft  Skin:     General: Skin is warm and dry  Neurological:      General: No focal deficit present  Mental Status: He is alert  Psychiatric:         Mood and Affect: Mood normal         Additional Data:     Labs:  Results from last 7 days   Lab Units 05/13/22  0524   WBC Thousand/uL 8 78   HEMOGLOBIN g/dL 14 2   HEMATOCRIT % 44 5   PLATELETS Thousands/uL 184   NEUTROS PCT % 71   LYMPHS PCT % 16   MONOS PCT % 9   EOS PCT % 2     Results from last 7 days   Lab Units 05/13/22  0524 05/12/22  0502 05/11/22  1446   SODIUM mmol/L 140   < > 137   POTASSIUM mmol/L 3 4*   < > 4 0   CHLORIDE mmol/L 101   < > 101   CO2 mmol/L 24   < > 25   BUN mg/dL 66*   < > 70*   CREATININE mg/dL 2 44*   < > 2 48*   ANION GAP mmol/L 15*   < > 11   CALCIUM mg/dL 9 4   < > 8 8   ALBUMIN g/dL  --   --  3 5   TOTAL BILIRUBIN mg/dL  --   --  0 40   ALK PHOS U/L  --   --  65   ALT U/L  --   --  20   AST U/L  --   --  11   GLUCOSE RANDOM mg/dL 181*   < > 211*    < > = values in this interval not displayed           Results from last 7 days   Lab Units 05/13/22  1112 05/13/22  0717 05/12/22  2115 05/12/22  1556 05/12/22  1128 05/12/22  0716 05/11/22  2131   POC GLUCOSE mg/dl 168* 180* 168* 139 195* 173* 145*         Results from last 7 days   Lab Units 05/13/22  0524 05/12/22  0502 05/11/22  1446   PROCALCITONIN ng/ml 0 17 0 14 0 13       Lines/Drains:  Invasive Devices  Report    Peripheral Intravenous Line  Duration           Peripheral IV 05/12/22 Distal;Right;Ventral (anterior) Forearm <1 day                      Imaging: Reviewed radiology reports from this admission including: chest xray    Recent Cultures (last 7 days):         Last 24 Hours Medication List:   Current Facility-Administered Medications   Medication Dose Route Frequency Provider Last Rate    acetaminophen  650 mg Oral Q6H PRN Dickens Bearded, PA-ANGELINA      aluminum-magnesium hydroxide-simethicone  30 mL Oral Q6H PRN Dickens Bearded, BAKARI      bumetanide  2 mg Intravenous BID Madhu Sharp V, BAKARI      fluticasone  2 spray Nasal BID Dickens Bearded, BAKARI      fluticasone-vilanterol  1 puff Inhalation Daily Dickens Bearded, BAKARI      guaiFENesin  600 mg Oral BID Dickens Bearded, BAKARI      heparin (porcine)  5,000 Units Subcutaneous [facility] Dickens Bearded, BAKARI      insulin lispro  1-5 Units Subcutaneous HS Landy Diasio, BAKARI      insulin lispro  1-6 Units Subcutaneous TID AC Landy Diasiyoselin, BAKARI      labetalol  300 mg Oral BID Dickens Bearded, BAKARI      melatonin  5 mg Oral HS Dickens Bearded, BAKARI      mupirocin   Topical Every Other Day Bere Hylton, DPM      [START ON 5/14/2022] potassium chloride  40 mEq Oral BID With Meals Alba CHEN PA-C      sodium chloride  1 spray Each Nare Q1H PRN Alcus Hum, BAKARI      tamsulosin  0 4 mg Oral Daily With Antonellana Bloch, PA-C      umeclidinium bromide  1 puff Inhalation Daily Dickens Bearded, BAKARI          Today, Patient Was Seen By: Ritchie Barkley PA-C    **Please Note: This note may have been constructed using a voice recognition system  **

## 2022-05-13 NOTE — ASSESSMENT & PLAN NOTE
· Source of noninfectious origin probably in setting of acute on chronic diastolic heart failure  Noted to have tachypnea, tachycardia and leukocytosis on admission    · Trend WBC and fever curve  · Hold antibiotics

## 2022-05-13 NOTE — ASSESSMENT & PLAN NOTE
Wt Readings from Last 3 Encounters:   05/13/22 129 kg (284 lb 11 2 oz)   04/20/22 132 kg (290 lb)   04/14/22 131 kg (289 lb)     · Increased SOB x months and worse in last weeks, increasing BLE edema and lack of improvement with PO diuretics, PND and orthopnea    CXR:  Suspicious for new myalgias right lower lobe pneumonia  o No signs or symptoms of PNA - stop ABX   Troponins negative    ECHO 83/36: EF 76%, diastolic function mildly abnormal-grade 1 relaxation  o Repeat ECHO ordered   Home diuretic regimen: torsemide 40 mg in AM/20 mg in PM, metazolone 2 5 mg on Fridays- managed by patient's primary  o Sometimes misses PM dose of torsemide  Metaozolone added for last 2-3 months   On IV Bumex 1 mg b i d , increased to 2 mg b i d   Today   Continue to monitor electrolytes and replete as needed   Daily weights, Strict I & Os   Cardiac diet, low sodium <2g, fluid restriction <1500   Cardiology consulted, patient should have ongoing outpatient cardiac follow-up for diuretic management

## 2022-05-13 NOTE — OCCUPATIONAL THERAPY NOTE
Occupational Therapy Evaluation (3717-9799) & Treatment (9314-1790)     Patient Name: Ernesto Del Angel  Today's Date: 5/13/2022  Problem List  Principal Problem:    Acute diastolic congestive heart failure ([address] )  Active Problems:    Type 2 diabetes mellitus with stage 3 chronic kidney disease, without long-term current use of insulin (HCC)    Essential hypertension    CHI (obstructive sleep apnea)    Obesity, morbid (HCC)    Moderate COPD (chronic obstructive pulmonary disease) ([facility])    Stage 3 chronic kidney disease ([address] )    Chest x-ray abnormality    Non healing left heel wound    SIRS (systemic inflammatory response syndrome) ([address] )    Past Medical History  Past Medical History:   Diagnosis Date    COPD (chronic obstructive pulmonary disease) ([address] )     Diabetes mellitus ([address] )     Herpes zoster     Hypertension     Mycoplasma pneumonia     Obesity     Osteoarthritis     Renal calculi      Past Surgical History  Past Surgical History:   Procedure Laterality Date    CATARACT EXTRACTION      with insert intraoculat lens prosthesis    HEMORRHOID SURGERY      NECK SURGERY      for bone spur             05/13/22 1558   OT Last Visit   OT Visit Date 05/13/22   Note Type   Note type Evaluation   Restrictions/Precautions   Weight Bearing Precautions Per Order No   Other Precautions Fluid restriction;Telemetry   Pain Assessment   Pain Assessment Tool 0-10   Pain Score No Pain   Home Living   Type of Home House  (Over 55+ community)   Home Layout Two level; Able to live on main level with bedroom/bathroom  (0 BOOGIE)   Bathroom Shower/Tub Walk-in shower   Bathroom Toilet Standard   Bathroom Equipment Shower chair;Grab bars in 2005 [facility] bed, son just bought walking sticks)   Additional Comments Pt sleeps in recliner, c/o sleep disturbances while in hospital bed 2/2 to COPD   H&P states pt has sleep study scheduled   Prior Function   Level of [place] Independent with ADLs and functional mobility   Lives With Spouse   Receives Help From Family   ADL Assistance Independent   IADLs Independent   Falls in the last 6 months 0   Vocational Retired   Lifestyle   Autonomy Pt reports being independent in ADL/IADLs but reports since CHF exacerbation that LB dressing is more difficult/tiring   Reciprocal Relationships Lives with wife, reports she is having some memory issues   Intrinsic Gratification Pt is very motivated to get healthy and walk multiple times/day & follow a cardiac diet   Subjective   Subjective "I want to walk in here at least 5 times today, I've already done 3"   ADL   Eating Assistance 7  Independent   Grooming Assistance 7  Independent   UB Bathing Assistance 7  Independent   LB [address] 7  Independent   LB Dressing Deficit Increased time to complete   [address]  7  Independent   Additional Comments Granulated wound open to air on R medial ankle  RN notified     Bed Mobility   Additional Comments Received OOB to recliner   Transfers   Sit to Stand 7  Independent   Stand to Sit 7  Independent   Stand pivot 7  Independent   Functional Mobility   Functional Mobility 6  Modified independent   Additional items Rolling walker   Balance   Static Sitting Normal   Dynamic Sitting Good   Static Standing Fair +   Dynamic Standing Fair +   Ambulatory Fair +   Activity Tolerance   Activity Tolerance Patient tolerated treatment well   Nurse Made Aware HELEN Sy   RUASHLYN Assessment   RUE Assessment WFL   LUE Assessment   LUE Assessment WFL   Cognition   Overall Cognitive Status WFL   Arousal/Participation Alert   Attention Within functional limits   Orientation Level Oriented X4   Memory Within functional limits   Following Commands Follows all commands and directions without difficulty   Assessment   Prognosis Good   Assessment Pt is a 81 y o  male seen for OT evaluation at [facility], admitted 1/72/2639 w/ Acute diastolic congestive heart failure ([address] )  OT completed expanded review of pt's medical and social history  Comorbidities affecting pt's functional performance at time of assessment include: CHI, obesity, DM II, HTN, COPD, CKD stage 3, non healing L heel wound  Personal factors affecting pt at time of IE include:health management   Pt with active OT orders and OOB/ambulate orders  Prior to admission, pt was living in a [facility] with a first floor s/u with his wife  Pt was I w/  ADLS and IADLS, (+) drove, & required no use of DME PTA  Upon evaluation: Pt requires Mod I for functional mobility/transfers, independence for UB ADLs and independence for LB ADLs  Based on findings, pt is of moderate complexity  The patient's raw score on the AM-PAC Daily Activity inpatient short form is 24, standardized score is 57 54, greater than 39 4  Patients at this level are likely to benefit from DC to home, which DOES coincide with CURRENT above OT recommendations  However please refer to therapist recommendation for discharge planning given other factors that may influence destination  At this time, OT recommendations at time of discharge are no rehab needs  Pt performing at functional baseline with no acute OT needs  Will D/C OT orders  Goals   Patient Goals Pt wants to do what he needs to do to be healthy & walk multiple times a day   Additional Treatment Session   Start Time 1535   End Time 1558   Treatment Assessment Pt independently performed LB dressing of donning/doffing socks seated in recliner utilizing figure 4 method  Pt independently performed multiple sit <> stands during session  Pt completed functional mobility through unit hallways with Mod I & RW  Pt requested to walk further than instructed  Walked further as requested  RW height adjusted  O2 desaturated to 89% after mobilizing  Recovered to 93% after returning to seated position   Pt educated on elevating feet when sleeping to prevent BLE swelling     Recommendation   OT Discharge Recommendation No rehabilitation needs   AM-PAC Daily Activity Inpatient   Lower Body Dressing 4   Bathing 4   Toileting 4   Upper Body Dressing 4   Grooming 4   Eating 4   Daily Activity Raw Score 24   Daily Activity Standardized Score (Calc for Raw Score >=11) 57 54   AM-PAC Applied Cognition Inpatient   Following a Speech/Presentation 4   Understanding Ordinary Conversation 4   Taking Medications 4   Remembering Where Things Are Placed or Put Away 4   Remembering List of 4-5 Errands 4   Taking Care of Complicated Tasks 4   Applied Cognition Raw Score 24   Applied Cognition Standardized Score 62 21     Emilia Anne OTR/L

## 2022-05-14 DIAGNOSIS — T48.5X5A RHINITIS MEDICAMENTOSA: ICD-10-CM

## 2022-05-14 DIAGNOSIS — J31.0 RHINITIS MEDICAMENTOSA: ICD-10-CM

## 2022-05-14 LAB
ANION GAP SERPL CALCULATED.3IONS-SCNC: 10 MMOL/L (ref 4–13)
ATRIAL RATE: 84 BPM
ATRIAL RATE: 87 BPM
BASOPHILS # BLD AUTO: 0.03 THOUSANDS/ΜL (ref 0–0.1)
BASOPHILS NFR BLD AUTO: 0 % (ref 0–1)
BUN SERPL-MCNC: 64 MG/DL (ref 5–25)
CALCIUM SERPL-MCNC: 9.4 MG/DL (ref 8.3–10.1)
CHLORIDE SERPL-SCNC: 102 MMOL/L (ref 100–108)
CO2 SERPL-SCNC: 26 MMOL/L (ref 21–32)
CREAT SERPL-MCNC: 2.45 MG/DL (ref 0.6–1.3)
EOSINOPHIL # BLD AUTO: 0.17 THOUSAND/ΜL (ref 0–0.61)
EOSINOPHIL NFR BLD AUTO: 2 % (ref 0–6)
ERYTHROCYTE [DISTWIDTH] IN BLOOD BY AUTOMATED COUNT: 13.6 % (ref 11.6–15.1)
GFR SERPL CREATININE-BSD FRML MDRD: 22 ML/MIN/1.73SQ M
GLUCOSE SERPL-MCNC: 143 MG/DL (ref 65–140)
GLUCOSE SERPL-MCNC: 157 MG/DL (ref 65–140)
GLUCOSE SERPL-MCNC: 165 MG/DL (ref 65–140)
GLUCOSE SERPL-MCNC: 169 MG/DL (ref 65–140)
GLUCOSE SERPL-MCNC: 171 MG/DL (ref 65–140)
GLUCOSE SERPL-MCNC: 194 MG/DL (ref 65–140)
HCT VFR BLD AUTO: 42.8 % (ref 36.5–49.3)
HGB BLD-MCNC: 13.4 G/DL (ref 12–17)
IMM GRANULOCYTES # BLD AUTO: 0.05 THOUSAND/UL (ref 0–0.2)
IMM GRANULOCYTES NFR BLD AUTO: 1 % (ref 0–2)
LYMPHOCYTES # BLD AUTO: 1.31 THOUSANDS/ΜL (ref 0.6–4.47)
LYMPHOCYTES NFR BLD AUTO: 17 % (ref 14–44)
MCH RBC QN AUTO: 28.5 PG (ref 26.8–34.3)
MCHC RBC AUTO-ENTMCNC: 31.3 G/DL (ref 31.4–37.4)
MCV RBC AUTO: 91 FL (ref 82–98)
MONOCYTES # BLD AUTO: 0.74 THOUSAND/ΜL (ref 0.17–1.22)
MONOCYTES NFR BLD AUTO: 10 % (ref 4–12)
NEUTROPHILS # BLD AUTO: 5.41 THOUSANDS/ΜL (ref 1.85–7.62)
NEUTS SEG NFR BLD AUTO: 70 % (ref 43–75)
NRBC BLD AUTO-RTO: 0 /100 WBCS
P AXIS: 69 DEGREES
P AXIS: 76 DEGREES
PLATELET # BLD AUTO: 168 THOUSANDS/UL (ref 149–390)
PMV BLD AUTO: 9.6 FL (ref 8.9–12.7)
POTASSIUM SERPL-SCNC: 3.5 MMOL/L (ref 3.5–5.3)
PR INTERVAL: 160 MS
PR INTERVAL: 166 MS
QRS AXIS: 2 DEGREES
QRS AXIS: 3 DEGREES
QRSD INTERVAL: 82 MS
QRSD INTERVAL: 84 MS
QT INTERVAL: 358 MS
QT INTERVAL: 412 MS
QTC INTERVAL: 430 MS
QTC INTERVAL: 486 MS
RBC # BLD AUTO: 4.71 MILLION/UL (ref 3.88–5.62)
SODIUM SERPL-SCNC: 138 MMOL/L (ref 136–145)
T WAVE AXIS: 16 DEGREES
T WAVE AXIS: 46 DEGREES
VENTRICULAR RATE: 84 BPM
VENTRICULAR RATE: 87 BPM
WBC # BLD AUTO: 7.71 THOUSAND/UL (ref 4.31–10.16)

## 2022-05-14 PROCEDURE — 85025 COMPLETE CBC W/AUTO DIFF WBC: CPT | Performed by: PHYSICIAN ASSISTANT

## 2022-05-14 PROCEDURE — 94761 N-INVAS EAR/PLS OXIMETRY MLT: CPT

## 2022-05-14 PROCEDURE — 94760 N-INVAS EAR/PLS OXIMETRY 1: CPT

## 2022-05-14 PROCEDURE — 80048 BASIC METABOLIC PNL TOTAL CA: CPT | Performed by: PHYSICIAN ASSISTANT

## 2022-05-14 PROCEDURE — 82948 REAGENT STRIP/BLOOD GLUCOSE: CPT

## 2022-05-14 PROCEDURE — 93010 ELECTROCARDIOGRAM REPORT: CPT | Performed by: INTERNAL MEDICINE

## 2022-05-14 PROCEDURE — 99232 SBSQ HOSP IP/OBS MODERATE 35: CPT | Performed by: INTERNAL MEDICINE

## 2022-05-14 RX ORDER — FLUTICASONE PROPIONATE 50 MCG
SPRAY, SUSPENSION (ML) NASAL
Qty: 48 ML | Refills: 1 | Status: SHIPPED | OUTPATIENT
Start: 2022-05-14 | End: 2022-08-08 | Stop reason: SDUPTHER

## 2022-05-14 RX ADMIN — LABETALOL HYDROCHLORIDE 300 MG: 200 TABLET, FILM COATED ORAL at 17:19

## 2022-05-14 RX ADMIN — POTASSIUM CHLORIDE 40 MEQ: 1500 TABLET, EXTENDED RELEASE ORAL at 17:20

## 2022-05-14 RX ADMIN — TAMSULOSIN HYDROCHLORIDE 0.4 MG: 0.4 CAPSULE ORAL at 17:19

## 2022-05-14 RX ADMIN — FLUTICASONE PROPIONATE 2 SPRAY: 50 SPRAY, METERED NASAL at 21:42

## 2022-05-14 RX ADMIN — BUMETANIDE 2 MG: 0.25 INJECTION, SOLUTION INTRAMUSCULAR; INTRAVENOUS at 17:20

## 2022-05-14 RX ADMIN — POTASSIUM CHLORIDE 40 MEQ: 1500 TABLET, EXTENDED RELEASE ORAL at 08:05

## 2022-05-14 RX ADMIN — INSULIN LISPRO 1 UNITS: 100 INJECTION, SOLUTION INTRAVENOUS; SUBCUTANEOUS at 08:03

## 2022-05-14 RX ADMIN — LABETALOL HYDROCHLORIDE 300 MG: 200 TABLET, FILM COATED ORAL at 08:05

## 2022-05-14 RX ADMIN — HEPARIN SODIUM 5000 UNITS: 5000 INJECTION INTRAVENOUS; SUBCUTANEOUS at 21:41

## 2022-05-14 RX ADMIN — GUAIFENESIN 600 MG: 600 TABLET ORAL at 17:19

## 2022-05-14 RX ADMIN — BUMETANIDE 2 MG: 0.25 INJECTION, SOLUTION INTRAMUSCULAR; INTRAVENOUS at 08:05

## 2022-05-14 RX ADMIN — HEPARIN SODIUM 5000 UNITS: 5000 INJECTION INTRAVENOUS; SUBCUTANEOUS at 14:18

## 2022-05-14 RX ADMIN — FLUTICASONE FUROATE AND VILANTEROL TRIFENATATE 1 PUFF: 200; 25 POWDER RESPIRATORY (INHALATION) at 08:06

## 2022-05-14 RX ADMIN — INSULIN LISPRO 1 UNITS: 100 INJECTION, SOLUTION INTRAVENOUS; SUBCUTANEOUS at 11:37

## 2022-05-14 RX ADMIN — Medication 5 MG: at 21:42

## 2022-05-14 RX ADMIN — UMECLIDINIUM 1 PUFF: 62.5 AEROSOL, POWDER ORAL at 08:06

## 2022-05-14 RX ADMIN — GUAIFENESIN 600 MG: 600 TABLET ORAL at 08:05

## 2022-05-14 RX ADMIN — HEPARIN SODIUM 5000 UNITS: 5000 INJECTION INTRAVENOUS; SUBCUTANEOUS at 05:47

## 2022-05-14 NOTE — PLAN OF CARE
Problem: Potential for Falls  Goal: Patient will remain free of falls  Description: INTERVENTIONS:  - Educate patient/family on patient safety including physical limitations  - Instruct patient to call for assistance with activity   - Consult OT/PT to assist with strengthening/mobility   - Keep Call bell within reach  - Keep bed low and locked with side rails adjusted as appropriate  - Keep care items and personal belongings within reach  - Initiate and maintain comfort rounds  - Make Fall Risk Sign visible to staff  - Offer Toileting every 2 Hours, in advance of need  - Initiate/Maintain bed/ chair alarm  - Obtain necessary fall risk management equipment  - Apply yellow socks and bracelet for high fall risk patients  - Consider moving patient to room near nurses station  Outcome: Progressing     Problem: Prexisting or High Potential for Compromised Skin Integrity  Goal: Skin integrity is maintained or improved  Description: INTERVENTIONS:  - Identify patients at risk for skin breakdown  - Assess and monitor skin integrity  - Assess and monitor nutrition and hydration status  - Monitor labs   - Assess for incontinence   - Turn and reposition patient  - Assist with mobility/ambulation  - Relieve pressure over bony prominences  - Avoid friction and shearing  - Provide appropriate hygiene as needed including keeping skin clean and dry  - Evaluate need for skin moisturizer/barrier cream  - Collaborate with interdisciplinary team   - Patient/family teaching  - Consider wound care consult   Outcome: Progressing     Problem: METABOLIC, FLUID AND ELECTROLYTES - ADULT  Goal: Fluid balance maintained  Description: INTERVENTIONS:  - Monitor labs   - Monitor I/O and WT  - Instruct patient on fluid and nutrition as appropriate  - Assess for signs & symptoms of volume excess or deficit  Outcome: Progressing     Problem: PAIN - ADULT  Goal: Verbalizes/displays adequate comfort level or baseline comfort level  Description: Interventions:  - Encourage patient to monitor pain and request assistance  - Assess pain using appropriate pain scale  - Administer analgesics based on type and severity of pain and evaluate response  - Implement non-pharmacological measures as appropriate and evaluate response  - Consider cultural and social influences on pain and pain management  - Notify physician/advanced practitioner if interventions unsuccessful or patient reports new pain  Outcome: Progressing     Problem: INFECTION - ADULT  Goal: Absence or prevention of progression during hospitalization  Description: INTERVENTIONS:  - Assess and monitor for signs and symptoms of infection  - Monitor lab/diagnostic results  - Monitor all insertion sites, i e  indwelling lines, tubes, and drains  - Monitor endotracheal if appropriate and nasal secretions for changes in amount and color  - [place] appropriate cooling/warming therapies per order  - Administer medications as ordered  - Instruct and encourage patient and family to use good hand hygiene technique  - Identify and instruct in appropriate isolation precautions for identified infection/condition  Outcome: Progressing  Goal: Absence of fever/infection during neutropenic period  Description: INTERVENTIONS:  - Monitor WBC    Outcome: Progressing

## 2022-05-14 NOTE — PROGRESS NOTES
Cardiology Progress Note - Tawana Aguayo 81 y o  male MRN: [id number]    Unit/Bed#: -01 Encounter: [id number]      Assessment:  Principal Problem:    Acute diastolic congestive heart failure (HCC)  Active Problems:    Type 2 diabetes mellitus with stage 3 chronic kidney disease, without long-term current use of insulin (HCC)    Essential hypertension    CHI (obstructive sleep apnea)    Obesity, morbid (HCC)    Moderate COPD (chronic obstructive pulmonary disease) ([facility])    Stage 3 chronic kidney disease (HCC)    Chest x-ray abnormality    Non healing left heel wound    SIRS (systemic inflammatory response syndrome) ([facility] [address] )      Plan:    1  Acute on chronic heart failure with preserved ejection fraction - He has clinically improved  Still remains somewhat volume overloaded  Continue IV Bumex today and we will transition to oral Bumex tomorrow  Suggest Bumex 2 mg twice daily at discharge  Low-sodium diet  Close outpatient follow-up and follow-up of labs  Anticipate discharge tomorrow  Discussed with Internal Medicine  Encouraged to get on CPAP as an outpatient  Subjective:   Patient seen and examined  No significant events overnight  Continues to remain volume overloaded but is responding to IV Bumex  Overall volume status is improving  Denies shortness of breath  Objective:     Vitals: Blood pressure 133/70, pulse 78, temperature 97 6 °F (36 4 °C), resp  rate 14, height 5' 11" (1 803 m), weight 126 kg (278 lb 10 6 oz), SpO2 92 %  , Body mass index is 38 87 kg/m² ,   Orthostatic Blood Pressures    Flowsheet Row Most Recent Value   Blood Pressure 133/70 filed at 05/14/2022 0630   Patient Position - Orthostatic VS Sitting filed at 05/12/2022 2139            Intake/Output Summary (Last 24 hours) at 5/14/2022 1303  Last data filed at 5/14/2022 1104  Gross per 24 hour   Intake 720 ml   Output 1350 ml   Net -630 ml       Physical Exam:    GEN: Tawana Aguayo appears well, alert and oriented x 3, pleasant and cooperative  Obese  HEENT: pupils equal, round, and reactive to light; extraocular muscles intact  NECK: supple, no carotid bruits  +JVD   HEART: regular rhythm, distant S1 and S2, no significant murmurs, clicks, gallops or rubs   LUNGS:  Diminished bilaterally; no wheezes, rales, or rhonchi   ABDOMEN: normal bowel sounds, soft, no tenderness, no distention  EXTREMITIES: peripheral pulses normal; no clubbing, cyanosis    ++ edema bilaterally  NEURO: no focal findings   SKIN: normal without suspicious lesions on exposed skin    Medications:      Current Facility-Administered Medications:     acetaminophen (TYLENOL) tablet 650 mg, 650 mg, Oral, Q6H PRN, Arpan Cummings PA-C    aluminum-magnesium hydroxide-simethicone (MYLANTA) oral suspension 30 mL, 30 mL, Oral, Q6H PRN, Arpan Cummings PA-C    bumetanide (BUMEX) injection 2 mg, 2 mg, Intravenous, BID, Alba CHEN PA-C, 2 mg at 05/14/22 0805    fluticasone (FLONASE) 50 mcg/act nasal spray 2 spray, 2 spray, Nasal, BID, Arpan Cummings PA-C, 2 spray at 05/13/22 2100    fluticasone-vilanterol (BREO ELLIPTA) 200-25 MCG/INH inhaler 1 puff, 1 puff, Inhalation, Daily, Arpan Cummings PA-C, 1 puff at 05/14/22 0806    guaiFENesin (MUCINEX) 12 hr tablet 600 mg, 600 mg, Oral, BID, Arpan Cummings PA-C, 600 mg at 05/14/22 0805    heparin (porcine) subcutaneous injection 5,000 Units, 5,000 Units, Subcutaneous, Q8H [facility], 5,000 Units at 05/14/22 0547 **AND** [CANCELED] Platelet count, , , Once, Arpan Cummings PA-C    insulin lispro (HumaLOG) 100 units/mL subcutaneous injection 1-5 Units, 1-5 Units, Subcutaneous, HS, Landy You PA-C, 1 Units at 05/13/22 2132    insulin lispro (HumaLOG) 100 units/mL subcutaneous injection 1-6 Units, 1-6 Units, Subcutaneous, TID AC, 1 Units at 05/14/22 1137 **AND** Fingerstick Glucose (POCT), , , TID AC, Landy You PA-C    labetalol (NORMODYNE) tablet 300 mg, 300 mg, Oral, BID, Arpan Cummings PA-C, 300 mg at 05/14/22 0805   melatonin tablet 5 mg, 5 mg, Oral, HS, Cirilo Ojeda PA-C, 5 mg at 05/13/22 2132    mupirocin (BACTROBAN) 2 % ointment, , Topical, Every Other Day, Zee Rosario DPM, Given at 05/13/22 1327    potassium chloride (K-DUR,KLOR-CON) CR tablet 40 mEq, 40 mEq, Oral, BID With Meals, Anibal LUCIA Stephens-C, 40 mEq at 05/14/22 0805    sodium chloride (OCEAN) 0 65 % nasal spray 1 spray, 1 spray, Each Nare, Q1H PRN, [facility] DiasioBAKARI    tamsulosin (FLOMAX) capsule 0 4 mg, 0 4 mg, Oral, Daily With Dinner, Cirilo Ojeda PA-C, 0 4 mg at 05/13/22 1614    umeclidinium bromide (INCRUSE ELLIPTA) 62 5 mcg/inh inhaler AEPB 1 puff, 1 puff, Inhalation, Daily, Cirilo Ojeda PA-C, 1 puff at 05/14/22 0806     Labs & Results:        Results from last 7 days   Lab Units 05/14/22  0439 05/13/22  0524 05/11/22  1446   WBC Thousand/uL 7 71 8 78 12 10*   HEMOGLOBIN g/dL 13 4 14 2 14 4   HEMATOCRIT % 42 8 44 5 45 2   PLATELETS Thousands/uL 168 184 212         Results from last 7 days   Lab Units 05/14/22  0439 05/13/22  0524 05/12/22  0502 05/11/22  1446   POTASSIUM mmol/L 3 5 3 4* 3 4* 4 0   CHLORIDE mmol/L 102 101 101 101   CO2 mmol/L 26 24 26 25   BUN mg/dL 64* 66* 69* 70*   CREATININE mg/dL 2 45* 2 44* 2 43* 2 48*   CALCIUM mg/dL 9 4 9 4 9 3 8 8   ALK PHOS U/L  --   --   --  65   ALT U/L  --   --   --  20   AST U/L  --   --   --  11         Results from last 7 days   Lab Units 05/13/22  0524   MAGNESIUM mg/dL 2 4         EKG personally reviewed by Nydia Grimaldo MD   Sinus rhythm with nonspecific ST and T-wave changes  ECHO:    Left Ventricle: Left ventricular cavity size is normal  Wall thickness is normal  The left ventricular ejection fraction is 60%  Systolic function is normal  Wall motion is normal  Diastolic function is mildly abnormal, consistent with grade I (abnormal) relaxation    Mitral Valve: There is mild annular calcification  There is mild regurgitation    Tricuspid Valve: There is mild regurgitation     Pulmonary Artery: The estimated pulmonary artery systolic pressure is 42 9 mmHg  The pulmonary artery systolic pressure is mildly increased  Counseling / Coordination of Care  Total floor / unit time spent today 25 minutes  Greater than 50% of total time was spent with the patient and / or family counseling and / or coordination of care

## 2022-05-14 NOTE — PROGRESS NOTES
[place]  Progress Note - Jimena San 1935, 81 y o  male MRN: [id number]  Unit/Bed#: -01 Encounter: [id number]  Primary Care Provider: Marilynn Rodriguez MD   Date and time admitted to hospital: 5/11/2022  3:55 PM    * Acute diastolic congestive heart failure (HCC)  Assessment & Plan  Wt Readings from Last 3 Encounters:   05/14/22 126 kg (278 lb 10 6 oz)   04/20/22 132 kg (290 lb)   04/14/22 131 kg (289 lb)     · Increased SOB x months and worse in last weeks, increasing BLE edema and lack of improvement with PO diuretics, PND and orthopnea    CXR:  Suspicious for new myalgias right lower lobe pneumonia  o No signs or symptoms of PNA - stop ABX   Troponins negative    ECHO 64/18: EF 79%, diastolic function mildly abnormal-grade 1 relaxation  o Repeat ECHO ordered   Home diuretic regimen: torsemide 40 mg in AM/20 mg in PM, metazolone 2 5 mg on Fridays- managed by patient's primary  o Sometimes misses PM dose of torsemide  Metaozolone added for last 2-3 months   Continue Bumex 2 mg b i d  and trend renal function  o Consider transitioning to torsemide 40 mg b i d  on discharge to avoid reliance on metolazone   Continue to monitor electrolytes and replete as needed   Daily weights, Strict I & Os   Cardiac diet, low sodium <2g, fluid restriction <1500   Cardiology consulted, patient should have ongoing outpatient cardiac follow-up for diuretic management    SIRS (systemic inflammatory response syndrome) (HCC)  Assessment & Plan  · Source of noninfectious origin probably in setting of acute on chronic diastolic heart failure  Noted to have tachypnea, tachycardia and leukocytosis on admission    · Trend WBC and fever curve  · Hold antibiotics    Non healing left heel wound  Assessment & Plan  · Follows with Dr Brooks Calderon   · Apply Mupirocin ointment to wound site  · Cover with ALLEVYN WITH BORDER  · CHANGE 3XWEEK    Chest x-ray abnormality  Assessment & Plan  · Chest x-ray showing findings suspicious for new right mild lower lobe pneumonia  · Patient with no signs of pneumonia  · Procalcitonin negative  · Stop ABX    Stage 3 chronic kidney disease [facility])  Assessment & Plan  Lab Results   Component Value Date    EGFR 22 05/14/2022    EGFR 23 05/13/2022    EGFR 23 05/12/2022    CREATININE 2 45 (H) 05/14/2022    CREATININE 2 44 (H) 05/13/2022    CREATININE 2 43 (H) 05/12/2022   · Patient with baseline creatinine in 2 2-2 3  · Will continue to monitor in setting of need for diuresis  · Avoid hypotension, nephrotoxic agents, NSAIDs    Moderate COPD (chronic obstructive pulmonary disease) ([facility])  Assessment & Plan  · No evidence to suggest COPD exacerbation  · PRN albuterol  · Home trelegy - substitute for incruse while admitted    Obesity, morbid ([facility] [address] )  Assessment & Plan  · Encourage healthy lifestyle changes  Body mass index is 38 87 kg/m²  · Outpatient sleep study    CHI (obstructive sleep apnea)  Assessment & Plan  · Does not use bipap or cpap  · Home oxygen PRN he had for 5 years - taken away given repeat 6 min walk did not show desat  · Repeat home oxygen study prior to discharge  · Needs sleep study outpt    Essential hypertension  Assessment & Plan  · Continue labetalol with hold parameters    Type 2 diabetes mellitus with stage 3 chronic kidney disease, without long-term current use of insulin [facility])  Assessment & Plan  Lab Results   Component Value Date    HGBA1C 6 0 (H) 03/15/2022       Recent Labs     05/13/22  1546 05/13/22  2059 05/14/22  0706 05/14/22  1103   POCGLU 141* 159* 171* 157*       Blood Sugar Average: Last 72 hrs:  · (P) 163 [id number]   · Patient on metformin at home - will hold while inpatient, can resume on discharge  · SSI ACHS  · Diabetic diet      VTE Pharmacologic Prophylaxis: VTE Score: 6 High Risk (Score >/= 5) - Pharmacological DVT Prophylaxis Ordered: heparin  Sequential Compression Devices Ordered      Patient Centered Rounds: I performed bedside rounds with nursing staff today  Discussions with Specialists or Other Care Team Provider:  Reviewed with Cardiology, anticipate discharge tomorrow morning  Discussed with Respiratory, will perform home oxygen study prior to discharge  Education and Discussions with Family / Patient: Updated  (son) at bedside  Time Spent for Care: 30 minutes  More than 50% of total time spent on counseling and coordination of care as described above  Current Length of Stay: 3 day(s)  Current Patient Status: Inpatient   Certification Statement: The patient will continue to require additional inpatient hospital stay due to Diuresis  Discharge Plan: Anticipate discharge tomorrow to home  Code Status: Level 1 - Full Code    Subjective:   Patient very eager to return home, says his legs look better and his breathing is better than it has been quite some time  He does admit though that his leg still is swollen and is agreeable to stay 1 more night in the hospital     Objective:     Vitals:   Temp (24hrs), Av 6 °F (36 4 °C), Min:97 2 °F (36 2 °C), Max:98 3 °F (36 8 °C)    Temp:  [97 2 °F (36 2 °C)-98 3 °F (36 8 °C)] 97 3 °F (36 3 °C)  HR:  [78-87] 78  Resp:  [14-20] 18  BP: (125-135)/() 135/104  SpO2:  [90 %-93 %] 91 %  Body mass index is 38 87 kg/m²  Input and Output Summary (last 24 hours): Intake/Output Summary (Last 24 hours) at 2022 1441  Last data filed at 2022 1104  Gross per 24 hour   Intake 720 ml   Output 1350 ml   Net -630 ml       Physical Exam:   Physical Exam  Vitals and nursing note reviewed  Constitutional:       General: He is not in acute distress  Appearance: Normal appearance  He is well-developed  HENT:      Head: Normocephalic and atraumatic  Eyes:      General: No scleral icterus  Conjunctiva/sclera: Conjunctivae normal    Cardiovascular:      Rate and Rhythm: Normal rate and regular rhythm  Heart sounds: No murmur heard    Pulmonary: Effort: Pulmonary effort is normal       Breath sounds: Decreased breath sounds present  No wheezing, rhonchi or rales  Abdominal:      General: There is no distension  Palpations: Abdomen is soft  Musculoskeletal:      Right lower leg: Edema present  Left lower leg: Edema present  Skin:     General: Skin is warm and dry  Neurological:      General: No focal deficit present  Mental Status: He is alert  Psychiatric:         Mood and Affect: Mood normal        Additional Data:     Labs:  Results from last 7 days   Lab Units 05/14/22  0439   WBC Thousand/uL 7 71   HEMOGLOBIN g/dL 13 4   HEMATOCRIT % 42 8   PLATELETS Thousands/uL 168   NEUTROS PCT % 70   LYMPHS PCT % 17   MONOS PCT % 10   EOS PCT % 2     Results from last 7 days   Lab Units 05/14/22  0439 05/12/22  0502 05/11/22  1446   SODIUM mmol/L 138   < > 137   POTASSIUM mmol/L 3 5   < > 4 0   CHLORIDE mmol/L 102   < > 101   CO2 mmol/L 26   < > 25   BUN mg/dL 64*   < > 70*   CREATININE mg/dL 2 45*   < > 2 48*   ANION GAP mmol/L 10   < > 11   CALCIUM mg/dL 9 4   < > 8 8   ALBUMIN g/dL  --   --  3 5   TOTAL BILIRUBIN mg/dL  --   --  0 40   ALK PHOS U/L  --   --  65   ALT U/L  --   --  20   AST U/L  --   --  11   GLUCOSE RANDOM mg/dL 169*   < > 211*    < > = values in this interval not displayed  Results from last 7 days   Lab Units 05/14/22  1103 05/14/22  0706 05/13/22  2059 05/13/22  1546 05/13/22  1112 05/13/22  0717 05/12/22  2115 05/12/22  1556 05/12/22  1128 05/12/22  0716 05/11/22  2131   POC GLUCOSE mg/dl 157* 171* 159* 141* 168* 180* 168* 139 195* 173* 145*         Results from last 7 days   Lab Units 05/13/22  0524 05/12/22  0502 05/11/22  1446   PROCALCITONIN ng/ml 0 17 0 14 0 13       Lines/Drains:  Invasive Devices  Report    Peripheral Intravenous Line  Duration           Peripheral IV 05/12/22 Distal;Right;Ventral (anterior) Forearm 1 day                      Imaging: No pertinent imaging reviewed      Recent Cultures (last 7 days):         Last 24 Hours Medication List:   Current Facility-Administered Medications   Medication Dose Route Frequency Provider Last Rate    acetaminophen  650 mg Oral Q6H PRN Rosalino Zepeda PA-C      aluminum-magnesium hydroxide-simethicone  30 mL Oral Q6H PRN Rosalino Zepeda PA-C      bumetanide  2 mg Intravenous BID Priscella Ores VBAKARI      fluticasone  2 spray Nasal BID Rosalino Santa IsabelBAKARI sotomayor      fluticasone-vilanterol  1 puff Inhalation Daily Rosalino Zepeda PA-C      guaiFENesin  600 mg Oral BID Rosalino Zepeda PA-C      heparin (porcine)  5,000 Units Subcutaneous [facility] Rosalino Zepeda PA-C      insulin lispro  1-5 Units Subcutaneous HS Landy DiaBAKARI self      insulin lispro  1-6 Units Subcutaneous TID AC Landy You PA-C      labetalol  300 mg Oral BID Rosalino Zepeda PA-C      melatonin  5 mg Oral HS Rosalino Zepeda PA-C      mupirocin   Topical Every Other Day Billy Books, DPM      potassium chloride  40 mEq Oral BID With Meals Priscella Ores BAKARI CHEN      sodium chloride  1 spray Each Nare Q1H PRN Opal BAKARI Tovar      tamsulosin  0 4 mg Oral Daily With Braydon James PA-C      umeclidinium bromide  1 puff Inhalation Daily Rosalino Zepeda PA-C          Today, Patient Was Seen By: Johnny Wylie PA-C    **Please Note: This note may have been constructed using a voice recognition system  **

## 2022-05-14 NOTE — ASSESSMENT & PLAN NOTE
Lab Results   Component Value Date    HGBA1C 6 0 (H) 03/15/2022       Recent Labs     05/13/22  1546 05/13/22 2059 05/14/22  0706 05/14/22  1103   POCGLU 141* 159* 171* 157*       Blood Sugar Average: Last 72 hrs:  · (P) 163 [id number]   · Patient on metformin at home - will hold while inpatient, can resume on discharge  · SSI ACHS  · Diabetic diet

## 2022-05-14 NOTE — ASSESSMENT & PLAN NOTE
· Follows with Dr Maria Teresa Lockwood   · Apply Mupirocin ointment to wound site  · Cover with ALLEVYN WITH BORDER  · CHANGE 3XWEEK

## 2022-05-14 NOTE — DISCHARGE SUMMARY
[place]  Discharge- Rosario Anna 1935, 81 y o  male MRN: [id number]  Unit/Bed#: -01 Encounter: [id number]  Primary Care Provider: Herminia Rodriguez MD   Date and time admitted to hospital: 5/11/2022  3:55 PM    * Acute diastolic congestive heart failure (HCC)  Assessment & Plan  Wt Readings from Last 3 Encounters:   05/14/22 126 kg (278 lb 10 6 oz)   04/20/22 132 kg (290 lb)   04/14/22 131 kg (289 lb)     · Increased SOB x months and worse in last weeks, increasing BLE edema and lack of improvement with PO diuretics, PND and orthopnea    CXR:  Suspicious for new myalgias right lower lobe pneumonia  o No signs or symptoms of PNA - stop ABX   Troponins negative    ECHO 94/58: EF 19%, diastolic function mildly abnormal-grade 1 relaxation  o Repeat ECHO ordered   Home diuretic regimen: torsemide 40 mg in AM/20 mg in PM, metazolone 2 5 mg on Fridays- managed by patient's primary  o Sometimes misses PM dose of torsemide  Metaozolone added for last 2-3 months   Continue Bumex 2 mg b i d  and trend renal function  o Condition to Bumex 2 mg b i d  PO on discharge   Continue to monitor electrolytes and replete as needed   Daily weights, Strict I & Os   Cardiac diet, low sodium <2g, fluid restriction <1500   Cardiology consulted, patient should have ongoing outpatient cardiac follow-up for diuretic management    SIRS (systemic inflammatory response syndrome) (HCC)  Assessment & Plan  · Source of noninfectious origin probably in setting of acute on chronic diastolic heart failure  Noted to have tachypnea, tachycardia and leukocytosis on admission    · Trend WBC and fever curve  · Hold antibiotics    Non healing left heel wound  Assessment & Plan  · Follows with Dr Rasheed President   · Apply Mupirocin ointment to wound site  · Cover with [address] [facility] [address]  · CHANGE 3XWEEK    Chest x-ray abnormality  Assessment & Plan  · Chest x-ray showing findings suspicious for new right mild lower lobe pneumonia  · Patient with no signs of pneumonia  · Procalcitonin negative  · Stop ABX    Stage 3 chronic kidney disease [facility])  Assessment & Plan  Lab Results   Component Value Date    EGFR 24 05/15/2022    EGFR 22 05/14/2022    EGFR 23 05/13/2022    CREATININE 2 30 (H) 05/15/2022    CREATININE 2 45 (H) 05/14/2022    CREATININE 2 44 (H) 05/13/2022   · Patient with baseline creatinine in 2 2-2 3  · Will continue to monitor in setting of need for diuresis  · Avoid hypotension, nephrotoxic agents, NSAIDs    Moderate COPD (chronic obstructive pulmonary disease) ([facility])  Assessment & Plan  · No evidence to suggest COPD exacerbation  · PRN albuterol  · Home trelegy - substitute for incruse while admitted    Obesity, morbid ([facility] [address] )  Assessment & Plan  · Encourage healthy lifestyle changes  Body mass index is 39 82 kg/m²    · Outpatient sleep study    CHI (obstructive sleep apnea)  Assessment & Plan  · Does not use bipap or cpap  · Home oxygen PRN he had for 5 years - taken away given repeat 6 min walk did not show desat  · Repeat home oxygen study prior to discharge  · Qualifies for home O2 on DC  · Needs sleep study outpt    Essential hypertension  Assessment & Plan  · Continue labetalol with hold parameters    Type 2 diabetes mellitus with stage 3 chronic kidney disease, without long-term current use of insulin [facility])  Assessment & Plan  Lab Results   Component Value Date    HGBA1C 6 0 (H) 03/15/2022       Recent Labs     05/14/22  1627 05/14/22  1834 05/14/22  2104 05/15/22  0729   POCGLU 143* 194* 165* 167*       Blood Sugar Average: Last 72 hrs:  · (P) 165 [id number]   · Patient on metformin at home - will hold while inpatient, can resume on discharge  · SSI ACHS  · Diabetic diet      Medical Problems             Resolved Problems  Date Reviewed: 5/14/2022   None               Discharging Physician / Practitioner: Reymundo Rodriguez PA-C  PCP: Staci Allred MD  Admission Date:   Admission Orders (From admission, onward)     Ordered        05/11/22 1830  Inpatient Admission  Once                      Discharge Date: 05/15/22    Consultations During Hospital Stay:  · Cardiology  · Podiatry    Procedures Performed:   · Home O2 evaluation:  Qualifies for 3 L  · Echocardiogram 5/12:    Left Ventricle: Left ventricular cavity size is normal  Wall thickness is normal  The left ventricular ejection fraction is 60%  Systolic function is normal  Wall motion is normal  Diastolic function is mildly abnormal, consistent with grade I (abnormal) relaxation    Mitral Valve: There is mild annular calcification  There is mild regurgitation    Tricuspid Valve: There is mild regurgitation    Pulmonary Artery: The estimated pulmonary artery systolic pressure is 09 2 mmHg  The pulmonary artery systolic pressure is mildly increased  Significant Findings / Test Results:   · CXR 5/11:   · Findings are suspicious for new mild right lower lobe pneumonia  · Possible 1 4 cm nodule versus summation artifact in the vicinity of suspected pneumonia  · Given the patient's smoking history a noncontrast CT is follow-up is advised when the patient is no longer acute to see if this finding persists in 2-4 weeks time  Incidental Findings:   · Pulmonary nodule-will need outpatient follow-up    Test Results Pending at Discharge (will require follow up): · None     Outpatient Tests Requested:  · Non contrast CT chest in 2-4 weeks to follow up on pulmonary nodule    Complications:  None    Reason for Admission: CHF exacerbation    Hospital Course:   Dorn Koyanagi is a 81 y o  male patient past medical history of chronic diastolic CHF, diabetes maintained on metformin, morbid obesity, COPD, hypertension who originally presented to the hospital on 5/11/2022 due to shortness of breath  Patient reports seeing his PCP who recommended 40mg torsemide in the morning and 20 mg in the afternoon but sometimes he forgets    He was also started on 2 5 mg metolazone weekly on Fridays  He believes this was helping initially but has noted a worsening dyspnea on exertion with lower extremity swelling  Patient in heart failure exacerbation on admission started on 80 mg IV Lasix t i d  And transition to 2 mg IV Bumex b i d  After cardiology evaluation  Initial CXR did reveal pulmonary nodule which will require outpatient follow-up and repeat imaging  It was also recommended that patient follow with Cardiology as opposed to his PCP given the severity of his heart failure  Patient responded to diuresis with IV Bumex and will be transition to 2 mg oral Bumex b i d  On discharge  He was instructed to weigh himself daily and monitor his fluid intake as well as salt  Metolazone is to be used only p r n  After speaking with his doctor  Home oxygen evaluation was performed prior to discharge, patient was previously on home oxygen for 6 years but during Re qualification his saturations only dropped to 90% and he did not Re-qualify  However here he did qualify for oxygen on discharge  Hemodynamically stable at time of discharge and appropriate for outpatient follow-up  Please see above list of diagnoses and related plan for additional information  Condition at Discharge: stable    Discharge Day Visit / Exam:   Subjective:  Patient very eager for discharge, denies any shortness of breath, says he has been walking here and his oxygen is improving  Vitals: Blood Pressure: 130/70 (05/15/22 0732)  Pulse: 78 (05/15/22 0732)  Temperature: 97 7 °F (36 5 °C) (05/15/22 0732)  Temp Source: Oral (05/12/22 2139)  Respirations: 16 (05/15/22 0732)  Height: 5' 11" (180 3 cm) (05/12/22 1312)  Weight - Scale: 129 kg (285 lb 7 9 oz) (05/15/22 0536)  SpO2: 93 % (05/15/22 0732)  Exam:   Physical Exam  Vitals and nursing note reviewed  Constitutional:       General: He is not in acute distress  Appearance: Normal appearance  He is well-developed     HENT:      Head: Normocephalic and atraumatic  Eyes:      General: No scleral icterus  Conjunctiva/sclera: Conjunctivae normal    Cardiovascular:      Rate and Rhythm: Normal rate and regular rhythm  Heart sounds: No murmur heard  Pulmonary:      Effort: Pulmonary effort is normal       Breath sounds: No wheezing, rhonchi or rales  Abdominal:      General: There is no distension  Palpations: Abdomen is soft  Skin:     General: Skin is warm and dry  Neurological:      General: No focal deficit present  Mental Status: He is alert  Psychiatric:         Mood and Affect: Mood normal         Discussion with Family: Updated  (son) via phone  Discharge instructions/Information to patient and family:   See after visit summary for information provided to patient and family  Provisions for Follow-Up Care:  See after visit summary for information related to follow-up care and any pertinent home health orders  Disposition:   Home    Planned Readmission: None     Discharge Statement:  I spent 65 minutes discharging the patient  This time was spent on the day of discharge  I had direct contact with the patient on the day of discharge  Greater than 50% of the total time was spent examining patient, answering all patient questions, arranging and discussing plan of care with patient as well as directly providing post-discharge instructions  Additional time then spent on discharge activities  Discharge Medications:  See after visit summary for reconciled discharge medications provided to patient and/or family        **Please Note: This note may have been constructed using a voice recognition system**

## 2022-05-14 NOTE — ASSESSMENT & PLAN NOTE
Wt Readings from Last 3 Encounters:   05/14/22 126 kg (278 lb 10 6 oz)   04/20/22 132 kg (290 lb)   04/14/22 131 kg (289 lb)     · Increased SOB x months and worse in last weeks, increasing BLE edema and lack of improvement with PO diuretics, PND and orthopnea    CXR:  Suspicious for new myalgias right lower lobe pneumonia  o No signs or symptoms of PNA - stop ABX   Troponins negative    ECHO 57/64: EF 07%, diastolic function mildly abnormal-grade 1 relaxation  o Repeat ECHO ordered   Home diuretic regimen: torsemide 40 mg in AM/20 mg in PM, metazolone 2 5 mg on Fridays- managed by patient's primary  o Sometimes misses PM dose of torsemide  Metaozolone added for last 2-3 months   Continue Bumex 2 mg b i d  and trend renal function  o Condition to Bumex 2 mg b i d  PO on discharge   Continue to monitor electrolytes and replete as needed   Daily weights, Strict I & Os   Cardiac diet, low sodium <2g, fluid restriction <1500   Cardiology consulted, patient should have ongoing outpatient cardiac follow-up for diuretic management

## 2022-05-14 NOTE — RESPIRATORY THERAPY NOTE
Home Oxygen Qualifying Test     Patient name: Cinda Rob        : 1935   Date of Test:  May 14, 2022  Diagnosis:    Home Oxygen Test:    **Medicare Guidelines require item(s) 1-5 on all ambulatory patients or 1 and 2 on non-ambulatory patients  1  Baseline SPO2 on Room Air at rest 93%   If <= 88% on Room Air add O2 via NC to obtain SpO2 >=88%  If LPM needed, document LPMneeded to reach =>88%    SPO2 during exertion on Room Air 93-88%  During exertion monitor SPO2  If SPO2 increases >=89%, do not add supplemental oxygen    SPO2 on Oxygen at Rest % at LPM    SPO2 during exertion on Oxygen 91-93% at 3 LPM    Test performed during exertion activity  [x]  Supplemental Home Oxygen is indicated  []  Client does not qualify for home oxygen      Respiratory Additional Notes-     Lem Saint, RT

## 2022-05-14 NOTE — ASSESSMENT & PLAN NOTE
· Does not use bipap or cpap  · Home oxygen PRN he had for 5 years - taken away given repeat 6 min walk did not show desat  · Repeat home oxygen study prior to discharge  · Needs sleep study outpt

## 2022-05-14 NOTE — ASSESSMENT & PLAN NOTE
Lab Results   Component Value Date    EGFR 22 05/14/2022    EGFR 23 05/13/2022    EGFR 23 05/12/2022    CREATININE 2 45 (H) 05/14/2022    CREATININE 2 44 (H) 05/13/2022    CREATININE 2 43 (H) 05/12/2022   · Patient with baseline creatinine in 2 2-2 3  · Will continue to monitor in setting of need for diuresis  · Avoid hypotension, nephrotoxic agents, NSAIDs

## 2022-05-14 NOTE — PLAN OF CARE
Problem: Potential for Falls  Goal: Patient will remain free of falls  Description: INTERVENTIONS:  - Educate patient/family on patient safety including physical limitations  - Instruct patient to call for assistance with activity   - Consult OT/PT to assist with strengthening/mobility   - Keep Call bell within reach  - Keep bed low and locked with side rails adjusted as appropriate  - Keep care items and personal belongings within reach  - Initiate and maintain comfort rounds  - Make Fall Risk Sign visible to staff  - Offer Toileting every 2 Hours, in advance of need  - Initiate/Maintain bed/ chair alarm  - Obtain necessary fall risk management equipment  - Apply yellow socks and bracelet for high fall risk patients  - Consider moving patient to room near nurses station  Outcome: Progressing     Problem: Prexisting or High Potential for Compromised Skin Integrity  Goal: Skin integrity is maintained or improved  Description: INTERVENTIONS:  - Identify patients at risk for skin breakdown  - Assess and monitor skin integrity  - Assess and monitor nutrition and hydration status  - Monitor labs   - Assess for incontinence   - Turn and reposition patient  - Assist with mobility/ambulation  - Relieve pressure over bony prominences  - Avoid friction and shearing  - Provide appropriate hygiene as needed including keeping skin clean and dry  - Evaluate need for skin moisturizer/barrier cream  - Collaborate with interdisciplinary team   - Patient/family teaching  - Consider wound care consult   Outcome: Progressing     Problem: METABOLIC, FLUID AND ELECTROLYTES - ADULT  Goal: Fluid balance maintained  Description: INTERVENTIONS:  - Monitor labs   - Monitor I/O and WT  - Instruct patient on fluid and nutrition as appropriate  - Assess for signs & symptoms of volume excess or deficit  Outcome: Progressing     Problem: PAIN - ADULT  Goal: Verbalizes/displays adequate comfort level or baseline comfort level  Description: Interventions:  - Encourage patient to monitor pain and request assistance  - Assess pain using appropriate pain scale  - Administer analgesics based on type and severity of pain and evaluate response  - Implement non-pharmacological measures as appropriate and evaluate response  - Consider cultural and social influences on pain and pain management  - Notify physician/advanced practitioner if interventions unsuccessful or patient reports new pain  Outcome: Progressing     Problem: INFECTION - ADULT  Goal: Absence or prevention of progression during hospitalization  Description: INTERVENTIONS:  - Assess and monitor for signs and symptoms of infection  - Monitor lab/diagnostic results  - Monitor all insertion sites, i e  indwelling lines, tubes, and drains  - Monitor endotracheal if appropriate and nasal secretions for changes in amount and color  - [place] appropriate cooling/warming therapies per order  - Administer medications as ordered  - Instruct and encourage patient and family to use good hand hygiene technique  - Identify and instruct in appropriate isolation precautions for identified infection/condition  Outcome: Progressing  Goal: Absence of fever/infection during neutropenic period  Description: INTERVENTIONS:  - Monitor WBC    Outcome: Progressing     Problem: SAFETY ADULT  Goal: Maintains/Returns to pre admission functional level  Description: INTERVENTIONS:  - Perform BMAT or MOVE assessment daily    - Set and communicate daily mobility goal to care team and patient/family/caregiver  - Collaborate with rehabilitation services on mobility goals if consulted  - Perform Range of Motion 3 times a day  - Reposition patient every 2 hours    - Dangle patient 3 times a day  - Stand patient 3 times a day  - Ambulate patient 3 times a day  - Out of bed to chair 3 times a day   - Out of bed for meals 3 times a day  - Out of bed for toileting  - Record patient progress and toleration of activity level Outcome: Progressing     Problem: DISCHARGE PLANNING  Goal: Discharge to home or other facility with appropriate resources  Description: INTERVENTIONS:  - Identify barriers to discharge w/patient and caregiver  - Arrange for needed discharge resources and transportation as appropriate  - Identify discharge learning needs (meds, wound care, etc )  - Arrange for interpretive services to assist at discharge as needed  - Refer to Case Management Department for coordinating discharge planning if the patient needs post-hospital services based on physician/advanced practitioner order or complex needs related to functional status, cognitive ability, or social support system  Outcome: Progressing     Problem: Knowledge Deficit  Goal: Patient/family/caregiver demonstrates understanding of disease process, treatment plan, medications, and discharge instructions  Description: Complete learning assessment and assess knowledge base  Interventions:  - Provide teaching at level of understanding  - Provide teaching via preferred learning methods  Outcome: Progressing     Problem: MOBILITY - ADULT  Goal: Maintains/Returns to pre admission functional level  Description: INTERVENTIONS:  - Perform BMAT or MOVE assessment daily    - Set and communicate daily mobility goal to care team and patient/family/caregiver  - Collaborate with rehabilitation services on mobility goals if consulted  - Perform Range of Motion 3 times a day  - Reposition patient every 2 hours    - Dangle patient 3 times a day  - Stand patient 3 times a day  - Ambulate patient 3 times a day  - Out of bed to chair 3 times a day   - Out of bed for meals 3 times a day  - Out of bed for toileting  - Record patient progress and toleration of activity level   Outcome: Progressing  Goal: Maintain or return to baseline ADL function  Description: INTERVENTIONS:  -  Assess patient's ability to carry out ADLs; assess patient's baseline for ADL function and identify physical deficits which impact ability to perform ADLs (bathing, care of mouth/teeth, toileting, grooming, dressing, etc )  - Assess/evaluate cause of self-care deficits   - Assess range of motion  - Assess patient's mobility; develop plan if impaired  - Assess patient's need for assistive devices and provide as appropriate  - Encourage maximum independence but intervene and supervise when necessary  - Involve family in performance of ADLs  - Assess for home care needs following discharge   - Consider OT consult to assist with ADL evaluation and planning for discharge  - Provide patient education as appropriate  Outcome: Progressing

## 2022-05-14 NOTE — ASSESSMENT & PLAN NOTE
· No evidence to suggest COPD exacerbation  · PRN albuterol  · Home trelegy - substitute for incruse while admitted

## 2022-05-14 NOTE — ASSESSMENT & PLAN NOTE
Wt Readings from Last 3 Encounters:   05/14/22 126 kg (278 lb 10 6 oz)   04/20/22 132 kg (290 lb)   04/14/22 131 kg (289 lb)     · Increased SOB x months and worse in last weeks, increasing BLE edema and lack of improvement with PO diuretics, PND and orthopnea    CXR:  Suspicious for new myalgias right lower lobe pneumonia  o No signs or symptoms of PNA - stop ABX   Troponins negative    ECHO 40/91: EF 94%, diastolic function mildly abnormal-grade 1 relaxation  o Repeat ECHO ordered   Home diuretic regimen: torsemide 40 mg in AM/20 mg in PM, metazolone 2 5 mg on Fridays- managed by patient's primary  o Sometimes misses PM dose of torsemide  Metaozolone added for last 2-3 months   Continue Bumex 2 mg b i d  and trend renal function  o Consider transitioning to torsemide 40 mg b i d  on discharge to avoid reliance on metolazone   Continue to monitor electrolytes and replete as needed   Daily weights, Strict I & Os   Cardiac diet, low sodium <2g, fluid restriction <1500   Cardiology consulted, patient should have ongoing outpatient cardiac follow-up for diuretic management

## 2022-05-15 VITALS
HEART RATE: 78 BPM | SYSTOLIC BLOOD PRESSURE: 130 MMHG | RESPIRATION RATE: 16 BRPM | TEMPERATURE: 97.7 F | DIASTOLIC BLOOD PRESSURE: 70 MMHG | WEIGHT: 285.5 LBS | OXYGEN SATURATION: 93 % | HEIGHT: 71 IN | BODY MASS INDEX: 39.97 KG/M2

## 2022-05-15 LAB
ANION GAP SERPL CALCULATED.3IONS-SCNC: 10 MMOL/L (ref 4–13)
BASOPHILS # BLD AUTO: 0.04 THOUSANDS/ΜL (ref 0–0.1)
BASOPHILS NFR BLD AUTO: 1 % (ref 0–1)
BUN SERPL-MCNC: 63 MG/DL (ref 5–25)
CALCIUM SERPL-MCNC: 9.4 MG/DL (ref 8.3–10.1)
CHLORIDE SERPL-SCNC: 103 MMOL/L (ref 100–108)
CO2 SERPL-SCNC: 26 MMOL/L (ref 21–32)
CREAT SERPL-MCNC: 2.3 MG/DL (ref 0.6–1.3)
DME PARACHUTE DELIVERY DATE ACTUAL: NORMAL
DME PARACHUTE DELIVERY DATE EXPECTED: NORMAL
DME PARACHUTE DELIVERY DATE REQUESTED: NORMAL
DME PARACHUTE ITEM DESCRIPTION: NORMAL
DME PARACHUTE ORDER STATUS: NORMAL
DME PARACHUTE SUPPLIER NAME: NORMAL
DME PARACHUTE SUPPLIER PHONE: NORMAL
EOSINOPHIL # BLD AUTO: 0.22 THOUSAND/ΜL (ref 0–0.61)
EOSINOPHIL NFR BLD AUTO: 3 % (ref 0–6)
ERYTHROCYTE [DISTWIDTH] IN BLOOD BY AUTOMATED COUNT: 13.9 % (ref 11.6–15.1)
GFR SERPL CREATININE-BSD FRML MDRD: 24 ML/MIN/1.73SQ M
GLUCOSE SERPL-MCNC: 137 MG/DL (ref 65–140)
GLUCOSE SERPL-MCNC: 167 MG/DL (ref 65–140)
GLUCOSE SERPL-MCNC: 168 MG/DL (ref 65–140)
HCT VFR BLD AUTO: 44.2 % (ref 36.5–49.3)
HGB BLD-MCNC: 13.9 G/DL (ref 12–17)
IMM GRANULOCYTES # BLD AUTO: 0.06 THOUSAND/UL (ref 0–0.2)
IMM GRANULOCYTES NFR BLD AUTO: 1 % (ref 0–2)
LYMPHOCYTES # BLD AUTO: 0.9 THOUSANDS/ΜL (ref 0.6–4.47)
LYMPHOCYTES NFR BLD AUTO: 12 % (ref 14–44)
MCH RBC QN AUTO: 28.7 PG (ref 26.8–34.3)
MCHC RBC AUTO-ENTMCNC: 31.4 G/DL (ref 31.4–37.4)
MCV RBC AUTO: 91 FL (ref 82–98)
MONOCYTES # BLD AUTO: 0.71 THOUSAND/ΜL (ref 0.17–1.22)
MONOCYTES NFR BLD AUTO: 9 % (ref 4–12)
NEUTROPHILS # BLD AUTO: 5.73 THOUSANDS/ΜL (ref 1.85–7.62)
NEUTS SEG NFR BLD AUTO: 74 % (ref 43–75)
NRBC BLD AUTO-RTO: 0 /100 WBCS
PLATELET # BLD AUTO: 168 THOUSANDS/UL (ref 149–390)
PMV BLD AUTO: 9.6 FL (ref 8.9–12.7)
POTASSIUM SERPL-SCNC: 4.4 MMOL/L (ref 3.5–5.3)
RBC # BLD AUTO: 4.84 MILLION/UL (ref 3.88–5.62)
SODIUM SERPL-SCNC: 139 MMOL/L (ref 136–145)
WBC # BLD AUTO: 7.66 THOUSAND/UL (ref 4.31–10.16)

## 2022-05-15 PROCEDURE — 82948 REAGENT STRIP/BLOOD GLUCOSE: CPT

## 2022-05-15 PROCEDURE — 99239 HOSP IP/OBS DSCHRG MGMT >30: CPT | Performed by: INTERNAL MEDICINE

## 2022-05-15 PROCEDURE — 85025 COMPLETE CBC W/AUTO DIFF WBC: CPT | Performed by: INTERNAL MEDICINE

## 2022-05-15 PROCEDURE — 80048 BASIC METABOLIC PNL TOTAL CA: CPT | Performed by: INTERNAL MEDICINE

## 2022-05-15 RX ORDER — POTASSIUM CHLORIDE 20 MEQ/1
20 TABLET, EXTENDED RELEASE ORAL 2 TIMES DAILY
Qty: 30 TABLET | Refills: 0 | Status: SHIPPED | OUTPATIENT
Start: 2022-05-15 | End: 2022-05-23

## 2022-05-15 RX ORDER — BUMETANIDE 2 MG/1
2 TABLET ORAL 2 TIMES DAILY
Qty: 60 TABLET | Refills: 0 | Status: SHIPPED | OUTPATIENT
Start: 2022-05-15 | End: 2022-05-23 | Stop reason: SDUPTHER

## 2022-05-15 RX ORDER — METOLAZONE 2.5 MG/1
2.5 TABLET ORAL WEEKLY
Qty: 10 TABLET | Refills: 0 | Status: SHIPPED | OUTPATIENT
Start: 2022-05-15 | End: 2022-05-23

## 2022-05-15 RX ADMIN — FLUTICASONE FUROATE AND VILANTEROL TRIFENATATE 1 PUFF: 200; 25 POWDER RESPIRATORY (INHALATION) at 08:28

## 2022-05-15 RX ADMIN — BUMETANIDE 2 MG: 0.25 INJECTION, SOLUTION INTRAMUSCULAR; INTRAVENOUS at 08:33

## 2022-05-15 RX ADMIN — POTASSIUM CHLORIDE 40 MEQ: 1500 TABLET, EXTENDED RELEASE ORAL at 08:33

## 2022-05-15 RX ADMIN — HEPARIN SODIUM 5000 UNITS: 5000 INJECTION INTRAVENOUS; SUBCUTANEOUS at 05:00

## 2022-05-15 RX ADMIN — LABETALOL HYDROCHLORIDE 300 MG: 200 TABLET, FILM COATED ORAL at 08:33

## 2022-05-15 RX ADMIN — FLUTICASONE PROPIONATE 2 SPRAY: 50 SPRAY, METERED NASAL at 08:33

## 2022-05-15 RX ADMIN — INSULIN LISPRO 1 UNITS: 100 INJECTION, SOLUTION INTRAVENOUS; SUBCUTANEOUS at 08:28

## 2022-05-15 RX ADMIN — UMECLIDINIUM 1 PUFF: 62.5 AEROSOL, POWDER ORAL at 08:28

## 2022-05-15 RX ADMIN — GUAIFENESIN 600 MG: 600 TABLET ORAL at 08:32

## 2022-05-15 NOTE — INCIDENTAL FINDINGS
The following findings require follow up:  Radiographic finding   Finding: Findings are suspicious for new mild right lower lobe pneumonia , Possible 1 4 cm nodule versus summation artifact in the vicinity of suspected pneumonia , Given the patient's smoking history a noncontrast CT is follow-up is advised when the patient is no longer acute to see if this finding persists in 2-4 weeks time     Follow up required:  Noncontrast CT chest   Follow up should be done within 2-4 week(s)    Please notify the following clinician to assist with the follow up:   Dr Haley Hilario

## 2022-05-15 NOTE — PLAN OF CARE
Problem: PAIN - ADULT  Goal: Verbalizes/displays adequate comfort level or baseline comfort level  Description: Interventions:  - Encourage patient to monitor pain and request assistance  - Assess pain using appropriate pain scale  - Administer analgesics based on type and severity of pain and evaluate response  - Implement non-pharmacological measures as appropriate and evaluate response  - Consider cultural and social influences on pain and pain management  - Notify physician/advanced practitioner if interventions unsuccessful or patient reports new pain  Outcome: Progressing     Problem: INFECTION - ADULT  Goal: Absence or prevention of progression during hospitalization  Description: INTERVENTIONS:  - Assess and monitor for signs and symptoms of infection  - Monitor lab/diagnostic results  - Monitor all insertion sites, i e  indwelling lines, tubes, and drains  - Monitor endotracheal if appropriate and nasal secretions for changes in amount and color  - [place] appropriate cooling/warming therapies per order  - Administer medications as ordered  - Instruct and encourage patient and family to use good hand hygiene technique  - Identify and instruct in appropriate isolation precautions for identified infection/condition  Outcome: Progressing  Goal: Absence of fever/infection during neutropenic period  Description: INTERVENTIONS:  - Monitor WBC    Outcome: Progressing     Problem: Prexisting or High Potential for Compromised Skin Integrity  Goal: Skin integrity is maintained or improved  Description: INTERVENTIONS:  - Identify patients at risk for skin breakdown  - Assess and monitor skin integrity  - Assess and monitor nutrition and hydration status  - Monitor labs   - Assess for incontinence   - Turn and reposition patient  - Assist with mobility/ambulation  - Relieve pressure over bony prominences  - Avoid friction and shearing  - Provide appropriate hygiene as needed including keeping skin clean and dry  - Evaluate need for skin moisturizer/barrier cream  - Collaborate with interdisciplinary team   - Patient/family teaching  - Consider wound care consult   Outcome: Progressing

## 2022-05-15 NOTE — ASSESSMENT & PLAN NOTE
Lab Results   Component Value Date    HGBA1C 6 0 (H) 03/15/2022       Recent Labs     05/14/22  1627 05/14/22  1834 05/14/22  2104 05/15/22  0729   POCGLU 143* 194* 165* 167*       Blood Sugar Average: Last 72 hrs:  · (P) 812 [id number]   · Patient on metformin at home - will hold while inpatient, can resume on discharge  · SSI ACHS  · Diabetic diet

## 2022-05-15 NOTE — ASSESSMENT & PLAN NOTE
Lab Results   Component Value Date    EGFR 24 05/15/2022    EGFR 22 05/14/2022    EGFR 23 05/13/2022    CREATININE 2 30 (H) 05/15/2022    CREATININE 2 45 (H) 05/14/2022    CREATININE 2 44 (H) 05/13/2022   · Patient with baseline creatinine in 2 2-2 3  · Will continue to monitor in setting of need for diuresis  · Avoid hypotension, nephrotoxic agents, NSAIDs

## 2022-05-15 NOTE — ASSESSMENT & PLAN NOTE
· Follows with Dr Treasure Franklin   · Apply Mupirocin ointment to wound site  · Cover with [facility] WITH BORDER  · CHANGE 3XWEEK

## 2022-05-15 NOTE — ASSESSMENT & PLAN NOTE
· Does not use bipap or cpap  · Home oxygen PRN he had for 5 years - taken away given repeat 6 min walk did not show desat  · Repeat home oxygen study prior to discharge  · Qualifies for home O2 on DC  · Needs sleep study outpt

## 2022-05-15 NOTE — CASE MANAGEMENT
Case Management Progress Note    Patient name Pamela Grant  Location /-76 MRN [id number]  : 1935 Date 5/15/2022       LOS (days): 4  Geometric Mean LOS (GMLOS) (days): 3 80  Days to Ambrocio 21:        Current admission status: Inpatient  Preferred Pharmacy:   [facility], [address] [place] [address]  Phone: [phone] Fax: [phone]    Primary Care Provider: Zahraa Godwin MD    Primary Insurance: [employer] [facility]  Secondary Insurance:     PROGRESS NOTE:  CM notified of need for home oxygen  Referral sent to [facility] via [place]  Spoke with Pt  Pt reports that he has had oxygen through [facility] in past  Per Kisha Vasques at [facility], portable tank can be given to Pt as scheduling as been in contact with family for delivery of concentrator

## 2022-05-16 NOTE — UTILIZATION REVIEW
Notification of Discharge   This is a Notification of Discharge from our facility [address]  Please be advised that this patient has been discharge from our facility  Below you will find the admission and discharge date and time including the patients disposition  UTILIZATION REVIEW CONTACT:  Brian Gupta  Utilization   Network Utilization Review Department  Phone: [phone] carefully listen to the prompts  All voicemails are confidential   Email: [email or web address]     PHYSICIAN ADVISORY SERVICES:  FOR VJ[facility]-RE-NSER REVIEW - MEDICAL NECESSITY DENIAL  Phone: [phone]  Fax: [phone]  Email: [email or web address]     PRESENTATION DATE: 5/11/2022  3:55 PM  OBERVATION ADMISSION DATE:   INPATIENT ADMISSION DATE: 5/11/22  6:30 PM   DISCHARGE DATE: 5/15/2022  1:26 PM  DISPOSITION: Home/Self Care Home/Self Care      IMPORTANT INFORMATION:  Send all requests for admission clinical reviews, approved or denied determinations and any other requests to dedicated fax number below belonging to the campus where the patient is receiving treatment   List of dedicated fax numbers:  [address] DENIALS (Administrative/Medical Necessity) [phone]   [address] (Maternity/NICU/Pediatrics) [phone]   David Rutherford [phone]   130 [facility] [phone]   [address] [place] [address] [phone]   2000 [facility] [address] [place] [address],[address] [phone]   [facility]  [phone]   2205 [facility] [address]  2401 [facility] 1000 [facility] [phone]

## 2022-05-17 ENCOUNTER — TRANSITIONAL CARE MANAGEMENT (OUTPATIENT)
Dept: FAMILY MEDICINE CLINIC | Facility: HOSPITAL | Age: 87
End: 2022-05-17

## 2022-05-18 ENCOUNTER — APPOINTMENT (OUTPATIENT)
Dept: LAB | Facility: HOSPITAL | Age: 87
End: 2022-05-18
Payer: COMMERCIAL

## 2022-05-18 DIAGNOSIS — E11.22 TYPE 2 DIABETES MELLITUS WITH STAGE 3 CHRONIC KIDNEY DISEASE, WITHOUT LONG-TERM CURRENT USE OF INSULIN, UNSPECIFIED WHETHER STAGE 3A OR 3B CKD (HCC): ICD-10-CM

## 2022-05-18 DIAGNOSIS — N18.30 TYPE 2 DIABETES MELLITUS WITH STAGE 3 CHRONIC KIDNEY DISEASE, WITHOUT LONG-TERM CURRENT USE OF INSULIN, UNSPECIFIED WHETHER STAGE 3A OR 3B CKD (HCC): ICD-10-CM

## 2022-05-18 DIAGNOSIS — I50.32 CHRONIC DIASTOLIC CONGESTIVE HEART FAILURE (HCC): ICD-10-CM

## 2022-05-18 LAB
ANION GAP SERPL CALCULATED.3IONS-SCNC: 8 MMOL/L (ref 4–13)
BUN SERPL-MCNC: 54 MG/DL (ref 5–25)
CALCIUM SERPL-MCNC: 9 MG/DL (ref 8.3–10.1)
CHLORIDE SERPL-SCNC: 107 MMOL/L (ref 100–108)
CO2 SERPL-SCNC: 21 MMOL/L (ref 21–32)
CREAT SERPL-MCNC: 2.63 MG/DL (ref 0.6–1.3)
GFR SERPL CREATININE-BSD FRML MDRD: 21 ML/MIN/1.73SQ M
GLUCOSE P FAST SERPL-MCNC: 167 MG/DL (ref 65–99)
POTASSIUM SERPL-SCNC: 5 MMOL/L (ref 3.5–5.3)
SODIUM SERPL-SCNC: 136 MMOL/L (ref 136–145)

## 2022-05-18 PROCEDURE — 36415 COLL VENOUS BLD VENIPUNCTURE: CPT

## 2022-05-18 PROCEDURE — 80048 BASIC METABOLIC PNL TOTAL CA: CPT

## 2022-05-20 ENCOUNTER — TELEPHONE (OUTPATIENT)
Dept: CARDIOLOGY CLINIC | Facility: CLINIC | Age: 87
End: 2022-05-20

## 2022-05-20 NOTE — TELEPHONE ENCOUNTER
Let us increase his Bumex to 3 mg twice daily  Can we have him seen sooner with Gerry Torres? ?   Thank you

## 2022-05-20 NOTE — TELEPHONE ENCOUNTER
P/C discharged from hospital on Sunday    Wt 289 00 LBS today  (When he left hospital)  00 LBS  Sob on exertion only  Denies chest pains  Right and Left edema on LL/ankles Right worse than left  Watching salt intake    Bumex 2 mg bid  Potassium 20 meq bid  Metolazone 2 5 mg as directed after speaking with cardiology  Bun 54, creat 2 63  Appt in June with cardiology  Appt Monday with pcp      Please advise

## 2022-05-20 NOTE — TELEPHONE ENCOUNTER
Called and spoke to pt and advised   increase his Bumex to 3 mg twice daily  Also advised will get in with norma sooner

## 2022-05-23 ENCOUNTER — OFFICE VISIT (OUTPATIENT)
Dept: FAMILY MEDICINE CLINIC | Facility: HOSPITAL | Age: 87
End: 2022-05-23
Payer: COMMERCIAL

## 2022-05-23 VITALS
OXYGEN SATURATION: 96 % | SYSTOLIC BLOOD PRESSURE: 132 MMHG | WEIGHT: 285 LBS | HEART RATE: 83 BPM | BODY MASS INDEX: 39.9 KG/M2 | HEIGHT: 71 IN | DIASTOLIC BLOOD PRESSURE: 78 MMHG

## 2022-05-23 DIAGNOSIS — E11.22 TYPE 2 DIABETES MELLITUS WITH STAGE 3 CHRONIC KIDNEY DISEASE, WITHOUT LONG-TERM CURRENT USE OF INSULIN, UNSPECIFIED WHETHER STAGE 3A OR 3B CKD (HCC): ICD-10-CM

## 2022-05-23 DIAGNOSIS — I50.31 ACUTE DIASTOLIC CONGESTIVE HEART FAILURE (HCC): Primary | ICD-10-CM

## 2022-05-23 DIAGNOSIS — R60.0 LOCALIZED EDEMA: ICD-10-CM

## 2022-05-23 DIAGNOSIS — I50.32 CHRONIC DIASTOLIC CONGESTIVE HEART FAILURE (HCC): ICD-10-CM

## 2022-05-23 DIAGNOSIS — N18.30 TYPE 2 DIABETES MELLITUS WITH STAGE 3 CHRONIC KIDNEY DISEASE, WITHOUT LONG-TERM CURRENT USE OF INSULIN, UNSPECIFIED WHETHER STAGE 3A OR 3B CKD (HCC): ICD-10-CM

## 2022-05-23 DIAGNOSIS — I50.33 ACUTE ON CHRONIC DIASTOLIC (CONGESTIVE) HEART FAILURE (HCC): ICD-10-CM

## 2022-05-23 DIAGNOSIS — N18.4 CHRONIC RENAL DISEASE, STAGE IV (HCC): ICD-10-CM

## 2022-05-23 PROBLEM — R65.10 SIRS (SYSTEMIC INFLAMMATORY RESPONSE SYNDROME) (HCC): Status: RESOLVED | Noted: 2022-05-12 | Resolved: 2022-05-23

## 2022-05-23 PROCEDURE — 99495 TRANSJ CARE MGMT MOD F2F 14D: CPT | Performed by: INTERNAL MEDICINE

## 2022-05-23 RX ORDER — POTASSIUM CHLORIDE 20 MEQ/1
20 TABLET, EXTENDED RELEASE ORAL DAILY
Qty: 30 TABLET | Refills: 5 | Status: SHIPPED | OUTPATIENT
Start: 2022-05-23 | End: 2022-07-14

## 2022-05-23 RX ORDER — BUMETANIDE 2 MG/1
3 TABLET ORAL 2 TIMES DAILY
Qty: 90 TABLET | Refills: 3 | Status: SHIPPED | OUTPATIENT
Start: 2022-05-23 | End: 2022-06-23

## 2022-05-23 NOTE — PROGRESS NOTES
Assessment/Plan:      Diagnoses and all orders for this visit:    Acute diastolic congestive heart failure ([address] )    Localized edema    Chronic renal disease, stage IV ([facility])  -     Basic metabolic panel; Future    Type 2 diabetes mellitus with stage 3 chronic kidney disease, without long-term current use of insulin, unspecified whether stage 3a or 3b CKD (HCC)    Chronic diastolic congestive heart failure (HCC)  -     bumetanide (BUMEX) 2 mg tablet; Take 1 5 tablets (3 mg total) by mouth in the morning and 1 5 tablets (3 mg total) in the evening  Acute on chronic diastolic (congestive) heart failure ([facility])  -     potassium chloride (K-DUR,KLOR-CON) 20 mEq tablet; Take 1 tablet (20 mEq total) by mouth in the morning  Subjective:     Patient ID: Domenica Barboza is a 81 y o  male  HPI     Patient was seen back after being discharged from the hospital   He acute on chronic congestive heart failure diastolic in nature  Osseous patient also has severe COPD  The patient was discharged on oxygen  Is watching fluid intake  Pt was discharged on O2  Does not have it here  Needs to get on O2  R heel lesion-has WC apt next week  Review of Systems   All other systems reviewed and are negative  Objective:     Physical Exam  Vitals reviewed  Constitutional:       Appearance: Normal appearance  Cardiovascular:      Rate and Rhythm: Normal rate and regular rhythm  Heart sounds: Normal heart sounds  Pulmonary:      Effort: Pulmonary effort is normal       Comments: Decreased breath sounds no wheezes  Musculoskeletal:      Comments: Plus two edema-lesion on the back of the right heel without evidence of infection   Neurological:      Mental Status: He is alert             Vitals:    05/23/22 1323   BP: 132/78   Pulse: 83   SpO2: 96%   Weight: 129 kg (285 lb)   Height: 5' 11" (1 803 m)       Wt Readings from Last 3 Encounters:   05/23/22 129 kg (285 lb)   05/15/22 129 kg (285 lb 7 9 oz) 04/20/22 132 kg (290 lb)       Transitional Care Management Review:  James Meng is a 81 y o  male here for TCM follow up  During the TCM phone call patient stated:    TCM Call (since 4/22/2022)     Date and time call was made  5/17/2022  2:58 PM    Hospital care reviewed  Records reviewed    Patient was hospitialized at  [facility]  [facility]    Date of Admission  05/11/22    Date of discharge  05/15/22    Diagnosis  Acute diastolic CHF    Disposition  Home    Were the patients medications reviewed and updated  Yes    Current Symptoms  None      TCM Call (since 4/22/2022)     Post hospital issues  None    Should patient be enrolled in anticoag monitoring? No    Scheduled for follow up? Yes    Did you obtain your prescribed medications  Yes    Do you need help managing your prescriptions or medications  No    Is transportation to your appointment needed  No    I have advised the patient to call PCP with any new or worsening symptoms  Milly Aburto MA - [address] or [facility] other    Support System  Spouse; Friends; Family    Are you recieving any outpatient services  No    Are you recieving home care services  No          Patient has appoint with cardiology in about a week and a half and will see back in 4 weeks  Wound care next week  Yolis Serra MD    Some or all of this note was generated with a voice recognition dictation system and therefore my contain grammatical or spelling errors

## 2022-05-23 NOTE — PATIENT INSTRUCTIONS
Get blood test in 1 week    Continue to keep fluids under 2 quarts per day    Keep oxygen on this much of the days she possibly can    Decreased potassium to once daily

## 2022-05-24 ENCOUNTER — TELEPHONE (OUTPATIENT)
Dept: FAMILY MEDICINE CLINIC | Facility: HOSPITAL | Age: 87
End: 2022-05-24

## 2022-05-24 DIAGNOSIS — I50.31 ACUTE DIASTOLIC CONGESTIVE HEART FAILURE (HCC): Primary | ICD-10-CM

## 2022-05-24 DIAGNOSIS — N18.4 CHRONIC RENAL DISEASE, STAGE IV (HCC): ICD-10-CM

## 2022-05-24 RX ORDER — METOLAZONE 2.5 MG/1
2.5 TABLET ORAL DAILY
Qty: 8 TABLET | Refills: 0 | Status: SHIPPED | OUTPATIENT
Start: 2022-05-24 | End: 2022-05-31 | Stop reason: SDUPTHER

## 2022-05-24 NOTE — TELEPHONE ENCOUNTER
Pt reports increased swelling in right leg and blistering above ankle  Should he go back to 1 pill instead  Was not very prevalent yesterday at appt  Would like to know what he should do  Also is not outputting a lot of urine   PCB

## 2022-05-24 NOTE — TELEPHONE ENCOUNTER
Pt states that since increasing the Bumex on 05/20/2022, he believes his leg swelling is getting worse  The swelling starts at his ankles and goes 3-4 inches above his calf  Right leg is worse than left, for swelling and blisters  There are "over a dozen" blisters on his right leg    less on his left  He states that when he pushes on the swelling, there is a finger print indent, but goes away after a couple seconds  Yesterdays weight was 286lb  His SOB is unchanged  Pt has also reached out to his PCP with concerns  Please advise

## 2022-05-25 ENCOUNTER — TELEPHONE (OUTPATIENT)
Dept: WOUND CARE | Facility: HOSPITAL | Age: 87
End: 2022-05-25

## 2022-05-25 NOTE — TELEPHONE ENCOUNTER
Patient called to report that he has blisters on his lower legs  Reports the he recently started to have more swelling to lower extremities  Patient reports that he did contact Dr Lorraine Martínez about his medications and adjustments were made  Patient wanted to know what to do with the blisters until his appointment  Patient advised to leave the blisters intact, cover with gauze and continue to wear compression stockings and elevate lower legs  Patient will call back if anything changes

## 2022-05-31 ENCOUNTER — OFFICE VISIT (OUTPATIENT)
Dept: FAMILY MEDICINE CLINIC | Facility: HOSPITAL | Age: 87
End: 2022-05-31
Payer: COMMERCIAL

## 2022-05-31 ENCOUNTER — APPOINTMENT (OUTPATIENT)
Dept: LAB | Facility: HOSPITAL | Age: 87
End: 2022-05-31
Attending: INTERNAL MEDICINE
Payer: COMMERCIAL

## 2022-05-31 VITALS
WEIGHT: 279 LBS | OXYGEN SATURATION: 95 % | DIASTOLIC BLOOD PRESSURE: 78 MMHG | HEIGHT: 71 IN | SYSTOLIC BLOOD PRESSURE: 125 MMHG | BODY MASS INDEX: 39.06 KG/M2 | HEART RATE: 87 BPM

## 2022-05-31 DIAGNOSIS — N18.4 CHRONIC RENAL DISEASE, STAGE IV (HCC): ICD-10-CM

## 2022-05-31 DIAGNOSIS — I50.32 CHRONIC DIASTOLIC CONGESTIVE HEART FAILURE (HCC): ICD-10-CM

## 2022-05-31 DIAGNOSIS — J44.9 MODERATE COPD (CHRONIC OBSTRUCTIVE PULMONARY DISEASE) (HCC): Primary | ICD-10-CM

## 2022-05-31 DIAGNOSIS — R60.0 LOCALIZED EDEMA: ICD-10-CM

## 2022-05-31 DIAGNOSIS — I50.31 ACUTE DIASTOLIC CONGESTIVE HEART FAILURE (HCC): ICD-10-CM

## 2022-05-31 DIAGNOSIS — N18.30 TYPE 2 DIABETES MELLITUS WITH STAGE 3 CHRONIC KIDNEY DISEASE, WITHOUT LONG-TERM CURRENT USE OF INSULIN, UNSPECIFIED WHETHER STAGE 3A OR 3B CKD (HCC): ICD-10-CM

## 2022-05-31 DIAGNOSIS — I10 ESSENTIAL HYPERTENSION: Primary | ICD-10-CM

## 2022-05-31 DIAGNOSIS — E11.22 TYPE 2 DIABETES MELLITUS WITH STAGE 3 CHRONIC KIDNEY DISEASE, WITHOUT LONG-TERM CURRENT USE OF INSULIN, UNSPECIFIED WHETHER STAGE 3A OR 3B CKD (HCC): ICD-10-CM

## 2022-05-31 LAB
ANION GAP SERPL CALCULATED.3IONS-SCNC: 5 MMOL/L (ref 4–13)
BUN SERPL-MCNC: 66 MG/DL (ref 5–25)
CALCIUM SERPL-MCNC: 10 MG/DL (ref 8.3–10.1)
CHLORIDE SERPL-SCNC: 100 MMOL/L (ref 100–108)
CO2 SERPL-SCNC: 31 MMOL/L (ref 21–32)
CREAT SERPL-MCNC: 2.55 MG/DL (ref 0.6–1.3)
EST. AVERAGE GLUCOSE BLD GHB EST-MCNC: 146 MG/DL
GFR SERPL CREATININE-BSD FRML MDRD: 21 ML/MIN/1.73SQ M
GLUCOSE P FAST SERPL-MCNC: 180 MG/DL (ref 65–99)
HBA1C MFR BLD: 6.7 %
POTASSIUM SERPL-SCNC: 3.5 MMOL/L (ref 3.5–5.3)
SODIUM SERPL-SCNC: 136 MMOL/L (ref 136–145)

## 2022-05-31 PROCEDURE — 36415 COLL VENOUS BLD VENIPUNCTURE: CPT

## 2022-05-31 PROCEDURE — 1036F TOBACCO NON-USER: CPT | Performed by: INTERNAL MEDICINE

## 2022-05-31 PROCEDURE — 83036 HEMOGLOBIN GLYCOSYLATED A1C: CPT

## 2022-05-31 PROCEDURE — 99214 OFFICE O/P EST MOD 30 MIN: CPT | Performed by: INTERNAL MEDICINE

## 2022-05-31 PROCEDURE — 1160F RVW MEDS BY RX/DR IN RCRD: CPT | Performed by: INTERNAL MEDICINE

## 2022-05-31 PROCEDURE — 80048 BASIC METABOLIC PNL TOTAL CA: CPT

## 2022-05-31 PROCEDURE — 3078F DIAST BP <80 MM HG: CPT | Performed by: INTERNAL MEDICINE

## 2022-05-31 PROCEDURE — 3074F SYST BP LT 130 MM HG: CPT | Performed by: INTERNAL MEDICINE

## 2022-05-31 RX ORDER — METOLAZONE 2.5 MG/1
2.5 TABLET ORAL DAILY
Qty: 25 TABLET | Refills: 3 | Status: SHIPPED | OUTPATIENT
Start: 2022-05-31

## 2022-05-31 NOTE — PROGRESS NOTES
Assessment/Plan:       Diagnoses and all orders for this visit:    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)    Localized edema    Chronic renal disease, stage IV (HCC)    Chronic diastolic congestive heart failure (HCC)    Acute diastolic congestive heart failure (HCC)  -     metolazone (ZAROXOLYN) 2 5 mg tablet; Take 1 tablet (2 5 mg total) by mouth daily          All of the above diagnoses have been assessed  Additional COMMENTS/PLAN:  Patient is appoint with wound care tomorrow  Hopefully they will place some wraps  Patient may eventually need lymphedema pumps  Need to assess his renal function which is pending  May need to back off diuretics if this is worsening  Subjective:      Patient ID: Camilo Brandon is a 81 y o  male  HPI     The edema is better  Has apt in [facility] tomorrow  Has been using O2 more  The following portions of the patient's history were revised and updated as appropriate: Problem list, allergies, med list, FH, SH, Past medical and surgical histories  Current Outpatient Medications   Medication Sig Dispense Refill    albuterol (ProAir HFA) 90 mcg/act inhaler Inhale 2 puffs every 4 (four) hours as needed for wheezing 18 g 3    bumetanide (BUMEX) 2 mg tablet Take 1 5 tablets (3 mg total) by mouth in the morning and 1 5 tablets (3 mg total) in the evening  90 tablet 3    fluticasone (FLONASE) 50 mcg/act nasal spray SPRAY 1 SPRAY INTO EACH NOSTRIL TWICE A DAY 48 mL 1    fluticasone-umeclidinium-vilanterol (Trelegy Ellipta) 200-62 5-25 MCG/INH AEPB inhaler Inhale 1 puff daily Rinse mouth after use   180 blister 3    glucose blood (OneTouch Verio) test strip Use one new strip daily DX:E11 9 100 strip 3    labetalol (NORMODYNE) 300 mg tablet Take 1 tablet (300 mg total) by mouth 2 (two) times a day 180 tablet 3    Melatonin 5 MG CAPS Take 5 mg by mouth daily at bedtime as needed      metFORMIN (GLUCOPHAGE) 500 mg tablet TAKE 1 TABLET (500 MG TOTAL) BY MOUTH 2 (TWO) TIMES A DAY WITH MEALS 180 tablet 0    metolazone (ZAROXOLYN) 2 5 mg tablet Take 1 tablet (2 5 mg total) by mouth daily 25 tablet 3    mupirocin (BACTROBAN) 2 % ointment Apply topically in the morning Daily, when dressing is changed, 3x per week 22 g 0    potassium chloride (K-DUR,KLOR-CON) 20 mEq tablet Take 1 tablet (20 mEq total) by mouth in the morning  30 tablet 5    tamsulosin (FLOMAX) 0 4 mg TAKE 1 CAPSULE BY MOUTH EVERY DAY WITH DINNER 90 capsule 3     No current facility-administered medications for this visit  Review of Systems   All other systems reviewed and are negative  Objective:    /78   Pulse 87   Ht 5' 11" (1 803 m)   Wt 127 kg (279 lb)   SpO2 95%   BMI 38 91 kg/m²     BP Readings from Last 3 Encounters:   05/31/22 125/78   05/23/22 132/78   05/15/22 130/70                  Wt Readings from Last 3 Encounters:   05/31/22 127 kg (279 lb)   05/23/22 129 kg (285 lb)   05/15/22 129 kg (285 lb 7 9 oz)         Physical Exam  Vitals reviewed  Constitutional:       Appearance: He is obese  Cardiovascular:      Rate and Rhythm: Normal rate and regular rhythm  Pulmonary:      Effort: Pulmonary effort is normal       Comments: Decreased breath sound  Musculoskeletal:      Comments: Plus two edema with lesions right leg have dressings on  No evidence of cellulitis   Neurological:      Mental Status: He is alert  Appointment on 05/18/2022   Component Date Value Ref Range Status    Sodium 05/18/2022 136  136 - 145 mmol/L Final    Potassium 05/18/2022 5 0  3 5 - 5 3 mmol/L Final    Slightly Hemolyzed;  Results May be Affected    Chloride 05/18/2022 107  100 - 108 mmol/L Final    CO2 05/18/2022 21  21 - 32 mmol/L Final    ANION GAP 05/18/2022 8  4 - 13 mmol/L Final    BUN 05/18/2022 54 (A) 5 - 25 mg/dL Final    Creatinine 05/18/2022 2 63 (A) 0 60 - 1 30 mg/dL Final    Standardized to IDMS reference method    Glucose, Fasting 05/18/2022 167 (A) 65 - 99 mg/dL Final    Specimen collection should occur prior to Sulfasalazine administration due to the potential for falsely depressed results  Specimen collection should occur prior to Sulfapyridine administration due to the potential for falsely elevated results   Calcium 05/18/2022 9 0  8 3 - 10 1 mg/dL Final    eGFR 05/18/2022 21  ml/min/1 73sq m Final         Zahraa Godwin MD    Some or all of this note was generated with a voice recognition dictation system and therefore my contain grammatical or spelling errors

## 2022-06-01 ENCOUNTER — OFFICE VISIT (OUTPATIENT)
Dept: WOUND CARE | Facility: HOSPITAL | Age: 87
End: 2022-06-01
Payer: COMMERCIAL

## 2022-06-01 VITALS
RESPIRATION RATE: 20 BRPM | TEMPERATURE: 96.4 F | DIASTOLIC BLOOD PRESSURE: 72 MMHG | HEART RATE: 80 BPM | SYSTOLIC BLOOD PRESSURE: 120 MMHG

## 2022-06-01 DIAGNOSIS — I83.018 VENOUS STASIS ULCER OF OTHER PART OF RIGHT LOWER LEG LIMITED TO BREAKDOWN OF SKIN, UNSPECIFIED WHETHER VARICOSE VEINS PRESENT (HCC): ICD-10-CM

## 2022-06-01 DIAGNOSIS — I83.014: Primary | ICD-10-CM

## 2022-06-01 DIAGNOSIS — L97.411: Primary | ICD-10-CM

## 2022-06-01 DIAGNOSIS — E11.40 TYPE 2 DIABETES MELLITUS WITH DIABETIC NEUROPATHY, WITHOUT LONG-TERM CURRENT USE OF INSULIN (HCC): ICD-10-CM

## 2022-06-01 DIAGNOSIS — L97.811 VENOUS STASIS ULCER OF OTHER PART OF RIGHT LOWER LEG LIMITED TO BREAKDOWN OF SKIN, UNSPECIFIED WHETHER VARICOSE VEINS PRESENT (HCC): ICD-10-CM

## 2022-06-01 PROCEDURE — 99213 OFFICE O/P EST LOW 20 MIN: CPT | Performed by: PODIATRIST

## 2022-06-01 NOTE — PATIENT INSTRUCTIONS
Orders Placed This Encounter   Procedures    Wound cleansing and dressings     Wound cleansing and dressings      Wound care to right heel and right lower leg     Wash your hands with soap and water  Remove old dressing, discard into plastic bag and place in trash  Cleanse the wound with soap and water prior to applying a clean dressing  Do not use tissue or cotton balls  Do not scrub the wound  Pat dry using gauze  Shower YES  Apply MOISTURIZER to skin surrounding wound  Cover with ALLEVYN WITH BORDER  CHANGE 3XWEEK     Done today at [facility]       Return in 2 weeks        Standing Status:   Future     Standing Expiration Date:   6/1/2023

## 2022-06-01 NOTE — TELEPHONE ENCOUNTER
IOP was able to get answers from his PCP  I have been off for the last week and half, so I am not sure how this task came to me and not to 1 of the providers in the office  If symptoms have not been resolved he probably should be seen in our office  Thank you

## 2022-06-01 NOTE — PROGRESS NOTES
Patient ID: Alejandra Villegas is a 81 y o  male Date of Birth 1935     Chief Complaint  Chief Complaint   Patient presents with    Follow Up Wound Care Visit     Left heel wound       Allergies  Patient has no known allergies  Assessment:    VSU of right heel limited to breakdown of skin, unspecified whether varicose veins present (HCC)  -     Wound cleansing and dressings; Future    VSU of other part of right lower leg limited to breakdown of skin, unspecified whether varicose veins present (HCC)  -     Wound cleansing and dressings; Future    Type 2 diabetes mellitus with diabetic neuropathy, without long-term current use of insulin ([facility] [address] )       Procedures    Plan:  · Patient advised to continue with his increased diuretic dosage, and trying to elevate his legs especially his right as much as he can at home  · Instructed to avoid sitting with legs dependent  · Follow-up 2 weeks         Wound 04/20/22 Pressure Injury Ankle Left;Lateral (Active)   Wound Image Images linked 06/01/22 0909   Wound Description Eschar 06/01/22 0909   Linnette-wound Assessment Intact 06/01/22 0909   Wound Length (cm) 0 5 cm 06/01/22 0909   Wound Width (cm) 0 7 cm 06/01/22 0909   Wound Depth (cm) 0 cm 06/01/22 0909   Wound Surface Area (cm^2) 0 35 cm^2 06/01/22 0909   Wound Volume (cm^3) 0 cm^3 06/01/22 0909   Calculated Wound Volume (cm^3) 0 cm^3 06/01/22 0909   Change in Wound Size % 100 06/01/22 0909   Drainage Amount None 06/01/22 0909   Non-staged Wound Description Not applicable 28/46/43 3520   Dressing Status Intact 06/01/22 0909       Wound 06/01/22 Venous Ulcer Leg Right;Lateral (Active)   Wound Image Images linked 06/01/22 0911   Wound Description Granulation tissue 06/01/22 0911   Linnette-wound Assessment Edema;Pink 06/01/22 0911   Wound Length (cm) 0 5 cm 06/01/22 0911   Wound Width (cm) 0 5 cm 06/01/22 0911   Wound Depth (cm) 0 1 cm 06/01/22 0911   Wound Surface Area (cm^2) 0 25 cm^2 06/01/22 0911   Wound Volume (cm^3) 0 025 cm^3 06/01/22 0911   Calculated Wound Volume (cm^3) 0 03 cm^3 06/01/22 0911   Drainage Amount Moderate 06/01/22 0911   Drainage Description Serosanguineous 06/01/22 0911   Non-staged Wound Description Full thickness 06/01/22 0911   Dressing Status Intact 06/01/22 0911       Wound 06/01/22 Venous Ulcer Heel Right (Active)   Wound Image Images linked 06/01/22 1002   Wound Description Granulation tissue; Epithelialization 06/01/22 0958   Linnette-wound Assessment Dry 06/01/22 0958   Wound Length (cm) 1 cm 06/01/22 0958   Wound Width (cm) 0 5 cm 06/01/22 0958   Wound Depth (cm) 0 1 cm 06/01/22 0958   Wound Surface Area (cm^2) 0 5 cm^2 06/01/22 0958   Wound Volume (cm^3) 0 05 cm^3 06/01/22 0958   Calculated Wound Volume (cm^3) 0 05 cm^3 06/01/22 0958   Drainage Amount Moderate 06/01/22 0958   Drainage Description Serosanguineous 06/01/22 0958   Non-staged Wound Description Full thickness 06/01/22 0958   Treatments Cleansed 06/01/22 0958   Wound packed? No 06/01/22 0958   Dressing Changed Changed 06/01/22 0958   Patient Tolerance Tolerated well 06/01/22 0958   Dressing Status Intact 06/01/22 0958       Wound 04/20/22 Pressure Injury Ankle Left;Lateral (Active)   Date First Assessed/Time First Assessed: 04/20/22 1302   Primary Wound Type: Pressure Injury  Location: Ankle  Wound Location Orientation: Left;Lateral       Wound 06/01/22 Venous Ulcer Leg Right;Lateral (Active)   Date First Assessed/Time First Assessed: 06/01/22 0911   Primary Wound Type: Venous Ulcer  Location: Leg  Wound Location Orientation: Right;Lateral       Wound 06/01/22 Venous Ulcer Heel Right (Active)   Date First Assessed/Time First Assessed: 06/01/22 0958   Primary Wound Type: Venous Ulcer  Location: Heel  Wound Location Orientation: Right       [REMOVED] Wound 11/15/19 Laceration Toe (Comment  which one) Medial;Inner (Removed)   Resolved Date/Resolved Time: 04/20/22 1301  Date First Assessed/Time First Assessed: 11/15/19 1610   Pre-Existing Wound:  Yes Traumatic Wound Type: Laceration  Location: (c) Toe (Comment  which one)  Wound Location Orientation: Medial;Inner  Wound Krunal    [REMOVED] Wound 11/16/19 Toe (Comment  which one) Anterior (Removed)   Resolved Date/Resolved Time: 04/20/22 1301  Date First Assessed/Time First Assessed: 11/16/19 1800   Pre-Existing Wound: No  Location: (c) Toe (Comment  which one)  Wound Location Orientation: Anterior  Wound Description (Comments): Red wound base  In    [REMOVED] Wound 10/26/21 Leg Left (Removed)   Resolved Date/Resolved Time: 04/20/22 1301  Date First Assessed/Time First Assessed: 10/26/21 1057   Pre-Existing Wound: Yes  Location: Leg  Wound Location Orientation: Left  Wound Description (Comments): CHF weeping blister  Dressing Status: Old drai    [REMOVED] Wound 10/26/21 Other (Comment) Tibial Left;Posterior (Removed)   Resolved Date/Resolved Time: 04/20/22 1301  Date First Assessed/Time First Assessed: 10/26/21 1058   Pre-Existing Wound: Yes  Traumatic Wound Type: (c) Other (Comment)  Location: Tibial  Wound Location Orientation: Left;Posterior  Dressing Status: Minnie  [REMOVED] Wound 10/26/21 Pressure Injury Skin tear Heel Left (Removed)   Resolved Date/Resolved Time: 04/20/22 1301  Date First Assessed/Time First Assessed: 10/26/21 1059   Pre-Existing Wound: Yes  Primary Wound Type: Pressure Injury  Traumatic Wound Type: Skin tear  Location: Heel  Wound Location Orientation: Left  Wound    [REMOVED] Wound 10/26/21 Pressure Injury Heel Right (Removed)   Resolved Date/Resolved Time: 04/20/22 1302  Date First Assessed/Time First Assessed: 10/26/21 1100   Pre-Existing Wound: Yes  Primary Wound Type: Pressure Injury  Location: Heel  Wound Location Orientation: Right  Wound Description (Comments): deja           Subjective:           81year-old DM2 presents for follow-up evaluation of left heel ulcer, reports hospital admission after last wound care visit only minimally helped his swelling in his legs  They have adjusted his medications and he is taking increased dose of diuretic  States he is still short of breath with a lot of swelling  Denies any new pain or discomfort from either lower extremity  The following portions of the patient's history were reviewed and updated as appropriate: allergies, current medications, past family history, past medical history, past social history, past surgical history and problem list     Review of Systems   Constitutional: Negative for chills and fever  HENT: Negative for ear pain and sore throat  Eyes: Negative for pain and visual disturbance  Respiratory: Positive for shortness of breath  Negative for cough and chest tightness  Cardiovascular: Positive for leg swelling  Negative for chest pain and palpitations  Gastrointestinal: Negative for abdominal pain and vomiting  Genitourinary: Negative for dysuria and hematuria  Musculoskeletal: Negative for arthralgias and back pain  Skin: Positive for wound (Left heel/ankle wound notably improved, new area on right noted over the past week  [facility] )  Negative for color change and rash  Neurological: Positive for numbness  Negative for seizures and syncope  All other systems reviewed and are negative          Objective:       Wound 04/20/22 Pressure Injury Ankle Left;Lateral (Active)   Wound Image Images linked 06/01/22 0909   Wound Description Eschar 06/01/22 0909   Linnette-wound Assessment Intact 06/01/22 0909   Wound Length (cm) 0 5 cm 06/01/22 0909   Wound Width (cm) 0 7 cm 06/01/22 0909   Wound Depth (cm) 0 cm 06/01/22 0909   Wound Surface Area (cm^2) 0 35 cm^2 06/01/22 0909   Wound Volume (cm^3) 0 cm^3 06/01/22 0909   Calculated Wound Volume (cm^3) 0 cm^3 06/01/22 0909   Change in Wound Size % 100 06/01/22 0909   Drainage Amount None 06/01/22 0909   Non-staged Wound Description Not applicable 49/49/94 1672   Dressing Status Intact 06/01/22 0909       Wound 06/01/22 Venous Ulcer Leg Right;Lateral (Active)   Wound Image Images linked 06/01/22 0911   Wound Description Granulation tissue 06/01/22 0911   Linnette-wound Assessment Edema;Pink 06/01/22 0911   Wound Length (cm) 0 5 cm 06/01/22 0911   Wound Width (cm) 0 5 cm 06/01/22 0911   Wound Depth (cm) 0 1 cm 06/01/22 0911   Wound Surface Area (cm^2) 0 25 cm^2 06/01/22 0911   Wound Volume (cm^3) 0 025 cm^3 06/01/22 0911   Calculated Wound Volume (cm^3) 0 03 cm^3 06/01/22 0911   Drainage Amount Moderate 06/01/22 0911   Drainage Description Serosanguineous 06/01/22 0911   Non-staged Wound Description Full thickness 06/01/22 0911   Dressing Status Intact 06/01/22 0911       Wound 06/01/22 Venous Ulcer Heel Right (Active)   Wound Image Images linked 06/01/22 1002   Wound Description Granulation tissue; Epithelialization 06/01/22 0958   Linnette-wound Assessment Dry 06/01/22 0958   Wound Length (cm) 1 cm 06/01/22 0958   Wound Width (cm) 0 5 cm 06/01/22 0958   Wound Depth (cm) 0 1 cm 06/01/22 0958   Wound Surface Area (cm^2) 0 5 cm^2 06/01/22 0958   Wound Volume (cm^3) 0 05 cm^3 06/01/22 0958   Calculated Wound Volume (cm^3) 0 05 cm^3 06/01/22 0958   Drainage Amount Moderate 06/01/22 0958   Drainage Description Serosanguineous 06/01/22 0958   Non-staged Wound Description Full thickness 06/01/22 0958   Treatments Cleansed 06/01/22 0958   Wound packed? No 06/01/22 0958   Dressing Changed Changed 06/01/22 0958   Patient Tolerance Tolerated well 06/01/22 0958   Dressing Status Intact 06/01/22 0958       /72   Pulse 80   Temp (!) 96 4 °F (35 8 °C)   Resp 20     Physical Exam  Constitutional:       Appearance: Normal appearance  HENT:      Head: Normocephalic  Right Ear: Tympanic membrane normal       Left Ear: Tympanic membrane normal       Nose: No congestion  Mouth/Throat:      Mouth: Mucous membranes are moist       Pharynx: No oropharyngeal exudate or posterior oropharyngeal erythema     Eyes:      Extraocular Movements: Extraocular movements intact  Conjunctiva/sclera: Conjunctivae normal       Pupils: Pupils are equal, round, and reactive to light  Cardiovascular:      Rate and Rhythm: Normal rate and regular rhythm  Pulses:           Dorsalis pedis pulses are detected w/ Doppler on the right side and detected w/ Doppler on the left side  Posterior tibial pulses are detected w/ Doppler on the right side and detected w/ Doppler on the left side  Pulmonary:      Comments: Notably short of breath  Abdominal:      Palpations: Abdomen is soft  Musculoskeletal:      Right lower leg: Edema present  Left lower leg: Edema present  Right foot: Decreased range of motion  Left foot: Decreased range of motion  Feet:    Feet:      Right foot:      Protective Sensation: 8 sites tested  0 sites sensed  Skin integrity: Blister (Right ankle blister noted to be dried up ) present  No ulcer  Toenail Condition: Right toenails are abnormally thick  Fungal disease present  Left foot:      Protective Sensation: 8 sites tested  0 sites sensed  Skin integrity: Ulcer (Partial depth breakdown posterior lateral aspect of left heel consistent with stage II pressure ulcer has closed) and blister present  Toenail Condition: Left toenails are abnormally thick  Fungal disease present  Skin:     General: Skin is warm and dry  Capillary Refill: Capillary refill takes 2 to 3 seconds  Coloration: Skin is not pale  Findings: Wound (Right leg posterolateral ankle and left leg posterolateral calf have partial depth ulcerative breakdown consistent with venous hypertension ulcers  No evidence of infection, 100% granular) present  No bruising, erythema, lesion or rash  Neurological:      Mental Status: He is alert  Cranial Nerves: No cranial nerve deficit  Sensory: Sensory deficit present  Motor: No weakness        Gait: Gait normal       Deep Tendon Reflexes: Reflexes normal    Psychiatric: Mood and Affect: Mood normal          Behavior: Behavior normal          Judgment: Judgment normal            Wound Instructions:  Orders Placed This Encounter   Procedures    Wound cleansing and dressings     Wound cleansing and dressings      Wound care to right heel and right lower leg     Wash your hands with soap and water  Remove old dressing, discard into plastic bag and place in trash  Cleanse the wound with soap and water prior to applying a clean dressing  Do not use tissue or cotton balls  Do not scrub the wound  Pat dry using gauze  Shower YES  Apply MOISTURIZER to skin surrounding wound  Cover with ALLEVYN WITH BORDER  CHANGE 3XWEEK     Done today at [facility]     Return in 2 weeks        Standing Status:   Future     Standing Expiration Date:   6/1/2023        Diagnosis ICD-10-CM Associated Orders   1  Venous stasis ulcer of right heel limited to breakdown of skin, unspecified whether varicose veins present ([facility])  I83 014 Wound cleansing and dressings    [id number]    2   Venous stasis ulcer of other part of right lower leg limited to breakdown of skin, unspecified whether varicose veins present ([facility])  I83 018 Wound cleansing and dressings    L97 [id number]

## 2022-06-06 NOTE — PROGRESS NOTES
Cardiology  Heart Failure   Cardiology     Ian Cleary   81 y o    male   MRN: [id number]  [place] [address] [facility] [id number] [phone]    PCP: Kali Powell MD  Cardiologist :  Dr Nacho Jeter              Summary of Recommendations  Low-sodium diet, Heart failure education as below  Diagnostic Sleep study  He has a CT of the chest schedule for this Fri given an abnormal CXR  F/U pulmonolgy 7/21  Consider a pharm stress test if his CT is unrevrealing  Refer to nephrology- worsening CKD  Pt wonders why his chronic Prednisone ( 10 mg)  was DCd when in the hospital  Will defer to his PCP  Follow up will be scheduled with Dr Nacho Jeter 7/14        Impression/plan  Chronic diastolic heart failure, recent adm 5/11-5/15/22  Wt Readings from Last 3 Encounters:   06/07/22 127 kg (279 lb)   05/31/22 127 kg (279 lb)   05/23/22 129 kg (285 lb)     Wt Readings from Last 3 Encounters:   06/07/22 127 kg (279 lb)   05/31/22 127 kg (279 lb)   05/23/22 129 kg (285 lb)     --Beta-blocker:  Labetalol 300 mg b i d   --Diuretic: Bumex 3 mg BID since 5/20, metolazone 2 5 mg twice weekly ( since 5/24)  --DCd 5/15/22 on Bumex 2 mg b i d , metolazone 2 5 mg weekly  --prior: Torsemide 40 mg in the morning, 20 mg in the p m  for a total dose of 60 mg a day, adjusted November 5th from torsemide 40 b i d  Prior to that was on Lasix 80 in the a m  40 in the p m   --ACE/ARB/ARNI:    --2 g sodium diet, 1800 cc fluid restriction  Daily weights, call weight gain 2-3 lb in 1 day or 5 lb in 5 days  COPD, moderate on oxygen  He had been on prednisone  This was discontinued  Abnormal chest x-ray 5/11/22  Possible 1 4 cm nodule versus summation artifact  Possible right lower lobe pneumonia(clinically felt less likely, absence of symptoms, normal procalcitonin)  CT chest pending  Hypertension, essential  BP  110/64  loop diuretics, labetalol, metolazone    Also on tamsulosin  Hyperlipidemia  3/15/22:   Not currently on therapy  He would benefit given his diabetes; however consider age as a factor  CKD, stage IIIB-IV, baseline creatinine 1 2-1 9  More recently, higher  Last creatinine 2 5 BUN 66 5/31/22  Will leave diuretics as present  Refer to nephr- discussed with the pt  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  Hemoglobin A1c 6 7 5/31/22  Referred to wound care  CHI, likely  Now wearing oxygen since discharge  Sleep study recommended  Patient is agreeable  Morbid obesity, BMI 40  BPH on tamsulosin  Cardiac testing   TTE 10/22/21  EF 65%  Grade 1 DD  No significant valve disease  Right ventricular size and function is normal   Atria normal in size   TTE 5/12/22  EF 60%  Wall motion normal   Grade 1 DD  Mild MR  Mild TR  PASP 40 mm Hg, mildly increased                  HPI:   Camilo Brandon is a 81y o  year old male with diabetes mellitus obesity, chronic diastolic heart failure, hypertension, hyperlipidemia, COPD and CKD 3b  He does not follow with Cardiology  At baseline he has been on Lasix 80 mg in the morning, 40 mg in the p m  Admit 10/22-10/27/21 ( 6d)  Chief complaint: dyspnea on exertion, orthopnea, bilateral lower extremity edema, weeping blisters, progressively worsening over 1-2 months  Dx:  Acute hypoxic respiratory failure due to congestive heart failure  Rx for acute on chronic diastolic heart failure, weeping leg ulcers  Diuresed, followed by Cardiology  Discharge weight :  287 lb  Discharge creatinine:  2 0  Discharge diuretics: Torsemide 40 mg b i d Prior to admission: Lasix    11/9/21  Hospital follow-up  Overall, he is doing much better  He denies chest pain  His dyspnea is improved his lower extremity edema is improved  He can walk 50 ft before getting short of breath  He now has O2 p r n  He has chronic left greater than right lower extremity edema    He is performing weights, every other day approximately  His weight has been stable around 286 lb  /72  He is making some lifestyle changes, he is trying to reduce his salt his sugar and his carbohydrate  We discussed at length how to read food labels to facilitate adherence  He was discharged on torsemide 40 mg b i d  His labs November 5th indicated and MYCHAL with a creatinine of 2 18  Torsemide was adjusted, to a total dose of 60 mg a day  He has follow-up labs scheduled  12/28/21  OV Dr Marino Bourne  Blood pressure running high  He declined up titrating meds today  Sleep apnea not treated  Wt 297  Stable  No changes to his regimen  Recommend follow-up in 6 months    Adm 5/11-5/15/22  CC: Worsening shortness of breath and lower extremity edema  Followed by Cardiology  Found to be in decompensated heart failure  EKG :  Sinus rhythm  Nonspecific ST T-wave changes  Echo:EF 60%; grade 1 DD; mild MR  He was diuresed with Lasix 80 mg IV t i d   subsequently Bumex 2 mg IV b i d  Discharge weight :  278 lb  Discharge creatinine: 2 3  Discharge diuretics:  Bumex 2 mg b i d  plus metolazone2 5 weekly  In the past he had oxygen p r n  for 5 years  Was taken way given a repeat 6 minute walk test did not show desaturation   Repeat home oxygen study, now qualifies discharged on home O2  Recommend outpatient sleep study  CXR abnormal, recommend repeat 2- 4 weeks  No s/sx PNA- Abx stopped  procalcitonin neg    5/20/22 phone call from patient  Wt 289;TRIMBLE; LE edema  Taking metolazone 2 5 mg weekly  Last Creatinine 2 6 BUN 54 ( 5/18)  Bumex increased to 3 mg BID    5/31/22  OV PCP  Edema better  Weight 279 lb    6/7/22  Hospital follow-up   ROS:  His dyspnea on exertion has really progressed, and is very limiting, affecting his ADLs  Ad Kov[place] He denies chest pain, pressure, heaviness  He wears oxygen at night  His O2 sat in the office is 97% room air  BP controlled, 110/64  Weighs himself daily, is now stable at 279  Adhering to a salt restricted diet    Able to teach back how he is reading food labels  He tells me he is compliant with Bumex 3 mg b i d  and metolazone 2 5 mg twice weekly potassium 20 mEq b i d  Despite his diuretics and salt restricted diet, he still has 2+ bilateral lower extremity edema  Worsening renal function  He has a CT chest ordered to follow-up on abnormal chest x-ray 5/11/22--1 4 cm nodule versus summation artifact  84 pack-year history tobacco, quitting 1998  Overall, decompensating  Using a cane  He is not certain whether the increased diuretics is helping his breathing, although without his diuretics he would certainly have significant lower extremity edema  Today, we discussed diagnostic sleep study  He is agreeable  Will await findings of the CT scan  He has not had ischemic evaluation  He is agreeable to wait upon further testing until the results of the CT scan are known  He is curious why he is no longer on the prednisone 10 mg daily  I deferred him to his PCP  I made no change in his medical therapy  Refer to sleep medicine, referral to Nephrology  He will follow-up with his cardiologist in a short interval         I have spent 25 minutes with Patient  today in which greater than 50% of this time was spent in counseling/coordination of care regarding Intructions for management, Patient and family education, Importance of tx compliance, Risk factor reductions and Impressions  Assessment  Diagnoses and all orders for this visit:    Hospital discharge follow-up    Chronic diastolic congestive heart failure ([facility] )  -     Diagnostic Sleep Study; Future    CHI (obstructive sleep apnea)  -     Diagnostic Sleep Study; Future    Essential hypertension  -     Diagnostic Sleep Study; Future    Chronic renal disease, stage IV ([facility] )  -     Ambulatory Referral to Nephrology; Future    Morbid obesity due to excess calories [facility])  -     Diagnostic Sleep Study;  Future    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 disease, without long-term current use of insulin ([place] [address] )        Past Medical History:   Diagnosis Date    COPD (chronic obstructive pulmonary disease) ([place] [address] )     Diabetes mellitus ([place] [address] )     Herpes zoster     Hypertension     Mycoplasma pneumonia     Obesity     Osteoarthritis     Renal calculi        Review of Systems   Constitutional: Negative for chills  Cardiovascular: Positive for dyspnea on exertion and leg swelling  Negative for chest pain, claudication, cyanosis, irregular heartbeat, near-syncope, orthopnea, palpitations, paroxysmal nocturnal dyspnea and syncope  Respiratory: Negative for cough and shortness of breath  Gastrointestinal: Negative for heartburn and nausea  Neurological: Negative for dizziness, focal weakness, headaches, light-headedness and weakness  All other systems reviewed and are negative  No Known Allergies        Current Outpatient Medications:     albuterol (ProAir HFA) 90 mcg/act inhaler, Inhale 2 puffs every 4 (four) hours as needed for wheezing, Disp: 18 g, Rfl: 3    bumetanide (BUMEX) 2 mg tablet, Take 1 5 tablets (3 mg total) by mouth in the morning and 1 5 tablets (3 mg total) in the evening , Disp: 90 tablet, Rfl: 3    fluticasone (FLONASE) 50 mcg/act nasal spray, SPRAY 1 SPRAY INTO EACH NOSTRIL TWICE A DAY, Disp: 48 mL, Rfl: 1    fluticasone-umeclidinium-vilanterol (Trelegy Ellipta) 200-62 5-25 MCG/INH AEPB inhaler, Inhale 1 puff daily Rinse mouth after use , Disp: 180 blister, Rfl: 3    glucose blood (OneTouch Verio) test strip, Use one new strip daily DX:E11 9, Disp: 100 strip, Rfl: 3    labetalol (NORMODYNE) 300 mg tablet, Take 1 tablet (300 mg total) by mouth 2 (two) times a day, Disp: 180 tablet, Rfl: 3    Melatonin 5 MG CAPS, Take 5 mg by mouth daily at bedtime as needed, Disp: , Rfl:     metFORMIN (GLUCOPHAGE) 500 mg tablet, TAKE 1 TABLET (500 MG TOTAL) BY MOUTH 2 (TWO) TIMES A DAY WITH MEALS, Disp: 180 tablet, Rfl: 0    metolazone (ZAROXOLYN) 2 5 mg tablet, Take 1 tablet (2 5 mg total) by mouth daily, Disp: 25 tablet, Rfl: 3    mupirocin (BACTROBAN) 2 % ointment, Apply topically in the morning Daily, when dressing is changed, 3x per week, Disp: 22 g, Rfl: 0    potassium chloride (K-DUR,KLOR-CON) 20 mEq tablet, Take 1 tablet (20 mEq total) by mouth in the morning  (Patient taking differently: Take 20 mEq by mouth 2 (two) times a day), Disp: 30 tablet, Rfl: 5    tamsulosin (FLOMAX) 0 4 mg, TAKE 1 CAPSULE BY MOUTH EVERY DAY WITH DINNER, Disp: 90 capsule, Rfl: 3    Social History     Socioeconomic History    Marital status: /Civil Union     Spouse name: Not on file    Number of children: Not on file    Years of education: Not on file    Highest education level: Not on file   Occupational History    Not on file   Tobacco Use    Smoking status: Former Smoker     Packs/day: 2 00     Years: 42 00     Pack years: 84 00     Types: Cigarettes     Start date:      Quit date:      Years since quittin 4    Smokeless tobacco: Never Used   Vaping Use    Vaping Use: Never used   Substance and Sexual Activity    Alcohol use: Yes     Alcohol/week: 1 0 standard drink     Types: 1 Cans of beer per week     Comment: 1-2 beers a months    Drug use: No    Sexual activity: Not Currently   Other Topics Concern    Not on file   Social History Narrative    Active advance directive    Daily coffee consumption, 1 cup/day    Denied exercise habits    Good dental hygiene    Supportive and safe    Active family support     Social Determinants of Health     Financial Resource Strain: Not on file   Food Insecurity: No Food Insecurity    Worried About Running Out of Food in the Last Year: Never true    Anthony of Food in the Last Year: Never true   Transportation Needs: No Transportation Needs    Lack of Transportation (Medical): No    Lack of Transportation (Non-Medical):  No   Physical Activity: Not on file   Stress: Not on file   Social Connections: Not on file Intimate Partner Violence: Not on file   Housing Stability: Low Risk     Unable to Pay for Housing in the Last Year: No    Number of Places Lived in the Last Year: 1    Unstable Housing in the Last Year: No       Family History   Problem Relation Age of Onset    Breast cancer Mother     Stroke Mother     Pneumonia Father     Substance Abuse Neg Hx     Mental illness Neg Hx        Physical Exam  Vitals and nursing note reviewed  Constitutional:       General: He is not in acute distress  Appearance: He is obese  HENT:      Head: Normocephalic and atraumatic  Eyes:      Conjunctiva/sclera: Conjunctivae normal    Cardiovascular:      Rate and Rhythm: Normal rate and regular rhythm  Pulses: Intact distal pulses  Heart sounds: Normal heart sounds  Pulmonary:      Effort: Pulmonary effort is normal       Breath sounds: Decreased breath sounds present  Abdominal:      General: Bowel sounds are normal       Palpations: Abdomen is soft  Musculoskeletal:         General: Normal range of motion  Cervical back: Normal range of motion and neck supple  Left lower leg: Edema present  Skin:     General: Skin is warm and dry  Neurological:      Mental Status: He is alert and oriented to person, place, and time  Vitals: Blood pressure 110/64, pulse 97, height 5' 11" (1 803 m), weight 127 kg (279 lb), SpO2 97 %     Wt Readings from Last 3 Encounters:   06/07/22 127 kg (279 lb)   05/31/22 127 kg (279 lb)   05/23/22 129 kg (285 lb)         Labs & Results:  Lab Results   Component Value Date    WBC 7 66 05/15/2022    HGB 13 9 05/15/2022    HCT 44 2 05/15/2022    MCV 91 05/15/2022     05/15/2022     BNP   Date Value Ref Range Status   06/10/2015 33 5 - 99 pg/mL Final     Comment:     The above 1 analytes were performed by [facility]  [address],[place] [address]       No components found for: CHEM    Results for orders placed during the hospital encounter of 10/05/18    Echo complete with contrast if indicated    Narrative  [facility] [address]    Transthoracic Echocardiogram  2D, M-mode, Doppler, and Color Doppler    Study date:  05-Oct-2018    Patient: Pauline Keane  MR number: [id number]  Account number: [de-identified]  : 1935  Age: 81 years  Gender: Male  Status: Outpatient  Location: Echo lab  Height: 73 in  Weight: 307 3 lb  BP: 200/ 126 mmHg    Indications: Chronic obstructive pulmonary disease  Diagnoses: J44 9 - Chronic obstructive pulmonary disease, unspecified    Sonographer:  Viry Guy RDCS  Primary Physician:  Herminia Rodriguez MD  Referring Physician:  Deangelo Nuno DO  Group:  [id number]  Interpreting Physician:  Carrie Goodman MD    SUMMARY    LEFT VENTRICLE:  Systolic function was normal  Ejection fraction was estimated to be 60 %  There were no regional wall motion abnormalities  Wall thickness was mildly increased  LEFT ATRIUM:  The atrium was mildly dilated  MITRAL VALVE:  There was mild regurgitation  TRICUSPID VALVE:  There was mild regurgitation  Pulmonary artery systolic pressure was within the normal range  AORTA:  There was mild dilatation of the ascending aorta  3 5 cm    HISTORY: PRIOR HISTORY: Obesity, sleep apnea, emphysema and leg swelling  Risk factors: hypertension, insulin-controlled diabetes  PROCEDURE: The procedure was performed in the echo lab  This was a routine study  The transthoracic approach was used  The study included complete 2D imaging, M-mode, complete spectral Doppler, and color Doppler  Images were obtained from  the parasternal, apical, subcostal, and suprasternal notch acoustic windows  Echocardiographic views were limited due to restricted patient mobility, poor acoustic window availability, decreased penetration, and lung interference  This was  a technically difficult study      LEFT VENTRICLE: Size was normal  Systolic function was normal  Ejection fraction was estimated to be 60 %  There were no regional wall motion abnormalities  Wall thickness was mildly increased  No evidence of apical thrombus  DOPPLER: Left  ventricular diastolic function parameters were normal     RIGHT VENTRICLE: The size was normal  Systolic function was normal  Wall thickness was normal     LEFT ATRIUM: The atrium was mildly dilated  RIGHT ATRIUM: Size was normal     MITRAL VALVE: Valve structure was normal  There was mild thickening  There was normal leaflet separation  DOPPLER: The transmitral velocity was within the normal range  There was no evidence for stenosis  There was mild regurgitation  AORTIC VALVE: The valve was trileaflet  Leaflets exhibited mildly increased thickness, mild calcification, and normal cuspal separation  DOPPLER: Transaortic velocity was within the normal range  There was no evidence for stenosis  There  was no significant regurgitation  TRICUSPID VALVE: The valve structure was normal  There was normal leaflet separation  DOPPLER: The transtricuspid velocity was within the normal range  There was no evidence for stenosis  There was mild regurgitation  Pulmonary artery  systolic pressure was within the normal range  PULMONIC VALVE: Leaflets exhibited normal thickness, no calcification, and normal cuspal separation  DOPPLER: The transpulmonic velocity was within the normal range  There was no significant regurgitation  PERICARDIUM: There was no pericardial effusion  The pericardium was normal in appearance  AORTA: The root exhibited normal size  There was mild dilatation of the ascending aorta  3 5 cm    SYSTEMIC VEINS: IVC: The inferior vena cava was normal in size      SYSTEM MEASUREMENT TABLES    2D  %FS: 27 45 %  Ao Diam: 3 19 cm  EDV(Teich): 84 88 ml  EF(Teich): 53 62 %  ESV(Teich): 39 37 ml  IVSd: 1 26 cm  LA Area: 16 64 cm2  LA Diam: 4 17 cm  LVEDV MOD A4C: 122 1 ml  LVEF MOD A4C: 59 42 %  LVESV MOD A4C: 49 55 ml  LVIDd: 4 34 cm  LVIDs: 3 15 cm  LVLd A4C: 8 55 cm  LVLs A4C: 7 57 cm  LVPWd: 1 33 cm  RA Area: 16 46 cm2  RVIDd: 4 41 cm  SV MOD A4C: 72 55 ml  SV(Teich): 45 51 ml    CW  TR Vmax: 2 47 m/s  TR maxP 4 mmHg    MM  TAPSE: 3 01 cm    PW  AVC: 360 33 ms  E': 0 07 m/s  E/E': 7 8  MV A Wu: 0 77 m/s  MV Dec Tensas: 1 85 m/s2  MV DecT: 278 1 ms  MV E Wu: 0 52 m/s  MV E/A Ratio: 0 67  MV PHT: 80 65 ms  MVA By PHT: 2 73 cm2    [employer] Accredited Echocardi[facility]    Prepared and electronically signed by    Trace Bose MD  Signed 05-Oct-2018 09:53:29    No results found for this or any previous visit  This note was completed in part utilizing [employer] fluency direct voice recognition software  Grammatical errors, random word insertion, spelling mistakes, and incomplete sentences may be an occasional consequence of the system secondary to software limitations, ambient noise and hardware issues  At the time of dictation, efforts were made to edit, clarify and /or correct errors  Please read the chart carefully and recognize, using context, where substitutions have occurred    If you have any questions or concerns about the context, text or information contained within the body of this dictation, please contact myself, the provider, for further clarification

## 2022-06-07 ENCOUNTER — OFFICE VISIT (OUTPATIENT)
Dept: CARDIOLOGY CLINIC | Facility: CLINIC | Age: 87
End: 2022-06-07
Payer: COMMERCIAL

## 2022-06-07 ENCOUNTER — TELEPHONE (OUTPATIENT)
Dept: NEPHROLOGY | Facility: CLINIC | Age: 87
End: 2022-06-07

## 2022-06-07 VITALS
DIASTOLIC BLOOD PRESSURE: 64 MMHG | HEIGHT: 71 IN | SYSTOLIC BLOOD PRESSURE: 110 MMHG | WEIGHT: 279 LBS | BODY MASS INDEX: 39.06 KG/M2 | HEART RATE: 97 BPM | OXYGEN SATURATION: 97 %

## 2022-06-07 DIAGNOSIS — E66.01 MORBID OBESITY DUE TO EXCESS CALORIES (HCC): ICD-10-CM

## 2022-06-07 DIAGNOSIS — E11.22 TYPE 2 DIABETES MELLITUS WITH STAGE 4 CHRONIC KIDNEY DISEASE, WITHOUT LONG-TERM CURRENT USE OF INSULIN (HCC): ICD-10-CM

## 2022-06-07 DIAGNOSIS — N18.4 CHRONIC RENAL DISEASE, STAGE IV (HCC): ICD-10-CM

## 2022-06-07 DIAGNOSIS — N18.4 TYPE 2 DIABETES MELLITUS WITH STAGE 4 CHRONIC KIDNEY DISEASE, WITHOUT LONG-TERM CURRENT USE OF INSULIN (HCC): ICD-10-CM

## 2022-06-07 DIAGNOSIS — I50.32 CHRONIC DIASTOLIC CONGESTIVE HEART FAILURE (HCC): ICD-10-CM

## 2022-06-07 DIAGNOSIS — Z09 HOSPITAL DISCHARGE FOLLOW-UP: Primary | ICD-10-CM

## 2022-06-07 DIAGNOSIS — I10 ESSENTIAL HYPERTENSION: ICD-10-CM

## 2022-06-07 DIAGNOSIS — G47.33 OSA (OBSTRUCTIVE SLEEP APNEA): ICD-10-CM

## 2022-06-07 PROCEDURE — 99215 OFFICE O/P EST HI 40 MIN: CPT | Performed by: NURSE PRACTITIONER

## 2022-06-07 NOTE — TELEPHONE ENCOUNTER
New Patient Intake Form   Patient Details   Bg Rosado     1935     [id number]     Appointment Information   Who is calling to schedule? If not patient, what is callers name? Patient   Referring Provider Eliud Santillan   Referring Provider Number [phone]   Reason for Appt (Diagnosis) CDK 4   Is patient aware of why they are being referred? yes   Does Patient have labs done at [place] [address]? If not, where do they go? yes   Has patient had labs / urine work done? List date of most recent lab / urine work yes   Has patient had a BMP & CBC done in the past 2 years? If so, list the date Yes 05-   Has patient been hospitalized recently? If yes, list name and location of hospital they were in YES 05-11-22   Has patient been seen by a Nephrologist before? If yes, list name, location and phone number no   Has patient been see by another Specialty before (ex  Neurology, urology, cardiology)? If yes, please list name, and specialty  Cardiology  Pulmonology   Has the patient had imaging done? If so, list the most recent date and type of imaging Xray chest 05-   Does the patient has a stone analysis report if history of kidney stones? no   Appointment Details   Is there a referral on file?  yes   Appointment Date 07-26   Location [facility]   Miscellaneous

## 2022-06-07 NOTE — PATIENT INSTRUCTIONS

## 2022-06-07 NOTE — LETTER
June 7, 2022     [facility]  [address] [address]    Patient: Mary Ann Ortiz   YOB: 1935   Date of Visit: 6/7/2022       Dear Dr Nydia Lewis:    Thank you for referring Doni Blanc to me for evaluation  Below are my notes for this consultation  If you have questions, please do not hesitate to call me  I look forward to following your patient along with you  Sincerely,        CARLOS Yu        CC: MD Perez Caceres, [place]  6/7/2022 12:39 PM  Sign when Signing Visit  Cardiology  Heart Failure   Cardiology     Mary Ann Ortiz   81 y o    male   MRN: [id number]  [address] [place] [address]  [phone]    PCP: Elmer Barron MD  Cardiologist :  Dr Johny Vargas              Summary of Recommendations  Low-sodium diet, Heart failure education as below  Diagnostic Sleep study  He has a CT of the chest schedule for this Fri given an abnormal CXR  F/U pulmonolgy 7/21  Consider a pharm stress test if his CT is unrevrealing  Refer to nephrology- worsening CKD  Pt wonders why his chronic Prednisone ( 10 mg)  was DCd when in the hospital  Will defer to his PCP  Follow up will be scheduled with Dr Johny Vargas 7/14        Impression/plan  Chronic diastolic heart failure, recent adm 5/11-5/15/22  Wt Readings from Last 3 Encounters:   06/07/22 127 kg (279 lb)   05/31/22 127 kg (279 lb)   05/23/22 129 kg (285 lb)     Wt Readings from Last 3 Encounters:   06/07/22 127 kg (279 lb)   05/31/22 127 kg (279 lb)   05/23/22 129 kg (285 lb)     --Beta-blocker:  Labetalol 300 mg b i d   --Diuretic: Bumex 3 mg BID since 5/20, metolazone 2 5 mg twice weekly ( since 5/24)  --DCd 5/15/22 on Bumex 2 mg b i d , metolazone 2 5 mg weekly  --prior: Torsemide 40 mg in the morning, 20 mg in the p m  for a total dose of 60 mg a day, adjusted November 5th from torsemide 40 b i d      Prior to that was on Lasix 80 in the a m  40 in the p m   --ACE/ARB/ARNI:    --2 g sodium diet, 1800 cc fluid restriction  Daily weights, call weight gain 2-3 lb in 1 day or 5 lb in 5 days  COPD, moderate on oxygen  He had been on prednisone  This was discontinued  Abnormal chest x-ray 5/11/22  Possible 1 4 cm nodule versus summation artifact  Possible right lower lobe pneumonia(clinically felt less likely, absence of symptoms, normal procalcitonin)  CT chest pending  Hypertension, essential  BP  110/64  loop diuretics, labetalol, metolazone  Also on tamsulosin  Hyperlipidemia  3/15/22:   Not currently on therapy  He would benefit given his diabetes; however consider age as a factor  CKD, stage IIIB-IV, baseline creatinine 1 2-1 9  More recently, higher  Last creatinine 2 5 BUN 66 5/31/22  Will leave diuretics as present  Refer to nephr- discussed with the pt  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  Hemoglobin A1c 6 7 5/31/22  Referred to wound care  CHI, likely  Now wearing oxygen since discharge  Sleep study recommended  Patient is agreeable  Morbid obesity, BMI 40  BPH on tamsulosin  Cardiac testing   TTE 10/22/21  EF 65%  Grade 1 DD  No significant valve disease  Right ventricular size and function is normal   Atria normal in size   TTE 5/12/22  EF 60%  Wall motion normal   Grade 1 DD  Mild MR  Mild TR  PASP 40 mm Hg, mildly increased                  HPI:   Munir Chapa is a 81y o  year old male with diabetes mellitus obesity, chronic diastolic heart failure, hypertension, hyperlipidemia, COPD and CKD 3b  He does not follow with Cardiology  At baseline he has been on Lasix 80 mg in the morning, 40 mg in the p m        Admit 10/22-10/27/21 ( 6d)  Chief complaint: dyspnea on exertion, orthopnea, bilateral lower extremity edema, weeping blisters, progressively worsening over 1-2 months  Dx:  Acute hypoxic respiratory failure due to congestive heart failure  Rx for acute on chronic diastolic heart failure, weeping leg ulcers  Diuresed, followed by Cardiology  Discharge weight :  287 lb  Discharge creatinine:  2 0  Discharge diuretics: Torsemide 40 mg b i d Prior to admission: Lasix    11/9/21  Hospital follow-up  Overall, he is doing much better  He denies chest pain  His dyspnea is improved his lower extremity edema is improved  He can walk 50 ft before getting short of breath  He now has O2 p r n  He has chronic left greater than right lower extremity edema  He is performing weights, every other day approximately  His weight has been stable around 286 lb  /72  He is making some lifestyle changes, he is trying to reduce his salt his sugar and his carbohydrate  We discussed at length how to read food labels to facilitate adherence  He was discharged on torsemide 40 mg b i d  His labs November 5th indicated and MYCHAL with a creatinine of 2 18  Torsemide was adjusted, to a total dose of 60 mg a day  He has follow-up labs scheduled  12/28/21  OV Dr Azul Gaytan  Blood pressure running high  He declined up titrating meds today  Sleep apnea not treated  Wt 297  Stable  No changes to his regimen  Recommend follow-up in 6 months    Adm 5/11-5/15/22  CC: Worsening shortness of breath and lower extremity edema  Followed by Cardiology  Found to be in decompensated heart failure  EKG :  Sinus rhythm  Nonspecific ST T-wave changes  Echo:EF 60%; grade 1 DD; mild MR  He was diuresed with Lasix 80 mg IV t i d   subsequently Bumex 2 mg IV b i d  Discharge weight :  278 lb  Discharge creatinine: 2 3  Discharge diuretics:  Bumex 2 mg b i d  plus metolazone2 5 weekly  In the past he had oxygen p r n  for 5 years  Was taken way given a repeat 6 minute walk test did not show desaturation   Repeat home oxygen study, now qualifies discharged on home O2  Recommend outpatient sleep study  CXR abnormal, recommend repeat 2- 4 weeks  No s/sx PNA- Abx stopped   procalcitonin neg    5/20/22 phone call from patient  Wt 289;TRIMBLE; LE edema  Taking metolazone 2 5 mg weekly  Last Creatinine 2 6 BUN 54 ( 5/18)  Bumex increased to 3 mg BID    5/31/22  OV PCP  Edema better  Weight 279 lb    6/7/22  Hospital follow-up   ROS:  His dyspnea on exertion has really progressed, and is very limiting, affecting his ADLs  Attila Campanile He denies chest pain, pressure, heaviness  He wears oxygen at night  His O2 sat in the office is 97% room air  BP controlled, 110/64  Weighs himself daily, is now stable at 279  Adhering to a salt restricted diet  Able to teach back how he is reading food labels  He tells me he is compliant with Bumex 3 mg b i d  and metolazone 2 5 mg twice weekly potassium 20 mEq b i d  Despite his diuretics and salt restricted diet, he still has 2+ bilateral lower extremity edema  Worsening renal function  He has a CT chest ordered to follow-up on abnormal chest x-ray 5/11/22--1 4 cm nodule versus summation artifact  84 pack-year history tobacco, quitting 1998  Overall, decompensating  Using a cane  He is not certain whether the increased diuretics is helping his breathing, although without his diuretics he would certainly have significant lower extremity edema  Today, we discussed diagnostic sleep study  He is agreeable  Will await findings of the CT scan  He has not had ischemic evaluation  He is agreeable to wait upon further testing until the results of the CT scan are known  He is curious why he is no longer on the prednisone 10 mg daily  I deferred him to his PCP  I made no change in his medical therapy  Refer to sleep medicine, referral to Nephrology  He will follow-up with his cardiologist in a short interval         I have spent 25 minutes with Patient  today in which greater than 50% of this time was spent in counseling/coordination of care regarding Intructions for management, Patient and family education, Importance of tx compliance, Risk factor reductions and Impressions       Assessment  Diagnoses and all orders for this visit:    Hospital discharge follow-up    Chronic diastolic congestive heart failure ([place] [address] )  -     Diagnostic Sleep Study; Future    CHI (obstructive sleep apnea)  -     Diagnostic Sleep Study; Future    Essential hypertension  -     Diagnostic Sleep Study; Future    Chronic renal disease, stage IV ([place] [address] )  -     Ambulatory Referral to Nephrology; Future    Morbid obesity due to excess calories [facility])  -     Diagnostic Sleep Study; Future    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 disease, without long-term current use of insulin ([facility])        Past Medical History:   Diagnosis Date    COPD (chronic obstructive pulmonary disease) ([place] [address] )     Diabetes mellitus ([place] [address] )     Herpes zoster     Hypertension     Mycoplasma pneumonia     Obesity     Osteoarthritis     Renal calculi        Review of Systems   Constitutional: Negative for chills  Cardiovascular: Positive for dyspnea on exertion and leg swelling  Negative for chest pain, claudication, cyanosis, irregular heartbeat, near-syncope, orthopnea, palpitations, paroxysmal nocturnal dyspnea and syncope  Respiratory: Negative for cough and shortness of breath  Gastrointestinal: Negative for heartburn and nausea  Neurological: Negative for dizziness, focal weakness, headaches, light-headedness and weakness  All other systems reviewed and are negative  No Known Allergies        Current Outpatient Medications:     albuterol (ProAir HFA) 90 mcg/act inhaler, Inhale 2 puffs every 4 (four) hours as needed for wheezing, Disp: 18 g, Rfl: 3    bumetanide (BUMEX) 2 mg tablet, Take 1 5 tablets (3 mg total) by mouth in the morning and 1 5 tablets (3 mg total) in the evening , Disp: 90 tablet, Rfl: 3    fluticasone (FLONASE) 50 mcg/act nasal spray, SPRAY 1 SPRAY INTO EACH NOSTRIL TWICE A DAY, Disp: 48 mL, Rfl: 1    fluticasone-umeclidinium-vilanterol (Trelegy Ellipta) 200-62 5-25 MCG/INH AEPB inhaler, Inhale 1 puff daily Rinse mouth after use , Disp: 180 blister, Rfl: 3    glucose blood (OneTouch Verio) test strip, Use one new strip daily DX:E11 9, Disp: 100 strip, Rfl: 3    labetalol (NORMODYNE) 300 mg tablet, Take 1 tablet (300 mg total) by mouth 2 (two) times a day, Disp: 180 tablet, Rfl: 3    Melatonin 5 MG CAPS, Take 5 mg by mouth daily at bedtime as needed, Disp: , Rfl:     metFORMIN (GLUCOPHAGE) 500 mg tablet, TAKE 1 TABLET (500 MG TOTAL) BY MOUTH 2 (TWO) TIMES A DAY WITH MEALS, Disp: 180 tablet, Rfl: 0    metolazone (ZAROXOLYN) 2 5 mg tablet, Take 1 tablet (2 5 mg total) by mouth daily, Disp: 25 tablet, Rfl: 3    mupirocin (BACTROBAN) 2 % ointment, Apply topically in the morning Daily, when dressing is changed, 3x per week, Disp: 22 g, Rfl: 0    potassium chloride (K-DUR,KLOR-CON) 20 mEq tablet, Take 1 tablet (20 mEq total) by mouth in the morning  (Patient taking differently: Take 20 mEq by mouth 2 (two) times a day), Disp: 30 tablet, Rfl: 5    tamsulosin (FLOMAX) 0 4 mg, TAKE 1 CAPSULE BY MOUTH EVERY DAY WITH DINNER, Disp: 90 capsule, Rfl: 3    Social History     Socioeconomic History    Marital status: /Civil Union     Spouse name: Not on file    Number of children: Not on file    Years of education: Not on file    Highest education level: Not on file   Occupational History    Not on file   Tobacco Use    Smoking status: Former Smoker     Packs/day: 2 00     Years: 42 00     Pack years: 84 00     Types: Cigarettes     Start date:      Quit date:      Years since quittin 4    Smokeless tobacco: Never Used   Vaping Use    Vaping Use: Never used   Substance and Sexual Activity    Alcohol use:  Yes     Alcohol/week: 1 0 standard drink     Types: 1 Cans of beer per week     Comment: 1-2 beers a months    Drug use: No    Sexual activity: Not Currently   Other Topics Concern    Not on file   Social History Narrative    Active advance directive    Daily coffee consumption, 1 cup/day    Denied exercise habits    Good dental hygiene    Supportive and safe    Active family support     Social Determinants of Health     Financial Resource Strain: Not on file   Food Insecurity: No Food Insecurity    Worried About Running Out of Food in the Last Year: Never true    Anthony of Food in the Last Year: Never true   Transportation Needs: No Transportation Needs    Lack of Transportation (Medical): No    Lack of Transportation (Non-Medical): No   Physical Activity: Not on file   Stress: Not on file   Social Connections: Not on file   Intimate Partner Violence: Not on file   Housing Stability: Low Risk     Unable to Pay for Housing in the Last Year: No    Number of Places Lived in the Last Year: 1    Unstable Housing in the Last Year: No       Family History   Problem Relation Age of Onset    Breast cancer Mother     Stroke Mother     Pneumonia Father     Substance Abuse Neg Hx     Mental illness Neg Hx        Physical Exam  Vitals and nursing note reviewed  Constitutional:       General: He is not in acute distress  Appearance: He is obese  HENT:      Head: Normocephalic and atraumatic  Eyes:      Conjunctiva/sclera: Conjunctivae normal    Cardiovascular:      Rate and Rhythm: Normal rate and regular rhythm  Pulses: Intact distal pulses  Heart sounds: Normal heart sounds  Pulmonary:      Effort: Pulmonary effort is normal       Breath sounds: Decreased breath sounds present  Abdominal:      General: Bowel sounds are normal       Palpations: Abdomen is soft  Musculoskeletal:         General: Normal range of motion  Cervical back: Normal range of motion and neck supple  Left lower leg: Edema present  Skin:     General: Skin is warm and dry  Neurological:      Mental Status: He is alert and oriented to person, place, and time  Vitals: Blood pressure 110/64, pulse 97, height 5' 11" (1 803 m), weight 127 kg (279 lb), SpO2 97 %     Wt Readings from Last 3 Encounters: 22 127 kg (279 lb)   22 127 kg (279 lb)   22 129 kg (285 lb)         Labs & Results:  Lab Results   Component Value Date    WBC 7 66 05/15/2022    HGB 13 9 05/15/2022    HCT 44 2 05/15/2022    MCV 91 05/15/2022     05/15/2022     BNP   Date Value Ref Range Status   06/10/2015 33 5 - 99 pg/mL Final     Comment:     The above 1 analytes were performed by [place] [id number] [id number]       No components found for: CHEM    Results for orders placed during the hospital encounter of 10/05/18    Echo complete with contrast if indicated    Narrative  [facility] [address]    Transthoracic Echocardiogram  2D, M-mode, Doppler, and Color Doppler    Study date:  05-Oct-2018    Patient: Augusta Ogden  MR number: [id number]  Account number: [de-identified]  : 1935  Age: 81 years  Gender: Male  Status: Outpatient  Location: Echo lab  Height: 73 in  Weight: 307 3 lb  BP: 200/ 126 mmHg    Indications: Chronic obstructive pulmonary disease  Diagnoses: J44 9 - Chronic obstructive pulmonary disease, unspecified    Sonographer:  Drea Justice RDCS  Primary Physician:  Dion Rizvi MD  Referring Physician:  Yousif Tate DO  Group:  [id number]  Interpreting Physician:  Melissa Garcia MD    SUMMARY    LEFT VENTRICLE:  Systolic function was normal  Ejection fraction was estimated to be 60 %  There were no regional wall motion abnormalities  Wall thickness was mildly increased  LEFT ATRIUM:  The atrium was mildly dilated  MITRAL VALVE:  There was mild regurgitation  TRICUSPID VALVE:  There was mild regurgitation  Pulmonary artery systolic pressure was within the normal range  AORTA:  There was mild dilatation of the ascending aorta  3 5 cm    HISTORY: PRIOR HISTORY: Obesity, sleep apnea, emphysema and leg swelling  Risk factors: hypertension, insulin-controlled diabetes      PROCEDURE: The procedure was performed in the echo lab  This was a routine study  The transthoracic approach was used  The study included complete 2D imaging, M-mode, complete spectral Doppler, and color Doppler  Images were obtained from  the parasternal, apical, subcostal, and suprasternal notch acoustic windows  Echocardiographic views were limited due to restricted patient mobility, poor acoustic window availability, decreased penetration, and lung interference  This was  a technically difficult study  LEFT VENTRICLE: Size was normal  Systolic function was normal  Ejection fraction was estimated to be 60 %  There were no regional wall motion abnormalities  Wall thickness was mildly increased  No evidence of apical thrombus  DOPPLER: Left  ventricular diastolic function parameters were normal     RIGHT VENTRICLE: The size was normal  Systolic function was normal  Wall thickness was normal     LEFT ATRIUM: The atrium was mildly dilated  RIGHT ATRIUM: Size was normal     MITRAL VALVE: Valve structure was normal  There was mild thickening  There was normal leaflet separation  DOPPLER: The transmitral velocity was within the normal range  There was no evidence for stenosis  There was mild regurgitation  AORTIC VALVE: The valve was trileaflet  Leaflets exhibited mildly increased thickness, mild calcification, and normal cuspal separation  DOPPLER: Transaortic velocity was within the normal range  There was no evidence for stenosis  There  was no significant regurgitation  TRICUSPID VALVE: The valve structure was normal  There was normal leaflet separation  DOPPLER: The transtricuspid velocity was within the normal range  There was no evidence for stenosis  There was mild regurgitation  Pulmonary artery  systolic pressure was within the normal range  PULMONIC VALVE: Leaflets exhibited normal thickness, no calcification, and normal cuspal separation  DOPPLER: The transpulmonic velocity was within the normal range   There was no significant regurgitation  PERICARDIUM: There was no pericardial effusion  The pericardium was normal in appearance  AORTA: The root exhibited normal size  There was mild dilatation of the ascending aorta  3 5 cm    SYSTEMIC VEINS: IVC: The inferior vena cava was normal in size  SYSTEM MEASUREMENT TABLES    2D  %FS: 27 45 %  Ao Diam: 3 19 cm  EDV(Teich): 84 88 ml  EF(Teich): 53 62 %  ESV(Teich): 39 37 ml  IVSd: 1 26 cm  LA Area: 16 64 cm2  LA Diam: 4 17 cm  LVEDV MOD A4C: 122 1 ml  LVEF MOD A4C: 59 42 %  LVESV MOD A4C: 49 55 ml  LVIDd: 4 34 cm  LVIDs: 3 15 cm  LVLd A4C: 8 55 cm  LVLs A4C: 7 57 cm  LVPWd: 1 33 cm  RA Area: 16 46 cm2  RVIDd: 4 41 cm  SV MOD A4C: 72 55 ml  SV(Teich): 45 51 ml    CW  TR Vmax: 2 47 m/s  TR maxP 4 mmHg    MM  TAPSE: 3 01 cm    PW  AVC: 360 33 ms  E': 0 07 m/s  E/E': 7 8  MV A Wu: 0 77 m/s  MV Dec [place]: 1 85 m/s2  MV DecT: 278 1 ms  MV E Wu: 0 52 m/s  MV E/A Ratio: 0 67  MV PHT: 80 65 ms  MVA By PHT: 2 73 cm2    [facility] Accredited Echocardiography Laboratory    Prepared and electronically signed by    Hailey Leigh MD  Signed 05-Oct-2018 09:53:29    No results found for this or any previous visit  This note was completed in part utilizing m-modal fluency direct voice recognition software  Grammatical errors, random word insertion, spelling mistakes, and incomplete sentences may be an occasional consequence of the system secondary to software limitations, ambient noise and hardware issues  At the time of dictation, efforts were made to edit, clarify and /or correct errors  Please read the chart carefully and recognize, using context, where substitutions have occurred    If you have any questions or concerns about the context, text or information contained within the body of this dictation, please contact myself, the provider, for further clarification

## 2022-06-08 ENCOUNTER — TELEPHONE (OUTPATIENT)
Dept: SLEEP CENTER | Facility: CLINIC | Age: 87
End: 2022-06-08

## 2022-06-08 NOTE — TELEPHONE ENCOUNTER
----- Message from Zay Schmidt MD sent at 2022 12:57 PM EDT -----  Approved    ----- Message -----  From: Lisa Grimaldo: 2022   7:52 AM EDT  To: Sleep Medicine [place] Provider    This diagnostic sleep study needs approval      If approved please sign and return to clerical pool  If denied please include reasons why  Also provide alternative testing if warranted  Please sign and return to clerical pool

## 2022-06-10 ENCOUNTER — HOSPITAL ENCOUNTER (OUTPATIENT)
Dept: CT IMAGING | Facility: HOSPITAL | Age: 87
Discharge: HOME | End: 2022-06-10
Payer: COMMERCIAL

## 2022-06-10 DIAGNOSIS — R93.89 CHEST X-RAY ABNORMALITY: ICD-10-CM

## 2022-06-10 PROCEDURE — 71250 CT THORAX DX C-: CPT

## 2022-06-10 PROCEDURE — G1004 CDSM NDSC: HCPCS

## 2022-06-13 ENCOUNTER — TELEPHONE (OUTPATIENT)
Dept: GERIATRICS | Age: 87
End: 2022-06-13

## 2022-06-13 NOTE — TELEPHONE ENCOUNTER
[address]  [phone]  Telephone Intake: PCP    Referral Source: Self/Son       Caller (and relationship to patient): Odalys Donnellyan     (relationship to patient): Gabby May Person Phone Number: [phone]   Is caller POA? (na)   Reason for choosing Geriatric PCP: family would like for pt to be seen by geriatrician   Does the patient have any memory / cognition issues: Family is starting to see some short term memory loss   If yes, please schedule geriatric assessment first for all patients requesting Dr Claudine Hsieh  Any additional concerns that you would like the provider to be aware of: COPD, Diabetes and Chronic renal disease, stage IV      Patient Insurance: [employer] rep   If insurance is a Medicare Advantage plan please make the patient aware that they will be responsible for a specialties co-pay  Is the patient aware of higher co-pay? Yes      NOTE FOR : Dr Claudine Hsieh does not accept new PCP patients who reside in facilities (i e  levels of care higher than independent living)  She WILL accept patients at independent living facilities

## 2022-06-20 DIAGNOSIS — I50.32 CHRONIC DIASTOLIC CONGESTIVE HEART FAILURE (HCC): ICD-10-CM

## 2022-06-20 DIAGNOSIS — R06.09 DOE (DYSPNEA ON EXERTION): Primary | ICD-10-CM

## 2022-06-20 DIAGNOSIS — I25.10 CORONARY ARTERY CALCIFICATION SEEN ON CAT SCAN: ICD-10-CM

## 2022-06-20 DIAGNOSIS — I10 ESSENTIAL HYPERTENSION: ICD-10-CM

## 2022-06-22 ENCOUNTER — OFFICE VISIT (OUTPATIENT)
Dept: GERIATRICS | Age: 87
End: 2022-06-22
Payer: COMMERCIAL

## 2022-06-22 VITALS
HEART RATE: 96 BPM | DIASTOLIC BLOOD PRESSURE: 72 MMHG | HEIGHT: 71 IN | SYSTOLIC BLOOD PRESSURE: 128 MMHG | WEIGHT: 273.8 LBS | OXYGEN SATURATION: 93 % | TEMPERATURE: 97.5 F | RESPIRATION RATE: 21 BRPM | BODY MASS INDEX: 38.33 KG/M2

## 2022-06-22 DIAGNOSIS — I10 ESSENTIAL HYPERTENSION: ICD-10-CM

## 2022-06-22 DIAGNOSIS — K59.09 OTHER CONSTIPATION: ICD-10-CM

## 2022-06-22 DIAGNOSIS — N18.4 CHRONIC RENAL DISEASE, STAGE IV (HCC): ICD-10-CM

## 2022-06-22 DIAGNOSIS — N18.4 TYPE 2 DIABETES MELLITUS WITH STAGE 4 CHRONIC KIDNEY DISEASE, WITHOUT LONG-TERM CURRENT USE OF INSULIN (HCC): ICD-10-CM

## 2022-06-22 DIAGNOSIS — I50.32 CHRONIC DIASTOLIC CONGESTIVE HEART FAILURE (HCC): ICD-10-CM

## 2022-06-22 DIAGNOSIS — G31.84 MILD COGNITIVE IMPAIRMENT: Primary | ICD-10-CM

## 2022-06-22 DIAGNOSIS — E11.22 TYPE 2 DIABETES MELLITUS WITH STAGE 4 CHRONIC KIDNEY DISEASE, WITHOUT LONG-TERM CURRENT USE OF INSULIN (HCC): ICD-10-CM

## 2022-06-22 DIAGNOSIS — J44.9 MODERATE COPD (CHRONIC OBSTRUCTIVE PULMONARY DISEASE) (HCC): ICD-10-CM

## 2022-06-22 DIAGNOSIS — G47.33 OSA (OBSTRUCTIVE SLEEP APNEA): ICD-10-CM

## 2022-06-22 DIAGNOSIS — Z23 ENCOUNTER FOR IMMUNIZATION: ICD-10-CM

## 2022-06-22 DIAGNOSIS — F32.89 OTHER DEPRESSION: ICD-10-CM

## 2022-06-22 DIAGNOSIS — G47.01 INSOMNIA DUE TO MEDICAL CONDITION: ICD-10-CM

## 2022-06-22 PROCEDURE — 99205 OFFICE O/P NEW HI 60 MIN: CPT | Performed by: NURSE PRACTITIONER

## 2022-06-22 PROCEDURE — 1036F TOBACCO NON-USER: CPT | Performed by: NURSE PRACTITIONER

## 2022-06-22 PROCEDURE — 1160F RVW MEDS BY RX/DR IN RCRD: CPT | Performed by: NURSE PRACTITIONER

## 2022-06-22 PROCEDURE — 3078F DIAST BP <80 MM HG: CPT | Performed by: NURSE PRACTITIONER

## 2022-06-22 PROCEDURE — 3074F SYST BP LT 130 MM HG: CPT | Performed by: NURSE PRACTITIONER

## 2022-06-22 RX ORDER — PREDNISONE 1 MG/1
TABLET ORAL DAILY
COMMUNITY
End: 2022-06-22

## 2022-06-22 NOTE — PROGRESS NOTES
[facility]  [address], [address]  [phone]    Geriatric Consultation    ASSESSMENT AND PLAN:  1  Mild cognitive impairment  Assessment & Plan:  · Patient scored a 20/30 on the [facility] test today with deficits noted in visuospatial/executive function and delayed recall  · Patient states that he did not pay attention to the memory recall part of the test due to it not being important to him  · Patient is independent with all ADLs and IADLs  · Recommend to engage in cognitive stimulating activities such as crossword puzzles, word searches, online memory games such as [employer] or [employer] com    · Continue regular exercise as tolerated  · Engage in social activities  · Recommend adherence to a heart healthy/diabetic diet   · Manage chronic medical conditions with PCP  · Follow-up in approximately 2 months, recommend repeat MOCA test      2  Chronic diastolic congestive heart failure [facility])  Assessment & Plan:  Wt Readings from Last 3 Encounters:   06/22/22 124 kg (273 lb 12 8 oz)   06/07/22 127 kg (279 lb)   05/31/22 127 kg (279 lb)   · Left ventricular ejection fraction is 60% on echocardiogram on 05/12/2022  His systolic function is normal  Wall motion is normal  Diastolic dysfunction is mildly abnormal consistent with grade 1 diastolic dysfunction  · NT-proBNP normal on 05/11/2022 at 449  · Patient ordered bumetanide in the hospital, 3 mg tablet two times daily  He is now reported to be taking 1 tablet in the morning and 1/2 of a tablet  in the afternoon due to increased fatigue and malaise  Improvement noted in his signs and symptoms with decreased dose  No reported weight gain  · Patient monitors his weights, breathing, fluid/salt intake, edema closely and also follows-up closely with Cardiology  He is to notify them  if he has a weight gain of 3 lbs in one day or 5 lbs in 5 days     · Patient also takes metolazone 2 5 mg two times per week  · NM cardiac stress test ordered by Cardiology  · Will order home care with VNA, PALS program, for CHF management in order to increase his quality of life with the goal to reduce hospital readmissions  · Continue leg elevation and compression stockings  · Follow-up with Cardiology          Orders:  -     Referral to [facility]; Future    3  Moderate COPD (chronic obstructive pulmonary disease) ([facility])  Assessment & Plan:  · No current signs or symptoms of COPD exacerbation  · Continue home trelegy inhaler 1 puff daily and prn albuterol   · Patient maintained on 10 mg PO daily by Pulmonologist prior to hospitalization and discontinued on hospital discharge  · Recommend Pulmonary to decide on whether or not to resume Prednisone in the setting of fluid retention in his bilateral lower extremities  · Continue home oxygen use  · Follow-up with Pulmonology      4  Chronic renal disease, stage IV [facility])  Assessment & Plan:  Lab Results   Component Value Date    EGFR 21 05/31/2022    EGFR 21 05/18/2022    EGFR 24 05/15/2022    CREATININE 2 55 (H) 05/31/2022    CREATININE 2 63 (H) 05/18/2022    CREATININE 2 30 (H) 05/15/2022   · Baseline kidney function 1 2 to 1 9 per record reviewed  · Recommend to avoid nephrotoxins and hypotension  · Patient is on diuretics which are contributing to kidney decline  Patient self reduced dose of Bumex with reported  improvement in his signs and symptoms   · Monitor kidney function and electrolytes  · Follow-up with Nephrology      5  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 disease, without long-term current use of insulin ([facility])  Assessment & Plan:    Lab Results   Component Value Date    HGBA1C 6 7 (H) 05/31/2022   · Stable  · Continue Metformin 500 mg two times daily  · Continue to adhere to a diabetic diet and exercise as tolerated  · Recommend regular monitoring of A1C per PCP      6   CHI (obstructive sleep apnea)  Assessment & Plan:  · Patient wears oxygen nasal cannula at night time  · Sleep study ordered, scheduled in September of this year  · Continue oxygen nasal cannula      7  Encounter for immunization  -     tetanus-diphtheria-acellular pertussis (ADACEL) 5-2-15 5 LF-mcg/0 5 injection; Inject 0 5 mL into a muscle once for 1 dose    8  Essential hypertension  Assessment & Plan:  · Stable and controlled at 128/72  · Continue labetalol 300 mg two times daily  · Recommend regular monitoring of electrolyte and kidney function      9  Other depression  Assessment & Plan:  · Multifactorial  · GDS 9/15 which indicates moderate level depression  · No SI/HI reported  · Recommend starting and SSRI such as Sertraline 25 mg PO daily      10  Other constipation  Assessment & Plan:  · Recommend taking Miralax 17 gm PO daily  · Adequate hydration encouraged, not to exceed fluid restriction  · Recommend increased nutritional fiber intake and regular exercise as tolerated      11  Insomnia due to medical condition  Assessment & Plan:  · Recommend taking prn Melatonin 5 mg at HS daily  · Sleep study test scheduled  · Continue oxygen nasal cannula at HS  · Reduce daytime sleep  · Recommend sleeping in bed with extra pillows to keep head slightly higher than rest of body  HPI:    We had the pleasure of evaluating Daysi Prater who is a 81 y o  male in Geriatric consultation today  Mr Amaris Acuna is in the office with his son  He lives with his wife in an adult 54 and older community  His medical history includes hypertension, type 2 DM, CKD stage 4, moderate chronic obstructive pulmonary disease, chronic diastolic congestive heart failure, morbid obesity, BPH and  HLD  Son thinks Dad has been suffering from depression for a long time  Patient has multiple chronic medical conditions and follows up with multiple specialist     Son believes that the patient  has memory issues    Son states his dad forgot incidences that happened few weeks prior that were significant enough to remember  Son states that his dad's living situation is challenging, wife has health issues, they don't have a good relationship  His mom is always on the go and his dad is the opposite  His dad has been suffering from lack of energy, increased sob and malaise since his recent hospitalization  Patient was in the hospital twice over the past seven months for CHF  Patient recently decreased his diuretic, Bumex with improvement in his signs and symptoms  Son states that his dad is very disciplined regarding his fluid restriction and diet  Cardiology was notified of the change in diuretic dose  Patient and his wife both drive and perform all of their own ADLs and they manage their own medications     Patient is unable to sleep at night because he sleeps on his side, he cannot sleep on back  He sleeps in his recliner  Max of 4 hours per night  He does take naps during the day  His nose closes and he  wakes up several times per night  He has a history of overusing Afrin nasal spray and suffered rebound congestion  He now takes Flonase nasal spray, one spray two times daily  Cardiology recommended placing patient on Lipitor due to coronary artery calcifications and fatty liver on CT scan, however, patient and son declined  Son also states that his dad has a major issue with constipation due to diuretics and fluid restriction  His constipation is reported to have improved with reduction in diuretic dose  Patient ambulates using walking sticks or a cane  He lives in a  54 and older community  He also has a history of COPD from smoking for many years  He had an incidental finding of lung nodules on CT and is to follow-up with Pulmonology for surveillance  He has a history of diabetes that is controlled with a A1C of 6 7 on 05//31/2022  ROS: Review of Systems   Constitutional: Positive for fatigue  Negative for fever     Respiratory: Positive for shortness of breath  Negative for chest tightness and wheezing  SOB on exertion   Cardiovascular: Positive for leg swelling  Negative for chest pain and palpitations  Gastrointestinal: Positive for constipation  Negative for abdominal pain, diarrhea, nausea and vomiting  Musculoskeletal: Positive for gait problem  Negative for arthralgias, back pain and myalgias  Neurological: Negative for dizziness, weakness, light-headedness and headaches  Psychiatric/Behavioral: Positive for dysphoric mood and sleep disturbance  Negative for confusion  The patient is not nervous/anxious  No Known Allergies    Medications:    Current Outpatient Medications on File Prior to Visit   Medication Sig Dispense Refill    albuterol (ProAir HFA) 90 mcg/act inhaler Inhale 2 puffs every 4 (four) hours as needed for wheezing 18 g 3    bumetanide (BUMEX) 2 mg tablet Take 1 5 tablets (3 mg total) by mouth in the morning and 1 5 tablets (3 mg total) in the evening  90 tablet 3    fluticasone (FLONASE) 50 mcg/act nasal spray SPRAY 1 SPRAY INTO EACH NOSTRIL TWICE A DAY 48 mL 1    fluticasone-umeclidinium-vilanterol (Trelegy Ellipta) 200-62 5-25 MCG/INH AEPB inhaler Inhale 1 puff daily Rinse mouth after use  180 blister 3    labetalol (NORMODYNE) 300 mg tablet Take 1 tablet (300 mg total) by mouth 2 (two) times a day 180 tablet 3    Melatonin 5 MG CAPS Take 5 mg by mouth daily at bedtime as needed      metFORMIN (GLUCOPHAGE) 500 mg tablet TAKE 1 TABLET BY MOUTH TWICE A DAY WITH MEALS 180 tablet 1    metolazone (ZAROXOLYN) 2 5 mg tablet Take 1 tablet (2 5 mg total) by mouth daily (Patient taking differently: Take 2 5 mg by mouth Two times per week) 25 tablet 3    mupirocin (BACTROBAN) 2 % ointment Apply topically in the morning Daily, when dressing is changed, 3x per week 22 g 0    potassium chloride (K-DUR,KLOR-CON) 20 mEq tablet Take 1 tablet (20 mEq total) by mouth in the morning   (Patient taking differently: Take 20 mEq by mouth 2 (two) times a day) 30 tablet 5    tamsulosin (FLOMAX) 0 4 mg TAKE 1 CAPSULE BY MOUTH EVERY DAY WITH DINNER 90 capsule 3    glucose blood (OneTouch Verio) test strip Use one new strip daily DX:[id number] 9 100 strip 3    [DISCONTINUED] umeclidinium bromide (INCRUSE ELLIPTA) 62 5 mcg/inh AEPB inhaler Inhale 1 puff  in the morning  30 blister 0     No current facility-administered medications on file prior to visit  History:  Past Medical History:   Diagnosis Date    COPD [facility]chronic obstructive pulmonary disease) ([facility] )     Diabetes mellitus ([facility] )     Herpes zoster     Hypertension     Memory loss     possible    Mycoplasma pneumonia     Obesity     Osteoarthritis     Renal calculi      Past Surgical History:   Procedure Laterality Date    CATARACT EXTRACTION      with insert intraoculat lens prosthesis    HEMORRHOID SURGERY      NECK SURGERY      for bone spur     Family History   Problem Relation Age of Onset    Breast cancer Mother     Stroke Mother     Pneumonia Father     Substance Abuse Neg Hx     Mental illness Neg Hx      Social History     Socioeconomic History    Marital status: /Civil Union     Spouse name: Not on file    Number of children: Not on file    Years of education: Not on file    Highest education level: Not on file   Occupational History    Not on file   Tobacco Use    Smoking status: Former Smoker     Packs/day: 2 00     Years: 42 00     Pack years: 84 00     Types: Cigarettes     Start date:      Quit date:      Years since quittin 4    Smokeless tobacco: Never Used   Vaping Use    Vaping Use: Never used   Substance and Sexual Activity    Alcohol use:  Yes     Alcohol/week: 1 0 standard drink     Types: 1 Cans of beer per week     Comment: 1-2 beers a months    Drug use: No    Sexual activity: Not Currently   Other Topics Concern    Not on file   Social History Narrative    Active advance directive    Daily coffee consumption, 1 cup/day    Denied exercise habits    Good dental hygiene    Supportive and safe    Active family support     Social Determinants of Health     Financial Resource Strain: Not on file   Food Insecurity: No Food Insecurity    Worried About Running Out of Food in the Last Year: Never true    Anthony of Food in the Last Year: Never true   Transportation Needs: No Transportation Needs    Lack of Transportation (Medical): No    Lack of Transportation (Non-Medical): No   Physical Activity: Not on file   Stress: Not on file   Social Connections: Not on file   Intimate Partner Violence: Not on file   Housing Stability: Low Risk     Unable to Pay for Housing in the Last Year: No    Number of Places Lived in the Last Year: 1    Unstable Housing in the Last Year: No     Past Surgical History:   Procedure Laterality Date    CATARACT EXTRACTION      with insert intraoculat lens prosthesis    HEMORRHOID SURGERY      NECK SURGERY      for bone spur       OBJECTIVE:  Vitals:    06/22/22 1501   BP: 128/72   BP Location: Left arm   Patient Position: Sitting   Cuff Size: Large   Pulse: 96   Resp: 21   Temp: 97 5 °F (36 4 °C)   TempSrc: Temporal   SpO2: 93%   Weight: 124 kg (273 lb 12 8 oz)   Height: 5' 11 25" (1 81 m)     Body mass index is 37 92 kg/m²  Physical Exam  Constitutional:       General: He is not in acute distress  Appearance: Normal appearance  He is obese  He is not ill-appearing, toxic-appearing or diaphoretic  HENT:      Head: Normocephalic and atraumatic  Right Ear: External ear normal       Left Ear: External ear normal       Nose: Nose normal    Cardiovascular:      Rate and Rhythm: Normal rate and regular rhythm  Pulses: Normal pulses  Heart sounds: Normal heart sounds  Pulmonary:      Effort: Pulmonary effort is normal  No respiratory distress  Breath sounds: Normal breath sounds  No wheezing, rhonchi or rales        Comments: Diminished breath sounds all lung fields  Abdominal:      General: Bowel sounds are normal  There is no distension  Palpations: There is no mass  Tenderness: There is no abdominal tenderness  There is no guarding  Musculoskeletal:         General: No deformity or signs of injury  Normal range of motion  Cervical back: Normal range of motion and neck supple  Right lower leg: Edema present  Left lower leg: Edema present  Comments: 1+ bilateral lower extremity edema   Skin:     General: Skin is warm and dry  Coloration: Skin is not jaundiced or pale  Findings: No bruising or erythema  Neurological:      General: No focal deficit present  Mental Status: He is alert and oriented to person, place, and time  Cranial Nerves: No cranial nerve deficit  Sensory: No sensory deficit  Motor: No weakness  Gait: Gait normal    Psychiatric:         Mood and Affect: Mood normal          Behavior: Behavior normal          Thought Content: Thought content normal          Judgment: Judgment normal          MoCA:  20/30  GDS:  9 /15    Labs & Imaging:  Lab Results   Component Value Date    WBC 7 66 05/15/2022    HGB 13 9 05/15/2022    HCT 44 2 05/15/2022    MCV 91 05/15/2022     05/15/2022     Lab Results   Component Value Date    SODIUM 136 05/31/2022    K 3 5 05/31/2022     05/31/2022    CO2 31 05/31/2022    BUN 66 (H) 05/31/2022    CREATININE 2 55 (H) 05/31/2022    GLUC 168 (H) 05/15/2022    CALCIUM 10 0 05/31/2022     No results found for: Dori Grant  Lab Results   Component Value Date    SXR6SAQNCFEP 0 870 07/06/2017     No results found for: [id number], [id number]   No results found for this or any previous visit

## 2022-06-22 NOTE — PATIENT INSTRUCTIONS
Recommend Miralax 17 gm daily for constipation  Will consult [facility], PALS program for CHF managment  Wear oxygen nasal cannula with exertion  Continue a heart healthy diet and regular exercise as tolerated  Follow-up with in August

## 2022-06-23 ENCOUNTER — HOME HEALTH ADMISSION (OUTPATIENT)
Dept: HOME HEALTH SERVICES | Facility: HOME HEALTHCARE | Age: 87
End: 2022-06-23
Payer: COMMERCIAL

## 2022-06-23 DIAGNOSIS — I50.32 CHRONIC DIASTOLIC CONGESTIVE HEART FAILURE (HCC): ICD-10-CM

## 2022-06-23 PROBLEM — K59.09 OTHER CONSTIPATION: Status: ACTIVE | Noted: 2022-06-22

## 2022-06-23 PROBLEM — F32.A DEPRESSION: Status: ACTIVE | Noted: 2022-06-22

## 2022-06-23 PROBLEM — G47.01 INSOMNIA DUE TO MEDICAL CONDITION: Status: ACTIVE | Noted: 2022-06-22

## 2022-06-23 RX ORDER — BUMETANIDE 2 MG/1
3 TABLET ORAL 2 TIMES DAILY
Qty: 270 TABLET | Refills: 2 | Status: SHIPPED | OUTPATIENT
Start: 2022-06-23

## 2022-06-23 NOTE — ASSESSMENT & PLAN NOTE
· Patient scored a 20/30 on the [facility] test today with deficits noted in visuospatial/executive function and delayed recall  · Patient states that he did not pay attention to the memory recall part of the test due to it not being important to him  · Patient is independent with all ADLs and IADLs  · Recommend to engage in cognitive stimulating activities such as crossword puzzles, word searches, online memory games such as [employer] or [employer] com    · Continue regular exercise as tolerated  · Engage in social activities  · Recommend adherence to a heart healthy/diabetic diet   · Manage chronic medical conditions with PCP    · Follow-up in approximately 2 months, recommend repeat MOCA test

## 2022-06-23 NOTE — ASSESSMENT & PLAN NOTE
· Recommend taking Miralax 17 gm PO daily  · Adequate hydration encouraged, not to exceed fluid restriction  · Recommend increased nutritional fiber intake and regular exercise as tolerated

## 2022-06-23 NOTE — ASSESSMENT & PLAN NOTE
· No current signs or symptoms of COPD exacerbation  · Continue home trelegy inhaler 1 puff daily and prn albuterol   · Patient maintained on 10 mg PO daily by Pulmonologist prior to hospitalization and discontinued on hospital discharge  · Recommend Pulmonary to decide on whether or not to resume Prednisone in the setting of fluid retention in his bilateral lower extremities  · Continue home oxygen use     · Follow-up with Pulmonology

## 2022-06-23 NOTE — ASSESSMENT & PLAN NOTE
Wt Readings from Last 3 Encounters:   06/22/22 124 kg (273 lb 12 8 oz)   06/07/22 127 kg (279 lb)   05/31/22 127 kg (279 lb)   · Left ventricular ejection fraction is 60% on echocardiogram on 05/12/2022  His systolic function is normal  Wall motion is normal  Diastolic dysfunction is mildly abnormal consistent with grade 1 diastolic dysfunction  · NT-proBNP normal on 05/11/2022 at 449  · Patient ordered bumetanide in the hospital, 3 mg tablet two times daily  He is now reported to be taking 1 tablet in the morning and 1/2 of a tablet  in the afternoon due to increased fatigue and malaise  Improvement noted in his signs and symptoms with decreased dose  No reported weight gain  · Patient monitors his weights, breathing, fluid/salt intake, edema closely and also follows-up closely with Cardiology  He is to notify them  if he has a weight gain of 3 lbs in one day or 5 lbs in 5 days  · Patient also takes metolazone 2 5 mg two times per week  · NM cardiac stress test ordered by Cardiology  · Will order home care with VNA, PALS program, for CHF management in order to increase his quality of life with the goal to reduce hospital readmissions       · Continue leg elevation and compression stockings  · Follow-up with Cardiology

## 2022-06-23 NOTE — ASSESSMENT & PLAN NOTE
· Multifactorial  · GDS 9/15 which indicates moderate level depression  · No SI/HI reported  · Recommend starting and SSRI such as Sertraline 25 mg PO daily

## 2022-06-23 NOTE — ASSESSMENT & PLAN NOTE
Lab Results   Component Value Date    HGBA1C 6 7 (H) 05/31/2022   · Stable  · Continue Metformin 500 mg two times daily  · Continue to adhere to a diabetic diet and exercise as tolerated  · Recommend regular monitoring of A1C per PCP

## 2022-06-23 NOTE — ASSESSMENT & PLAN NOTE
· Stable and controlled at 128/72  · Continue labetalol 300 mg two times daily  · Recommend regular monitoring of electrolyte and kidney function

## 2022-06-23 NOTE — ASSESSMENT & PLAN NOTE
Lab Results   Component Value Date    EGFR 21 05/31/2022    EGFR 21 05/18/2022    EGFR 24 05/15/2022    CREATININE 2 55 (H) 05/31/2022    CREATININE 2 63 (H) 05/18/2022    CREATININE 2 30 (H) 05/15/2022   · Baseline kidney function 1 2 to 1 9 per record reviewed  · Recommend to avoid nephrotoxins and hypotension  · Patient is on diuretics which are contributing to kidney decline  Patient self reduced dose of Bumex with reported  improvement in his signs and symptoms   · Monitor kidney function and electrolytes      · Follow-up with Nephrology

## 2022-06-23 NOTE — ASSESSMENT & PLAN NOTE
· Patient wears oxygen nasal cannula at night time  · Sleep study ordered, scheduled in September of this year  · Continue oxygen nasal cannula

## 2022-06-24 ENCOUNTER — TELEPHONE (OUTPATIENT)
Dept: GERIATRICS | Age: 87
End: 2022-06-24

## 2022-06-24 ENCOUNTER — HOME CARE VISIT (OUTPATIENT)
Dept: HOME HEALTH SERVICES | Facility: HOME HEALTHCARE | Age: 87
End: 2022-06-24
Payer: COMMERCIAL

## 2022-06-24 VITALS
TEMPERATURE: 97.5 F | SYSTOLIC BLOOD PRESSURE: 122 MMHG | RESPIRATION RATE: 16 BRPM | HEART RATE: 70 BPM | DIASTOLIC BLOOD PRESSURE: 70 MMHG | OXYGEN SATURATION: 97 %

## 2022-06-24 PROCEDURE — G0299 HHS/HOSPICE OF RN EA 15 MIN: HCPCS

## 2022-06-24 PROCEDURE — 400013 VN SOC

## 2022-06-24 NOTE — TELEPHONE ENCOUNTER
Called pt son to schedule a Sobeida 2 Month follow up with CARLOS Levine on 8/2/2022 at 8 AM  Pt son confirmed

## 2022-06-24 NOTE — CASE COMMUNICATION
[facility] has admitted your patient to [address] [place] [address] service with the following disciplines:      SN and MSW  This report is informational only, no responses is needed  Primary focus of home health care cardiac rt chf  Patient stated goals of care sleep better in bed  Anticipated visit pattern and next visit date weekly referred to pals  Significant clinical findings none  Potential barriers to goal achievement compliance with lifest yle modifications  Other pertinent information none    Thank you for allowing us to participate in the care of your patient        Margarita Alexandra RN

## 2022-06-28 ENCOUNTER — HOME CARE VISIT (OUTPATIENT)
Dept: HOME HEALTH SERVICES | Facility: HOME HEALTHCARE | Age: 87
End: 2022-06-28
Payer: COMMERCIAL

## 2022-06-28 VITALS
RESPIRATION RATE: 20 BRPM | DIASTOLIC BLOOD PRESSURE: 64 MMHG | WEIGHT: 274 LBS | OXYGEN SATURATION: 96 % | SYSTOLIC BLOOD PRESSURE: 130 MMHG | HEART RATE: 78 BPM | TEMPERATURE: 97.2 F | BODY MASS INDEX: 37.95 KG/M2

## 2022-06-28 PROCEDURE — G0155 HHCP-SVS OF CSW,EA 15 MIN: HCPCS

## 2022-06-28 PROCEDURE — G0299 HHS/HOSPICE OF RN EA 15 MIN: HCPCS

## 2022-06-30 ENCOUNTER — APPOINTMENT (OUTPATIENT)
Dept: LAB | Facility: HOSPITAL | Age: 87
End: 2022-06-30
Attending: INTERNAL MEDICINE
Payer: COMMERCIAL

## 2022-06-30 DIAGNOSIS — I10 ESSENTIAL HYPERTENSION: ICD-10-CM

## 2022-06-30 LAB
ANION GAP SERPL CALCULATED.3IONS-SCNC: 10 MMOL/L (ref 4–13)
BUN SERPL-MCNC: 49 MG/DL (ref 5–25)
CALCIUM SERPL-MCNC: 9.1 MG/DL (ref 8.3–10.1)
CHLORIDE SERPL-SCNC: 99 MMOL/L (ref 100–108)
CO2 SERPL-SCNC: 27 MMOL/L (ref 21–32)
CREAT SERPL-MCNC: 2.33 MG/DL (ref 0.6–1.3)
GFR SERPL CREATININE-BSD FRML MDRD: 24 ML/MIN/1.73SQ M
GLUCOSE P FAST SERPL-MCNC: 168 MG/DL (ref 65–99)
POTASSIUM SERPL-SCNC: 3.8 MMOL/L (ref 3.5–5.3)
SODIUM SERPL-SCNC: 136 MMOL/L (ref 136–145)

## 2022-06-30 PROCEDURE — 36415 COLL VENOUS BLD VENIPUNCTURE: CPT

## 2022-06-30 PROCEDURE — 80048 BASIC METABOLIC PNL TOTAL CA: CPT

## 2022-06-30 NOTE — ASSESSMENT & PLAN NOTE
Anesthesia Pre-Procedure Evaluation    Patient: Priyanka Hathaway   MRN: [id number] : 1973        Procedure : Procedure(s):  Right Breast Biopsy after Wire Localization          Past Medical History:   Diagnosis Date     Motion sickness      Orthopnea      PONV (postoperative nausea and vomiting)       Past Surgical History:   Procedure Laterality Date     ADENOIDECTOMY        Allergies   Allergen Reactions     Blood-Group Specific Substance      Patient has probable passive anti-D. Blood product orders may be delayed. Draw one red top and two purple top tubes for all Type and screen/Type and crossmatch orders.     Codeine      Morphine       Social History     Tobacco Use     Smoking status: Never Smoker     Smokeless tobacco: Never Used   Substance Use Topics     Alcohol use: Yes      Wt Readings from Last 1 Encounters:   22 90.4 kg (199 lb 4.8 oz)        Anesthesia Evaluation   Pt has had prior anesthetic.     History of anesthetic complications  - PONV.      ROS/MED HX  ENT/Pulmonary:       Neurologic:       Cardiovascular:     (+) -----orthopnea/PND,     METS/Exercise Tolerance:     Hematologic:       Musculoskeletal:       GI/Hepatic:       Renal/Genitourinary:       Endo:     (+) Obesity,     Psychiatric/Substance Use:       Infectious Disease:       Malignancy:       Other:            Physical Exam    Airway        Mallampati: II    Neck ROM: full     Respiratory Devices and Support         Dental  no notable dental history         Cardiovascular   cardiovascular exam normal          Pulmonary   pulmonary exam normal                OUTSIDE LABS:  CBC: No results found for: WBC, HGB, HCT, PLT  BMP: No results found for: NA, POTASSIUM, CHLORIDE, CO2, BUN, CR, GLC  COAGS: No results found for: PTT, INR, FIBR  POC: No results found for: BGM, HCG, HCGS  HEPATIC: No results found for: ALBUMIN, PROTTOTAL, ALT, AST, GGT, ALKPHOS, BILITOTAL, BILIDIRECT, REGAN  OTHER: No results found for: PH, LACT, A1C,  Patient desaturated to 84% with ambulation in ED  Maintain o2 sats above 88%  Patient uses oxygen intermittently at home-by reviewing the records it appears that he has to use oxygen at nighttime and he is not compliant with it for his obstructive sleep apnea  Patient reported that he is planning to lose weight and changes diet that may help with his respiratory status ELVIN, PHOS, MAG, LIPASE, AMYLASE, TSH, T4, T3, CRP, SED    Anesthesia Plan    ASA Status:  2      Anesthesia Type: MAC.              Consents    Anesthesia Plan(s) and associated risks, benefits, and realistic alternatives discussed. Questions answered and patient/representative(s) expressed understanding.     - Discussed: Risks, Benefits and Alternatives for the PROCEDURE were discussed     - Discussed with:  Patient      - Extended Intubation/Ventilatory Support Discussed: No.      - Patient is DNR/DNI Status: No    Use of blood products discussed: No .     Postoperative Care    Pain management: Multi-modal analgesia.        Comments:                Nazia Martell MD

## 2022-07-01 ENCOUNTER — HOME CARE VISIT (OUTPATIENT)
Dept: HOME HEALTH SERVICES | Facility: HOME HEALTHCARE | Age: 87
End: 2022-07-01
Payer: COMMERCIAL

## 2022-07-05 ENCOUNTER — HOME CARE VISIT (OUTPATIENT)
Dept: HOME HEALTH SERVICES | Facility: HOME HEALTHCARE | Age: 87
End: 2022-07-05
Payer: COMMERCIAL

## 2022-07-05 VITALS
BODY MASS INDEX: 37.57 KG/M2 | OXYGEN SATURATION: 92 % | RESPIRATION RATE: 20 BRPM | SYSTOLIC BLOOD PRESSURE: 118 MMHG | WEIGHT: 271.25 LBS | HEART RATE: 74 BPM | TEMPERATURE: 97.2 F | DIASTOLIC BLOOD PRESSURE: 62 MMHG

## 2022-07-05 PROCEDURE — G0299 HHS/HOSPICE OF RN EA 15 MIN: HCPCS

## 2022-07-13 ENCOUNTER — HOSPITAL ENCOUNTER (OUTPATIENT)
Dept: NON INVASIVE DIAGNOSTICS | Age: 87
Discharge: HOME | End: 2022-07-13
Payer: COMMERCIAL

## 2022-07-13 DIAGNOSIS — I10 ESSENTIAL HYPERTENSION: ICD-10-CM

## 2022-07-13 DIAGNOSIS — R06.09 DOE (DYSPNEA ON EXERTION): ICD-10-CM

## 2022-07-13 DIAGNOSIS — I50.32 CHRONIC DIASTOLIC CONGESTIVE HEART FAILURE (HCC): ICD-10-CM

## 2022-07-13 DIAGNOSIS — I25.10 CORONARY ARTERY CALCIFICATION SEEN ON CAT SCAN: ICD-10-CM

## 2022-07-13 LAB
CHEST PAIN STATEMENT: NORMAL
MAX DIASTOLIC BP: 67 MMHG
MAX HEART RATE: 102 BPM
MAX PREDICTED HEART RATE: 134 BPM
MAX. SYSTOLIC BP: 128 MMHG
NUC STRESS EJECTION FRACTION: 68 %
PROTOCOL NAME: NORMAL
REASON FOR TERMINATION: NORMAL
SL CV REST NUCLEAR ISOTOPE DOSE: 16.2 MCI
SL CV STRESS NUCLEAR ISOTOPE DOSE: 46.1 MCI
STRESS ANGINA INDEX: 0
STRESS BASELINE HR: 81 BPM
STRESS POST PEAK BP: 128 MMHG
STRESS ST DEPRESSION: 0 MM
STRESS/REST PERFUSION RATIO: 1.21
TARGET HR FORMULA: NORMAL
TEST INDICATION: NORMAL
TIME IN EXERCISE PHASE: NORMAL

## 2022-07-13 PROCEDURE — 93017 CV STRESS TEST TRACING ONLY: CPT

## 2022-07-13 PROCEDURE — 93018 CV STRESS TEST I&R ONLY: CPT | Performed by: INTERNAL MEDICINE

## 2022-07-13 PROCEDURE — G1004 CDSM NDSC: HCPCS

## 2022-07-13 PROCEDURE — 78452 HT MUSCLE IMAGE SPECT MULT: CPT | Performed by: INTERNAL MEDICINE

## 2022-07-13 PROCEDURE — 78452 HT MUSCLE IMAGE SPECT MULT: CPT

## 2022-07-13 PROCEDURE — 93016 CV STRESS TEST SUPVJ ONLY: CPT | Performed by: INTERNAL MEDICINE

## 2022-07-13 PROCEDURE — A9502 TC99M TETROFOSMIN: HCPCS

## 2022-07-13 RX ADMIN — REGADENOSON 0.4 MG: 0.08 INJECTION, SOLUTION INTRAVENOUS at 09:51

## 2022-07-14 ENCOUNTER — OFFICE VISIT (OUTPATIENT)
Dept: FAMILY MEDICINE CLINIC | Facility: HOSPITAL | Age: 87
End: 2022-07-14
Payer: COMMERCIAL

## 2022-07-14 ENCOUNTER — OFFICE VISIT (OUTPATIENT)
Dept: CARDIOLOGY CLINIC | Facility: CLINIC | Age: 87
End: 2022-07-14
Payer: COMMERCIAL

## 2022-07-14 VITALS
OXYGEN SATURATION: 96 % | SYSTOLIC BLOOD PRESSURE: 98 MMHG | WEIGHT: 262.2 LBS | HEART RATE: 96 BPM | DIASTOLIC BLOOD PRESSURE: 58 MMHG | BODY MASS INDEX: 36.31 KG/M2

## 2022-07-14 VITALS
BODY MASS INDEX: 36.99 KG/M2 | SYSTOLIC BLOOD PRESSURE: 124 MMHG | HEART RATE: 96 BPM | HEIGHT: 71 IN | WEIGHT: 264.2 LBS | DIASTOLIC BLOOD PRESSURE: 64 MMHG

## 2022-07-14 DIAGNOSIS — I10 ESSENTIAL HYPERTENSION: ICD-10-CM

## 2022-07-14 DIAGNOSIS — N18.4 CHRONIC RENAL DISEASE, STAGE IV (HCC): ICD-10-CM

## 2022-07-14 DIAGNOSIS — J44.9 MODERATE COPD (CHRONIC OBSTRUCTIVE PULMONARY DISEASE) (HCC): ICD-10-CM

## 2022-07-14 DIAGNOSIS — I50.32 CHRONIC DIASTOLIC CONGESTIVE HEART FAILURE (HCC): Primary | ICD-10-CM

## 2022-07-14 DIAGNOSIS — R60.0 LOCALIZED EDEMA: ICD-10-CM

## 2022-07-14 DIAGNOSIS — N18.4 TYPE 2 DIABETES MELLITUS WITH STAGE 4 CHRONIC KIDNEY DISEASE, WITHOUT LONG-TERM CURRENT USE OF INSULIN (HCC): ICD-10-CM

## 2022-07-14 DIAGNOSIS — E11.22 TYPE 2 DIABETES MELLITUS WITH STAGE 4 CHRONIC KIDNEY DISEASE, WITHOUT LONG-TERM CURRENT USE OF INSULIN (HCC): ICD-10-CM

## 2022-07-14 DIAGNOSIS — I10 ESSENTIAL HYPERTENSION, BENIGN: ICD-10-CM

## 2022-07-14 DIAGNOSIS — G47.33 OSA (OBSTRUCTIVE SLEEP APNEA): ICD-10-CM

## 2022-07-14 DIAGNOSIS — I50.33 ACUTE ON CHRONIC DIASTOLIC (CONGESTIVE) HEART FAILURE (HCC): ICD-10-CM

## 2022-07-14 PROBLEM — G31.84 MILD COGNITIVE IMPAIRMENT: Status: RESOLVED | Noted: 2022-06-22 | Resolved: 2022-07-14

## 2022-07-14 PROCEDURE — 99214 OFFICE O/P EST MOD 30 MIN: CPT | Performed by: INTERNAL MEDICINE

## 2022-07-14 RX ORDER — LABETALOL 200 MG/1
200 TABLET, FILM COATED ORAL 2 TIMES DAILY
Qty: 180 TABLET | Refills: 3 | Status: SHIPPED | OUTPATIENT
Start: 2022-07-14

## 2022-07-14 RX ORDER — POTASSIUM CHLORIDE 20 MEQ/1
20 TABLET, EXTENDED RELEASE ORAL 2 TIMES DAILY
Start: 2022-07-14 | End: 2022-08-17

## 2022-07-14 NOTE — PATIENT INSTRUCTIONS

## 2022-07-14 NOTE — PROGRESS NOTES
Cardiology Follow Up    Viktoria Luis  1935  [id number]  [facility] [address]  6 [address] [address] [phone]    1  Chronic diastolic congestive heart failure [facility])  Ambulatory  Referral to Cardiac Rehabilitation   2  Essential hypertension  Ambulatory  Referral to Cardiac Rehabilitation   3  CHI (obstructive sleep apnea)     4  Chronic renal disease, stage IV ([address] )     5  Localized edema     6  Moderate COPD (chronic obstructive pulmonary disease) (Former[facility])         Discussion/Summary:    1  Chronic diastolic CHF Jerrica Jimenez was in the hospital again in May with volume overload, and he was switched to Bumex and metolazone was ordered  He was on 3 mg twice daily, but due to hypotension and fatigue he decrease this to daily  It did help  His internist suggested decreasing labetalol which I agree with  He also takes metolazone twice a wee which he should take 20-30 minutes before the morning Bumex  He is following his weight and a low-sodium diet  Blood work will be followed closely  His creatinine has remained stable  Given his recurrent hospitalizations we will refer him to cardiac rehabilitation  2   Hypertension - He is on labetalol in addition to his diuretics  His blood pressure was running low with higher doses of Bumex  It was instructed that he lowers the labetalol which I agree and so we can have room for higher diuretics if needed  3   Obstructive sleep apnea - He conveys many features of this and likely has it  A sleep study was ordered and he is going to get this done in the near future  He does wear oxygen at night  Interval History:     Mr Manisha Pfeiffer comes in for follow-up given his history of diastolic and right-sided CHF and hypertension  He also has COPD, diabetes mellitus and chronic kidney disease    I met him during hospitalization in October 2021 in which she presented volume overloaded  At 1st his internist tried to increase his Lasix but he had very little response to this  He did respond to IV diuretics and was switched over to torsemide  When I last saw him he 40 mg in the morning and 20 mg in the afternoon  His creatinine has remained stable  Manual Pillar was then back in the hospital in May with volume overloaded CHF  His diuretic therapy was changed to Bumex 2 mg twice daily  He had some weight gain in the outpatient setting and this was briefly increased to 3 mg twice daily  However it became profoundly fatigued, likely from hypotension any decrease this on his own and is currently taking 3 mg daily  He also takes metolazone twice a week  He had an updated echocardiogram that showed no change  Yesterday he had a stress nuclear study which was normal     Rich's edema has improved  He does have chronic shortness of breath with exertion which is associated with his CHF, COPD and likely underlying obstructive sleep apnea  A sleep study was ordered but he has not got this done as of yet  He does wear oxygen at night  He denies orthopnea or PND  He denies chest pain or any symptoms of angina  No palpitations, lightheadedness or any syncope        Patient Active Problem List   Diagnosis    Simple chronic bronchitis ([facility])    Type 2 diabetes mellitus ([facility] )    Essential hypertension    CHI (obstructive sleep apnea)    Obesity, morbid ([facility])    Moderate COPD (chronic obstructive pulmonary disease) ([facility])    Benign prostatic hyperplasia with urinary hesitancy    Localized edema    Bullous lesion    Morbid obesity due to excess calories ([facility])    Chronic renal disease, stage IV ([facility])    Chest x-ray abnormality    Non healing left heel wound    Chronic diastolic congestive heart failure (HCC)    Depression    Other constipation    Insomnia due to medical condition     Past Medical History:   Diagnosis Date    Anxiety 2020    COPD [facility]chronic obstructive pulmonary disease) ([facility] )     Coronary artery disease 2012    Depression 2020    Diabetes mellitus ([facility] )     Herpes zoster     Hypertension     Memory loss     possible    Mycoplasma pneumonia     Obesity     Osteoarthritis     Renal calculi      Social History     Socioeconomic History    Marital status: /Civil Union     Spouse name: Not on file    Number of children: Not on file    Years of education: Not on file    Highest education level: Not on file   Occupational History    Not on file   Tobacco Use    Smoking status: Former Smoker     Packs/day: 2 00     Years: 42 00     Pack years: 84 00     Types: Cigarettes     Start date: 1956     Quit date: 1998     Years since quittin 5    Smokeless tobacco: Never Used   Vaping Use    Vaping Use: Never used   Substance and Sexual Activity    Alcohol use: Yes     Alcohol/week: 1 0 standard drink     Types: 1 Cans of beer per week     Comment: 1-2 beers a months    Drug use: No    Sexual activity: Not Currently   Other Topics Concern    Not on file   Social History Narrative    Active advance directive    Daily coffee consumption, 1 cup/day    Denied exercise habits    Good dental hygiene    Supportive and safe    Active family support     Social Determinants of Health     Financial Resource Strain: Not on file   Food Insecurity: No Food Insecurity    Worried About Running Out of Food in the Last Year: Never true    Anthony of Food in the Last Year: Never true   Transportation Needs: No Transportation Needs    Lack of Transportation (Medical): No    Lack of Transportation (Non-Medical):  No   Physical Activity: Not on file   Stress: Not on file   Social Connections: Not on file   Intimate Partner Violence: Not on file   Housing Stability: Low Risk     Unable to Pay for Housing in the Last Year: No    Number of Places Lived in the Last Year: 1    Unstable Housing in the Last Year: No      Family History   Problem Relation Age of Onset  Breast cancer Mother         She  in 12  Age 80    Stroke Mother     Pneumonia Father     Substance Abuse Neg Hx     Mental illness Neg Hx      Past Surgical History:   Procedure Laterality Date    CATARACT EXTRACTION      with insert intraoculat lens prosthesis    HEMORRHOID SURGERY      NECK SURGERY      for bone spur       Current Outpatient Medications:     albuterol (ProAir HFA) 90 mcg/act inhaler, Inhale 2 puffs every 4 (four) hours as needed for wheezing, Disp: 18 g, Rfl: 3    bumetanide (BUMEX) 2 mg tablet, TAKE 1 5 TABLETS (3 MG TOTAL) BY MOUTH IN THE MORNING AND 1 5 TABLETS (3 MG TOTAL) IN THE EVENING   (Patient taking differently: Take 2 mg by mouth daily Pt just takes once in the morning ), Disp: 270 tablet, Rfl: 2    fluticasone (FLONASE) 50 mcg/act nasal spray, SPRAY 1 SPRAY INTO EACH NOSTRIL TWICE A DAY, Disp: 48 mL, Rfl: 1    fluticasone-umeclidinium-vilanterol (Trelegy Ellipta) 200-62 5-25 MCG/INH AEPB inhaler, Inhale 1 puff daily Rinse mouth after use , Disp: 180 blister, Rfl: 3    labetalol (NORMODYNE) 200 mg tablet, Take 1 tablet (200 mg total) by mouth 2 (two) times a day, Disp: 180 tablet, Rfl: 3    Melatonin 5 MG CAPS, Take 5 mg by mouth daily at bedtime as needed, Disp: , Rfl:     metFORMIN (GLUCOPHAGE) 500 mg tablet, TAKE 1 TABLET BY MOUTH TWICE A DAY WITH MEALS, Disp: 180 tablet, Rfl: 1    metolazone (ZAROXOLYN) 2 5 mg tablet, Take 1 tablet (2 5 mg total) by mouth daily (Patient taking differently: Take 2 5 mg by mouth Two times per week), Disp: 25 tablet, Rfl: 3    mupirocin (BACTROBAN) 2 % ointment, Apply topically in the morning Daily, when dressing is changed, 3x per week, Disp: 22 g, Rfl: 0    potassium chloride (K-DUR,KLOR-CON) 20 mEq tablet, Take 1 tablet (20 mEq total) by mouth 2 (two) times a day, Disp: , Rfl:     tamsulosin (FLOMAX) 0 4 mg, TAKE 1 CAPSULE BY MOUTH EVERY DAY WITH DINNER, Disp: 90 capsule, Rfl: 3    glucose blood (OneTouch Verio) test strip, Use one new strip daily DX:E11 [id number], Disp: 100 strip, Rfl: 3  No Known Allergies    Labs:  Lab Results   Component Value Date     09/18/2015    K 3 8 06/30/2022    K 4 2 09/18/2015    CL 99 (L) 06/30/2022     09/18/2015    CO2 27 06/30/2022    CO2 24 5 09/18/2015    BUN 49 (H) 06/30/2022    BUN 18 09/18/2015    CREATININE 2 33 (H) 06/30/2022    CREATININE 1 22 09/18/2015    GLUCOSE 152 (H) 09/18/2015    CALCIUM 9 1 06/30/2022    CALCIUM 8 9 09/18/2015     Lab Results   Component Value Date    WBC 7 66 05/15/2022    WBC 7 69 09/18/2015    HGB 13 9 05/15/2022    HGB 15 0 09/18/2015    HCT 44 2 05/15/2022    HCT 46 4 09/18/2015    MCV 91 05/15/2022    MCV 88 09/18/2015     05/15/2022     09/18/2015     Lab Results   Component Value Date    CHOL 173 09/18/2015    TRIG 127 03/15/2022    TRIG 111 09/18/2015    HDL 60 03/15/2022    HDL 49 09/18/2015     Imaging:    STRESSNUCLEAR (7/14/22):   Stress ECG: A pharmacological stress test was performed using regadenoson  The patient experienced no angina during the test  The patient reached the end of the protocol  The patient reported dyspnea during the stress test  Symptoms ended during recovery    Stress ECG: No ST deviation is noted  Arrhythmias during stress: PVCs, 7 beats SVT  The ECG was not diagnostic due to pharmacological (vasodilator) stress    Perfusion: There are no perfusion defects    Stress Function: Left ventricular function post-stress is normal  Post-stress ejection fraction is 68 %      Normal pharmacologic nuclear stress test        ECHO:    Left Ventricle: Left ventricular cavity size is normal  The left ventricular ejection fraction is 65%  Systolic function is normal  Wall motion is normal  Diastolic function is mildly abnormal, consistent with grade I (abnormal) relaxation  Wall thickness is mildly increased  There is mild concentric hypertrophy    Mitral Valve: The valve has normal function   There is systolic anterior motion  There is mild annular calcification  There is trace regurgitation  Review of Systems:  Review of Systems   Constitutional: Positive for fatigue  HENT: Negative  Eyes: Negative  Respiratory: Positive for shortness of breath  Cardiovascular: Positive for leg swelling  Gastrointestinal: Negative  Musculoskeletal: Negative  Skin: Negative  Allergic/Immunologic: Negative  Neurological: Negative  Hematological: Negative  Psychiatric/Behavioral: Positive for sleep disturbance  All other systems reviewed and are negative  Vitals:    22 1419   BP: 124/64   BP Location: Right arm   Patient Position: Sitting   Cuff Size: Large   Pulse: 96   Weight: 120 kg (264 lb 3 2 oz)   Height: 5' 11" (1 803 m)       Physical Exam:  Physical Exam  Vitals and nursing note reviewed  Constitutional:       Appearance: He is well-developed  HENT:      Head: Normocephalic and atraumatic  Eyes:      General: No scleral icterus  Right eye: No discharge  Left eye: No discharge  Pupils: Pupils are equal, round, and reactive to light  Neck:      Thyroid: No thyromegaly  Vascular: No JVD  Cardiovascular:      Rate and Rhythm: Normal rate and regular rhythm  No extrasystoles are present  Pulses: Normal pulses  No decreased pulses  Heart sounds: Normal heart sounds, S1 normal and S2 normal  No murmur heard  No friction rub  No gallop  Pulmonary:      Effort: Pulmonary effort is normal  No respiratory distress  Breath sounds: Decreased breath sounds present  No wheezing, rhonchi or rales  Abdominal:      General: Bowel sounds are normal  There is no distension  Palpations: Abdomen is soft  Tenderness: There is no abdominal tenderness  Musculoskeletal:         General: No tenderness or deformity  Normal range of motion  Cervical back: Normal range of motion and neck supple  Right lower le+ Edema present  Left lower le+ Edema present  Skin:     General: Skin is warm and dry  Findings: No rash  Neurological:      Mental Status: He is alert and oriented to person, place, and time  Cranial Nerves: No cranial nerve deficit  Psychiatric:         Thought Content: Thought content normal          Judgment: Judgment normal        Counseling / Coordination of Care  Total office time spent today 30 minutes  Greater than 50% of total time was spent with the patient and / or family counseling and / or coordination of care

## 2022-07-14 NOTE — PROGRESS NOTES
Assessment/Plan:       Diagnoses and all orders for this visit:    Chronic diastolic congestive heart failure (HCC)    Acute on chronic diastolic (congestive) heart failure (HCC)  -     potassium chloride (K-DUR,KLOR-CON) 20 mEq tablet; Take 1 tablet (20 mEq total) by mouth 2 (two) times a day    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)    Chronic renal disease, stage IV (HCC)    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 disease, without long-term current use of insulin (HCC)  -     Hemoglobin A1C; Future  -     Basic metabolic panel; Future    Essential hypertension    Essential hypertension, benign  -     labetalol (NORMODYNE) 200 mg tablet; Take 1 tablet (200 mg total) by mouth 2 (two) times a day          All of the above diagnoses have been assessed  Additional COMMENTS/PLAN:  Reduce the labetalol to 200 mg b i d  He will be seen back in 5-6 weeks with attention to blood pressure  A BMP and hemoglobin A1c we done prior to that  Subjective:      Patient ID: Albaro Guzmán is a 81 y o  male  HPI     Congestive Heart Failure  Patient presents for re-evaluation of congestive heart failure  The patient is compliant with dietary restriction of fluids and sodium  There is self-monitoring of weight  The patient denies significant dyspnea, orthopnea or edema  As lost 20+ lbs  COPD-has O2 hs and prn  DM-this is under good control  The following portions of the patient's history were revised and updated as appropriate: Problem list, allergies, med list, FH, SH, Past medical and surgical histories  Current Outpatient Medications   Medication Sig Dispense Refill    albuterol (ProAir HFA) 90 mcg/act inhaler Inhale 2 puffs every 4 (four) hours as needed for wheezing 18 g 3    bumetanide (BUMEX) 2 mg tablet TAKE 1 5 TABLETS (3 MG TOTAL) BY MOUTH IN THE MORNING AND 1 5 TABLETS (3 MG TOTAL) IN THE EVENING   270 tablet 2    fluticasone (FLONASE) 50 mcg/act nasal spray SPRAY 1 SPRAY INTO EACH NOSTRIL TWICE A DAY 48 mL 1    fluticasone-umeclidinium-vilanterol (Trelegy Ellipta) 200-62 5-25 MCG/INH AEPB inhaler Inhale 1 puff daily Rinse mouth after use  180 blister 3    labetalol (NORMODYNE) 200 mg tablet Take 1 tablet (200 mg total) by mouth 2 (two) times a day 180 tablet 3    Melatonin 5 MG CAPS Take 5 mg by mouth daily at bedtime as needed      metFORMIN (GLUCOPHAGE) 500 mg tablet TAKE 1 TABLET BY MOUTH TWICE A DAY WITH MEALS 180 tablet 1    metolazone (ZAROXOLYN) 2 5 mg tablet Take 1 tablet (2 5 mg total) by mouth daily (Patient taking differently: Take 2 5 mg by mouth Two times per week) 25 tablet 3    potassium chloride (K-DUR,KLOR-CON) 20 mEq tablet Take 1 tablet (20 mEq total) by mouth 2 (two) times a day      tamsulosin (FLOMAX) 0 4 mg TAKE 1 CAPSULE BY MOUTH EVERY DAY WITH DINNER 90 capsule 3    glucose blood (OneTouch Verio) test strip Use one new strip daily DX:E11 9 100 strip 3    mupirocin (BACTROBAN) 2 % ointment Apply topically in the morning Daily, when dressing is changed, 3x per week (Patient not taking: Reported on 7/14/2022) 22 g 0     No current facility-administered medications for this visit  Review of Systems   All other systems reviewed and are negative  Objective:    BP 98/58   Pulse 96   Wt 119 kg (262 lb 3 2 oz)   SpO2 96%   BMI 36 31 kg/m²     BP Readings from Last 3 Encounters:   07/14/22 98/58   07/05/22 118/62   06/28/22 130/64                  Wt Readings from Last 3 Encounters:   07/14/22 119 kg (262 lb 3 2 oz)   07/05/22 123 kg (271 lb 4 oz)   06/28/22 124 kg (274 lb)         Physical Exam  Vitals reviewed  Constitutional:       Appearance: He is obese  Neck:      Comments: No JVD  Cardiovascular:      Rate and Rhythm: Normal rate and regular rhythm  Pulmonary:      Comments: Dec BS  Abdominal:      General: Abdomen is flat  Bowel sounds are normal       Palpations: Abdomen is soft     Musculoskeletal:      Comments: Plus 1 edema   Neurological:      General: No focal deficit present  Mental Status: He is oriented to person, place, and time  Hospital Outpatient Visit on 07/13/2022   Component Date Value Ref Range Status    Protocol Name 07/13/2022 KIMBERLEY SIT   Final    Time In Exercise Phase 07/13/2022 00:03:00   Final    MAX  SYSTOLIC BP 67/02/2152 802  mmHg Final    Max Diastolic Bp 30/68/9610 67  mmHg Final    Max Heart Rate 07/13/2022 102  BPM Final    Max Predicted Heart Rate 07/13/2022 134  BPM Final    Reason for Termination 07/13/2022 PROTOCOL COMPLETED   Final    Test Indication 07/13/2022 TRIMBLE   Final    Target Hr Formular 07/13/2022 (220 - Age)*100%   Final    Chest Pain Statement 07/13/2022 none   Final   Hospital Outpatient Visit on 07/13/2022   Component Date Value Ref Range Status    Angina Index 07/13/2022 0   Final    Baseline HR 07/13/2022 81  bpm Final    Post peak BP 07/13/2022 128 0  mmHg Final    Rest Nuclear Isotope Dose 07/13/2022 16 20  mCi Final    Stress Nuclear Isotope Dose 07/13/2022 46 10  mCi Final    ST Depression (mm) 07/13/2022 0  mm Final    EF (%) 07/13/2022 68  % Final    Stress/rest perfusion ratio 07/13/2022 1 21   Final         Erik Petit MD    Some or all of this note was generated with a voice recognition dictation system and therefore my contain grammatical or spelling errors

## 2022-07-19 DIAGNOSIS — J44.9 CHRONIC OBSTRUCTIVE PULMONARY DISEASE, UNSPECIFIED COPD TYPE (HCC): ICD-10-CM

## 2022-07-19 RX ORDER — ALBUTEROL SULFATE 90 UG/1
AEROSOL, METERED RESPIRATORY (INHALATION)
Qty: 8.5 G | Refills: 3 | Status: SHIPPED | OUTPATIENT
Start: 2022-07-19

## 2022-07-21 ENCOUNTER — OFFICE VISIT (OUTPATIENT)
Dept: PULMONOLOGY | Facility: HOSPITAL | Age: 87
End: 2022-07-21
Payer: COMMERCIAL

## 2022-07-21 VITALS
RESPIRATION RATE: 18 BRPM | OXYGEN SATURATION: 96 % | SYSTOLIC BLOOD PRESSURE: 118 MMHG | HEART RATE: 111 BPM | BODY MASS INDEX: 36.96 KG/M2 | DIASTOLIC BLOOD PRESSURE: 60 MMHG | TEMPERATURE: 98.7 F | HEIGHT: 71 IN | WEIGHT: 264 LBS

## 2022-07-21 DIAGNOSIS — G47.33 OSA (OBSTRUCTIVE SLEEP APNEA): ICD-10-CM

## 2022-07-21 DIAGNOSIS — I50.32 CHRONIC DIASTOLIC CONGESTIVE HEART FAILURE (HCC): ICD-10-CM

## 2022-07-21 DIAGNOSIS — J44.9 MODERATE COPD (CHRONIC OBSTRUCTIVE PULMONARY DISEASE) (HCC): Primary | ICD-10-CM

## 2022-07-21 PROCEDURE — 99215 OFFICE O/P EST HI 40 MIN: CPT | Performed by: INTERNAL MEDICINE

## 2022-07-21 RX ORDER — ALBUTEROL SULFATE 2.5 MG/3ML
2.5 SOLUTION RESPIRATORY (INHALATION) EVERY 6 HOURS PRN
Qty: 270 ML | Refills: 5 | Status: SHIPPED | OUTPATIENT
Start: 2022-07-21

## 2022-07-21 NOTE — ASSESSMENT & PLAN NOTE
Maintain on current trilogy 1 puff daily but I have given him a nebulizer and asked him to use albuterol 2 to 3 times a day and his nebulizer  I also think he would be a great candidate for pulmonary rehab and he is agreeable

## 2022-07-21 NOTE — ASSESSMENT & PLAN NOTE
Wt Readings from Last 3 Encounters:   07/21/22 120 kg (264 lb)   07/14/22 120 kg (264 lb 3 2 oz)   07/14/22 119 kg (262 lb 3 2 oz)     Unfortunately Mr Simone Trujillo appears to be symptomatic mostly from his difficult to manage heart failure and fluid retention combined with advanced CKD  A portable oxygen concentrator will help with his symptoms I will attempt to expedite this

## 2022-07-21 NOTE — ASSESSMENT & PLAN NOTE
I do think Mr  Josiah Maciel would benefit from nocturnal noninvasive ventilation  He has no evidence of hypercarbia but has sleep apnea from several years ago which is likely progressed    I would like to expedite his sleep study and get him therapy as soon as possible

## 2022-07-21 NOTE — PROGRESS NOTES
Assessment/Plan:    CHI (obstructive sleep apnea)  I do think Mr Ange Mata would benefit from nocturnal noninvasive ventilation  He has no evidence of hypercarbia but has sleep apnea from several years ago which is likely progressed  I would like to expedite his sleep study and get him therapy as soon as possible    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)  Maintain on current trilogy 1 puff daily but I have given him a nebulizer and asked him to use albuterol 2 to 3 times a day and his nebulizer  I also think he would be a great candidate for pulmonary rehab and he is agreeable  Chronic diastolic congestive heart failure (HCC)  Wt Readings from Last 3 Encounters:   07/21/22 120 kg (264 lb)   07/14/22 120 kg (264 lb 3 2 oz)   07/14/22 119 kg (262 lb 3 2 oz)     Unfortunately Mr Ange Mata appears to be symptomatic mostly from his difficult to manage heart failure and fluid retention combined with advanced CKD  A portable oxygen concentrator will help with his symptoms I will attempt to expedite this  Diagnoses and all orders for this visit:    Moderate COPD (chronic obstructive pulmonary disease) ([address] )  -     Ambulatory Referral to Pulmonary Rehabilitation; Future  -     Nebulizer  -     Nebulizer Supplies  -     albuterol (2 5 mg/3 mL) 0 083 % nebulizer solution; Take 3 mL (2 5 mg total) by nebulization every 6 (six) hours as needed for wheezing or shortness of breath    Chronic diastolic congestive heart failure (HCC)    CHI (obstructive sleep apnea)          Subjective:      Patient ID: Edgardo Capellan is a 81 y o  male  Esperanza Cowden is been struggling over the past several months  He has had difficulty with dyspnea on exertion  He is unable to walk more than a few steps without stopping to rest   He feels weak tired and his lower extremities are extremely swollen  He denies any cough or wheeze      primary symptoms        The following portions of the patient's history were reviewed and updated as appropriate: allergies, current medications, past family history, past medical history, past social history, past surgical history and problem list     Review of Systems   Constitutional: Negative  HENT: Negative  Eyes: Negative  Respiratory: Positive for chest tightness and shortness of breath  Cardiovascular: Positive for leg swelling  Gastrointestinal: Negative  Endocrine: Negative  Genitourinary: Negative  Musculoskeletal: Positive for gait problem  Allergic/Immunologic: Negative  Hematological: Negative  Psychiatric/Behavioral: Negative  Objective:      /60 (BP Location: Left arm, Patient Position: Sitting, Cuff Size: Adult)   Pulse (!) 111   Temp 98 7 °F (37 1 °C) (Tympanic)   Resp 18   Ht 5' 11" (1 803 m)   Wt 120 kg (264 lb)   SpO2 96%   BMI 36 82 kg/m²          Physical Exam  Constitutional:       Appearance: He is well-developed  He is toxic-appearing  HENT:      Head: Normocephalic  Eyes:      Pupils: Pupils are equal, round, and reactive to light  Cardiovascular:      Rate and Rhythm: Normal rate  Pulmonary:      Effort: Pulmonary effort is normal  No respiratory distress  Breath sounds: No wheezing or rales  Abdominal:      Palpations: Abdomen is soft  Musculoskeletal:         General: Normal range of motion  Cervical back: Neck supple  Right lower leg: Edema present  Left lower leg: Edema present  Skin:     General: Skin is warm and dry  Neurological:      Mental Status: He is alert and oriented to person, place, and time           Answers for HPI/ROS submitted by the patient on 7/19/2022  Which of the following makes your symptoms worse?: any activity

## 2022-07-24 ENCOUNTER — HOSPITAL ENCOUNTER (OUTPATIENT)
Dept: SLEEP CENTER | Facility: CLINIC | Age: 87
Discharge: HOME | End: 2022-07-24
Payer: COMMERCIAL

## 2022-07-24 DIAGNOSIS — I50.32 CHRONIC DIASTOLIC CONGESTIVE HEART FAILURE (HCC): ICD-10-CM

## 2022-07-24 DIAGNOSIS — J44.9 MODERATE COPD (CHRONIC OBSTRUCTIVE PULMONARY DISEASE) (HCC): ICD-10-CM

## 2022-07-24 DIAGNOSIS — G47.33 OSA (OBSTRUCTIVE SLEEP APNEA): ICD-10-CM

## 2022-07-24 PROCEDURE — 95810 POLYSOM 6/> YRS 4/> PARAM: CPT

## 2022-07-24 PROCEDURE — 95810 POLYSOM 6/> YRS 4/> PARAM: CPT | Performed by: INTERNAL MEDICINE

## 2022-07-25 ENCOUNTER — TELEPHONE (OUTPATIENT)
Dept: NEPHROLOGY | Facility: CLINIC | Age: 87
End: 2022-07-25

## 2022-07-25 NOTE — PROGRESS NOTES
Sleep Study Documentation    Pre-Sleep Study       Sleep testing procedure explained to patient:YES    Patient napped prior to study:NO    Caffeine:Dayshift worker after 12PM   Caffeine use:YES- ice tea  12 ounces    Alcohol:Dayshift workers after 5PM: Alcohol use:NO    Typical day for patient:YES       Study Documentation    Sleep Study Indications: Snoring, Excessive Daytime Sleepiness, Impaired concentration/memory, COPD, HTN, CHF    Sleep Study: Diagnostic   Snore:None  Supplemental O2: no    O2 flow rate (L/min) range   O2 flow rate (L/min) final   Minimum SaO2 89%  Baseline SaO2 91%    EKG abnormalities: yes:  EPOCH example and comments: EKG arrhythmias? EEG abnormalities: no    Sleep Study Recorded < 2 hours: N/A    Sleep Study Recorded > 2 hours but incomplete study: N/A    Sleep Study Recorded 6 hours but no sleep obtained: NO    Patient classification: retired       Post-Sleep Study    Medication used at bedtime or during sleep study:NO    Patient reports time it took to fall asleep:greater than 60 minutes    Patient reports waking up during study:3 or more times  Patient reports returning to sleep in greater than 30 minutes  Patient reports sleeping 2 to 4 hours without dreaming  Patient reports sleep during study:worse than usual    Patient rated sleepiness: Somewhat sleepy or tired    PAP treatment:no

## 2022-07-25 NOTE — TELEPHONE ENCOUNTER
Appointment Confirmation   Person confirmed appointment with  If not patient, name of the person PT    Date and time of appointment 7/26    2:30   Patient acknowledged and will be at appointment? yes    Did you advise the patient that they will need a urine sample if they are a new patient?  Yes    Did you advise the patient to bring their current medications for verification? (including any OTC) Yes    Additional Information

## 2022-07-26 ENCOUNTER — CONSULT (OUTPATIENT)
Dept: NEPHROLOGY | Facility: HOSPITAL | Age: 87
End: 2022-07-26
Payer: COMMERCIAL

## 2022-07-26 VITALS
DIASTOLIC BLOOD PRESSURE: 62 MMHG | WEIGHT: 264 LBS | BODY MASS INDEX: 36.96 KG/M2 | HEART RATE: 85 BPM | HEIGHT: 71 IN | SYSTOLIC BLOOD PRESSURE: 118 MMHG

## 2022-07-26 DIAGNOSIS — R60.0 LOCALIZED EDEMA: ICD-10-CM

## 2022-07-26 DIAGNOSIS — N18.4 CHRONIC RENAL DISEASE, STAGE IV (HCC): Primary | ICD-10-CM

## 2022-07-26 DIAGNOSIS — E11.22 TYPE 2 DIABETES MELLITUS WITH STAGE 4 CHRONIC KIDNEY DISEASE, WITHOUT LONG-TERM CURRENT USE OF INSULIN (HCC): ICD-10-CM

## 2022-07-26 DIAGNOSIS — I50.32 CHRONIC DIASTOLIC CONGESTIVE HEART FAILURE (HCC): ICD-10-CM

## 2022-07-26 DIAGNOSIS — I10 ESSENTIAL HYPERTENSION: ICD-10-CM

## 2022-07-26 DIAGNOSIS — R39.11 BENIGN PROSTATIC HYPERPLASIA WITH URINARY HESITANCY: ICD-10-CM

## 2022-07-26 DIAGNOSIS — N40.1 BENIGN PROSTATIC HYPERPLASIA WITH URINARY HESITANCY: ICD-10-CM

## 2022-07-26 DIAGNOSIS — N18.4 TYPE 2 DIABETES MELLITUS WITH STAGE 4 CHRONIC KIDNEY DISEASE, WITHOUT LONG-TERM CURRENT USE OF INSULIN (HCC): ICD-10-CM

## 2022-07-26 PROCEDURE — 1160F RVW MEDS BY RX/DR IN RCRD: CPT | Performed by: INTERNAL MEDICINE

## 2022-07-26 PROCEDURE — 3078F DIAST BP <80 MM HG: CPT | Performed by: INTERNAL MEDICINE

## 2022-07-26 PROCEDURE — 3074F SYST BP LT 130 MM HG: CPT | Performed by: INTERNAL MEDICINE

## 2022-07-26 PROCEDURE — 99204 OFFICE O/P NEW MOD 45 MIN: CPT | Performed by: INTERNAL MEDICINE

## 2022-07-26 NOTE — PROGRESS NOTES
OFFICE CONSULT - Nephrology   Ann Epstein 81 y o  male MRN: [id number]    Encounter: [id number]        ASSESSMENT    81-year-old male with a past medical history of stage 4 chronic kidney disease, chronic diastolic congestive heart failure with recent hospitalization for volume overload, hypertension, type 2 diabetes mellitus who presents for evaluation of chronic kidney disease    1  Stage 4 chronic kidney disease with a baseline creatinine at 2 1-2 4  o Will check a urinalysis  o Will check a renal ultrasound  o Creatinine has been above 1 8 since 2019  o Estimated GFR is likely around 20-25 mL/minute  o Agree with down titration of labetalol and if blood pressure drops further would decrease to 100 mg twice daily  o CKD education planned  o Repeat blood work and monitor for anemia related to chronic kidney disease and bone mineral disease  o Compensated from a volume standpoint  o Continue cardiovascular risk reduction  o Given age and comorbidities he is at high risk for progression    2  Chronic diastolic congestive heart failure with localized edema  o Labetalol 200 mg twice daily  o Bumex 3 mg 2 times daily  o Metolazone 2 5 mg 2 times per week  o Compensated from a volume standpoint    3  Hypertension  o Now with episodes of hypotension  o Down titrated labetalol recently  o Continue current diuretic dosing  o Avoid further hypotension    4  Type 2 diabetes mellitus  o Last hemoglobin A1c was 6 7  o Given current GFR risk of metformin outweighs benefit so will discontinue  o Acid-base status is currently acceptable    5  Hypokalemia  o Continue potassium 20 mEq twice daily while taking diuretics    6  Emphysema, COPD, obstructive sleep apnea  o Following with pulmonary continue inhalers      It was nice meeting Rich today  Will start the workup for CKD  Given the advanced nature and chronicity we did discuss renal replacement therapy briefly  Will have him attend a CKD education course    Will repeat blood work in August and if stable follow-up in November  He was in agreement with this plan and had no further questions      HPI:  Celso Gallego is a 81 y o male who was referred by Katie Ochoa MD for evaluation of No chief complaint on file  81-year-old male with a history of hypertension, type 2 diabetes, diastolic congestive heart failure, COPD, he is a retired  who originally lived in [place] and now in [facility] to be closer to family    He has a history of diastolic congestive heart failure where he has had hospitalizations for volume overload he has had up titrate his diuretics and he is now on Bumex 3 mg twice daily with metolazone twice per week he also has a history of COPD, history of smoking  He currently has difficulties with ambulating for distances requires a wheelchair  He has no chest pain no fevers no chills no nausea vomiting diarrhea or constipation    I personally spent over half of a total 60 minutes face to face with the patient in counseling and discussion and/or coordination of care as described above  ROS:    Review of Systems   Constitutional: Negative  HENT: Negative  Eyes: Negative  Respiratory: Positive for shortness of breath  Cardiovascular: Positive for leg swelling  Gastrointestinal: Negative  Endocrine: Negative  Genitourinary: Negative  Musculoskeletal: Negative  Skin: Negative  Allergic/Immunologic: Negative  Neurological: Negative  Hematological: Negative  Psychiatric/Behavioral: Negative  All other systems reviewed and are negative  Allergies: Patient has no known allergies      Medications:   Current Outpatient Medications:     albuterol (2 5 mg/3 mL) 0 083 % nebulizer solution, Take 3 mL (2 5 mg total) by nebulization every 6 (six) hours as needed for wheezing or shortness of breath, Disp: 270 mL, Rfl: 5    albuterol (PROVENTIL HFA,VENTOLIN HFA) 90 mcg/act inhaler, TAKE 2 PUFFS BY MOUTH EVERY 4 HOURS AS NEEDED FOR WHEEZE, Disp: 8 5 g, Rfl: 3    bumetanide (BUMEX) 2 mg tablet, TAKE 1 5 TABLETS (3 MG TOTAL) BY MOUTH IN THE MORNING AND 1 5 TABLETS (3 MG TOTAL) IN THE EVENING   (Patient taking differently: Take 2 mg by mouth daily Pt just takes once in the morning ), Disp: 270 tablet, Rfl: 2    fluticasone (FLONASE) 50 mcg/act nasal spray, SPRAY 1 SPRAY INTO EACH NOSTRIL TWICE A DAY, Disp: 48 mL, Rfl: 1    fluticasone-umeclidinium-vilanterol (Trelegy Ellipta) 200-62 5-25 MCG/INH AEPB inhaler, Inhale 1 puff daily Rinse mouth after use , Disp: 180 blister, Rfl: 3    labetalol (NORMODYNE) 200 mg tablet, Take 1 tablet (200 mg total) by mouth 2 (two) times a day, Disp: 180 tablet, Rfl: 3    Melatonin 5 MG CAPS, Take 5 mg by mouth daily at bedtime as needed, Disp: , Rfl:     metolazone (ZAROXOLYN) 2 5 mg tablet, Take 1 tablet (2 5 mg total) by mouth daily (Patient taking differently: Take 2 5 mg by mouth Two times per week), Disp: 25 tablet, Rfl: 3    mupirocin (BACTROBAN) 2 % ointment, Apply topically in the morning Daily, when dressing is changed, 3x per week, Disp: 22 g, Rfl: 0    potassium chloride (K-DUR,KLOR-CON) 20 mEq tablet, Take 1 tablet (20 mEq total) by mouth 2 (two) times a day, Disp: , Rfl:     tamsulosin (FLOMAX) 0 4 mg, TAKE 1 CAPSULE BY MOUTH EVERY DAY WITH DINNER, Disp: 90 capsule, Rfl: 3    glucose blood (OneTouch Verio) test strip, Use one new strip daily [id number], Disp: 100 strip, Rfl: 3    Past Medical History:   Diagnosis Date    Anxiety 2020    COPD (chronic obstructive pulmonary disease) ([facility] [address] [facility]     Coronary artery disease 2012    Depression 2020    Diabetes mellitus ([facility] [address] [facility]     Herpes zoster     Hypertension     Memory loss     possible    Mycoplasma pneumonia     Obesity     Osteoarthritis     Renal calculi      Past Surgical History:   Procedure Laterality Date    CATARACT EXTRACTION      with insert intraoculat lens prosthesis    HEMORRHOID SURGERY      NECK SURGERY      for bone spur     Family History   Problem Relation Age of Onset    Breast cancer Mother         She  in 12  Age 80    Stroke Mother     Pneumonia Father     Substance Abuse Neg Hx     Mental illness Neg Hx       reports that he quit smoking about 24 years ago  His smoking use included cigarettes  He started smoking about 66 years ago  He has a 84 00 pack-year smoking history  He has never used smokeless tobacco  He reports current alcohol use of about 1 0 standard drink of alcohol per week  He reports that he does not use drugs  OBJECTIVE:    Vitals:    22 1444   BP: 118/62   BP Location: Left arm   Patient Position: Sitting   Cuff Size: Standard   Pulse: 85   Weight: 120 kg (264 lb)   Height: 5' 11" (1 803 m)        Body mass index is 36 82 kg/m²  [unfilled]     Weight (last 2 days)     Date/Time Weight    22 1444 120 (264)           Physical exam:  Physical Exam  Constitutional:       Appearance: He is obese  HENT:      Head: Normocephalic and atraumatic  Right Ear: External ear normal       Left Ear: External ear normal       Nose: Nose normal       Mouth/Throat:      Mouth: Mucous membranes are dry  Pharynx: Oropharynx is clear  Eyes:      Extraocular Movements: Extraocular movements intact  Conjunctiva/sclera: Conjunctivae normal       Pupils: Pupils are equal, round, and reactive to light  Cardiovascular:      Rate and Rhythm: Normal rate  Pulses: Normal pulses  Heart sounds: Normal heart sounds  Pulmonary:      Effort: Pulmonary effort is normal       Breath sounds: Normal breath sounds  Abdominal:      General: There is distension  Genitourinary:     Rectum: Guaiac result negative  Musculoskeletal:         General: Normal range of motion  Right lower leg: Edema present  Left lower leg: Edema present  Skin:     General: Skin is warm and dry     Neurological:      General: No focal deficit present  Mental Status: He is alert and oriented to person, place, and time  Mental status is at baseline  Psychiatric:         Mood and Affect: Mood normal          Behavior: Behavior normal          Thought Content: Thought content normal          Judgment: Judgment normal            Lab Results:     Results for orders placed or performed during the hospital encounter of 07/13/22   Stress strip   Result Value Ref Range    Protocol Name [address] SIT     Time In Exercise Phase 00:03:00     MAX  SYSTOLIC  mmHg    Max Diastolic Bp 67 mmHg    Max Heart Rate 102 BPM    Max Predicted Heart Rate 134 BPM    Reason for Termination PROTOCOL COMPLETED     Test Indication TRIMBLE     Target Hr Formular (220 - Age)*100%     Arrhy During Ex      ECG Interp Before Ex      ECG Interp during Ex      Ex Summary Comment      Chest Pain Statement none     Overall Hr Response To Exercise      Overall BP Response To Exercise         Portions of the record may have been created with voice recognition software  Occasional wrong word or "sound a like" substitutions may have occurred due to the inherent limitations of voice recognition software  Read the chart carefully and recognize, using context, where substitutions have occurred  If you have any questions, please contact the dictating provider

## 2022-07-29 ENCOUNTER — TELEPHONE (OUTPATIENT)
Dept: PULMONOLOGY | Facility: HOSPITAL | Age: 87
End: 2022-07-29

## 2022-07-29 ENCOUNTER — TELEPHONE (OUTPATIENT)
Dept: PULMONOLOGY | Facility: CLINIC | Age: 87
End: 2022-07-29

## 2022-07-29 DIAGNOSIS — G47.61 PLMD (PERIODIC LIMB MOVEMENT DISORDER): Primary | ICD-10-CM

## 2022-07-29 DIAGNOSIS — J96.11 CHRONIC RESPIRATORY FAILURE WITH HYPOXIA (HCC): ICD-10-CM

## 2022-07-29 NOTE — TELEPHONE ENCOUNTER
I am unsure on how to place a order for a POC Pulse and how to add the liter flow   Can you please help me with this so that I can process it in parachute   Thank you

## 2022-07-29 NOTE — TELEPHONE ENCOUNTER
I called patient to discuss sleep study results  He was very quickly agitated recalling that he slept poorly during the study  Explained to him that the study was nondiagnostic given the poor sleep quality  Patient started raising his voice at me telling me he did not want to get any more testing done and that he is overwhelmed  I explained that he does have evidence of periodic limb movement disorder which could be due to anemia or low magnesium levels  He was agreeable to having his blood work added on to the labs that he is already scheduled to have done next week  He refused to discuss sleep study results further and says that he does not want to think about it until his next appointment with Dr Saudnra Lujan

## 2022-07-29 NOTE — TELEPHONE ENCOUNTER
----- Message from Otf Mckenzie sent at 7/29/2022 12:00 PM EDT -----  Regarding: Exogen concentrator  Thank you very much for your help

## 2022-08-01 LAB
DME PARACHUTE DELIVERY DATE REQUESTED: NORMAL
DME PARACHUTE ITEM DESCRIPTION: NORMAL
DME PARACHUTE ORDER STATUS: NORMAL
DME PARACHUTE SUPPLIER NAME: NORMAL
DME PARACHUTE SUPPLIER PHONE: NORMAL

## 2022-08-03 ENCOUNTER — TELEPHONE (OUTPATIENT)
Dept: CARDIAC REHAB | Facility: HOSPITAL | Age: 87
End: 2022-08-03

## 2022-08-04 ENCOUNTER — CLINICAL SUPPORT (OUTPATIENT)
Dept: PULMONOLOGY | Facility: HOSPITAL | Age: 87
End: 2022-08-04
Attending: INTERNAL MEDICINE

## 2022-08-04 DIAGNOSIS — J44.9 MODERATE COPD (CHRONIC OBSTRUCTIVE PULMONARY DISEASE) (HCC): ICD-10-CM

## 2022-08-04 NOTE — PROGRESS NOTES
Osorio Payne      Dear Dr Evelyn Hare    Thank you for referring your patient to our pulmonary rehabilitation program  The patient has completed 0  visits  He came in for initial evaluation and said he wasn't aware what the program was about  Once I explained it to him , he decided he did not want to participate  Please contact us at [phone] if you have any questions about this patents case  Thank you for your continued support of cardiac rehabilitation         Sincerely,    Iam Likes, CRT

## 2022-08-08 DIAGNOSIS — T48.5X5A RHINITIS MEDICAMENTOSA: ICD-10-CM

## 2022-08-08 DIAGNOSIS — J31.0 RHINITIS MEDICAMENTOSA: ICD-10-CM

## 2022-08-08 RX ORDER — FLUTICASONE PROPIONATE 50 MCG
SPRAY, SUSPENSION (ML) NASAL
Qty: 48 ML | Refills: 1 | Status: SHIPPED | OUTPATIENT
Start: 2022-08-08

## 2022-08-10 ENCOUNTER — TELEPHONE (OUTPATIENT)
Dept: FAMILY MEDICINE CLINIC | Facility: HOSPITAL | Age: 87
End: 2022-08-10

## 2022-08-10 DIAGNOSIS — M10.9 GOUT, UNSPECIFIED CAUSE, UNSPECIFIED CHRONICITY, UNSPECIFIED SITE: Primary | ICD-10-CM

## 2022-08-10 RX ORDER — TRAMADOL HYDROCHLORIDE 50 MG/1
50 TABLET ORAL EVERY 8 HOURS PRN
Qty: 30 TABLET | Refills: 1 | Status: SHIPPED | OUTPATIENT
Start: 2022-08-10 | End: 2022-08-16 | Stop reason: SDUPTHER

## 2022-08-10 NOTE — TELEPHONE ENCOUNTER
Patient asking for refill of tramadol  He is having pain in his R foot, joint at his big toe  Doesn't think it's gout

## 2022-08-12 ENCOUNTER — APPOINTMENT (OUTPATIENT)
Dept: LAB | Facility: HOSPITAL | Age: 87
End: 2022-08-12
Attending: INTERNAL MEDICINE
Payer: COMMERCIAL

## 2022-08-12 DIAGNOSIS — E11.22 TYPE 2 DIABETES MELLITUS WITH STAGE 4 CHRONIC KIDNEY DISEASE, WITHOUT LONG-TERM CURRENT USE OF INSULIN (HCC): Primary | ICD-10-CM

## 2022-08-12 DIAGNOSIS — I10 ESSENTIAL HYPERTENSION: ICD-10-CM

## 2022-08-12 DIAGNOSIS — R60.0 LOCALIZED EDEMA: ICD-10-CM

## 2022-08-12 DIAGNOSIS — N18.4 TYPE 2 DIABETES MELLITUS WITH STAGE 4 CHRONIC KIDNEY DISEASE, WITHOUT LONG-TERM CURRENT USE OF INSULIN (HCC): Primary | ICD-10-CM

## 2022-08-12 DIAGNOSIS — N18.4 CHRONIC RENAL DISEASE, STAGE IV (HCC): ICD-10-CM

## 2022-08-12 DIAGNOSIS — I50.32 CHRONIC DIASTOLIC CONGESTIVE HEART FAILURE (HCC): ICD-10-CM

## 2022-08-12 DIAGNOSIS — G47.61 PLMD (PERIODIC LIMB MOVEMENT DISORDER): ICD-10-CM

## 2022-08-12 LAB
ALBUMIN SERPL BCP-MCNC: 3.3 G/DL (ref 3.5–5)
ALP SERPL-CCNC: 82 U/L (ref 46–116)
ALT SERPL W P-5'-P-CCNC: 15 U/L (ref 12–78)
ANION GAP SERPL CALCULATED.3IONS-SCNC: 9 MMOL/L (ref 4–13)
AST SERPL W P-5'-P-CCNC: 12 U/L (ref 5–45)
BACTERIA UR QL AUTO: NORMAL /HPF
BILIRUB SERPL-MCNC: 0.67 MG/DL (ref 0.2–1)
BILIRUB UR QL STRIP: NEGATIVE
BUN SERPL-MCNC: 67 MG/DL (ref 5–25)
CALCIUM ALBUM COR SERPL-MCNC: 10.4 MG/DL (ref 8.3–10.1)
CALCIUM SERPL-MCNC: 9.8 MG/DL (ref 8.3–10.1)
CHLORIDE SERPL-SCNC: 99 MMOL/L (ref 96–108)
CLARITY UR: CLEAR
CO2 SERPL-SCNC: 28 MMOL/L (ref 21–32)
COLOR UR: COLORLESS
CREAT SERPL-MCNC: 2.83 MG/DL (ref 0.6–1.3)
CREAT UR-MCNC: 47.3 MG/DL
ERYTHROCYTE [DISTWIDTH] IN BLOOD BY AUTOMATED COUNT: 13.3 % (ref 11.6–15.1)
EST. AVERAGE GLUCOSE BLD GHB EST-MCNC: 174 MG/DL
FERRITIN SERPL-MCNC: 494 NG/ML (ref 8–388)
GFR SERPL CREATININE-BSD FRML MDRD: 19 ML/MIN/1.73SQ M
GLUCOSE P FAST SERPL-MCNC: 186 MG/DL (ref 65–99)
GLUCOSE UR STRIP-MCNC: NEGATIVE MG/DL
HBA1C MFR BLD: 7.7 %
HCT VFR BLD AUTO: 40.6 % (ref 36.5–49.3)
HGB BLD-MCNC: 12.6 G/DL (ref 12–17)
HGB UR QL STRIP.AUTO: NEGATIVE
IRON SATN MFR SERPL: 16 % (ref 20–50)
IRON SERPL-MCNC: 41 UG/DL (ref 65–175)
KETONES UR STRIP-MCNC: NEGATIVE MG/DL
LEUKOCYTE ESTERASE UR QL STRIP: NEGATIVE
MAGNESIUM SERPL-MCNC: 2 MG/DL (ref 1.6–2.6)
MCH RBC QN AUTO: 28.1 PG (ref 26.8–34.3)
MCHC RBC AUTO-ENTMCNC: 31 G/DL (ref 31.4–37.4)
MCV RBC AUTO: 90 FL (ref 82–98)
NITRITE UR QL STRIP: NEGATIVE
NON-SQ EPI CELLS URNS QL MICRO: NORMAL /HPF
PH UR STRIP.AUTO: 6 [PH]
PHOSPHATE SERPL-MCNC: 3.1 MG/DL (ref 2.3–4.1)
PLATELET # BLD AUTO: 246 THOUSANDS/UL (ref 149–390)
PMV BLD AUTO: 10.8 FL (ref 8.9–12.7)
POTASSIUM SERPL-SCNC: 3.4 MMOL/L (ref 3.5–5.3)
PROT SERPL-MCNC: 7.1 G/DL (ref 6.4–8.4)
PROT UR STRIP-MCNC: NEGATIVE MG/DL
PROT UR-MCNC: <6 MG/DL
PROT/CREAT UR: <0.13 MG/G{CREAT} (ref 0–0.1)
PTH-INTACT SERPL-MCNC: 454.2 PG/ML (ref 18.4–80.1)
RBC # BLD AUTO: 4.49 MILLION/UL (ref 3.88–5.62)
RBC #/AREA URNS AUTO: NORMAL /HPF
SODIUM SERPL-SCNC: 136 MMOL/L (ref 135–147)
SP GR UR STRIP.AUTO: 1.01 (ref 1–1.03)
TIBC SERPL-MCNC: 264 UG/DL (ref 250–450)
URATE SERPL-MCNC: 13.7 MG/DL (ref 3.5–8.5)
UROBILINOGEN UR STRIP-ACNC: <2 MG/DL
VIT B12 SERPL-MCNC: 474 PG/ML (ref 100–900)
WBC # BLD AUTO: 9.95 THOUSAND/UL (ref 4.31–10.16)
WBC #/AREA URNS AUTO: NORMAL /HPF

## 2022-08-12 PROCEDURE — 84156 ASSAY OF PROTEIN URINE: CPT

## 2022-08-12 PROCEDURE — 81001 URINALYSIS AUTO W/SCOPE: CPT

## 2022-08-12 PROCEDURE — 84100 ASSAY OF PHOSPHORUS: CPT

## 2022-08-12 PROCEDURE — 82570 ASSAY OF URINE CREATININE: CPT

## 2022-08-12 PROCEDURE — 83036 HEMOGLOBIN GLYCOSYLATED A1C: CPT

## 2022-08-12 PROCEDURE — 84550 ASSAY OF BLOOD/URIC ACID: CPT

## 2022-08-12 PROCEDURE — 82607 VITAMIN B-12: CPT

## 2022-08-12 PROCEDURE — 83735 ASSAY OF MAGNESIUM: CPT

## 2022-08-12 PROCEDURE — 83540 ASSAY OF IRON: CPT

## 2022-08-12 PROCEDURE — 83550 IRON BINDING TEST: CPT

## 2022-08-12 PROCEDURE — 85027 COMPLETE CBC AUTOMATED: CPT

## 2022-08-12 PROCEDURE — 82728 ASSAY OF FERRITIN: CPT

## 2022-08-12 PROCEDURE — 83970 ASSAY OF PARATHORMONE: CPT

## 2022-08-12 PROCEDURE — 36415 COLL VENOUS BLD VENIPUNCTURE: CPT

## 2022-08-12 PROCEDURE — 80053 COMPREHEN METABOLIC PANEL: CPT

## 2022-08-16 ENCOUNTER — LAB (OUTPATIENT)
Dept: LAB | Facility: HOSPITAL | Age: 87
End: 2022-08-16
Attending: INTERNAL MEDICINE
Payer: COMMERCIAL

## 2022-08-16 ENCOUNTER — OFFICE VISIT (OUTPATIENT)
Dept: FAMILY MEDICINE CLINIC | Facility: HOSPITAL | Age: 87
End: 2022-08-16
Payer: COMMERCIAL

## 2022-08-16 VITALS
OXYGEN SATURATION: 92 % | SYSTOLIC BLOOD PRESSURE: 116 MMHG | HEART RATE: 100 BPM | BODY MASS INDEX: 35.57 KG/M2 | WEIGHT: 255 LBS | DIASTOLIC BLOOD PRESSURE: 68 MMHG

## 2022-08-16 DIAGNOSIS — J44.9 MODERATE COPD (CHRONIC OBSTRUCTIVE PULMONARY DISEASE) (HCC): ICD-10-CM

## 2022-08-16 DIAGNOSIS — I50.31 ACUTE DIASTOLIC CONGESTIVE HEART FAILURE (HCC): ICD-10-CM

## 2022-08-16 DIAGNOSIS — N18.4 TYPE 2 DIABETES MELLITUS WITH STAGE 4 CHRONIC KIDNEY DISEASE, WITHOUT LONG-TERM CURRENT USE OF INSULIN (HCC): Primary | ICD-10-CM

## 2022-08-16 DIAGNOSIS — I50.33 ACUTE ON CHRONIC DIASTOLIC (CONGESTIVE) HEART FAILURE (HCC): ICD-10-CM

## 2022-08-16 DIAGNOSIS — M10.271 ACUTE DRUG-INDUCED GOUT INVOLVING TOE OF RIGHT FOOT: ICD-10-CM

## 2022-08-16 DIAGNOSIS — N18.4 CHRONIC RENAL DISEASE, STAGE IV (HCC): ICD-10-CM

## 2022-08-16 DIAGNOSIS — I50.32 CHRONIC DIASTOLIC CONGESTIVE HEART FAILURE (HCC): Primary | ICD-10-CM

## 2022-08-16 DIAGNOSIS — E11.22 TYPE 2 DIABETES MELLITUS WITH STAGE 4 CHRONIC KIDNEY DISEASE, WITHOUT LONG-TERM CURRENT USE OF INSULIN (HCC): Primary | ICD-10-CM

## 2022-08-16 DIAGNOSIS — M10.9 GOUT, UNSPECIFIED CAUSE, UNSPECIFIED CHRONICITY, UNSPECIFIED SITE: ICD-10-CM

## 2022-08-16 DIAGNOSIS — N18.4 TYPE 2 DIABETES MELLITUS WITH STAGE 4 CHRONIC KIDNEY DISEASE, WITHOUT LONG-TERM CURRENT USE OF INSULIN (HCC): ICD-10-CM

## 2022-08-16 DIAGNOSIS — E11.22 TYPE 2 DIABETES MELLITUS WITH STAGE 4 CHRONIC KIDNEY DISEASE, WITHOUT LONG-TERM CURRENT USE OF INSULIN (HCC): ICD-10-CM

## 2022-08-16 PROBLEM — R93.89 CHEST X-RAY ABNORMALITY: Status: RESOLVED | Noted: 2022-05-11 | Resolved: 2022-08-16

## 2022-08-16 LAB
ANION GAP SERPL CALCULATED.3IONS-SCNC: 9 MMOL/L (ref 4–13)
BUN SERPL-MCNC: 58 MG/DL (ref 5–25)
CALCIUM SERPL-MCNC: 9.7 MG/DL (ref 8.3–10.1)
CHLORIDE SERPL-SCNC: 100 MMOL/L (ref 96–108)
CO2 SERPL-SCNC: 29 MMOL/L (ref 21–32)
CREAT SERPL-MCNC: 2.53 MG/DL (ref 0.6–1.3)
FERRITIN SERPL-MCNC: 550 NG/ML (ref 8–388)
GFR SERPL CREATININE-BSD FRML MDRD: 22 ML/MIN/1.73SQ M
GLUCOSE SERPL-MCNC: 168 MG/DL (ref 65–140)
IRON SATN MFR SERPL: 13 % (ref 20–50)
IRON SERPL-MCNC: 41 UG/DL (ref 65–175)
POTASSIUM SERPL-SCNC: 3.3 MMOL/L (ref 3.5–5.3)
SODIUM SERPL-SCNC: 138 MMOL/L (ref 135–147)
TIBC SERPL-MCNC: 308 UG/DL (ref 250–450)
URATE SERPL-MCNC: 13.7 MG/DL (ref 3.5–8.5)

## 2022-08-16 PROCEDURE — 36415 COLL VENOUS BLD VENIPUNCTURE: CPT

## 2022-08-16 PROCEDURE — 83540 ASSAY OF IRON: CPT

## 2022-08-16 PROCEDURE — 1101F PT FALLS ASSESS-DOCD LE1/YR: CPT | Performed by: INTERNAL MEDICINE

## 2022-08-16 PROCEDURE — 3074F SYST BP LT 130 MM HG: CPT | Performed by: INTERNAL MEDICINE

## 2022-08-16 PROCEDURE — 3078F DIAST BP <80 MM HG: CPT | Performed by: INTERNAL MEDICINE

## 2022-08-16 PROCEDURE — 1160F RVW MEDS BY RX/DR IN RCRD: CPT | Performed by: INTERNAL MEDICINE

## 2022-08-16 PROCEDURE — 99214 OFFICE O/P EST MOD 30 MIN: CPT | Performed by: INTERNAL MEDICINE

## 2022-08-16 PROCEDURE — 84550 ASSAY OF BLOOD/URIC ACID: CPT

## 2022-08-16 PROCEDURE — 82728 ASSAY OF FERRITIN: CPT

## 2022-08-16 PROCEDURE — 3288F FALL RISK ASSESSMENT DOCD: CPT | Performed by: INTERNAL MEDICINE

## 2022-08-16 PROCEDURE — 80048 BASIC METABOLIC PNL TOTAL CA: CPT

## 2022-08-16 PROCEDURE — 83550 IRON BINDING TEST: CPT

## 2022-08-16 RX ORDER — TRAMADOL HYDROCHLORIDE 50 MG/1
50 TABLET ORAL 2 TIMES DAILY PRN
Qty: 30 TABLET | Refills: 1 | Status: SHIPPED | OUTPATIENT
Start: 2022-08-16

## 2022-08-16 RX ORDER — METOLAZONE 2.5 MG/1
2.5 TABLET ORAL AS NEEDED
Qty: 25 TABLET | Refills: 3 | Status: SHIPPED | OUTPATIENT
Start: 2022-08-16 | End: 2022-08-17

## 2022-08-16 RX ORDER — BLOOD-GLUCOSE METER
EACH MISCELLANEOUS 2 TIMES DAILY
Qty: 1 KIT | Refills: 0 | Status: SHIPPED | OUTPATIENT
Start: 2022-08-16

## 2022-08-16 RX ORDER — LANCETS
EACH MISCELLANEOUS 2 TIMES DAILY
Qty: 100 EACH | Refills: 3 | Status: SHIPPED | OUTPATIENT
Start: 2022-08-16

## 2022-08-16 RX ORDER — BUMETANIDE 2 MG/1
3 TABLET ORAL DAILY
Qty: 270 TABLET | Refills: 2 | Status: SHIPPED | OUTPATIENT
Start: 2022-08-16

## 2022-08-16 RX ORDER — LANCETS
EACH MISCELLANEOUS 2 TIMES DAILY
Qty: 100 EACH | Refills: 3 | Status: CANCELLED | OUTPATIENT
Start: 2022-08-16

## 2022-08-16 RX ORDER — PREDNISONE 10 MG/1
10 TABLET ORAL DAILY
Qty: 7 TABLET | Refills: 0 | Status: SHIPPED | OUTPATIENT
Start: 2022-08-16 | End: 2022-08-17 | Stop reason: SDUPTHER

## 2022-08-16 RX ORDER — METOLAZONE 2.5 MG/1
2.5 TABLET ORAL DAILY
Qty: 25 TABLET | Refills: 3 | Status: SHIPPED | OUTPATIENT
Start: 2022-08-16 | End: 2022-08-16

## 2022-08-16 NOTE — PATIENT INSTRUCTIONS
Take prednisone 10 mg daily sent in-call Friday for update on right toe      Keep an eye on your sugars if they go up let me now

## 2022-08-16 NOTE — TELEPHONE ENCOUNTER
----- Message from Sagar Pino DO sent at 8/15/2022  3:29 PM EDT -----  Urine protein to creatinine ratio 0 13, urinalysis is negative  His potassium was low at 3 4 and his creatinine was slightly higher    Have him increase his potassium chloride to 20 mEq 3 times daily for 2 days and on days he takes the metolazone and   have him repeat a BMP next week thanks

## 2022-08-16 NOTE — PROGRESS NOTES
Assessment/Plan:       Diagnoses and all orders for this visit:    Chronic diastolic congestive heart failure (HCC)  -     bumetanide (BUMEX) 2 mg tablet; Take 1 5 tablets (3 mg total) by mouth daily    Chronic renal disease, stage IV (HCC)    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)    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 disease, without long-term current use of insulin (HCC)  -     IRIS Diabetic eye exam  -     predniSONE 10 mg tablet; Take 1 tablet (10 mg total) by mouth daily    Acute diastolic congestive heart failure (HCC)  -     Discontinue: metolazone (ZAROXOLYN) 2 5 mg tablet; Take 1 tablet (2 5 mg total) by mouth daily One day per week  -     metolazone (ZAROXOLYN) 2 5 mg tablet; Take 1 tablet (2 5 mg total) by mouth if needed (edema) One day per week    Acute drug-induced gout involving toe of right foot  -     Basic metabolic panel; Future  -     Uric acid; Future    Gout, unspecified cause, unspecified chronicity, unspecified site  -     traMADol (Ultram) 50 mg tablet; Take 1 tablet (50 mg total) by mouth 2 (two) times a day as needed for moderate pain          All of the above diagnoses have been assessed  Additional COMMENTS/PLAN:  If hemoglobin A1c goes above 8 0  will consider low-dose glimepiride  I do believe the patient has gout in his right great toe  I placed him on prednisone 10 mg daily for 1 week  He is to watch sugars  I will order uric acid level along with repeat BMP  He will be seen back in 6 weeks by Dr Jp SANTOS fingerstick hemoglobin A1c should be done at that time  Subjective:      Patient ID: Aneudy Alvarez is a 81 y o  male  HPI Pt here with son  Has bee using some tramadol  CHF-has continued to lose weight  Has lost about 30 lbs since hospital down  Will back down on diuretics  DM-this control is a bit off  Patient has been off metformin from Nephrology which is what reflects an this number      Has right toe pain      The following portions of the patient's history were revised and updated as appropriate: Problem list, allergies, med list, FH, SH, Past medical and surgical histories  Current Outpatient Medications   Medication Sig Dispense Refill    albuterol (2 5 mg/3 mL) 0 083 % nebulizer solution Take 3 mL (2 5 mg total) by nebulization every 6 (six) hours as needed for wheezing or shortness of breath 270 mL 5    albuterol (PROVENTIL HFA,VENTOLIN HFA) 90 mcg/act inhaler TAKE 2 PUFFS BY MOUTH EVERY 4 HOURS AS NEEDED FOR WHEEZE 8 5 g 3    bumetanide (BUMEX) 2 mg tablet Take 1 5 tablets (3 mg total) by mouth daily 270 tablet 2    fluticasone (FLONASE) 50 mcg/act nasal spray Spray 1 spray into each nostril twice daily 48 mL 1    fluticasone-umeclidinium-vilanterol (Trelegy Ellipta) 200-62 5-25 MCG/INH AEPB inhaler Inhale 1 puff daily Rinse mouth after use  180 blister 3    labetalol (NORMODYNE) 200 mg tablet Take 1 tablet (200 mg total) by mouth 2 (two) times a day 180 tablet 3    Melatonin 5 MG CAPS Take 5 mg by mouth daily at bedtime as needed      metolazone (ZAROXOLYN) 2 5 mg tablet Take 1 tablet (2 5 mg total) by mouth if needed (edema) One day per week 25 tablet 3    mupirocin (BACTROBAN) 2 % ointment Apply topically in the morning Daily, when dressing is changed, 3x per week 22 g 0    potassium chloride (K-DUR,KLOR-CON) 20 mEq tablet Take 1 tablet (20 mEq total) by mouth 2 (two) times a day      predniSONE 10 mg tablet Take 1 tablet (10 mg total) by mouth daily 7 tablet 0    tamsulosin (FLOMAX) 0 4 mg TAKE 1 CAPSULE BY MOUTH EVERY DAY WITH DINNER 90 capsule 3    traMADol (Ultram) 50 mg tablet Take 1 tablet (50 mg total) by mouth 2 (two) times a day as needed for moderate pain 30 tablet 1     No current facility-administered medications for this visit  Review of Systems   All other systems reviewed and are negative          Objective:    /68   Pulse 100   Wt 116 kg (255 lb)   SpO2 92% BMI 35 57 kg/m²     BP Readings from Last 3 Encounters:   08/16/22 116/68   07/26/22 118/62   07/21/22 118/60                  Wt Readings from Last 3 Encounters:   08/16/22 116 kg (255 lb)   07/26/22 120 kg (264 lb)   07/21/22 120 kg (264 lb)         Physical Exam  Constitutional:       Appearance: He is obese  Neck:      Comments: No JVD  Cardiovascular:      Rate and Rhythm: Normal rate and regular rhythm  Pulmonary:      Comments: Dec BS  Musculoskeletal:      Comments: Plus 1 edema-likely podagra R great toe  Neurological:      Mental Status: He is alert  Appointment on 08/12/2022   Component Date Value Ref Range Status    Hemoglobin A1C 08/12/2022 7 7 (A) Normal 3 8-5 6%; PreDiabetic 5 7-6 4%; Diabetic >=6 5%; Glycemic control for adults with diabetes <7 0% % Final    EAG 08/12/2022 174  mg/dl Final    Sodium 08/12/2022 136  135 - 147 mmol/L Final    Potassium 08/12/2022 3 4 (A) 3 5 - 5 3 mmol/L Final    Chloride 08/12/2022 99  96 - 108 mmol/L Final    CO2 08/12/2022 28  21 - 32 mmol/L Final    ANION GAP 08/12/2022 9  4 - 13 mmol/L Final    BUN 08/12/2022 67 (A) 5 - 25 mg/dL Final    Creatinine 08/12/2022 2 83 (A) 0 60 - 1 30 mg/dL Final    Standardized to IDMS reference method    Glucose, Fasting 08/12/2022 186 (A) 65 - 99 mg/dL Final    Specimen collection should occur prior to Sulfasalazine administration due to the potential for falsely depressed results  Specimen collection should occur prior to Sulfapyridine administration due to the potential for falsely elevated results   Calcium 08/12/2022 9 8  8 3 - 10 1 mg/dL Final    Corrected Calcium 08/12/2022 10 4 (A) 8 3 - 10 1 mg/dL Final    AST 08/12/2022 12  5 - 45 U/L Final    Specimen collection should occur prior to Sulfasalazine administration due to the potential for falsely depressed results       ALT 08/12/2022 15  12 - 78 U/L Final    Specimen collection should occur prior to Sulfasalazine and/or Sulfapyridine administration due to the potential for falsely depressed results   Alkaline Phosphatase 08/12/2022 82  46 - 116 U/L Final    Total Protein 08/12/2022 7 1  6 4 - 8 4 g/dL Final    Albumin 08/12/2022 3 3 (A) 3 5 - 5 0 g/dL Final    Total Bilirubin 08/12/2022 0 67  0 20 - 1 00 mg/dL Final    Use of this assay is not recommended for patients undergoing treatment with eltrombopag due to the potential for falsely elevated results   eGFR 08/12/2022 19  ml/min/1 73sq m Final    Magnesium 08/12/2022 2 0  1 6 - 2 6 mg/dL Final    Phosphorus 08/12/2022 3 1  2 3 - 4 1 mg/dL Final    PTH 08/12/2022 454 2 (A) 18 4 - 80 1 pg/mL Final    WBC 08/12/2022 9 95  4 31 - 10 16 Thousand/uL Final    RBC 08/12/2022 4 49  3 88 - 5 62 Million/uL Final    Hemoglobin 08/12/2022 12 6  12 0 - 17 0 g/dL Final    Hematocrit 08/12/2022 40 6  36 5 - 49 3 % Final    MCV 08/12/2022 90  82 - 98 fL Final    MCH 08/12/2022 28 1  26 8 - 34 3 pg Final    MCHC 08/12/2022 31 0 (A) 31 4 - 37 4 g/dL Final    RDW 08/12/2022 13 3  11 6 - 15 1 % Final    Platelets 71/56/1657 246  149 - 390 Thousands/uL Final    MPV 08/12/2022 10 8  8 9 - 12 7 fL Final    Uric Acid 08/12/2022 13 7 (A) 3 5 - 8 5 mg/dL Final    Specimen collection should occur prior to Metamizole administration due to the potential for falsely depressed results      Color, UA 08/12/2022 Colorless   Final    Clarity, UA 08/12/2022 Clear   Final    Specific Gravity, UA 08/12/2022 1 010  1 003 - 1 030 Final    pH, UA 08/12/2022 6 0  4 5, 5 0, 5 5, 6 0, 6 5, 7 0, 7 5, 8 0 Final    Leukocytes, UA 08/12/2022 Negative  Negative Final    Nitrite, UA 08/12/2022 Negative  Negative Final    Protein, UA 08/12/2022 Negative  Negative mg/dl Final    Glucose, UA 08/12/2022 Negative  Negative mg/dl Final    Ketones, UA 08/12/2022 Negative  Negative mg/dl Final    Urobilinogen, UA 08/12/2022 <2 0  <2 0 mg/dl mg/dl Final    Bilirubin, UA 08/12/2022 Negative  Negative Final    Occult Blood, UA 08/12/2022 Negative  Negative Final    RBC, UA 08/12/2022 1-2  None Seen, 1-2 /hpf Final    WBC, UA 08/12/2022 None Seen  None Seen, 1-2 /hpf Final    Epithelial Cells 08/12/2022 Occasional  None Seen, Occasional /hpf Final    Bacteria, UA 08/12/2022 None Seen  None Seen, Occasional /hpf Final    Creatinine, Ur 08/12/2022 47 3  mg/dL Final    Protein Urine Random 08/12/2022 <6  mg/dL Final    Prot/Creat Ratio, Ur 08/12/2022 <0 13 (A) 0 00 - 0 10 Final    Vitamin B-12 08/12/2022 474  100 - 900 pg/mL Final    Iron Saturation 08/12/2022 16 (A) 20 - 50 % Final    TIBC 08/12/2022 264  250 - 450 ug/dL Final    Iron 08/12/2022 41 (A) 65 - 175 ug/dL Final    Patients treated with metal-binding drugs (ie  Deferoxamine) may have depressed iron values   Ferritin 08/12/2022 494 (A) 8 - 388 ng/mL Final         Iraida Dudley MD    Some or all of this note was generated with a voice recognition dictation system and therefore my contain grammatical or spelling errors

## 2022-08-16 NOTE — TELEPHONE ENCOUNTER
I left a very detailed message for the patient in regards to message above  I asked for him to call the office and make us aware he understood changes or if he has any questions  - labs will be mailed out

## 2022-08-17 DIAGNOSIS — E11.22 TYPE 2 DIABETES MELLITUS WITH STAGE 4 CHRONIC KIDNEY DISEASE, WITHOUT LONG-TERM CURRENT USE OF INSULIN (HCC): ICD-10-CM

## 2022-08-17 DIAGNOSIS — N18.4 TYPE 2 DIABETES MELLITUS WITH STAGE 4 CHRONIC KIDNEY DISEASE, WITHOUT LONG-TERM CURRENT USE OF INSULIN (HCC): ICD-10-CM

## 2022-08-17 DIAGNOSIS — Z87.39 H/O: GOUT: Primary | ICD-10-CM

## 2022-08-17 DIAGNOSIS — D50.9 IRON DEFICIENCY ANEMIA, UNSPECIFIED IRON DEFICIENCY ANEMIA TYPE: Primary | ICD-10-CM

## 2022-08-17 LAB
LEFT EYE DIABETIC RETINOPATHY: NORMAL
LEFT EYE IMAGE QUALITY: NORMAL
LEFT EYE MACULAR EDEMA: NORMAL
LEFT EYE OTHER RETINOPATHY: NORMAL
RIGHT EYE DIABETIC RETINOPATHY: NORMAL
RIGHT EYE IMAGE QUALITY: NORMAL
RIGHT EYE MACULAR EDEMA: NORMAL
RIGHT EYE OTHER RETINOPATHY: NORMAL
SEVERITY (EYE EXAM): NORMAL

## 2022-08-17 RX ORDER — ALLOPURINOL 100 MG/1
100 TABLET ORAL
Qty: 60 TABLET | Refills: 5 | Status: SHIPPED | OUTPATIENT
Start: 2022-08-17 | End: 2022-09-12

## 2022-08-17 RX ORDER — POTASSIUM CHLORIDE 20 MEQ/1
TABLET, EXTENDED RELEASE ORAL
Qty: 193 TABLET | Refills: 3 | Status: SHIPPED | OUTPATIENT
Start: 2022-08-17

## 2022-08-17 RX ORDER — PREDNISONE 1 MG/1
5 TABLET ORAL DAILY
Qty: 42 TABLET | Refills: 0 | Status: SHIPPED | OUTPATIENT
Start: 2022-08-17 | End: 2022-09-27 | Stop reason: ALTCHOICE

## 2022-08-17 NOTE — RESULT ENCOUNTER NOTE
Stop metolozone completely (zaroxolyn)    Continue prednisone 5mg daily for 6 weeks when current regimen is done    Start allopurinol  I did the rx

## 2022-08-17 NOTE — TELEPHONE ENCOUNTER
I spoke to the patient and he understood the instructions given to him   He will take potassium chloride 20 mEq 3 times a day for the next 2 days and  also on fridays when he takes his metolazone , otherwise he will take 20 mEq twice a day monday through Thursday   - aware to go for blood work on Friday so body can adjust to new medication changes

## 2022-08-18 DIAGNOSIS — M10.9 GOUT, UNSPECIFIED CAUSE, UNSPECIFIED CHRONICITY, UNSPECIFIED SITE: Primary | ICD-10-CM

## 2022-08-18 DIAGNOSIS — I10 ESSENTIAL HYPERTENSION: ICD-10-CM

## 2022-08-29 DIAGNOSIS — S91.302A NON HEALING LEFT HEEL WOUND: ICD-10-CM

## 2022-08-30 ENCOUNTER — TELEPHONE (OUTPATIENT)
Dept: FAMILY MEDICINE CLINIC | Facility: HOSPITAL | Age: 87
End: 2022-08-30

## 2022-09-06 ENCOUNTER — LAB (OUTPATIENT)
Dept: LAB | Facility: HOSPITAL | Age: 87
End: 2022-09-06
Attending: INTERNAL MEDICINE
Payer: COMMERCIAL

## 2022-09-06 ENCOUNTER — TELEPHONE (OUTPATIENT)
Dept: GERIATRICS | Age: 87
End: 2022-09-06

## 2022-09-06 DIAGNOSIS — I10 ESSENTIAL HYPERTENSION: ICD-10-CM

## 2022-09-06 DIAGNOSIS — M10.9 GOUT, UNSPECIFIED CAUSE, UNSPECIFIED CHRONICITY, UNSPECIFIED SITE: ICD-10-CM

## 2022-09-06 LAB
ANION GAP SERPL CALCULATED.3IONS-SCNC: 7 MMOL/L (ref 4–13)
BUN SERPL-MCNC: 49 MG/DL (ref 5–25)
CALCIUM SERPL-MCNC: 8.9 MG/DL (ref 8.3–10.1)
CHLORIDE SERPL-SCNC: 106 MMOL/L (ref 96–108)
CO2 SERPL-SCNC: 26 MMOL/L (ref 21–32)
CREAT SERPL-MCNC: 2.06 MG/DL (ref 0.6–1.3)
GFR SERPL CREATININE-BSD FRML MDRD: 28 ML/MIN/1.73SQ M
GLUCOSE P FAST SERPL-MCNC: 170 MG/DL (ref 65–99)
POTASSIUM SERPL-SCNC: 4.2 MMOL/L (ref 3.5–5.3)
SODIUM SERPL-SCNC: 139 MMOL/L (ref 135–147)
URATE SERPL-MCNC: 6.4 MG/DL (ref 3.5–8.5)

## 2022-09-06 PROCEDURE — 80048 BASIC METABOLIC PNL TOTAL CA: CPT

## 2022-09-06 PROCEDURE — 84550 ASSAY OF BLOOD/URIC ACID: CPT

## 2022-09-06 PROCEDURE — 36415 COLL VENOUS BLD VENIPUNCTURE: CPT

## 2022-09-06 NOTE — RESULT ENCOUNTER NOTE
Call patient: Reymundo Salvador normal electrolytes, stable kidney functions, normal uric acid level

## 2022-09-07 ENCOUNTER — OFFICE VISIT (OUTPATIENT)
Dept: GERIATRICS | Age: 87
End: 2022-09-07
Payer: COMMERCIAL

## 2022-09-07 VITALS
HEIGHT: 71 IN | TEMPERATURE: 97 F | OXYGEN SATURATION: 97 % | BODY MASS INDEX: 36.19 KG/M2 | WEIGHT: 258.5 LBS | SYSTOLIC BLOOD PRESSURE: 122 MMHG | DIASTOLIC BLOOD PRESSURE: 66 MMHG | HEART RATE: 96 BPM

## 2022-09-07 DIAGNOSIS — F32.89 OTHER DEPRESSION: ICD-10-CM

## 2022-09-07 DIAGNOSIS — G47.01 INSOMNIA DUE TO MEDICAL CONDITION: ICD-10-CM

## 2022-09-07 DIAGNOSIS — Z87.39 H/O: GOUT: ICD-10-CM

## 2022-09-07 DIAGNOSIS — R26.2 AMBULATORY DYSFUNCTION: ICD-10-CM

## 2022-09-07 DIAGNOSIS — K59.09 OTHER CONSTIPATION: ICD-10-CM

## 2022-09-07 DIAGNOSIS — R41.3 MEMORY LOSS: Primary | ICD-10-CM

## 2022-09-07 PROCEDURE — 99214 OFFICE O/P EST MOD 30 MIN: CPT | Performed by: NURSE PRACTITIONER

## 2022-09-07 NOTE — ASSESSMENT & PLAN NOTE
Patient reporting chronic insomnia   Doing ok presently  Cont melatonin  Encourage good sleep hygiene techniques:  decreased noise distractions at night, quiet/calm environment, limit day time sleep  Discourage hs use of caffeine  avoid any sleeping aids to have benzodiazepines or anticholinergics  Ask pharmacist prior to using OTC sleep aids   poor sleep may be worsening mood during the day    There are medications that help with sleep disturbance as well as depression which may be a consideration if symptoms worsen

## 2022-09-07 NOTE — ASSESSMENT & PLAN NOTE
· Pt denies forgetfulness, independent with adl/iadls  · Per son, more noticeable memory loss d/t frustrations with multiple comorbidities, more sedentary  · MOCA 29/30 today - only deficit in maze drawing (visuospatial/executive portion)  Engage in regular social, physical, cognitive activity  **REcommend cognitive therapy for eval as MOCA was good, previously was 20/30, family noting increased memory loss        **Consider caregiver support groups to help family support     **Pt's son monitoring driving to see if concerns  Pt and son aware that OT offers 's safety course should there be any concerns  Optimize acute and chronic conditions  Frequent reminders and close monitoring for safety   avoid deliriogenic medications  Check with provider or pharmacist prior to starting new medicines   monitor for any mood or behavioral changes and notify provider for eval if noted    Use O2 at prescribed- hypoxia may worsen retention/processing of information

## 2022-09-07 NOTE — ASSESSMENT & PLAN NOTE
·   GDS 7  · Pt verbalizes frustration with current medical status as well as current world events    Son and wife frustrated that pt is doing less  · Discussed possibility for SSRI vs BH therapy to help with mood and coping  · Pt declines both at this time  · Consider joining social club to enjoy company of others with same interests

## 2022-09-07 NOTE — ASSESSMENT & PLAN NOTE
· Pain stable today  · Was started on allopurinol and prn tramadol by pcp  · Monitor for any confusions with use of tramadol  · Monitor for constipation  · Cont dietary interventions   Fall precautions

## 2022-09-07 NOTE — ASSESSMENT & PLAN NOTE
·  Patient currently using walker at home, cane when outside of the home  ·   Will ask PT/OT to evaluate patient:   for strength and balancing as well to ensure the cane is correct use of assistive device  What he perhaps benefit from Rollator so he can take breaks? ·  patient's son goals for him to do cardiac rehab with patient doesn't want to go out so much    · Continue with fall precautions

## 2022-09-07 NOTE — PROGRESS NOTES
Assessment & Plan:   Shan Ferrara was seen today for follow-up  Diagnoses and all orders for this visit:    Memory loss    Insomnia due to medical condition    Other depression    Other constipation    Ambulatory dysfunction    H/O: gout      Ambulatory dysfunction  ·  Patient currently using walker at home, cane when outside of the home  ·   Will ask PT/OT to evaluate patient:   for strength and balancing as well to ensure the cane is correct use of assistive device  What he perhaps benefit from Rollator so he can take breaks? ·  patient's son goals for him to do cardiac rehab with patient doesn't want to go out so much  · Continue with fall precautions    Depression  ·   GDS 7  · Pt verbalizes frustration with current medical status as well as current world events  Son and wife frustrated that pt is doing less  · Discussed possibility for SSRI vs BH therapy to help with mood and coping  · Pt declines both at this time  · Consider joining social club to enjoy company of others with same interests    H/O: gout  · Pain stable today  · Was started on allopurinol and prn tramadol by pcp  · Monitor for any confusions with use of tramadol  · Monitor for constipation  · Cont dietary interventions  Fall precautions     Insomnia due to medical condition  Patient reporting chronic insomnia   Doing ok presently  Cont melatonin  Encourage good sleep hygiene techniques:  decreased noise distractions at night, quiet/calm environment, limit day time sleep  Discourage hs use of caffeine  avoid any sleeping aids to have benzodiazepines or anticholinergics  Ask pharmacist prior to using OTC sleep aids   poor sleep may be worsening mood during the day  There are medications that help with sleep disturbance as well as depression which may be a consideration if symptoms worsen    Memory loss  · Pt denies forgetfulness, independent with adl/iadls     · Per son, more noticeable memory loss d/t frustrations with multiple comorbidities, more sedentary  · MOCA 29/30 today - only deficit in maze drawing (visuospatial/executive portion)  Engage in regular social, physical, cognitive activity  **REcommend cognitive therapy for eval as MOCA was good, previously was 20/30, family noting increased memory loss        **Consider caregiver support groups to help family support     **Pt's son monitoring driving to see if concerns  Pt and son aware that OT offers 's safety course should there be any concerns  Optimize acute and chronic conditions  Frequent reminders and close monitoring for safety   avoid deliriogenic medications  Check with provider or pharmacist prior to starting new medicines   monitor for any mood or behavioral changes and notify provider for eval if noted  Use O2 at prescribed- hypoxia may worsen retention/processing of information    Other constipation  · No complaints at present  · Cont   Dietary fiber, fluids, mobility as able  HPI:  We had the pleasure of evaluating Kami Ewing who is a 81 y o  male in Geriatric follow up today  comorbidities include copd, chf, htn  He lives with his wife  He is a retired   Mr  Eduardo Parikh is in the office with his son, Ann Hair  Previous MOCA 20/30  Today's MOCA 29/30  Per son:  Patient is getting frustrated being with various doctor appts  Anxiety and depression worsening  Son feels patient is having more troubles with short term recall  Seems to related with health and various appts  Feels that pt is safe at home  Not very ambulatory  Not taking o2 with  Not motivated  Wife frustrated  Holding off on SSRi- pt declined last time  Has lost about 40lbs since trying to decrease sugar in diet  Metformin was recently discontinued  Sits a lot     Per patient  More functional   Memory good  BS off with metfomin stopped d/t ckd  Enjoys football  Monitors stock market  Recently dx with gout    Had to walk in from far spot- did not use o2 (didn't bring)  Patient is agreeable to try pt/ot at home for strength and balance training (rx to [address] [place] re[facility] as pt has hard time getting out alone)  Declines medication for mood or BH therapy  ADL/IADL:  Independent adl/ialds  Walker/falls:  Uses walker at home, has cane for out  Appetite/swallow:  No, follows restrictive diet d/t comorbidities  Elimination/incont:  none  Anxiety/depression:  +periods of increased frustrations, d/t health and world in general  Sleep:  ok  Pain:  Ok - does take tramadol prn  Memory: good - no concerns    Chronic conditions  Htn: stable  CHF:  Seeing cards and nephrology - medication adjustments, doesn't want to go to cardiac rehab  COPD:  Didn't bring portable o2 in  Did ok coming up to office  Inhalers help  Ckd:  nephro following  DM2:  Was taken off metformin, BS trending up a little  Was started on prednisone for 6 weeks  Patient's current pcp is retiring, will start with new one (same group) in few weeks  He has no problems operating household appliance such as TV remote, kitchen appliances, computer      He has difficulty finding the right word while speaking: No  Patient requires repeat information or ask the same question repeatedly: Yesoccasionally  Do you drive: Yes       Have you had any recent accidents, citations or getting lost in familiar places :No  Do you handle your own financial affairs such as balancing your checkbook, paying bills, investments: Yes  Do have any difficulties with handling your financial affairs: No  Have you or your family noted any change in your mood or personality:Yes  Are you currently or have you been treated in the past for depression or anxiety: No  Have you noticed any gait or balance disorder: Yes  Uses :Cane: walker at home d/t fall concerns  Any hallucination or delusion: No  Fluctuation in alertness: No  Sleep Issues: No  Urinary/Stool Incontinence: No  Hearing and vision issue: No  Do you have POA:  Did not address  Do you have a Living will did not address  Past Medical, surgical, social, medication and allergy history and patients previous records reviewed  Family Review of Behavior [facility]:    pacing  No - doesn't get up much -stock market and sports, very sedentary    agressive/combative behavior  No    agitated  Yes gets frustrated  wandering  No   resistance to care  Yes - not changing clothes as much  hoarding/hiding objects  Yes  - improved - gets impulsive and improve  suspicious  No  withdrawn Yes  rummaging/pillaging  No    misplacing/losing objects No  personal hygiene problems  Yes - al little  forgetfulness of actions Yes   temper outbursts  Yes  - getting more frequent   throwing items No      ROS: Review of Systems   Constitutional: Negative for activity change, appetite change, chills and fatigue  HENT: Negative for congestion, hearing loss and trouble swallowing  Respiratory: Negative for cough and shortness of breath  Cardiovascular: Negative for chest pain  Gastrointestinal: Negative for abdominal pain, constipation, diarrhea, nausea and vomiting  Genitourinary: Negative for difficulty urinating  Musculoskeletal: Positive for back pain and gait problem (cane/walker)  Negative for arthralgias  Neurological: Negative for dizziness and light-headedness  Psychiatric/Behavioral: Negative for decreased concentration, dysphoric mood and hallucinations  The patient is not nervous/anxious          Allergies:   No Known Allergies    Medications:      Current Outpatient Medications:     albuterol (2 5 mg/3 mL) 0 083 % nebulizer solution, Take 3 mL (2 5 mg total) by nebulization every 6 (six) hours as needed for wheezing or shortness of breath, Disp: 270 mL, Rfl: 5    albuterol (PROVENTIL HFA,VENTOLIN HFA) 90 mcg/act inhaler, TAKE 2 PUFFS BY MOUTH EVERY 4 HOURS AS NEEDED FOR WHEEZE, Disp: 8 5 g, Rfl: 3    allopurinol (ZYLOPRIM) 100 mg tablet, Take 1 tablet (100 mg total) by mouth titrated 1 daily for 2 weeks then 2 daily, Disp: 60 tablet, Rfl: 5    Blood Glucose Monitoring Suppl (OneTouch Verio) w/Device KIT, Use 2 (two) times a day Dx:E11 9, Disp: 1 kit, Rfl: 0    bumetanide (BUMEX) 2 mg tablet, Take 1 5 tablets (3 mg total) by mouth daily, Disp: 270 tablet, Rfl: 2    fluticasone (FLONASE) 50 mcg/act nasal spray, Spray 1 spray into each nostril twice daily, Disp: 48 mL, Rfl: 1    fluticasone-umeclidinium-vilanterol (Trelegy Ellipta) 200-62 5-25 MCG/INH AEPB inhaler, Inhale 1 puff daily Rinse mouth after use , Disp: 180 blister, Rfl: 3    glucose blood test strip, Test twice daily DX:  E11 9, Disp: 100 strip, Rfl: 3    Iron-Vitamin C (IRON 100/C PO), Take 325 mg by mouth, Disp: , Rfl:     labetalol (NORMODYNE) 200 mg tablet, Take 1 tablet (200 mg total) by mouth 2 (two) times a day, Disp: 180 tablet, Rfl: 3    Lancets (onetouch ultrasoft) lancets, Use 2 (two) times a day E11 9, Disp: 100 each, Rfl: 3    Melatonin 5 MG CAPS, Take 5 mg by mouth daily at bedtime as needed, Disp: , Rfl:     mupirocin (BACTROBAN) 2 % ointment, Apply topically in the morning Daily, when dressing is changed, 3x per week, Disp: 22 g, Rfl: 0    potassium chloride (K-DUR,KLOR-CON) 20 mEq tablet, Take 20 mEq twice a day Monday through Thursday, take an extra 20 mEq on Fridays along with metolazone  , Disp: 193 tablet, Rfl: 3    predniSONE 5 mg tablet, Take 1 tablet (5 mg total) by mouth daily After current regimen, Disp: 42 tablet, Rfl: 0    tamsulosin (FLOMAX) 0 4 mg, TAKE 1 CAPSULE BY MOUTH EVERY DAY WITH DINNER, Disp: 90 capsule, Rfl: 3    traMADol (Ultram) 50 mg tablet, Take 1 tablet (50 mg total) by mouth 2 (two) times a day as needed for moderate pain, Disp: 30 tablet, Rfl: 1    Vitals:  Vitals:    09/07/22 1113   BP: 122/66   Pulse: 96   Temp: (!) 97 °F (36 1 °C)   SpO2: 97%       History:  Past Medical History:   Diagnosis Date    Anxiety 2020    COPD (chronic obstructive pulmonary disease) (HCC)     Coronary artery disease 2012    Depression 2020    Diabetes mellitus ([facility] [address] )     Herpes zoster     Hypertension     Memory loss     possible    Mycoplasma pneumonia     Obesity     Osteoarthritis     Renal calculi      Past Surgical History:   Procedure Laterality Date    CATARACT EXTRACTION      with insert intraoculat lens prosthesis    HEMORRHOID SURGERY      NECK SURGERY      for bone spur     Family History   Problem Relation Age of Onset    Breast cancer Mother         She  in 12  Age 80    Stroke Mother     Pneumonia Father     Substance Abuse Neg Hx     Mental illness Neg Hx      Social History     Socioeconomic History    Marital status: /Civil Union     Spouse name: Not on file    Number of children: Not on file    Years of education: Not on file    Highest education level: Not on file   Occupational History    Not on file   Tobacco Use    Smoking status: Former Smoker     Packs/day: 2 00     Years: 42 00     Pack years: 84 00     Types: Cigarettes     Start date: 1956     Quit date: 1998     Years since quittin 6    Smokeless tobacco: Never Used   Vaping Use    Vaping Use: Never used   Substance and Sexual Activity    Alcohol use: Yes     Alcohol/week: 1 0 standard drink     Types: 1 Cans of beer per week     Comment: 1-2 beers a months    Drug use: No    Sexual activity: Not Currently   Other Topics Concern    Not on file   Social History Narrative    Active advance directive    Daily coffee consumption, 1 cup/day    Denied exercise habits    Good dental hygiene    Supportive and safe    Active family support     Social Determinants of Health     Financial Resource Strain: Not on file   Food Insecurity: No Food Insecurity    Worried About Running Out of Food in the Last Year: Never true    Anthony of Food in the Last Year: Never true   Transportation Needs: No Transportation Needs    Lack of Transportation (Medical): No    Lack of Transportation (Non-Medical):  No Physical Activity: Not on file   Stress: Not on file   Social Connections: Not on file   Intimate Partner Violence: Not on file   Housing Stability: Low Risk     Unable to Pay for Housing in the Last Year: No    Number of Places Lived in the Last Year: 1    Unstable Housing in the Last Year: No     Past Surgical History:   Procedure Laterality Date    CATARACT EXTRACTION      with insert intraoculat lens prosthesis    HEMORRHOID SURGERY      NECK SURGERY      for bone spur         Physical Exam:   Physical Exam  Vitals and nursing note reviewed  Constitutional:       General: He is not in acute distress  Appearance: Normal appearance  He is well-developed  He is not diaphoretic  HENT:      Head: Normocephalic  Cardiovascular:      Rate and Rhythm: Normal rate  Heart sounds: No friction rub  No gallop  Pulmonary:      Effort: Pulmonary effort is normal  No respiratory distress  Breath sounds: Normal breath sounds  No wheezing or rales  Abdominal:      General: Bowel sounds are normal  There is no distension  Palpations: Abdomen is soft  Tenderness: There is no abdominal tenderness  Musculoskeletal:         General: Swelling (+1 bilat LE) present  Normal range of motion  Cervical back: Normal range of motion  Skin:     General: Skin is warm and dry  Neurological:      General: No focal deficit present  Mental Status: He is alert and oriented to person, place, and time  Mental status is at baseline     Psychiatric:         Mood and Affect: Mood normal          Behavior: Behavior normal

## 2022-09-08 NOTE — TELEPHONE ENCOUNTER
Willam Hdz, I do see that Mr  Regina Conway confirmed his appointment online via [address]  We do continue to provide phone reminders, as well  Thank you

## 2022-09-19 ENCOUNTER — TELEPHONE (OUTPATIENT)
Dept: NEPHROLOGY | Facility: CLINIC | Age: 87
End: 2022-09-19

## 2022-09-19 NOTE — TELEPHONE ENCOUNTER
Patient is requesting a phone call from Dr Tank Mas  regarding potassium pills and metolzone pill  When patient picked up potassium, on the instructions it says to take  metolzone on Fridays  Patient stated that on Monday - Thursday take 1 AM and 1 Pm potassium and on Friday take one extra potassium along with one metolzone  Patient thought he was suppose to stop the metolzone - he has not been taking it the last 3 weeks and more  Please advise / call patient

## 2022-09-19 NOTE — TELEPHONE ENCOUNTER
Spoke with him asked him to take an dose of potassium chloride when he takes the metolazone  Given that his GFR is less than 30 recommend to continue holding metformin    All questions were answered and he agreed with plan

## 2022-09-27 ENCOUNTER — OFFICE VISIT (OUTPATIENT)
Dept: FAMILY MEDICINE CLINIC | Facility: HOSPITAL | Age: 87
End: 2022-09-27
Payer: COMMERCIAL

## 2022-09-27 ENCOUNTER — TELEPHONE (OUTPATIENT)
Dept: FAMILY MEDICINE CLINIC | Facility: HOSPITAL | Age: 87
End: 2022-09-27

## 2022-09-27 VITALS
RESPIRATION RATE: 16 BRPM | OXYGEN SATURATION: 94 % | SYSTOLIC BLOOD PRESSURE: 130 MMHG | HEIGHT: 71 IN | WEIGHT: 258 LBS | DIASTOLIC BLOOD PRESSURE: 64 MMHG | HEART RATE: 84 BPM | BODY MASS INDEX: 36.12 KG/M2

## 2022-09-27 DIAGNOSIS — I10 ESSENTIAL HYPERTENSION: ICD-10-CM

## 2022-09-27 DIAGNOSIS — E11.22 TYPE 2 DIABETES MELLITUS WITH STAGE 4 CHRONIC KIDNEY DISEASE, WITHOUT LONG-TERM CURRENT USE OF INSULIN (HCC): Primary | ICD-10-CM

## 2022-09-27 DIAGNOSIS — N18.4 TYPE 2 DIABETES MELLITUS WITH STAGE 4 CHRONIC KIDNEY DISEASE, WITHOUT LONG-TERM CURRENT USE OF INSULIN (HCC): Primary | ICD-10-CM

## 2022-09-27 DIAGNOSIS — N18.4 TYPE 2 DIABETES MELLITUS WITH STAGE 4 CHRONIC KIDNEY DISEASE, WITHOUT LONG-TERM CURRENT USE OF INSULIN (HCC): ICD-10-CM

## 2022-09-27 DIAGNOSIS — M1A.0710 IDIOPATHIC CHRONIC GOUT OF RIGHT FOOT WITHOUT TOPHUS: ICD-10-CM

## 2022-09-27 DIAGNOSIS — N18.4 CHRONIC RENAL DISEASE, STAGE IV (HCC): Primary | ICD-10-CM

## 2022-09-27 DIAGNOSIS — E11.22 TYPE 2 DIABETES MELLITUS WITH STAGE 4 CHRONIC KIDNEY DISEASE, WITHOUT LONG-TERM CURRENT USE OF INSULIN (HCC): ICD-10-CM

## 2022-09-27 DIAGNOSIS — I50.32 CHRONIC DIASTOLIC CONGESTIVE HEART FAILURE (HCC): ICD-10-CM

## 2022-09-27 DIAGNOSIS — J43.9 PULMONARY EMPHYSEMA, UNSPECIFIED EMPHYSEMA TYPE (HCC): ICD-10-CM

## 2022-09-27 PROBLEM — R23.8 BULLOUS LESION: Status: RESOLVED | Noted: 2021-09-29 | Resolved: 2022-09-27

## 2022-09-27 PROBLEM — J41.0 SIMPLE CHRONIC BRONCHITIS (HCC): Status: RESOLVED | Noted: 2018-03-14 | Resolved: 2022-09-27

## 2022-09-27 PROBLEM — Z87.39 H/O: GOUT: Status: RESOLVED | Noted: 2022-08-17 | Resolved: 2022-09-27

## 2022-09-27 PROCEDURE — 1160F RVW MEDS BY RX/DR IN RCRD: CPT | Performed by: INTERNAL MEDICINE

## 2022-09-27 PROCEDURE — 3078F DIAST BP <80 MM HG: CPT | Performed by: INTERNAL MEDICINE

## 2022-09-27 PROCEDURE — 3075F SYST BP GE 130 - 139MM HG: CPT | Performed by: INTERNAL MEDICINE

## 2022-09-27 PROCEDURE — 99214 OFFICE O/P EST MOD 30 MIN: CPT | Performed by: INTERNAL MEDICINE

## 2022-09-27 RX ORDER — GLIPIZIDE 5 MG/1
5 TABLET ORAL
Qty: 60 TABLET | Refills: 3 | Status: SHIPPED | OUTPATIENT
Start: 2022-09-27 | End: 2022-10-20

## 2022-09-27 RX ORDER — METOLAZONE 2.5 MG/1
TABLET ORAL
Qty: 30 TABLET | Refills: 3 | Status: SHIPPED | OUTPATIENT
Start: 2022-09-27

## 2022-09-27 RX ORDER — ALLOPURINOL 100 MG/1
200 TABLET ORAL DAILY
Qty: 60 TABLET | Refills: 5 | Status: SHIPPED | OUTPATIENT
Start: 2022-09-27

## 2022-09-27 RX ORDER — LINAGLIPTIN 5 MG/1
5 TABLET, FILM COATED ORAL DAILY
Qty: 30 TABLET | Refills: 3 | Status: SHIPPED | OUTPATIENT
Start: 2022-09-27 | End: 2022-09-27

## 2022-09-27 NOTE — ASSESSMENT & PLAN NOTE
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

## 2022-09-27 NOTE — TELEPHONE ENCOUNTER
Medication "tradjentz" prescribed this morning is $120 00 for a 30 day supply  He is requesting that a new medication be prescribed that is cheaper  Please call

## 2022-09-27 NOTE — PROGRESS NOTES
Assessment/Plan:    Chronic diastolic congestive heart failure (HCC)  Wt Readings from Last 3 Encounters:   09/27/22 117 kg (258 lb)   09/07/22 117 kg (258 lb 8 oz)   08/16/22 116 kg (255 lb)         Patient has chronic diastolic CHF  Ejection fraction is 60%  Nuclear stress test showed no perfusion defects in July this year  Patient has dyspnea exertion after walking 30-40 feet  This is probably due to chronic diastolic CHF as well COPD and morbid obesity  He does have evidence of volume overload with lower extremity edema today  He is taking Bumex 3 mg daily that I asked him to continue and I also asked him to resume taking metolazone 2 5 mg on Tuesdays and Fridays 30 minutes in the morning before taking Bumex  I will recheck his electrolytes renal function in 2 weeks    Pulmonary emphysema (HCC)  Patient has chronic COPD  He has no expiratory wheezing today  He will continue albuterol nebulizers as well as Trelegy  Essential hypertension  Patient has chronic hypertension  His blood pressure is acceptable today he will continue Bumex and labetalol    I will check his electrolytes, renal function before next visit    Idiopathic chronic gout of right foot without tophus  Patient was treated for acute gout of the right 1st MTP joint about 6 weeks ago  He has no evidence of acute gout today on exam   I asked him to discontinue his prednisone  He will continue allopurinol 200 mg daily  I will check his LFTs  He has no allergic reactions to the allopurinol  His uric acid level is decreasing and I will recheck this before next visit    Chronic renal disease, stage IV [facility])  Lab Results   Component Value Date    EGFR 28 09/06/2022    EGFR 22 08/16/2022    EGFR 19 08/12/2022    CREATININE 2 06 (H) 09/06/2022    CREATININE 2 53 (H) 08/16/2022    CREATININE 2 83 (H) 08/12/2022       Patient has stage IV chronic disease  He denies urinary retention symptoms    I will recheck his renal function in 2 weeks    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 disease, without long-term current use of insulin ([facility])    Lab Results   Component Value Date    HGBA1C 7 7 (H) 08/12/2022     Patient has type 2 diabetes with stage IV chronic disease  His metformin was discontinued because of stage 4 chronic kidney disease  I am starting patient on Tradjenta  If it is not affordable will try glipizide  Diagnoses and all orders for this visit:    Chronic renal disease, stage IV ([facility])    Chronic diastolic congestive heart failure ([facility])  -     metolazone (ZAROXOLYN) 2 5 mg tablet; Take 2 5 mg 30 minutes before taking bumetanide in the morning on Tuesdays and Fridays! 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 disease, without long-term current use of insulin ([facility])  -     linaGLIPtin (Tradjenta) 5 MG TABS; Take 5 mg by mouth daily  -     Hemoglobin A1C; Future  -     Comprehensive metabolic panel; Future    Essential hypertension  -     Basic metabolic panel; Future    Idiopathic chronic gout of right foot without tophus  -     allopurinol (ZYLOPRIM) 100 mg tablet; Take 2 tablets (200 mg total) by mouth daily 1 daily for 2 weeks then 2 daily  -     Uric acid; Future    Pulmonary emphysema, unspecified emphysema type ([address] )  -     fluticasone-umeclidinium-vilanterol (Trelegy Ellipta) 200-62 5-25 MCG/INH AEPB inhaler; Inhale 1 puff daily Rinse mouth after use  Subjective:      Patient ID: Michaelle Carmona is a 81 y o  male  HPI    Patient presents for follow-up of chronic conditions as detailed in the assessment and plan        The following portions of the patient's history were reviewed and updated as appropriate: current medications, past family history, past medical history, past social history, past surgical history and problem list     Review of Systems      Objective:    /64   Pulse 84   Resp 16   Ht 5' 10 5" (1 791 m)   Wt 117 kg (258 lb)   SpO2 94%   BMI 36 50 kg/m² Physical Exam  Constitutional:       General: He is not in acute distress  Appearance: He is not toxic-appearing  Eyes:      Conjunctiva/sclera: Conjunctivae normal    Neck:      Comments: JVD was present  Cardiovascular:      Rate and Rhythm: Normal rate and regular rhythm  Heart sounds: No murmur heard  Pulmonary:      Effort: Respiratory distress (Patient became visibly dyspneic after walking in) present  Breath sounds: No wheezing or rales  Abdominal:      General: Bowel sounds are normal       Palpations: Abdomen is soft  Tenderness: There is no abdominal tenderness  Musculoskeletal:         General: No tenderness  Cervical back: Neck supple  Comments: Patient has no evidence of acute gout of his feet today   Skin:     Comments: patient has at least 1+ bilateral lower extremity pitting edema   Neurological:      Mental Status: He is alert and oriented to person, place, and time  Motor: No weakness  Psychiatric:         Mood and Affect: Mood normal          Thought Content:  Thought content normal              Tacos Cho MD

## 2022-09-27 NOTE — ASSESSMENT & PLAN NOTE
Patient has chronic COPD  He has no expiratory wheezing today  He will continue albuterol nebulizers as well as Trelegy

## 2022-09-27 NOTE — ASSESSMENT & PLAN NOTE
Lab Results   Component Value Date    EGFR 28 09/06/2022    EGFR 22 08/16/2022    EGFR 19 08/12/2022    CREATININE 2 06 (H) 09/06/2022    CREATININE 2 53 (H) 08/16/2022    CREATININE 2 83 (H) 08/12/2022       Patient has stage IV chronic disease  He denies urinary retention symptoms    I will recheck his renal function in 2 weeks

## 2022-09-27 NOTE — ASSESSMENT & PLAN NOTE
Patient was treated for acute gout of the right 1st MTP joint about 6 weeks ago  He has no evidence of acute gout today on exam   I asked him to discontinue his prednisone  He will continue allopurinol 200 mg daily  I will check his LFTs  He has no allergic reactions to the allopurinol    His uric acid level is decreasing and I will recheck this before next visit

## 2022-09-27 NOTE — ASSESSMENT & PLAN NOTE
Patient has chronic hypertension    His blood pressure is acceptable today he will continue Bumex and labetalol    I will check his electrolytes, renal function before next visit

## 2022-09-27 NOTE — ASSESSMENT & PLAN NOTE
Wt Readings from Last 3 Encounters:   09/27/22 117 kg (258 lb)   09/07/22 117 kg (258 lb 8 oz)   08/16/22 116 kg (255 lb)         Patient has chronic diastolic CHF  Ejection fraction is 60%  Nuclear stress test showed no perfusion defects in July this year  Patient has dyspnea exertion after walking 30-40 feet  This is probably due to chronic diastolic CHF as well COPD and morbid obesity  He does have evidence of volume overload with lower extremity edema today  He is taking Bumex 3 mg daily that I asked him to continue and I also asked him to resume taking metolazone 2 5 mg on Tuesdays and Fridays 30 minutes in the morning before taking Bumex      I will recheck his electrolytes renal function in 2 weeks

## 2022-09-28 ENCOUNTER — TELEPHONE (OUTPATIENT)
Dept: FAMILY MEDICINE CLINIC | Facility: HOSPITAL | Age: 87
End: 2022-09-28

## 2022-10-11 ENCOUNTER — LAB (OUTPATIENT)
Dept: LAB | Facility: HOSPITAL | Age: 87
End: 2022-10-11
Attending: INTERNAL MEDICINE
Payer: COMMERCIAL

## 2022-10-11 DIAGNOSIS — N18.4 TYPE 2 DIABETES MELLITUS WITH STAGE 4 CHRONIC KIDNEY DISEASE, WITHOUT LONG-TERM CURRENT USE OF INSULIN (HCC): ICD-10-CM

## 2022-10-11 DIAGNOSIS — M1A.0710 IDIOPATHIC CHRONIC GOUT OF RIGHT FOOT WITHOUT TOPHUS: ICD-10-CM

## 2022-10-11 DIAGNOSIS — I10 ESSENTIAL HYPERTENSION: ICD-10-CM

## 2022-10-11 DIAGNOSIS — E11.22 TYPE 2 DIABETES MELLITUS WITH STAGE 4 CHRONIC KIDNEY DISEASE, WITHOUT LONG-TERM CURRENT USE OF INSULIN (HCC): ICD-10-CM

## 2022-10-11 LAB
ALBUMIN SERPL BCP-MCNC: 3.7 G/DL (ref 3.5–5)
ALP SERPL-CCNC: 72 U/L (ref 46–116)
ALT SERPL W P-5'-P-CCNC: 16 U/L (ref 12–78)
ANION GAP SERPL CALCULATED.3IONS-SCNC: 8 MMOL/L (ref 4–13)
AST SERPL W P-5'-P-CCNC: 7 U/L (ref 5–45)
BILIRUB SERPL-MCNC: 0.44 MG/DL (ref 0.2–1)
BUN SERPL-MCNC: 71 MG/DL (ref 5–25)
CALCIUM SERPL-MCNC: 9.9 MG/DL (ref 8.3–10.1)
CHLORIDE SERPL-SCNC: 103 MMOL/L (ref 96–108)
CO2 SERPL-SCNC: 27 MMOL/L (ref 21–32)
CREAT SERPL-MCNC: 2.4 MG/DL (ref 0.6–1.3)
EST. AVERAGE GLUCOSE BLD GHB EST-MCNC: 160 MG/DL
GFR SERPL CREATININE-BSD FRML MDRD: 23 ML/MIN/1.73SQ M
GLUCOSE P FAST SERPL-MCNC: 161 MG/DL (ref 65–99)
HBA1C MFR BLD: 7.2 %
POTASSIUM SERPL-SCNC: 3.6 MMOL/L (ref 3.5–5.3)
PROT SERPL-MCNC: 7.3 G/DL (ref 6.4–8.4)
SODIUM SERPL-SCNC: 138 MMOL/L (ref 135–147)
URATE SERPL-MCNC: 7.2 MG/DL (ref 3.5–8.5)

## 2022-10-11 PROCEDURE — 80053 COMPREHEN METABOLIC PANEL: CPT

## 2022-10-11 PROCEDURE — 84550 ASSAY OF BLOOD/URIC ACID: CPT

## 2022-10-11 PROCEDURE — 83036 HEMOGLOBIN GLYCOSYLATED A1C: CPT

## 2022-10-11 PROCEDURE — 36415 COLL VENOUS BLD VENIPUNCTURE: CPT

## 2022-10-12 NOTE — RESULT ENCOUNTER NOTE
Call patient: Labs Show improved blood sugar control with decreasing A1c:  7 2    Kidney function is at baseline, electrolytes are normal, liver function tests are not

## 2022-10-20 ENCOUNTER — TELEPHONE (OUTPATIENT)
Dept: FAMILY MEDICINE CLINIC | Facility: HOSPITAL | Age: 87
End: 2022-10-20

## 2022-10-20 DIAGNOSIS — E11.22 TYPE 2 DIABETES MELLITUS WITH STAGE 4 CHRONIC KIDNEY DISEASE, WITHOUT LONG-TERM CURRENT USE OF INSULIN (HCC): ICD-10-CM

## 2022-10-20 DIAGNOSIS — N18.4 TYPE 2 DIABETES MELLITUS WITH STAGE 4 CHRONIC KIDNEY DISEASE, WITHOUT LONG-TERM CURRENT USE OF INSULIN (HCC): ICD-10-CM

## 2022-10-20 RX ORDER — GLIPIZIDE 5 MG/1
TABLET ORAL
Qty: 180 TABLET | Refills: 2 | Status: SHIPPED | OUTPATIENT
Start: 2022-10-20

## 2022-10-20 NOTE — TELEPHONE ENCOUNTER
Patient taking glipizide twice daily before meals      Does he need to wait 30 minutes before eating dinner like he does for breakfast?    pcb

## 2022-10-20 NOTE — TELEPHONE ENCOUNTER
No he does not need to wait 30 minutes before breakfast or dinner when he takes glipizide    He can take glipizide with the meals

## 2022-10-24 LAB
LEFT EYE DIABETIC RETINOPATHY: NORMAL
RIGHT EYE DIABETIC RETINOPATHY: NORMAL

## 2022-11-02 DIAGNOSIS — M1A.0710 IDIOPATHIC CHRONIC GOUT OF RIGHT FOOT WITHOUT TOPHUS: ICD-10-CM

## 2022-11-02 RX ORDER — ALLOPURINOL 100 MG/1
200 TABLET ORAL DAILY
Qty: 180 TABLET | Refills: 2 | Status: SHIPPED | OUTPATIENT
Start: 2022-11-02

## 2022-11-03 ENCOUNTER — APPOINTMENT (EMERGENCY)
Dept: RADIOLOGY | Facility: HOSPITAL | Age: 87
End: 2022-11-03

## 2022-11-03 ENCOUNTER — TELEPHONE (OUTPATIENT)
Dept: FAMILY MEDICINE CLINIC | Facility: HOSPITAL | Age: 87
End: 2022-11-03

## 2022-11-03 ENCOUNTER — HOSPITAL ENCOUNTER (INPATIENT)
Facility: HOSPITAL | Age: 87
LOS: 3 days | Discharge: HOME HEALTH | End: 2022-11-07
Attending: EMERGENCY MEDICINE | Admitting: INTERNAL MEDICINE

## 2022-11-03 DIAGNOSIS — R06.02 SOB (SHORTNESS OF BREATH): Primary | ICD-10-CM

## 2022-11-03 DIAGNOSIS — N25.89 UREMIC ACIDOSIS: ICD-10-CM

## 2022-11-03 DIAGNOSIS — N17.9 AKI (ACUTE KIDNEY INJURY) (HCC): ICD-10-CM

## 2022-11-03 DIAGNOSIS — N19 UREMIA: ICD-10-CM

## 2022-11-03 DIAGNOSIS — J21.0 RSV (ACUTE BRONCHIOLITIS DUE TO RESPIRATORY SYNCYTIAL VIRUS): ICD-10-CM

## 2022-11-03 DIAGNOSIS — N18.9 CKD (CHRONIC KIDNEY DISEASE): ICD-10-CM

## 2022-11-03 DIAGNOSIS — J11.1 INFLUENZA: ICD-10-CM

## 2022-11-03 DIAGNOSIS — E86.0 DEHYDRATION: ICD-10-CM

## 2022-11-03 PROBLEM — J96.11 CHRONIC RESPIRATORY FAILURE WITH HYPOXIA (HCC): Status: ACTIVE | Noted: 2019-11-15

## 2022-11-03 PROBLEM — J10.00 PNEUMONIA DUE TO INFLUENZA A VIRUS: Status: ACTIVE | Noted: 2022-11-03

## 2022-11-03 PROBLEM — N18.4 ACUTE RENAL FAILURE SUPERIMPOSED ON STAGE 4 CHRONIC KIDNEY DISEASE (HCC): Status: ACTIVE | Noted: 2022-11-03

## 2022-11-03 LAB
ALBUMIN SERPL BCP-MCNC: 3.4 G/DL (ref 3.5–5)
ALP SERPL-CCNC: 72 U/L (ref 46–116)
ALT SERPL W P-5'-P-CCNC: 18 U/L (ref 12–78)
ANION GAP SERPL CALCULATED.3IONS-SCNC: 14 MMOL/L (ref 4–13)
BACTERIA UR QL AUTO: ABNORMAL /HPF
BASOPHILS # BLD AUTO: 0.02 THOUSANDS/ÂΜL (ref 0–0.1)
BASOPHILS NFR BLD AUTO: 0 % (ref 0–1)
BILIRUB SERPL-MCNC: 0.5 MG/DL (ref 0.2–1)
BILIRUB UR QL STRIP: NEGATIVE
BUN SERPL-MCNC: 72 MG/DL (ref 5–25)
CALCIUM ALBUM COR SERPL-MCNC: 9.1 MG/DL (ref 8.3–10.1)
CALCIUM SERPL-MCNC: 8.6 MG/DL (ref 8.3–10.1)
CHLORIDE SERPL-SCNC: 99 MMOL/L (ref 96–108)
CLARITY UR: CLEAR
CO2 SERPL-SCNC: 26 MMOL/L (ref 21–32)
COLOR UR: YELLOW
CREAT SERPL-MCNC: 3.07 MG/DL (ref 0.6–1.3)
EOSINOPHIL # BLD AUTO: 0.03 THOUSAND/ÂΜL (ref 0–0.61)
EOSINOPHIL NFR BLD AUTO: 0 % (ref 0–6)
ERYTHROCYTE [DISTWIDTH] IN BLOOD BY AUTOMATED COUNT: 14.6 % (ref 11.6–15.1)
FLUAV RNA RESP QL NAA+PROBE: POSITIVE
FLUBV RNA RESP QL NAA+PROBE: NEGATIVE
GFR SERPL CREATININE-BSD FRML MDRD: 17 ML/MIN/1.73SQ M
GLUCOSE SERPL-MCNC: 116 MG/DL (ref 65–140)
GLUCOSE SERPL-MCNC: 127 MG/DL (ref 65–140)
GLUCOSE SERPL-MCNC: 189 MG/DL (ref 65–140)
GLUCOSE UR STRIP-MCNC: NEGATIVE MG/DL
HCT VFR BLD AUTO: 41.2 % (ref 36.5–49.3)
HGB BLD-MCNC: 13.3 G/DL (ref 12–17)
HGB UR QL STRIP.AUTO: NEGATIVE
HYALINE CASTS #/AREA URNS LPF: ABNORMAL /LPF
IMM GRANULOCYTES # BLD AUTO: 0.04 THOUSAND/UL (ref 0–0.2)
IMM GRANULOCYTES NFR BLD AUTO: 1 % (ref 0–2)
KETONES UR STRIP-MCNC: NEGATIVE MG/DL
L PNEUMO1 AG UR QL IA.RAPID: NEGATIVE
LEUKOCYTE ESTERASE UR QL STRIP: NEGATIVE
LYMPHOCYTES # BLD AUTO: 0.86 THOUSANDS/ÂΜL (ref 0.6–4.47)
LYMPHOCYTES NFR BLD AUTO: 10 % (ref 14–44)
MCH RBC QN AUTO: 28.4 PG (ref 26.8–34.3)
MCHC RBC AUTO-ENTMCNC: 32.3 G/DL (ref 31.4–37.4)
MCV RBC AUTO: 88 FL (ref 82–98)
MONOCYTES # BLD AUTO: 0.78 THOUSAND/ÂΜL (ref 0.17–1.22)
MONOCYTES NFR BLD AUTO: 9 % (ref 4–12)
MUCOUS THREADS UR QL AUTO: ABNORMAL
NEUTROPHILS # BLD AUTO: 7.07 THOUSANDS/ÂΜL (ref 1.85–7.62)
NEUTS SEG NFR BLD AUTO: 80 % (ref 43–75)
NITRITE UR QL STRIP: NEGATIVE
NON-SQ EPI CELLS URNS QL MICRO: ABNORMAL /HPF
NRBC BLD AUTO-RTO: 0 /100 WBCS
NT-PROBNP SERPL-MCNC: 254 PG/ML
PH UR STRIP.AUTO: 5 [PH]
PLATELET # BLD AUTO: 247 THOUSANDS/UL (ref 149–390)
PMV BLD AUTO: 10.2 FL (ref 8.9–12.7)
POTASSIUM SERPL-SCNC: 3.3 MMOL/L (ref 3.5–5.3)
PROCALCITONIN SERPL-MCNC: 0.26 NG/ML
PROT SERPL-MCNC: 7.7 G/DL (ref 6.4–8.4)
PROT UR STRIP-MCNC: NEGATIVE MG/DL
RBC # BLD AUTO: 4.69 MILLION/UL (ref 3.88–5.62)
RBC #/AREA URNS AUTO: ABNORMAL /HPF
RSV RNA RESP QL NAA+PROBE: POSITIVE
S PNEUM AG UR QL: NEGATIVE
SARS-COV-2 RNA RESP QL NAA+PROBE: NEGATIVE
SODIUM SERPL-SCNC: 139 MMOL/L (ref 135–147)
SP GR UR STRIP.AUTO: 1.01 (ref 1–1.03)
UROBILINOGEN UR STRIP-ACNC: <2 MG/DL
WBC # BLD AUTO: 8.8 THOUSAND/UL (ref 4.31–10.16)
WBC #/AREA URNS AUTO: ABNORMAL /HPF

## 2022-11-03 RX ORDER — INSULIN LISPRO 100 [IU]/ML
2-12 INJECTION, SOLUTION INTRAVENOUS; SUBCUTANEOUS
Status: DISCONTINUED | OUTPATIENT
Start: 2022-11-03 | End: 2022-11-07 | Stop reason: HOSPADM

## 2022-11-03 RX ORDER — GUAIFENESIN 600 MG/1
600 TABLET, EXTENDED RELEASE ORAL EVERY 12 HOURS SCHEDULED
Status: DISCONTINUED | OUTPATIENT
Start: 2022-11-03 | End: 2022-11-07 | Stop reason: HOSPADM

## 2022-11-03 RX ORDER — CEFTRIAXONE 2 G/50ML
2000 INJECTION, SOLUTION INTRAVENOUS EVERY 24 HOURS
Status: DISCONTINUED | OUTPATIENT
Start: 2022-11-03 | End: 2022-11-07 | Stop reason: HOSPADM

## 2022-11-03 RX ORDER — OSELTAMIVIR PHOSPHATE 30 MG/1
30 CAPSULE ORAL EVERY 24 HOURS
Status: DISCONTINUED | OUTPATIENT
Start: 2022-11-03 | End: 2022-11-07 | Stop reason: HOSPADM

## 2022-11-03 RX ORDER — PREDNISONE 20 MG/1
40 TABLET ORAL DAILY
Status: COMPLETED | OUTPATIENT
Start: 2022-11-03 | End: 2022-11-07

## 2022-11-03 RX ORDER — LEVALBUTEROL INHALATION SOLUTION 0.63 MG/3ML
0.63 SOLUTION RESPIRATORY (INHALATION)
Status: DISCONTINUED | OUTPATIENT
Start: 2022-11-03 | End: 2022-11-04

## 2022-11-03 RX ORDER — SODIUM CHLORIDE, SODIUM GLUCONATE, SODIUM ACETATE, POTASSIUM CHLORIDE, MAGNESIUM CHLORIDE, SODIUM PHOSPHATE, DIBASIC, AND POTASSIUM PHOSPHATE .53; .5; .37; .037; .03; .012; .00082 G/100ML; G/100ML; G/100ML; G/100ML; G/100ML; G/100ML; G/100ML
50 INJECTION, SOLUTION INTRAVENOUS CONTINUOUS
Status: DISPENSED | OUTPATIENT
Start: 2022-11-03 | End: 2022-11-04

## 2022-11-03 RX ORDER — IPRATROPIUM BROMIDE AND ALBUTEROL SULFATE 2.5; .5 MG/3ML; MG/3ML
3 SOLUTION RESPIRATORY (INHALATION) ONCE
Status: COMPLETED | OUTPATIENT
Start: 2022-11-03 | End: 2022-11-03

## 2022-11-03 RX ORDER — LEVALBUTEROL INHALATION SOLUTION 0.63 MG/3ML
0.63 SOLUTION RESPIRATORY (INHALATION) EVERY 6 HOURS PRN
Status: DISCONTINUED | OUTPATIENT
Start: 2022-11-03 | End: 2022-11-03

## 2022-11-03 RX ORDER — BENZONATATE 100 MG/1
100 CAPSULE ORAL 3 TIMES DAILY PRN
Status: DISCONTINUED | OUTPATIENT
Start: 2022-11-03 | End: 2022-11-07 | Stop reason: HOSPADM

## 2022-11-03 RX ORDER — INSULIN GLARGINE 100 [IU]/ML
10 INJECTION, SOLUTION SUBCUTANEOUS
Status: DISCONTINUED | OUTPATIENT
Start: 2022-11-03 | End: 2022-11-07 | Stop reason: HOSPADM

## 2022-11-03 RX ADMIN — BENZONATATE 100 MG: 100 CAPSULE ORAL at 22:40

## 2022-11-03 RX ADMIN — CEFTRIAXONE 2000 MG: 2 INJECTION, SOLUTION INTRAVENOUS at 14:32

## 2022-11-03 RX ADMIN — INSULIN GLARGINE 10 UNITS: 100 INJECTION, SOLUTION SUBCUTANEOUS at 21:15

## 2022-11-03 RX ADMIN — LEVALBUTEROL HYDROCHLORIDE 0.63 MG: 0.63 SOLUTION RESPIRATORY (INHALATION) at 20:11

## 2022-11-03 RX ADMIN — PREDNISONE 40 MG: 20 TABLET ORAL at 14:37

## 2022-11-03 RX ADMIN — SODIUM CHLORIDE, SODIUM LACTATE, POTASSIUM CHLORIDE, AND CALCIUM CHLORIDE 500 ML: .6; .31; .03; .02 INJECTION, SOLUTION INTRAVENOUS at 10:15

## 2022-11-03 RX ADMIN — GUAIFENESIN 600 MG: 600 TABLET, EXTENDED RELEASE ORAL at 14:33

## 2022-11-03 RX ADMIN — GUAIFENESIN 600 MG: 600 TABLET, EXTENDED RELEASE ORAL at 21:15

## 2022-11-03 RX ADMIN — INSULIN LISPRO 2 UNITS: 100 INJECTION, SOLUTION INTRAVENOUS; SUBCUTANEOUS at 21:15

## 2022-11-03 RX ADMIN — IPRATROPIUM BROMIDE AND ALBUTEROL SULFATE 3 ML: .5; 3 SOLUTION RESPIRATORY (INHALATION) at 08:47

## 2022-11-03 RX ADMIN — OSELTAMIVIR PHOSPHATE 30 MG: 30 CAPSULE ORAL at 14:33

## 2022-11-03 RX ADMIN — AZITHROMYCIN MONOHYDRATE 500 MG: 500 INJECTION, POWDER, LYOPHILIZED, FOR SOLUTION INTRAVENOUS at 14:33

## 2022-11-03 RX ADMIN — SODIUM CHLORIDE, SODIUM GLUCONATE, SODIUM ACETATE, POTASSIUM CHLORIDE, MAGNESIUM CHLORIDE, SODIUM PHOSPHATE, DIBASIC, AND POTASSIUM PHOSPHATE 50 ML/HR: .53; .5; .37; .037; .03; .012; .00082 INJECTION, SOLUTION INTRAVENOUS at 13:45

## 2022-11-03 RX ADMIN — IPRATROPIUM BROMIDE 0.5 MG: 0.5 SOLUTION RESPIRATORY (INHALATION) at 20:11

## 2022-11-03 NOTE — ED NOTES
Received patient from previous shift  Finishing up neb treatment  Alert and oriented x3  NAD at this time  Occasional cough noted  VSS on RA        Sydney Ng RN  11/03/22 0746

## 2022-11-03 NOTE — ASSESSMENT & PLAN NOTE
No in acute exacerbation however has RSV Flu A pneumonia  IS  ACP  mucinex  Tesslon pereles  RP  Xopenx and Atrovent

## 2022-11-03 NOTE — ASSESSMENT & PLAN NOTE
Lab Results   Component Value Date    EGFR 17 11/03/2022    EGFR 23 10/11/2022    EGFR 28 09/06/2022    CREATININE 3 07 (H) 11/03/2022    CREATININE 2 40 (H) 10/11/2022    CREATININE 2 06 (H) 09/06/2022     Patient's creatinine on admission 3 07  Baseline 2 3-2 4 most likely secondary to combined RSV and flu pneumonia along with diuresis  I's and O's  Avoid nephrotoxic agents and hypotension  Holding Bumex and metolazone  Gentle IV fluid  Continue to trend  UA pending  Urine retention protocol

## 2022-11-03 NOTE — ASSESSMENT & PLAN NOTE
Lab Results   Component Value Date    HGBA1C 7 2 (H) 10/11/2022       No results for input(s): POCGLU in the last 72 hours      Blood Sugar Average: Last 72 hrs:     Controlled  ISS  Hold oral meds  lantus 10  Diabetic low-salt diet  Hypoglycemic protocol

## 2022-11-03 NOTE — RESPIRATORY THERAPY NOTE
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)    Chronic diastolic congestive heart failure (HCC)    Pneumonia due to influenza A virus      Home Pulmonary Medications:  Trelegy/albuterol prn  Home Devices/Therapy: (P) Home O2    Past Medical History:   Diagnosis Date    Anxiety     COPD (chronic obstructive pulmonary disease) ([facility] )     Coronary artery disease 2012    Depression     Diabetes mellitus ([facility] )     Herpes zoster     Hypertension     Memory loss     possible    Mycoplasma pneumonia     Obesity     Osteoarthritis     Renal calculi      Social History     Socioeconomic History    Marital status: /Civil Union     Spouse name: None    Number of children: None    Years of education: None    Highest education level: None   Occupational History    None   Tobacco Use    Smoking status: Former Smoker     Packs/day: 2 00     Years: 42 00     Pack years: 84 00     Types: Cigarettes     Start date: 1956     Quit date: 1998     Years since quittin 8    Smokeless tobacco: Never Used   Vaping Use    Vaping Use: Never used   Substance and Sexual Activity    Alcohol use:  Yes     Alcohol/week: 1 0 standard drink     Types: 1 Cans of beer per week     Comment: 1-2 beers a months    Drug use: No    Sexual activity: Not Currently   Other Topics Concern    None   Social History Narrative    Active advance directive    Daily coffee consumption, 1 cup/day    Denied exercise habits    Good dental hygiene    Supportive and safe    Active family support     Social Determinants of Health     Financial Resource Strain: Not on file   Food Insecurity: No Food Insecurity    Worried About [employer] in the Last Year: Never true    Ran Out of Food in the Last Year: Never true   Transportation Needs: No Transportation Needs    Lack of Transportation (Medical): No    Lack of Transportation (Non-Medical): No   Physical Activity: Not on file   Stress: Not on file   Social Connections: Not on file   Intimate Partner Violence: Not on file   Housing Stability: Low Risk     Unable to Pay for Housing in the Last Year: No    Number of Places Lived in the Last Year: 1    Unstable Housing in the Last Year: No       Subjective         Objective    Physical Exam:   Assessment Type: (P) Assess only  General Appearance: (P) Awake, Alert  Respiratory Pattern: (P) Dyspnea at rest  Chest Assessment: (P) Chest expansion symmetrical  Bilateral Breath Sounds: (P) Diminished  Cough: (P) Non-productive, Strong  O2 Device: (P) NC    Vitals:  Blood pressure 120/66, pulse 85, temperature 97 8 °F (36 6 °C), resp  rate 18, height 5' 11" (1 803 m), weight 118 kg (260 lb), SpO2 95 %            Imaging and other studies: I have personally reviewed pertinent films in PACS    O2 Device: (P) NC     Plan    Respiratory Plan: (P) Mild Distress pathway  Airway Clearance Plan: (P) Discontinue Protocol     Resp Comments: (P) as per protocol neb tx' changed to BID

## 2022-11-03 NOTE — ASSESSMENT & PLAN NOTE
Patient presenting with worsening dyspnea on exertion  Chest x-ray on admission showed:Mild right basilar opacity may reflect developing pneumonia  Found to have RSV and influenza a     Urine Ag, MRSA, sputum cultures pending  Ceftriaxone, azithromycin and Tamiflu    Incentive spirometry, airway current meds protocol, Mucinex, Tessalon Perles, respiratory protocol, and nebulizers p r n

## 2022-11-03 NOTE — ASSESSMENT & PLAN NOTE
Wt Readings from Last 3 Encounters:   11/03/22 118 kg (260 lb)   09/27/22 117 kg (258 lb)   09/07/22 117 kg (258 lb 8 oz)     Pt hypovolemic on admission  Gentle IVF  Echo done on 5/12/22 showed an EF of 60% grade 1 diasystolic dysfunction  I's and O's daily weight low salt DM diet  Monitor resp status while getting IVF  Holding bumex

## 2022-11-03 NOTE — ED PROVIDER NOTES
History  Chief Complaint   Patient presents with   • Shortness of Breath     Pt reports shortness of breath x4 days  Worse on exertion, hx of emphysema and copd  Denies chest pain     PMHX moderate COPD and chronic oxygen dependence 3 L ambulatory initiated last hospitalization / CDCHF / CKD with preserved ejection fraction / Chronic 02 dependence w/ 2 L walking  SOB x 10-12 days  Worse over last 4  This morning went to urinate and was severely short of breath  No significant changes  Some cough and dyspnea at home with no fever  Did utilize 1 DuoNeb yesterday with some improvement  Intermittently using oxygen at home but uncertain of L dosage  Positive history for intermittent lower extremity swelling  Was admitted for similar symptoms back in May from May 11 to May 15th for acute on chronic diastolic heart failure  Echo from 05/12 with EF 60% and grade 1 diastolic dysfunction mild tricuspid valvular regurgitation  Since then has been on bumetanide 1 and half tablets daily and Zaroxolyn every Tuesday and Friday  Also follows up with Nephrology for chronic kidney disease  Shortness of Breath  Associated symptoms: cough    Associated symptoms: no abdominal pain, no chest pain, no ear pain, no fever, no rash, no sore throat and no vomiting        Prior to Admission Medications   Prescriptions Last Dose Informant Patient Reported? Taking?    Blood Glucose Monitoring Suppl (OneTouch Verio) w/Device KIT   No No   Sig: Use 2 (two) times a day Dx:E11 9   Iron-Vitamin C (IRON 100/C PO)   Yes No   Sig: Take 325 mg by mouth   Lancets (onetouch ultrasoft) lancets   No No   Sig: Use 2 (two) times a day E11 9   albuterol (2 5 mg/3 mL) 0 083 % nebulizer solution   No No   Sig: Take 3 mL (2 5 mg total) by nebulization every 6 (six) hours as needed for wheezing or shortness of breath   albuterol (PROVENTIL HFA,VENTOLIN HFA) 90 mcg/act inhaler   No No   Sig: TAKE 2 PUFFS BY MOUTH EVERY 4 HOURS AS NEEDED FOR WHEEZE allopurinol (ZYLOPRIM) 100 mg tablet   No No   Sig: TAKE 2 TABLETS (200 MG TOTAL) BY MOUTH DAILY 1 DAILY FOR 2 WEEKS THEN 2 DAILY   bumetanide (BUMEX) 2 mg tablet   No No   Sig: Take 1 5 tablets (3 mg total) by mouth daily   fluticasone (FLONASE) 50 mcg/act nasal spray   No No   Sig: Spray 1 spray into each nostril twice daily   fluticasone-umeclidinium-vilanterol (Trelegy Ellipta) 200-62 5-25 MCG/INH AEPB inhaler   No No   Sig: Inhale 1 puff daily Rinse mouth after use  glipiZIDE (GLUCOTROL) 5 mg tablet   No No   Sig: TAKE 1 TABLET BY MOUTH 2 TIMES A DAY BEFORE MEALS    glucose blood test strip   No No   Sig: Test twice daily DX:  E11 9   labetalol (NORMODYNE) 200 mg tablet  Self No No   Sig: Take 1 tablet (200 mg total) by mouth 2 (two) times a day   metolazone (ZAROXOLYN) 2 5 mg tablet   No No   Sig: Take 2 5 mg 30 minutes before taking bumetanide in the morning on Tuesdays and Fridays!   mupirocin (BACTROBAN) 2 % ointment   No No   Sig: Apply topically in the morning Daily, when dressing is changed, 3x per week   potassium chloride (K-DUR,KLOR-CON) 20 mEq tablet   No No   Sig: Take 20 mEq twice a day Monday through Thursday, take an extra 20 mEq on Fridays along with metolazone     tamsulosin (FLOMAX) 0 4 mg  Self No No   Sig: TAKE 1 CAPSULE BY MOUTH EVERY DAY WITH DINNER   traMADol (Ultram) 50 mg tablet   No No   Sig: Take 1 tablet (50 mg total) by mouth 2 (two) times a day as needed for moderate pain      Facility-Administered Medications: None       Past Medical History:   Diagnosis Date   • Anxiety 2020   • COPD (chronic obstructive pulmonary disease) ([facility] )    • Coronary artery disease 2012   • Depression 2020   • Diabetes mellitus (HCC)    • Herpes zoster    • Hypertension    • Memory loss     possible   • Mycoplasma pneumonia    • Obesity    • Osteoarthritis    • Renal calculi        Past Surgical History:   Procedure Laterality Date   • CATARACT EXTRACTION      with insert intraoculat lens prosthesis • HEMORRHOID SURGERY     • NECK SURGERY      for bone spur       Family History   Problem Relation Age of Onset   • Diabetes Mother    • Breast cancer Mother         She  in 12  Age 80   • Stroke Mother    • Pneumonia Father    • Substance Abuse Neg Hx    • Mental illness Neg Hx      I have reviewed and agree with the history as documented  E-Cigarette/Vaping   • E-Cigarette Use Never User      E-Cigarette/Vaping Substances   • Nicotine No    • THC No    • CBD No    • Flavoring No    • Other No    • Unknown No      Social History     Tobacco Use   • Smoking status: Former Smoker     Packs/day: 2 00     Years: 42 00     Pack years: 84 00     Types: Cigarettes     Start date: 1956     Quit date: 1998     Years since quittin 8   • Smokeless tobacco: Never Used   Vaping Use   • Vaping Use: Never used   Substance Use Topics   • Alcohol use: Yes     Alcohol/week: 1 0 standard drink     Types: 1 Cans of beer per week     Comment: 1-2 beers a months   • Drug use: No       Review of Systems   Constitutional: Negative for chills and fever  HENT: Negative for ear pain and sore throat  Eyes: Negative for pain and visual disturbance  Respiratory: Positive for cough and shortness of breath  Cardiovascular: Positive for leg swelling  Negative for chest pain and palpitations  Gastrointestinal: Negative for abdominal pain and vomiting  Genitourinary: Negative for dysuria and hematuria  Musculoskeletal: Negative for arthralgias and back pain  Skin: Negative for color change and rash  Neurological: Negative for seizures and syncope  All other systems reviewed and are negative  Physical Exam  Physical Exam  Vitals and nursing note reviewed  Constitutional:       Appearance: He is well-developed  HENT:      Head: Normocephalic and atraumatic  Eyes:      Conjunctiva/sclera: Conjunctivae normal    Cardiovascular:      Rate and Rhythm: Normal rate and regular rhythm        Heart sounds: No murmur heard  Pulmonary:      Effort: Pulmonary effort is normal  No respiratory distress  Breath sounds: Examination of the right-lower field reveals rales  Examination of the left-lower field reveals rales  Rales present  Abdominal:      Palpations: Abdomen is soft  Tenderness: There is no abdominal tenderness  Musculoskeletal:      Cervical back: Neck supple  Right lower le+ Pitting Edema present  Left lower le+ Pitting Edema present  Skin:     General: Skin is warm and dry  Neurological:      Mental Status: He is alert           Vital Signs  ED Triage Vitals   Temperature Pulse Respirations Blood Pressure SpO2   22 0829 22 0829 22 0829 22 0829 22 0829   97 5 °F (36 4 °C) 85 (!) 26 102/58 90 %      Temp Source Heart Rate Source Patient Position - Orthostatic VS BP Location FiO2 (%)   22 0829 22 0900 22 0829 22 08 --   Oral Monitor Sitting Left arm       Pain Score       --                  Vitals:    22 1000 22 1015 22 1114 22 1115   BP: 120/59  123/63 123/63   Pulse: 77 80 80 80   Patient Position - Orthostatic VS: Sitting            Visual Acuity      ED Medications  Medications   lactated ringers bolus 500 mL (500 mL Intravenous New Bag 11/3/22 1015)   ipratropium-albuterol (DUO-NEB) 0 5-2 5 mg/3 mL inhalation solution 3 mL (3 mL Nebulization Given 11/3/22 0847)       Diagnostic Studies  Results Reviewed     Procedure Component Value Units Date/Time    FLU/RSV/COVID - if FLU/RSV clinically relevant [[id number]]  (Abnormal) Collected: 22 0847    Lab Status: Final result Specimen: Nares from Nose Updated: 22     SARS-CoV-2 Negative     INFLUENZA A PCR Positive     INFLUENZA B PCR Negative     RSV PCR Positive    Narrative:      FOR PEDIATRIC PATIENTS - copy/paste COVID Guidelines URL to browser: [email or web address]    SARS-CoV-2 assay is a Nucleic Acid Amplification assay intended for the  qualitative detection of nucleic acid from SARS-CoV-2 in nasopharyngeal  swabs  Results are for the presumptive identification of SARS-CoV-2 RNA  Positive results are indicative of infection with SARS-CoV-2, the virus  causing COVID-19, but do not rule out bacterial infection or co-infection  with other viruses  Laboratories within the [place] and its  territories are required to report all positive results to the appropriate  public health authorities  Negative results do not preclude SARS-CoV-2  infection and should not be used as the sole basis for treatment or other  patient management decisions  Negative results must be combined with  clinical observations, patient history, and epidemiological information  This test has not been FDA cleared or approved  This test has been authorized by FDA under an Emergency Use Authorization  (EUA)  This test is only authorized for the duration of time the  declaration that circumstances exist justifying the authorization of the  emergency use of an in vitro diagnostic tests for detection of SARS-CoV-2  virus and/or diagnosis of COVID-19 infection under section 564(b)(1) of  the Act, 21 U  S C  869VLZ-7(N)(4), unless the authorization is terminated  or revoked sooner  The test has been validated but independent review by FDA  and CLIA is pending  Test performed using [employer] Gene[employer]pert: This RT-PCR assay targets N2,  a region unique to SARS-CoV-2  A conserved region in the E-gene was chosen  for pan-Sarbecovirus detection which includes SARS-CoV-2  According to CMS-2020-01-R, this platform meets the definition of high-throughput technology      NT-BNP PRO [[id number]]  (Normal) Collected: 11/03/22 0847    Lab Status: Final result Specimen: Blood from Arm, Right Updated: 11/03/22 1007     NT-proBNP 254 pg/mL Comprehensive metabolic panel [[id number]]  (Abnormal) Collected: 11/03/22 0847    Lab Status: Final result Specimen: Blood from Arm, Right Updated: 11/03/22 0933     Sodium 139 mmol/L      Potassium 3 3 mmol/L      Chloride 99 mmol/L      CO2 26 mmol/L      ANION GAP 14 mmol/L      BUN 72 mg/dL      Creatinine 3 07 mg/dL      Glucose 116 mg/dL      Calcium 8 6 mg/dL      Corrected Calcium 9 1 mg/dL      AST --     ALT 18 U/L      Alkaline Phosphatase 72 U/L      Total Protein 7 7 g/dL      Albumin 3 4 g/dL      Total Bilirubin 0 50 mg/dL      eGFR 17 ml/min/1 73sq m     Narrative:      National Kidney Disease Foundation guidelines for Chronic Kidney Disease (CKD):   •  Stage 1 with normal or high GFR (GFR > 90 mL/min/1 73 square meters)  •  Stage 2 Mild CKD (GFR = 60-89 mL/min/1 73 square meters)  •  Stage 3A Moderate CKD (GFR = 45-59 mL/min/1 73 square meters)  •  Stage 3B Moderate CKD (GFR = 30-44 mL/min/1 73 square meters)  •  Stage 4 Severe CKD (GFR = 15-29 mL/min/1 73 square meters)  •  Stage 5 End Stage CKD (GFR <15 mL/min/1 73 square meters)  Note: GFR calculation is accurate only with a steady state creatinine    CBC and differential [[id number]]  (Abnormal) Collected: 11/03/22 0847    Lab Status: Final result Specimen: Blood from Arm, Right Updated: 11/03/22 0853     WBC 8 80 Thousand/uL      RBC 4 69 Million/uL      Hemoglobin 13 3 g/dL      Hematocrit 41 2 %      MCV 88 fL      MCH 28 4 pg      MCHC 32 3 g/dL      RDW 14 6 %      MPV 10 2 fL      Platelets 119 Thousands/uL      nRBC 0 /100 WBCs      Neutrophils Relative 80 %      Immat GRANS % 1 %      Lymphocytes Relative 10 %      Monocytes Relative 9 %      Eosinophils Relative 0 %      Basophils Relative 0 %      Neutrophils Absolute 7 07 Thousands/µL      Immature Grans Absolute 0 04 Thousand/uL      Lymphocytes Absolute 0 86 Thousands/µL      Monocytes Absolute 0 78 Thousand/µL      Eosinophils Absolute 0 03 Thousand/µL      Basophils Absolute 0 02 Thousands/µL                  X-ray chest 2 views   Final Result by Kathya Ponce MD (11/03 1010)      Mild right basilar opacity may reflect developing pneumonia  Workstation performed: [id number]                    Procedures  ECG 12 Lead Documentation Only    Date/Time: 11/3/2022 8:43 AM  Performed by: Ninfa Zhong PA-C  Authorized by: Ninfa Zhong PA-C     Indications / Diagnosis:  Shortness of breath  ECG reviewed by me, the ED Provider: yes (And by Dr Deidra Waite)    Patient location:  ED  Previous ECG:     Previous ECG:  Compared to current    Similarity:  No change  Interpretation:     Interpretation: non-specific    Rate:     ECG rate:  82    ECG rate assessment: normal    Rhythm:     Rhythm: sinus rhythm    Ectopy:     Ectopy: PVCs    QRS:     QRS axis:  Normal  Conduction:     Conduction: normal    ST segments:     ST segments:  Normal  T waves:     T waves: normal               ED Course  ED Course as of 11/03/22 1128   Thu Nov 03, 2022   9010 Pending Pro BNP/CMP/x-ray and COVID swab   1045 Paged Dr Malik Horowitz for admission for patient w/ MYCHAL  Likelihood for uremic acidosis  Also FLU and RSV +   1126 Discussion surrounding non treatment for RSV/influenza, however, patient remains still with acidosis likely secondary to uremia and admission agreed upon for rehydration and possibly Nephrology consultation                               SBIRT 20yo+    Flowsheet Row Most Recent Value   SBIRT (25 yo +)    In order to provide better care to our patients, we are screening all of our patients for alcohol and drug use  Would it be okay to ask you these screening questions? Yes Filed at: 11/03/2022 7351   Initial Alcohol Screen: US AUDIT-C     1  How often do you have a drink containing alcohol? 2 Filed at: 11/03/2022 0838   2  How many drinks containing alcohol do you have on a typical day you are drinking? 2 Filed at: 11/03/2022 0838   3a  Male UNDER 65:  How often do you have five or more drinks on one occasion? 0 Filed at: 11/03/2022 0838   3b  FEMALE Any Age, or MALE 65+: How often do you have 4 or more drinks on one occassion? 0 Filed at: 11/03/2022 0838   Audit-C Score 4 Filed at: 11/03/2022 4951   LINH: How many times in the past year have you    Used an illegal drug or used a prescription medication for non-medical reasons?  Never Filed at: 11/03/2022 2870                    MDM  Number of Diagnoses or Management Options  MYCHAL (acute kidney injury) [facility]): new and requires workup  CKD (chronic kidney disease)  Dehydration: new and requires workup  Influenza: new and requires workup  RSV (acute bronchiolitis due to respiratory syncytial virus): new and requires workup  SOB (shortness of breath): new and requires workup  Uremia: established and worsening  Uremic acidosis: established and worsening  Diagnosis management comments: Case discussed with Dr Vivian Banda for admission       Amount and/or Complexity of Data Reviewed  Clinical lab tests: ordered and reviewed  Tests in the radiology section of CPT®: ordered and reviewed  Tests in the medicine section of CPT®: ordered and reviewed        Disposition  Final diagnoses:   SOB (shortness of breath)   Influenza   RSV (acute bronchiolitis due to respiratory syncytial virus)   Uremia   MYCHAL (acute kidney injury) ([facility] )   CKD (chronic kidney disease)   Uremic acidosis   Dehydration     Time reflects when diagnosis was documented in both MDM as applicable and the Disposition within this note     Time User Action Codes Description Comment    11/3/2022  9:26 AM Valentina Homme Add [R06 02] SOB (shortness of breath)     11/3/2022 11:25 AM Valentina Homme Add [J11 1] Influenza     11/3/2022 11:25 AM Valentina Homme Add [J21 0] RSV (acute bronchiolitis due to respiratory syncytial virus)     11/3/2022 11:25 AM Valentina Homme Add [N19] Uremia     11/3/2022 11:25 AM Valentina Homme Add [N17 9] MYCHAL (acute kidney injury) ([facility] )     11/3/2022 11:25 AM Angela Mixer Add [N18 9] CKD (chronic kidney disease)     11/3/2022 11:25 AM Angela Mixer Add [N25 89] Uremic acidosis     11/3/2022 11:27 AM [place] Mixer Add [E86 0] Dehydration       ED Disposition     ED Disposition   Admit    Condition   Stable    Date/Time   Thu Nov 3, 2022 11:25 AM    Comment   Case was discussed with Dr Ady Bashir and the patient's admission status was agreed to be Admission Status: observation status to the service of Dr Ady Bashir for Acidosis / MYCHAL / Uremia w/ plan for inpatient Nephro consultation   Follow-up Information    None         Patient's Medications   Discharge Prescriptions    No medications on file       No discharge procedures on file      PDMP Review       Value Time User    PDMP Reviewed  Yes 9/7/2022 12:41 PM [facility] Espitia, [facility]           ED Provider  Electronically Signed by           Sigmund Blizzard, PA-C  11/03/22 1129

## 2022-11-03 NOTE — H&P
[place]  H&P- Allyson Rodriguez 1935, 81 y o  male MRN: [id number]  Unit/Bed#: -01 Encounter: [id number]  Primary Care Provider: Jhonny Goodrich MD   Date and time admitted to hospital: 11/3/2022  8:34 AM    * Acute renal failure superimposed on stage 4 chronic kidney disease [facility])  Assessment & Plan  Lab Results   Component Value Date    EGFR 17 11/03/2022    EGFR 23 10/11/2022    EGFR 28 09/06/2022    CREATININE 3 07 (H) 11/03/2022    CREATININE 2 40 (H) 10/11/2022    CREATININE 2 06 (H) 09/06/2022     Patient's creatinine on admission 3 07  Baseline 2 3-2 4 most likely secondary to combined RSV and flu pneumonia along with diuresis  I's and O's  Avoid nephrotoxic agents and hypotension  Holding Bumex and metolazone  Gentle IV fluid  Continue to trend  UA pending  Urine retention protocol    Pneumonia due to influenza A virus  Assessment & Plan  Patient presenting with worsening dyspnea on exertion  Chest x-ray on admission showed:Mild right basilar opacity may reflect developing pneumonia  Found to have RSV and influenza a     Urine Ag, MRSA, sputum cultures pending  Ceftriaxone, azithromycin and Tamiflu    Incentive spirometry, airway current meds protocol, Mucinex, Tessalon Perles, respiratory protocol, and nebulizers p r n          Chronic diastolic congestive heart failure (HCC)  Assessment & Plan  Wt Readings from Last 3 Encounters:   11/03/22 118 kg (260 lb)   09/27/22 117 kg (258 lb)   09/07/22 117 kg (258 lb 8 oz)     Pt hypovolemic on admission  Gentle IVF  Echo done on 5/12/22 showed an EF of 60% grade 1 diasystolic dysfunction  I's and O's daily weight low salt DM diet  Monitor resp status while getting IVF  Holding bumex    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)  Assessment & Plan  No in acute exacerbation however has RSV Flu A pneumonia  IS  ACP  mucinex  Tesslon pereles  RP  Xopenx and Atrovent      Chronic respiratory failure with hypoxia [facility])  Assessment & Plan  Baseline 3-4LNC  In no acute exacerbation at this time    Essential hypertension  Assessment & Plan  Continue labetalol  Holding bumex and metolazone    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 disease, without long-term current use of insulin (HCC)  Assessment & Plan  Lab Results   Component Value Date    HGBA1C 7 2 (H) 10/11/2022       No results for input(s): POCGLU in the last 72 hours  Blood Sugar Average: Last 72 hrs:     Controlled  ISS  Hold oral meds  lantus 10  Diabetic low-salt diet  Hypoglycemic protocol    VTE Pharmacologic Prophylaxis: VTE Score: 6 High Risk (Score >/= 5) - Pharmacological DVT Prophylaxis Ordered: heparin  Sequential Compression Devices Ordered  Code Status: Prior full code      Anticipated Length of Stay: Patient will be admitted on an observation basis with an anticipated length of stay of less than 2 midnights secondary to MYCHAL  Total Time for Visit, including Counseling / Coordination of Care: 60 minutes Greater than 50% of this total time spent on direct patient counseling and coordination of care  Chief Complaint: SOB    History of Present Illness:  Shashank Adams is a 81 y o  male with a PMH of chronic respiratory failure, diabetes, chronic congestive heart failure, CKD stage 4 who presents with gradual worsening of shortness of breath over the past couple a days that was associated with a cough  He had time to DUs his nebulizer treatment as well as inhalers however had no improvement he also but that a KUB CHF and had been increasing his diuretics  He denies any fevers, chills, sweats, chest pain, abdominal pain, nausea, vomiting, diarrhea, constipation and urinary symptoms      Review of Systems:  Review of Systems    Past Medical and Surgical History:   Past Medical History:   Diagnosis Date   • Anxiety 2020   • COPD (chronic obstructive pulmonary disease) ([facility] [address] )    • Coronary artery disease 2012   • Depression 2020   • Diabetes mellitus ([facility] [address] )    • Herpes zoster    • Hypertension    • Memory loss     possible   • Mycoplasma pneumonia    • Obesity    • Osteoarthritis    • Renal calculi        Past Surgical History:   Procedure Laterality Date   • CATARACT EXTRACTION      with insert intraoculat lens prosthesis   • HEMORRHOID SURGERY     • NECK SURGERY      for bone spur       Meds/Allergies:  Prior to Admission medications    Medication Sig Start Date End Date Taking? Authorizing Provider   albuterol (2 5 mg/3 mL) 0 083 % nebulizer solution Take 3 mL (2 5 mg total) by nebulization every 6 (six) hours as needed for wheezing or shortness of breath 7/21/22   Saray Artis DO   albuterol (PROVENTIL HFA,VENTOLIN HFA) 90 mcg/act inhaler TAKE 2 PUFFS BY MOUTH EVERY 4 HOURS AS NEEDED FOR WHEEZE 7/19/22   Juliana Officer, MD   allopurinol (ZYLOPRIM) 100 mg tablet TAKE 2 TABLETS (200 MG TOTAL) BY MOUTH DAILY 1 DAILY FOR 2 WEEKS THEN 2 DAILY 11/2/22   Natalie Crain MD   Blood Glucose Monitoring Suppl (OneTouch Verio) w/Device KIT Use 2 (two) times a day Dx:E11 9 8/16/22   Juliana Shearer MD   bumetanide (BUMEX) 2 mg tablet Take 1 5 tablets (3 mg total) by mouth daily 8/16/22   Juliana Shearer MD   fluticasone [facility]) 50 mcg/act nasal spray Spray 1 spray into each nostril twice daily 8/8/22   Juliana Shearer MD   fluticasone-umeclidinium-vilanterol (Trelegy Ellipta) 200-62 5-25 MCG/INH AEPB inhaler Inhale 1 puff daily Rinse mouth after use  9/27/22 9/22/23  Natalie Crain MD   glipiZIDE (GLUCOTROL) 5 mg tablet TAKE 1 TABLET BY MOUTH 2 TIMES A DAY BEFORE MEALS   10/20/22   Natalie Crain MD   glucose blood test strip Test twice daily DX:  E11 9 8/16/22   Juliana Shearer MD   Iron-Vitamin C (IRON 100/C PO) Take 325 mg by mouth    Historical Provider, MD   labetalol (NORMODYNE) 200 mg tablet Take 1 tablet (200 mg total) by mouth 2 (two) times a day 7/14/22   Juliana Shearer MD   Lancets (onetouch ultrasoft) lancets Use 2 (two) times a day E11 9 8/16/22   Juliana Officer, MD metolazone (ZAROXOLYN) 2 5 mg tablet Take 2 5 mg 30 minutes before taking bumetanide in the morning on  and ! 22   Tayler Reece MD   mupirocin (BACTROBAN) 2 % ointment Apply topically in the morning Daily, when dressing is changed, 3x per week 22   Shwetha Lind MD   potassium chloride (K-DUR,KLOR-CON) 20 mEq tablet Take 20 mEq twice a day Monday through Thursday, take an extra 20 mEq on  along with metolazone  22   Marco Lips, DO   tamsulosin (FLOMAX) 0 4 mg TAKE 1 CAPSULE BY MOUTH EVERY DAY WITH DINNER 21   Shwetha Lind MD   traMADol (Ultram) 50 mg tablet Take 1 tablet (50 mg total) by mouth 2 (two) times a day as needed for moderate pain 22   Shwetha Lind MD   umeclidinium bromide (INCRUSE ELLIPTA) 62 5 mcg/inh AEPB inhaler Inhale 1 puff  in the morning  5/15/22 5/15/22  Alba Mitchell PA-C     I have reviewed home medications using recent Epic encounter  Allergies: No Known Allergies    Social History:  Marital Status: /Civil Union   Patient Pre-hospital Living Situation: Home  Patient Pre-hospital Level of Mobility: walks  Patient Pre-hospital Diet Restrictions: none  Substance Use History:   Social History     Substance and Sexual Activity   Alcohol Use Yes   • Alcohol/week: 1 0 standard drink   • Types: 1 Cans of beer per week    Comment: 1-2 beers a months     Social History     Tobacco Use   Smoking Status Former Smoker   • Packs/day: 2 00   • Years: 42 00   • Pack years: 84 00   • Types: Cigarettes   • Start date: 1956   • Quit date: 1998   • Years since quittin 8   Smokeless Tobacco Never Used     Social History     Substance and Sexual Activity   Drug Use No       Family History:  Family History   Problem Relation Age of Onset   • Diabetes Mother    • Breast cancer Mother         She  in 12   Age 80   • Stroke Mother    • Pneumonia Father    • Substance Abuse Neg Hx    • Mental illness Neg Hx        Physical Exam: Vitals:   Blood Pressure: 120/66 (11/03/22 1237)  Pulse: 85 (11/03/22 1130)  Temperature: 97 8 °F (36 6 °C) (11/03/22 1237)  Temp Source: Oral (11/03/22 0829)  Respirations: 18 (11/03/22 1237)  Height: 5' 11" (180 3 cm) (11/03/22 0829)  Weight - Scale: 118 kg (260 lb) (11/03/22 0829)  SpO2: 95 % (11/03/22 1115)    Physical Exam  Vitals and nursing note reviewed  Constitutional:       Appearance: Normal appearance  He is obese  He is ill-appearing  He is not diaphoretic  HENT:      Head: Normocephalic and atraumatic  Cardiovascular:      Rate and Rhythm: Normal rate and regular rhythm  Pulses: Normal pulses  Heart sounds: Normal heart sounds  Pulmonary:      Effort: Pulmonary effort is normal       Breath sounds: Rhonchi present  Abdominal:      General: Abdomen is flat  Bowel sounds are normal       Palpations: Abdomen is soft  Musculoskeletal:      Right lower leg: Edema present  Left lower leg: Edema present  Skin:     General: Skin is warm  Neurological:      General: No focal deficit present  Mental Status: He is alert and oriented to person, place, and time  Additional Data:     Lab Results:  Results from last 7 days   Lab Units 11/03/22  0847   WBC Thousand/uL 8 80   HEMOGLOBIN g/dL 13 3   HEMATOCRIT % 41 2   PLATELETS Thousands/uL 247   NEUTROS PCT % 80*   LYMPHS PCT % 10*   MONOS PCT % 9   EOS PCT % 0     Results from last 7 days   Lab Units 11/03/22  0847   SODIUM mmol/L 139   POTASSIUM mmol/L 3 3*   CHLORIDE mmol/L 99   CO2 mmol/L 26   BUN mg/dL 72*   CREATININE mg/dL 3 07*   ANION GAP mmol/L 14*   CALCIUM mg/dL 8 6   ALBUMIN g/dL 3 4*   TOTAL BILIRUBIN mg/dL 0 50   ALK PHOS U/L 72   ALT U/L 18   GLUCOSE RANDOM mg/dL 116                       Imaging:  X-ray chest 2 views   Final Result by Trudy Day MD (11/03 1010)      Mild right basilar opacity may reflect developing pneumonia                    Workstation performed: [id number]             EKG and Other Studies Reviewed on Admission:   EKG: Sinus rhythm with Premature supraventricular complexes and with occasional Premature ventricular complexes  Otherwise normal ECG  When compared with ECG of 11-MAY-2022 17:48,  Premature ventricular complexes are now Present  Premature supraventricular complexes are now Present  · T wave inversion no longer evident in Lateral leads    ** Please Note: This note has been constructed using a voice recognition system   **

## 2022-11-03 NOTE — PLAN OF CARE
Problem: Potential for Falls  Goal: Patient will remain free of falls  Description: INTERVENTIONS:  - Educate patient/family on patient safety including physical limitations  - Instruct patient to call for assistance with activity   - Consult OT/PT to assist with strengthening/mobility   - Keep Call bell within reach  - Keep bed low and locked with side rails adjusted as appropriate  - Keep care items and personal belongings within reach  - Initiate and maintain comfort rounds  - Make Fall Risk Sign visible to staff  - Offer Toileting every 2 Hours, in advance of need  - Initiate/Maintain alarm  - Obtain necessary fall risk management equipment:   - Apply yellow socks and bracelet for high fall risk patients  - Consider moving patient to room near nurses station  Outcome: Progressing     Problem: MOBILITY - ADULT  Goal: Maintain or return to baseline ADL function  Description: INTERVENTIONS:  -  Assess patient's ability to carry out ADLs; assess patient's baseline for ADL function and identify physical deficits which impact ability to perform ADLs (bathing, care of mouth/teeth, toileting, grooming, dressing, etc )  - Assess/evaluate cause of self-care deficits   - Assess range of motion  - Assess patient's mobility; develop plan if impaired  - Assess patient's need for assistive devices and provide as appropriate  - Encourage maximum independence but intervene and supervise when necessary  - Involve family in performance of ADLs  - Assess for home care needs following discharge   - Consider OT consult to assist with ADL evaluation and planning for discharge  - Provide patient education as appropriate  Outcome: Progressing  Goal: Maintains/Returns to pre admission functional level  Description: INTERVENTIONS:  - Perform BMAT or MOVE assessment daily    - Set and communicate daily mobility goal to care team and patient/family/caregiver     - Collaborate with rehabilitation services on mobility goals if consulted  - Out of bed for toileting  - Record patient progress and toleration of activity level   Outcome: Progressing     Problem: PAIN - ADULT  Goal: Verbalizes/displays adequate comfort level or baseline comfort level  Description: Interventions:  - Encourage patient to monitor pain and request assistance  - Assess pain using appropriate pain scale  - Administer analgesics based on type and severity of pain and evaluate response  - Implement non-pharmacological measures as appropriate and evaluate response  - Consider cultural and social influences on pain and pain management  - Notify physician/advanced practitioner if interventions unsuccessful or patient reports new pain  Outcome: Progressing     Problem: INFECTION - ADULT  Goal: Absence or prevention of progression during hospitalization  Description: INTERVENTIONS:  - Assess and monitor for signs and symptoms of infection  - Monitor lab/diagnostic results  - Monitor all insertion sites, i e  indwelling lines, tubes, and drains  - Monitor endotracheal if appropriate and nasal secretions for changes in amount and color  - [place] appropriate cooling/warming therapies per order  - Administer medications as ordered  - Instruct and encourage patient and family to use good hand hygiene technique  - Identify and instruct in appropriate isolation precautions for identified infection/condition  Outcome: Progressing  Goal: Absence of fever/infection during neutropenic period  Description: INTERVENTIONS:  - Monitor WBC    Outcome: Progressing     Problem: SAFETY ADULT  Goal: Patient will remain free of falls  Description: INTERVENTIONS:  - Educate patient/family on patient safety including physical limitations  - Instruct patient to call for assistance with activity   - Consult OT/PT to assist with strengthening/mobility   - Keep Call bell within reach  - Keep bed low and locked with side rails adjusted as appropriate  - Keep care items and personal belongings within reach  - Initiate and maintain comfort rounds  - Make Fall Risk Sign visible to staff  - Offer Toileting every 2 Hours, in advance of need  - Initiate/Maintain alarm  - Obtain necessary fall risk management equipment:   - Apply yellow socks and bracelet for high fall risk patients  - Consider moving patient to room near nurses station  Outcome: Progressing  Goal: Maintain or return to baseline ADL function  Description: INTERVENTIONS:  -  Assess patient's ability to carry out ADLs; assess patient's baseline for ADL function and identify physical deficits which impact ability to perform ADLs (bathing, care of mouth/teeth, toileting, grooming, dressing, etc )  - Assess/evaluate cause of self-care deficits   - Assess range of motion  - Assess patient's mobility; develop plan if impaired  - Assess patient's need for assistive devices and provide as appropriate  - Encourage maximum independence but intervene and supervise when necessary  - Involve family in performance of ADLs  - Assess for home care needs following discharge   - Consider OT consult to assist with ADL evaluation and planning for discharge  - Provide patient education as appropriate  Outcome: Progressing  Goal: Maintains/Returns to pre admission functional level  Description: INTERVENTIONS:  - Perform BMAT or MOVE assessment daily    - Set and communicate daily mobility goal to care team and patient/family/caregiver     - Collaborate with rehabilitation services on mobility goals if consulted  - Out of bed for toileting  - Record patient progress and toleration of activity level   Outcome: Progressing     Problem: DISCHARGE PLANNING  Goal: Discharge to home or other facility with appropriate resources  Description: INTERVENTIONS:  - Identify barriers to discharge w/patient and caregiver  - Arrange for needed discharge resources and transportation as appropriate  - Identify discharge learning needs (meds, wound care, etc )  - Arrange for interpretive services to assist at discharge as needed  - Refer to Case Management Department for coordinating discharge planning if the patient needs post-hospital services based on physician/advanced practitioner order or complex needs related to functional status, cognitive ability, or social support system  Outcome: Progressing     Problem: Knowledge Deficit  Goal: Patient/family/caregiver demonstrates understanding of disease process, treatment plan, medications, and discharge instructions  Description: Complete learning assessment and assess knowledge base    Interventions:  - Provide teaching at level of understanding  - Provide teaching via preferred learning methods  Outcome: Progressing

## 2022-11-04 LAB
ANION GAP SERPL CALCULATED.3IONS-SCNC: 12 MMOL/L (ref 4–13)
ATRIAL RATE: 82 BPM
BASOPHILS # BLD AUTO: 0.01 THOUSANDS/ÂΜL (ref 0–0.1)
BASOPHILS NFR BLD AUTO: 0 % (ref 0–1)
BUN SERPL-MCNC: 70 MG/DL (ref 5–25)
CALCIUM SERPL-MCNC: 9.1 MG/DL (ref 8.3–10.1)
CHLORIDE SERPL-SCNC: 100 MMOL/L (ref 96–108)
CO2 SERPL-SCNC: 26 MMOL/L (ref 21–32)
CREAT SERPL-MCNC: 2.45 MG/DL (ref 0.6–1.3)
EOSINOPHIL # BLD AUTO: 0 THOUSAND/ÂΜL (ref 0–0.61)
EOSINOPHIL NFR BLD AUTO: 0 % (ref 0–6)
ERYTHROCYTE [DISTWIDTH] IN BLOOD BY AUTOMATED COUNT: 14.3 % (ref 11.6–15.1)
GFR SERPL CREATININE-BSD FRML MDRD: 22 ML/MIN/1.73SQ M
GLUCOSE P FAST SERPL-MCNC: 185 MG/DL (ref 65–99)
GLUCOSE SERPL-MCNC: 138 MG/DL (ref 65–140)
GLUCOSE SERPL-MCNC: 175 MG/DL (ref 65–140)
GLUCOSE SERPL-MCNC: 182 MG/DL (ref 65–140)
GLUCOSE SERPL-MCNC: 185 MG/DL (ref 65–140)
GLUCOSE SERPL-MCNC: 202 MG/DL (ref 65–140)
HCT VFR BLD AUTO: 39.1 % (ref 36.5–49.3)
HGB BLD-MCNC: 12.9 G/DL (ref 12–17)
IMM GRANULOCYTES # BLD AUTO: 0.03 THOUSAND/UL (ref 0–0.2)
IMM GRANULOCYTES NFR BLD AUTO: 0 % (ref 0–2)
LYMPHOCYTES # BLD AUTO: 0.75 THOUSANDS/ÂΜL (ref 0.6–4.47)
LYMPHOCYTES NFR BLD AUTO: 9 % (ref 14–44)
MCH RBC QN AUTO: 28.6 PG (ref 26.8–34.3)
MCHC RBC AUTO-ENTMCNC: 33 G/DL (ref 31.4–37.4)
MCV RBC AUTO: 87 FL (ref 82–98)
MONOCYTES # BLD AUTO: 0.7 THOUSAND/ÂΜL (ref 0.17–1.22)
MONOCYTES NFR BLD AUTO: 8 % (ref 4–12)
NEUTROPHILS # BLD AUTO: 7.18 THOUSANDS/ÂΜL (ref 1.85–7.62)
NEUTS SEG NFR BLD AUTO: 83 % (ref 43–75)
NRBC BLD AUTO-RTO: 0 /100 WBCS
P AXIS: 104 DEGREES
PLATELET # BLD AUTO: 254 THOUSANDS/UL (ref 149–390)
PMV BLD AUTO: 10.1 FL (ref 8.9–12.7)
POTASSIUM SERPL-SCNC: 3.4 MMOL/L (ref 3.5–5.3)
PR INTERVAL: 168 MS
PROCALCITONIN SERPL-MCNC: 0.18 NG/ML
QRS AXIS: 50 DEGREES
QRSD INTERVAL: 84 MS
QT INTERVAL: 382 MS
QTC INTERVAL: 446 MS
RBC # BLD AUTO: 4.51 MILLION/UL (ref 3.88–5.62)
SODIUM SERPL-SCNC: 138 MMOL/L (ref 135–147)
T WAVE AXIS: 67 DEGREES
VENTRICULAR RATE: 82 BPM
WBC # BLD AUTO: 8.67 THOUSAND/UL (ref 4.31–10.16)

## 2022-11-04 RX ORDER — HEPARIN SODIUM 5000 [USP'U]/ML
5000 INJECTION, SOLUTION INTRAVENOUS; SUBCUTANEOUS EVERY 8 HOURS SCHEDULED
Status: DISCONTINUED | OUTPATIENT
Start: 2022-11-04 | End: 2022-11-07 | Stop reason: HOSPADM

## 2022-11-04 RX ORDER — LEVALBUTEROL 1.25 MG/.5ML
1.25 SOLUTION, CONCENTRATE RESPIRATORY (INHALATION)
Status: DISCONTINUED | OUTPATIENT
Start: 2022-11-04 | End: 2022-11-07 | Stop reason: HOSPADM

## 2022-11-04 RX ORDER — POTASSIUM CHLORIDE 20 MEQ/1
40 TABLET, EXTENDED RELEASE ORAL ONCE
Status: COMPLETED | OUTPATIENT
Start: 2022-11-04 | End: 2022-11-04

## 2022-11-04 RX ORDER — IPRATROPIUM BROMIDE AND ALBUTEROL SULFATE 2.5; .5 MG/3ML; MG/3ML
3 SOLUTION RESPIRATORY (INHALATION)
Status: DISCONTINUED | OUTPATIENT
Start: 2022-11-04 | End: 2022-11-04

## 2022-11-04 RX ADMIN — HEPARIN SODIUM 5000 UNITS: 5000 INJECTION INTRAVENOUS; SUBCUTANEOUS at 13:16

## 2022-11-04 RX ADMIN — PREDNISONE 40 MG: 20 TABLET ORAL at 09:29

## 2022-11-04 RX ADMIN — IPRATROPIUM BROMIDE 0.5 MG: 0.5 SOLUTION RESPIRATORY (INHALATION) at 07:22

## 2022-11-04 RX ADMIN — LEVALBUTEROL HYDROCHLORIDE 1.25 MG: 1.25 SOLUTION, CONCENTRATE RESPIRATORY (INHALATION) at 18:41

## 2022-11-04 RX ADMIN — GUAIFENESIN 600 MG: 600 TABLET, EXTENDED RELEASE ORAL at 09:29

## 2022-11-04 RX ADMIN — INSULIN GLARGINE 10 UNITS: 100 INJECTION, SOLUTION SUBCUTANEOUS at 20:56

## 2022-11-04 RX ADMIN — OSELTAMIVIR PHOSPHATE 30 MG: 30 CAPSULE ORAL at 13:16

## 2022-11-04 RX ADMIN — POTASSIUM CHLORIDE 40 MEQ: 1500 TABLET, EXTENDED RELEASE ORAL at 13:15

## 2022-11-04 RX ADMIN — IPRATROPIUM BROMIDE 0.5 MG: 0.5 SOLUTION RESPIRATORY (INHALATION) at 13:25

## 2022-11-04 RX ADMIN — IPRATROPIUM BROMIDE 0.5 MG: 0.5 SOLUTION RESPIRATORY (INHALATION) at 18:41

## 2022-11-04 RX ADMIN — INSULIN LISPRO 2 UNITS: 100 INJECTION, SOLUTION INTRAVENOUS; SUBCUTANEOUS at 09:32

## 2022-11-04 RX ADMIN — GUAIFENESIN 600 MG: 600 TABLET, EXTENDED RELEASE ORAL at 20:56

## 2022-11-04 RX ADMIN — BENZONATATE 100 MG: 100 CAPSULE ORAL at 10:55

## 2022-11-04 RX ADMIN — CEFTRIAXONE 2000 MG: 2 INJECTION, SOLUTION INTRAVENOUS at 13:40

## 2022-11-04 RX ADMIN — INSULIN LISPRO 2 UNITS: 100 INJECTION, SOLUTION INTRAVENOUS; SUBCUTANEOUS at 20:57

## 2022-11-04 RX ADMIN — LEVALBUTEROL HYDROCHLORIDE 1.25 MG: 1.25 SOLUTION, CONCENTRATE RESPIRATORY (INHALATION) at 13:25

## 2022-11-04 RX ADMIN — LEVALBUTEROL HYDROCHLORIDE 0.63 MG: 0.63 SOLUTION RESPIRATORY (INHALATION) at 07:22

## 2022-11-04 RX ADMIN — HEPARIN SODIUM 5000 UNITS: 5000 INJECTION INTRAVENOUS; SUBCUTANEOUS at 20:56

## 2022-11-04 RX ADMIN — INSULIN LISPRO 4 UNITS: 100 INJECTION, SOLUTION INTRAVENOUS; SUBCUTANEOUS at 17:00

## 2022-11-04 NOTE — ASSESSMENT & PLAN NOTE
No in acute exacerbation however has RSV Flu A pneumonia  Continue with flutter valve  Continue respiratory protocol  mucinex  Charlenesjuan bird  RP  Xopenx and Atrovent

## 2022-11-04 NOTE — PLAN OF CARE
Problem: Potential for Falls  Goal: Patient will remain free of falls  Description: INTERVENTIONS:  - Educate patient/family on patient safety including physical limitations  - Instruct patient to call for assistance with activity   - Consult OT/PT to assist with strengthening/mobility   - Keep Call bell within reach  - Keep bed low and locked with side rails adjusted as appropriate  - Keep care items and personal belongings within reach  - Initiate and maintain comfort rounds  - Make Fall Risk Sign visible to staff  - Offer Toileting every 2 Hours, in advance of need  - Initiate/Maintain alarm  - Obtain necessary fall risk management equipment:   - Apply yellow socks and bracelet for high fall risk patients  - Consider moving patient to room near nurses station  Outcome: Progressing     Problem: MOBILITY - ADULT  Goal: Maintain or return to baseline ADL function  Description: INTERVENTIONS:  -  Assess patient's ability to carry out ADLs; assess patient's baseline for ADL function and identify physical deficits which impact ability to perform ADLs (bathing, care of mouth/teeth, toileting, grooming, dressing, etc )  - Assess/evaluate cause of self-care deficits   - Assess range of motion  - Assess patient's mobility; develop plan if impaired  - Assess patient's need for assistive devices and provide as appropriate  - Encourage maximum independence but intervene and supervise when necessary  - Involve family in performance of ADLs  - Assess for home care needs following discharge   - Consider OT consult to assist with ADL evaluation and planning for discharge  - Provide patient education as appropriate  Outcome: Progressing  Goal: Maintains/Returns to pre admission functional level  Description: INTERVENTIONS:  - Perform BMAT or MOVE assessment daily    - Set and communicate daily mobility goal to care team and patient/family/caregiver     - Collaborate with rehabilitation services on mobility goals if consulted  - Out of bed for toileting  - Record patient progress and toleration of activity level   Outcome: Progressing     Problem: PAIN - ADULT  Goal: Verbalizes/displays adequate comfort level or baseline comfort level  Description: Interventions:  - Encourage patient to monitor pain and request assistance  - Assess pain using appropriate pain scale  - Administer analgesics based on type and severity of pain and evaluate response  - Implement non-pharmacological measures as appropriate and evaluate response  - Consider cultural and social influences on pain and pain management  - Notify physician/advanced practitioner if interventions unsuccessful or patient reports new pain  Outcome: Progressing     Problem: INFECTION - ADULT  Goal: Absence or prevention of progression during hospitalization  Description: INTERVENTIONS:  - Assess and monitor for signs and symptoms of infection  - Monitor lab/diagnostic results  - Monitor all insertion sites, i e  indwelling lines, tubes, and drains  - Monitor endotracheal if appropriate and nasal secretions for changes in amount and color  - [place] appropriate cooling/warming therapies per order  - Administer medications as ordered  - Instruct and encourage patient and family to use good hand hygiene technique  - Identify and instruct in appropriate isolation precautions for identified infection/condition  Outcome: Progressing  Goal: Absence of fever/infection during neutropenic period  Description: INTERVENTIONS:  - Monitor WBC    Outcome: Progressing     Problem: SAFETY ADULT  Goal: Patient will remain free of falls  Description: INTERVENTIONS:  - Educate patient/family on patient safety including physical limitations  - Instruct patient to call for assistance with activity   - Consult OT/PT to assist with strengthening/mobility   - Keep Call bell within reach  - Keep bed low and locked with side rails adjusted as appropriate  - Keep care items and personal belongings within reach  - Initiate and maintain comfort rounds  - Make Fall Risk Sign visible to staff  - Offer Toileting every 2 Hours, in advance of need  - Initiate/Maintain alarm  - Obtain necessary fall risk management equipment:   - Apply yellow socks and bracelet for high fall risk patients  - Consider moving patient to room near nurses station  Outcome: Progressing  Goal: Maintain or return to baseline ADL function  Description: INTERVENTIONS:  -  Assess patient's ability to carry out ADLs; assess patient's baseline for ADL function and identify physical deficits which impact ability to perform ADLs (bathing, care of mouth/teeth, toileting, grooming, dressing, etc )  - Assess/evaluate cause of self-care deficits   - Assess range of motion  - Assess patient's mobility; develop plan if impaired  - Assess patient's need for assistive devices and provide as appropriate  - Encourage maximum independence but intervene and supervise when necessary  - Involve family in performance of ADLs  - Assess for home care needs following discharge   - Consider OT consult to assist with ADL evaluation and planning for discharge  - Provide patient education as appropriate  Outcome: Progressing  Goal: Maintains/Returns to pre admission functional level  Description: INTERVENTIONS:  - Perform BMAT or MOVE assessment daily    - Set and communicate daily mobility goal to care team and patient/family/caregiver     - Collaborate with rehabilitation services on mobility goals if consulted  - Out of bed for toileting  - Record patient progress and toleration of activity level   Outcome: Progressing     Problem: DISCHARGE PLANNING  Goal: Discharge to home or other facility with appropriate resources  Description: INTERVENTIONS:  - Identify barriers to discharge w/patient and caregiver  - Arrange for needed discharge resources and transportation as appropriate  - Identify discharge learning needs (meds, wound care, etc )  - Arrange for interpretive services to assist at discharge as needed  - Refer to Case Management Department for coordinating discharge planning if the patient needs post-hospital services based on physician/advanced practitioner order or complex needs related to functional status, cognitive ability, or social support system  Outcome: Progressing     Problem: Knowledge Deficit  Goal: Patient/family/caregiver demonstrates understanding of disease process, treatment plan, medications, and discharge instructions  Description: Complete learning assessment and assess knowledge base    Interventions:  - Provide teaching at level of understanding  - Provide teaching via preferred learning methods  Outcome: Progressing

## 2022-11-04 NOTE — ASSESSMENT & PLAN NOTE
Continue labetalol  Holding bumex and metolazone in the setting of mildly elevated creatinine and poor oral intake

## 2022-11-04 NOTE — UTILIZATION REVIEW
Initial Clinical Review    Admission: Date/Time/Statement:  11/3/22 1126 observation AND CHANGED 11/4/22 1104 INPATIENT RE:  PATIENT NEEDS > 2 MIDNIGHT STAY DUE TO ARF NECESSITATING MEDICATION ADJUSTMENTS, IVF AND TRENDING OF BMP; MONITOR RESPIRATORY STATUS WITH INFLUENZA A AND RSV, ON STEROIDS, NEBS AND PREDNISON  Admission Orders (From admission, onward)     Ordered        11/04/22 1104  Inpatient Admission  Once                      Orders Placed This Encounter   Procedures   • Inpatient Admission     Standing Status:   Standing     Number of Occurrences:   1     Order Specific Question:   Level of Care     Answer:   Med Surg [16]     Order Specific Question:   Estimated length of stay     Answer:   More than 2 Midnights     Order Specific Question:   Certification     Answer:   I certify that inpatient services are medically necessary for this patient for a duration of greater than two midnights  See H&P and MD Progress Notes for additional information about the patient's course of treatment  ED Arrival Information     Expected   -    Arrival   11/3/2022 08:24    Acuity   Emergent            Means of arrival   Wheelchair    Escorted by   Family Member    Service   Hospitalist    Admission type   Emergency            Arrival complaint   Sob, Copd           Chief Complaint   Patient presents with   • Shortness of Breath     Pt reports shortness of breath x4 days  Worse on exertion, hx of emphysema and copd  Denies chest pain       Initial Presentation: 81 y o  male from home to ED admitted to observation [address]  due to ARF on CKD stage IV/pneumonia due to influenza A  PMH of COPD, CHF, on home oxygen 3-4 liters, hpt and dm  Presented due to shortness of breath starting 10 to 12 days prior to arrival and worse last 4 days  Intermittently using home oxygen  On exam lungs rales  Bilateral + 1 pitting edema    + influenza A, RSV   K 3 3   Bun 72, creatinine 3 07 with baseline of 2 3 - 2 4  CxR with right base opacity  In the ED given Duo Neb, IVF bolus  Plan is continue IVF, trend BMP, hold home Bumex and metolazone  Start ceftriaxone, azithromycin and Tamiflu  Incentive spirometry  Add Mucinex, Tessalon perles, nebs as needed  Oxygen as needed    11/4/22 CHANGED TO INPATIENT:  Has productive cough with thick yellow phlegm  Remains short of breath, worse with minimal exertion  Continues on oxygen  On exam:  Obese  Appears ill  Lungs diffuse wheezing bilateral lung fields with poor respiratory effort  K 3 4  Bun 70, creatinine 2 45  Continue to hold home Bumex and metolazone  IVF dc   Trend BMP  Continue incentive spirometry, Mucinex, Tessslon Perles, oxygen, prednisone   Continue ceftriaxone and dc azithromycin, continue Tamiflu  Neb increased from BID to TID  Replete KCL          ED Triage Vitals   Temperature Pulse Respirations Blood Pressure SpO2   11/03/22 0829 11/03/22 0829 11/03/22 0829 11/03/22 0829 11/03/22 0829   97 5 °F (36 4 °C) 85 (!) 26 102/58 90 %      Temp Source Heart Rate Source Patient Position - Orthostatic VS BP Location FiO2 (%)   11/03/22 0829 11/03/22 0900 11/03/22 0829 11/03/22 0829 --   Oral Monitor Sitting Left arm       Pain Score       11/03/22 1300       No Pain          Wt Readings from Last 1 Encounters:   11/03/22 118 kg (260 lb)     Additional Vital Signs:   11/04/22 07:12:46 97 5 °F (36 4 °C) 85 18 148/81 103 93 % -- -- -- --   11/03/22 22:35:17 97 3 °F (36 3 °C) Abnormal  104 -- 131/71 91 86 % Abnormal  -- -- -- --   11/03/22 2045 -- -- -- -- -- -- 28 2 L/min Nasal cannula --   11/03/22 2013 -- -- -- -- -- 93 % -- -- None (Room air) --   11/03/22 15:00:34 97 3 °F (36 3 °C) Abnormal  102 18 117/68 84 89 % Abnormal  -- -- -- --   11/03/22 12:37:43 97 8 °F (36 6 °C) -- 18 120/66 84 -- -- -- -- --   11/03/22 1130 -- 85 -- 127/60 86 -- -- -- -- --   11/03/22 1115 -- 80 24 Abnormal  123/63 77 95 % -- -- -- --   11/03/22 1114 -- 80 -- 123/63 -- -- -- -- -- --   11/03/22 1015 -- 80 30 Abnormal  -- -- 94 % -- -- -- --   11/03/22 1000 -- 77 21 120/59 83 95 % 32 3 L/min Nasal cannula Sitting   11/03/22 0945 -- 80 21 111/59 77 96 % 32 3 L/min Nasal cannula        Pertinent Labs/Diagnostic Test Results:   X-ray chest 2 views   Final Result by Mp Simpson MD (11/03 1010)      Mild right basilar opacity may reflect developing pneumonia                    Workstation performed: [id number]           11/3/22 ecg Previous ECG:  Compared to current    Similarity:  No change  Interpretation:     Interpretation: non-specific    Rate:     ECG rate:  82    ECG rate assessment: normal    Rhythm:     Rhythm: sinus rhythm    Ectopy:     Ectopy: PVCs    QRS:     QRS axis:  Normal  Conduction:     Conduction: normal    ST segments:     ST segments:  Normal  T waves:     T waves: normal    Results from last 7 days   Lab Units 11/03/22  0847   SARS-COV-2  Negative     Results from last 7 days   Lab Units 11/04/22  1053 11/03/22  0847   WBC Thousand/uL 8 67 8 80   HEMOGLOBIN g/dL 12 9 13 3   HEMATOCRIT % 39 1 41 2   PLATELETS Thousands/uL 254 247   NEUTROS ABS Thousands/µL 7 18 7 07     Results from last 7 days   Lab Units 11/04/22  0953 11/03/22  0847   SODIUM mmol/L 138 139   POTASSIUM mmol/L 3 4* 3 3*   CHLORIDE mmol/L 100 99   CO2 mmol/L 26 26   ANION GAP mmol/L 12 14*   BUN mg/dL 70* 72*   CREATININE mg/dL 2 45* 3 07*   EGFR ml/min/1 73sq m 22 17   CALCIUM mg/dL 9 1 8 6     Results from last 7 days   Lab Units 11/03/22  0847   ALT U/L 18   ALK PHOS U/L 72   TOTAL PROTEIN g/dL 7 7   ALBUMIN g/dL 3 4*   TOTAL BILIRUBIN mg/dL 0 50     Results from last 7 days   Lab Units 11/04/22  1127 11/04/22  0711 11/03/22  2114 11/03/22  1626   POC GLUCOSE mg/dl 138 175* 189* 127     Results from last 7 days   Lab Units 11/04/22  0953 11/03/22  0847   GLUCOSE RANDOM mg/dL 185* 116     Results from last 7 days   Lab Units 11/04/22  0953 11/03/22  0847   PROCALCITONIN ng/ml 0 18 0 26*     Results from last 7 days   Lab Units 11/03/22  0847   NT-PRO BNP pg/mL 254     Results from last 7 days   Lab Units 11/03/22  1433   CLARITY UA  Clear   COLOR UA  Yellow   SPEC GRAV UA  1 015   PH UA  5 0   GLUCOSE UA mg/dl Negative   KETONES UA mg/dl Negative   BLOOD UA  Negative   PROTEIN UA mg/dl Negative   NITRITE UA  Negative   BILIRUBIN UA  Negative   UROBILINOGEN UA (BE) mg/dl <2 0   LEUKOCYTES UA  Negative   WBC UA /hpf None Seen   RBC UA /hpf None Seen   BACTERIA UA /hpf None Seen   EPITHELIAL CELLS WET PREP /hpf Occasional   MUCUS THREADS  Occasional*     Results from last 7 days   Lab Units 11/03/22  1433 11/03/22  0847   STREP PNEUMONIAE ANTIGEN, URINE  Negative  --    LEGIONELLA URINARY ANTIGEN  Negative  --    INFLUENZA A PCR   --  Positive*   INFLUENZA B PCR   --  Negative   RSV PCR   --  Positive*       ED Treatment:   Medication Administration from 11/03/2022 0823 to 11/03/2022 1215       Date/Time Order Dose Route Action Comments     11/03/2022 0847 ipratropium-albuterol (DUO-NEB) 0 5-2 5 mg/3 mL inhalation solution 3 mL 3 mL Nebulization Given      11/03/2022 1015 lactated ringers bolus 500 mL 500 mL Intravenous New Bag         Past Medical History:   Diagnosis Date   • Anxiety 2020   • COPD (chronic obstructive pulmonary disease) ([facility] )    • Coronary artery disease 2012   • Depression 2020   • Diabetes mellitus ([facility] )    • Herpes zoster    • Hypertension    • Memory loss     possible   • Mycoplasma pneumonia    • Obesity    • Osteoarthritis    • Renal calculi      Present on Admission:  • 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 disease, without long-term current use of insulin ([facility])  • Essential hypertension  • Chronic diastolic congestive heart failure ([facility])  • Moderate COPD (chronic obstructive pulmonary disease) ([facility])  • Chronic respiratory failure with hypoxia ([facility])      Admitting Diagnosis: Dehydration [E86 0]  Uremic acidosis [N25 89]  RSV (acute bronchiolitis due to respiratory syncytial virus) [J21 0]  Uremia [N19]  Influenza [J11 1]  SOB (shortness of breath) [R06 02]  CKD (chronic kidney disease) [N18 9]  MYCHAL (acute kidney injury) ([facility] ) [N17 9]  Age/Sex: 81 y o  male  Admission Orders:  Scheduled Medications:  azithromycin, 500 mg, Intravenous, Q24H- dc 11/4 at 1052  cefTRIAXone, 2,000 mg, Intravenous, Q24H  guaiFENesin, 600 mg, Oral, Q12H [facility]  insulin glargine, 10 Units, Subcutaneous, HS  insulin lispro, 2-12 Units, Subcutaneous, TID AC  insulin lispro, 2-12 Units, Subcutaneous, HS  ipratropium, 0 5 mg, Nebulization, BID  levalbuterol, 0 63 mg, Nebulization, BID  oseltamivir, 30 mg, Oral, Q24H  predniSONE, 40 mg, Oral, Daily    heparin (porcine) subcutaneous injection 5,000 Units  Dose: 5,000 Units  Freq: Every 8 hours scheduled Route: SC  Start: 11/04/22 1400    ipratropium (ATROVENT) 0 02 % inhalation solution 0 5 mg  Dose: 0 5 mg  Freq: 3 times daily (RESP) Route: NEBULIZATION  Start: 11/04/22 1000    levalbuterol (XOPENEX) inhalation solution 1 25 mg  Dose: 1 25 mg  Freq: 3 times daily (RESP) Route: NEBULIZATION  Start: 11/04/22 1400    potassium chloride (K-DUR,KLOR-CON) CR tablet 40 mEq  Dose: 40 mEq  Freq: Once Route: PO  Start: 11/04/22 1100 End: 11/04/22 1315      Continuous IV Infusions:  multi-electrolyte (PLASMALYTE-A/ISOLYTE-S PH 7 4) IV solution  Rate: 50 mL/hr Dose: 50 mL/hr  Freq: Continuous Route: IV  Last Dose: Stopped (11/04/22 0132)  Start: 11/03/22 1315 End: 11/04/22 0144     PRN Meds:  benzonatate, 100 mg, Oral, TID PRN - used x 1 11/3, x 1 11/4           Network Utilization Review Department  ATTENTION: Please call with any questions or concerns to [phone] and carefully listen to the prompts so that you are directed to the right person   All voicemails are confidential   Josi Wlikes all requests for admission clinical reviews, approved or denied determinations and any other requests to dedicated fax number below belonging to the campus where the patient is receiving treatment   List of dedicated fax numbers for the Facilities:  [address] DENIALS (Administrative/Medical Necessity) [phone]   [address] (Maternity/NICU/Pediatrics) [phone]   [address] [phone]   Aretha Harris 77 [phone]   [address] [facility] [facility] [phone]   15[address] [facility] Dary Lizarraga [facility] 134 815 [facility] [phone]

## 2022-11-04 NOTE — ASSESSMENT & PLAN NOTE
Lab Results   Component Value Date    HGBA1C 7 2 (H) 10/11/2022       Recent Labs     11/03/22  1626 11/03/22  2114 11/04/22  0711   POCGLU 127 189* 175*       Blood Sugar Average: Last 72 hrs:  (P) 235 [id number]   Controlled  ISS  Hold oral meds  Continue with lantus 10 q h s   And sliding scale insulin  Maintained on Diabetic low-salt diet  Continue with Hypoglycemic protocol

## 2022-11-04 NOTE — PROGRESS NOTES
[place]  Progress Note - Iona Molina 1935, 81 y o  male MRN: [id number]  Unit/Bed#: -01 Encounter: [id number]  Primary Care Provider: Kevin Jiang MD   Date and time admitted to hospital: 11/3/2022  8:34 AM    * Acute renal failure superimposed on stage 4 chronic kidney disease [facility])  Assessment & Plan  Lab Results   Component Value Date    EGFR 22 11/04/2022    EGFR 17 11/03/2022    EGFR 23 10/11/2022    CREATININE 2 45 (H) 11/04/2022    CREATININE 3 07 (H) 11/03/2022    CREATININE 2 40 (H) 10/11/2022     Patient's creatinine on admission 3 07  Baseline 2 3-2 4 most likely secondary to combined RSV and flu pneumonia along with diuresis  I's and O's  Avoid nephrotoxic agents and hypotension  Holding Bumex and metolazone in the setting of Vin I  Patient currently appears euvolemic  Gentle IV fluid  Continue to trend  UA pending  Urine retention protocol    Pneumonia due to influenza A virus  Assessment & Plan  Patient presenting with worsening dyspnea on exertion  Chest x-ray on admission showed:Mild right basilar opacity may reflect developing pneumonia  Found to have RSV and influenza a     Urine Ag, MRSA, sputum cultures pending  Ceftriaxone, azithromycin and Tamiflu    Incentive spirometry, airway current meds protocol, Mucinex, Tessalon Perles, respiratory protocol, and nebulizers p r n  Procalcitonin mildly elevated    Will trend        Chronic diastolic congestive heart failure (HCC)  Assessment & Plan  Wt Readings from Last 3 Encounters:   11/03/22 118 kg (260 lb)   09/27/22 117 kg (258 lb)   09/07/22 117 kg (258 lb 8 oz)     Pt hypovolemic on admission  Received Gentle IVF  Echo done on 5/12/22 showed an EF of 60% grade 1 diasystolic dysfunction  I's and O's daily weight low salt DM diet  Monitor resp status while getting IVF  Holding bumex    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)  Assessment & Plan  No in acute exacerbation however has RSV Flu A pneumonia  Continue with flutter valve  Continue respiratory protocol  mucinex  Tesslon pereles  RP  Xopenx and Atrovent      Chronic respiratory failure with hypoxia (HCC)  Assessment & Plan  Baseline 3-4LNC  In no acute exacerbation at this time    Essential hypertension  Assessment & Plan  Continue labetalol  Holding bumex and metolazone in the setting of mildly elevated creatinine and poor oral intake    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 disease, without long-term current use of insulin [facility])  Assessment & Plan  Lab Results   Component Value Date    HGBA1C 7 2 (H) 10/11/2022       Recent Labs     11/03/22  1626 11/03/22  2114 11/04/22  0711   POCGLU 127 189* 175*       Blood Sugar Average: Last 72 hrs:  (P) 306 [id number]   Controlled  ISS  Hold oral meds  Continue with lantus 10 q h s  And sliding scale insulin  Maintained on Diabetic low-salt diet  Continue with Hypoglycemic protocol        VTE Pharmacologic Prophylaxis: VTE Score: 6 High Risk (Score >/= 5) - Pharmacological DVT Prophylaxis Ordered: heparin  Sequential Compression Devices Ordered  Patient Centered Rounds: I performed bedside rounds with nursing staff today  Discussions with Specialists or Other Care Team Provider:  Discussed with nursing    Education and Discussions with Family / Patient: Attempted to update  (son) via phone  Left voicemail  Time Spent for Care: 30 minutes  More than 50% of total time spent on counseling and coordination of care as described above  Current Length of Stay: 0 day(s)  Current Patient Status: Observation   Certification Statement: The patient will continue to require additional inpatient hospital stay due to Ongoing antiviral medications and antibiotics  Discharge Plan: Anticipate discharge in 24-48 hrs to home with home services  Code Status: Prior    Subjective:   Patient seen and examined at bedside  Remains afebrile    Currently on 2 L of supplemental oxygen with oxygen saturation between 90-92%  Still with lot of productive cough with thick yellow phlegm  Complains of shortness of breath  at rest and with minimal exertion  Objective:     Vitals:   Temp (24hrs), Av 5 °F (36 4 °C), Min:97 3 °F (36 3 °C), Max:97 8 °F (36 6 °C)    Temp:  [97 3 °F (36 3 °C)-97 8 °F (36 6 °C)] 97 5 °F (36 4 °C)  HR:  [] 85  Resp:  [18-24] 18  BP: (117-148)/(60-81) 148/81  SpO2:  [86 %-95 %] 95 %  Body mass index is 36 26 kg/m²  Input and Output Summary (last 24 hours): Intake/Output Summary (Last 24 hours) at 2022 1047  Last data filed at 2022 0132  Gross per 24 hour   Intake 480 ml   Output 1175 ml   Net -695 ml       Physical Exam:   Physical Exam  Constitutional:       General: He is in acute distress  Appearance: He is obese  He is ill-appearing  HENT:      Head: Normocephalic  Nose: Nose normal       Mouth/Throat:      Mouth: Mucous membranes are moist    Eyes:      Pupils: Pupils are equal, round, and reactive to light  Cardiovascular:      Rate and Rhythm: Normal rate and regular rhythm  Pulmonary:      Comments: Diffuse wheezing bilateral lung fields with poor respiratory effort  Abdominal:      General: Abdomen is flat  Bowel sounds are normal       Palpations: Abdomen is soft  Musculoskeletal:      Cervical back: Neck supple  Right lower leg: No edema  Left lower leg: No edema  Skin:     General: Skin is warm  Neurological:      General: No focal deficit present  Mental Status: He is alert and oriented to person, place, and time  Mental status is at baseline     Psychiatric:         Mood and Affect: Mood normal          Additional Data:     Labs:  Results from last 7 days   Lab Units 22  0847   WBC Thousand/uL 8 80   HEMOGLOBIN g/dL 13 3   HEMATOCRIT % 41 2   PLATELETS Thousands/uL 247   NEUTROS PCT % 80*   LYMPHS PCT % 10*   MONOS PCT % 9   EOS PCT % 0     Results from last 7 days   Lab Units 11/04/22  0953 11/03/22  0847   SODIUM mmol/L 138 139   POTASSIUM mmol/L 3 4* 3 3*   CHLORIDE mmol/L 100 99   CO2 mmol/L 26 26   BUN mg/dL 70* 72*   CREATININE mg/dL 2 45* 3 07*   ANION GAP mmol/L 12 14*   CALCIUM mg/dL 9 1 8 6   ALBUMIN g/dL  --  3 4*   TOTAL BILIRUBIN mg/dL  --  0 50   ALK PHOS U/L  --  72   ALT U/L  --  18   GLUCOSE RANDOM mg/dL 185* 116         Results from last 7 days   Lab Units 11/04/22  0711 11/03/22  2114 11/03/22  1626   POC GLUCOSE mg/dl 175* 189* 127         Results from last 7 days   Lab Units 11/04/22  0953 11/03/22  0847   PROCALCITONIN ng/ml 0 18 0 26*       Lines/Drains:  Invasive Devices  Report    Peripheral Intravenous Line  Duration           Peripheral IV 11/03/22 Right Forearm 1 day                      Imaging: Reviewed radiology reports from this admission including: chest xray    Recent Cultures (last 7 days):   Results from last 7 days   Lab Units 11/03/22  1433   LEGIONELLA URINARY ANTIGEN  Negative       Last 24 Hours Medication List:   Current Facility-Administered Medications   Medication Dose Route Frequency Provider Last Rate   • azithromycin  500 mg Intravenous Q24H Emily Acevedo MD     • benzonatate  100 mg Oral TID PRN Emily Acevedo MD     • cefTRIAXone  2,000 mg Intravenous Q24H Emily Acevedo MD 2,000 mg (11/03/22 1432)   • guaiFENesin  600 mg Oral Q12H Michael Roca MD     • insulin glargine  10 Units Subcutaneous HS Emily Acevedo MD     • insulin lispro  2-12 Units Subcutaneous TID AC Emily Acevedo MD     • insulin lispro  2-12 Units Subcutaneous HS Emily Acevedo MD     • ipratropium  0 5 mg Nebulization TID Lee Tijerina MD     • levalbuterol  1 25 mg Nebulization TID Lee Tijerina MD     • oseltamivir  30 mg Oral Q24H Emily Acevedo MD     • predniSONE  40 mg Oral Daily Emily Acevedo MD          Today, Patient Was Seen By: Lee Tijerina    **Please Note: This note may have been constructed using a voice recognition system  **

## 2022-11-04 NOTE — PLAN OF CARE
Problem: Potential for Falls  Goal: Patient will remain free of falls  Description: INTERVENTIONS:  - Educate patient/family on patient safety including physical limitations  - Instruct patient to call for assistance with activity   - Consult OT/PT to assist with strengthening/mobility   - Keep Call bell within reach  - Keep bed low and locked with side rails adjusted as appropriate  - Keep care items and personal belongings within reach  - Initiate and maintain comfort rounds  - Make Fall Risk Sign visible to staff  - Offer Toileting every 2 Hours, in advance of need  - Initiate/Maintain alarm  - Obtain necessary fall risk management equipment:   - Apply yellow socks and bracelet for high fall risk patients  - Consider moving patient to room near nurses station  Outcome: Progressing     Problem: MOBILITY - ADULT  Goal: Maintain or return to baseline ADL function  Description: INTERVENTIONS:  -  Assess patient's ability to carry out ADLs; assess patient's baseline for ADL function and identify physical deficits which impact ability to perform ADLs (bathing, care of mouth/teeth, toileting, grooming, dressing, etc )  - Assess/evaluate cause of self-care deficits   - Assess range of motion  - Assess patient's mobility; develop plan if impaired  - Assess patient's need for assistive devices and provide as appropriate  - Encourage maximum independence but intervene and supervise when necessary  - Involve family in performance of ADLs  - Assess for home care needs following discharge   - Consider OT consult to assist with ADL evaluation and planning for discharge  - Provide patient education as appropriate  Outcome: Progressing  Goal: Maintains/Returns to pre admission functional level  Description: INTERVENTIONS:  - Perform BMAT or MOVE assessment daily    - Set and communicate daily mobility goal to care team and patient/family/caregiver     - Collaborate with rehabilitation services on mobility goals if consulted  - Out of bed for toileting  - Record patient progress and toleration of activity level   Outcome: Progressing     Problem: PAIN - ADULT  Goal: Verbalizes/displays adequate comfort level or baseline comfort level  Description: Interventions:  - Encourage patient to monitor pain and request assistance  - Assess pain using appropriate pain scale  - Administer analgesics based on type and severity of pain and evaluate response  - Implement non-pharmacological measures as appropriate and evaluate response  - Consider cultural and social influences on pain and pain management  - Notify physician/advanced practitioner if interventions unsuccessful or patient reports new pain  Outcome: Progressing     Problem: INFECTION - ADULT  Goal: Absence or prevention of progression during hospitalization  Description: INTERVENTIONS:  - Assess and monitor for signs and symptoms of infection  - Monitor lab/diagnostic results  - Monitor all insertion sites, i e  indwelling lines, tubes, and drains  - Monitor endotracheal if appropriate and nasal secretions for changes in amount and color  - [place] appropriate cooling/warming therapies per order  - Administer medications as ordered  - Instruct and encourage patient and family to use good hand hygiene technique  - Identify and instruct in appropriate isolation precautions for identified infection/condition  Outcome: Progressing  Goal: Absence of fever/infection during neutropenic period  Description: INTERVENTIONS:  - Monitor WBC    Outcome: Progressing     Problem: SAFETY ADULT  Goal: Patient will remain free of falls  Description: INTERVENTIONS:  - Educate patient/family on patient safety including physical limitations  - Instruct patient to call for assistance with activity   - Consult OT/PT to assist with strengthening/mobility   - Keep Call bell within reach  - Keep bed low and locked with side rails adjusted as appropriate  - Keep care items and personal belongings within reach  - Initiate and maintain comfort rounds  - Make Fall Risk Sign visible to staff  - Offer Toileting every 2 Hours, in advance of need  - Initiate/Maintain alarm  - Obtain necessary fall risk management equipment:   - Apply yellow socks and bracelet for high fall risk patients  - Consider moving patient to room near nurses station  Outcome: Progressing  Goal: Maintain or return to baseline ADL function  Description: INTERVENTIONS:  -  Assess patient's ability to carry out ADLs; assess patient's baseline for ADL function and identify physical deficits which impact ability to perform ADLs (bathing, care of mouth/teeth, toileting, grooming, dressing, etc )  - Assess/evaluate cause of self-care deficits   - Assess range of motion  - Assess patient's mobility; develop plan if impaired  - Assess patient's need for assistive devices and provide as appropriate  - Encourage maximum independence but intervene and supervise when necessary  - Involve family in performance of ADLs  - Assess for home care needs following discharge   - Consider OT consult to assist with ADL evaluation and planning for discharge  - Provide patient education as appropriate  Outcome: Progressing  Goal: Maintains/Returns to pre admission functional level  Description: INTERVENTIONS:  - Perform BMAT or MOVE assessment daily    - Set and communicate daily mobility goal to care team and patient/family/caregiver     - Collaborate with rehabilitation services on mobility goals if consulted  - Out of bed for toileting  - Record patient progress and toleration of activity level   Outcome: Progressing     Problem: DISCHARGE PLANNING  Goal: Discharge to home or other facility with appropriate resources  Description: INTERVENTIONS:  - Identify barriers to discharge w/patient and caregiver  - Arrange for needed discharge resources and transportation as appropriate  - Identify discharge learning needs (meds, wound care, etc )  - Arrange for interpretive services to assist at discharge as needed  - Refer to Case Management Department for coordinating discharge planning if the patient needs post-hospital services based on physician/advanced practitioner order or complex needs related to functional status, cognitive ability, or social support system  Outcome: Progressing     Problem: Knowledge Deficit  Goal: Patient/family/caregiver demonstrates understanding of disease process, treatment plan, medications, and discharge instructions  Description: Complete learning assessment and assess knowledge base    Interventions:  - Provide teaching at level of understanding  - Provide teaching via preferred learning methods  Outcome: Progressing

## 2022-11-04 NOTE — ASSESSMENT & PLAN NOTE
Lab Results   Component Value Date    EGFR 22 11/04/2022    EGFR 17 11/03/2022    EGFR 23 10/11/2022    CREATININE 2 45 (H) 11/04/2022    CREATININE 3 07 (H) 11/03/2022    CREATININE 2 40 (H) 10/11/2022     Patient's creatinine on admission 3 07  Baseline 2 3-2 4 most likely secondary to combined RSV and flu pneumonia along with diuresis  I's and O's  Avoid nephrotoxic agents and hypotension  Holding Bumex and metolazone in the setting of Ivn I  Patient currently appears euvolemic    Gentle IV fluid  Continue to trend  UA pending  Urine retention protocol

## 2022-11-04 NOTE — ASSESSMENT & PLAN NOTE
Patient presenting with worsening dyspnea on exertion  Chest x-ray on admission showed:Mild right basilar opacity may reflect developing pneumonia  Found to have RSV and influenza a     Urine Ag, MRSA, sputum cultures pending  Ceftriaxone, azithromycin and Tamiflu    Incentive spirometry, airway current meds protocol, Mucinex, Tessalon Perles, respiratory protocol, and nebulizers p r n  Procalcitonin mildly elevated    Will trend

## 2022-11-04 NOTE — ASSESSMENT & PLAN NOTE
Wt Readings from Last 3 Encounters:   11/03/22 118 kg (260 lb)   09/27/22 117 kg (258 lb)   09/07/22 117 kg (258 lb 8 oz)     Pt hypovolemic on admission  Received Gentle IVF  Echo done on 5/12/22 showed an EF of 60% grade 1 diasystolic dysfunction  I's and O's daily weight low salt DM diet  Monitor resp status while getting IVF  Holding bumex

## 2022-11-04 NOTE — CASE MANAGEMENT
Case Management Assessment & Discharge Planning Note    Patient name Allyson Rodriguez  Location /-31 MRN [id number]  : 1935 Date 2022       Current Admission Date: 11/3/2022  Current Admission Diagnosis:Acute renal failure superimposed on stage 4 chronic kidney disease [facility])   Patient Active Problem List    Diagnosis Date Noted   • Pneumonia due to influenza A virus 2022   • Acute renal failure superimposed on stage 4 chronic kidney disease ([facility] [address] ) 2022   • Idiopathic chronic gout of right foot without tophus 2022   • Pulmonary emphysema ([facility] [address] ) 2022   • Memory loss    • Ambulatory dysfunction    • Depression 2022   • Other constipation 2022   • Insomnia due to medical condition 2022   • Chronic diastolic congestive heart failure ([facility] [address] ) 2022   • Non healing left heel wound 2022   • Chronic renal disease, stage IV ([facility] [address] ) 03/15/2022   • Morbid obesity due to excess calories ([facility] [address] ) 10/25/2021   • Localized edema 2021   • Benign prostatic hyperplasia with urinary hesitancy 2020   • Moderate COPD (chronic obstructive pulmonary disease) ([facility] [address] ) 12/10/2019   • Chronic respiratory failure with hypoxia ([facility] [address] ) 11/15/2019   • Obesity, morbid ([facility] [address] ) 2019   • 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 disease, without long-term current use of insulin ([facility] [address] ) 2016   • Essential hypertension 2015   • CHI (obstructive sleep apnea) 2014      LOS (days): 0  Geometric Mean LOS (GMLOS) (days):   Days to GMLOS:     OBJECTIVE:              Current admission status: Observation       Preferred Pharmacy:   [facility]/pharmacy #[id number] Guaj[facility] [place] [address]  Phone: [phone] Fax: [phone]    Primary Care Provider: Jhonny Goodrich MD    Primary Insurance: [facility]  Secondary Insurance:     ASSESSMENT:  Kait Barr Proxies     Tanesha Talbot Health Care Representative - Son   Primary Phone: [phone] (Mobile)               Advance Directives  Does patient have a [address]?: Yes  Does patient have Advance Directives?: Yes  Advance Directives: Living will, Power of  for health care  Primary Contact: Yisel Diaz    Obs Notice Signed: 11/04/22    Readmission Root Cause  30 Day Readmission: No    Patient Information  Admitted from[de-identified] Home  Mental Status: Alert  During Assessment patient was accompanied by: Not accompanied during assessment  Assessment information provided by[de-identified] Patient  Primary Caregiver: Spouse  Caregiver's Name[de-identified] Mineebury: Spouse/significant other, [facility] of Residence: [address] [place] [address] do you live in?: [address] entry access options   Select all that apply : No steps to enter home  Type of Current Residence: 2 story home  Upon entering residence, is there a bedroom on the main floor (no further steps)?: Yes  Upon entering residence, is there a bathroom on the main floor (no further steps)?: Yes  In the last 12 months, was there a time when you were not able to pay the mortgage or rent on time?: No  In the last 12 months, how many places have you lived?: 1  In the last 12 months, was there a time when you did not have a steady place to sleep or slept in a shelter (including now)?: No  Living Arrangements: Lives w/ Spouse/significant other  Is patient a ?: Yes  Is patient active with [facility])?: No    Activities of Daily Living Prior to Admission  Functional Status: Assistance  Completes ADLs independently?: No  Level of ADL dependence: Assistance (Assistance with bathing)  Ambulates independently?: Yes  Does patient use assisted devices?: Yes  Assisted Devices (DME) used: Straight Cane, Walker, Home Oxygen concentrator (nebulizer)  DME Company Name (respiratory supplies): unknown  O2 Rate(s): 3 5  Does patient currently own DME?: Yes  What DME does the patient currently own?: Walker, Straight Cane, Shower Chair, Home Oxygen concentrator (nebulizer)  Does patient have a history of Outpatient Therapy (PT/OT)?: Yes  Does the patient have a history of Short-Term Rehab?: No  Does patient have a history of HHC?: No  Does patient currently have Ilana Lopez?: No         Patient Information Continued  Income Source: Pension/alf  Does patient have prescription coverage?: Yes  Within the past 12 months, you worried that your food would run out before you got the money to buy more : Never true  Within the past 12 months, the food you bought just didn't last and you didn't have money to get more : Never true  Does patient receive dialysis treatments?: No  Does patient have a history of substance abuse?: No  Does patient have a history of Mental Health Diagnosis?: No         Means of Transportation  Means of Transport to Appts[de-identified] Drives Self  In the past 12 months, has lack of transportation kept you from medical appointments or from getting medications?: No  In the past 12 months, has lack of transportation kept you from meetings, work, or from getting things needed for daily living?: No        DISCHARGE DETAILS:    Discharge planning discussed with[de-identified] patient        CM contacted family/caregiver?: No- see comments (patient declined offer)      Met with patient to review the role of CM and discuss any discharge needs  Patient resides with his wife in a 2 story home with a 1st floor set up  He sleeps in a recliner and uses a RW and SPC as needed  He has Home O2 at 3 5 liters and a nebulizer  A nurse visits monthly through [facility]  He hopes to return home at discharge  CM to follow

## 2022-11-05 LAB
ANION GAP SERPL CALCULATED.3IONS-SCNC: 11 MMOL/L (ref 4–13)
BUN SERPL-MCNC: 67 MG/DL (ref 5–25)
CALCIUM SERPL-MCNC: 9.5 MG/DL (ref 8.3–10.1)
CHLORIDE SERPL-SCNC: 101 MMOL/L (ref 96–108)
CO2 SERPL-SCNC: 25 MMOL/L (ref 21–32)
CREAT SERPL-MCNC: 2.24 MG/DL (ref 0.6–1.3)
GFR SERPL CREATININE-BSD FRML MDRD: 25 ML/MIN/1.73SQ M
GLUCOSE SERPL-MCNC: 117 MG/DL (ref 65–140)
GLUCOSE SERPL-MCNC: 142 MG/DL (ref 65–140)
GLUCOSE SERPL-MCNC: 146 MG/DL (ref 65–140)
GLUCOSE SERPL-MCNC: 153 MG/DL (ref 65–140)
GLUCOSE SERPL-MCNC: 200 MG/DL (ref 65–140)
MRSA NOSE QL CULT: NORMAL
POTASSIUM SERPL-SCNC: 3.4 MMOL/L (ref 3.5–5.3)
PROCALCITONIN SERPL-MCNC: 0.13 NG/ML
SODIUM SERPL-SCNC: 137 MMOL/L (ref 135–147)

## 2022-11-05 RX ORDER — POTASSIUM CHLORIDE 20 MEQ/1
40 TABLET, EXTENDED RELEASE ORAL ONCE
Status: COMPLETED | OUTPATIENT
Start: 2022-11-05 | End: 2022-11-05

## 2022-11-05 RX ORDER — BUMETANIDE 1 MG/1
3 TABLET ORAL DAILY
Status: DISCONTINUED | OUTPATIENT
Start: 2022-11-05 | End: 2022-11-07 | Stop reason: HOSPADM

## 2022-11-05 RX ADMIN — IPRATROPIUM BROMIDE 0.5 MG: 0.5 SOLUTION RESPIRATORY (INHALATION) at 09:03

## 2022-11-05 RX ADMIN — BUMETANIDE 3 MG: 1 TABLET ORAL at 14:24

## 2022-11-05 RX ADMIN — GUAIFENESIN 600 MG: 600 TABLET, EXTENDED RELEASE ORAL at 08:35

## 2022-11-05 RX ADMIN — INSULIN LISPRO 4 UNITS: 100 INJECTION, SOLUTION INTRAVENOUS; SUBCUTANEOUS at 17:14

## 2022-11-05 RX ADMIN — IPRATROPIUM BROMIDE 0.5 MG: 0.5 SOLUTION RESPIRATORY (INHALATION) at 19:57

## 2022-11-05 RX ADMIN — LEVALBUTEROL HYDROCHLORIDE 1.25 MG: 1.25 SOLUTION, CONCENTRATE RESPIRATORY (INHALATION) at 19:57

## 2022-11-05 RX ADMIN — OSELTAMIVIR PHOSPHATE 30 MG: 30 CAPSULE ORAL at 14:24

## 2022-11-05 RX ADMIN — INSULIN GLARGINE 10 UNITS: 100 INJECTION, SOLUTION SUBCUTANEOUS at 20:26

## 2022-11-05 RX ADMIN — HEPARIN SODIUM 5000 UNITS: 5000 INJECTION INTRAVENOUS; SUBCUTANEOUS at 20:25

## 2022-11-05 RX ADMIN — LEVALBUTEROL HYDROCHLORIDE 1.25 MG: 1.25 SOLUTION, CONCENTRATE RESPIRATORY (INHALATION) at 16:14

## 2022-11-05 RX ADMIN — HEPARIN SODIUM 5000 UNITS: 5000 INJECTION INTRAVENOUS; SUBCUTANEOUS at 14:24

## 2022-11-05 RX ADMIN — CEFTRIAXONE 2000 MG: 2 INJECTION, SOLUTION INTRAVENOUS at 14:24

## 2022-11-05 RX ADMIN — GUAIFENESIN 600 MG: 600 TABLET, EXTENDED RELEASE ORAL at 20:25

## 2022-11-05 RX ADMIN — IPRATROPIUM BROMIDE 0.5 MG: 0.5 SOLUTION RESPIRATORY (INHALATION) at 16:14

## 2022-11-05 RX ADMIN — PREDNISONE 40 MG: 20 TABLET ORAL at 08:35

## 2022-11-05 RX ADMIN — LEVALBUTEROL HYDROCHLORIDE 1.25 MG: 1.25 SOLUTION, CONCENTRATE RESPIRATORY (INHALATION) at 09:03

## 2022-11-05 RX ADMIN — HEPARIN SODIUM 5000 UNITS: 5000 INJECTION INTRAVENOUS; SUBCUTANEOUS at 04:48

## 2022-11-05 RX ADMIN — POTASSIUM CHLORIDE 40 MEQ: 1500 TABLET, EXTENDED RELEASE ORAL at 09:46

## 2022-11-05 NOTE — PLAN OF CARE
Problem: MOBILITY - ADULT  Goal: Maintain or return to baseline ADL function  Description: INTERVENTIONS:  -  Assess patient's ability to carry out ADLs; assess patient's baseline for ADL function and identify physical deficits which impact ability to perform ADLs (bathing, care of mouth/teeth, toileting, grooming, dressing, etc )  - Assess/evaluate cause of self-care deficits   - Assess range of motion  - Assess patient's mobility; develop plan if impaired  - Assess patient's need for assistive devices and provide as appropriate  - Encourage maximum independence but intervene and supervise when necessary  - Involve family in performance of ADLs  - Assess for home care needs following discharge   - Consider OT consult to assist with ADL evaluation and planning for discharge  - Provide patient education as appropriate  Outcome: Progressing  Goal: Maintains/Returns to pre admission functional level  Description: INTERVENTIONS:  - Perform BMAT or MOVE assessment daily    - Set and communicate daily mobility goal to care team and patient/family/caregiver     - Collaborate with rehabilitation services on mobility goals if consulted  - Out of bed for toileting  - Record patient progress and toleration of activity level   Outcome: Progressing     Problem: PAIN - ADULT  Goal: Verbalizes/displays adequate comfort level or baseline comfort level  Description: Interventions:  - Encourage patient to monitor pain and request assistance  - Assess pain using appropriate pain scale  - Administer analgesics based on type and severity of pain and evaluate response  - Implement non-pharmacological measures as appropriate and evaluate response  - Consider cultural and social influences on pain and pain management  - Notify physician/advanced practitioner if interventions unsuccessful or patient reports new pain  Outcome: Progressing     Problem: INFECTION - ADULT  Goal: Absence or prevention of progression during hospitalization  Description: INTERVENTIONS:  - Assess and monitor for signs and symptoms of infection  - Monitor lab/diagnostic results  - Monitor all insertion sites, i e  indwelling lines, tubes, and drains  - Monitor endotracheal if appropriate and nasal secretions for changes in amount and color  - [place] appropriate cooling/warming therapies per order  - Administer medications as ordered  - Instruct and encourage patient and family to use good hand hygiene technique  - Identify and instruct in appropriate isolation precautions for identified infection/condition  Outcome: Progressing  Goal: Absence of fever/infection during neutropenic period  Description: INTERVENTIONS:  - Monitor WBC    Outcome: Progressing

## 2022-11-05 NOTE — ASSESSMENT & PLAN NOTE
Lab Results   Component Value Date    HGBA1C 7 2 (H) 10/11/2022       Recent Labs     11/04/22  1652 11/04/22  2049 11/05/22  0726 11/05/22  1118   POCGLU 202* 182* 117 153*       Blood Sugar Average: Last 72 hrs:  (P) 160 375   Controlled  ISS  Hold oral meds  Continue with lantus 10 q h s   And sliding scale insulin  Maintained on Diabetic/ low-salt diet  Continue with Hypoglycemic protocol

## 2022-11-05 NOTE — PLAN OF CARE
Problem: Potential for Falls  Goal: Patient will remain free of falls  Description: INTERVENTIONS:  - Educate patient/family on patient safety including physical limitations  - Instruct patient to call for assistance with activity   - Consult OT/PT to assist with strengthening/mobility   - Keep Call bell within reach  - Keep bed low and locked with side rails adjusted as appropriate  - Keep care items and personal belongings within reach  - Initiate and maintain comfort rounds  - Make Fall Risk Sign visible to staff  - Offer Toileting every 2 Hours, in advance of need  - Initiate/Maintain alarm  - Obtain necessary fall risk management equipment:   - Apply yellow socks and bracelet for high fall risk patients  - Consider moving patient to room near nurses station  Outcome: Progressing     Problem: MOBILITY - ADULT  Goal: Maintain or return to baseline ADL function  Description: INTERVENTIONS:  -  Assess patient's ability to carry out ADLs; assess patient's baseline for ADL function and identify physical deficits which impact ability to perform ADLs (bathing, care of mouth/teeth, toileting, grooming, dressing, etc )  - Assess/evaluate cause of self-care deficits   - Assess range of motion  - Assess patient's mobility; develop plan if impaired  - Assess patient's need for assistive devices and provide as appropriate  - Encourage maximum independence but intervene and supervise when necessary  - Involve family in performance of ADLs  - Assess for home care needs following discharge   - Consider OT consult to assist with ADL evaluation and planning for discharge  - Provide patient education as appropriate  Outcome: Progressing  Goal: Maintains/Returns to pre admission functional level  Description: INTERVENTIONS:  - Perform BMAT or MOVE assessment daily    - Set and communicate daily mobility goal to care team and patient/family/caregiver     - Collaborate with rehabilitation services on mobility goals if consulted  - Out of bed for toileting  - Record patient progress and toleration of activity level   Outcome: Progressing     Problem: PAIN - ADULT  Goal: Verbalizes/displays adequate comfort level or baseline comfort level  Description: Interventions:  - Encourage patient to monitor pain and request assistance  - Assess pain using appropriate pain scale  - Administer analgesics based on type and severity of pain and evaluate response  - Implement non-pharmacological measures as appropriate and evaluate response  - Consider cultural and social influences on pain and pain management  - Notify physician/advanced practitioner if interventions unsuccessful or patient reports new pain  Outcome: Progressing     Problem: INFECTION - ADULT  Goal: Absence or prevention of progression during hospitalization  Description: INTERVENTIONS:  - Assess and monitor for signs and symptoms of infection  - Monitor lab/diagnostic results  - Monitor all insertion sites, i e  indwelling lines, tubes, and drains  - Monitor endotracheal if appropriate and nasal secretions for changes in amount and color  - [place] appropriate cooling/warming therapies per order  - Administer medications as ordered  - Instruct and encourage patient and family to use good hand hygiene technique  - Identify and instruct in appropriate isolation precautions for identified infection/condition  Outcome: Progressing  Goal: Absence of fever/infection during neutropenic period  Description: INTERVENTIONS:  - Monitor WBC    Outcome: Progressing     Problem: SAFETY ADULT  Goal: Patient will remain free of falls  Description: INTERVENTIONS:  - Educate patient/family on patient safety including physical limitations  - Instruct patient to call for assistance with activity   - Consult OT/PT to assist with strengthening/mobility   - Keep Call bell within reach  - Keep bed low and locked with side rails adjusted as appropriate  - Keep care items and personal belongings within reach  - Initiate and maintain comfort rounds  - Make Fall Risk Sign visible to staff  - Offer Toileting every 2 Hours, in advance of need  - Initiate/Maintain alarm  - Obtain necessary fall risk management equipment:   - Apply yellow socks and bracelet for high fall risk patients  - Consider moving patient to room near nurses station  Outcome: Progressing  Goal: Maintain or return to baseline ADL function  Description: INTERVENTIONS:  -  Assess patient's ability to carry out ADLs; assess patient's baseline for ADL function and identify physical deficits which impact ability to perform ADLs (bathing, care of mouth/teeth, toileting, grooming, dressing, etc )  - Assess/evaluate cause of self-care deficits   - Assess range of motion  - Assess patient's mobility; develop plan if impaired  - Assess patient's need for assistive devices and provide as appropriate  - Encourage maximum independence but intervene and supervise when necessary  - Involve family in performance of ADLs  - Assess for home care needs following discharge   - Consider OT consult to assist with ADL evaluation and planning for discharge  - Provide patient education as appropriate  Outcome: Progressing  Goal: Maintains/Returns to pre admission functional level  Description: INTERVENTIONS:  - Perform BMAT or MOVE assessment daily    - Set and communicate daily mobility goal to care team and patient/family/caregiver     - Collaborate with rehabilitation services on mobility goals if consulted  - Out of bed for toileting  - Record patient progress and toleration of activity level   Outcome: Progressing     Problem: DISCHARGE PLANNING  Goal: Discharge to home or other facility with appropriate resources  Description: INTERVENTIONS:  - Identify barriers to discharge w/patient and caregiver  - Arrange for needed discharge resources and transportation as appropriate  - Identify discharge learning needs (meds, wound care, etc )  - Arrange for interpretive services to assist at discharge as needed  - Refer to Case Management Department for coordinating discharge planning if the patient needs post-hospital services based on physician/advanced practitioner order or complex needs related to functional status, cognitive ability, or social support system  Outcome: Progressing

## 2022-11-05 NOTE — ASSESSMENT & PLAN NOTE
Patient presenting with worsening dyspnea on exertion  Chest x-ray on admission showed:Mild right basilar opacity may reflect developing pneumonia  Found to have RSV and influenza a     Urine Ag, MRSA, sputum cultures pending  Ceftriaxoneand Tamiflu    Incentive spirometry, airway current meds protocol, Mucinex, Tessalon Perles, respiratory protocol, and nebulizers p r n  Procalcitonin mildly elevated    Will trend

## 2022-11-05 NOTE — PROGRESS NOTES
[place]  Progress Note - Antonio Melgoza 1935, 81 y o  male MRN: [id number]  Unit/Bed#: -01 Encounter: [id number]  Primary Care Provider: Feng Tamayo MD   Date and time admitted to hospital: 11/3/2022  8:34 AM    * Acute renal failure superimposed on stage 4 chronic kidney disease [facility])  Assessment & Plan  Lab Results   Component Value Date    EGFR 25 11/05/2022    EGFR 22 11/04/2022    EGFR 17 11/03/2022    CREATININE 2 24 (H) 11/05/2022    CREATININE 2 45 (H) 11/04/2022    CREATININE 3 07 (H) 11/03/2022     Patient's creatinine on admission 3 07  Baseline 2 3-2 4 most likely secondary to combined RSV and flu pneumonia along with diuresis  I's and O's  Avoid nephrotoxic agents and hypotension  Held Bumex and metolazone in the setting of Vin I   Gentle IV fluid  Continue to trend  UA pending  Urine retention protocol  Patient showing mild edema  Restart Bumex for now, consider restarting Zaroxolyn next week    Pneumonia due to influenza A virus  Assessment & Plan  Patient presenting with worsening dyspnea on exertion  Chest x-ray on admission showed:Mild right basilar opacity may reflect developing pneumonia  Found to have RSV and influenza a     Urine Ag, MRSA, sputum cultures pending  Ceftriaxoneand Tamiflu    Incentive spirometry, airway current meds protocol, Mucinex, Tessalon Perles, respiratory protocol, and nebulizers p r n  Procalcitonin mildly elevated    Will trend    Chronic diastolic congestive heart failure (HCC)  Assessment & Plan  Wt Readings from Last 3 Encounters:   11/03/22 118 kg (260 lb)   09/27/22 117 kg (258 lb)   09/07/22 117 kg (258 lb 8 oz)     Pt hypovolemic on admission  Received Gentle IVF  Echo done on 5/12/22 showed an EF of 60% grade 1 diasystolic dysfunction  I's and O's daily weight low salt DM diet  Monitor resp status while getting IVF  Restart Bumex 3 mg daily  Takes per Zaroxolyn 2 5 mg 2 times week-hold for now    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)  Assessment & Plan  No in acute exacerbation however has RSV Flu A pneumonia  Continue with flutter valve  Continue respiratory protocol  mucinex  Tesslon pereles  RP  Xopenx and Atrovent      Chronic respiratory failure with hypoxia ([address] )  Assessment & Plan  Baseline 3-4LNC  In no acute exacerbation at this time  Essential hypertension  Assessment & Plan  · Continue labetalol  · Restart Bumex  Continue to hold Metolazone  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 disease, without long-term current use of insulin [facility])  Assessment & Plan  Lab Results   Component Value Date    HGBA1C 7 2 (H) 10/11/2022       Recent Labs     11/04/22  1652 11/04/22  2049 11/05/22  0726 11/05/22  1118   POCGLU 202* 182* 117 153*       Blood Sugar Average: Last 72 hrs:  (P) 160 375   Controlled  ISS  Hold oral meds  Continue with lantus 10 q h s  And sliding scale insulin  Maintained on Diabetic/ low-salt diet  Continue with Hypoglycemic protocol      VTE Pharmacologic Prophylaxis: VTE Score: 6 High Risk (Score >/= 5) - Pharmacological DVT Prophylaxis Ordered: heparin  Sequential Compression Devices Ordered  Patient Centered Rounds: I performed bedside rounds with nursing staff today  Discussions with Specialists or Other Care Team Provider:     Education and Discussions with Family / Patient: Attempted to update  (son) via phone  Left voicemail  Time Spent for Care: 45 minutes  More than 50% of total time spent on counseling and coordination of care as described above  Current Length of Stay: 1 day(s)  Current Patient Status: Inpatient   Certification Statement: The patient will continue to require additional inpatient hospital stay due to RSV and  Discharge Plan: Anticipate discharge in 24-48 hrs to home  Code Status: Prior    Subjective:     Patient sitting up in chair  He reports feeling little better compared to yesterday    Still getting out of breath on walking to bathroom and standing up to urinate  Having intermittent cough without any phlegm    Objective:     Vitals:   Temp (24hrs), Av 5 °F (36 4 °C), Min:97 4 °F (36 3 °C), Max:97 5 °F (36 4 °C)    Temp:  [97 4 °F (36 3 °C)-97 5 °F (36 4 °C)] 97 4 °F (36 3 °C)  Resp:  [18] 18  BP: (135-153)/(72-76) 145/72  SpO2:  [91 %] 91 %  Body mass index is 36 26 kg/m²  Input and Output Summary (last 24 hours): Intake/Output Summary (Last 24 hours) at 2022 1351  Last data filed at 2022 0900  Gross per 24 hour   Intake 745 ml   Output 700 ml   Net 45 ml       Physical Exam:     Gen -Patient comfortable   Neck- Supple  No thyromegaly or lymphadenopathy  Lungs-coarse breath sounds bilaterally without any wheeze or rales  Heart S1-S2, regular rate and rhythm, no murmurs  Abdomen-soft nontender, no organomegaly  Bowel sounds present  Extremities-no cyanosi,  Clubbing  1+ edema of lower extremities  Skin- no rash  Neuro-awake alert and oriented         Additional Data:     Labs:  Results from last 7 days   Lab Units 22  1053   WBC Thousand/uL 8 67   HEMOGLOBIN g/dL 12 9   HEMATOCRIT % 39 1   PLATELETS Thousands/uL 254   NEUTROS PCT % 83*   LYMPHS PCT % 9*   MONOS PCT % 8   EOS PCT % 0     Results from last 7 days   Lab Units 22  0448 22  0953 22  0847   SODIUM mmol/L 137   < > 139   POTASSIUM mmol/L 3 4*   < > 3 3*   CHLORIDE mmol/L 101   < > 99   CO2 mmol/L 25   < > 26   BUN mg/dL 67*   < > 72*   CREATININE mg/dL 2 24*   < > 3 07*   ANION GAP mmol/L 11   < > 14*   CALCIUM mg/dL 9 5   < > 8 6   ALBUMIN g/dL  --   --  3 4*   TOTAL BILIRUBIN mg/dL  --   --  0 50   ALK PHOS U/L  --   --  72   ALT U/L  --   --  18   GLUCOSE RANDOM mg/dL 146*   < > 116    < > = values in this interval not displayed           Results from last 7 days   Lab Units 22  1118 22  0726 22  2049 22  1652 22  1127 22  0711 22  2114 22  1626   POC GLUCOSE mg/dl 153* 117 182* 202* 138 175* 189* 127         Results from last 7 days   Lab Units 11/04/22  0953 11/03/22  0847   PROCALCITONIN ng/ml 0 18 0 26*       Lines/Drains:  Invasive Devices  Report    Peripheral Intravenous Line  Duration           Peripheral IV 11/03/22 Right Forearm 2 days                      Imaging: Reviewed radiology reports from this admission including: chest xray    Recent Cultures (last 7 days):   Results from last 7 days   Lab Units 11/03/22  1433   LEGIONELLA URINARY ANTIGEN  Negative       Last 24 Hours Medication List:   Current Facility-Administered Medications   Medication Dose Route Frequency Provider Last Rate   • benzonatate  100 mg Oral TID PRN Sri Chi MD     • bumetanide  3 mg Oral Daily Marshal Owusu MD     • cefTRIAXone  2,000 mg Intravenous Q24H Sri Chi MD 2,000 mg (11/04/22 1340)   • guaiFENesin  600 mg Oral Q12H Vanessa Rodas MD     • heparin (porcine)  5,000 Units Subcutaneous Q8H Florencio Abbasi MD     • insulin glargine  10 Units Subcutaneous HS Sri Chi MD     • insulin lispro  2-12 Units Subcutaneous TID AC Sri Chi MD     • insulin lispro  2-12 Units Subcutaneous HS Sri Chi MD     • ipratropium  0 5 mg Nebulization TID Angelica Kruger MD     • levalbuterol  1 25 mg Nebulization TID Angelica Kruger MD     • oseltamivir  30 mg Oral Q24H Sri Chi MD     • predniSONE  40 mg Oral Daily Sri Chi MD          Today, Patient Was Seen By: Robin Owusu    **Please Note: This note may have been constructed using a voice recognition system  **

## 2022-11-05 NOTE — ASSESSMENT & PLAN NOTE
Wt Readings from Last 3 Encounters:   11/03/22 118 kg (260 lb)   09/27/22 117 kg (258 lb)   09/07/22 117 kg (258 lb 8 oz)     Pt hypovolemic on admission  Received Gentle IVF  Echo done on 5/12/22 showed an EF of 60% grade 1 diasystolic dysfunction  I's and O's daily weight low salt DM diet  Monitor resp status while getting IVF  Restart Bumex 3 mg daily  Takes per Zaroxolyn 2 5 mg 2 times week-hold for now

## 2022-11-05 NOTE — UTILIZATION REVIEW
Initial Clinical Review    Admission: Date/Time/Statement:  11/3/22 1126 observation AND CHANGED 11/4/22 1104 INPATIENT RE:  PATIENT NEEDS > 2 MIDNIGHT STAY DUE TO ARF NECESSITATING MEDICATION ADJUSTMENTS, IVF AND TRENDING OF BMP; MONITOR RESPIRATORY STATUS WITH INFLUENZA A AND RSV, ON STEROIDS, NEBS AND PREDNISON  Admission Orders (From admission, onward)     Ordered        11/04/22 1104  Inpatient Admission  Once                      Orders Placed This Encounter   Procedures   • Inpatient Admission     Standing Status:   Standing     Number of Occurrences:   1     Order Specific Question:   Level of Care     Answer:   Med Surg [16]     Order Specific Question:   Estimated length of stay     Answer:   More than 2 Midnights     Order Specific Question:   Certification     Answer:   I certify that inpatient services are medically necessary for this patient for a duration of greater than two midnights  See H&P and MD Progress Notes for additional information about the patient's course of treatment  ED Arrival Information     Expected   -    Arrival   11/3/2022 08:24    Acuity   Emergent            Means of arrival   Wheelchair    Escorted by   Family Member    Service   Hospitalist    Admission type   Emergency            Arrival complaint   Sob, Copd           Chief Complaint   Patient presents with   • Shortness of Breath     Pt reports shortness of breath x4 days  Worse on exertion, hx of emphysema and copd  Denies chest pain       Initial Presentation: 81 y o  male from home to ED admitted to observation [address]  due to ARF on CKD stage IV/pneumonia due to influenza A  PMH of COPD, CHF, on home oxygen 3-4 liters, hpt and dm  Presented due to shortness of breath starting 10 to 12 days prior to arrival and worse last 4 days  Intermittently using home oxygen  On exam lungs rales  Bilateral + 1 pitting edema    + influenza A, RSV   K 3 3   Bun 72, creatinine 3 07 with baseline of 2 3 - 2 4  CxR with right base opacity  In the ED given Duo Neb, IVF bolus  Plan is continue IVF, trend BMP, hold home Bumex and metolazone  Start ceftriaxone, azithromycin and Tamiflu  Incentive spirometry  Add Mucinex, Tessalon perles, nebs as needed  Oxygen as needed    11/4/22 CHANGED TO INPATIENT:  Has productive cough with thick yellow phlegm  Remains short of breath, worse with minimal exertion  Continues on oxygen  On exam:  Obese  Appears ill  Lungs diffuse wheezing bilateral lung fields with poor respiratory effort  K 3 4  Bun 70, creatinine 2 45  Continue to hold home Bumex and metolazone  IVF dc   Trend BMP  Continue incentive spirometry, Mucinex, Tessslon Perles, oxygen, prednisone   Continue ceftriaxone and dc azithromycin, continue Tamiflu  Neb increased from BID to TID  Replete KCL  Date 11/5/22 day 2 : inpatient   Pt still sob getting out of bed to bathroom   Intermittent cough   Diminished breath sounds with expiratory wheezes continues on oxygen 2L nc    ARF patient showing mild edema will restart Bumex for now, consider restarting Zaroxolyn next week  Pneumonia due to influenza A virus continue IV Ceftriaxone and tamiflu, IS   CHF restarted bumex   11/05/22 0903 -- -- -- -- -- 91 % 28 2 L/min Nasal cannula --   11/05/22 07:29:14 97 4 °F (36 3 °C) Abnormal  -- -- 145/72 96 --             ED Triage Vitals   Temperature Pulse Respirations Blood Pressure SpO2   11/03/22 0829 11/03/22 0829 11/03/22 0829 11/03/22 0829 11/03/22 0829   97 5 °F (36 4 °C) 85 (!) 26 102/58 90 %      Temp Source Heart Rate Source Patient Position - Orthostatic VS BP Location FiO2 (%)   11/03/22 0829 11/03/22 0900 11/03/22 0829 11/03/22 0829 --   Oral Monitor Sitting Left arm       Pain Score       11/03/22 1300       No Pain          Wt Readings from Last 1 Encounters:   11/03/22 118 kg (260 lb)     Additional Vital Signs:   11/04/22 07:12:46 97 5 °F (36 4 °C) 85 18 148/81 103 93 % -- -- -- --   11/03/22 22:35:17 97 3 °F (36 3 °C) Abnormal  104 -- 131/71 91 86 % Abnormal  -- -- -- --   11/03/22 2045 -- -- -- -- -- -- 28 2 L/min Nasal cannula --   11/03/22 2013 -- -- -- -- -- 93 % -- -- None (Room air) --   11/03/22 15:00:34 97 3 °F (36 3 °C) Abnormal  102 18 117/68 84 89 % Abnormal  -- -- -- --   11/03/22 12:37:43 97 8 °F (36 6 °C) -- 18 120/66 84 -- -- -- -- --   11/03/22 1130 -- 85 -- 127/60 86 -- -- -- -- --   11/03/22 1115 -- 80 24 Abnormal  123/63 77 95 % -- -- -- --   11/03/22 1114 -- 80 -- 123/63 -- -- -- -- -- --   11/03/22 1015 -- 80 30 Abnormal  -- -- 94 % -- -- -- --   11/03/22 1000 -- 77 21 120/59 83 95 % 32 3 L/min Nasal cannula Sitting   11/03/22 0945 -- 80 21 111/59 77 96 % 32 3 L/min Nasal cannula        Pertinent Labs/Diagnostic Test Results:   X-ray chest 2 views   Final Result by Hernando Reed MD (11/03 1010)      Mild right basilar opacity may reflect developing pneumonia                    Workstation performed: [id number]           11/3/22 ecg Previous ECG:  Compared to current    Similarity:  No change  Interpretation:     Interpretation: non-specific    Rate:     ECG rate:  82    ECG rate assessment: normal    Rhythm:     Rhythm: sinus rhythm    Ectopy:     Ectopy: PVCs    QRS:     QRS axis:  Normal  Conduction:     Conduction: normal    ST segments:     ST segments:  Normal  T waves:     T waves: normal    Results from last 7 days   Lab Units 11/03/22  0847   SARS-COV-2  Negative     Results from last 7 days   Lab Units 11/04/22  1053 11/03/22  0847   WBC Thousand/uL 8 67 8 80   HEMOGLOBIN g/dL 12 9 13 3   HEMATOCRIT % 39 1 41 2   PLATELETS Thousands/uL 254 247   NEUTROS ABS Thousands/µL 7 18 7 07     Results from last 7 days   Lab Units 11/05/22  0448 11/04/22  0953 11/03/22  0847   SODIUM mmol/L 137 138 139   POTASSIUM mmol/L 3 4* 3 4* 3 3*   CHLORIDE mmol/L 101 100 99   CO2 mmol/L 25 26 26   ANION GAP mmol/L 11 12 14*   BUN mg/dL 67* 70* 72*   CREATININE mg/dL 2 24* 2 45* 3 07*   EGFR ml/min/1 73sq m 25 22 17   CALCIUM mg/dL 9 5 9 1 8 6     Results from last 7 days   Lab Units 11/03/22  0847   ALT U/L 18   ALK PHOS U/L 72   TOTAL PROTEIN g/dL 7 7   ALBUMIN g/dL 3 4*   TOTAL BILIRUBIN mg/dL 0 50     Results from last 7 days   Lab Units 11/05/22  1118 11/05/22  0726 11/04/22  2049 11/04/22  1652 11/04/22  1127 11/04/22  0711 11/03/22  2114 11/03/22  1626   POC GLUCOSE mg/dl 153* 117 182* 202* 138 175* 189* 127     Results from last 7 days   Lab Units 11/05/22  0448 11/04/22  0953 11/03/22  0847   GLUCOSE RANDOM mg/dL 146* 185* 116     Results from last 7 days   Lab Units 11/04/22  0953 11/03/22  0847   PROCALCITONIN ng/ml 0 18 0 26*     Results from last 7 days   Lab Units 11/03/22  0847   NT-PRO BNP pg/mL 254     Results from last 7 days   Lab Units 11/03/22  1433   CLARITY UA  Clear   COLOR UA  Yellow   SPEC GRAV UA  1 015   PH UA  5 0   GLUCOSE UA mg/dl Negative   KETONES UA mg/dl Negative   BLOOD UA  Negative   PROTEIN UA mg/dl Negative   NITRITE UA  Negative   BILIRUBIN UA  Negative   UROBILINOGEN UA (BE) mg/dl <2 0   LEUKOCYTES UA  Negative   WBC UA /hpf None Seen   RBC UA /hpf None Seen   BACTERIA UA /hpf None Seen   EPITHELIAL CELLS WET PREP /hpf Occasional   MUCUS THREADS  Occasional*     Results from last 7 days   Lab Units 11/03/22  1433 11/03/22  0847   STREP PNEUMONIAE ANTIGEN, URINE  Negative  --    LEGIONELLA URINARY ANTIGEN  Negative  --    INFLUENZA A PCR   --  Positive*   INFLUENZA B PCR   --  Negative   RSV PCR   --  Positive*       ED Treatment:   Medication Administration from 11/03/2022 0823 to 11/03/2022 1215       Date/Time Order Dose Route Action Comments     11/03/2022 0847 ipratropium-albuterol (DUO-NEB) 0 5-2 5 mg/3 mL inhalation solution 3 mL 3 mL Nebulization Given      11/03/2022 1015 lactated ringers bolus 500 mL 500 mL Intravenous New Bag         Past Medical History:   Diagnosis Date   • Anxiety 2020   • COPD (chronic obstructive pulmonary disease) ([facility])    • Coronary artery disease 2012   • Depression 2020   • Diabetes mellitus ([facility])    • Herpes zoster    • Hypertension    • Memory loss     possible   • Mycoplasma pneumonia    • Obesity    • Osteoarthritis    • Renal calculi      Present on Admission:  • 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 disease, without long-term current use of insulin ([facility])  • Essential hypertension  • Chronic diastolic congestive heart failure ([facility])  • Moderate COPD (chronic obstructive pulmonary disease) ([facility])  • Chronic respiratory failure with hypoxia ([facility])      Admitting Diagnosis: Dehydration [E86 0]  Uremic acidosis [N25 89]  RSV (acute bronchiolitis due to respiratory syncytial virus) [J21 0]  Uremia [N19]  Influenza [J11 1]  SOB (shortness of breath) [R06 02]  CKD (chronic kidney disease) [N18 9]  MYCHAL (acute kidney injury) ([facility] ) [N17 9]  Age/Sex: 81 y o  male  Admission Orders:  gmf  procalcitonin  Sputum gscx  Scheduled Medications:  azithromycin, 500 mg, Intravenous, Q24H- dc 11/4 at 1052  cefTRIAXone, 2,000 mg, Intravenous, Q24H  guaiFENesin, 600 mg, Oral, Q12H [facility]  insulin glargine, 10 Units, Subcutaneous, HS  insulin lispro, 2-12 Units, Subcutaneous, TID AC  insulin lispro, 2-12 Units, Subcutaneous, HS  ipratropium, 0 5 mg, Nebulization, BID  levalbuterol, 0 63 mg, Nebulization, BID  oseltamivir, 30 mg, Oral, Q24H  predniSONE, 40 mg, Oral, Daily    heparin (porcine) subcutaneous injection 5,000 Units  Dose: 5,000 Units  Freq: Every 8 hours scheduled Route: SC  Start: 11/04/22 1400    ipratropium (ATROVENT) 0 02 % inhalation solution 0 5 mg  Dose: 0 5 mg  Freq: 3 times daily (RESP) Route: NEBULIZATION  Start: 11/04/22 1000    levalbuterol (XOPENEX) inhalation solution 1 25 mg  Dose: 1 25 mg  Freq: 3 times daily (RESP) Route: NEBULIZATION  Start: 11/04/22 1400    potassium chloride (K-DUR,KLOR-CON) CR tablet 40 mEq  Dose: 40 mEq  Freq:  Once Route: PO  Start: 11/04/22 1100 End: 11/04/22 1315      Continuous IV Infusions:  multi-electrolyte (PLASMALYTE-A/ISOLYTE-S PH 7 4) IV solution  Rate: 50 mL/hr Dose: 50 mL/hr  Freq: Continuous Route: IV  Last Dose: Stopped (11/04/22 0132)  Start: 11/03/22 1315 End: 11/04/22 0144     PRN Meds:  benzonatate, 100 mg, Oral, TID PRN - used x 1 11/3, x 1 11/4           Network Utilization Review Department  ATTENTION: Please call with any questions or concerns to [phone] and carefully listen to the prompts so that you are directed to the right person  All voicemails are confidential   Naye Mitchell all requests for admission clinical reviews, approved or denied determinations and any other requests to dedicated fax number below belonging to the campus where the patient is receiving treatment   List of dedicated fax numbers for the Facilities:  [address] DENIALS (Administrative/Medical Necessity) [phone]   [address] (Maternity/NICU/Pediatrics) [phone]   [address] Dahiana Zamora [phone]   [facility] [address] [phone]   [address] [place] [address] [facility] [place] [place] [address] [facility] [phone]   [address] [facility] [address] [facility] [phone]

## 2022-11-05 NOTE — ASSESSMENT & PLAN NOTE
Lab Results   Component Value Date    EGFR 25 11/05/2022    EGFR 22 11/04/2022    EGFR 17 11/03/2022    CREATININE 2 24 (H) 11/05/2022    CREATININE 2 45 (H) 11/04/2022    CREATININE 3 07 (H) 11/03/2022     Patient's creatinine on admission 3 07  Baseline 2 3-2 4 most likely secondary to combined RSV and flu pneumonia along with diuresis  I's and O's  Avoid nephrotoxic agents and hypotension  Held Bumex and metolazone in the setting of Vin I   Gentle IV fluid  Continue to trend  UA pending  Urine retention protocol  Patient showing mild edema    Restart Bumex for now, consider restarting Zaroxolyn next week

## 2022-11-05 NOTE — PLAN OF CARE
Problem: MOBILITY - ADULT  Goal: Maintain or return to baseline ADL function  Description: INTERVENTIONS:  -  Assess patient's ability to carry out ADLs; assess patient's baseline for ADL function and identify physical deficits which impact ability to perform ADLs (bathing, care of mouth/teeth, toileting, grooming, dressing, etc )  - Assess/evaluate cause of self-care deficits   - Assess range of motion  - Assess patient's mobility; develop plan if impaired  - Assess patient's need for assistive devices and provide as appropriate  - Encourage maximum independence but intervene and supervise when necessary  - Involve family in performance of ADLs  - Assess for home care needs following discharge   - Consider OT consult to assist with ADL evaluation and planning for discharge  - Provide patient education as appropriate  Outcome: Progressing  Goal: Maintains/Returns to pre admission functional level  Description: INTERVENTIONS:  - Perform BMAT or MOVE assessment daily    - Set and communicate daily mobility goal to care team and patient/family/caregiver     - Collaborate with rehabilitation services on mobility goals if consulted  - Out of bed for toileting  - Record patient progress and toleration of activity level   Outcome: Progressing     Problem: INFECTION - ADULT  Goal: Absence or prevention of progression during hospitalization  Description: INTERVENTIONS:  - Assess and monitor for signs and symptoms of infection  - Monitor lab/diagnostic results  - Monitor all insertion sites, i e  indwelling lines, tubes, and drains  - Monitor endotracheal if appropriate and nasal secretions for changes in amount and color  - [place] appropriate cooling/warming therapies per order  - Administer medications as ordered  - Instruct and encourage patient and family to use good hand hygiene technique  - Identify and instruct in appropriate isolation precautions for identified infection/condition  Outcome: Progressing  Goal: Absence of fever/infection during neutropenic period  Description: INTERVENTIONS:  - Monitor WBC    Outcome: Progressing

## 2022-11-05 NOTE — NURSING NOTE
Pt annoyed that he has to get blood glucose checks QID  Pt states his sugar is not changing and he is frustrated with having his sugar taken four times a day  Pt states tomorrow he may refuse Blood sugar checks  Pt was educated on the importance of blood glucose checks while in the hospital, especially since he is receiving SSI  Pt states his understanding but he may still refuse

## 2022-11-06 LAB
ALBUMIN SERPL BCP-MCNC: 3.4 G/DL (ref 3.5–5)
ALP SERPL-CCNC: 65 U/L (ref 46–116)
ALT SERPL W P-5'-P-CCNC: 16 U/L (ref 12–78)
ANION GAP SERPL CALCULATED.3IONS-SCNC: 11 MMOL/L (ref 4–13)
BASOPHILS # BLD AUTO: 0.01 THOUSANDS/ÂΜL (ref 0–0.1)
BASOPHILS NFR BLD AUTO: 0 % (ref 0–1)
BILIRUB SERPL-MCNC: 0.4 MG/DL (ref 0.2–1)
BUN SERPL-MCNC: 62 MG/DL (ref 5–25)
CALCIUM ALBUM COR SERPL-MCNC: 10.1 MG/DL (ref 8.3–10.1)
CALCIUM SERPL-MCNC: 9.6 MG/DL (ref 8.3–10.1)
CHLORIDE SERPL-SCNC: 101 MMOL/L (ref 96–108)
CO2 SERPL-SCNC: 28 MMOL/L (ref 21–32)
CREAT SERPL-MCNC: 2.21 MG/DL (ref 0.6–1.3)
EOSINOPHIL # BLD AUTO: 0.01 THOUSAND/ÂΜL (ref 0–0.61)
EOSINOPHIL NFR BLD AUTO: 0 % (ref 0–6)
ERYTHROCYTE [DISTWIDTH] IN BLOOD BY AUTOMATED COUNT: 14.5 % (ref 11.6–15.1)
GFR SERPL CREATININE-BSD FRML MDRD: 26 ML/MIN/1.73SQ M
GLUCOSE SERPL-MCNC: 120 MG/DL (ref 65–140)
GLUCOSE SERPL-MCNC: 120 MG/DL (ref 65–140)
GLUCOSE SERPL-MCNC: 179 MG/DL (ref 65–140)
GLUCOSE SERPL-MCNC: 181 MG/DL (ref 65–140)
GLUCOSE SERPL-MCNC: 219 MG/DL (ref 65–140)
HCT VFR BLD AUTO: 43.3 % (ref 36.5–49.3)
HGB BLD-MCNC: 13.6 G/DL (ref 12–17)
IMM GRANULOCYTES # BLD AUTO: 0.1 THOUSAND/UL (ref 0–0.2)
IMM GRANULOCYTES NFR BLD AUTO: 1 % (ref 0–2)
LYMPHOCYTES # BLD AUTO: 1.18 THOUSANDS/ÂΜL (ref 0.6–4.47)
LYMPHOCYTES NFR BLD AUTO: 13 % (ref 14–44)
MCH RBC QN AUTO: 27.6 PG (ref 26.8–34.3)
MCHC RBC AUTO-ENTMCNC: 31.4 G/DL (ref 31.4–37.4)
MCV RBC AUTO: 88 FL (ref 82–98)
MONOCYTES # BLD AUTO: 0.79 THOUSAND/ÂΜL (ref 0.17–1.22)
MONOCYTES NFR BLD AUTO: 8 % (ref 4–12)
NEUTROPHILS # BLD AUTO: 7.35 THOUSANDS/ÂΜL (ref 1.85–7.62)
NEUTS SEG NFR BLD AUTO: 78 % (ref 43–75)
NRBC BLD AUTO-RTO: 0 /100 WBCS
PLATELET # BLD AUTO: 282 THOUSANDS/UL (ref 149–390)
PMV BLD AUTO: 9.9 FL (ref 8.9–12.7)
POTASSIUM SERPL-SCNC: 3.4 MMOL/L (ref 3.5–5.3)
PROCALCITONIN SERPL-MCNC: 0.15 NG/ML
PROT SERPL-MCNC: 7.5 G/DL (ref 6.4–8.4)
RBC # BLD AUTO: 4.93 MILLION/UL (ref 3.88–5.62)
SODIUM SERPL-SCNC: 140 MMOL/L (ref 135–147)
WBC # BLD AUTO: 9.44 THOUSAND/UL (ref 4.31–10.16)

## 2022-11-06 RX ORDER — BUDESONIDE 0.5 MG/2ML
0.5 INHALANT ORAL
Status: DISCONTINUED | OUTPATIENT
Start: 2022-11-06 | End: 2022-11-07 | Stop reason: HOSPADM

## 2022-11-06 RX ORDER — FORMOTEROL FUMARATE 20 UG/2ML
20 SOLUTION RESPIRATORY (INHALATION)
Status: DISCONTINUED | OUTPATIENT
Start: 2022-11-06 | End: 2022-11-07 | Stop reason: HOSPADM

## 2022-11-06 RX ORDER — POTASSIUM CHLORIDE 20 MEQ/1
40 TABLET, EXTENDED RELEASE ORAL ONCE
Status: COMPLETED | OUTPATIENT
Start: 2022-11-06 | End: 2022-11-06

## 2022-11-06 RX ADMIN — IPRATROPIUM BROMIDE 0.5 MG: 0.5 SOLUTION RESPIRATORY (INHALATION) at 13:42

## 2022-11-06 RX ADMIN — IPRATROPIUM BROMIDE 0.5 MG: 0.5 SOLUTION RESPIRATORY (INHALATION) at 20:17

## 2022-11-06 RX ADMIN — PREDNISONE 40 MG: 20 TABLET ORAL at 08:18

## 2022-11-06 RX ADMIN — LEVALBUTEROL HYDROCHLORIDE 1.25 MG: 1.25 SOLUTION, CONCENTRATE RESPIRATORY (INHALATION) at 13:42

## 2022-11-06 RX ADMIN — BUDESONIDE 0.5 MG: 0.5 INHALANT ORAL at 20:17

## 2022-11-06 RX ADMIN — FORMOTEROL FUMARATE DIHYDRATE 20 MCG: 20 SOLUTION RESPIRATORY (INHALATION) at 20:17

## 2022-11-06 RX ADMIN — INSULIN LISPRO 4 UNITS: 100 INJECTION, SOLUTION INTRAVENOUS; SUBCUTANEOUS at 17:31

## 2022-11-06 RX ADMIN — HEPARIN SODIUM 5000 UNITS: 5000 INJECTION INTRAVENOUS; SUBCUTANEOUS at 14:20

## 2022-11-06 RX ADMIN — INSULIN LISPRO 2 UNITS: 100 INJECTION, SOLUTION INTRAVENOUS; SUBCUTANEOUS at 12:45

## 2022-11-06 RX ADMIN — LEVALBUTEROL HYDROCHLORIDE 1.25 MG: 1.25 SOLUTION, CONCENTRATE RESPIRATORY (INHALATION) at 20:16

## 2022-11-06 RX ADMIN — POTASSIUM CHLORIDE 40 MEQ: 1500 TABLET, EXTENDED RELEASE ORAL at 10:12

## 2022-11-06 RX ADMIN — CEFTRIAXONE 2000 MG: 2 INJECTION, SOLUTION INTRAVENOUS at 14:21

## 2022-11-06 RX ADMIN — OSELTAMIVIR PHOSPHATE 30 MG: 30 CAPSULE ORAL at 14:20

## 2022-11-06 RX ADMIN — BUMETANIDE 3 MG: 1 TABLET ORAL at 08:18

## 2022-11-06 RX ADMIN — BUDESONIDE 0.5 MG: 0.5 INHALANT ORAL at 13:42

## 2022-11-06 RX ADMIN — IPRATROPIUM BROMIDE 0.5 MG: 0.5 SOLUTION RESPIRATORY (INHALATION) at 07:34

## 2022-11-06 RX ADMIN — FORMOTEROL FUMARATE DIHYDRATE 20 MCG: 20 SOLUTION RESPIRATORY (INHALATION) at 13:49

## 2022-11-06 RX ADMIN — GUAIFENESIN 600 MG: 600 TABLET, EXTENDED RELEASE ORAL at 22:11

## 2022-11-06 RX ADMIN — LEVALBUTEROL HYDROCHLORIDE 1.25 MG: 1.25 SOLUTION, CONCENTRATE RESPIRATORY (INHALATION) at 07:34

## 2022-11-06 RX ADMIN — GUAIFENESIN 600 MG: 600 TABLET, EXTENDED RELEASE ORAL at 08:18

## 2022-11-06 NOTE — ASSESSMENT & PLAN NOTE
· Patient reports only using oxygen q h s  or as needed  · Currently requiring 2-3 L nasal cannula  · Will need home O2 oxygen evaluation prior to discharge

## 2022-11-06 NOTE — ASSESSMENT & PLAN NOTE
Wt Readings from Last 3 Encounters:   11/03/22 118 kg (260 lb)   09/27/22 117 kg (258 lb)   09/07/22 117 kg (258 lb 8 oz)     · Pt hypovolemic on admission  · Had received IVF on admission, now discontinued  · Echo done on 5/12/22 showed an EF of 60% grade 1 diasystolic dysfunction  · I's and O's daily weight low salt DM diet  · Oral Bumex resumed 11/5  · Takes per Zaroxolyn 2 5 mg 2 times week-hold for now

## 2022-11-06 NOTE — PLAN OF CARE
Problem: Potential for Falls  Goal: Patient will remain free of falls  Description: INTERVENTIONS:  - Educate patient/family on patient safety including physical limitations  - Instruct patient to call for assistance with activity   - Consult OT/PT to assist with strengthening/mobility   - Keep Call bell within reach  - Keep bed low and locked with side rails adjusted as appropriate  - Keep care items and personal belongings within reach  - Initiate and maintain comfort rounds  - Make Fall Risk Sign visible to staff  - Offer Toileting every 2 Hours, in advance of need  - Initiate/Maintain alarm  - Obtain necessary fall risk management equipment:   - Apply yellow socks and bracelet for high fall risk patients  - Consider moving patient to room near nurses station  Outcome: Progressing     Problem: MOBILITY - ADULT  Goal: Maintain or return to baseline ADL function  Description: INTERVENTIONS:  -  Assess patient's ability to carry out ADLs; assess patient's baseline for ADL function and identify physical deficits which impact ability to perform ADLs (bathing, care of mouth/teeth, toileting, grooming, dressing, etc )  - Assess/evaluate cause of self-care deficits   - Assess range of motion  - Assess patient's mobility; develop plan if impaired  - Assess patient's need for assistive devices and provide as appropriate  - Encourage maximum independence but intervene and supervise when necessary  - Involve family in performance of ADLs  - Assess for home care needs following discharge   - Consider OT consult to assist with ADL evaluation and planning for discharge  - Provide patient education as appropriate  Outcome: Progressing  Goal: Maintains/Returns to pre admission functional level  Description: INTERVENTIONS:  - Perform BMAT or MOVE assessment daily    - Set and communicate daily mobility goal to care team and patient/family/caregiver     - Collaborate with rehabilitation services on mobility goals if consulted  - Out of bed for toileting  - Record patient progress and toleration of activity level   Outcome: Progressing     Problem: PAIN - ADULT  Goal: Verbalizes/displays adequate comfort level or baseline comfort level  Description: Interventions:  - Encourage patient to monitor pain and request assistance  - Assess pain using appropriate pain scale  - Administer analgesics based on type and severity of pain and evaluate response  - Implement non-pharmacological measures as appropriate and evaluate response  - Consider cultural and social influences on pain and pain management  - Notify physician/advanced practitioner if interventions unsuccessful or patient reports new pain  Outcome: Progressing     Problem: INFECTION - ADULT  Goal: Absence or prevention of progression during hospitalization  Description: INTERVENTIONS:  - Assess and monitor for signs and symptoms of infection  - Monitor lab/diagnostic results  - Monitor all insertion sites, i e  indwelling lines, tubes, and drains  - Monitor endotracheal if appropriate and nasal secretions for changes in amount and color  - [place] appropriate cooling/warming therapies per order  - Administer medications as ordered  - Instruct and encourage patient and family to use good hand hygiene technique  - Identify and instruct in appropriate isolation precautions for identified infection/condition  Outcome: Progressing  Goal: Absence of fever/infection during neutropenic period  Description: INTERVENTIONS:  - Monitor WBC    Outcome: Progressing     Problem: SAFETY ADULT  Goal: Patient will remain free of falls  Description: INTERVENTIONS:  - Educate patient/family on patient safety including physical limitations  - Instruct patient to call for assistance with activity   - Consult OT/PT to assist with strengthening/mobility   - Keep Call bell within reach  - Keep bed low and locked with side rails adjusted as appropriate  - Keep care items and personal belongings within reach  - Initiate and maintain comfort rounds  - Make Fall Risk Sign visible to staff  - Offer Toileting every 2 Hours, in advance of need  - Initiate/Maintain alarm  - Obtain necessary fall risk management equipment:   - Apply yellow socks and bracelet for high fall risk patients  - Consider moving patient to room near nurses station  Outcome: Progressing  Goal: Maintain or return to baseline ADL function  Description: INTERVENTIONS:  -  Assess patient's ability to carry out ADLs; assess patient's baseline for ADL function and identify physical deficits which impact ability to perform ADLs (bathing, care of mouth/teeth, toileting, grooming, dressing, etc )  - Assess/evaluate cause of self-care deficits   - Assess range of motion  - Assess patient's mobility; develop plan if impaired  - Assess patient's need for assistive devices and provide as appropriate  - Encourage maximum independence but intervene and supervise when necessary  - Involve family in performance of ADLs  - Assess for home care needs following discharge   - Consider OT consult to assist with ADL evaluation and planning for discharge  - Provide patient education as appropriate  Outcome: Progressing  Goal: Maintains/Returns to pre admission functional level  Description: INTERVENTIONS:  - Perform BMAT or MOVE assessment daily    - Set and communicate daily mobility goal to care team and patient/family/caregiver     - Collaborate with rehabilitation services on mobility goals if consulted  - Out of bed for toileting  - Record patient progress and toleration of activity level   Outcome: Progressing     Problem: DISCHARGE PLANNING  Goal: Discharge to home or other facility with appropriate resources  Description: INTERVENTIONS:  - Identify barriers to discharge w/patient and caregiver  - Arrange for needed discharge resources and transportation as appropriate  - Identify discharge learning needs (meds, wound care, etc )  - Arrange for interpretive services to assist at discharge as needed  - Refer to Case Management Department for coordinating discharge planning if the patient needs post-hospital services based on physician/advanced practitioner order or complex needs related to functional status, cognitive ability, or social support system  Outcome: Progressing

## 2022-11-06 NOTE — PROGRESS NOTES
[place]     Progress Note - Dhaval Getting 1935, 81 y o  male MRN: [id number]  Unit/Bed#: -01 Encounter: [id number]  Primary Care Provider: Jyoti Freire MD   Date and time admitted to hospital: 11/3/2022  8:34 AM    * Acute renal failure superimposed on stage 4 chronic kidney disease [facility])  Assessment & Plan  Lab Results   Component Value Date    EGFR 26 11/06/2022    EGFR 25 11/05/2022    EGFR 22 11/04/2022    CREATININE 2 21 (H) 11/06/2022    CREATININE 2 24 (H) 11/05/2022    CREATININE 2 45 (H) 11/04/2022     · Patient's creatinine on admission 3 07  · Baseline 2 3-2 4 most likely secondary to combined RSV and flu pneumonia along with diuresis  · I's and O's  · Avoid nephrotoxic agents and hypotension  · Bumex and metolazone held on admission due to acute kidney injury  · Hold further IVF  · Urine retention protocol  · Oral Bumex resumed 11/5 due to peripheral edema  · Creatinine stable  Acute kidney injury resolved  · Trend BMP    Pneumonia due to influenza A virus  Assessment & Plan  · Patient presenting with worsening dyspnea on exertion  · CXR: Mild right basilar opacity may reflect developing pneumonia  · Found to have RSV and influenza A  · Urine antigens negative  · Currently on IV Rocephin due to mildly elevated procalcitonin on admission which has since normalized  · Receiving Tamiflu renally dosed day 4/5  · Incentive spirometry, airway current meds protocol, Mucinex, Tessalon Perles, respiratory protocol, and nebulizers p r n      Chronic diastolic congestive heart failure (HCC)  Assessment & Plan  Wt Readings from Last 3 Encounters:   11/03/22 118 kg (260 lb)   09/27/22 117 kg (258 lb)   09/07/22 117 kg (258 lb 8 oz)     · Pt hypovolemic on admission  · Had received IVF on admission, now discontinued  · Echo done on 5/12/22 showed an EF of 60% grade 1 diasystolic dysfunction  · I's and O's daily weight low salt DM diet  · Oral Bumex resumed 11/5  · Takes per Zaroxolyn 2 5 mg 2 times week-hold for now    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)  Assessment & Plan  · Mild bilateral expiratory wheezes on exam  · Continue with flutter valve/airway clearance protocol  · Continue respiratory protocol  · Continue Mucinex  · Continue Tessalon Perles  · Continue nebulizer treatments  · Add pulmicort/perforomist   · Day 3/5 of oral prednisone 40 mg daily    Chronic respiratory failure with hypoxia ([address] )  Assessment & Plan  · Patient reports only using oxygen q h s  or as needed  · Currently requiring 2-3 L nasal cannula  · Will need home O2 oxygen evaluation prior to discharge    Essential hypertension  Assessment & Plan  · Continue labetalol  · Restart Bumex  Continue to hold Metolazone  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 disease, without long-term current use of insulin [facility])  Assessment & Plan  Lab Results   Component Value Date    HGBA1C 7 2 (H) 10/11/2022       Recent Labs     11/05/22  1118 11/05/22  1650 11/05/22 2023 11/06/22  0727   POCGLU 153* 200* 142* 120       Blood Sugar Average: Last 72 hrs:  (P) 363 [id number]   · Controlled  · SSI plus Accu-Chek  · Hold oral meds  · Continue with lantus 10 q h s  And sliding scale insulin  · Maintained on Diabetic/ low-salt diet  · Continue with Hypoglycemic protocol    VTE Pharmacologic Prophylaxis: VTE Score: 6 High Risk (Score >/= 5) - Pharmacological DVT Prophylaxis Ordered: heparin  Sequential Compression Devices Ordered  Patient Centered Rounds: I performed bedside rounds with nursing staff today  Discussions with Specialists or Other Care Team Provider:     Education and Discussions with Family / Patient: Updated  (son) via phone  Time Spent for Care: 30 minutes  More than 50% of total time spent on counseling and coordination of care as described above      Current Length of Stay: 2 day(s)  Current Patient Status: Inpatient   Certification Statement: The patient will continue to require additional inpatient hospital stay due to Hypoxia, PT/OT eval  Discharge Plan: Anticipate discharge in 24-48 hrs to discharge location to be determined pending rehab evaluations  Code Status: Prior    Subjective:   Patient reports his cough is improving  Denies chest pain/palpitations, nausea vomiting, abdominal pain  Still with shortness of breath with minimal activity  Reports at home he usually only uses oxygen at bedtime or as needed  Objective:     Vitals:   Temp (24hrs), Av 6 °F (36 4 °C), Min:97 5 °F (36 4 °C), Max:97 7 °F (36 5 °C)    Temp:  [97 5 °F (36 4 °C)-97 7 °F (36 5 °C)] 97 7 °F (36 5 °C)  HR:  [109-115] 109  Resp:  [18-19] 19  BP: (144-157)/(78-89) 157/81  SpO2:  [91 %-92 %] 92 %  Body mass index is 36 26 kg/m²  Input and Output Summary (last 24 hours): Intake/Output Summary (Last 24 hours) at 2022 1313  Last data filed at 2022 2319  Gross per 24 hour   Intake --   Output 1000 ml   Net -1000 ml       Physical Exam:   Physical Exam  Vitals and nursing note reviewed  Constitutional:       Appearance: He is well-developed  Interventions: Nasal cannula in place  Comments: No acute distress   HENT:      Head: Normocephalic and atraumatic  Eyes:      General: No scleral icterus  Extraocular Movements: Extraocular movements intact  Conjunctiva/sclera: Conjunctivae normal    Cardiovascular:      Rate and Rhythm: Normal rate and regular rhythm  Heart sounds: S1 normal and S2 normal  No murmur heard  Pulmonary:      Effort: Pulmonary effort is normal  No respiratory distress  Breath sounds: Wheezing present  Comments: Mild bilateral expiratory wheezes  Abdominal:      General: Bowel sounds are normal       Palpations: Abdomen is soft  Tenderness: There is no abdominal tenderness  There is no guarding or rebound  Musculoskeletal:      Cervical back: Normal range of motion        Comments: Able to move upper/lower extremities bilaterally, trace pedal edema   Skin:     General: Skin is warm and dry  Neurological:      Mental Status: He is alert and oriented to person, place, and time     Psychiatric:         Mood and Affect: Mood normal          Speech: Speech normal          Behavior: Behavior normal           Additional Data:     Labs:  Results from last 7 days   Lab Units 11/06/22  0803   WBC Thousand/uL 9 44   HEMOGLOBIN g/dL 13 6   HEMATOCRIT % 43 3   PLATELETS Thousands/uL 282   NEUTROS PCT % 78*   LYMPHS PCT % 13*   MONOS PCT % 8   EOS PCT % 0     Results from last 7 days   Lab Units 11/06/22  0803   SODIUM mmol/L 140   POTASSIUM mmol/L 3 4*   CHLORIDE mmol/L 101   CO2 mmol/L 28   BUN mg/dL 62*   CREATININE mg/dL 2 21*   ANION GAP mmol/L 11   CALCIUM mg/dL 9 6   ALBUMIN g/dL 3 4*   TOTAL BILIRUBIN mg/dL 0 40   ALK PHOS U/L 65   ALT U/L 16   GLUCOSE RANDOM mg/dL 120         Results from last 7 days   Lab Units 11/06/22  1214 11/06/22  0727 11/05/22  2023 11/05/22  1650 11/05/22  1118 11/05/22  0726 11/04/22  2049 11/04/22  1652 11/04/22  1127 11/04/22  0711 11/03/22  2114 11/03/22  1626   POC GLUCOSE mg/dl 179* 120 142* 200* 153* 117 182* 202* 138 175* 189* 127         Results from last 7 days   Lab Units 11/06/22  0339 11/05/22  1656 11/04/22  0953 11/03/22  0847   PROCALCITONIN ng/ml 0 15 0 13 0 18 0 26*       Lines/Drains:  Invasive Devices  Report    Peripheral Intravenous Line  Duration           Peripheral IV 11/03/22 Right Forearm 3 days                      Imaging: Reviewed radiology reports from this admission including: chest xray    Recent Cultures (last 7 days):   Results from last 7 days   Lab Units 11/03/22  1433   LEGIONELLA URINARY ANTIGEN  Negative       Last 24 Hours Medication List:   Current Facility-Administered Medications   Medication Dose Route Frequency Provider Last Rate   • benzonatate  100 mg Oral TID PRN Argenis Garcia MD     • budesonide  0 5 mg Nebulization Q12H Christopher Ware BAKARI Trevizo     • bumetanide  3 mg Oral Daily Marshal Owusu MD     • cefTRIAXone  2,000 mg Intravenous Q24H Bart Reyna MD 2,000 mg (11/05/22 1424)   • formoterol  20 mcg Nebulization Q12H Kyea Trevizo PA-C     • guaiFENesin  600 mg Oral Q12H Benito Astudillo MD     • heparin (porcine)  5,000 Units Subcutaneous Q8H [address] Michael Kim MD     • insulin glargine  10 Units Subcutaneous HS Bart Reyna MD     • insulin lispro  2-12 Units Subcutaneous TID AC Bart Reyna MD     • insulin lispro  2-12 Units Subcutaneous HS Bart Reyna MD     • ipratropium  0 5 mg Nebulization TID Michael Kim MD     • levalbuterol  1 25 mg Nebulization TID Michael Kim MD     • oseltamivir  30 mg Oral Q24H Bart Reyna MD     • predniSONE  40 mg Oral Daily Bart Reyna MD          Today, Patient Was Seen By: Bean Kent    **Please Note: This note may have been constructed using a voice recognition system  **

## 2022-11-06 NOTE — ASSESSMENT & PLAN NOTE
Lab Results   Component Value Date    HGBA1C 7 2 (H) 10/11/2022       Recent Labs     11/05/22  1118 11/05/22  1650 11/05/22 2023 11/06/22  0727   POCGLU 153* 200* 142* 120       Blood Sugar Average: Last 72 hrs:  (P) 003 [id number]   · Controlled  · SSI plus Accu-Chek  · Hold oral meds  · Continue with lantus 10 q h s   And sliding scale insulin  · Maintained on Diabetic/ low-salt diet  · Continue with Hypoglycemic protocol

## 2022-11-06 NOTE — ASSESSMENT & PLAN NOTE
Lab Results   Component Value Date    EGFR 26 11/06/2022    EGFR 25 11/05/2022    EGFR 22 11/04/2022    CREATININE 2 21 (H) 11/06/2022    CREATININE 2 24 (H) 11/05/2022    CREATININE 2 45 (H) 11/04/2022     · Patient's creatinine on admission 3 07  · Baseline 2 3-2 4 most likely secondary to combined RSV and flu pneumonia along with diuresis  · I's and O's  · Avoid nephrotoxic agents and hypotension  · Bumex and metolazone held on admission due to acute kidney injury  · Hold further IVF  · Urine retention protocol  · Oral Bumex resumed 11/5 due to peripheral edema  · Creatinine stable    Acute kidney injury resolved  · Trend BMP

## 2022-11-06 NOTE — ASSESSMENT & PLAN NOTE
· Mild bilateral expiratory wheezes on exam  · Continue with flutter valve/airway clearance protocol  · Continue respiratory protocol  · Continue Mucinex  · Continue Tessalon Perles  · Continue nebulizer treatments  · Add pulmicort/perforomist   · Day 3/5 of oral prednisone 40 mg daily

## 2022-11-06 NOTE — ASSESSMENT & PLAN NOTE
· Patient presenting with worsening dyspnea on exertion  · CXR: Mild right basilar opacity may reflect developing pneumonia  · Found to have RSV and influenza A  · Urine antigens negative  · Currently on IV Rocephin due to mildly elevated procalcitonin on admission which has since normalized  · Receiving Tamiflu renally dosed day 4/5  · Incentive spirometry, airway current meds protocol, Mucinex, Tessalon Perles, respiratory protocol, and nebulizers p r n

## 2022-11-07 VITALS
TEMPERATURE: 97.8 F | WEIGHT: 260 LBS | SYSTOLIC BLOOD PRESSURE: 128 MMHG | HEIGHT: 71 IN | BODY MASS INDEX: 36.4 KG/M2 | OXYGEN SATURATION: 92 % | HEART RATE: 95 BPM | RESPIRATION RATE: 18 BRPM | DIASTOLIC BLOOD PRESSURE: 76 MMHG

## 2022-11-07 LAB
ANION GAP SERPL CALCULATED.3IONS-SCNC: 13 MMOL/L (ref 4–13)
BASOPHILS # BLD AUTO: 0.02 THOUSANDS/ÂΜL (ref 0–0.1)
BASOPHILS NFR BLD AUTO: 0 % (ref 0–1)
BUN SERPL-MCNC: 63 MG/DL (ref 5–25)
CALCIUM SERPL-MCNC: 9.6 MG/DL (ref 8.3–10.1)
CHLORIDE SERPL-SCNC: 101 MMOL/L (ref 96–108)
CO2 SERPL-SCNC: 24 MMOL/L (ref 21–32)
CREAT SERPL-MCNC: 2.21 MG/DL (ref 0.6–1.3)
EOSINOPHIL # BLD AUTO: 0.01 THOUSAND/ÂΜL (ref 0–0.61)
EOSINOPHIL NFR BLD AUTO: 0 % (ref 0–6)
ERYTHROCYTE [DISTWIDTH] IN BLOOD BY AUTOMATED COUNT: 14.4 % (ref 11.6–15.1)
GFR SERPL CREATININE-BSD FRML MDRD: 26 ML/MIN/1.73SQ M
GLUCOSE SERPL-MCNC: 125 MG/DL (ref 65–140)
GLUCOSE SERPL-MCNC: 153 MG/DL (ref 65–140)
GLUCOSE SERPL-MCNC: 159 MG/DL (ref 65–140)
HCT VFR BLD AUTO: 42.1 % (ref 36.5–49.3)
HGB BLD-MCNC: 13.3 G/DL (ref 12–17)
IMM GRANULOCYTES # BLD AUTO: 0.22 THOUSAND/UL (ref 0–0.2)
IMM GRANULOCYTES NFR BLD AUTO: 2 % (ref 0–2)
LYMPHOCYTES # BLD AUTO: 1.1 THOUSANDS/ÂΜL (ref 0.6–4.47)
LYMPHOCYTES NFR BLD AUTO: 9 % (ref 14–44)
MAGNESIUM SERPL-MCNC: 2 MG/DL (ref 1.6–2.6)
MCH RBC QN AUTO: 27.8 PG (ref 26.8–34.3)
MCHC RBC AUTO-ENTMCNC: 31.6 G/DL (ref 31.4–37.4)
MCV RBC AUTO: 88 FL (ref 82–98)
MONOCYTES # BLD AUTO: 1.05 THOUSAND/ÂΜL (ref 0.17–1.22)
MONOCYTES NFR BLD AUTO: 9 % (ref 4–12)
NEUTROPHILS # BLD AUTO: 9.68 THOUSANDS/ÂΜL (ref 1.85–7.62)
NEUTS SEG NFR BLD AUTO: 80 % (ref 43–75)
NRBC BLD AUTO-RTO: 0 /100 WBCS
PLATELET # BLD AUTO: 296 THOUSANDS/UL (ref 149–390)
PMV BLD AUTO: 10.2 FL (ref 8.9–12.7)
POTASSIUM SERPL-SCNC: 3.8 MMOL/L (ref 3.5–5.3)
RBC # BLD AUTO: 4.79 MILLION/UL (ref 3.88–5.62)
SODIUM SERPL-SCNC: 138 MMOL/L (ref 135–147)
WBC # BLD AUTO: 12.08 THOUSAND/UL (ref 4.31–10.16)

## 2022-11-07 RX ORDER — PREDNISONE 10 MG/1
TABLET ORAL
Qty: 12 TABLET | Refills: 0 | Status: SHIPPED | OUTPATIENT
Start: 2022-11-07 | End: 2022-11-13

## 2022-11-07 RX ORDER — CEFDINIR 300 MG/1
300 CAPSULE ORAL EVERY 12 HOURS SCHEDULED
Qty: 6 CAPSULE | Refills: 0 | Status: SHIPPED | OUTPATIENT
Start: 2022-11-08 | End: 2022-11-11

## 2022-11-07 RX ORDER — BENZONATATE 100 MG/1
100 CAPSULE ORAL 3 TIMES DAILY PRN
Qty: 20 CAPSULE | Refills: 0 | Status: SHIPPED | OUTPATIENT
Start: 2022-11-07

## 2022-11-07 RX ADMIN — HEPARIN SODIUM 5000 UNITS: 5000 INJECTION INTRAVENOUS; SUBCUTANEOUS at 05:55

## 2022-11-07 RX ADMIN — BENZONATATE 100 MG: 100 CAPSULE ORAL at 09:19

## 2022-11-07 RX ADMIN — GUAIFENESIN 600 MG: 600 TABLET, EXTENDED RELEASE ORAL at 09:19

## 2022-11-07 RX ADMIN — BUMETANIDE 3 MG: 1 TABLET ORAL at 09:19

## 2022-11-07 RX ADMIN — IPRATROPIUM BROMIDE 0.5 MG: 0.5 SOLUTION RESPIRATORY (INHALATION) at 07:39

## 2022-11-07 RX ADMIN — FORMOTEROL FUMARATE DIHYDRATE 20 MCG: 20 SOLUTION RESPIRATORY (INHALATION) at 07:39

## 2022-11-07 RX ADMIN — BUDESONIDE 0.5 MG: 0.5 INHALANT ORAL at 07:39

## 2022-11-07 RX ADMIN — LEVALBUTEROL HYDROCHLORIDE 1.25 MG: 1.25 SOLUTION, CONCENTRATE RESPIRATORY (INHALATION) at 07:39

## 2022-11-07 RX ADMIN — PREDNISONE 40 MG: 20 TABLET ORAL at 09:20

## 2022-11-07 RX ADMIN — IPRATROPIUM BROMIDE 0.5 MG: 0.5 SOLUTION RESPIRATORY (INHALATION) at 14:04

## 2022-11-07 RX ADMIN — INSULIN LISPRO 2 UNITS: 100 INJECTION, SOLUTION INTRAVENOUS; SUBCUTANEOUS at 12:18

## 2022-11-07 RX ADMIN — OSELTAMIVIR PHOSPHATE 30 MG: 30 CAPSULE ORAL at 14:32

## 2022-11-07 RX ADMIN — LEVALBUTEROL HYDROCHLORIDE 1.25 MG: 1.25 SOLUTION, CONCENTRATE RESPIRATORY (INHALATION) at 14:04

## 2022-11-07 NOTE — PLAN OF CARE
Pt D/C to home  Wife to drive pt home  Pt and pt wife given D/C and F/U instructions  Pt verbalized understanding  Pt IV removed and pt escorted to main front lobby with wife

## 2022-11-07 NOTE — ASSESSMENT & PLAN NOTE
· Patient reports only using oxygen q h s  or as needed  · Home oxygen eval completed prior to discharge, requires 2 L nasal cannula with exertion  · Per CM patient already has concentrator and portable tanks available at home - wife will bring in portable tank on discharge

## 2022-11-07 NOTE — CASE MANAGEMENT
Case Management Progress Note    Patient name Mary Ann Ortiz  Location /-74 MRN [id number]  : 1935 Date 2022       LOS (days): 3  Geometric Mean LOS (GMLOS) (days): 4 00  Days to GMLOS:0 9        OBJECTIVE:    Current admission status: Inpatient  Preferred Pharmacy:   [facility] Jose Morales Jr  [facility], [address] [address]  Phone: [phone] Fax: [phone]    Primary Care Provider: Joon Cao MD    Primary Insurance: [employer] [address] [id number]  Secondary Insurance:     PROGRESS NOTE:  Met with Pt re: discharge planning and home O2  Pt reports he has home O2 concentrator and uses it if he feels he needs it  Pt is agreeable to go home with oxygen and agreeable for [employer] Pt also reports that he has [facility] for PT  Referral sent to [facility] via [address] for [facility] of PT  Referral sent to [employer] via [place] for home O2

## 2022-11-07 NOTE — ASSESSMENT & PLAN NOTE
Lab Results   Component Value Date    HGBA1C 7 2 (H) 10/11/2022       Recent Labs     11/06/22  1646 11/06/22  2132 11/07/22  0722 11/07/22  1100   POCGLU 219* 181* 125 159*       Blood Sugar Average: Last 72 hrs:  (P) 163 [id number]   · Controlled  · SSI plus Accu-Chek  · Resume oral meds on discharge

## 2022-11-07 NOTE — RESPIRATORY THERAPY NOTE
Home Oxygen Qualifying Test     Patient name: Gamal Patel        : 1935   Date of Test:  2022  Diagnosis:    Home Oxygen Test:    **Medicare Guidelines require item(s) 1-5 on all ambulatory patients or 1 and 2 on non-ambulatory patients  1  Baseline SPO2 on Room Air at rest 90 %     2  SPO2 during exertion on Room Air 85  %  a  During exertion monitor SPO2  If SPO2 increases >=89%, do not add supplemental oxygen    3  SPO2 during exertion on Oxygen 90 % at 2 LPM    Test performed during exertion activity  [x]  Supplemental Home Oxygen is indicated  []  Client does not qualify for home oxygen  Respiratory Additional Notes- Pt found on RA 90% at rest  Pt states he uses walker for balance issue, walked in the room on RA desat to 85%, recovers with rest  90% was the lowest on 2L NC  Pt understands when to take break, signs and symptoms of hypoxia       Lamar Richardson, RT

## 2022-11-07 NOTE — ASSESSMENT & PLAN NOTE
· Wheezing resolved  · Patient feels at baseline  · Continue Tessalon Perles  · Continue nebulizer treatments  · Discharged on prednisone taper

## 2022-11-07 NOTE — ASSESSMENT & PLAN NOTE
Lab Results   Component Value Date    EGFR 26 11/07/2022    EGFR 26 11/06/2022    EGFR 25 11/05/2022    CREATININE 2 21 (H) 11/07/2022    CREATININE 2 21 (H) 11/06/2022    CREATININE 2 24 (H) 11/05/2022     · Patient's creatinine on admission 3 07  · Baseline 2 3-2 4 most likely secondary to combined RSV and flu pneumonia along with diuresis  · I's and O's  · Avoid nephrotoxic agents and hypotension  · Bumex and metolazone held on admission due to acute kidney injury  · Hold further IVF  · Urine retention protocol  · Oral Bumex resumed 11/5 due to peripheral edema  · Creatinine stable    Acute kidney injury resolved  · Repeat BMP on 11/10

## 2022-11-07 NOTE — CASE MANAGEMENT
Case Management Progress Note    Patient name Domenica Barboza  Location /-26 MRN [id number]  : 1935 Date 2022       LOS (days): 3  Geometric Mean LOS (GMLOS) (days): 4 00  Days to GMLOS:0 9        OBJECTIVE:        Current admission status: Inpatient  Preferred Pharmacy:   [facility] Jose Morales Jr  [facility], [address] [address]  Phone: [phone] Fax: [phone]    Primary Care Provider: Kassie Stanley MD    Primary Insurance: [facility]  Secondary Insurance:     PROGRESS NOTE:  Per [facility], portable tank can be given to Pt  Brought portable tank into Pt's room  Pt apologized to CM informing CM that he "probably was not clear when discussing his O2 concentrator at home " Pt reports that he has portable oxygen tanks at home and his wife will bring one with her when she transports him home  Portable tank put back in consignment closet

## 2022-11-07 NOTE — DISCHARGE INSTR - AVS FIRST PAGE
Follow-up with PCP within 1 week for post hospitalization follow-up  Continue prednisone taper as prescribed to complete taper  Continue cefdinir (antibiotic) to complete antibiotic course  You completed treatment for flu while in the hospital  Please have repeat blood work completed on Thursday, 11/10    Return to the emergency department for further evaluation with any chest pain/palpitations, worsening shortness of breath, nausea vomiting, abdominal pain, fever/chills

## 2022-11-07 NOTE — QUICK NOTE
Received sepsis time zero for HR 95, WBC 12k, creatinine 2 21  Patient on oral prednisone which is likely contributing to leukocytosis  Creatinine is at baseline  Receiving treatment for influenza/RSV  No change in management at this time

## 2022-11-07 NOTE — DISCHARGE SUMMARY
[place]     Discharge- Linn Fletcher 1935, 81 y o  male MRN: [id number]  Unit/Bed#: -01 Encounter: [id number]  Primary Care Provider: Radhika Coleman MD   Date and time admitted to hospital: 11/3/2022  8:34 AM    * Acute renal failure superimposed on stage 4 chronic kidney disease [facility])  Assessment & Plan  Lab Results   Component Value Date    EGFR 26 11/07/2022    EGFR 26 11/06/2022    EGFR 25 11/05/2022    CREATININE 2 21 (H) 11/07/2022    CREATININE 2 21 (H) 11/06/2022    CREATININE 2 24 (H) 11/05/2022     · Patient's creatinine on admission 3 07  · Baseline 2 3-2 4 most likely secondary to combined RSV and flu pneumonia along with diuresis  · I's and O's  · Avoid nephrotoxic agents and hypotension  · Bumex and metolazone held on admission due to acute kidney injury  · Hold further IVF  · Urine retention protocol  · Oral Bumex resumed 11/5 due to peripheral edema  · Creatinine stable  Acute kidney injury resolved  · Repeat BMP on 11/10    Pneumonia due to influenza A virus  Assessment & Plan  · Patient presenting with worsening dyspnea on exertion  · CXR: Mild right basilar opacity may reflect developing pneumonia     · Found to have RSV and influenza A  · Urine antigens negative  · Currently on IV Rocephin due to elevated procalcitonin on admission which has since normalized  · Discharge on cefdinir to complete course  · Patient completed course of Tamiflu while inpatient    Chronic diastolic congestive heart failure ([facility] [address] )  Assessment & Plan  Wt Readings from Last 3 Encounters:   11/03/22 118 kg (260 lb)   09/27/22 117 kg (258 lb)   09/07/22 117 kg (258 lb 8 oz)     · Pt hypovolemic on admission  · Had received IVF on admission, now discontinued  · Echo done on 5/12/22 showed an EF of 60% grade 1 diasystolic dysfunction  · I's and O's daily weight low salt DM diet  · Oral Bumex resumed 11/5  · Takes per Zaroxolyn 2 5 mg 2 times week - resume on discharge    Moderate COPD (chronic obstructive pulmonary disease) ([facility])  Assessment & Plan  · Wheezing resolved  · Patient feels at baseline  · Continue Tessalon Perles  · Continue nebulizer treatments  · Discharged on prednisone taper    Chronic respiratory failure with hypoxia ([facility] [address] )  Assessment & Plan  · Patient reports only using oxygen q h s  or as needed  · Home oxygen eval completed prior to discharge, requires 2 L nasal cannula with exertion  · Per CM patient already has concentrator and portable tanks available at home - wife will bring in portable tank on discharge    Essential hypertension  Assessment & Plan  · Continue labetalol  · Restart Bumex and metolazone    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 disease, without long-term current use of insulin ([facility])  Assessment & Plan  Lab Results   Component Value Date    HGBA1C 7 2 (H) 10/11/2022       Recent Labs     11/06/22  1646 11/06/22  2132 11/07/22  0722 11/07/22  1100   POCGLU 219* 181* 125 159*       Blood Sugar Average: Last 72 hrs:  (P) 163 [id number]   · Controlled  · SSI plus Accu-Chek  · Resume oral meds on discharge    Medical Problems             Resolved Problems  Date Reviewed: 11/7/2022   None               Discharging Physician / Practitioner: Kaylie Beverly  PCP: Lucien Moore MD  Admission Date:   Admission Orders (From admission, onward)     Ordered        11/04/22 1104  Inpatient Admission  Once            11/03/22 1126  Place in Observation  Once                      Discharge Date: 11/07/22    Consultations During Hospital Stay:  · Case management    Procedures Performed:   · None    Significant Findings / Test Results:   · CXR:  Mild right basilar opacity may reflect developing pneumonia  · Influenza A positive  · RSV positive  · Creatinine 3 07 on admission  · Urine antigens negative  · MRSA culture negative  · BNP normal    Incidental Findings:   · As above     Test Results Pending at Discharge (will require follow up):    · None Outpatient Tests Requested:  · Repeat CBC and BMP on 86/92/8890    Complications:  None    Reason for Admission:  Influenza a and RSV infection    Hospital Course:   Jimena San is a 81 y o  male patient who originally presented to the hospital on 11/3/2022 due to shortness of breath  Past medical history significant for chronic respiratory failure, diabetes, congestive heart failure, chronic kidney disease  Patient presented to the emergency department with worsening shortness of breath, cough  Patient was found to be positive for influenza A and RSV  Patient originally required 2-3 L nasal cannula on presentation  He was started on steroids and his diuretics were held due to elevated creatinine on admission  Patient was placed on respiratory protocol including scheduled nebulizer treatments  Patient continued to clinically improve and renal function returned to baseline therefore his home dose of Bumex was resumed  Patient was weaned to RA at rest and on evaluation required 2 L nasal cannula with exertion  Patient already has home oxygen set up at home and patient's family bring in portable oxygen tank on discharge  Case management confirmed on going home VNA services  On day of discharge patient was hemodynamically stable, felt that his breathing was at baseline and verbalized understanding for requested outpatient follow-up  Please see above list of diagnoses and related plan for additional information  Condition at Discharge: stable    Discharge Day Visit / Exam:   Subjective:  Feels much improved from admission, states his breathing feels at baseline    Vitals: Blood Pressure: 148/83 (11/07/22 0722)  Pulse: 95 (11/07/22 0722)  Temperature: 97 8 °F (36 6 °C) (11/07/22 0722)  Temp Source: Oral (11/07/22 0722)  Respirations: 18 (11/07/22 0722)  Height: 5' 11" (180 3 cm) (11/03/22 0829)  Weight - Scale: 118 kg (260 lb) (11/03/22 0829)  SpO2: 92 % (11/07/22 0742)  Exam:   Physical Exam  Vitals and nursing note reviewed  Constitutional:       Appearance: He is well-developed  Comments: No acute distress   HENT:      Head: Normocephalic and atraumatic  Eyes:      General: No scleral icterus  Extraocular Movements: Extraocular movements intact  Conjunctiva/sclera: Conjunctivae normal    Cardiovascular:      Rate and Rhythm: Normal rate and regular rhythm  Heart sounds: S1 normal and S2 normal  No murmur heard  Pulmonary:      Effort: Pulmonary effort is normal  No respiratory distress  Breath sounds: Normal breath sounds  No wheezing, rhonchi or rales  Abdominal:      General: Bowel sounds are normal       Palpations: Abdomen is soft  Tenderness: There is no abdominal tenderness  There is no guarding or rebound  Musculoskeletal:      Cervical back: Normal range of motion  Comments: Able to move upper/lower extremities bilaterally, no edema   Skin:     General: Skin is warm and dry  Neurological:      Mental Status: He is alert and oriented to person, place, and time  Psychiatric:         Mood and Affect: Mood normal          Speech: Speech normal          Behavior: Behavior normal           Discussion with Family: Updated  (son) at bedside  Discharge instructions/Information to patient and family:   See after visit summary for information provided to patient and family  Provisions for Follow-Up Care:  See after visit summary for information related to follow-up care and any pertinent home health orders  Disposition:   Home with VNA Services (Reminder: Complete face to face encounter)    Planned Readmission: None     Discharge Statement:  I spent 60 minutes discharging the patient  This time was spent on the day of discharge  I had direct contact with the patient on the day of discharge   Greater than 50% of the total time was spent examining patient, answering all patient questions, arranging and discussing plan of care with patient as well as directly providing post-discharge instructions  Additional time then spent on discharge activities  Discharge Medications:  See after visit summary for reconciled discharge medications provided to patient and/or family        **Please Note: This note may have been constructed using a voice recognition system**

## 2022-11-07 NOTE — ASSESSMENT & PLAN NOTE
· Patient presenting with worsening dyspnea on exertion  · CXR: Mild right basilar opacity may reflect developing pneumonia     · Found to have RSV and influenza A  · Urine antigens negative  · Currently on IV Rocephin due to elevated procalcitonin on admission which has since normalized  · Discharge on cefdinir to complete course  · Patient completed course of Tamiflu while inpatient

## 2022-11-07 NOTE — ASSESSMENT & PLAN NOTE
Wt Readings from Last 3 Encounters:   11/03/22 118 kg (260 lb)   09/27/22 117 kg (258 lb)   09/07/22 117 kg (258 lb 8 oz)     · Pt hypovolemic on admission  · Had received IVF on admission, now discontinued  · Echo done on 5/12/22 showed an EF of 60% grade 1 diasystolic dysfunction  · I's and O's daily weight low salt DM diet  · Oral Bumex resumed 11/5  · Takes per Zaroxolyn 2 5 mg 2 times week - resume on discharge

## 2022-11-07 NOTE — NURSING NOTE
Pt expressed that he is currently refusing the [place] and the continuous pulse ox  He is not happy with all the beeping that goes on in his room  I educated the pt on why the devices beep to alert the nurse about concerns  Pt is still wanting to say off all "devices that make noise " Pt is checking pulse ox with a portable finger probe  Pt is also refusing to wear any oxygen because he stated that it makes his breathing worse  I asked the pt to please us the call bell if any SOB occurs so we can attend to that immediately

## 2022-11-07 NOTE — PLAN OF CARE
Problem: PAIN - ADULT  Goal: Verbalizes/displays adequate comfort level or baseline comfort level  Description: Interventions:  - Encourage patient to monitor pain and request assistance  - Assess pain using appropriate pain scale  - Administer analgesics based on type and severity of pain and evaluate response  - Implement non-pharmacological measures as appropriate and evaluate response  - Consider cultural and social influences on pain and pain management  - Notify physician/advanced practitioner if interventions unsuccessful or patient reports new pain  Outcome: Progressing     Problem: INFECTION - ADULT  Goal: Absence or prevention of progression during hospitalization  Description: INTERVENTIONS:  - Assess and monitor for signs and symptoms of infection  - Monitor lab/diagnostic results  - Monitor all insertion sites, i e  indwelling lines, tubes, and drains  - Monitor endotracheal if appropriate and nasal secretions for changes in amount and color  - [place] appropriate cooling/warming therapies per order  - Administer medications as ordered  - Instruct and encourage patient and family to use good hand hygiene technique  - Identify and instruct in appropriate isolation precautions for identified infection/condition  Outcome: Progressing  Goal: Absence of fever/infection during neutropenic period  Description: INTERVENTIONS:  - Monitor WBC    Outcome: Progressing     Problem: DISCHARGE PLANNING  Goal: Discharge to home or other facility with appropriate resources  Description: INTERVENTIONS:  - Identify barriers to discharge w/patient and caregiver  - Arrange for needed discharge resources and transportation as appropriate  - Identify discharge learning needs (meds, wound care, etc )  - Arrange for interpretive services to assist at discharge as needed  - Refer to Case Management Department for coordinating discharge planning if the patient needs post-hospital services based on physician/advanced practitioner order or complex needs related to functional status, cognitive ability, or social support system  Outcome: Progressing     Problem: Knowledge Deficit  Goal: Patient/family/caregiver demonstrates understanding of disease process, treatment plan, medications, and discharge instructions  Description: Complete learning assessment and assess knowledge base    Interventions:  - Provide teaching at level of understanding  - Provide teaching via preferred learning methods  Outcome: Progressing

## 2022-11-08 NOTE — UTILIZATION REVIEW
NOTIFICATION OF ADMISSION DISCHARGE   This is a Notification of Discharge from [address]  Please be advised that this patient has been discharge from our facility  Below you will find the admission and discharge date and time including the patient’s disposition  UTILIZATION REVIEW CONTACT:  Ana Osborne  Utilization   Network Utilization Review Department  Phone: [phone] carefully listen to the prompts  All voicemails are confidential   Email: [email or web address]     ADMISSION INFORMATION  PRESENTATION DATE: 11/3/2022  8:34 AM  OBERVATION ADMISSION DATE:   INPATIENT ADMISSION DATE: 11/4/22 11:04 AM   DISCHARGE DATE: 11/7/2022  3:18 PM  DISPOSITION: Home with [place] with [address] INFORMATION:  Send all requests for admission clinical reviews, approved or denied determinations and any other requests to dedicated fax number below belonging to the campus where the patient is receiving treatment   List of dedicated fax numbers:  [address] DENIALS (Administrative/Medical Necessity) [phone]   [address] (Maternity/NICU/Pediatrics) [phone]   [facility] [phone]   [facility] 87 [phone]   [address] [phone]   [address] [facility] [phone] 90 [facility] [phone]   [address] [place], [place] [address] [address] [phone]   [facility]  [phone]   [address] [facility] [phone]   [address] [facility] [address] [facility] [phone]

## 2022-11-09 ENCOUNTER — TRANSITIONAL CARE MANAGEMENT (OUTPATIENT)
Dept: FAMILY MEDICINE CLINIC | Facility: HOSPITAL | Age: 87
End: 2022-11-09

## 2022-11-11 ENCOUNTER — APPOINTMENT (OUTPATIENT)
Dept: LAB | Facility: HOSPITAL | Age: 87
End: 2022-11-11

## 2022-11-11 DIAGNOSIS — R06.02 SOB (SHORTNESS OF BREATH): ICD-10-CM

## 2022-11-11 LAB
ANION GAP SERPL CALCULATED.3IONS-SCNC: 9 MMOL/L (ref 4–13)
BASOPHILS # BLD MANUAL: 0 THOUSAND/UL (ref 0–0.1)
BASOPHILS NFR MAR MANUAL: 0 % (ref 0–1)
BUN SERPL-MCNC: 92 MG/DL (ref 5–25)
CALCIUM SERPL-MCNC: 9 MG/DL (ref 8.3–10.1)
CHLORIDE SERPL-SCNC: 103 MMOL/L (ref 96–108)
CO2 SERPL-SCNC: 23 MMOL/L (ref 21–32)
CREAT SERPL-MCNC: 2.24 MG/DL (ref 0.6–1.3)
EOSINOPHIL # BLD MANUAL: 0 THOUSAND/UL (ref 0–0.4)
EOSINOPHIL NFR BLD MANUAL: 0 % (ref 0–6)
ERYTHROCYTE [DISTWIDTH] IN BLOOD BY AUTOMATED COUNT: 15.1 % (ref 11.6–15.1)
GFR SERPL CREATININE-BSD FRML MDRD: 25 ML/MIN/1.73SQ M
GIANT PLATELETS BLD QL SMEAR: PRESENT
GLUCOSE P FAST SERPL-MCNC: 121 MG/DL (ref 65–99)
HCT VFR BLD AUTO: 45 % (ref 36.5–49.3)
HGB BLD-MCNC: 14.1 G/DL (ref 12–17)
LYMPHOCYTES # BLD AUTO: 0.52 THOUSAND/UL (ref 0.6–4.47)
LYMPHOCYTES # BLD AUTO: 3 % (ref 14–44)
MCH RBC QN AUTO: 28.1 PG (ref 26.8–34.3)
MCHC RBC AUTO-ENTMCNC: 31.3 G/DL (ref 31.4–37.4)
MCV RBC AUTO: 90 FL (ref 82–98)
MONOCYTES # BLD AUTO: 0.69 THOUSAND/UL (ref 0–1.22)
MONOCYTES NFR BLD: 4 % (ref 4–12)
NEUTROPHILS # BLD MANUAL: 16.14 THOUSAND/UL (ref 1.85–7.62)
NEUTS SEG NFR BLD AUTO: 93 % (ref 43–75)
PLATELET # BLD AUTO: 302 THOUSANDS/UL (ref 149–390)
PLATELET BLD QL SMEAR: ADEQUATE
PMV BLD AUTO: 10.7 FL (ref 8.9–12.7)
POLYCHROMASIA BLD QL SMEAR: PRESENT
POTASSIUM SERPL-SCNC: 4 MMOL/L (ref 3.5–5.3)
RBC # BLD AUTO: 5.02 MILLION/UL (ref 3.88–5.62)
RBC MORPH BLD: PRESENT
SODIUM SERPL-SCNC: 135 MMOL/L (ref 135–147)
WBC # BLD AUTO: 17.36 THOUSAND/UL (ref 4.31–10.16)

## 2022-11-16 ENCOUNTER — OFFICE VISIT (OUTPATIENT)
Dept: FAMILY MEDICINE CLINIC | Facility: HOSPITAL | Age: 87
End: 2022-11-16

## 2022-11-16 VITALS
DIASTOLIC BLOOD PRESSURE: 68 MMHG | SYSTOLIC BLOOD PRESSURE: 126 MMHG | HEART RATE: 72 BPM | WEIGHT: 252 LBS | OXYGEN SATURATION: 95 % | BODY MASS INDEX: 35.28 KG/M2 | HEIGHT: 71 IN | RESPIRATION RATE: 16 BRPM

## 2022-11-16 DIAGNOSIS — N18.4 TYPE 2 DIABETES MELLITUS WITH STAGE 4 CHRONIC KIDNEY DISEASE, WITHOUT LONG-TERM CURRENT USE OF INSULIN (HCC): ICD-10-CM

## 2022-11-16 DIAGNOSIS — J43.9 PULMONARY EMPHYSEMA, UNSPECIFIED EMPHYSEMA TYPE (HCC): ICD-10-CM

## 2022-11-16 DIAGNOSIS — I10 ESSENTIAL HYPERTENSION: ICD-10-CM

## 2022-11-16 DIAGNOSIS — N18.4 CHRONIC RENAL DISEASE, STAGE IV (HCC): Primary | ICD-10-CM

## 2022-11-16 DIAGNOSIS — E11.22 TYPE 2 DIABETES MELLITUS WITH STAGE 4 CHRONIC KIDNEY DISEASE, WITHOUT LONG-TERM CURRENT USE OF INSULIN (HCC): ICD-10-CM

## 2022-11-16 DIAGNOSIS — R39.11 BENIGN PROSTATIC HYPERPLASIA WITH URINARY HESITANCY: ICD-10-CM

## 2022-11-16 DIAGNOSIS — J10.00 PNEUMONIA DUE TO INFLUENZA A VIRUS: ICD-10-CM

## 2022-11-16 DIAGNOSIS — I50.32 CHRONIC DIASTOLIC CONGESTIVE HEART FAILURE (HCC): ICD-10-CM

## 2022-11-16 DIAGNOSIS — N40.1 BENIGN PROSTATIC HYPERPLASIA WITH URINARY HESITANCY: ICD-10-CM

## 2022-11-16 PROBLEM — N17.9 ACUTE RENAL FAILURE SUPERIMPOSED ON STAGE 4 CHRONIC KIDNEY DISEASE (HCC): Status: RESOLVED | Noted: 2022-11-03 | Resolved: 2022-11-16

## 2022-11-16 RX ORDER — FLUTICASONE FUROATE, UMECLIDINIUM BROMIDE AND VILANTEROL TRIFENATATE 100; 62.5; 25 UG/1; UG/1; UG/1
1 POWDER RESPIRATORY (INHALATION) DAILY
Qty: 60 BLISTER | Refills: 5
Start: 2022-11-16 | End: 2022-12-16

## 2022-11-16 RX ORDER — METOLAZONE 2.5 MG/1
TABLET ORAL
Qty: 30 TABLET | Refills: 3 | Status: SHIPPED | OUTPATIENT
Start: 2022-11-16

## 2022-11-16 NOTE — ASSESSMENT & PLAN NOTE
Patient has BPH with symptoms  His urination is at baseline  Denies hematuria or urinary retention    Continue tamsulosin

## 2022-11-16 NOTE — ASSESSMENT & PLAN NOTE
Wt Readings from Last 3 Encounters:   11/16/22 114 kg (252 lb)   11/03/22 118 kg (260 lb)   09/27/22 117 kg (258 lb)         Patient has chronic diastolic CHF  His lungs are clear to auscultation but he has trace lower extremity edema  I asked him to start taking Zaroxolyn at least once a week on Thursdays before taking Bumex and to continue taking Bumex every day    I will reassess his volume status on November 30th

## 2022-11-16 NOTE — ASSESSMENT & PLAN NOTE
Patient has chronic hypertension  His blood pressure is stable today    He will continue Bumex, labetalol

## 2022-11-16 NOTE — ASSESSMENT & PLAN NOTE
Lab Results   Component Value Date    EGFR 25 11/11/2022    EGFR 26 11/07/2022    EGFR 26 11/06/2022    CREATININE 2 24 (H) 11/11/2022    CREATININE 2 21 (H) 11/07/2022    CREATININE 2 21 (H) 11/06/2022       Patient had acute kidney injury during hospital stay most likely due to prerenal causes of pneumonia diuretics  Acute kidney injury resolved  Patient's renal function is back at baseline on repeat blood test on November 11th  I will recheck patient as scheduled on November 30th to assess his volume status and he will have repeat renal function test done before appointment  He also had leukocytosis on November 11th likely due to the steroids  I will recheck leukocytosis as well    I see no evidence of infection on examination today

## 2022-11-16 NOTE — ASSESSMENT & PLAN NOTE
Patient had pneumonia due to influenza a virus infection  He is improving clinically  I will recheck his chest x-ray in 4 weeks    I will ordered the chest x-ray at the next visit

## 2022-11-16 NOTE — ASSESSMENT & PLAN NOTE
Lab Results   Component Value Date    HGBA1C 7 2 (H) 10/11/2022       Patient has type 2 diabetes with stage IV chronic disease  His renal function was at baseline  Blood sugars are controlled overall    Continue glipizide for now

## 2022-11-28 ENCOUNTER — APPOINTMENT (OUTPATIENT)
Dept: LAB | Facility: HOSPITAL | Age: 87
End: 2022-11-28
Attending: INTERNAL MEDICINE

## 2022-11-28 DIAGNOSIS — N18.4 CHRONIC RENAL DISEASE, STAGE IV (HCC): ICD-10-CM

## 2022-11-28 DIAGNOSIS — I10 ESSENTIAL HYPERTENSION: ICD-10-CM

## 2022-11-28 LAB
ANION GAP SERPL CALCULATED.3IONS-SCNC: 7 MMOL/L (ref 4–13)
BUN SERPL-MCNC: 51 MG/DL (ref 5–25)
CALCIUM SERPL-MCNC: 9.6 MG/DL (ref 8.3–10.1)
CHLORIDE SERPL-SCNC: 103 MMOL/L (ref 96–108)
CO2 SERPL-SCNC: 28 MMOL/L (ref 21–32)
CREAT SERPL-MCNC: 2.3 MG/DL (ref 0.6–1.3)
ERYTHROCYTE [DISTWIDTH] IN BLOOD BY AUTOMATED COUNT: 14.9 % (ref 11.6–15.1)
GFR SERPL CREATININE-BSD FRML MDRD: 24 ML/MIN/1.73SQ M
GLUCOSE P FAST SERPL-MCNC: 140 MG/DL (ref 65–99)
HCT VFR BLD AUTO: 43 % (ref 36.5–49.3)
HGB BLD-MCNC: 13.2 G/DL (ref 12–17)
MCH RBC QN AUTO: 28.2 PG (ref 26.8–34.3)
MCHC RBC AUTO-ENTMCNC: 30.7 G/DL (ref 31.4–37.4)
MCV RBC AUTO: 92 FL (ref 82–98)
PLATELET # BLD AUTO: 220 THOUSANDS/UL (ref 149–390)
PMV BLD AUTO: 10.8 FL (ref 8.9–12.7)
POTASSIUM SERPL-SCNC: 4 MMOL/L (ref 3.5–5.3)
RBC # BLD AUTO: 4.68 MILLION/UL (ref 3.88–5.62)
SODIUM SERPL-SCNC: 138 MMOL/L (ref 135–147)
WBC # BLD AUTO: 9.12 THOUSAND/UL (ref 4.31–10.16)

## 2022-11-30 ENCOUNTER — OFFICE VISIT (OUTPATIENT)
Dept: FAMILY MEDICINE CLINIC | Facility: HOSPITAL | Age: 87
End: 2022-11-30

## 2022-11-30 VITALS
WEIGHT: 261 LBS | DIASTOLIC BLOOD PRESSURE: 68 MMHG | BODY MASS INDEX: 36.54 KG/M2 | OXYGEN SATURATION: 94 % | SYSTOLIC BLOOD PRESSURE: 120 MMHG | HEART RATE: 63 BPM | RESPIRATION RATE: 20 BRPM | HEIGHT: 71 IN

## 2022-11-30 DIAGNOSIS — N18.4 CHRONIC RENAL DISEASE, STAGE IV (HCC): ICD-10-CM

## 2022-11-30 DIAGNOSIS — N18.4 TYPE 2 DIABETES MELLITUS WITH STAGE 4 CHRONIC KIDNEY DISEASE, WITHOUT LONG-TERM CURRENT USE OF INSULIN (HCC): ICD-10-CM

## 2022-11-30 DIAGNOSIS — E11.22 TYPE 2 DIABETES MELLITUS WITH STAGE 4 CHRONIC KIDNEY DISEASE, WITHOUT LONG-TERM CURRENT USE OF INSULIN (HCC): ICD-10-CM

## 2022-11-30 DIAGNOSIS — Z00.00 MEDICARE ANNUAL WELLNESS VISIT, SUBSEQUENT: ICD-10-CM

## 2022-11-30 DIAGNOSIS — I50.32 CHRONIC DIASTOLIC CONGESTIVE HEART FAILURE (HCC): Primary | ICD-10-CM

## 2022-11-30 DIAGNOSIS — S91.302A NON HEALING LEFT HEEL WOUND: ICD-10-CM

## 2022-11-30 PROBLEM — J10.00 PNEUMONIA DUE TO INFLUENZA A VIRUS: Status: RESOLVED | Noted: 2022-11-03 | Resolved: 2022-11-30

## 2022-11-30 PROBLEM — R60.0 LOCALIZED EDEMA: Status: RESOLVED | Noted: 2021-05-19 | Resolved: 2022-11-30

## 2022-11-30 RX ORDER — BENZONATATE 100 MG/1
100 CAPSULE ORAL 3 TIMES DAILY PRN
Qty: 20 CAPSULE | Refills: 0 | Status: SHIPPED | OUTPATIENT
Start: 2022-11-30

## 2022-11-30 RX ORDER — METOLAZONE 2.5 MG/1
TABLET ORAL
Qty: 30 TABLET | Refills: 3 | Status: SHIPPED | OUTPATIENT
Start: 2022-11-30

## 2022-11-30 NOTE — PROGRESS NOTES
Assessment and Plan:     Problem List Items Addressed This Visit        Endocrine    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 disease, without long-term current use of insulin (HCC)    Relevant Medications    metolazone (ZAROXOLYN) 2 5 mg tablet    Other Relevant Orders    Hemoglobin A1C    Comprehensive metabolic panel    Microalbumin / creatinine urine ratio       Cardiovascular and Mediastinum    Chronic diastolic congestive heart failure (HCC) - Primary     Wt Readings from Last 3 Encounters:   11/30/22 118 kg (261 lb)   11/16/22 114 kg (252 lb)   11/03/22 118 kg (260 lb)     Patient has chronic diastolic CHF  He presents for follow-up  His dyspnea with exertion is stable the same  He noticed increasing swelling of the lower extremities and started taking metolazone on Mondays and Thursdays as opposed to just taking it once a week  He continues taking his Bumex  His lungs are clear to auscultation, he has no JVD but I do see trace lower extremity edema  I agree with taking metolazone twice a week and continuing Bumex 3 mg daily  Patient's electrolytes were normal on review of labs this month    I ordered repeat chest x-ray to document resolution of right-sided infiltrate due to influenza pneumonia             Relevant Medications    metolazone (ZAROXOLYN) 2 5 mg tablet    benzonatate (TESSALON PERLES) 100 mg capsule    Other Relevant Orders    XR chest pa & lateral       Genitourinary    Chronic renal disease, stage IV (HCC)     Lab Results   Component Value Date    EGFR 24 11/28/2022    EGFR 25 11/11/2022    EGFR 26 11/07/2022    CREATININE 2 30 (H) 11/28/2022    CREATININE 2 24 (H) 11/11/2022    CREATININE 2 21 (H) 11/07/2022       Patient has stage IV chronic disease 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, diabetic nephropathy  Renal function was at baseline on repeat blood work after hospital discharge  He denies urinary retention      He was seen nephrology next month and I will repeat renal function before next visit in January  Relevant Medications    metolazone (ZAROXOLYN) 2 5 mg tablet       Other    Non healing left heel wound    Relevant Medications    mupirocin (BACTROBAN) 2 % ointment   Other Visit Diagnoses     Medicare annual wellness visit, subsequent               Preventive health issues were discussed with patient, and age appropriate screening tests were ordered as noted in patient's After Visit Summary  Personalized health advice and appropriate referrals for health education or preventive services given if needed, as noted in patient's After Visit Summary  History of Present Illness:     Patient presents for a Medicare Wellness Visit    HPI   Patient Care Team:  Kassie Stanley MD as PCP - General (Internal Medicine)  Joanne Mckinnon MD as PCP - [address] [place] [address] (RTE)  MD Amanda Perez DO     Review of Systems:     Review of Systems   Constitutional: Negative for fever  HENT: Positive for hearing loss ( does have some hearing loss)  Eyes: Negative for visual disturbance  Respiratory: Positive for cough (Is getting better but still has some after influenza pneumonia) and shortness of breath ( with exertion, stable)  Cardiovascular: Positive for leg swelling ( is at baseline but patient resume taking Zaroxolyn twice a week)  Negative for chest pain  Gastrointestinal: Negative for abdominal pain, blood in stool, constipation and diarrhea  Genitourinary: Negative for dysuria and hematuria  Musculoskeletal: Negative for arthralgias and myalgias  Neurological: Negative for headaches  Psychiatric/Behavioral: Negative for dysphoric mood          Problem List:     Patient Active Problem List   Diagnosis   • 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 disease, without long-term current use of insulin ([facility] )   • Essential hypertension   • CHI (obstructive sleep apnea)   • Obesity, morbid (HCC)   • Chronic respiratory failure with hypoxia (HCC)   • 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)   • Benign prostatic hyperplasia with urinary hesitancy   • Morbid obesity due to excess calories (HCC)   • Chronic renal disease, stage IV (HCC)   • Non healing left heel wound   • Chronic diastolic congestive heart failure (HCC)   • Depression   • Other constipation   • Insomnia due to medical condition   • Memory loss   • Ambulatory dysfunction   • Idiopathic chronic gout of right foot without tophus   • Pulmonary emphysema (HCC)      Past Medical and Surgical History:     Past Medical History:   Diagnosis Date   • Anxiety    • COPD (chronic obstructive pulmonary disease) ([facility] [address] )    • Coronary artery disease    • Depression    • Diabetes mellitus ([facility] )    • Herpes zoster    • Hypertension    • Memory loss     possible   • Mycoplasma pneumonia    • Obesity    • Osteoarthritis    • Renal calculi      Past Surgical History:   Procedure Laterality Date   • CATARACT EXTRACTION      with insert intraoculat lens prosthesis   • HEMORRHOID SURGERY     • NECK SURGERY      for bone spur      Family History:     Family History   Problem Relation Age of Onset   • Diabetes Mother    • Breast cancer Mother         She  in 12  Age 80   • Stroke Mother    • Pneumonia Father    • Substance Abuse Neg Hx    • Mental illness Neg Hx       Social History:     Social History     Socioeconomic History   • Marital status: /Civil Union     Spouse name: None   • Number of children: None   • Years of education: None   • Highest education level: None   Occupational History   • None   Tobacco Use   • Smoking status: Former     Packs/day: 2 00     Years: 42 00     Pack years: 84 00     Types: Cigarettes     Start date: 1956     Quit date: 1998     Years since quittin 9   • Smokeless tobacco: Never   Vaping Use   • Vaping Use: Never used   Substance and Sexual Activity   • Alcohol use:  Yes     Alcohol/week: 1 0 standard drink     Types: 1 Cans of beer per week     Comment: 1-2 beers a months   • Drug use: No   • Sexual activity: Not Currently   Other Topics Concern   • None   Social History Narrative    Active advance directive    Daily coffee consumption, 1 cup/day    Denied exercise habits    Good dental hygiene    Supportive and safe    Active family support     Social Determinants of Health     Financial Resource Strain: Not on file   Food Insecurity: No Food Insecurity   • Worried About Running Out of Food in the Last Year: Never true   • Ran Out of Food in the Last Year: Never true   Transportation Needs: No Transportation Needs   • Lack of Transportation (Medical): No   • Lack of Transportation (Non-Medical):  No   Physical Activity: Not on file   Stress: Not on file   Social Connections: Not on file   Intimate Partner Violence: Not on file   Housing Stability: Low Risk    • Unable to Pay for Housing in the Last Year: No   • Number of Places Lived in the Last Year: 1   • Unstable Housing in the Last Year: No      Medications and Allergies:     Current Outpatient Medications   Medication Sig Dispense Refill   • albuterol (2 5 mg/3 mL) 0 083 % nebulizer solution Take 3 mL (2 5 mg total) by nebulization every 6 (six) hours as needed for wheezing or shortness of breath 270 mL 5   • albuterol (PROVENTIL HFA,VENTOLIN HFA) 90 mcg/act inhaler TAKE 2 PUFFS BY MOUTH EVERY 4 HOURS AS NEEDED FOR WHEEZE 8 5 g 3   • allopurinol (ZYLOPRIM) 100 mg tablet TAKE 2 TABLETS (200 MG TOTAL) BY MOUTH DAILY 1 DAILY FOR 2 WEEKS THEN 2 DAILY 180 tablet 2   • benzonatate (TESSALON PERLES) 100 mg capsule Take 1 capsule (100 mg total) by mouth 3 (three) times a day as needed for cough 20 capsule 0   • Blood Glucose Monitoring Suppl (OneTouch Verio) w/Device KIT Use 2 (two) times a day Dx:E11 9 1 kit 0   • bumetanide (BUMEX) 2 mg tablet Take 1 5 tablets (3 mg total) by mouth daily 270 tablet 2   • fluticasone (FLONASE) 50 mcg/act nasal spray Spray 1 spray into each nostril twice daily 48 mL 1   • fluticasone-umeclidinium-vilanterol (Trelegy Ellipta) 100-62 5-25 mcg/actuation inhaler Inhale 1 puff daily Rinse mouth after use  60 blister 5   • glipiZIDE (GLUCOTROL) 5 mg tablet TAKE 1 TABLET BY MOUTH 2 TIMES A DAY BEFORE MEALS  180 tablet 2   • glucose blood test strip Test twice daily DX:  E11 9 100 strip 3   • Iron-Vitamin C (IRON 100/C PO) Take 325 mg by mouth     • labetalol (NORMODYNE) 200 mg tablet Take 1 tablet (200 mg total) by mouth 2 (two) times a day 180 tablet 3   • Lancets (onetouch ultrasoft) lancets Use 2 (two) times a day E11 9 100 each 3   • metolazone (ZAROXOLYN) 2 5 mg tablet Take 2 5 mg 30 minutes before taking bumetanide in the morning on Mondays and Thursdays! 30 tablet 3   • mupirocin (BACTROBAN) 2 % ointment Apply topically in the morning Daily, when dressing is changed, 3x per week 22 g 0   • potassium chloride (K-DUR,KLOR-CON) 20 mEq tablet Take 20 mEq twice a day Monday through Thursday, take an extra 20 mEq on Fridays along with metolazone  193 tablet 3   • tamsulosin (FLOMAX) 0 4 mg TAKE 1 CAPSULE BY MOUTH EVERY DAY WITH DINNER 90 capsule 3   • traMADol (Ultram) 50 mg tablet Take 1 tablet (50 mg total) by mouth 2 (two) times a day as needed for moderate pain 30 tablet 1     No current facility-administered medications for this visit  No Known Allergies   Immunizations:     Immunization History   Administered Date(s) Administered   • COVID-19 MODERNA VACC 0 5 ML IM 01/22/2021, 02/17/2021   • COVID-19 Moderna Vac BIVALENT 18 Yr+ Im (BOOSTER ONLY) 11/03/2021   • INFLUENZA 10/09/2018   • Influenza Split High Dose Preservative Free IM 09/14/2015, 01/14/2016, 10/13/2017   • Influenza, high dose seasonal 0 7 mL 10/29/2019, 10/13/2020, 10/15/2021   • Influenza, seasonal, injectable 11/01/2013, 12/16/2014   • Pneumococcal Conjugate 13-Valent 07/05/2016   • Pneumococcal Polysaccharide PPV23 11/01/2018      Health Maintenance:      There are no preventive care reminders to display for this patient  Topic Date Due   • Hepatitis B Vaccine (1 of 3 - 3-dose series) Never done   • COVID-19 Vaccine (3 - Booster for Moderna series) 07/17/2021   • Influenza Vaccine (1) 09/01/2022      Medicare Screening Tests and Risk Assessments:     Trish Simpson is here for his Subsequent Wellness visit  Health Risk Assessment:   Patient rates overall health as good  Patient feels that their physical health rating is same  Patient is satisfied with their life  Eyesight was rated as same  Hearing was rated as same  Patient feels that their emotional and mental health rating is same  Patients states they are never, rarely angry  Patient states they are never, rarely unusually tired/fatigued  Pain experienced in the last 7 days has been none  Patient states that he has experienced no weight loss or gain in last 6 months  Fall Risk Screening: In the past year, patient has experienced: no history of falling in past year      Home Safety:  Patient does not have trouble with stairs inside or outside of their home  Medications:   Patient is not currently taking any over-the-counter supplements  Patient is able to manage medications  Activities of Daily Living (ADLs)/Instrumental Activities of Daily Living (IADLs):   Walk and transfer into and out of bed and chair?: Yes  Dress and groom yourself?: Yes    Bathe or shower yourself?: Yes    Feed yourself?  Yes  Do your laundry/housekeeping?: Yes  Manage your money, pay your bills and track your expenses?: Yes  Make your own meals?: No    Do your own shopping?: No    Advance Care Planning:   Living will: Yes    Advanced directive: Yes    Advanced directive counseling given: Yes    End of Life Decisions reviewed with patient: Yes      Comments: I asked pt to bring living will in    PREVENTIVE SCREENINGS      Cardiovascular Screening:    General: Screening Current      Diabetes Screening:     General: Screening Not Indicated, History Diabetes, Risks and Benefits Discussed and Screening Current      Colorectal Cancer Screening:     General: Screening Not Indicated and Risks and Benefits Discussed      Prostate Cancer Screening:    General: Screening Not Indicated and Risks and Benefits Discussed      Abdominal Aortic Aneurysm (AAA) Screening:    Risk factors include: tobacco use        Lung Cancer Screening:     General: Screening Not Indicated    Screening, Brief Intervention, and Referral to Treatment (SBIRT)    Screening  Typical number of drinks in a day: 0  Typical number of drinks in a week: 0  Interpretation: Low risk drinking behavior  Brief Intervention  Alcohol & drug use screenings were reviewed  No concerns regarding substance use disorder identified  Review of Current Opioid Use    Opioid Risk Tool (ORT) Interpretation: Complete Opioid Risk Tool (ORT)    Other Counseling Topics:   Regular weightbearing exercise  No results found  Physical Exam:     /68   Pulse 63   Resp 20   Ht 5' 11" (1 803 m)   Wt 118 kg (261 lb)   SpO2 94%   BMI 36 40 kg/m²     Physical Exam  Constitutional:       General: He is not in acute distress  HENT:      Head: Normocephalic  Eyes:      Conjunctiva/sclera: Conjunctivae normal    Neck:      Comments: Pt has no JVD  Cardiovascular:      Rate and Rhythm: Normal rate and regular rhythm  Pulmonary:      Effort: No respiratory distress  Breath sounds: No wheezing  Abdominal:      General: Bowel sounds are normal       Palpations: Abdomen is soft  Tenderness: There is no abdominal tenderness  Musculoskeletal:      Cervical back: Neck supple  Skin:     General: Skin is warm  Comments: Trace LE edema is present     Neurological:      Mental Status: He is alert  Mental status is at baseline  Cranial Nerves: No cranial nerve deficit  Motor: No weakness  Gait: Gait abnormal (walks with cane)     Psychiatric:         Mood and Affect: Mood normal  Thought Content:  Thought content normal           Kerwin Castillo MD

## 2022-11-30 NOTE — PATIENT INSTRUCTIONS
Medicare Preventive Visit Patient Instructions  Thank you for completing your Welcome to Medicare Visit or Medicare Annual Wellness Visit today  Your next wellness visit will be due in one year (12/1/2023)  The screening/preventive services that you may require over the next 5-10 years are detailed below  Some tests may not apply to you based off risk factors and/or age  Screening tests ordered at today's visit but not completed yet may show as past due  Also, please note that scanned in results may not display below  Preventive Screenings:  Service Recommendations Previous Testing/Comments   Colorectal Cancer Screening  · Colonoscopy    · Fecal Occult Blood Test (FOBT)/Fecal Immunochemical Test (FIT)  · Fecal DNA/Cologuard Test  · Flexible Sigmoidoscopy Age: 39-70 years old   Colonoscopy: every 10 years (May be performed more frequently if at higher risk)  OR  FOBT/FIT: every 1 year  OR  Cologuard: every 3 years  OR  Sigmoidoscopy: every 5 years  Screening may be recommended earlier than age 39 if at higher risk for colorectal cancer  Also, an individualized decision between you and your healthcare provider will decide whether screening between the ages of 74-80 would be appropriate   Colonoscopy: Not on file  FOBT/FIT: Not on file  Cologuard: Not on file  Sigmoidoscopy: Not on file          Prostate Cancer Screening Individualized decision between patient and health care provider in men between ages of 53-78   Medicare will cover every 12 months beginning on the day after your 50th birthday PSA: No results in last 5 years           Hepatitis C Screening Once for adults born between 80 and 1965  More frequently in patients at high risk for Hepatitis C Hep C Antibody: Not on file        Diabetes Screening 1-2 times per year if you're at risk for diabetes or have pre-diabetes Fasting glucose: 140 mg/dL (11/28/2022)  A1C: 7 2 % (10/11/2022)      Cholesterol Screening Once every 5 years if you don't have a lipid disorder  May order more often based on risk factors  Lipid panel: 03/15/2022         Other Preventive Screenings Covered by Medicare:  1  Abdominal Aortic Aneurysm (AAA) Screening: covered once if your at risk  You're considered to be at risk if you have a family history of AAA or a male between the age of 73-68 who smoking at least 100 cigarettes in your lifetime  2  Lung Cancer Screening: covers low dose CT scan once per year if you meet all of the following conditions: (1) Age 50-69; (2) No signs or symptoms of lung cancer; (3) Current smoker or have quit smoking within the last 15 years; (4) You have a tobacco smoking history of at least 20 pack years (packs per day x number of years you smoked); (5) You get a written order from a healthcare provider  3  Glaucoma Screening: covered annually if you're considered high risk: (1) You have diabetes OR (2) Family history of glaucoma OR (3)  aged 48 and older OR (3)  American aged 72 and older  3  Osteoporosis Screening: covered every 2 years if you meet one of the following conditions: (1) Have a vertebral abnormality; (2) On glucocorticoid therapy for more than 3 months; (3) Have primary hyperparathyroidism; (4) On osteoporosis medications and need to assess response to drug therapy  5  HIV Screening: covered annually if you're between the age of 12-76  Also covered annually if you are younger than 13 and older than 72 with risk factors for HIV infection  For pregnant patients, it is covered up to 3 times per pregnancy      Immunizations:  Immunization Recommendations   Influenza Vaccine Annual influenza vaccination during flu season is recommended for all persons aged >= 6 months who do not have contraindications   Pneumococcal Vaccine   * Pneumococcal conjugate vaccine = PCV13 (Prevnar 13), PCV15 (Vaxneuvance), PCV20 (Prevnar 20)  * Pneumococcal polysaccharide vaccine = PPSV23 (Pneumovax) Adults 25-60 years old: 1-3 doses may be recommended based on certain risk factors  Adults 72 years old: 1-2 doses may be recommended based off what pneumonia vaccine you previously received   Hepatitis B Vaccine 3 dose series if at intermediate or high risk (ex: diabetes, end stage renal disease, liver disease)   Tetanus (Td) Vaccine - COST NOT COVERED BY MEDICARE PART B Following completion of primary series, a booster dose should be given every 10 years to maintain immunity against tetanus  Td may also be given as tetanus wound prophylaxis  Tdap Vaccine - COST NOT COVERED BY MEDICARE PART B Recommended at least once for all adults  For pregnant patients, recommended with each pregnancy  Shingles Vaccine (Shingrix) - COST NOT COVERED BY MEDICARE PART B  2 shot series recommended in those aged 48 and above     Health Maintenance Due:  There are no preventive care reminders to display for this patient  Immunizations Due:      Topic Date Due   • Hepatitis B Vaccine (1 of 3 - 3-dose series) Never done   • COVID-19 Vaccine (3 - Booster for Moderna series) 07/17/2021   • Influenza Vaccine (1) 09/01/2022     Advance Directives   What are advance directives? Advance directives are legal documents that state your wishes and plans for medical care  These plans are made ahead of time in case you lose your ability to make decisions for yourself  Advance directives can apply to any medical decision, such as the treatments you want, and if you want to donate organs  What are the types of advance directives? There are many types of advance directives, and each state has rules about how to use them  You may choose a combination of any of the following:  · Living will: This is a written record of the treatment you want  You can also choose which treatments you do not want, which to limit, and which to stop at a certain time  This includes surgery, medicine, IV fluid, and tube feedings  · Durable power of  for healthcare [facility]):   This is a written record that states who you want to make healthcare choices for you when you are unable to make them for yourself  This person, called a proxy, is usually a family member or a friend  You may choose more than 1 proxy  · Do not resuscitate (DNR) order:  A DNR order is used in case your heart stops beating or you stop breathing  It is a request not to have certain forms of treatment, such as CPR  A DNR order may be included in other types of advance directives  · Medical directive: This covers the care that you want if you are in a coma, near death, or unable to make decisions for yourself  You can list the treatments you want for each condition  Treatment may include pain medicine, surgery, blood transfusions, dialysis, IV or tube feedings, and a ventilator (breathing machine)  · Values history: This document has questions about your views, beliefs, and how you feel and think about life  This information can help others choose the care that you would choose  Why are advance directives important? An advance directive helps you control your care  Although spoken wishes may be used, it is better to have your wishes written down  Spoken wishes can be misunderstood, or not followed  Treatments may be given even if you do not want them  An advance directive may make it easier for your family to make difficult choices about your care  Weight Management   Why it is important to manage your weight:  Being overweight increases your risk of health conditions such as heart disease, high blood pressure, type 2 diabetes, and certain types of cancer  It can also increase your risk for osteoarthritis, sleep apnea, and other respiratory problems  Aim for a slow, steady weight loss  Even a small amount of weight loss can lower your risk of health problems  How to lose weight safely:  A safe and healthy way to lose weight is to eat fewer calories and get regular exercise   You can lose up about 1 pound a week by decreasing the number of calories you eat by 500 calories each day  Healthy meal plan for weight management:  A healthy meal plan includes a variety of foods, contains fewer calories, and helps you stay healthy  A healthy meal plan includes the following:  · Eat whole-grain foods more often  A healthy meal plan should contain fiber  Fiber is the part of grains, fruits, and vegetables that is not broken down by your body  Whole-grain foods are healthy and provide extra fiber in your diet  Some examples of whole-grain foods are whole-wheat breads and pastas, oatmeal, brown rice, and bulgur  · Eat a variety of vegetables every day  Include dark, leafy greens such as spinach, kale, ca greens, and mustard greens  Eat yellow and orange vegetables such as carrots, sweet potatoes, and winter squash  · Eat a variety of fruits every day  Choose fresh or canned fruit (canned in its own juice or light syrup) instead of juice  Fruit juice has very little or no fiber  · Eat low-fat dairy foods  Drink fat-free (skim) milk or 1% milk  Eat fat-free yogurt and low-fat cottage cheese  Try low-fat cheeses such as mozzarella and other reduced-fat cheeses  · Choose meat and other protein foods that are low in fat  Choose beans or other legumes such as split peas or lentils  Choose fish, skinless poultry (chicken or turkey), or lean cuts of red meat (beef or pork)  Before you cook meat or poultry, cut off any visible fat  · Use less fat and oil  Try baking foods instead of frying them  Add less fat, such as margarine, sour cream, regular salad dressing and mayonnaise to foods  Eat fewer high-fat foods  Some examples of high-fat foods include french fries, doughnuts, ice cream, and cakes  · Eat fewer sweets  Limit foods and drinks that are high in sugar  This includes candy, cookies, regular soda, and sweetened drinks  Exercise:  Exercise at least 30 minutes per day on most days of the week   Some examples of exercise include walking, biking, dancing, and swimming  You can also fit in more physical activity by taking the stairs instead of the elevator or parking farther away from stores  Ask your healthcare provider about the best exercise plan for you  © Copyright [employer] 2018 Information is for End User's use only and may not be sold, redistributed or otherwise used for commercial purposes   All illustrations and images included in CareNotes® are the copyrighted property of [employer]  or [address] [place] [address]

## 2022-11-30 NOTE — ASSESSMENT & PLAN NOTE
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

## 2022-11-30 NOTE — ASSESSMENT & PLAN NOTE
Wt Readings from Last 3 Encounters:   11/30/22 118 kg (261 lb)   11/16/22 114 kg (252 lb)   11/03/22 118 kg (260 lb)     Patient has chronic diastolic CHF  He presents for follow-up  His dyspnea with exertion is stable the same  He noticed increasing swelling of the lower extremities and started taking metolazone on Mondays and Thursdays as opposed to just taking it once a week  He continues taking his Bumex  His lungs are clear to auscultation, he has no JVD but I do see trace lower extremity edema  I agree with taking metolazone twice a week and continuing Bumex 3 mg daily      Patient's electrolytes were normal on review of labs this month    I ordered repeat chest x-ray to document resolution of right-sided infiltrate due to influenza pneumonia

## 2022-12-02 ENCOUNTER — TELEPHONE (OUTPATIENT)
Dept: ADMINISTRATIVE | Facility: OTHER | Age: 87
End: 2022-12-02

## 2022-12-02 NOTE — TELEPHONE ENCOUNTER
----- Message from Luna Montes sent at 12/1/2022 12:04 PM EST -----  Regarding: care gap request    dm eye exam  Contact: [phone]  12/01/22 12:04 PM    Hello, our patient attached above has had dm eye exam completed/performed  Please assist in updating the patient chart by contacting Dr Heri Melvin office, [address] [place] [address] , [address], [place], [address]  Phone is [phone]  The date of service is within the past 30 days        Thank you,  Tien Le  [facility]

## 2022-12-02 NOTE — LETTER
Diabetic Eye Exam Form    Date Requested: 22  Patient: Mahad Osorio  Patient : 1935   Referring Provider: Lenox Alpers, MD      DIABETIC Eye Exam Date _______________________________      Type of Exam MUST be documented for Diabetic Eye Exams  Please CHECK ONE  Retinal Exam       Dilated Retinal Exam       OCT       Optomap-Iris Exam      Fundus Photography       Left Eye - Please check Retinopathy or No Retinopathy        Exam did show retinopathy    Exam did not show retinopathy       Right Eye - Please check Retinopathy or No Retinopathy       Exam did show retinopathy    Exam did not show retinopathy       Comments __________________________________________________________    Practice Providing Exam ______________________________________________    Exam Performed By (print name) _______________________________________      Provider Signature ___________________________________________________      These reports are needed for  compliance  Please fax this completed form and a copy of the Diabetic Eye Exam report to our office located at [place] [address] as soon as possible via [phone] fazal Aquino Search: Phone [phone]  We thank you for your assistance in treating our mutual patient

## 2022-12-02 NOTE — TELEPHONE ENCOUNTER
Upon review of the In Basket request and the patient's chart, initial outreach has been made via fax to facility  Please see Contacts section for details       Thank you  Lisa Vázquez

## 2022-12-02 NOTE — LETTER
Diabetic Eye Exam Form    Date Requested: 22  Patient: Rae Negrete  Patient : 1935   Referring Provider: Tacos Cho MD      DIABETIC Eye Exam Date _______________________________      Type of Exam MUST be documented for Diabetic Eye Exams  Please CHECK ONE  Retinal Exam       Dilated Retinal Exam       OCT       Optomap-Iris Exam      Fundus Photography       Left Eye - Please check Retinopathy or No Retinopathy        Exam did show retinopathy    Exam did not show retinopathy       Right Eye - Please check Retinopathy or No Retinopathy       Exam did show retinopathy    Exam did not show retinopathy       Comments __________________________________________________________    Practice Providing Exam ______________________________________________    Exam Performed By (print name) _______________________________________      Provider Signature ___________________________________________________      These reports are needed for  compliance  Please fax this completed form and a copy of the Diabetic Eye Exam report to our office located at [place] [address] as soon as possible via [phone] fazal Nuno: Phone [phone]  We thank you for your assistance in treating our mutual patient

## 2022-12-06 NOTE — TELEPHONE ENCOUNTER
Upon review of the In Basket request we were able to locate, review, and update the patient chart as requested for Diabetic Eye Exam A letter was sent to [address] [place] [address] for 2020 and 2021  If they come, we will add to the HM  Any additional questions or concerns should be emailed to the Practice Liaisons via the appropriate education email address, please do not reply via In Basket      Thank you  Jesus Gallego

## 2022-12-06 NOTE — TELEPHONE ENCOUNTER
As a follow-up, a second attempt has been made for outreach via telephone call to facility  Please see Contacts section for details      Thank you  Ursula Maria

## 2022-12-07 ENCOUNTER — TELEPHONE (OUTPATIENT)
Dept: FAMILY MEDICINE CLINIC | Facility: HOSPITAL | Age: 87
End: 2022-12-07

## 2022-12-07 NOTE — TELEPHONE ENCOUNTER
Patient called and said he takes OTC nasal spray- he said the bottle says he can only use it for 3 days and then it says not to  Says he uses the nasal spray because he has a problem at night with breathing from the COPD- wants to know if you can give him a prescription nasal spray- says its very helpful at night time  [facility] on chart  Call patient when done

## 2022-12-09 ENCOUNTER — TELEPHONE (OUTPATIENT)
Dept: NEPHROLOGY | Facility: CLINIC | Age: 87
End: 2022-12-09

## 2022-12-09 NOTE — TELEPHONE ENCOUNTER
Appointment Confirmation   Person confirmed appointment with  If not patient, name of the person Patient    Date and time of appointment 12/12/22 8:00 am    Patient acknowledged and will be at appointment? yes    Did you advise the patient that they will need a urine sample if they are a new patient?  N/A    Did you advise the patient to bring their current medications for verification? (including any OTC) Yes    Additional Information

## 2022-12-12 ENCOUNTER — OFFICE VISIT (OUTPATIENT)
Dept: NEPHROLOGY | Facility: HOSPITAL | Age: 87
End: 2022-12-12

## 2022-12-12 VITALS
SYSTOLIC BLOOD PRESSURE: 122 MMHG | DIASTOLIC BLOOD PRESSURE: 70 MMHG | WEIGHT: 252 LBS | BODY MASS INDEX: 35.28 KG/M2 | HEIGHT: 71 IN

## 2022-12-12 DIAGNOSIS — N18.4 CKD (CHRONIC KIDNEY DISEASE) STAGE 4, GFR 15-29 ML/MIN (HCC): ICD-10-CM

## 2022-12-12 DIAGNOSIS — I50.32 CHRONIC DIASTOLIC CONGESTIVE HEART FAILURE (HCC): Primary | ICD-10-CM

## 2022-12-12 NOTE — PROGRESS NOTES
OFFICE Follow up- Nephrology   Zeina Alfredo 81 y o  male MRN: [id number]    Encounter: [id number]        ASSESSMENT    32-year-old male with a past medical history of stage 4 chronic kidney disease, chronic diastolic congestive heart failure with recent hospitalization for volume overload, hypertension, type 2 diabetes mellitus who presents for evaluation of chronic kidney disease    1  Stage 4 chronic kidney disease with a baseline creatinine at 2 1-2 4  o Creatinine has been above 1 8 since 2019  o Estimated GFR is likely around 20-25 mL/minute  o Pressures currently at goal  o CKD education ordered  o Repeat blood work and monitor for anemia related to chronic kidney disease and bone mineral disease  o Lower extremity edema present but this has improved, weight is around 250 pounds on Bumex 3 mg daily with metolazone 2 times weekly if his weight is worsening increase the Bumex to twice daily  o Continue cardiovascular risk reduction  o Given age and comorbidities he is at high risk for progression we discussed goals of care today and his age frailty dialysis may be more difficult than beneficial understands this and would like to make a decision as to when is more emergent but wants to concentrate on maximal conservative management closer monitoring of blood work    2  Chronic diastolic congestive heart failure with localized edema  o Labetalol 200 mg twice daily  o Bumex 3 mg daily  o Metolazone 2 5 mg 2 times per week  o I have asked him to see cardiology again, placed a consult    3  Hypertension  o Now with episodes of hypotension  o Down titrated labetalol recently  o Continue current diuretic dosing  o Avoid further hypotension    4  Type 2 diabetes mellitus  o Given current GFR risk of metformin outweighs benefit so will discontinue  o Acid-base status is currently acceptable    5  Hypokalemia  o Continue potassium 20 mEq twice daily while taking diuretics    6   Emphysema, COPD, obstructive sleep apnea  o Following with pulmonary continue inhalers    It was nice seeing Rich today, overall stable after his most recent hospitalization have him follow-up in 3 months now continue current medications  Closer monitoring of blood work  CKD education  He was in agreement with this plan and had no further questions      HPI:  Zeina Alfredo is a 81 y o male who was referred by Jai Rosario MD for evaluation of No chief complaint on file  81-year-old male with a history of hypertension, type 2 diabetes, diastolic congestive heart failure, COPD, he is a retired  who originally lived in [facility] and now in [facility] to be closer to family    He has a history of diastolic congestive heart failure where he has had hospitalizations for volume overload the past   He has COPD and was recently hospitalized for pneumonia and RSV and influenza  He was subsequently discharged his recent blood work post hospitalization stable  He denies any acute complaints  No chest pain he states his shortness of breath is worse on some days  He did have to walk than he normally does to get to the office today so he felt more short of breath but on shorter distances he feels fine his weight is remained stable appetite is remained stable he denies any fevers or chills no nausea or vomiting      ROS:    Review of Systems   Constitutional: Negative  HENT: Negative  Eyes: Negative  Respiratory: Positive for shortness of breath  Cardiovascular: Positive for leg swelling  Gastrointestinal: Negative  Endocrine: Negative  Genitourinary: Negative  Musculoskeletal: Negative  Skin: Negative  Allergic/Immunologic: Negative  Neurological: Negative  Hematological: Negative  Psychiatric/Behavioral: Negative  All other systems reviewed and are negative  Allergies: Patient has no known allergies      Medications:   Current Outpatient Medications:   •  albuterol (2 5 mg/3 mL) 0 083 % nebulizer solution, Take 3 mL (2 5 mg total) by nebulization every 6 (six) hours as needed for wheezing or shortness of breath, Disp: 270 mL, Rfl: 5  •  albuterol (PROVENTIL HFA,VENTOLIN HFA) 90 mcg/act inhaler, TAKE 2 PUFFS BY MOUTH EVERY 4 HOURS AS NEEDED FOR WHEEZE, Disp: 8 5 g, Rfl: 3  •  allopurinol (ZYLOPRIM) 100 mg tablet, TAKE 2 TABLETS (200 MG TOTAL) BY MOUTH DAILY 1 DAILY FOR 2 WEEKS THEN 2 DAILY, Disp: 180 tablet, Rfl: 2  •  benzonatate (TESSALON PERLES) 100 mg capsule, Take 1 capsule (100 mg total) by mouth 3 (three) times a day as needed for cough, Disp: 20 capsule, Rfl: 0  •  Blood Glucose Monitoring Suppl (OneTouch Verio) w/Device KIT, Use 2 (two) times a day Dx:E11 9, Disp: 1 kit, Rfl: 0  •  bumetanide (BUMEX) 2 mg tablet, Take 1 5 tablets (3 mg total) by mouth daily, Disp: 270 tablet, Rfl: 2  •  fluticasone (FLONASE) 50 mcg/act nasal spray, Spray 1 spray into each nostril twice daily, Disp: 48 mL, Rfl: 1  •  fluticasone-umeclidinium-vilanterol (Trelegy Ellipta) 100-62 5-25 mcg/actuation inhaler, Inhale 1 puff daily Rinse mouth after use , Disp: 60 blister, Rfl: 5  •  glipiZIDE (GLUCOTROL) 5 mg tablet, TAKE 1 TABLET BY MOUTH 2 TIMES A DAY BEFORE MEALS , Disp: 180 tablet, Rfl: 2  •  glucose blood test strip, Test twice daily DX:  E11 9, Disp: 100 strip, Rfl: 3  •  Iron-Vitamin C (IRON 100/C PO), Take 325 mg by mouth, Disp: , Rfl:   •  labetalol (NORMODYNE) 200 mg tablet, Take 1 tablet (200 mg total) by mouth 2 (two) times a day, Disp: 180 tablet, Rfl: 3  •  Lancets (onetouch ultrasoft) lancets, Use 2 (two) times a day E11 9, Disp: 100 each, Rfl: 3  •  metolazone (ZAROXOLYN) 2 5 mg tablet, Take 2 5 mg 30 minutes before taking bumetanide in the morning on Mondays and Thursdays!, Disp: 30 tablet, Rfl: 3  •  mupirocin (BACTROBAN) 2 % ointment, Apply topically in the morning Daily, when dressing is changed, 3x per week, Disp: 22 g, Rfl: 0  •  potassium chloride (K-DUR,KLOR-CON) 20 mEq tablet, Take 20 mEq twice a day Monday through Thursday, take an extra 20 mEq on  along with metolazone  , Disp: 193 tablet, Rfl: 3  •  tamsulosin (FLOMAX) 0 4 mg, TAKE 1 CAPSULE BY MOUTH EVERY DAY WITH DINNER, Disp: 90 capsule, Rfl: 3  •  traMADol (Ultram) 50 mg tablet, Take 1 tablet (50 mg total) by mouth 2 (two) times a day as needed for moderate pain, Disp: 30 tablet, Rfl: 1    Past Medical History:   Diagnosis Date   • Anxiety    • COPD (chronic obstructive pulmonary disease) ([facility] )    • Coronary artery disease    • Depression    • Diabetes mellitus ([facility] )    • Herpes zoster    • Hypertension    • Memory loss     possible   • Mycoplasma pneumonia    • Obesity    • Osteoarthritis    • Renal calculi      Past Surgical History:   Procedure Laterality Date   • CATARACT EXTRACTION      with insert intraoculat lens prosthesis   • HEMORRHOID SURGERY     • NECK SURGERY      for bone spur     Family History   Problem Relation Age of Onset   • Diabetes Mother    • Breast cancer Mother         She  in 12  Age 80   • Stroke Mother    • Pneumonia Father    • Substance Abuse Neg Hx    • Mental illness Neg Hx       reports that he quit smoking about 24 years ago  His smoking use included cigarettes  He started smoking about 66 years ago  He has a 84 00 pack-year smoking history  He has never used smokeless tobacco  He reports that he does not currently use alcohol after a past usage of about 1 0 standard drink per week  He reports that he does not use drugs  OBJECTIVE:    Vitals:    22   BP: 122/70   BP Location: Left arm   Patient Position: Sitting   Cuff Size: Adult   Weight: 114 kg (252 lb)   Height: 5' 11" (1 803 m)        Body mass index is 35 15 kg/m²  [unfilled]     Weight (last 2 days)     Date/Time Weight    22 114 (252)           Physical exam:  Physical Exam  Constitutional:       Appearance: He is obese  HENT:      Head: Normocephalic and atraumatic  Right Ear: External ear normal       Left Ear: External ear normal       Nose: Nose normal       Mouth/Throat:      Mouth: Mucous membranes are dry  Pharynx: Oropharynx is clear  Eyes:      Extraocular Movements: Extraocular movements intact  Conjunctiva/sclera: Conjunctivae normal       Pupils: Pupils are equal, round, and reactive to light  Cardiovascular:      Rate and Rhythm: Normal rate  Pulses: Normal pulses  Heart sounds: Normal heart sounds  Pulmonary:      Effort: Pulmonary effort is normal       Breath sounds: Normal breath sounds  Abdominal:      General: There is distension  Genitourinary:     Rectum: Guaiac result negative  Musculoskeletal:         General: Normal range of motion  Right lower leg: Edema present  Left lower leg: Edema present  Skin:     General: Skin is warm and dry  Neurological:      General: No focal deficit present  Mental Status: He is alert and oriented to person, place, and time  Mental status is at baseline  Psychiatric:         Mood and Affect: Mood normal          Behavior: Behavior normal          Thought Content: Thought content normal          Judgment: Judgment normal            Lab Results:     Results for orders placed or performed in visit on 12/02/22    Diabetes Eye Exam   Result Value Ref Range    Right Eye Diabetic Retinopathy None     Left Eye Diabetic Retinopathy None        Portions of the record may have been created with voice recognition software  Occasional wrong word or "sound a like" substitutions may have occurred due to the inherent limitations of voice recognition software  Read the chart carefully and recognize, using context, where substitutions have occurred  If you have any questions, please contact the dictating provider  I will STOP taking the medications listed below when I get home from the hospital:  None

## 2022-12-12 NOTE — PATIENT INSTRUCTIONS
Low-Sodium Diet   WHAT YOU NEED TO KNOW:   What is a low-sodium diet? A low-sodium diet limits foods that are high in sodium (salt)  You will need to follow a low-sodium diet if you have high blood pressure, kidney disease, or heart failure  You may also need to follow this diet if you have a condition that is causing your body to retain (hold) extra fluid  You may need to limit the amount of sodium you eat in a day to 1,500 to 2,000 mg  Ask your healthcare provider how much sodium you can have each day  How can I use food labels to choose foods that are low in sodium? Read food labels to find the amount of sodium they contain  The amount of sodium is listed in milligrams (mg)  The % Daily Value (DV) column tells you how much of your daily needs are met by 1 serving of the food for each nutrient listed  Choose foods that have less than 5% of the DV of sodium  These foods are considered low in sodium  Foods that have 20% or more of the DV of sodium are considered high in sodium  Some food labels may also list any of the following terms that tell you about the sodium content in the food:  Sodium-free:  Less than 5 mg in each serving    Very low sodium:  35 mg of sodium or less in each serving    Low sodium:  140 mg of sodium or less in each serving    Reduced sodium: At least 25% less sodium in each serving than the regular type    Light in sodium:  50% less sodium in each serving    Unsalted or no added salt:  No extra salt is added during processing (the food may still contain sodium)       Which foods should I avoid? Salty foods are high in sodium   You should avoid the following:  Processed foods:      Mixes for cornbread, biscuits, cake, and pudding     Instant foods, such as potatoes, cereals, noodles, and rice     Packaged foods, such as bread stuffing, rice and pasta mixes, snack dip mixes, and macaroni and cheese     Canned foods, such as canned vegetables, soups, broths, sauces, and vegetable or tomato juice    Snack foods, such as salted chips, popcorn, pretzels, pork rinds, salted crackers, and salted nuts    Frozen foods, such as dinners, entrees, vegetables with sauces, and breaded meats    Sauerkraut, pickled vegetables, and other foods prepared in brine    Meats and cheeses:      Smoked or cured meat, such as corned beef, serna, ham, hot dogs, and sausage    Canned meats or spreads, such as potted meats, sardines, anchovies, and imitation seafood    Deli or lunch meats, such as bologna, ham, turkey, and roast beef    Processed cheese, such as American cheese and cheese spreads    Condiments, sauces, and seasonings:      Salt (¼ teaspoon of salt contains 575 mg of sodium)    Seasonings made with salt, such as garlic salt, celery salt, onion salt, and seasoned salt    Regular soy sauce, barbecue sauce, teriyaki sauce, steak sauce, Worcestershire sauce, and most flavored vinegars    Canned gravy and mixes     Regular condiments, such as mustard, ketchup, and salad dressings    Pickles and olives    Meat tenderizers and monosodium glutamate (MSG)    Which foods can I include? Read the food label to find the amount of sodium in each serving  Bread and cereal:  Try to choose breads with less than 80 mg of sodium per serving  Bread, roll, jose, tortilla, or unsalted crackers  Ready-to-eat cereals with less than 5% DV of sodium (examples include shredded wheat and puffed rice)    Pasta    Vegetables and fruits:      Unsalted fresh, frozen, or canned vegetables    Fresh, frozen, or canned fruits    Fruit juice    Dairy:  One serving has about 150 mg of sodium  Milk, all types    Yogurt    Hard cheese, such as cheddar, Swiss, [employer], or [employer] and other protein foods:  Some raw meats may have added sodium       Plain meats, fish, and poultry     Egg    Other foods:      Homemade pudding    Unsalted nuts, popcorn, or pretzels    Unsalted butter or margarine    What are some ways that I can decrease sodium? Add spices and herbs to foods instead of salt during cooking  Use salt-free seasonings to add flavor to foods  Examples include onion powder, garlic powder, basil, prince powder, paprika, and parsley  Try lemon or lime juice or vinegar to give foods a tart flavor  Use hot peppers, pepper, or cayenne pepper to add a spicy flavor  Do not keep a salt shaker at your kitchen table  This may help keep you from adding salt to food at the table  A teaspoon of salt has 2,300 mg of sodium  It may take time to get used to enjoying the natural flavor of food instead of adding salt  Talk to your healthcare provider before you use salt substitutes  Some salt substitutes have a high amount of potassium and need to be avoided if you have kidney disease  Choose low-sodium foods at restaurants  Meals from restaurants are often high in sodium  Some restaurants have nutrition information on the menu that tells you the amount of sodium in their foods  If possible, ask for your food to be prepared with less, or no salt  Shop for unsalted or low-sodium foods and snacks at the grocery store  Examples include unsalted or low-sodium broths, soups, and canned vegetables  Choose fresh or frozen vegetables instead  Choose unsalted nuts or seeds or fresh fruits or vegetables as snacks  Read food labels and choose salt-free, very low-sodium, or low-sodium foods  CARE AGREEMENT:   You have the right to help plan your care  Discuss treatment options with your healthcare provider to decide what care you want to receive  You always have the right to refuse treatment  The above information is an  only  It is not intended as medical advice for individual conditions or treatments  Talk to your doctor, nurse or pharmacist before following any medical regimen to see if it is safe and effective for you    © Copyright [employer] 2022 Information is for End User's use only and may not be sold, redistributed or otherwise used for commercial purposes   All illustrations and images included in CareNotes® are the copyrighted property of A D A M , Inc  or [facility] [place]in [place]

## 2022-12-15 DIAGNOSIS — R39.11 BENIGN PROSTATIC HYPERPLASIA WITH URINARY HESITANCY: ICD-10-CM

## 2022-12-15 DIAGNOSIS — N40.1 BENIGN PROSTATIC HYPERPLASIA WITH URINARY HESITANCY: ICD-10-CM

## 2022-12-15 RX ORDER — TAMSULOSIN HYDROCHLORIDE 0.4 MG/1
CAPSULE ORAL
Qty: 90 CAPSULE | Refills: 3 | Status: SHIPPED | OUTPATIENT
Start: 2022-12-15

## 2022-12-19 ENCOUNTER — HOSPITAL ENCOUNTER (OUTPATIENT)
Dept: RADIOLOGY | Facility: HOSPITAL | Age: 87
Discharge: HOME | End: 2022-12-19
Attending: INTERNAL MEDICINE

## 2022-12-19 DIAGNOSIS — I50.32 CHRONIC DIASTOLIC CONGESTIVE HEART FAILURE (HCC): ICD-10-CM

## 2022-12-24 ENCOUNTER — OFFICE VISIT (OUTPATIENT)
Dept: URGENT CARE | Facility: CLINIC | Age: 87
End: 2022-12-24

## 2022-12-24 VITALS
RESPIRATION RATE: 30 BRPM | SYSTOLIC BLOOD PRESSURE: 110 MMHG | TEMPERATURE: 97.9 F | DIASTOLIC BLOOD PRESSURE: 80 MMHG | OXYGEN SATURATION: 96 % | HEART RATE: 72 BPM

## 2022-12-24 DIAGNOSIS — L25.9 CONTACT DERMATITIS, UNSPECIFIED CONTACT DERMATITIS TYPE, UNSPECIFIED TRIGGER: Primary | ICD-10-CM

## 2022-12-24 RX ORDER — CETIRIZINE HYDROCHLORIDE 5 MG/1
5 TABLET ORAL DAILY
Qty: 30 TABLET | Refills: 0 | Status: SHIPPED | OUTPATIENT
Start: 2022-12-24

## 2022-12-24 RX ORDER — PREDNISONE 10 MG/1
40 TABLET ORAL DAILY
Qty: 20 TABLET | Refills: 0 | Status: SHIPPED | OUTPATIENT
Start: 2022-12-24 | End: 2022-12-29

## 2022-12-24 NOTE — PATIENT INSTRUCTIONS
Take prednisone as directed until completed  Take zyrtec as directed  Follow up with PCP in 3-5 days  Proceed to  ER if symptoms worsen  Dermatitis   AMBULATORY CARE:   Dermatitis  is skin inflammation  Dermatitis may be caused by allergens such as dust mites, pet dander, pollen, and certain foods  Dermatitis can also develop when something touches your skin and irritates it or causes an allergic reaction  Examples include soaps, chemicals, latex, and poison ivy  Signs and symptoms of dermatitis  depend on the cause: An itchy rash    Redness    Bumps or blisters that crust over or ooze clear fluid    Swelling    Call your local emergency number (911 in the [address] [facility]) or have someone call if:   You have symptoms of anaphylaxis, such as sudden trouble breathing, throat swelling, or feeling dizzy or lightheaded  Seek care immediately if:   You develop a fever or have red streaks going up your arm or leg  Your rash gets more swollen, red, or hot  Call your doctor or dermatologist if:   Your skin blisters, oozes white or yellow pus, or has a foul-smelling discharge  Your rash spreads or does not get better, even after treatment  You have questions or concerns about your condition or care  Treatment for dermatitis  depends on the cause of your rash  You may need medicines to help decrease itching and inflammation or treat a bacterial infection  They may be given as a topical cream, shot, or a pill  Manage dermatitis:   Apply a cool compress to your rash  This will help soothe your skin  Apply lotions or creams to the area  These help keep your skin moist and decrease itching  Apply the lotion or cream right after a lukewarm bath or shower when your skin is still damp  Use products that do not contain dye or a scent  Avoid skin irritants  Examples include makeup, hair products, soaps, and cleansers  Use products that do not contain a scent or dye      Follow up with your doctor or dermatologist as directed:  Write down your questions so you remember to ask them during your visits  © Copyright [employer] 2022 Information is for End User's use only and may not be sold, redistributed or otherwise used for commercial purposes  All illustrations and images included in CareNotes® are the copyrighted property of [employer]  or Boni [place]  The above information is an  only  It is not intended as medical advice for individual conditions or treatments  Talk to your doctor, nurse or pharmacist before following any medical regimen to see if it is safe and effective for you

## 2022-12-24 NOTE — PROGRESS NOTES
[address] [facility]        NAME: Tresia Holstein is a 81 y o  male  : 1935    MRN: [id number]  DATE: 2022  TIME: 11:50 AM    Assessment and Plan   Contact dermatitis, unspecified contact dermatitis type, unspecified trigger [L25 9]  1  Contact dermatitis, unspecified contact dermatitis type, unspecified trigger  cetirizine (ZyrTEC) 5 MG tablet    predniSONE 10 mg tablet            Patient Instructions     Take prednisone as directed until completed  Take zyrtec as directed  Follow up with PCP in 3-5 days  Proceed to  ER if symptoms worsen  Chief Complaint     Chief Complaint   Patient presents with   • Rash     Pt reports rash for several days on arms and chest, kept him awake last night, tried taking melatonin but it didn't help him sleep  History of Present Illness       31-year-old male presents with rash and itchy skin  Symptoms started yesterday  Continues to have rash and itch on bilateral arms and chest and back  Denies any new foods detergents soaps or lotions  Denies any swelling of lips tongue throat or mouth  Denies any chest pain shortness of breath or wheezing  No fevers or chills  No abdominal pain nausea or vomiting  Patient reports he applied some Caladryl lotion all over the areas which helped out for a little bit but is now wearing off and started to have itching again  Rash  This is a new problem  The current episode started yesterday  The problem is unchanged  The rash is diffuse  The problem is moderate  The rash is characterized by itchiness, pain and redness  He was exposed to nothing  The rash first occurred at home  Associated symptoms include itching  Pertinent negatives include no cough, fatigue, fever, sore throat or vomiting  Past treatments include anti-itch cream  The treatment provided mild relief  There were no sick contacts  Review of Systems   Review of Systems   Constitutional: Negative  Negative for fatigue and fever     HENT: Negative  Negative for sore throat  Eyes: Negative  Respiratory: Negative  Negative for cough  Cardiovascular: Negative  Gastrointestinal: Negative  Negative for vomiting  Musculoskeletal: Negative  Skin: Positive for itching and rash  Neurological: Negative            Current Medications       Current Outpatient Medications:   •  cetirizine (ZyrTEC) 5 MG tablet, Take 1 tablet (5 mg total) by mouth daily, Disp: 30 tablet, Rfl: 0  •  predniSONE 10 mg tablet, Take 4 tablets (40 mg total) by mouth daily for 5 days, Disp: 20 tablet, Rfl: 0  •  albuterol (2 5 mg/3 mL) 0 083 % nebulizer solution, Take 3 mL (2 5 mg total) by nebulization every 6 (six) hours as needed for wheezing or shortness of breath, Disp: 270 mL, Rfl: 5  •  albuterol (PROVENTIL HFA,VENTOLIN HFA) 90 mcg/act inhaler, TAKE 2 PUFFS BY MOUTH EVERY 4 HOURS AS NEEDED FOR WHEEZE, Disp: 8 5 g, Rfl: 3  •  allopurinol (ZYLOPRIM) 100 mg tablet, TAKE 2 TABLETS (200 MG TOTAL) BY MOUTH DAILY 1 DAILY FOR 2 WEEKS THEN 2 DAILY, Disp: 180 tablet, Rfl: 2  •  benzonatate (TESSALON PERLES) 100 mg capsule, Take 1 capsule (100 mg total) by mouth 3 (three) times a day as needed for cough, Disp: 20 capsule, Rfl: 0  •  Blood Glucose Monitoring Suppl (OneTouch Verio) w/Device KIT, Use 2 (two) times a day Dx:E11 9, Disp: 1 kit, Rfl: 0  •  bumetanide (BUMEX) 2 mg tablet, Take 1 5 tablets (3 mg total) by mouth daily, Disp: 270 tablet, Rfl: 2  •  fluticasone (FLONASE) 50 mcg/act nasal spray, Spray 1 spray into each nostril twice daily, Disp: 48 mL, Rfl: 1  •  fluticasone-umeclidinium-vilanterol (Trelegy Ellipta) 100-62 5-25 mcg/actuation inhaler, Inhale 1 puff daily Rinse mouth after use , Disp: 60 blister, Rfl: 5  •  glipiZIDE (GLUCOTROL) 5 mg tablet, TAKE 1 TABLET BY MOUTH 2 TIMES A DAY BEFORE MEALS , Disp: 180 tablet, Rfl: 2  •  glucose blood test strip, Test twice daily DX:  E11 9, Disp: 100 strip, Rfl: 3  •  Iron-Vitamin C (IRON 100/C PO), Take 325 mg by mouth, Disp: , Rfl:   •  labetalol (NORMODYNE) 200 mg tablet, Take 1 tablet (200 mg total) by mouth 2 (two) times a day, Disp: 180 tablet, Rfl: 3  •  Lancets (onetouch ultrasoft) lancets, Use 2 (two) times a day E11 9, Disp: 100 each, Rfl: 3  •  metolazone (ZAROXOLYN) 2 5 mg tablet, Take 2 5 mg 30 minutes before taking bumetanide in the morning on  and !, Disp: 30 tablet, Rfl: 3  •  mupirocin (BACTROBAN) 2 % ointment, Apply topically in the morning Daily, when dressing is changed, 3x per week, Disp: 22 g, Rfl: 0  •  potassium chloride (K-DUR,KLOR-CON) 20 mEq tablet, Take 20 mEq twice a day Monday through Thursday, take an extra 20 mEq on  along with metolazone  , Disp: 193 tablet, Rfl: 3  •  tamsulosin (FLOMAX) 0 4 mg, TAKE 1 CAPSULE BY MOUTH EVERY DAY WITH DINNER, Disp: 90 capsule, Rfl: 3  •  traMADol (Ultram) 50 mg tablet, Take 1 tablet (50 mg total) by mouth 2 (two) times a day as needed for moderate pain, Disp: 30 tablet, Rfl: 1    Current Allergies     Allergies as of 2022   • (No Known Allergies)            The following portions of the patient's history were reviewed and updated as appropriate: allergies, current medications, past family history, past medical history, past social history, past surgical history and problem list      Past Medical History:   Diagnosis Date   • Anxiety    • COPD (chronic obstructive pulmonary disease) ([facility] [address] )    • Coronary artery disease    • Depression 2020   • Diabetes mellitus ([facility] [address] )    • Herpes zoster    • Hypertension    • Memory loss     possible   • Mycoplasma pneumonia    • Obesity    • Osteoarthritis    • Renal calculi        Past Surgical History:   Procedure Laterality Date   • CATARACT EXTRACTION      with insert intraoculat lens prosthesis   • HEMORRHOID SURGERY     • NECK SURGERY      for bone spur       Family History   Problem Relation Age of Onset   • Diabetes Mother    • Breast cancer Mother         She  in 12   Age 80   • Stroke Mother    • Pneumonia Father    • Substance Abuse Neg Hx    • Mental illness Neg Hx          Medications have been verified  Objective   /80   Pulse 72   Temp 97 9 °F (36 6 °C)   Resp (!) 30   SpO2 96%   No LMP for male patient  Physical Exam     Physical Exam  Vitals and nursing note reviewed  Constitutional:       General: He is not in acute distress  Appearance: Normal appearance  He is well-developed  HENT:      Head: Normocephalic and atraumatic  Right Ear: Hearing, tympanic membrane, ear canal and external ear normal  There is no impacted cerumen  Left Ear: Hearing, tympanic membrane, ear canal and external ear normal  There is no impacted cerumen  Nose: Nose normal       Mouth/Throat:      Pharynx: Uvula midline  No oropharyngeal exudate  Eyes:      General:         Right eye: No discharge  Left eye: No discharge  Conjunctiva/sclera: Conjunctivae normal    Cardiovascular:      Rate and Rhythm: Normal rate and regular rhythm  Heart sounds: Normal heart sounds  No murmur heard  Pulmonary:      Effort: Pulmonary effort is normal  No respiratory distress  Breath sounds: Normal breath sounds  No wheezing or rales  Abdominal:      General: Bowel sounds are normal       Palpations: Abdomen is soft  Tenderness: There is no abdominal tenderness  Musculoskeletal:         General: Normal range of motion  Cervical back: Normal range of motion and neck supple  Lymphadenopathy:      Cervical: No cervical adenopathy  Skin:     General: Skin is warm and dry  Findings: Rash (Erythematous red rash noted diffusely on bilateral arms chest and back  Blanches with pressure  Macular papular in nature) present  Neurological:      Mental Status: He is alert and oriented to person, place, and time     Psychiatric:         Mood and Affect: Mood normal

## 2023-01-05 ENCOUNTER — TELEPHONE (OUTPATIENT)
Dept: FAMILY MEDICINE CLINIC | Facility: HOSPITAL | Age: 88
End: 2023-01-05

## 2023-01-05 NOTE — TELEPHONE ENCOUNTER
Patient was seen at [facility] for dermatitis he said they gave him predniSONE and cetirizine he wants to know if you can extend them until his appt at the end of the month - says he ran out of prednisone

## 2023-01-09 ENCOUNTER — OFFICE VISIT (OUTPATIENT)
Dept: FAMILY MEDICINE CLINIC | Facility: HOSPITAL | Age: 88
End: 2023-01-09

## 2023-01-09 VITALS
TEMPERATURE: 96.6 F | DIASTOLIC BLOOD PRESSURE: 72 MMHG | SYSTOLIC BLOOD PRESSURE: 126 MMHG | HEART RATE: 104 BPM | WEIGHT: 259.8 LBS | BODY MASS INDEX: 36.37 KG/M2 | HEIGHT: 71 IN

## 2023-01-09 DIAGNOSIS — L25.9 CONTACT DERMATITIS, UNSPECIFIED CONTACT DERMATITIS TYPE, UNSPECIFIED TRIGGER: ICD-10-CM

## 2023-01-09 DIAGNOSIS — R21 RASH: Primary | ICD-10-CM

## 2023-01-09 DIAGNOSIS — E11.22 TYPE 2 DIABETES MELLITUS WITH STAGE 4 CHRONIC KIDNEY DISEASE, WITHOUT LONG-TERM CURRENT USE OF INSULIN (HCC): ICD-10-CM

## 2023-01-09 DIAGNOSIS — N18.4 TYPE 2 DIABETES MELLITUS WITH STAGE 4 CHRONIC KIDNEY DISEASE, WITHOUT LONG-TERM CURRENT USE OF INSULIN (HCC): ICD-10-CM

## 2023-01-09 RX ORDER — CETIRIZINE HYDROCHLORIDE 5 MG/1
5 TABLET ORAL 2 TIMES DAILY
Qty: 60 TABLET | Refills: 0 | Status: SHIPPED | OUTPATIENT
Start: 2023-01-09

## 2023-01-09 RX ORDER — PREDNISONE 10 MG/1
TABLET ORAL
Qty: 24 TABLET | Refills: 0 | Status: SHIPPED | OUTPATIENT
Start: 2023-01-09 | End: 2023-01-19 | Stop reason: ALTCHOICE

## 2023-01-09 RX ORDER — FAMOTIDINE 10 MG
10 TABLET ORAL 2 TIMES DAILY
Qty: 60 TABLET | Refills: 0 | Status: SHIPPED | OUTPATIENT
Start: 2023-01-09

## 2023-01-09 NOTE — PROGRESS NOTES
Name: Monica Cintron      : 1935      MRN: [id number]  Encounter Provider: Ethel Landon DO  Encounter Date: 2023   [facility]  Rash  Comments:  D/w pt that I agree with UC that this rash appears to be a contact dermatitis - con't to be localized/has not spread and characteristics c/w contact dermatitis, likely just needs a longer course of steroids, however with his DM his risk of hyperglycemia is high, he is currently only on oral Glipizide and does NOT check his sugars at home, advised pt that I will only given another steroid course if he checks his sugars at home - call if > 200-250 or if any other SE occur, will increase Zyrtec to 5 mg bid and Pepcid 10 mg bid to help with itch, urged NOT TO SCRATCH and to use cool compresses for itch  Orders:  -     famotidine (PEPCID) 10 mg tablet; Take 1 tablet (10 mg total) by mouth 2 (two) times a day  -     predniSONE 10 mg tablet; 3 tab PO x 1 now then the next day start 2 tab PO bid x 3 days then 1 tab PO bid x 3 days then 1 tab PO q day x 3 days then stop    2  Contact dermatitis, unspecified contact dermatitis type, unspecified trigger  -     famotidine (PEPCID) 10 mg tablet; Take 1 tablet (10 mg total) by mouth 2 (two) times a day  -     cetirizine (ZyrTEC) 5 MG tablet; Take 1 tablet (5 mg total) by mouth 2 (two) times a day  -     predniSONE 10 mg tablet; 3 tab PO x 1 now then the next day start 2 tab PO bid x 3 days then 1 tab PO bid x 3 days then 1 tab PO q day x 3 days then stop    3   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 disease, without long-term current use of insulin (HCC)  Assessment & Plan:  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/CKD stage 4 - SE of Prednisone with hyperglycemia and DM reviewed, he noted no s/sx of hyperglycemia with first round of steroids but does not check sugars, d/w pt that the only way I will give him a 2nd round of steroids is if he starts checking his sugars at home again - urged to do daily over the next 4-5 days and call asap with any BS > 200-250, con't Glipizide for now -may need to increase if BS go up with steroids  Lab Results   Component Value Date    HGBA1C 7 2 (H) 10/11/2022              Subjective      HPI Pt here for an acute visit    Pt noting aprox 2 wks of a itchy rash  He notes no new medications prior to the rash  He notes he had recent increase in LE edema and used a new kind of soap that he thinks may have caused the rash  He has stopped using the rash  The rash started at his arms and upper chest and top of shoulders  The rash is very itchy  He went to [facility] 12/24/22 and was dx with contact dermatitis  He was given a rx for Zyrtec and Prednisone  He noted some benefit with the rx but the rash never resolved  The rash has not spread at all to his knowledge  The rash con't to itch  He is using Zyrtec once a day with no great benefit with the itching  He is diabetic and he does not check his sugars at home  He is only on oral DM meds (Glipizide)  His last A1C 10/11/22 was 7 2  He has frequent urination but that is not new/worse d/t his diuretic use  With the steroids he did not notice worsening of urinary frequency  Review of Systems   Constitutional: Negative for chills and fever  Skin: Positive for rash  Neurological: Negative for dizziness and headaches         Current Outpatient Medications on File Prior to Visit   Medication Sig   • albuterol (2 5 mg/3 mL) 0 083 % nebulizer solution Take 3 mL (2 5 mg total) by nebulization every 6 (six) hours as needed for wheezing or shortness of breath   • albuterol (PROVENTIL HFA,VENTOLIN HFA) 90 mcg/act inhaler TAKE 2 PUFFS BY MOUTH EVERY 4 HOURS AS NEEDED FOR WHEEZE   • allopurinol (ZYLOPRIM) 100 mg tablet TAKE 2 TABLETS (200 MG TOTAL) BY MOUTH DAILY 1 DAILY FOR 2 WEEKS THEN 2 DAILY   • benzonatate (TESSALON PERLES) 100 mg capsule Take 1 capsule (100 mg total) by mouth 3 (three) times a day as needed for cough   • Blood Glucose Monitoring Suppl (OneTouch Verio) w/Device KIT Use 2 (two) times a day Dx:E11 9   • bumetanide (BUMEX) 2 mg tablet Take 1 5 tablets (3 mg total) by mouth daily   • fluticasone (FLONASE) 50 mcg/act nasal spray Spray 1 spray into each nostril twice daily   • fluticasone-umeclidinium-vilanterol (Trelegy Ellipta) 100-62 5-25 mcg/actuation inhaler Inhale 1 puff daily Rinse mouth after use  • glipiZIDE (GLUCOTROL) 5 mg tablet TAKE 1 TABLET BY MOUTH 2 TIMES A DAY BEFORE MEALS  • glucose blood test strip Test twice daily DX:  E11 9   • Iron-Vitamin C (IRON 100/C PO) Take 325 mg by mouth   • labetalol (NORMODYNE) 200 mg tablet Take 1 tablet (200 mg total) by mouth 2 (two) times a day   • Lancets (onetouch ultrasoft) lancets Use 2 (two) times a day E11 9   • metolazone (ZAROXOLYN) 2 5 mg tablet Take 2 5 mg 30 minutes before taking bumetanide in the morning on Mondays and Thursdays! • mupirocin (BACTROBAN) 2 % ointment Apply topically in the morning Daily, when dressing is changed, 3x per week   • potassium chloride (K-DUR,KLOR-CON) 20 mEq tablet Take 20 mEq twice a day Monday through Thursday, take an extra 20 mEq on Fridays along with metolazone  • tamsulosin (FLOMAX) 0 4 mg TAKE 1 CAPSULE BY MOUTH EVERY DAY WITH DINNER   • traMADol (Ultram) 50 mg tablet Take 1 tablet (50 mg total) by mouth 2 (two) times a day as needed for moderate pain   • [DISCONTINUED] cetirizine (ZyrTEC) 5 MG tablet Take 1 tablet (5 mg total) by mouth daily   • [DISCONTINUED] umeclidinium bromide (INCRUSE ELLIPTA) 62 5 mcg/inh AEPB inhaler Inhale 1 puff  in the morning  Objective     /72   Pulse 104   Temp (!) 96 6 °F (35 9 °C) (Tympanic)   Ht 5' 11" (1 803 m)   Wt 118 kg (259 lb 12 8 oz)   BMI 36 23 kg/m²     Physical Exam  Vitals and nursing note reviewed  Constitutional:       Appearance: He is obese  He is not ill-appearing     HENT: Head: Normocephalic and atraumatic  Ears:      Comments: +[place]  Eyes:      General:         Right eye: No discharge  Left eye: No discharge  Conjunctiva/sclera: Conjunctivae normal    Pulmonary:      Effort: Pulmonary effort is normal  No respiratory distress  Skin:     Coloration: Skin is not pale  Findings: Rash present  Comments: Scabbed papular pin point spots on UE and upper chest - rare papula on upper back, some ecchymosis/excoriations on UE    Neurological:      Gait: Gait abnormal    Psychiatric:         Thought Content:  Thought content normal          Judgment: Judgment normal        Damien Hylton DO

## 2023-01-09 NOTE — ASSESSMENT & PLAN NOTE
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

## 2023-01-10 DIAGNOSIS — S91.302A NON HEALING LEFT HEEL WOUND: ICD-10-CM

## 2023-01-19 ENCOUNTER — OFFICE VISIT (OUTPATIENT)
Dept: CARDIOLOGY CLINIC | Facility: CLINIC | Age: 88
End: 2023-01-19

## 2023-01-19 VITALS
HEIGHT: 71 IN | WEIGHT: 257 LBS | HEART RATE: 68 BPM | OXYGEN SATURATION: 97 % | BODY MASS INDEX: 35.98 KG/M2 | SYSTOLIC BLOOD PRESSURE: 142 MMHG | DIASTOLIC BLOOD PRESSURE: 68 MMHG

## 2023-01-19 DIAGNOSIS — I10 ESSENTIAL HYPERTENSION, BENIGN: ICD-10-CM

## 2023-01-19 DIAGNOSIS — I50.32 CHRONIC DIASTOLIC CONGESTIVE HEART FAILURE (HCC): Primary | ICD-10-CM

## 2023-01-19 DIAGNOSIS — J96.11 CHRONIC RESPIRATORY FAILURE WITH HYPOXIA (HCC): ICD-10-CM

## 2023-01-19 DIAGNOSIS — G47.33 OSA (OBSTRUCTIVE SLEEP APNEA): ICD-10-CM

## 2023-01-19 DIAGNOSIS — N18.4 CHRONIC RENAL DISEASE, STAGE IV (HCC): ICD-10-CM

## 2023-01-19 DIAGNOSIS — I10 ESSENTIAL HYPERTENSION: ICD-10-CM

## 2023-01-19 RX ORDER — LABETALOL 200 MG/1
200 TABLET, FILM COATED ORAL 2 TIMES DAILY
Qty: 180 TABLET | Refills: 3 | Status: SHIPPED | OUTPATIENT
Start: 2023-01-19

## 2023-01-19 NOTE — PROGRESS NOTES
Cardiology Follow Up    Michael Coronel  1935  [id number]  [address] [facility] Sara Koenig  [address] [address] [phone]    1  Chronic diastolic congestive heart failure ([address] )        2  Essential hypertension  labetalol (NORMODYNE) 200 mg tablet      3  Chronic respiratory failure with hypoxia (HCC)        4  CHI (obstructive sleep apnea)        5  Chronic renal disease, stage IV ([address] )        6  Essential hypertension, benign            Discussion/Summary:    1  Chronic diastolic CHF - Tony was in the hospital again in May 2022 with volume overload, and he was switched to Bumex and metolazone was ordered  He was on 3 mg twice daily, but due to hypotension and fatigue he decrease this to daily  He also takes metolazone twice a wee which he should take 20-30 minutes before the morning Bumex  He is following his weight and a low-sodium diet  Blood work will be followed closely  His creatinine has remained stable  2   Hypertension - His blood pressure is mildly elevated but runs normal on other occasions  No changes were made to his labetalol at this visit  He will continue to follow with his internist closely  We will see him back in 6 months  3   Obstructive sleep apnea - He conveys many features of this and likely has it  A sleep study was inconclusive as he did not sleep adequately enough  It was recommended to repeat this but he has yet to get this done  He does wear home oxygen at night  Interval History:     Mr Anatoly Ceballos comes in for follow-up given his history of diastolic and right-sided CHF and hypertension  He also has COPD, diabetes mellitus and chronic kidney disease  I met him during hospitalization in October 2021 in which she presented volume overloaded  At 1st his internist tried to increase his Lasix but he had very little response to this    He did respond to IV diuretics and was switched over to torsemide  When I last saw him he 40 mg in the morning and 20 mg in the afternoon  His creatinine has remained stable  Diana Grijalva was then back in the hospital in May 2022 with volume overloaded CHF  His diuretic therapy was changed to Bumex 2 mg twice daily  He had some weight gain in the outpatient setting and this was briefly increased to 3 mg twice daily  However it became profoundly fatigued, likely from hypotension any decrease this on his own and is currently taking 3 mg daily  He also takes metolazone twice a week  He had an updated echocardiogram that showed no change  Just prior to last visit he had a stress nuclear study which was normal   He also likely has obstructive sleep apnea  He went through diagnostic sleep study but did not sleep enough for an assessment  He has not repeated this  He does wear oxygen at night  He also did reduce his labetalol after the last visit given his fatigue  Rich's edema has improved and has remained stable since last visit  He does have chronic shortness of breath with exertion and fatigue which is associated with his CHF, COPD and likely underlying obstructive sleep apnea  He denies orthopnea or PND  He denies chest pain or any symptoms of angina  No palpitations, lightheadedness or any syncope        Patient Active Problem List   Diagnosis   • 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 disease, without long-term current use of insulin ([facility] )   • Essential hypertension   • CHI (obstructive sleep apnea)   • Obesity, morbid ([facility])   • Chronic respiratory failure with hypoxia ([facility])   • Moderate COPD (chronic obstructive pulmonary disease) ([facility])   • Benign prostatic hyperplasia with urinary hesitancy   • Morbid obesity due to excess calories ([facility])   • Chronic renal disease, stage IV ([facility])   • Non healing left heel wound   • Chronic diastolic congestive heart failure ([facility])   • Depression   • Other constipation   • Insomnia due to medical condition   • Memory loss   • Ambulatory dysfunction   • Idiopathic chronic gout of right foot without tophus   • Pulmonary emphysema (HCC)     Past Medical History:   Diagnosis Date   • Anxiety    • COPD (chronic obstructive pulmonary disease) ([facility] [address] )    • Coronary artery disease 2012   • Depression 2020   • Herpes zoster    • Hypertension    • Memory loss     possible   • Mycoplasma pneumonia    • Obesity    • Osteoarthritis    • Renal calculi      Social History     Socioeconomic History   • Marital status: /Civil Union     Spouse name: Not on file   • Number of children: Not on file   • Years of education: Not on file   • Highest education level: Not on file   Occupational History   • Not on file   Tobacco Use   • Smoking status: Former     Packs/day: 2 00     Years: 42 00     Pack years: 84 00     Types: Cigarettes     Start date: 1956     Quit date: 1998     Years since quittin 0   • Smokeless tobacco: Never   Vaping Use   • Vaping Use: Never used   Substance and Sexual Activity   • Alcohol use: Not Currently     Alcohol/week: 1 0 standard drink     Types: 1 Cans of beer per week     Comment: 1-2 beers a months   • Drug use: No   • Sexual activity: Not Currently   Other Topics Concern   • Not on file   Social History Narrative    Active advance directive    Daily coffee consumption, 1 cup/day    Denied exercise habits    Good dental hygiene    Supportive and safe    Active family support     Social Determinants of Health     Financial Resource Strain: Not on file   Food Insecurity: No Food Insecurity   • Worried About Running Out of Food in the Last Year: Never true   • Ran Out of Food in the Last Year: Never true   Transportation Needs: No Transportation Needs   • Lack of Transportation (Medical): No   • Lack of Transportation (Non-Medical):  No   Physical Activity: Not on file   Stress: Not on file   Social Connections: Not on file   Intimate Partner Violence: Not on file Housing Stability: Low Risk    • Unable to Pay for Housing in the Last Year: No   • Number of Places Lived in the Last Year: 1   • Unstable Housing in the Last Year: No      Family History   Problem Relation Age of Onset   • Diabetes Mother    • Breast cancer Mother         She  in 12   Age 80   • Stroke Mother    • Pneumonia Father    • Substance Abuse Neg Hx    • Mental illness Neg Hx      Past Surgical History:   Procedure Laterality Date   • CATARACT EXTRACTION      with insert intraoculat lens prosthesis   • HEMORRHOID SURGERY     • NECK SURGERY      for bone spur       Current Outpatient Medications:   •  albuterol (2 5 mg/3 mL) 0 083 % nebulizer solution, Take 3 mL (2 5 mg total) by nebulization every 6 (six) hours as needed for wheezing or shortness of breath, Disp: 270 mL, Rfl: 5  •  albuterol (PROVENTIL HFA,VENTOLIN HFA) 90 mcg/act inhaler, TAKE 2 PUFFS BY MOUTH EVERY 4 HOURS AS NEEDED FOR WHEEZE, Disp: 8 5 g, Rfl: 3  •  allopurinol (ZYLOPRIM) 100 mg tablet, TAKE 2 TABLETS (200 MG TOTAL) BY MOUTH DAILY 1 DAILY FOR 2 WEEKS THEN 2 DAILY, Disp: 180 tablet, Rfl: 2  •  benzonatate (TESSALON PERLES) 100 mg capsule, Take 1 capsule (100 mg total) by mouth 3 (three) times a day as needed for cough, Disp: 20 capsule, Rfl: 0  •  Blood Glucose Monitoring Suppl (OneTouch Verio) w/Device KIT, Use 2 (two) times a day Dx:E11 9, Disp: 1 kit, Rfl: 0  •  bumetanide (BUMEX) 2 mg tablet, Take 1 5 tablets (3 mg total) by mouth daily, Disp: 270 tablet, Rfl: 2  •  cetirizine (ZyrTEC) 5 MG tablet, Take 1 tablet (5 mg total) by mouth 2 (two) times a day, Disp: 60 tablet, Rfl: 0  •  famotidine (PEPCID) 10 mg tablet, Take 1 tablet (10 mg total) by mouth 2 (two) times a day, Disp: 60 tablet, Rfl: 0  •  fluticasone (FLONASE) 50 mcg/act nasal spray, Spray 1 spray into each nostril twice daily, Disp: 48 mL, Rfl: 1  •  fluticasone-umeclidinium-vilanterol (Trelegy Ellipta) 100-62 5-25 mcg/actuation inhaler, Inhale 1 puff daily Rinse mouth after use , Disp: 60 blister, Rfl: 5  •  glipiZIDE (GLUCOTROL) 5 mg tablet, TAKE 1 TABLET BY MOUTH 2 TIMES A DAY BEFORE MEALS , Disp: 180 tablet, Rfl: 2  •  glucose blood test strip, Test twice daily DX:  E11 9, Disp: 100 strip, Rfl: 3  •  Iron-Vitamin C (IRON 100/C PO), Take 325 mg by mouth, Disp: , Rfl:   •  labetalol (NORMODYNE) 200 mg tablet, Take 1 tablet (200 mg total) by mouth 2 (two) times a day, Disp: 180 tablet, Rfl: 3  •  Lancets (onetouch ultrasoft) lancets, Use 2 (two) times a day E11 9, Disp: 100 each, Rfl: 3  •  metolazone (ZAROXOLYN) 2 5 mg tablet, Take 2 5 mg 30 minutes before taking bumetanide in the morning on Mondays and Thursdays!, Disp: 30 tablet, Rfl: 3  •  mupirocin (BACTROBAN) 2 % ointment, APPLY TOPICALLY IN THE MORNING DAILY, WHEN DRESSING IS CHANGED, 3X PER WEEK, Disp: 22 g, Rfl: 0  •  potassium chloride (K-DUR,KLOR-CON) 20 mEq tablet, Take 20 mEq twice a day Monday through Thursday, take an extra 20 mEq on Fridays along with metolazone  , Disp: 193 tablet, Rfl: 3  •  tamsulosin (FLOMAX) 0 4 mg, TAKE 1 CAPSULE BY MOUTH EVERY DAY WITH DINNER, Disp: 90 capsule, Rfl: 3  •  traMADol (Ultram) 50 mg tablet, Take 1 tablet (50 mg total) by mouth 2 (two) times a day as needed for moderate pain, Disp: 30 tablet, Rfl: 1  No Known Allergies    Labs:  Lab Results   Component Value Date     09/18/2015    K 4 0 11/28/2022    K 4 2 09/18/2015     11/28/2022     09/18/2015    CO2 28 11/28/2022    CO2 24 5 09/18/2015    BUN 51 (H) 11/28/2022    BUN 18 09/18/2015    CREATININE 2 30 (H) 11/28/2022    CREATININE 1 22 09/18/2015    GLUCOSE 152 (H) 09/18/2015    CALCIUM 9 6 11/28/2022    CALCIUM 8 9 09/18/2015     Lab Results   Component Value Date    WBC 9 12 11/28/2022    WBC 7 69 09/18/2015    HGB 13 2 11/28/2022    HGB 15 0 09/18/2015    HCT 43 0 11/28/2022    HCT 46 4 09/18/2015    MCV 92 11/28/2022    MCV 88 09/18/2015     11/28/2022     09/18/2015     Lab Results Component Value Date    CHOL 173 09/18/2015    TRIG 127 03/15/2022    TRIG 111 09/18/2015    HDL 60 03/15/2022    HDL 49 09/18/2015     Imaging:    STRESSNUCLEAR (7/14/22): •  Stress ECG: A pharmacological stress test was performed using regadenoson  The patient experienced no angina during the test  The patient reached the end of the protocol  The patient reported dyspnea during the stress test  Symptoms ended during recovery  •  Stress ECG: No ST deviation is noted  Arrhythmias during stress: PVCs, 7 beats SVT  The ECG was not diagnostic due to pharmacological (vasodilator) stress  •  Perfusion: There are no perfusion defects  •  Stress Function: Left ventricular function post-stress is normal  Post-stress ejection fraction is 68 %  Normal pharmacologic nuclear stress test        ECHO:  •  Left Ventricle: Left ventricular cavity size is normal  The left ventricular ejection fraction is 65%  Systolic function is normal  Wall motion is normal  Diastolic function is mildly abnormal, consistent with grade I (abnormal) relaxation  Wall thickness is mildly increased  There is mild concentric hypertrophy  •  Mitral Valve: The valve has normal function  There is systolic anterior motion  There is mild annular calcification  There is trace regurgitation  Review of Systems:  Review of Systems   Constitutional: Positive for fatigue  HENT: Negative  Eyes: Negative  Respiratory: Positive for shortness of breath  Cardiovascular: Positive for leg swelling  Gastrointestinal: Negative  Musculoskeletal: Negative  Skin: Negative  Allergic/Immunologic: Negative  Neurological: Negative  Hematological: Negative  Psychiatric/Behavioral: Negative  All other systems reviewed and are negative      Vitals:    01/19/23 1555   BP: 142/68   BP Location: Right arm   Patient Position: Sitting   Cuff Size: Standard   Pulse: 68   SpO2: 97%   Weight: 117 kg (257 lb)   Height: 5' 11" (1 803 m) Physical Exam:  Physical Exam  Vitals and nursing note reviewed  Constitutional:       Appearance: He is well-developed  HENT:      Head: Normocephalic and atraumatic  Eyes:      General: No scleral icterus  Right eye: No discharge  Left eye: No discharge  Pupils: Pupils are equal, round, and reactive to light  Neck:      Thyroid: No thyromegaly  Vascular: No JVD  Cardiovascular:      Rate and Rhythm: Normal rate and regular rhythm  No extrasystoles are present  Pulses: Normal pulses  No decreased pulses  Heart sounds: S1 normal and S2 normal  Heart sounds are distant  No murmur heard  No friction rub  No gallop  Pulmonary:      Effort: Pulmonary effort is normal  No respiratory distress  Breath sounds: Decreased breath sounds present  No wheezing, rhonchi or rales  Abdominal:      General: Bowel sounds are normal  There is no distension  Palpations: Abdomen is soft  Tenderness: There is no abdominal tenderness  Musculoskeletal:         General: No tenderness or deformity  Normal range of motion  Cervical back: Normal range of motion and neck supple  Right lower le+ Edema present  Left lower le+ Edema present  Skin:     General: Skin is warm and dry  Findings: No rash  Neurological:      Mental Status: He is alert and oriented to person, place, and time  Cranial Nerves: No cranial nerve deficit  Psychiatric:         Thought Content: Thought content normal          Judgment: Judgment normal        Counseling / Coordination of Care  Total office time spent today 30 minutes  Greater than 50% of total time was spent with the patient and / or family counseling and / or coordination of care

## 2023-01-19 NOTE — PATIENT INSTRUCTIONS

## 2023-01-30 ENCOUNTER — APPOINTMENT (OUTPATIENT)
Dept: LAB | Facility: HOSPITAL | Age: 88
End: 2023-01-30
Attending: INTERNAL MEDICINE

## 2023-01-30 DIAGNOSIS — N18.4 TYPE 2 DIABETES MELLITUS WITH STAGE 4 CHRONIC KIDNEY DISEASE, WITHOUT LONG-TERM CURRENT USE OF INSULIN (HCC): ICD-10-CM

## 2023-01-30 DIAGNOSIS — E11.22 TYPE 2 DIABETES MELLITUS WITH STAGE 4 CHRONIC KIDNEY DISEASE, WITHOUT LONG-TERM CURRENT USE OF INSULIN (HCC): ICD-10-CM

## 2023-01-30 LAB
ALBUMIN SERPL BCP-MCNC: 3.2 G/DL (ref 3.5–5)
ALP SERPL-CCNC: 69 U/L (ref 46–116)
ALT SERPL W P-5'-P-CCNC: 14 U/L (ref 12–78)
ANION GAP SERPL CALCULATED.3IONS-SCNC: 6 MMOL/L (ref 4–13)
AST SERPL W P-5'-P-CCNC: 13 U/L (ref 5–45)
BILIRUB SERPL-MCNC: 0.51 MG/DL (ref 0.2–1)
BUN SERPL-MCNC: 71 MG/DL (ref 5–25)
CALCIUM ALBUM COR SERPL-MCNC: 10.1 MG/DL (ref 8.3–10.1)
CALCIUM SERPL-MCNC: 9.5 MG/DL (ref 8.3–10.1)
CHLORIDE SERPL-SCNC: 104 MMOL/L (ref 96–108)
CO2 SERPL-SCNC: 27 MMOL/L (ref 21–32)
CREAT SERPL-MCNC: 2.57 MG/DL (ref 0.6–1.3)
CREAT UR-MCNC: 81.1 MG/DL
EST. AVERAGE GLUCOSE BLD GHB EST-MCNC: 140 MG/DL
GFR SERPL CREATININE-BSD FRML MDRD: 21 ML/MIN/1.73SQ M
GLUCOSE P FAST SERPL-MCNC: 151 MG/DL (ref 65–99)
HBA1C MFR BLD: 6.5 %
MICROALBUMIN UR-MCNC: 35.1 MG/L (ref 0–20)
MICROALBUMIN/CREAT 24H UR: 43 MG/G CREATININE (ref 0–30)
POTASSIUM SERPL-SCNC: 4 MMOL/L (ref 3.5–5.3)
PROT SERPL-MCNC: 6.7 G/DL (ref 6.4–8.4)
SODIUM SERPL-SCNC: 137 MMOL/L (ref 135–147)

## 2023-01-31 ENCOUNTER — OFFICE VISIT (OUTPATIENT)
Dept: FAMILY MEDICINE CLINIC | Facility: HOSPITAL | Age: 88
End: 2023-01-31

## 2023-01-31 VITALS
OXYGEN SATURATION: 96 % | WEIGHT: 258 LBS | RESPIRATION RATE: 16 BRPM | BODY MASS INDEX: 36.12 KG/M2 | SYSTOLIC BLOOD PRESSURE: 128 MMHG | HEART RATE: 94 BPM | DIASTOLIC BLOOD PRESSURE: 70 MMHG | HEIGHT: 71 IN

## 2023-01-31 DIAGNOSIS — L29.9 PRURITUS: ICD-10-CM

## 2023-01-31 DIAGNOSIS — L25.9 CONTACT DERMATITIS, UNSPECIFIED CONTACT DERMATITIS TYPE, UNSPECIFIED TRIGGER: ICD-10-CM

## 2023-01-31 DIAGNOSIS — M1A.0710 IDIOPATHIC CHRONIC GOUT OF RIGHT FOOT WITHOUT TOPHUS: ICD-10-CM

## 2023-01-31 DIAGNOSIS — L97.811 VENOUS STASIS ULCER OF OTHER PART OF RIGHT LOWER LEG LIMITED TO BREAKDOWN OF SKIN, UNSPECIFIED WHETHER VARICOSE VEINS PRESENT (HCC): ICD-10-CM

## 2023-01-31 DIAGNOSIS — E11.22 TYPE 2 DIABETES MELLITUS WITH STAGE 4 CHRONIC KIDNEY DISEASE, WITHOUT LONG-TERM CURRENT USE OF INSULIN (HCC): ICD-10-CM

## 2023-01-31 DIAGNOSIS — N18.4 TYPE 2 DIABETES MELLITUS WITH STAGE 4 CHRONIC KIDNEY DISEASE, WITHOUT LONG-TERM CURRENT USE OF INSULIN (HCC): ICD-10-CM

## 2023-01-31 DIAGNOSIS — I10 ESSENTIAL HYPERTENSION: ICD-10-CM

## 2023-01-31 DIAGNOSIS — R21 RASH: ICD-10-CM

## 2023-01-31 DIAGNOSIS — N18.4 CHRONIC RENAL DISEASE, STAGE IV (HCC): ICD-10-CM

## 2023-01-31 DIAGNOSIS — I50.32 CHRONIC DIASTOLIC CONGESTIVE HEART FAILURE (HCC): Primary | ICD-10-CM

## 2023-01-31 DIAGNOSIS — E11.40 TYPE 2 DIABETES MELLITUS WITH DIABETIC NEUROPATHY, WITHOUT LONG-TERM CURRENT USE OF INSULIN (HCC): ICD-10-CM

## 2023-01-31 DIAGNOSIS — J43.9 PULMONARY EMPHYSEMA, UNSPECIFIED EMPHYSEMA TYPE (HCC): ICD-10-CM

## 2023-01-31 DIAGNOSIS — I83.018 VENOUS STASIS ULCER OF OTHER PART OF RIGHT LOWER LEG LIMITED TO BREAKDOWN OF SKIN, UNSPECIFIED WHETHER VARICOSE VEINS PRESENT (HCC): ICD-10-CM

## 2023-01-31 PROBLEM — E66.01 MORBID OBESITY DUE TO EXCESS CALORIES (HCC): Status: RESOLVED | Noted: 2021-10-25 | Resolved: 2023-01-31

## 2023-01-31 PROBLEM — S91.302A NON HEALING LEFT HEEL WOUND: Status: RESOLVED | Noted: 2022-05-11 | Resolved: 2023-01-31

## 2023-01-31 RX ORDER — FAMOTIDINE 10 MG/1
TABLET, FILM COATED ORAL
Qty: 180 TABLET | Refills: 1 | Status: SHIPPED | OUTPATIENT
Start: 2023-01-31

## 2023-01-31 RX ORDER — CETIRIZINE HYDROCHLORIDE 5 MG/1
TABLET ORAL
Qty: 180 TABLET | Refills: 1 | Status: SHIPPED | OUTPATIENT
Start: 2023-01-31

## 2023-01-31 RX ORDER — FLUTICASONE FUROATE, UMECLIDINIUM BROMIDE AND VILANTEROL TRIFENATATE 200; 62.5; 25 UG/1; UG/1; UG/1
POWDER RESPIRATORY (INHALATION)
COMMUNITY
Start: 2023-01-16

## 2023-01-31 RX ORDER — BUMETANIDE 2 MG/1
4 TABLET ORAL EVERY MORNING
Qty: 180 TABLET | Refills: 2 | Status: SHIPPED | OUTPATIENT
Start: 2023-01-31

## 2023-01-31 NOTE — ASSESSMENT & PLAN NOTE
Lab Results   Component Value Date    EGFR 21 01/30/2023    EGFR 24 11/28/2022    EGFR 25 11/11/2022    CREATININE 2 57 (H) 01/30/2023    CREATININE 2 30 (H) 11/28/2022    CREATININE 2 24 (H) 11/11/2022       Patient is stage IV chronic disease  Renal function was at baseline earlier this month    I will recheck renal function after increasing dose of bumetanide
Lab Results   Component Value Date    HGBA1C 6 5 (H) 01/30/2023     She has type 2 diabetes mellitus with stage IV chronic disease  His renal function was around baseline earlier this month  A1c decreased      Continue glipizide, I will recheck renal function next week
Lab Results   Component Value Date    HGBA1C 6 5 (H) 01/30/2023   Patient has diabetic neuropathy: He had decreased sensation on monofilament testing of the feet  He had no ulcers of the feet  We will monitor him closely    Continue glipizide
Patient has chronic hypertension  Blood pressure is stable    He will continue bumetanide, labetalol and metolazone
Patient presents with chief complaint of pruritus has been going on for 6 weeks and is located to the anterior chest wall as well as the arms bilaterally  Prednisone, Zyrtec and Pepcid has not helped  On physical examination I saw dark skin and saw excoriations but saw no rash  Patient's liver function was normal this month, renal function was at baseline  I suspect that his pruritus is caused by dry skin      Advised him to use moisturizer creams, and to continue taking Zyrtec and Pepcid
She has chronic COPD  I heard no expiratory wheezing on examination  His shortness of breath is due to volume overload from chronic diastolic CHF    He will continue using Trelegy inhaler
Wt Readings from Last 3 Encounters:   01/31/23 117 kg (258 lb)   01/19/23 117 kg (257 lb)   01/09/23 118 kg (259 lb 12 8 oz)         Patient is chronic diastolic CHF  He also complains of increasing lower to edema and ulcer formation of the right lower leg with erythema  Patient's weight was 252 pounds in December last year increase it to 258  He has chronic dyspnea with exertion but he feels it is getting worse  His lungs are clear to auscultation without crackles or wheezing but he does have at least 1+ bilateral lower leg edema  Please review the image I took of his legs  He has evidence of volume overload  I increase patient's bumetanide to 4 mg daily in the morning, he will continue taking metolazone 2 days of the week and I asked him to restrict his fluid intake to 48 ounces of fluids a day      I will recheck his electrolytes and renal function before next visit in 2 weeks for follow-up of his chronic diastolic CHF with volume overload
No

## 2023-01-31 NOTE — PROGRESS NOTES
Assessment/Plan:    Pruritus  Patient presents with chief complaint of pruritus has been going on for 6 weeks and is located to the anterior chest wall as well as the arms bilaterally  Prednisone, Zyrtec and Pepcid has not helped  On physical examination I saw dark skin and saw excoriations but saw no rash  Patient's liver function was normal this month, renal function was at baseline  I suspect that his pruritus is caused by dry skin  Advised him to use moisturizer creams, and to continue taking Zyrtec and Pepcid    Chronic diastolic congestive heart failure (HCC)  Wt Readings from Last 3 Encounters:   01/31/23 117 kg (258 lb)   01/19/23 117 kg (257 lb)   01/09/23 118 kg (259 lb 12 8 oz)         Patient is chronic diastolic CHF  He also complains of increasing lower to edema and ulcer formation of the right lower leg with erythema  Patient's weight was 252 pounds in December last year increase it to 258  He has chronic dyspnea with exertion but he feels it is getting worse  His lungs are clear to auscultation without crackles or wheezing but he does have at least 1+ bilateral lower leg edema  Please review the image I took of his legs  He has evidence of volume overload  I increase patient's bumetanide to 4 mg daily in the morning, he will continue taking metolazone 2 days of the week and I asked him to restrict his fluid intake to 48 ounces of fluids a day  I will recheck his electrolytes and renal function before next visit in 2 weeks for follow-up of his chronic diastolic CHF with volume overload        Essential hypertension  Patient has chronic hypertension  Blood pressure is stable  He will continue bumetanide, labetalol and metolazone    Pulmonary emphysema (HCC)  She has chronic COPD  I heard no expiratory wheezing on examination  His shortness of breath is due to volume overload from chronic diastolic CHF    He will continue using Trelegy inhaler    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 disease, without long-term current use of insulin ([facility])    Lab Results   Component Value Date    HGBA1C 6 5 (H) 01/30/2023     She has type 2 diabetes mellitus with stage IV chronic disease  His renal function was around baseline earlier this month  A1c decreased  Continue glipizide, I will recheck renal function next week    Type 2 diabetes mellitus with diabetic neuropathy, without long-term current use of insulin ([facility])    Lab Results   Component Value Date    HGBA1C 6 5 (H) 01/30/2023   Patient has diabetic neuropathy: He had decreased sensation on monofilament testing of the feet  He had no ulcers of the feet  We will monitor him closely  Continue glipizide    Chronic renal disease, stage IV [facility])  Lab Results   Component Value Date    EGFR 21 01/30/2023    EGFR 24 11/28/2022    EGFR 25 11/11/2022    CREATININE 2 57 (H) 01/30/2023    CREATININE 2 30 (H) 11/28/2022    CREATININE 2 24 (H) 11/11/2022       Patient is stage IV chronic disease  Renal function was at baseline earlier this month  I will recheck renal function after increasing dose of bumetanide       Diagnoses and all orders for this visit:    Chronic diastolic congestive heart failure (HCC)  -     bumetanide (BUMEX) 2 mg tablet; Take 2 tablets (4 mg total) by mouth every morning  -     CBC; Future  -     Basic metabolic panel; Future    Venous stasis ulcer of other part of right lower leg limited to breakdown of skin, unspecified whether varicose veins present ([address] )    Pulmonary emphysema, unspecified emphysema type ([address] )    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 disease, without long-term current use of insulin ([facility])    Essential hypertension  -     TSH, 3rd generation with Free T4 reflex; Future    Chronic renal disease, stage IV (HCC)    Idiopathic chronic gout of right foot without tophus  -     Uric acid;  Future    Type 2 diabetes mellitus with diabetic neuropathy, without long-term current use of insulin (HCC)    Pruritus    Other orders  -     Trelegy Ellipta 200-62 5-25 MCG/ACT AEPB inhaler; INHALE 1 PUFF DAILY RINSE MOUTH AFTER USE  Subjective:      Patient ID: Michael Coronel is a 81 y o  male  HPI    Patient presents for follow-up of chronic conditions as detailed in the assessment and plan  The following portions of the patient's history were reviewed and updated as appropriate: current medications, past family history, past medical history, past social history, past surgical history and problem list     Review of Systems   Respiratory: Positive for shortness of breath (with exertion, worse)  Cardiovascular: Positive for leg swelling  Negative for chest pain  Gastrointestinal: Negative for abdominal distention, abdominal pain and blood in stool  Genitourinary: Negative for difficulty urinating  Skin: Positive for color change and wound  Pruritus of the UEs and anterior chest for 6 weeks   Neurological: Negative for weakness and numbness  Psychiatric/Behavioral: Negative for dysphoric mood  Objective:    /70   Pulse 94   Resp 16   Ht 5' 11" (1 803 m)   Wt 117 kg (258 lb)   SpO2 96%   BMI 35 98 kg/m²      Physical Exam  Constitutional:       General: He is not in acute distress  HENT:      Head: Normocephalic  Cardiovascular:      Rate and Rhythm: Normal rate and regular rhythm  Pulses: Pulses are weak  Dorsalis pedis pulses are 1+ on the right side and 1+ on the left side  Pulmonary:      Effort: No respiratory distress  Breath sounds: No wheezing or rales  Abdominal:      General: Bowel sounds are normal       Palpations: Abdomen is soft  Tenderness: There is no abdominal tenderness  Feet:      Right foot:      Skin integrity: No ulcer  Left foot:      Skin integrity: No ulcer  Skin:     General: Skin is warm and dry        Comments: Bl 2+ LE edema is present with erythema of the RLE and stage 2 ulcer, skin is not warm   Neurological:      Mental Status: He is alert and oriented to person, place, and time  Motor: No weakness  Gait: Gait normal    Psychiatric:         Mood and Affect: Mood normal          Thought Content: Thought content normal          Diabetic Foot Exam    Patient's shoes and socks removed  Right Foot/Ankle   Right Foot Inspection  Skin Exam: No ulcer  Sensory   Monofilament testing: diminished    Vascular  The right DP pulse is 1+  Left Foot/Ankle  Left Foot Inspection  Skin Exam: No ulcer  Sensory   Monofilament testing: diminished    Vascular  The left DP pulse is 1+       Assign Risk Category  No deformity present  Loss of protective sensation  Weak pulses  Risk: 2         Maryam Menjivar MD

## 2023-01-31 NOTE — PATIENT INSTRUCTIONS
Limit your fluid intake to 48-50 ounces per day! Increase your bumetanide to 4 mg, 2 pills in the morning and take the same dose of metolazone 2 days a week 30 minutes before taking bumetanide in the morning! Apply Desitin/Eucerin/cetaphil cream to your skin on your chest and your arms for your itching  Please weigh your self at home every morning before breakfast and keep a log of your weight  Bring your readings in for the next appointment! DASH Eating Plan   AMBULATORY CARE:   The DASH (Dietary Approaches to Stop Hypertension) Eating Plan  is designed to help prevent or lower high blood pressure  It can also help to lower LDL (bad) cholesterol and decrease your risk for heart disease  The plan is low in sodium, sugar, unhealthy fats, and total fat  It is high in potassium, calcium, magnesium, and fiber  These nutrients are added when you eat more fruits, vegetables, and whole grains  With the DASH eating plan, you need to eat a certain number of servings from each food group  This will help you get enough of certain nutrients and limit others  The amount of servings you should eat depends on how many calories you need  Your dietitian can help you create meal plans with the right number of servings for each food group  What you need to know about sodium:  Your dietitian will tell you how much sodium is safe for you to have each day  People with high blood pressure should have no more than 1,500 to 2,300 mg of sodium in a day  A teaspoon (tsp) of salt has 2,300 mg of sodium  This may seem like a difficult goal, but small changes to the foods you eat can make a big difference  Your healthcare provider or dietitian can help you create a meal plan that follows your sodium limit  Read food labels  Food labels can help you choose foods that are low in sodium  The amount of sodium is listed in milligrams (mg)   The % Daily Value (DV) column tells you how much of your daily needs are met by 1 serving of the food for each nutrient listed  Choose foods that have less than 5% of the DV of sodium  These foods are considered low in sodium  Foods that have 20% or more of the DV of sodium are considered high in sodium  Avoid foods that have more than 300 mg of sodium in each serving  Choose foods that say low-sodium, reduced-sodium, or no salt added on the food label  Limit added salt  Do not salt food at the table if you add salt when you cook  Use herbs and spices, such as onions, garlic, and salt-free seasonings to add flavor  Try lemon or lime juice or vinegar to add a tart flavor  Use hot peppers or a small amount of hot pepper sauce to add a spicy flavor  Limit foods high in added salt, such as the following:    Seasonings made with salt, such as garlic salt, celery salt, onion salt, seasoned salt, meat tenderizers, and monosodium glutamate (MSG)    Miso soup and canned or dried soup mixes    Regular soy sauce, barbecue sauce, teriyaki sauce, steak sauce, Worcestershire sauce, and most flavored vinegars    Snack foods, such as salted chips, popcorn, pretzels, pork rinds, salted crackers, and salted nuts    Frozen foods, such as dinners, entrees, vegetables with sauces, and breaded meats    Ask about salt substitutes  Ask your healthcare provider if you may use salt substitutes  Some salt substitutes have ingredients that can be harmful if you have certain health conditions  Choose foods carefully at restaurants  Meals from restaurants, especially fast food restaurants, are often high in sodium  Some restaurants have nutrition information that tells you the amount of sodium in their foods  Ask to have your food prepared with less, or no salt  What you need to know about fats:  Healthy fats include unsaturated fats and omega-3 fatty acids  Unhealthy fats include saturated fats and trans fats    Include healthy fats, such as the following:      Cooking oils, such as soybean, canola, olive, or sunflower    Fatty fish, such as salmon, tuna, mackerel, or sardines    Flaxseed oil or ground flaxseed    ½ cup of cooked beans, such as black beans, kidney beans, or ramirez beans    1½ ounces of low-sodium nuts, such as almonds or walnuts    Low-sugar, low-sodium peanut butter    Seeds such as linn seeds or sunflower seeds       Limit or do not have unhealthy fats, such as the following:      Foods that contain fat from animals, such as fatty meats, whole milk, butter, and cream    Shortening, stick margarine, palm oil, and coconut oil    Full-fat or creamy salad dressing    Creamy soup    Crackers, chips, and baked goods made with margarine or shortening    Foods that are fried in unhealthy fats    Gravy and sauces, such as Real or cheese sauces    What you need to know about carbohydrates (carbs): All carbs break down into sugar  Complex carbs contain more fiber than simple carbs  This means complex carbs go into the bloodstream more slowly and cause less of a blood sugar spike  Try to include more complex carbs and fewer simple carbs  Include complex carbs, such as the followin slice of whole-grain bread    1 ounce of dry cereal that does not contain added sugar    ½ cup of cooked oatmeal    2 ounces of cooked whole-grain pasta    ½ cup of cooked brown rice    Limit or do not have simple carbs, such as the following:      [employer], such as doughnuts, pastries, and cookies    Mixes for cornbread and biscuits    White rice and pasta mixes, such as boxed macaroni and cheese    Instant and cold cereals that contain sugar    Jelly, jam, and ice cream that contain sugar    Condiments such as ketchup    Drinks high in sugar, such as soft drinks, lemonade, and fruit juice    What you need to know about vegetables and fruits:  Vegetables and fruits can be fresh, frozen, or canned  If possible, try to choose low-sodium canned options    Include a variety of vegetables and fruits, such as the following: 1 medium apple, pear, or peach (about ½ cup chopped)    ½ small banana    ½ cup berries, such as blueberries, strawberries, or blackberries    1 cup of raw leafy greens, such as lettuce, spinach, kale, or ca greens    ½ cup of frozen or canned (no added salt) vegetables, such as green beans    ½ cup of fresh, frozen, or canned fruit (canned in light syrup or fruit juice)    ½ cup of vegetable or fruit juice    Limit or do not have vegetables and fruits made in the following ways:      Frozen fruit such as cherries that have added sugar    Fruit in cream or butter sauce    Canned vegetables that are high in sodium    Sauerkraut, pickled vegetables, and other foods prepared in brine    Fried vegetables or vegetables in butter or high-fat sauces    What you need to know about protein foods: Include lean or low-fat protein foods, such as the following:      Poultry (chicken, turkey) with no skin    Fish (especially fatty fish, such as salmon, fresh tuna, or mackerel)    Lean beef and pork (loin, round, extra lean hamburger)    Egg whites and egg substitutes    1 cup of nonfat (skim) or 1% milk    1½ ounces of fat-free or low-fat cheese    6 ounces of nonfat or low-fat yogurt    Limit or do not have high-fat protein foods, such as the following:      Smoked or cured meat, such as corned beef, serna, ham, hot dogs, and sausage    Canned beans and canned meats or spreads, such as potted meats, sardines, anchovies, and imitation seafood    Deli or lunch meats, such as bologna, ham, turkey, and roast beef    High-fat meat (T-bone steak, regular hamburger, and ribs)    Whole eggs and egg yolks    Whole milk, 2% milk, and cream    Regular cheese and processed cheese    Other guidelines to follow:   Maintain a healthy weight  Your risk for heart disease is higher if you are overweight  Your healthcare provider may suggest that you lose weight if you are overweight   You can lose weight by eating fewer calories and foods that have added sugars and fat  The DASH meal plan can help you do this  Decrease calories by eating smaller portions at each meal and fewer snacks  Ask your healthcare provider for more information about how to lose weight  Exercise regularly  Regular exercise can help you reach or maintain a healthy weight  Regular exercise can also help decrease your blood pressure and improve your cholesterol levels  Get 30 minutes or more of moderate exercise each day of the week  To lose weight, get at least 60 minutes of exercise  Talk to your healthcare provider about the best exercise program for you  Limit alcohol  Women should limit alcohol to 1 drink a day  Men should limit alcohol to 2 drinks a day  A drink of alcohol is 12 ounces of beer, 5 ounces of wine, or 1½ ounces of liquor  For more information:   [facility] [address]  [address] [address]  [place] [address]  Phone: 0- 723 - [phone]  Web Address: [facility] no    © [facility] [facility] 2022 Information is for End User's use only and may not be sold, redistributed or otherwise used for commercial purposes  All illustrations and images included in CareNotes® are the copyrighted property of [employer]  or [facility] [employer]   The above information is an  only  It is not intended as medical advice for individual conditions or treatments  Talk to your doctor, nurse or pharmacist before following any medical regimen to see if it is safe and effective for you

## 2023-02-15 ENCOUNTER — APPOINTMENT (OUTPATIENT)
Dept: LAB | Facility: HOSPITAL | Age: 88
End: 2023-02-15
Attending: INTERNAL MEDICINE

## 2023-02-15 DIAGNOSIS — N18.4 CKD (CHRONIC KIDNEY DISEASE) STAGE 4, GFR 15-29 ML/MIN (HCC): ICD-10-CM

## 2023-02-15 DIAGNOSIS — I50.32 CHRONIC DIASTOLIC CONGESTIVE HEART FAILURE (HCC): ICD-10-CM

## 2023-02-15 DIAGNOSIS — M1A.0710 IDIOPATHIC CHRONIC GOUT OF RIGHT FOOT WITHOUT TOPHUS: ICD-10-CM

## 2023-02-15 DIAGNOSIS — I10 ESSENTIAL HYPERTENSION: ICD-10-CM

## 2023-02-15 LAB
ALBUMIN SERPL BCP-MCNC: 3.4 G/DL (ref 3.5–5)
ALP SERPL-CCNC: 72 U/L (ref 46–116)
ALT SERPL W P-5'-P-CCNC: 18 U/L (ref 12–78)
ANION GAP SERPL CALCULATED.3IONS-SCNC: 7 MMOL/L (ref 4–13)
AST SERPL W P-5'-P-CCNC: 11 U/L (ref 5–45)
BACTERIA UR QL AUTO: ABNORMAL /HPF
BILIRUB SERPL-MCNC: 0.47 MG/DL (ref 0.2–1)
BILIRUB UR QL STRIP: NEGATIVE
BUN SERPL-MCNC: 70 MG/DL (ref 5–25)
CALCIUM ALBUM COR SERPL-MCNC: 10 MG/DL (ref 8.3–10.1)
CALCIUM SERPL-MCNC: 9.5 MG/DL (ref 8.3–10.1)
CHLORIDE SERPL-SCNC: 102 MMOL/L (ref 96–108)
CLARITY UR: CLEAR
CO2 SERPL-SCNC: 29 MMOL/L (ref 21–32)
COLOR UR: ABNORMAL
CREAT SERPL-MCNC: 2.45 MG/DL (ref 0.6–1.3)
CREAT UR-MCNC: 84.8 MG/DL
ERYTHROCYTE [DISTWIDTH] IN BLOOD BY AUTOMATED COUNT: 15.4 % (ref 11.6–15.1)
GFR SERPL CREATININE-BSD FRML MDRD: 22 ML/MIN/1.73SQ M
GLUCOSE P FAST SERPL-MCNC: 146 MG/DL (ref 65–99)
GLUCOSE UR STRIP-MCNC: NEGATIVE MG/DL
HCT VFR BLD AUTO: 42.8 % (ref 36.5–49.3)
HGB BLD-MCNC: 13.1 G/DL (ref 12–17)
HGB UR QL STRIP.AUTO: NEGATIVE
KETONES UR STRIP-MCNC: NEGATIVE MG/DL
LEUKOCYTE ESTERASE UR QL STRIP: NEGATIVE
MAGNESIUM SERPL-MCNC: 2 MG/DL (ref 1.6–2.6)
MCH RBC QN AUTO: 28.1 PG (ref 26.8–34.3)
MCHC RBC AUTO-ENTMCNC: 30.6 G/DL (ref 31.4–37.4)
MCV RBC AUTO: 92 FL (ref 82–98)
NITRITE UR QL STRIP: NEGATIVE
NON-SQ EPI CELLS URNS QL MICRO: ABNORMAL /HPF
PH UR STRIP.AUTO: 6 [PH]
PHOSPHATE SERPL-MCNC: 4 MG/DL (ref 2.3–4.1)
PLATELET # BLD AUTO: 260 THOUSANDS/UL (ref 149–390)
PMV BLD AUTO: 10.7 FL (ref 8.9–12.7)
POTASSIUM SERPL-SCNC: 3.9 MMOL/L (ref 3.5–5.3)
PROT SERPL-MCNC: 6.7 G/DL (ref 6.4–8.4)
PROT UR STRIP-MCNC: ABNORMAL MG/DL
PROT UR-MCNC: 18 MG/DL
PROT/CREAT UR: 0.21 MG/G{CREAT} (ref 0–0.1)
PTH-INTACT SERPL-MCNC: 694.8 PG/ML (ref 18.4–80.1)
RBC # BLD AUTO: 4.67 MILLION/UL (ref 3.88–5.62)
RBC #/AREA URNS AUTO: ABNORMAL /HPF
SODIUM SERPL-SCNC: 138 MMOL/L (ref 135–147)
SP GR UR STRIP.AUTO: 1.01 (ref 1–1.03)
TSH SERPL DL<=0.05 MIU/L-ACNC: 1.18 UIU/ML (ref 0.45–4.5)
URATE SERPL-MCNC: 7.3 MG/DL (ref 3.5–8.5)
UROBILINOGEN UR STRIP-ACNC: <2 MG/DL
WBC # BLD AUTO: 8.02 THOUSAND/UL (ref 4.31–10.16)
WBC #/AREA URNS AUTO: ABNORMAL /HPF

## 2023-02-16 ENCOUNTER — OFFICE VISIT (OUTPATIENT)
Dept: FAMILY MEDICINE CLINIC | Facility: HOSPITAL | Age: 88
End: 2023-02-16

## 2023-02-16 VITALS
HEIGHT: 71 IN | WEIGHT: 258 LBS | OXYGEN SATURATION: 97 % | SYSTOLIC BLOOD PRESSURE: 126 MMHG | HEART RATE: 76 BPM | RESPIRATION RATE: 16 BRPM | DIASTOLIC BLOOD PRESSURE: 78 MMHG | BODY MASS INDEX: 36.12 KG/M2

## 2023-02-16 DIAGNOSIS — J31.0 CHRONIC RHINITIS: ICD-10-CM

## 2023-02-16 DIAGNOSIS — L97.811 VENOUS STASIS ULCER OF OTHER PART OF RIGHT LOWER LEG LIMITED TO BREAKDOWN OF SKIN, UNSPECIFIED WHETHER VARICOSE VEINS PRESENT (HCC): ICD-10-CM

## 2023-02-16 DIAGNOSIS — E11.22 TYPE 2 DIABETES MELLITUS WITH STAGE 4 CHRONIC KIDNEY DISEASE, WITHOUT LONG-TERM CURRENT USE OF INSULIN (HCC): Primary | ICD-10-CM

## 2023-02-16 DIAGNOSIS — N18.4 TYPE 2 DIABETES MELLITUS WITH STAGE 4 CHRONIC KIDNEY DISEASE, WITHOUT LONG-TERM CURRENT USE OF INSULIN (HCC): Primary | ICD-10-CM

## 2023-02-16 DIAGNOSIS — I83.018 VENOUS STASIS ULCER OF OTHER PART OF RIGHT LOWER LEG LIMITED TO BREAKDOWN OF SKIN, UNSPECIFIED WHETHER VARICOSE VEINS PRESENT (HCC): ICD-10-CM

## 2023-02-16 DIAGNOSIS — I50.32 CHRONIC DIASTOLIC CONGESTIVE HEART FAILURE (HCC): ICD-10-CM

## 2023-02-16 PROBLEM — E66.01 OBESITY, MORBID (HCC): Status: RESOLVED | Noted: 2019-03-19 | Resolved: 2023-02-16

## 2023-02-16 RX ORDER — METOLAZONE 2.5 MG/1
TABLET ORAL
Qty: 30 TABLET | Refills: 3 | Status: SHIPPED | OUTPATIENT
Start: 2023-02-16

## 2023-02-16 RX ORDER — FLUTICASONE PROPIONATE 50 MCG
SPRAY, SUSPENSION (ML) NASAL
Qty: 48 ML | Refills: 3 | Status: SHIPPED | OUTPATIENT
Start: 2023-02-16

## 2023-02-16 RX ORDER — CETIRIZINE HYDROCHLORIDE 5 MG/1
5 TABLET ORAL DAILY
Qty: 90 TABLET | Refills: 3 | Status: SHIPPED | OUTPATIENT
Start: 2023-02-16

## 2023-02-16 NOTE — PATIENT INSTRUCTIONS
Please get the blood work done on the week of February 27! Call me if the itching gets worse after we decrease the dose of cetirizine to 5 mg daily and discontinue the famotidine!     And I would like to see you on the week of March 6

## 2023-02-16 NOTE — PROGRESS NOTES
Assessment/Plan:    Chronic diastolic congestive heart failure (HCC)  Wt Readings from Last 3 Encounters:   02/16/23 117 kg (258 lb)   01/31/23 117 kg (258 lb)   01/19/23 117 kg (257 lb)         Patient has chronic diastolic CHF  He presents for follow-up  He has chronic dyspnea on exertion: He has to stop after walking 50 feet to catch his breath  This has been stable    His weight is about the same as last time but he has less swelling of the lower extremities  He thinks he has taken metolazone 3 days a week this week  He is on bumetanide 4 mg in the morning  We reviewed labs from yesterday that show normal potassium and stable renal function, stage IV chronic kidney disease  I agree with taking metolazone 3 days a week and continue bumetanide 4 mg daily    I will recheck patient's electrolytes renal function in about 2 weeks I will follow-up with him after that      Chronic rhinitis  Patient has chronic rhinitis and itchy skin  He gets relief using Flonase nasal spray and taking Zyrtec  I dosage adjusted the Zyrtec to 5 mg daily  We discontinued Pepcid  We will see how this regimen works  Diagnoses and all orders for this visit:    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 disease, without long-term current use of insulin ([facility])  -     Basic metabolic panel; Future    Chronic diastolic congestive heart failure (HCC)  -     metolazone (ZAROXOLYN) 2 5 mg tablet; Take 2 5 mg 30 minutes before taking bumetanide in the morning on Mondays, Wednesdays and Fridays! Venous stasis ulcer of other part of right lower leg limited to breakdown of skin, unspecified whether varicose veins present ([facility])  -     mupirocin (BACTROBAN) 2 % ointment; Apply topically in the morning    Chronic rhinitis  -     fluticasone (FLONASE) 50 mcg/act nasal spray; Spray 1 spray into each nostril twice daily  -     cetirizine (ZyrTEC) 5 MG tablet;  Take 1 tablet (5 mg total) by mouth daily          Subjective: Patient ID: Wyatt Welsh is a 81 y o  male  HPI    Patient presents for follow-up of chronic conditions as detailed in the assessment and plan  The following portions of the patient's history were reviewed and updated as appropriate: current medications, past family history, past medical history, past social history, past surgical history and problem list     Review of Systems      Objective:    /78   Pulse 76   Resp 16   Ht 5' 11" (1 803 m)   Wt 117 kg (258 lb)   SpO2 97%   BMI 35 98 kg/m²      Physical Exam  Constitutional:       General: He is not in acute distress  Appearance: He is not toxic-appearing  Eyes:      Conjunctiva/sclera: Conjunctivae normal    Cardiovascular:      Rate and Rhythm: Normal rate and regular rhythm  Heart sounds: No murmur heard  Pulmonary:      Effort: No respiratory distress  Breath sounds: No wheezing or rales  Skin:     General: Skin is warm and dry  Comments: Pt has ecchymoses on the forearms  Has less lower extremity edema compared to last visit  He has no cellulitis around the ulcers of the RLE   Neurological:      Mental Status: He is alert               Marissa Delgado MD

## 2023-02-16 NOTE — ASSESSMENT & PLAN NOTE
Wt Readings from Last 3 Encounters:   02/16/23 117 kg (258 lb)   01/31/23 117 kg (258 lb)   01/19/23 117 kg (257 lb)         Patient has chronic diastolic CHF  He presents for follow-up  He has chronic dyspnea on exertion: He has to stop after walking 50 feet to catch his breath  This has been stable    His weight is about the same as last time but he has less swelling of the lower extremities  He thinks he has taken metolazone 3 days a week this week  He is on bumetanide 4 mg in the morning  We reviewed labs from yesterday that show normal potassium and stable renal function, stage IV chronic kidney disease      I agree with taking metolazone 3 days a week and continue bumetanide 4 mg daily    I will recheck patient's electrolytes renal function in about 2 weeks I will follow-up with him after that

## 2023-02-16 NOTE — ASSESSMENT & PLAN NOTE
Patient has chronic rhinitis and itchy skin  He gets relief using Flonase nasal spray and taking Zyrtec  I dosage adjusted the Zyrtec to 5 mg daily  We discontinued Pepcid  We will see how this regimen works

## 2023-02-24 ENCOUNTER — OFFICE VISIT (OUTPATIENT)
Dept: PODIATRY | Facility: CLINIC | Age: 88
End: 2023-02-24

## 2023-02-24 VITALS — SYSTOLIC BLOOD PRESSURE: 123 MMHG | DIASTOLIC BLOOD PRESSURE: 81 MMHG | HEART RATE: 89 BPM

## 2023-02-24 DIAGNOSIS — E11.40 TYPE 2 DIABETES MELLITUS WITH DIABETIC NEUROPATHY, WITHOUT LONG-TERM CURRENT USE OF INSULIN (HCC): ICD-10-CM

## 2023-02-24 DIAGNOSIS — B35.1 ONYCHOMYCOSIS: Primary | ICD-10-CM

## 2023-02-26 NOTE — PROGRESS NOTES
PATIENT:  Kristina Henson  1935    ASSESSMENT:  1  Onychomycosis        2  Type 2 diabetes mellitus with diabetic neuropathy, without long-term current use of insulin (HCC)              No orders of the defined types were placed in this encounter  PLAN:  Disease prevention and related risk factors of diabetes were identified and discussed  The patient was educated in proper foot wear for diabetics  Educated in daily foot assessment and routine diabetic foot care  Discussed the importance of controlling BS through diet and exercise  The patient will follow up in 9-12 weeks for further diabetic foot exam and care  Procedures: [id number]  All mycotic toenails were reduced and debrided in length, width, and girth using a nail nipper and electric dremel  All hyperkeratotic skin lesion(s) if present were sharply pared with a #10 scalpel with no evidence of ulceration/abscess  Patient tolerated procedure(s) well without complications  Procedures     HPI:  Kristina Henson is a 81 y  o year old male seen for diabetic foot exam   The patient has class findings with diabetes  BS is under control  The patient complained of thick toenails and lower extremity swelling  The patient denied any acute pedal disorder, redness, acute swelling, or recent injury        The following portions of the patient's history were reviewed and updated as appropriate: allergies, current medications, past family history, past medical history, past social history, past surgical history and problem list     REVIEW OF SYSTEMS:  GENERAL: No fever or chills  HEART: No chest pain, or palpitation  RESPIRATORY:  No acute SOB or cough  GI: No Nausea, vomit or diarrhea  NEUROLOGIC: No syncope or acute weakness    PHYSICAL EXAM:    /81 (BP Location: Left arm, Patient Position: Sitting, Cuff Size: Standard)   Pulse 89     VASCULAR EXAM  Posterior tibial artery +1 bilateral  Dorsalis pedis artery absent bilateral  The patient has class findings with skin atrophy, lack of digital hair, and nail dystrophy  There is +2 lower extremity edema bilaterally  Venous stasis skin changes noted BLE  No    NEUROLOGIC EXAM  Sensation is intact to light touch  Yes   Sensation is intact to 10gm monofilament  Vibratory sensation diminished  Tingling paresthesias noted bilateral       No focal neurologic deficit  DERMATOLOGIC EXAM:   Texture, Tone and Turgor are diminished bilateral   The patient has dystrophic/hypertrophic toenails with yellow/white discoloration, onycholysis, and subungal debris  Fungal odor noted  Brittle nature noted  Right foot nails severely dystrophic x5 with 0 45 cm ave thickness (1 through 5)  Left foot nails severely dystrophic x5 with 0 35 cm ave thickness (1 through 5)  Patient has hyperkeratotic lesions noted:  Right foot at none  Left foot at none  Ulcer or cellulitis noted  Previous pressure ulcer posterior left heel remains healed long with right medial ankle venous ulcer     No notable suspicious skin lesions  MUSCULOSKELETAL EXAM:   No acute joint pain, edema, or redness  No acute musculoskeletal problem  Patient has deformity including none  Patient has mild ambulation limitations  Patient wears DM shoes? No    Risk Category/Class Findings: Q8 (B2 ABC, B3)  0 = No loss of protective sensation    A1)  Has the patient had a previous amputation of the foot or integral skeletal portion thereof? No  B1)  Does the patient have absent posterior tibial pulse? No  B3)  Does the patient have absent dorsalis pedis? Yes  B2)  Does the patient have three of the following? Yes           1  Hair growth (increased or decreased), 2   Nail changes (thickening) and 3  Pigmentary changes (discoloring)  C)  Does the patient have two of the following and one above? Yes            3   Edema and 4    Paraesthesias (abnormal spontaneous sensations in the feet)

## 2023-02-27 ENCOUNTER — OFFICE VISIT (OUTPATIENT)
Dept: NEPHROLOGY | Facility: HOSPITAL | Age: 88
End: 2023-02-27

## 2023-02-27 VITALS
BODY MASS INDEX: 35.98 KG/M2 | DIASTOLIC BLOOD PRESSURE: 62 MMHG | HEART RATE: 80 BPM | WEIGHT: 257 LBS | HEIGHT: 71 IN | SYSTOLIC BLOOD PRESSURE: 120 MMHG | OXYGEN SATURATION: 97 %

## 2023-02-27 DIAGNOSIS — R39.11 BENIGN PROSTATIC HYPERPLASIA WITH URINARY HESITANCY: ICD-10-CM

## 2023-02-27 DIAGNOSIS — I50.32 CHRONIC DIASTOLIC CONGESTIVE HEART FAILURE (HCC): ICD-10-CM

## 2023-02-27 DIAGNOSIS — N18.4 TYPE 2 DIABETES MELLITUS WITH STAGE 4 CHRONIC KIDNEY DISEASE, WITHOUT LONG-TERM CURRENT USE OF INSULIN (HCC): ICD-10-CM

## 2023-02-27 DIAGNOSIS — N25.81 SECONDARY HYPERPARATHYROIDISM OF RENAL ORIGIN (HCC): Primary | ICD-10-CM

## 2023-02-27 DIAGNOSIS — E11.22 TYPE 2 DIABETES MELLITUS WITH STAGE 4 CHRONIC KIDNEY DISEASE, WITHOUT LONG-TERM CURRENT USE OF INSULIN (HCC): ICD-10-CM

## 2023-02-27 DIAGNOSIS — N40.1 BENIGN PROSTATIC HYPERPLASIA WITH URINARY HESITANCY: ICD-10-CM

## 2023-02-27 RX ORDER — CALCITRIOL 0.25 UG/1
0.25 CAPSULE, LIQUID FILLED ORAL DAILY
Qty: 90 CAPSULE | Refills: 3 | Status: SHIPPED | OUTPATIENT
Start: 2023-02-27

## 2023-02-27 NOTE — PROGRESS NOTES
OFFICE Follow up- Nephrology   Tyree Dickreson 81 y o  male MRN: [id number]    Encounter: [id number]        ASSESSMENT    28-year-old male with a past medical history of stage 4 chronic kidney disease, chronic diastolic congestive heart failure with recent hospitalization for volume overload, hypertension, type 2 diabetes mellitus who presents for evaluation of chronic kidney disease    1  Stage 4 chronic kidney disease with a baseline creatinine at 2 1-2 4  o Creatinine has been above 1 8 since 2019  o Estimated GFR is likely around 20-25 mL/minute  o Pressures currently at goal  o CKD education ordered  o Repeat blood work and monitor for anemia related to chronic kidney disease and bone mineral disease  o Lower extremity edema present but this has improved, weight is around 250 pounds on Bumex 3 mg daily with metolazone 2 times weekly if his weight is worsening increase the Bumex to twice daily  o Continue cardiovascular risk reduction  o Given age and comorbidities he is at high risk for progression  We will schedule an advanced care meeting to further define his goals of care  This will be done at his follow-up appointment in 3 to 4 months  Overall his kidney function is stable at this time  His GFR is acceptable    2  Chronic diastolic congestive heart failure with localized edema  o Labetalol 200 mg twice daily  o Bumex 3 mg daily  o Metolazone 2 5 mg 2 times per week  o Follows up with cardiology  o His weight has been stable    3  Hypertension  o Now with episodes of hypotension  o Down titrated labetalol recently  o Continue current diuretic dosing  o Avoid further hypotension    4  Type 2 diabetes mellitus  o Given current GFR risk of metformin outweighs benefit so will discontinue  o Acid-base status is currently acceptable    5  Hypokalemia  o Continue potassium 20 mEq twice daily while taking diuretics    6   Emphysema, COPD, obstructive sleep apnea  o Following with pulmonary continue inhalers    It was nice seeing Tony today, overall stable  His weight is stable, his symptoms are stable  We will monitor his kidney function closely and follow-up in 3 months  Further discussions regarding specifically renal replacement therapy to be had at that meeting      HPI:  Kristina Henson is a 81 y o male who was referred by Coral Arriaga MD for evaluation of No chief complaint on file  56-year-old male with a history of hypertension, type 2 diabetes, diastolic congestive heart failure, COPD, he is a retired  who originally lived in [facility] and now in [facility] to be closer to family    He has a history of diastolic congestive heart failure where he has had hospitalizations for volume overload the past   He has COPD and was recently hospitalized for pneumonia and RSV and influenza  He has been doing relatively okay, he does get dyspnea on exertion but this has not worsened  His weight is remained relatively stable  Denies any acute chest pain or shortness of breath  Does get some edema but this is also improved and stable  ROS:    Review of Systems   Constitutional: Negative  HENT: Negative  Eyes: Negative  Respiratory: Positive for shortness of breath  Cardiovascular: Positive for leg swelling  Gastrointestinal: Negative  Endocrine: Negative  Genitourinary: Negative  Musculoskeletal: Negative  Skin: Negative  Allergic/Immunologic: Negative  Neurological: Negative  Hematological: Negative  Psychiatric/Behavioral: Negative  All other systems reviewed and are negative  Allergies: Patient has no known allergies      Medications:   Current Outpatient Medications:   •  albuterol (2 5 mg/3 mL) 0 083 % nebulizer solution, Take 3 mL (2 5 mg total) by nebulization every 6 (six) hours as needed for wheezing or shortness of breath, Disp: 270 mL, Rfl: 5  •  albuterol (PROVENTIL HFA,VENTOLIN HFA) 90 mcg/act inhaler, TAKE 2 PUFFS BY MOUTH EVERY 4 HOURS AS NEEDED FOR WHEEZE, Disp: 8 5 g, Rfl: 3  •  allopurinol (ZYLOPRIM) 100 mg tablet, TAKE 2 TABLETS (200 MG TOTAL) BY MOUTH DAILY 1 DAILY FOR 2 WEEKS THEN 2 DAILY, Disp: 180 tablet, Rfl: 2  •  Blood Glucose Monitoring Suppl (OneTouch Verio) w/Device KIT, Use 2 (two) times a day Dx:E11 9, Disp: 1 kit, Rfl: 0  •  bumetanide (BUMEX) 2 mg tablet, Take 2 tablets (4 mg total) by mouth every morning, Disp: 180 tablet, Rfl: 2  •  calcitriol (ROCALTROL) 0 25 mcg capsule, Take 1 capsule (0 25 mcg total) by mouth daily, Disp: 90 capsule, Rfl: 3  •  cetirizine (ZyrTEC) 5 MG tablet, Take 1 tablet (5 mg total) by mouth daily, Disp: 90 tablet, Rfl: 3  •  fluticasone (FLONASE) 50 mcg/act nasal spray, Spray 1 spray into each nostril twice daily, Disp: 48 mL, Rfl: 3  •  fluticasone-umeclidinium-vilanterol (Trelegy Ellipta) 100-62 5-25 mcg/actuation inhaler, Inhale 1 puff daily Rinse mouth after use , Disp: 60 blister, Rfl: 5  •  glipiZIDE (GLUCOTROL) 5 mg tablet, TAKE 1 TABLET BY MOUTH 2 TIMES A DAY BEFORE MEALS , Disp: 180 tablet, Rfl: 2  •  glucose blood test strip, Test twice daily DX:  E11 9, Disp: 100 strip, Rfl: 3  •  Iron-Vitamin C (IRON 100/C PO), Take 325 mg by mouth, Disp: , Rfl:   •  labetalol (NORMODYNE) 200 mg tablet, Take 1 tablet (200 mg total) by mouth 2 (two) times a day, Disp: 180 tablet, Rfl: 3  •  Lancets (onetouch ultrasoft) lancets, Use 2 (two) times a day E11 9, Disp: 100 each, Rfl: 3  •  metolazone (ZAROXOLYN) 2 5 mg tablet, Take 2 5 mg 30 minutes before taking bumetanide in the morning on Mondays, Wednesdays and Fridays!, Disp: 30 tablet, Rfl: 3  •  mupirocin (BACTROBAN) 2 % ointment, Apply topically in the morning, Disp: 30 g, Rfl: 5  •  potassium chloride (K-DUR,KLOR-CON) 20 mEq tablet, Take 20 mEq twice a day Monday through Thursday, take an extra 20 mEq on Fridays along with metolazone  , Disp: 193 tablet, Rfl: 3  •  tamsulosin (FLOMAX) 0 4 mg, TAKE 1 CAPSULE BY MOUTH EVERY DAY WITH DINNER, Disp: 90 capsule, Rfl: 3  •  traMADol (Ultram) 50 mg tablet, Take 1 tablet (50 mg total) by mouth 2 (two) times a day as needed for moderate pain, Disp: 30 tablet, Rfl: 1  •  Trelegy Ellipta 200-62 5-25 MCG/ACT AEPB inhaler, INHALE 1 PUFF DAILY RINSE MOUTH AFTER USE , Disp: , Rfl:     Past Medical History:   Diagnosis Date   • Anxiety    • [facility]    • Coronary artery disease    • Depression    • Herpes zoster    • Hypertension    • Memory loss     possible   • Mycoplasma pneumonia    • Obesity    • Osteoarthritis    • Renal calculi      Past Surgical History:   Procedure Laterality Date   • CATARACT EXTRACTION      with insert intraoculat lens prosthesis   • HEMORRHOID SURGERY     • NECK SURGERY      for bone spur     Family History   Problem Relation Age of Onset   • Diabetes Mother    • Breast cancer Mother         She  in 12  Age 80   • Stroke Mother    • Pneumonia Father    • Substance Abuse Neg Hx    • Mental illness Neg Hx       reports that he quit smoking about 25 years ago  His smoking use included cigarettes  He started smoking about 67 years ago  He has a 84 00 pack-year smoking history  He has never used smokeless tobacco  He reports that he does not currently use alcohol after a past usage of about 1 0 standard drink per week  He reports that he does not use drugs  OBJECTIVE:    Vitals:    23 0749   BP: 120/62   BP Location: Left arm   Patient Position: Sitting   Cuff Size: Adult   Pulse: 80   SpO2: 97%   Weight: 117 kg (257 lb)   Height: 5' 11" (1 803 m)        Body mass index is 35 84 kg/m²  [unfilled]     Weight (last 2 days)     Date/Time Weight    23 0749 117 (257)           Physical exam:  Physical Exam  Constitutional:       Appearance: He is obese  HENT:      Head: Normocephalic and atraumatic        Right Ear: External ear normal       Left Ear: External ear normal       Nose: Nose normal  Mouth/Throat:      Mouth: Mucous membranes are dry  Pharynx: Oropharynx is clear  Eyes:      Extraocular Movements: Extraocular movements intact  Conjunctiva/sclera: Conjunctivae normal       Pupils: Pupils are equal, round, and reactive to light  Cardiovascular:      Rate and Rhythm: Normal rate  Pulses: Normal pulses  Heart sounds: Normal heart sounds  Pulmonary:      Effort: Pulmonary effort is normal       Breath sounds: Normal breath sounds  Abdominal:      General: There is distension  Genitourinary:     Rectum: Guaiac result negative  Musculoskeletal:         General: Normal range of motion  Right lower leg: Edema present  Left lower leg: Edema present  Skin:     General: Skin is warm and dry  Neurological:      General: No focal deficit present  Mental Status: He is alert and oriented to person, place, and time  Mental status is at baseline  Psychiatric:         Mood and Affect: Mood normal          Behavior: Behavior normal          Thought Content:  Thought content normal          Judgment: Judgment normal            Lab Results:     Results for orders placed or performed in visit on 02/15/23   Comprehensive metabolic panel   Result Value Ref Range    Sodium 138 135 - 147 mmol/L    Potassium 3 9 3 5 - 5 3 mmol/L    Chloride 102 96 - 108 mmol/L    CO2 29 21 - 32 mmol/L    ANION GAP 7 4 - 13 mmol/L    BUN 70 (H) 5 - 25 mg/dL    Creatinine 2 45 (H) 0 60 - 1 30 mg/dL    Glucose, Fasting 146 (H) 65 - 99 mg/dL    Calcium 9 5 8 3 - 10 1 mg/dL    Corrected Calcium 10 0 8 3 - 10 1 mg/dL    AST 11 5 - 45 U/L    ALT 18 12 - 78 U/L    Alkaline Phosphatase 72 46 - 116 U/L    Total Protein 6 7 6 4 - 8 4 g/dL    Albumin 3 4 (L) 3 5 - 5 0 g/dL    Total Bilirubin 0 47 0 20 - 1 00 mg/dL    eGFR 22 ml/min/1 73sq m   Magnesium   Result Value Ref Range    Magnesium 2 0 1 6 - 2 6 mg/dL   Phosphorus   Result Value Ref Range    Phosphorus 4 0 2 3 - 4 1 mg/dL   PTH, intact Result Value Ref Range     8 (H) 18 4 - 80 1 pg/mL   CBC and Platelet   Result Value Ref Range    WBC 8 02 4 31 - 10 16 Thousand/uL    RBC 4 67 3 88 - 5 62 Million/uL    Hemoglobin 13 1 12 0 - 17 0 g/dL    Hematocrit 42 8 36 5 - 49 3 %    MCV 92 82 - 98 fL    MCH 28 1 26 8 - 34 3 pg    MCHC 30 6 (L) 31 4 - 37 4 g/dL    RDW 15 4 (H) 11 6 - 15 1 %    Platelets 516 171 - 336 Thousands/uL    MPV 10 7 8 9 - 12 7 fL   Uric acid   Result Value Ref Range    Uric Acid 7 3 3 5 - 8 5 mg/dL   Urinalysis with microscopic   Result Value Ref Range    Color, UA Light Yellow     Clarity, UA Clear     Specific [place], UA 1 015 1 003 - 1 030    pH, UA 6 0 4 5, 5 0, 5 5, 6 0, 6 5, 7 0, 7 5, 8 0    Leukocytes, UA Negative Negative    Nitrite, UA Negative Negative    Protein, UA Trace (A) Negative mg/dl    Glucose, UA Negative Negative mg/dl    Ketones, UA Negative Negative mg/dl    Urobilinogen, UA <2 0 <2 0 mg/dl mg/dl    Bilirubin, UA Negative Negative    Occult Blood, UA Negative Negative    RBC, UA None Seen None Seen, 1-2 /hpf    WBC, UA None Seen None Seen, 1-2 /hpf    Epithelial Cells Occasional None Seen, Occasional /hpf    Bacteria, UA None Seen None Seen, Occasional /hpf   Protein / creatinine ratio, urine   Result Value Ref Range    Creatinine, Ur 84 8 mg/dL    Protein Urine Random 18 mg/dL    Prot/Creat Ratio, Ur 0 21 (H) 0 00 - 0 10   TSH, 3rd generation with Free T4 reflex   Result Value Ref Range    TSH 3RD GENERATON 1 180 0 450 - 4 500 uIU/mL       Portions of the record may have been created with voice recognition software  Occasional wrong word or "sound a like" substitutions may have occurred due to the inherent limitations of voice recognition software  Read the chart carefully and recognize, using context, where substitutions have occurred  If you have any questions, please contact the dictating provider

## 2023-02-27 NOTE — PATIENT INSTRUCTIONS
Chronic Kidney Disease   WHAT YOU NEED TO KNOW:   Chronic kidney disease (CKD) is the gradual and permanent loss of kidney function  It is also called chronic kidney failure, or chronic renal insufficiency  Normally, the kidneys remove fluid, chemicals, and waste from your blood  These wastes are turned into urine by your kidneys  CKD may worsen over time and lead to kidney failure  Your CKD team will help you and your family plan for your care at home  The team will help you create goals and find ways to meet your goals  Your care plan may change over time as your needs change  DISCHARGE INSTRUCTIONS:   Call your local emergency number (911 in the [address]) if:   You have a seizure  You have shortness of breath  Return to the emergency department if:   You are confused and very drowsy  Call your doctor or nephrologist if:   You suddenly gain or lose more weight than your healthcare provider has told you is okay  You have itchy skin or a rash  You urinate more or less than you normally do  You have blood in your urine  You have nausea and are vomiting  You have fatigue or muscle weakness  You have hiccups that will not stop  You have questions or concerns about your condition or care  Medicines:   Medicines  may be given to decrease blood pressure and get rid of extra fluid  You may also receive medicine to manage health conditions that may occur with CKD, such as anemia, diabetes, and heart disease  Take your medicine as directed  Contact your healthcare provider if you think your medicine is not helping or if you have side effects  Tell your provider if you are allergic to any medicine  Keep a list of the medicines, vitamins, and herbs you take  Include the amounts, and when and why you take them  Bring the list or the pill bottles to follow-up visits  Carry your medicine list with you in case of an emergency  What you can do to manage CKD:   Management may include making some lifestyle changes  Tell your healthcare provider if you have any concerns about being able to make changes  He or she can help you find solutions, including working with specialists  Ask for help creating a plan to break large goals into smaller steps  Your plan may include any of the following:  Manage other health conditions  Your healthcare provider will work with you to make a care plan that meets your needs  You will be checked regularly for heart disease or other conditions that can make CKD worse, such as diabetes  Your blood pressure will be closely monitored  You will also get a target blood pressure and help making a plan to reach your target  This may include taking your blood pressure at home  Maintain a healthy weight  Your weight and body mass index (BMI) will be checked regularly  BMI helps find if your weight is healthy for your height  Your healthcare provider will use other tests to check your muscle and protein levels  Extra weight can strain your kidneys  A low weight or low muscle mass can make you feel more tired  You may have trouble doing your daily activities  Ask your provider what a healthy weight is for you  He or she can help you create a plan to lose or gain weight safely, if needed  The plan may include keeping a food diary  This is a list of foods and liquids you have each day  Your provider will use the diary to help you make changes, if needed  Changes are based on your health and any other conditions you have, such as diabetes  Create an exercise plan  Regular exercise can help you manage CKD, high blood pressure, and diabetes  Exercise also helps control weight  Your provider can help you create exercise goals and a plan to reach those goals  For example, your goal may be to exercise for 30 minutes in a day  Your plan can include breaking exercise into 10 minute sessions, 3 times during the day  Create a healthy eating plan    Your provider may tell you to eat food low in potassium, phosphorus, or protein  Your provider may also recommend vitamin or mineral supplements  Do not take any supplements without talking to your provider  A dietitian can help you plan meals if needed  Ask how much liquid to drink each day and which liquids are best for you  Limit sodium (salt) as directed  You may need to limit sodium to less than 2,300 milligrams (mg) each day  Ask your dietitian or healthcare provider how much sodium you can have each day  The amount depends on your stage of kidney disease  Table salt, canned foods, soups, salted snacks, and processed meats, like deli meats and sausage, are high in sodium  Your provider or a dietitian can show you how to read food labels for sodium  Limit alcohol as directed  Alcohol can cause fluid retention and can affect your kidneys  Ask how much alcohol is safe for you  A drink of alcohol is 12 ounces of beer, 5 ounces of wine, or 1½ ounces of liquor  Do not smoke  Nicotine and other chemicals in cigarettes and cigars can cause kidney damage  Ask your provider for information if you currently smoke and need help to quit  E-cigarettes or smokeless tobacco still contain nicotine  Talk to your provider before you use these products  Ask about over-the-counter medicines  Medicines such as NSAIDs and laxatives may harm your kidneys  Some cough and cold medicines can raise your blood pressure  Always ask if a medicine is safe before you take it  Ask about vaccines you may need  CKD can increase your risk for infections such as pneumonia, influenza, and hepatitis  Vaccines lower your risk for infection  Your healthcare provider will tell you which vaccines you need and when to get them  Follow up with your doctor or nephrologist as directed: You will need to return for tests to monitor your kidney and nerve function, and your parathyroid hormone level   Your medicines may be changed, based on certain test results  Write down your questions so you remember to ask them during your visits  © Copyright [employer] 2022 Information is for End User's use only and may not be sold, redistributed or otherwise used for commercial purposes  The above information is an  only  It is not intended as medical advice for individual conditions or treatments  Talk to your doctor, nurse or pharmacist before following any medical regimen to see if it is safe and effective for you

## 2023-03-07 ENCOUNTER — APPOINTMENT (OUTPATIENT)
Dept: LAB | Facility: HOSPITAL | Age: 88
End: 2023-03-07
Attending: INTERNAL MEDICINE

## 2023-03-07 DIAGNOSIS — N18.4 TYPE 2 DIABETES MELLITUS WITH STAGE 4 CHRONIC KIDNEY DISEASE, WITHOUT LONG-TERM CURRENT USE OF INSULIN (HCC): ICD-10-CM

## 2023-03-07 DIAGNOSIS — E11.22 TYPE 2 DIABETES MELLITUS WITH STAGE 4 CHRONIC KIDNEY DISEASE, WITHOUT LONG-TERM CURRENT USE OF INSULIN (HCC): ICD-10-CM

## 2023-03-07 LAB
ANION GAP SERPL CALCULATED.3IONS-SCNC: 8 MMOL/L (ref 4–13)
BUN SERPL-MCNC: 81 MG/DL (ref 5–25)
CALCIUM SERPL-MCNC: 9.6 MG/DL (ref 8.3–10.1)
CHLORIDE SERPL-SCNC: 101 MMOL/L (ref 96–108)
CO2 SERPL-SCNC: 27 MMOL/L (ref 21–32)
CREAT SERPL-MCNC: 2.54 MG/DL (ref 0.6–1.3)
GFR SERPL CREATININE-BSD FRML MDRD: 21 ML/MIN/1.73SQ M
GLUCOSE P FAST SERPL-MCNC: 137 MG/DL (ref 65–99)
POTASSIUM SERPL-SCNC: 3.3 MMOL/L (ref 3.5–5.3)
SODIUM SERPL-SCNC: 136 MMOL/L (ref 135–147)

## 2023-03-07 NOTE — PROGRESS NOTES
Assessment & Plan:    1  Mild cognitive impairment  Assessment & Plan:  Pt independent with adl, independent for iadls  Memory loss:  Pt and son note some decline since last visit  Cont supportive cares:  Lives with wife, son and family are supportive  SW needs this visit:  See note  Discussed safe environment  No safety concerns per pt or son  Pt still driving, doing ok  No trouble with appliances  Encourage safety plan should pt fall or get lost in future  Continue to engage in physical, cognitive, social activity  Cont doing games, puzzles, trivia, current event discussions  Cont daily walks or exercise video  Cont outings with friends and family  Avoid social isolation  Pt declines cognitive therapy at this time  Optimize chronic and acute conditions  Cont to f/u with providers and ensure medication compliance  Ensure good nutrition, adequate hydration  Monitor for changes in behavior/mood- if noted, notify provider for eval  Avoid medications that worsen cognition - check with provider or pharmacist before starting new or OTC meds  Do not overwhelm pt with info  Do one task at a time  Use slower pace  Keep to one conversation at a time  Do not multitask  2  Ambulatory dysfunction  Assessment & Plan:  · Using cane and/or walker  No recent falls  · Fall precautions      3  Other depression  Assessment & Plan:  · Pt reports current events make him anxious and depressed  · Declines adding any new medications for anxiety/depression  · Declines talk therapy with geripsychologist  · Pt is aware these are interventions are available should he need them in the future as mood can impact cognition and attention  · Cont emotional support, relaxation techniques  4  Other constipation  Assessment & Plan:  · No complaints at present  · Cont dietary fiber, fluids, mobility as able        5  Essential hypertension  Assessment & Plan:  · BP stable, denies ha/dizziness  · Continues on diuretic  · Continue dietary and lifestyle interventions  · Cont f/u with nephro, cards, pcp as directed  HPI:  We had the pleasure of evaluating Rod Patel who is a 81 y o  male in Geriatric follow up today  Previous MOCA:  29/30  Comorbidities include memory loss, ambulatory dysfunction, htn  He lives with his wife  Mr Brad Kirk is in the office with his son  Memory update per family:  Son has seen cognitive and functional decline in last 6 months  Pt is slowing down on driving, not doing as much  He is more irritable  Still independent with adls (needs reminders because he doesn't care to do it so much)  Not doing much, not social   Stays at home a lot  CKD is worsening, on more diuretics  Breathings is harder  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, 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)  Son would like info on HHA  Not remember people and recent events  Has stopped trying to exercise  Does drop o2 when doing too much  He is just getting tired and doesn't want to do as many things anymore  Day to day physical hurdles are becoming tiring  He has difficulty finding the right word while speaking: Yes  Patient requires repeat information or ask the same question repeatedly: Yes  Do you drive: Yes       Have you had any recent accidents, citations or getting lost in familiar places :Yes  Do you handle your own financial affairs such as balancing your checkbook, paying bills, investments: Yes  Do have any difficulties with handling your financial affairs: No  Have you or your family noted any change in your mood or personality:Yes  Are you currently or have you been treated in the past for depression or anxiety: No  Have you noticed any gait or balance disorder: No  Uses :Walker: no recent fall  Sometimes uses cane  Any hallucination or delusion: No    Memory update per patient:  STm is not as sharp as it used to be - odalis concentrate on important to him and does good with that    Over six months, a little worse   Does own meds, does pretty good with taking them  Doesn't do cooking and cleaning (baseline)  Goes out on occasion  Doing good on banking and driving  Appetite good  Little physical activity d/t breathing (wears o2 on occasion)  Social activity:  Staying at home more, visits family sometimes  Cognitive activity:  Cross word puzzles (from book)  Goal is to stay stable, health wise  Mood:  Some anxiety with [employer]  Sleep Issues: No - sleeps in recliner- 4 or 5 hours  Will nap later in day  Hearing and vision issue: Yes - mild [place], wears readers  ADL/IADL:  Independent/independent  Appetite/swallow:  good  Pain:  No chronic pain (doesn't use tramadol too much)    Past Medical, surgical, social, medication and allergy history and patients previous records reviewed  Family Review of Behavior [facility]:    pacing  No    agressive/combative behavior  Yes (verbal)   agitated  Yes   wandering  No   resistance to care  Yes   hoarding/hiding objects  Yes  - he's a   suspicious  No  withdrawn Yes  misplacing/losing objects No  personal hygiene problems  Yes  forgetfulness of actions Yes    ROS: Review of Systems   Constitutional: Negative for activity change, appetite change, chills and fatigue  HENT: Positive for hearing loss (mild)  Negative for congestion  Eyes: Negative for visual disturbance  Respiratory: Positive for shortness of breath (occ tyler)  Negative for cough  Cardiovascular: Negative for chest pain  Gastrointestinal: Negative for abdominal pain, constipation, diarrhea, nausea and vomiting  Genitourinary: Negative for difficulty urinating  Musculoskeletal: Positive for gait problem  Negative for arthralgias and back pain  Neurological: Negative for dizziness and light-headedness  Psychiatric/Behavioral: Positive for decreased concentration (forgetful)  Negative for dysphoric mood and sleep disturbance  The patient is nervous/anxious          Allergies:   No Known Allergies    Medications:      Current Outpatient Medications:   •  albuterol (2 5 mg/3 mL) 0 083 % nebulizer solution, Take 3 mL (2 5 mg total) by nebulization every 6 (six) hours as needed for wheezing or shortness of breath, Disp: 270 mL, Rfl: 5  •  albuterol (PROVENTIL HFA,VENTOLIN HFA) 90 mcg/act inhaler, TAKE 2 PUFFS BY MOUTH EVERY 4 HOURS AS NEEDED FOR WHEEZE, Disp: 8 5 g, Rfl: 3  •  allopurinol (ZYLOPRIM) 100 mg tablet, TAKE 2 TABLETS (200 MG TOTAL) BY MOUTH DAILY 1 DAILY FOR 2 WEEKS THEN 2 DAILY, Disp: 180 tablet, Rfl: 2  •  Blood Glucose Monitoring Suppl (OneTouch Verio) w/Device KIT, Use 2 (two) times a day Dx:E11 9, Disp: 1 kit, Rfl: 0  •  bumetanide (BUMEX) 2 mg tablet, Take 2 tablets (4 mg total) by mouth every morning, Disp: 180 tablet, Rfl: 2  •  calcitriol (ROCALTROL) 0 25 mcg capsule, Take 1 capsule (0 25 mcg total) by mouth daily, Disp: 90 capsule, Rfl: 3  •  cetirizine (ZyrTEC) 5 MG tablet, Take 1 tablet (5 mg total) by mouth daily, Disp: 90 tablet, Rfl: 3  •  fluticasone (FLONASE) 50 mcg/act nasal spray, Spray 1 spray into each nostril twice daily, Disp: 48 mL, Rfl: 3  •  fluticasone-umeclidinium-vilanterol (Trelegy Ellipta) 100-62 5-25 mcg/actuation inhaler, Inhale 1 puff daily Rinse mouth after use , Disp: 60 blister, Rfl: 5  •  glipiZIDE (GLUCOTROL) 5 mg tablet, TAKE 1 TABLET BY MOUTH 2 TIMES A DAY BEFORE MEALS , Disp: 180 tablet, Rfl: 2  •  glucose blood test strip, Test twice daily DX:  E11 9, Disp: 100 strip, Rfl: 3  •  Iron-Vitamin C (IRON 100/C PO), Take 325 mg by mouth, Disp: , Rfl:   •  labetalol (NORMODYNE) 200 mg tablet, Take 1 tablet (200 mg total) by mouth 2 (two) times a day, Disp: 180 tablet, Rfl: 3  •  Lancets (onetouch ultrasoft) lancets, Use 2 (two) times a day E11 9, Disp: 100 each, Rfl: 3  •  metolazone (ZAROXOLYN) 2 5 mg tablet, Take 2 5 mg 30 minutes before taking bumetanide in the morning on Mondays, Wednesdays and Fridays!, Disp: 30 tablet, Rfl: 3  •  mupirocin (BACTROBAN) 2 % ointment, Apply topically in the morning, Disp: 30 g, Rfl: 5  •  potassium chloride (K-DUR,KLOR-CON) 20 mEq tablet, Take 20 mEq twice a day Monday through Thursday, take an extra 20 mEq on  along with metolazone  , Disp: 193 tablet, Rfl: 3  •  tamsulosin (FLOMAX) 0 4 mg, TAKE 1 CAPSULE BY MOUTH EVERY DAY WITH DINNER, Disp: 90 capsule, Rfl: 3  •  traMADol (Ultram) 50 mg tablet, Take 1 tablet (50 mg total) by mouth 2 (two) times a day as needed for moderate pain, Disp: 30 tablet, Rfl: 1  •  Trelegy Ellipta 200-62 5-25 MCG/ACT AEPB inhaler, INHALE 1 PUFF DAILY RINSE MOUTH AFTER USE , Disp: , Rfl:     Vitals: There were no vitals filed for this visit  History:  Past Medical History:   Diagnosis Date   • Anxiety    • COPD (chronic obstructive pulmonary disease) ([facility] [address] )    • Coronary artery disease    • Depression    • Herpes zoster    • Hypertension    • Memory loss     possible   • Mycoplasma pneumonia    • Obesity    • Osteoarthritis    • Renal calculi      Past Surgical History:   Procedure Laterality Date   • CATARACT EXTRACTION      with insert intraoculat lens prosthesis   • HEMORRHOID SURGERY     • NECK SURGERY      for bone spur     Family History   Problem Relation Age of Onset   • Diabetes Mother    • Breast cancer Mother         She  in 12   Age 80   • Stroke Mother    • Pneumonia Father    • Substance Abuse Neg Hx    • Mental illness Neg Hx      Social History     Socioeconomic History   • Marital status: /Civil Union     Spouse name: Not on file   • Number of children: Not on file   • Years of education: Not on file   • Highest education level: Not on file   Occupational History   • Not on file   Tobacco Use   • Smoking status: Former     Packs/day: 2 00     Years: 42 00     Pack years: 84 00     Types: Cigarettes     Start date: 1956     Quit date: 1998     Years since quittin 1   • Smokeless tobacco: Never   Vaping Use   • Vaping Use: Never used Substance and Sexual Activity   • Alcohol use: Not Currently     Alcohol/week: 1 0 standard drink     Types: 1 Cans of beer per week     Comment: 1-2 beers a months   • Drug use: No   • Sexual activity: Not Currently   Other Topics Concern   • Not on file   Social History Narrative    Active advance directive    Daily coffee consumption, 1 cup/day    Denied exercise habits    Good dental hygiene    Supportive and safe    Active family support     Social Determinants of Health     Financial Resource Strain: Not on file   Food Insecurity: No Food Insecurity   • Worried About Running Out of Food in the Last Year: Never true   • Ran Out of Food in the Last Year: Never true   Transportation Needs: No Transportation Needs   • Lack of Transportation (Medical): No   • Lack of Transportation (Non-Medical): No   Physical Activity: Not on file   Stress: Not on file   Social Connections: Not on file   Intimate Partner Violence: Not on file   Housing Stability: Low Risk    • Unable to Pay for Housing in the Last Year: No   • Number of Places Lived in the Last Year: 1   • Unstable Housing in the Last Year: No     Past Surgical History:   Procedure Laterality Date   • CATARACT EXTRACTION      with insert intraoculat lens prosthesis   • HEMORRHOID SURGERY     • NECK SURGERY      for bone spur         Physical Exam:  Physical Exam  Vitals and nursing note reviewed  Constitutional:       General: He is not in acute distress  Appearance: Normal appearance  He is well-developed  He is not diaphoretic  HENT:      Head: Normocephalic  Cardiovascular:      Rate and Rhythm: Normal rate  Heart sounds: No murmur heard  No friction rub  No gallop  Pulmonary:      Effort: Pulmonary effort is normal  No respiratory distress  Breath sounds: Normal breath sounds  No wheezing or rales  Abdominal:      General: Bowel sounds are normal  There is no distension  Palpations: Abdomen is soft  Tenderness:  There is no abdominal tenderness  Musculoskeletal:         General: Normal range of motion  Cervical back: Normal range of motion  Skin:     General: Skin is warm and dry  Neurological:      General: No focal deficit present  Mental Status: He is alert and oriented to person, place, and time  Mental status is at baseline     Psychiatric:         Mood and Affect: Mood normal          Behavior: Behavior normal

## 2023-03-08 ENCOUNTER — OFFICE VISIT (OUTPATIENT)
Age: 88
End: 2023-03-08

## 2023-03-08 VITALS
SYSTOLIC BLOOD PRESSURE: 104 MMHG | HEART RATE: 71 BPM | DIASTOLIC BLOOD PRESSURE: 50 MMHG | OXYGEN SATURATION: 95 % | RESPIRATION RATE: 18 BRPM

## 2023-03-08 DIAGNOSIS — R26.2 AMBULATORY DYSFUNCTION: ICD-10-CM

## 2023-03-08 DIAGNOSIS — G31.84 MILD COGNITIVE IMPAIRMENT: Primary | ICD-10-CM

## 2023-03-08 DIAGNOSIS — K59.09 OTHER CONSTIPATION: ICD-10-CM

## 2023-03-08 DIAGNOSIS — F32.89 OTHER DEPRESSION: ICD-10-CM

## 2023-03-08 DIAGNOSIS — I10 ESSENTIAL HYPERTENSION: ICD-10-CM

## 2023-03-08 NOTE — ASSESSMENT & PLAN NOTE
· BP stable, denies ha/dizziness  · Continues on diuretic  · Continue dietary and lifestyle interventions  · Cont f/u with nephro, cards, pcp as directed

## 2023-03-08 NOTE — ASSESSMENT & PLAN NOTE
Pt independent with adl, independent for iadls  Memory loss:  Pt and son note some decline since last visit  Cont supportive cares:  Lives with wife, son and family are supportive  SW needs this visit:  See note  Discussed safe environment  No safety concerns per pt or son  Pt still driving, doing ok  No trouble with appliances  Encourage safety plan should pt fall or get lost in future  Continue to engage in physical, cognitive, social activity  Cont doing games, puzzles, trivia, current event discussions  Cont daily walks or exercise video  Cont outings with friends and family  Avoid social isolation  Pt declines cognitive therapy at this time  Optimize chronic and acute conditions  Cont to f/u with providers and ensure medication compliance  Ensure good nutrition, adequate hydration  Monitor for changes in behavior/mood- if noted, notify provider for eval  Avoid medications that worsen cognition - check with provider or pharmacist before starting new or OTC meds  Do not overwhelm pt with info  Do one task at a time  Use slower pace  Keep to one conversation at a time  Do not multitask

## 2023-03-08 NOTE — ASSESSMENT & PLAN NOTE
· Pt reports current events make him anxious and depressed  · Declines adding any new medications for anxiety/depression  · Declines talk therapy with geripsychologist  · Pt is aware these are interventions are available should he need them in the future as mood can impact cognition and attention  · Cont emotional support, relaxation techniques

## 2023-03-08 NOTE — PROGRESS NOTES
[facility]  [address] [facility], [address] [place] [address], [address] [place] [address]  [phone]    Social Work Follow-Up    LSW met with Damien Vargas for follow up visit  Completed [place] Cognitive Assessment, score 19/30 (previously 29/30 in 9/7/22), and Geriatric Depression Screen, 7/15 (previously 7/15 in 9/7/22)  LSW screened for SI due to some answers on GDS, patient declined SI/was negative  LSW also met with Tony's son Med Cortez who reports that he has noticed some memory decline in his father since his last visit in September  LSW provided [employer], home health aide list, and [facility] Older Adults and Depression booklet to Med Cortez  Mono and LSW discussed future planning and placement options for patient and patient's wife  Riggins Cognitive Assessment (MoCA) Version 8 3  Education: BS    Points Earned POSSIBLE Points   Visuospatial/Executive   Alternating [place] Making 0 1   Visuoconstructional skills 0 1   Visuoconstructional skills (clock) 3 3   Naming   Naming Animals 3 3   Attention   Digit Span 1 2   Vigilance (letters) 1 1   Serial 7 subtraction 3 3   Language   Sentence Repetition 0 2   Verbal fluency 1 1   Abstraction   Abstraction (word pairings) 2 2   Delayed recall   Delayed recall 0 5   Memory index score: 6/15   Orientation   Orientation 5 6   TOTAL SCORE: 19/30  (Normal ?26/30)   Additional notes:      LSW to remain available as needed

## 2023-03-09 ENCOUNTER — OFFICE VISIT (OUTPATIENT)
Dept: FAMILY MEDICINE CLINIC | Facility: HOSPITAL | Age: 88
End: 2023-03-09

## 2023-03-09 VITALS
DIASTOLIC BLOOD PRESSURE: 78 MMHG | WEIGHT: 257 LBS | HEIGHT: 71 IN | RESPIRATION RATE: 18 BRPM | BODY MASS INDEX: 35.98 KG/M2 | SYSTOLIC BLOOD PRESSURE: 130 MMHG | HEART RATE: 87 BPM | OXYGEN SATURATION: 98 %

## 2023-03-09 DIAGNOSIS — I50.32 CHRONIC DIASTOLIC CONGESTIVE HEART FAILURE (HCC): Primary | ICD-10-CM

## 2023-03-09 DIAGNOSIS — E11.22 TYPE 2 DIABETES MELLITUS WITH STAGE 4 CHRONIC KIDNEY DISEASE, WITHOUT LONG-TERM CURRENT USE OF INSULIN (HCC): ICD-10-CM

## 2023-03-09 DIAGNOSIS — N18.4 TYPE 2 DIABETES MELLITUS WITH STAGE 4 CHRONIC KIDNEY DISEASE, WITHOUT LONG-TERM CURRENT USE OF INSULIN (HCC): ICD-10-CM

## 2023-03-09 NOTE — ASSESSMENT & PLAN NOTE
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

## 2023-03-09 NOTE — PATIENT INSTRUCTIONS
Weigh your self 3 days a week such as Monday Wednesday Friday in the morning before breakfast   If you gain more than 3 pounds take 3 metolazone pills that week  If you know that she will have more salty food or he will eat more take 3 metolazone pills that week! It is okay to enjoy 1 beer a week with your son!

## 2023-03-09 NOTE — ASSESSMENT & PLAN NOTE
Wt Readings from Last 3 Encounters:   03/09/23 117 kg (257 lb)   02/27/23 117 kg (257 lb)   02/16/23 117 kg (258 lb)           Patient presents for follow-up of chronic diastolic CHF  His shortness of breath with exertion is stable  His dorsal edema is stable  Denies paroxysmal nocturnal dyspnea  His weight has been stable  He is also okay to auscultation and he has trace lower extremity edema, stable  His sodium, potassium and renal function were stable on review of labs in February    Discussed continuing low-salt diet  Patient will continue bumetanide 4 mg daily  Depending on how much lower extremity edema he has, depending on weight gain patient will take metolazone 2 5 mg 3 pills a week  He takes 2 pills a week normally  Patient will have repeat renal function in June and I will follow-up with patient after that

## 2023-03-09 NOTE — PROGRESS NOTES
Assessment/Plan:    Chronic diastolic congestive heart failure (HCC)  Wt Readings from Last 3 Encounters:   03/09/23 117 kg (257 lb)   02/27/23 117 kg (257 lb)   02/16/23 117 kg (258 lb)           Patient presents for follow-up of chronic diastolic CHF  His shortness of breath with exertion is stable  His dorsal edema is stable  Denies paroxysmal nocturnal dyspnea  His weight has been stable  He is also okay to auscultation and he has trace lower extremity edema, stable  His sodium, potassium and renal function were stable on review of labs in February    Discussed continuing low-salt diet  Patient will continue bumetanide 4 mg daily  Depending on how much lower extremity edema he has, depending on weight gain patient will take metolazone 2 5 mg 3 pills a week  He takes 2 pills a week normally  Patient will have repeat renal function in June and I will follow-up with patient after that  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 disease, without long-term current use of insulin ([facility])    Lab Results   Component Value Date    HGBA1C 6 5 (H) 01/30/2023       Patient 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  A1c was controlled  Continue Glucotrol  I will check his A1c the first week of June       Diagnoses and all orders for this visit:    Chronic diastolic congestive heart failure ([facility] [address] )    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 disease, without long-term current use of insulin ([facility])  -     Hemoglobin A1C; Future          Subjective:      Patient ID: Tyree Dickerson is a 81 y o  male  HPI    Patient presents for follow-up of chronic conditions as detailed in the assessment and plan        The following portions of the patient's history were reviewed and updated as appropriate: current medications, past family history, past medical history, past social history, past surgical history and problem list     Review of Systems      Objective:    BP 130/78   Pulse 87   Resp 18   Ht 5' 11" (1 803 m)   Wt 117 kg (257 lb)   SpO2 98%   BMI 35 84 kg/m²      Physical Exam  Constitutional:       General: He is not in acute distress  Appearance: He is not toxic-appearing  Eyes:      Conjunctiva/sclera: Conjunctivae normal    Cardiovascular:      Rate and Rhythm: Normal rate and regular rhythm  Pulmonary:      Effort: No respiratory distress  Breath sounds: No wheezing or rales  Skin:     General: Skin is warm and dry  Comments: He has trace lower leg edema   Neurological:      Mental Status: He is alert and oriented to person, place, and time     Psychiatric:         Mood and Affect: Mood normal              Maryam Menjivar MD

## 2023-03-13 DIAGNOSIS — I50.32 CHRONIC DIASTOLIC CONGESTIVE HEART FAILURE (HCC): ICD-10-CM

## 2023-03-13 RX ORDER — METOLAZONE 2.5 MG/1
TABLET ORAL
Qty: 45 TABLET | Refills: 3 | Status: SHIPPED | OUTPATIENT
Start: 2023-03-13

## 2023-04-28 ENCOUNTER — OFFICE VISIT (OUTPATIENT)
Dept: FAMILY MEDICINE CLINIC | Facility: HOSPITAL | Age: 88
End: 2023-04-28

## 2023-04-28 VITALS
BODY MASS INDEX: 36.26 KG/M2 | DIASTOLIC BLOOD PRESSURE: 58 MMHG | HEART RATE: 64 BPM | OXYGEN SATURATION: 94 % | WEIGHT: 259 LBS | SYSTOLIC BLOOD PRESSURE: 124 MMHG | HEIGHT: 71 IN

## 2023-04-28 DIAGNOSIS — N18.4 CHRONIC RENAL DISEASE, STAGE IV (HCC): ICD-10-CM

## 2023-04-28 DIAGNOSIS — I50.32 CHRONIC DIASTOLIC CONGESTIVE HEART FAILURE (HCC): Primary | ICD-10-CM

## 2023-04-28 RX ORDER — BUMETANIDE 2 MG/1
TABLET ORAL
Qty: 180 TABLET | Refills: 2 | Status: SHIPPED | OUTPATIENT
Start: 2023-04-28 | End: 2023-05-02

## 2023-04-28 RX ORDER — METOLAZONE 2.5 MG/1
TABLET ORAL
Qty: 45 TABLET | Refills: 3 | Status: SHIPPED | OUTPATIENT
Start: 2023-04-28

## 2023-04-28 NOTE — ASSESSMENT & PLAN NOTE
Wt Readings from Last 3 Encounters:   04/28/23 117 kg (259 lb)   03/09/23 117 kg (257 lb)   02/27/23 117 kg (257 lb)         Pt presents with cc of worsening dyspnea on exertion and increasing LE edema over the last week    He sleeps in a recliner chair because of dyspnea at night    Lungs are clear but he does have at least 1+ b/l LE edema up to the knees with stasis dermatitis of the RLE but no cellulitis on exam     He has worsening of chronic diastolic chf    He will continue low salt diet, raising his legs when sitting  I increased the dose of bumex and zaroxolyn  Will get bmp next week and f/u with pt on 5/11

## 2023-04-28 NOTE — PATIENT INSTRUCTIONS
Take bumex 2mg two pills in the morning and one pill after lunch! Weigh yourself every morning before breakfast and call me with your weight next Wednesday! Take metolazone 2 5 mg 30 minutes before breakfast on Monday, Wednesday and Friday mornings!

## 2023-04-28 NOTE — PROGRESS NOTES
Assessment/Plan:    Chronic diastolic congestive heart failure (HCC)  Wt Readings from Last 3 Encounters:   04/28/23 117 kg (259 lb)   03/09/23 117 kg (257 lb)   02/27/23 117 kg (257 lb)         Pt presents with cc of worsening dyspnea on exertion and increasing LE edema over the last week  He sleeps in a recliner chair because of dyspnea at night    Lungs are clear but he does have at least 1+ b/l LE edema up to the knees with stasis dermatitis of the RLE but no cellulitis on exam     He has worsening of chronic diastolic chf    He will continue low salt diet, raising his legs when sitting  I increased the dose of bumex and zaroxolyn  Will get bmp next week and f/u with pt on 5/11    Chronic renal disease, stage IV [facility])  Lab Results   Component Value Date    EGFR 21 03/07/2023    EGFR 22 02/15/2023    EGFR 21 01/30/2023    CREATININE 2 54 (H) 03/07/2023    CREATININE 2 45 (H) 02/15/2023    CREATININE 2 57 (H) 01/30/2023       Pt has stage 4 ckd  Denies symptoms of urinary obstruction  Will recheck his renal fxn after increasing the dose of bumex and zaroxolyn       Diagnoses and all orders for this visit:    Chronic diastolic congestive heart failure (HCC)  -     metolazone (ZAROXOLYN) 2 5 mg tablet; TAKE 1 TABLET BY MOUTH 30 MINUTES BEFORE TAKING BUMETANIDE IN THE MORNING ON M,W,F  -     Basic metabolic panel; Future  -     bumetanide (BUMEX) 2 mg tablet; 4mg in am and 2 mg after lunch    Chronic renal disease, stage IV (HCC)  -     Basic metabolic panel; Future          Subjective:      Patient ID: Kal Zepeda is a 81 y o  male  HPI    Patient presents for follow-up of chronic conditions as detailed in the assessment and plan        The following portions of the patient's history were reviewed and updated as appropriate: current medications, past family history, past medical history, past social history, past surgical history and problem list     Review of Systems      Objective:    /58   Pulse "64   Ht 5' 11\" (1 803 m)   Wt 117 kg (259 lb)   SpO2 94%   BMI 36 12 kg/m²      Physical Exam  Constitutional:       General: He is not in acute distress  Appearance: He is not toxic-appearing  Cardiovascular:      Rate and Rhythm: Normal rate and regular rhythm  Pulmonary:      Effort: No respiratory distress  Breath sounds: No wheezing or rales  Abdominal:      General: Bowel sounds are normal       Palpations: Abdomen is soft  Tenderness: There is no abdominal tenderness  Skin:     General: Skin is warm and dry  Findings: Erythema present  Comments: Please review the image I took   Neurological:      Mental Status: He is alert               Cali Hollins MD  "

## 2023-04-28 NOTE — ASSESSMENT & PLAN NOTE
Lab Results   Component Value Date    EGFR 21 03/07/2023    EGFR 22 02/15/2023    EGFR 21 01/30/2023    CREATININE 2 54 (H) 03/07/2023    CREATININE 2 45 (H) 02/15/2023    CREATININE 2 57 (H) 01/30/2023       Pt has stage 4 ckd  Denies symptoms of urinary obstruction  Will recheck his renal fxn after increasing the dose of bumex and zaroxolyn

## 2023-05-02 ENCOUNTER — LAB (OUTPATIENT)
Dept: LAB | Facility: HOSPITAL | Age: 88
End: 2023-05-02
Attending: INTERNAL MEDICINE

## 2023-05-02 DIAGNOSIS — I50.32 CHRONIC DIASTOLIC CONGESTIVE HEART FAILURE (HCC): ICD-10-CM

## 2023-05-02 DIAGNOSIS — N18.4 TYPE 2 DIABETES MELLITUS WITH STAGE 4 CHRONIC KIDNEY DISEASE, WITHOUT LONG-TERM CURRENT USE OF INSULIN (HCC): ICD-10-CM

## 2023-05-02 DIAGNOSIS — N18.4 CHRONIC RENAL DISEASE, STAGE IV (HCC): ICD-10-CM

## 2023-05-02 DIAGNOSIS — E11.22 TYPE 2 DIABETES MELLITUS WITH STAGE 4 CHRONIC KIDNEY DISEASE, WITHOUT LONG-TERM CURRENT USE OF INSULIN (HCC): ICD-10-CM

## 2023-05-02 LAB
ANION GAP SERPL CALCULATED.3IONS-SCNC: 7 MMOL/L (ref 4–13)
BUN SERPL-MCNC: 89 MG/DL (ref 5–25)
CALCIUM SERPL-MCNC: 9.6 MG/DL (ref 8.3–10.1)
CHLORIDE SERPL-SCNC: 99 MMOL/L (ref 96–108)
CO2 SERPL-SCNC: 30 MMOL/L (ref 21–32)
CREAT SERPL-MCNC: 2.96 MG/DL (ref 0.6–1.3)
GFR SERPL CREATININE-BSD FRML MDRD: 18 ML/MIN/1.73SQ M
GLUCOSE P FAST SERPL-MCNC: 153 MG/DL (ref 65–99)
POTASSIUM SERPL-SCNC: 3.3 MMOL/L (ref 3.5–5.3)
SODIUM SERPL-SCNC: 136 MMOL/L (ref 135–147)

## 2023-05-02 RX ORDER — BUMETANIDE 2 MG/1
2 TABLET ORAL 2 TIMES DAILY
Qty: 180 TABLET | Refills: 2
Start: 2023-05-02

## 2023-05-02 NOTE — RESULT ENCOUNTER NOTE
Call patient: Richmond Limon that his kidney function got worse, reduce dose of bumetanide to 2mg twice a day, keep same dose of metolazone, I ordered repeat bmp for 5/8

## 2023-05-03 ENCOUNTER — TELEPHONE (OUTPATIENT)
Dept: FAMILY MEDICINE CLINIC | Facility: HOSPITAL | Age: 88
End: 2023-05-03

## 2023-05-03 LAB
EST. AVERAGE GLUCOSE BLD GHB EST-MCNC: 134 MG/DL
HBA1C MFR BLD: 6.3 %

## 2023-05-03 NOTE — TELEPHONE ENCOUNTER
RIO SAID IT WAS THE BUMEX - SO HE SHOULD DISCONTINUE THE AFTERNOON MED AND JUST REMAIN TAKING THE 2MG  BID CORRECT?   THAT'S HOW I LEFT IT WITH HIM

## 2023-05-03 NOTE — TELEPHONE ENCOUNTER
IRO WAS TOLD TO CALL THIS WEEK WITH HIS WEIGHTS:    4/29  260  4/30  261  5/1    260  5/2    256  5/3   257

## 2023-05-03 NOTE — TELEPHONE ENCOUNTER
RIO Chang SAID HE HAS BEEN TAKING THE BUMETANIDE 2MG  BID (BOTH IN THE MORNING) IS THAT CORRECT? AND ALSO HE IS TAKING ANOTHER MEDICATION AT NOON - IS HE SUPPOSE TO STOP THE NOON MEDICATION?

## 2023-05-08 ENCOUNTER — APPOINTMENT (OUTPATIENT)
Dept: LAB | Facility: HOSPITAL | Age: 88
End: 2023-05-08
Attending: INTERNAL MEDICINE

## 2023-05-08 DIAGNOSIS — I50.32 CHRONIC DIASTOLIC CONGESTIVE HEART FAILURE (HCC): ICD-10-CM

## 2023-05-08 LAB
ANION GAP SERPL CALCULATED.3IONS-SCNC: 9 MMOL/L (ref 4–13)
BUN SERPL-MCNC: 90 MG/DL (ref 5–25)
CALCIUM SERPL-MCNC: 9.9 MG/DL (ref 8.3–10.1)
CHLORIDE SERPL-SCNC: 103 MMOL/L (ref 96–108)
CO2 SERPL-SCNC: 27 MMOL/L (ref 21–32)
CREAT SERPL-MCNC: 2.69 MG/DL (ref 0.6–1.3)
GFR SERPL CREATININE-BSD FRML MDRD: 20 ML/MIN/1.73SQ M
GLUCOSE P FAST SERPL-MCNC: 134 MG/DL (ref 65–99)
POTASSIUM SERPL-SCNC: 3.4 MMOL/L (ref 3.5–5.3)
SODIUM SERPL-SCNC: 139 MMOL/L (ref 135–147)

## 2023-05-09 ENCOUNTER — OFFICE VISIT (OUTPATIENT)
Dept: FAMILY MEDICINE CLINIC | Facility: HOSPITAL | Age: 88
End: 2023-05-09

## 2023-05-09 VITALS
OXYGEN SATURATION: 95 % | RESPIRATION RATE: 16 BRPM | WEIGHT: 257 LBS | HEART RATE: 80 BPM | SYSTOLIC BLOOD PRESSURE: 106 MMHG | BODY MASS INDEX: 35.98 KG/M2 | HEIGHT: 71 IN | DIASTOLIC BLOOD PRESSURE: 64 MMHG

## 2023-05-09 DIAGNOSIS — E11.40 TYPE 2 DIABETES MELLITUS WITH DIABETIC NEUROPATHY, WITHOUT LONG-TERM CURRENT USE OF INSULIN (HCC): Primary | ICD-10-CM

## 2023-05-09 DIAGNOSIS — N18.4 CHRONIC RENAL DISEASE, STAGE IV (HCC): ICD-10-CM

## 2023-05-09 DIAGNOSIS — I50.32 CHRONIC DIASTOLIC CONGESTIVE HEART FAILURE (HCC): ICD-10-CM

## 2023-05-09 PROBLEM — L29.9 PRURITUS: Status: RESOLVED | Noted: 2023-01-31 | Resolved: 2023-05-09

## 2023-05-09 RX ORDER — BUMETANIDE 2 MG/1
2 TABLET ORAL 2 TIMES DAILY
Qty: 180 TABLET | Refills: 2
Start: 2023-05-09

## 2023-05-09 RX ORDER — TRAMADOL HYDROCHLORIDE 50 MG/1
50 TABLET ORAL 2 TIMES DAILY PRN
Qty: 30 TABLET | Refills: 0 | Status: SHIPPED | OUTPATIENT
Start: 2023-05-09

## 2023-05-09 NOTE — ASSESSMENT & PLAN NOTE
Lab Results   Component Value Date    EGFR 20 05/08/2023    EGFR 18 05/02/2023    EGFR 21 03/07/2023    CREATININE 2 69 (H) 05/08/2023    CREATININE 2 96 (H) 05/02/2023    CREATININE 2 54 (H) 03/07/2023       Renal fxn is close to baseline after reducing the dose of bumex back to 4mg daily  Denies urinary retention

## 2023-05-09 NOTE — PATIENT INSTRUCTIONS
Take bumetanide 2mg one pill in the morning and one pill after lunch! Continue taking metalozone the same way three mornings a week!

## 2023-05-09 NOTE — ASSESSMENT & PLAN NOTE
Wt Readings from Last 3 Encounters:   05/09/23 117 kg (257 lb)   04/28/23 117 kg (259 lb)   03/09/23 117 kg (257 lb)         Pt has chronic diastolic chf, stage 4 ckd  His dyspnea with exertion is at baseline, LE edema is not improved  Pt did not tolerate bumex 6mg a day because his renal fxn got worse    Will try knee high compression stockings for the LE edema  Lungs were clear b/l    Continue bumex 2mg bid and metolazone 2 5mg on Mon-Wed-Fri in the mornings

## 2023-05-09 NOTE — PROGRESS NOTES
Assessment/Plan:    Chronic diastolic congestive heart failure (HCC)  Wt Readings from Last 3 Encounters:   05/09/23 117 kg (257 lb)   04/28/23 117 kg (259 lb)   03/09/23 117 kg (257 lb)         Pt has chronic diastolic chf, stage 4 ckd  His dyspnea with exertion is at baseline, LE edema is not improved  Pt did not tolerate bumex 6mg a day because his renal fxn got worse  Will try knee high compression stockings for the LE edema  Lungs were clear b/l    Continue bumex 2mg bid and metolazone 2 5mg on Mon-Wed-Fri in the mornings    Type 2 diabetes mellitus with diabetic neuropathy, without long-term current use of insulin ([facility])    Lab Results   Component Value Date    HGBA1C 6 3 (H) 05/02/2023       Pt has type 2 dm with neuropathy and stage 4 ckd  BS are stable  Continue glipizide 2 5mg bid    Chronic renal disease, stage IV ([facility])  Lab Results   Component Value Date    EGFR 20 05/08/2023    EGFR 18 05/02/2023    EGFR 21 03/07/2023    CREATININE 2 69 (H) 05/08/2023    CREATININE 2 96 (H) 05/02/2023    CREATININE 2 54 (H) 03/07/2023       Renal fxn is close to baseline after reducing the dose of bumex back to 4mg daily  Denies urinary retention       Diagnoses and all orders for this visit:    Type 2 diabetes mellitus with diabetic neuropathy, without long-term current use of insulin ([facility])    Chronic diastolic congestive heart failure (HCC)  -     bumetanide (BUMEX) 2 mg tablet; Take 1 tablet (2 mg total) by mouth 2 (two) times a day  -     Compression Stocking  -     traMADol (Ultram) 50 mg tablet; Take 1 tablet (50 mg total) by mouth 2 (two) times a day as needed for moderate pain    Chronic renal disease, stage IV ([facility])        Subjective:      Patient ID: Annamaria Braga is a 81 y o  male  HPI    Patient presents for follow-up of chronic conditions as detailed in the assessment and plan        The following portions of the patient's history were reviewed and updated as appropriate: current medications, past "family history, past medical history, past social history, past surgical history and problem list     Review of Systems      Objective:    /64 (BP Location: Left arm, Patient Position: Sitting)   Pulse 80   Resp 16   Ht 5' 11\" (1 803 m)   Wt 117 kg (257 lb)   SpO2 95%   BMI 35 84 kg/m²      Physical Exam  Constitutional:       General: He is not in acute distress  Eyes:      Conjunctiva/sclera: Conjunctivae normal    Cardiovascular:      Rate and Rhythm: Normal rate and regular rhythm  Pulmonary:      Effort: No respiratory distress  Breath sounds: No wheezing or rales  Skin:     General: Skin is warm and dry  Comments: Pt has LE edema, please review image I took  He has stasis dermatitis, no cellulitis of the RLE   Neurological:      Mental Status: He is alert               Ondina Lawrence MD  "

## 2023-05-09 NOTE — ASSESSMENT & PLAN NOTE
Lab Results   Component Value Date    HGBA1C 6 3 (H) 05/02/2023       Pt has type 2 dm with neuropathy and stage 4 ckd  BS are stable  Continue glipizide 2 5mg bid

## 2023-05-10 ENCOUNTER — TELEPHONE (OUTPATIENT)
Dept: FAMILY MEDICINE CLINIC | Facility: HOSPITAL | Age: 88
End: 2023-05-10

## 2023-05-10 NOTE — TELEPHONE ENCOUNTER
He just picked up   bumetanide (BUMEX) 2 mg tablet  From the pharmacy  His bottle says take 2 tabs in the morning and 1 after lunch  He would like clarification if that is correct  He thought he was told it was going to be 1 in the morning and 1 in the afternoon

## 2023-05-17 ENCOUNTER — OFFICE VISIT (OUTPATIENT)
Dept: URGENT CARE | Facility: CLINIC | Age: 88
End: 2023-05-17

## 2023-05-17 VITALS
HEART RATE: 86 BPM | SYSTOLIC BLOOD PRESSURE: 105 MMHG | RESPIRATION RATE: 20 BRPM | OXYGEN SATURATION: 92 % | TEMPERATURE: 97.9 F | DIASTOLIC BLOOD PRESSURE: 56 MMHG

## 2023-05-17 DIAGNOSIS — H10.33 ACUTE BACTERIAL CONJUNCTIVITIS OF BOTH EYES: Primary | ICD-10-CM

## 2023-05-17 RX ORDER — OFLOXACIN 3 MG/ML
1 SOLUTION/ DROPS OPHTHALMIC 4 TIMES DAILY
Qty: 5 ML | Refills: 0 | Status: SHIPPED | OUTPATIENT
Start: 2023-05-17

## 2023-05-17 NOTE — PROGRESS NOTES
[facility]        NAME: Cyril Funes is a 81 y o  male  : 1935    MRN: [id number]  DATE: May 17, 2023  TIME: 10:10 AM    Assessment and Plan   Acute bacterial conjunctivitis of both eyes [H10 33]  1  Acute bacterial conjunctivitis of both eyes  ofloxacin (OCUFLOX) 0 3 % ophthalmic solution            Patient Instructions       Follow up with PCP in 3-5 days  Proceed to  ER if symptoms worsen  Chief Complaint     Chief Complaint   Patient presents with   • Conjunctivitis     Pt has had red, itchy, watery eyes for the last 4 days  4 days ago he awoke with both his eyes crusted over  Also reports occasional blurry vision  History of Present Illness       50-year-old male presenting with a 4-day history of increased redness, itchy watery eyes first beginning in his right eye and has now progressed to the left eye  Denies any blurry vision or loss of vision  Review of Systems   Review of Systems   Constitutional: Negative  HENT: Negative  Eyes: Positive for redness and itching  Respiratory: Negative  Cardiovascular: Negative  Gastrointestinal: Negative  Genitourinary: Negative  Skin: Negative  Allergic/Immunologic: Negative  Neurological: Negative  Hematological: Negative  Psychiatric/Behavioral: Negative            Current Medications       Current Outpatient Medications:   •  albuterol (2 5 mg/3 mL) 0 083 % nebulizer solution, Take 3 mL (2 5 mg total) by nebulization every 6 (six) hours as needed for wheezing or shortness of breath, Disp: 270 mL, Rfl: 5  •  albuterol (PROVENTIL HFA,VENTOLIN HFA) 90 mcg/act inhaler, TAKE 2 PUFFS BY MOUTH EVERY 4 HOURS AS NEEDED FOR WHEEZE, Disp: 8 5 g, Rfl: 3  •  allopurinol (ZYLOPRIM) 100 mg tablet, TAKE 2 TABLETS (200 MG TOTAL) BY MOUTH DAILY 1 DAILY FOR 2 WEEKS THEN 2 DAILY, Disp: 180 tablet, Rfl: 2  •  Blood Glucose Monitoring Suppl (OneTouch Verio) w/Device KIT, Use 2 (two) times a day Dx:E11 9, Disp: 1 kit, Rfl: 0  •  bumetanide (BUMEX) 2 mg tablet, Take 1 tablet (2 mg total) by mouth 2 (two) times a day, Disp: 180 tablet, Rfl: 2  •  calcitriol (ROCALTROL) 0 25 mcg capsule, Take 1 capsule (0 25 mcg total) by mouth daily, Disp: 90 capsule, Rfl: 3  •  cetirizine (ZyrTEC) 5 MG tablet, Take 1 tablet (5 mg total) by mouth daily, Disp: 90 tablet, Rfl: 3  •  fluticasone (FLONASE) 50 mcg/act nasal spray, Spray 1 spray into each nostril twice daily, Disp: 48 mL, Rfl: 3  •  glipiZIDE (GLUCOTROL) 5 mg tablet, TAKE 1 TABLET BY MOUTH 2 TIMES A DAY BEFORE MEALS , Disp: 180 tablet, Rfl: 2  •  glucose blood test strip, Test twice daily DX:  E11 9, Disp: 100 strip, Rfl: 3  •  Iron-Vitamin C (IRON 100/C PO), Take 325 mg by mouth, Disp: , Rfl:   •  labetalol (NORMODYNE) 200 mg tablet, Take 1 tablet (200 mg total) by mouth 2 (two) times a day, Disp: 180 tablet, Rfl: 3  •  Lancets (onetouch ultrasoft) lancets, Use 2 (two) times a day E11 9, Disp: 100 each, Rfl: 3  •  metolazone (ZAROXOLYN) 2 5 mg tablet, TAKE 1 TABLET BY MOUTH 30 MINUTES BEFORE TAKING BUMETANIDE IN THE MORNING ON M,W,F, Disp: 45 tablet, Rfl: 3  •  mupirocin (BACTROBAN) 2 % ointment, Apply topically in the morning, Disp: 30 g, Rfl: 5  •  ofloxacin (OCUFLOX) 0 3 % ophthalmic solution, Administer 1 drop into the left eye 4 (four) times a day, Disp: 5 mL, Rfl: 0  •  potassium chloride (K-DUR,KLOR-CON) 20 mEq tablet, Take 20 mEq twice a day Monday through Thursday, take an extra 20 mEq on Fridays along with metolazone  , Disp: 193 tablet, Rfl: 3  •  tamsulosin (FLOMAX) 0 4 mg, TAKE 1 CAPSULE BY MOUTH EVERY DAY WITH DINNER, Disp: 90 capsule, Rfl: 3  •  traMADol (Ultram) 50 mg tablet, Take 1 tablet (50 mg total) by mouth 2 (two) times a day as needed for moderate pain, Disp: 30 tablet, Rfl: 0  •  Trelegy Ellipta 200-62 5-25 MCG/ACT AEPB inhaler, INHALE 1 PUFF DAILY RINSE MOUTH AFTER USE , Disp: , Rfl:   •  fluticasone-umeclidinium-vilanterol (Trelegy Ellipta) 100-62 5-25 mcg/actuation inhaler, Inhale 1 puff daily Rinse mouth after use , Disp: 60 blister, Rfl: 5    Current Allergies     Allergies as of 2023   • (No Known Allergies)            The following portions of the patient's history were reviewed and updated as appropriate: allergies, current medications, past family history, past medical history, past social history, past surgical history and problem list      Past Medical History:   Diagnosis Date   • Anxiety    • COPD (chronic obstructive pulmonary disease) ([facility] [address] )    • Coronary artery disease    • Depression    • Herpes zoster    • Hypertension    • Memory loss     possible   • Mycoplasma pneumonia    • Obesity    • Osteoarthritis    • Renal calculi        Past Surgical History:   Procedure Laterality Date   • CATARACT EXTRACTION      with insert intraoculat lens prosthesis   • HEMORRHOID SURGERY     • NECK SURGERY      for bone spur       Family History   Problem Relation Age of Onset   • Diabetes Mother    • Breast cancer Mother         She  in 12  Age 80   • Stroke Mother    • Pneumonia Father    • Substance Abuse Neg Hx    • Mental illness Neg Hx          Medications have been verified  Objective   /56   Pulse 86   Temp 97 9 °F (36 6 °C) (Oral)   Resp 20   SpO2 92%   No LMP for male patient  Physical Exam     Physical Exam  Constitutional:       Appearance: He is well-developed  HENT:      Head: Normocephalic  Eyes:      General:         Right eye: Discharge present  Left eye: Discharge present  Pupils: Pupils are equal, round, and reactive to light  Cardiovascular:      Rate and Rhythm: Normal rate and regular rhythm  Pulmonary:      Effort: Pulmonary effort is normal    Musculoskeletal:         General: Normal range of motion  Cervical back: Normal range of motion  Skin:     General: Skin is warm  Neurological:      Mental Status: He is alert and oriented to person, place, and time

## 2023-06-01 ENCOUNTER — OFFICE VISIT (OUTPATIENT)
Dept: PODIATRY | Facility: CLINIC | Age: 88
End: 2023-06-01

## 2023-06-01 VITALS
WEIGHT: 257 LBS | SYSTOLIC BLOOD PRESSURE: 101 MMHG | BODY MASS INDEX: 35.98 KG/M2 | DIASTOLIC BLOOD PRESSURE: 56 MMHG | HEIGHT: 71 IN | HEART RATE: 94 BPM

## 2023-06-01 DIAGNOSIS — B35.1 ONYCHOMYCOSIS: Primary | ICD-10-CM

## 2023-06-01 DIAGNOSIS — E11.40 TYPE 2 DIABETES MELLITUS WITH DIABETIC NEUROPATHY, WITHOUT LONG-TERM CURRENT USE OF INSULIN (HCC): ICD-10-CM

## 2023-06-01 DIAGNOSIS — I87.331 IDIOPATHIC CHRONIC VENOUS HYPERTENSION OF RIGHT LOWER EXTREMITY WITH ULCER AND INFLAMMATION (HCC): ICD-10-CM

## 2023-06-01 DIAGNOSIS — L97.919 IDIOPATHIC CHRONIC VENOUS HYPERTENSION OF RIGHT LOWER EXTREMITY WITH ULCER AND INFLAMMATION (HCC): ICD-10-CM

## 2023-06-01 NOTE — PROGRESS NOTES
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 (HCC)              No orders of the defined types were placed in this encounter  PLAN:  Disease prevention and related risk factors of diabetes were identified and discussed  The patient was educated in proper foot wear for diabetics  Educated in daily foot assessment and routine diabetic foot care  Discussed the importance of controlling BS through diet and exercise  Follow up in 12 weeks for [facility]  Follow-up in 4 weeks for evaluation of right leg ulceration  Silicone border foam dressing applied right leg, to be changed every 3 days  Tubigrip F compression dispensed to patient to be worn daily with elevation of legs consistently  Rx foam border dressing from [facility]        Procedures: 91179  All mycotic toenails were reduced and debrided in length, width, and girth using a nail nipper and electric dremel  All hyperkeratotic skin lesion(s) if present were sharply pared with a #10 scalpel with no evidence of ulceration/abscess  Patient tolerated procedure(s) well without complications  Procedures     HPI:  Tyesha Sharma is a 81 y  o year old male seen for diabetic foot exam   The patient has class findings with diabetes  BS is under control  The patient complained of thick toenails and lower extremity swelling  The patient denied any acute pedal disorder, redness, acute swelling, or recent injury        The following portions of the patient's history were reviewed and updated as appropriate: allergies, current medications, past family history, past medical history, past social history, past surgical history and problem list     REVIEW OF SYSTEMS:  GENERAL: No fever or chills  HEART: No "chest pain, or palpitation  RESPIRATORY:  No acute SOB or cough  GI: No Nausea, vomit or diarrhea  NEUROLOGIC: No syncope or acute weakness    PHYSICAL EXAM:    /56   Pulse 94   Ht 5' 11\" (1 803 m)   Wt 117 kg (257 lb)   BMI 35 84 kg/m²     VASCULAR EXAM  Posterior tibial artery +1 bilateral  Dorsalis pedis artery absent bilateral  The patient has class findings with skin atrophy, lack of digital hair, and nail dystrophy  There is +2 lower extremity edema right leg, +1 left leg  Venous stasis skin changes noted BLE  No    NEUROLOGIC EXAM  Sensation is intact to light touch  Yes   Sensation is intact to 10gm monofilament  Vibratory sensation diminished  Tingling paresthesias noted bilateral       No focal neurologic deficit  DERMATOLOGIC EXAM:   Texture, Tone and Turgor are diminished bilateral   The patient has dystrophic/hypertrophic toenails with yellow/white discoloration, onycholysis, and subungal debris  Fungal odor noted  Brittle nature noted  Right foot nails severely dystrophic x5 with 0 45 cm ave thickness (1 through 5)  Left foot nails severely dystrophic x5 with 0 35 cm ave thickness (1 through 5)  Patient has hyperkeratotic lesions noted:  Right foot at none  Left foot at none  Ulcer/Wound:   partial depth venous wound noted anterior medial right leg, measures 10 0 x 8 0 x 0 1 cm, mixed skin noted within wound base which is healthy and granular, moderate heavy serous drainage  No signs of infection  No notable suspicious skin lesions  MUSCULOSKELETAL EXAM:   No acute joint pain, edema, or redness  No acute musculoskeletal problem  Patient has deformity including none  Patient has mild ambulation limitations  Patient wears DM shoes? No    Risk Category/Class Findings: Q8 (B2 ABC, B3)  0 = No loss of protective sensation    A1)  Has the patient had a previous amputation of the foot or integral skeletal portion thereof?  No  B1)  Does the patient have absent " posterior tibial pulse? No  B3)  Does the patient have absent dorsalis pedis? Yes  B2)  Does the patient have three of the following? Yes           1  Hair growth (increased or decreased), 2   Nail changes (thickening) and 3  Pigmentary changes (discoloring)  C)  Does the patient have two of the following and one above? Yes            3   Edema and 4    Paraesthesias (abnormal spontaneous sensations in the feet)

## 2023-06-12 ENCOUNTER — APPOINTMENT (OUTPATIENT)
Dept: LAB | Facility: HOSPITAL | Age: 88
End: 2023-06-12
Payer: COMMERCIAL

## 2023-06-12 ENCOUNTER — OFFICE VISIT (OUTPATIENT)
Dept: FAMILY MEDICINE CLINIC | Facility: HOSPITAL | Age: 88
End: 2023-06-12
Payer: COMMERCIAL

## 2023-06-12 VITALS
BODY MASS INDEX: 36.4 KG/M2 | RESPIRATION RATE: 16 BRPM | OXYGEN SATURATION: 95 % | HEIGHT: 71 IN | WEIGHT: 260 LBS | SYSTOLIC BLOOD PRESSURE: 108 MMHG | HEART RATE: 84 BPM | DIASTOLIC BLOOD PRESSURE: 60 MMHG | TEMPERATURE: 97.2 F

## 2023-06-12 DIAGNOSIS — E11.22 TYPE 2 DIABETES MELLITUS WITH STAGE 4 CHRONIC KIDNEY DISEASE, WITHOUT LONG-TERM CURRENT USE OF INSULIN (HCC): ICD-10-CM

## 2023-06-12 DIAGNOSIS — L97.811 VENOUS STASIS ULCER OF OTHER PART OF RIGHT LOWER LEG LIMITED TO BREAKDOWN OF SKIN, UNSPECIFIED WHETHER VARICOSE VEINS PRESENT (HCC): ICD-10-CM

## 2023-06-12 DIAGNOSIS — I50.32 CHRONIC DIASTOLIC CONGESTIVE HEART FAILURE (HCC): ICD-10-CM

## 2023-06-12 DIAGNOSIS — F11.20 CONTINUOUS OPIOID DEPENDENCE (HCC): ICD-10-CM

## 2023-06-12 DIAGNOSIS — R39.11 BENIGN PROSTATIC HYPERPLASIA WITH URINARY HESITANCY: ICD-10-CM

## 2023-06-12 DIAGNOSIS — I83.018 VENOUS STASIS ULCER OF OTHER PART OF RIGHT LOWER LEG LIMITED TO BREAKDOWN OF SKIN, UNSPECIFIED WHETHER VARICOSE VEINS PRESENT (HCC): ICD-10-CM

## 2023-06-12 DIAGNOSIS — I10 ESSENTIAL HYPERTENSION: Primary | ICD-10-CM

## 2023-06-12 DIAGNOSIS — N40.1 BENIGN PROSTATIC HYPERPLASIA WITH URINARY HESITANCY: ICD-10-CM

## 2023-06-12 DIAGNOSIS — N18.4 TYPE 2 DIABETES MELLITUS WITH STAGE 4 CHRONIC KIDNEY DISEASE, WITHOUT LONG-TERM CURRENT USE OF INSULIN (HCC): ICD-10-CM

## 2023-06-12 DIAGNOSIS — N25.81 SECONDARY HYPERPARATHYROIDISM OF RENAL ORIGIN (HCC): ICD-10-CM

## 2023-06-12 PROBLEM — H10.33 ACUTE BACTERIAL CONJUNCTIVITIS OF BOTH EYES: Status: RESOLVED | Noted: 2023-05-17 | Resolved: 2023-06-12

## 2023-06-12 LAB
ALBUMIN SERPL BCP-MCNC: 3.7 G/DL (ref 3.5–5)
ALP SERPL-CCNC: 80 U/L (ref 46–116)
ALT SERPL W P-5'-P-CCNC: 19 U/L (ref 12–78)
ANION GAP SERPL CALCULATED.3IONS-SCNC: 6 MMOL/L (ref 4–13)
AST SERPL W P-5'-P-CCNC: 11 U/L (ref 5–45)
BACTERIA UR QL AUTO: NORMAL /HPF
BILIRUB SERPL-MCNC: 0.45 MG/DL (ref 0.2–1)
BILIRUB UR QL STRIP: NEGATIVE
BUN SERPL-MCNC: 77 MG/DL (ref 5–25)
CALCIUM SERPL-MCNC: 9.3 MG/DL (ref 8.3–10.1)
CHLORIDE SERPL-SCNC: 103 MMOL/L (ref 96–108)
CLARITY UR: CLEAR
CO2 SERPL-SCNC: 29 MMOL/L (ref 21–32)
COLOR UR: COLORLESS
CREAT SERPL-MCNC: 2.74 MG/DL (ref 0.6–1.3)
CREAT UR-MCNC: 33.5 MG/DL
ERYTHROCYTE [DISTWIDTH] IN BLOOD BY AUTOMATED COUNT: 14.8 % (ref 11.6–15.1)
GFR SERPL CREATININE-BSD FRML MDRD: 19 ML/MIN/1.73SQ M
GLUCOSE SERPL-MCNC: 88 MG/DL (ref 65–140)
GLUCOSE UR STRIP-MCNC: NEGATIVE MG/DL
HCT VFR BLD AUTO: 42.9 % (ref 36.5–49.3)
HGB BLD-MCNC: 13.9 G/DL (ref 12–17)
HGB UR QL STRIP.AUTO: NEGATIVE
KETONES UR STRIP-MCNC: NEGATIVE MG/DL
LEUKOCYTE ESTERASE UR QL STRIP: NEGATIVE
MAGNESIUM SERPL-MCNC: 2 MG/DL (ref 1.6–2.6)
MCH RBC QN AUTO: 29 PG (ref 26.8–34.3)
MCHC RBC AUTO-ENTMCNC: 32.4 G/DL (ref 31.4–37.4)
MCV RBC AUTO: 90 FL (ref 82–98)
NITRITE UR QL STRIP: NEGATIVE
NON-SQ EPI CELLS URNS QL MICRO: NORMAL /HPF
PH UR STRIP.AUTO: 6.5 [PH]
PHOSPHATE SERPL-MCNC: 3.6 MG/DL (ref 2.3–4.1)
PLATELET # BLD AUTO: 274 THOUSANDS/UL (ref 149–390)
PMV BLD AUTO: 11.1 FL (ref 8.9–12.7)
POTASSIUM SERPL-SCNC: 3.5 MMOL/L (ref 3.5–5.3)
PROT SERPL-MCNC: 7.2 G/DL (ref 6.4–8.4)
PROT UR STRIP-MCNC: NEGATIVE MG/DL
PROT UR-MCNC: <6 MG/DL
PTH-INTACT SERPL-MCNC: 631.2 PG/ML (ref 12–88)
RBC # BLD AUTO: 4.79 MILLION/UL (ref 3.88–5.62)
RBC #/AREA URNS AUTO: NORMAL /HPF
SODIUM SERPL-SCNC: 138 MMOL/L (ref 135–147)
SP GR UR STRIP.AUTO: 1.01 (ref 1–1.03)
URATE SERPL-MCNC: 7.3 MG/DL (ref 3.5–8.5)
UROBILINOGEN UR STRIP-ACNC: <2 MG/DL
WBC # BLD AUTO: 10.73 THOUSAND/UL (ref 4.31–10.16)
WBC #/AREA URNS AUTO: NORMAL /HPF

## 2023-06-12 PROCEDURE — 36415 COLL VENOUS BLD VENIPUNCTURE: CPT

## 2023-06-12 PROCEDURE — 81001 URINALYSIS AUTO W/SCOPE: CPT

## 2023-06-12 PROCEDURE — 84550 ASSAY OF BLOOD/URIC ACID: CPT

## 2023-06-12 PROCEDURE — 84156 ASSAY OF PROTEIN URINE: CPT

## 2023-06-12 PROCEDURE — 82570 ASSAY OF URINE CREATININE: CPT

## 2023-06-12 PROCEDURE — 85027 COMPLETE CBC AUTOMATED: CPT

## 2023-06-12 PROCEDURE — 83970 ASSAY OF PARATHORMONE: CPT

## 2023-06-12 PROCEDURE — 80053 COMPREHEN METABOLIC PANEL: CPT

## 2023-06-12 PROCEDURE — 84100 ASSAY OF PHOSPHORUS: CPT

## 2023-06-12 PROCEDURE — 83735 ASSAY OF MAGNESIUM: CPT

## 2023-06-12 PROCEDURE — 99213 OFFICE O/P EST LOW 20 MIN: CPT | Performed by: INTERNAL MEDICINE

## 2023-06-12 RX ORDER — BETAMETHASONE DIPROPIONATE 0.5 MG/G
CREAM TOPICAL 2 TIMES DAILY
Qty: 45 G | Refills: 0 | Status: SHIPPED | OUTPATIENT
Start: 2023-06-12 | End: 2023-06-26

## 2023-06-12 NOTE — PATIENT INSTRUCTIONS
DASH Eating Plan   AMBULATORY CARE:   The DASH (Dietary Approaches to Stop Hypertension) Eating Plan  is designed to help prevent or lower high blood pressure  It can also help to lower LDL (bad) cholesterol and decrease your risk for heart disease  The plan is low in sodium, sugar, unhealthy fats, and total fat  It is high in potassium, calcium, magnesium, and fiber  These nutrients are added when you eat more fruits, vegetables, and whole grains  With the DASH eating plan, you need to eat a certain number of servings from each food group  This will help you get enough of certain nutrients and limit others  The amount of servings you should eat depends on how many calories you need  Your dietitian can help you create meal plans with the right number of servings for each food group  What you need to know about sodium:  Your dietitian will tell you how much sodium is safe for you to have each day  People with high blood pressure should have no more than 1,500 to 2,300 mg of sodium in a day  A teaspoon (tsp) of salt has 2,300 mg of sodium  This may seem like a difficult goal, but small changes to the foods you eat can make a big difference  Your healthcare provider or dietitian can help you create a meal plan that follows your sodium limit  Read food labels  Food labels can help you choose foods that are low in sodium  The amount of sodium is listed in milligrams (mg)  The % Daily Value (DV) column tells you how much of your daily needs are met by 1 serving of the food for each nutrient listed  Choose foods that have less than 5% of the DV of sodium  These foods are considered low in sodium  Foods that have 20% or more of the DV of sodium are considered high in sodium  Avoid foods that have more than 300 mg of sodium in each serving  Choose foods that say low-sodium, reduced-sodium, or no salt added on the food label  Limit added salt  Do not salt food at the table if you add salt when you cook   Use herbs and spices, such as onions, garlic, and salt-free seasonings to add flavor  Try lemon or lime juice or vinegar to add a tart flavor  Use hot peppers or a small amount of hot pepper sauce to add a spicy flavor  Limit foods high in added salt, such as the following:    Seasonings made with salt, such as garlic salt, celery salt, onion salt, seasoned salt, meat tenderizers, and monosodium glutamate (MSG)    Miso soup and canned or dried soup mixes    Regular soy sauce, barbecue sauce, teriyaki sauce, steak sauce, Worcestershire sauce, and most flavored vinegars    Snack foods, such as salted chips, popcorn, pretzels, pork rinds, salted crackers, and salted nuts    Frozen foods, such as dinners, entrees, vegetables with sauces, and breaded meats    Ask about salt substitutes  Ask your healthcare provider if you may use salt substitutes  Some salt substitutes have ingredients that can be harmful if you have certain health conditions  Choose foods carefully at restaurants  Meals from restaurants, especially fast food restaurants, are often high in sodium  Some restaurants have nutrition information that tells you the amount of sodium in their foods  Ask to have your food prepared with less, or no salt  What you need to know about fats:  Healthy fats include unsaturated fats and omega-3 fatty acids  Unhealthy fats include saturated fats and trans fats    Include healthy fats, such as the following:      Cooking oils, such as soybean, canola, olive, or sunflower    Fatty fish, such as salmon, tuna, mackerel, or sardines    Flaxseed oil or ground flaxseed    ½ cup of cooked beans, such as black beans, kidney beans, or ramirez beans    1½ ounces of low-sodium nuts, such as almonds or walnuts    Low-sugar, low-sodium peanut butter    Seeds such as linn seeds or sunflower seeds       Limit or do not have unhealthy fats, such as the following:      Foods that contain fat from animals, such as fatty meats, whole milk, butter, and cream    Shortening, stick margarine, palm oil, and coconut oil    Full-fat or creamy salad dressing    Creamy soup    Crackers, chips, and baked goods made with margarine or shortening    Foods that are fried in unhealthy fats    Gravy and sauces, such as Real or cheese sauces    What you need to know about carbohydrates (carbs): All carbs break down into sugar  Complex carbs contain more fiber than simple carbs  This means complex carbs go into the bloodstream more slowly and cause less of a blood sugar spike  Try to include more complex carbs and fewer simple carbs  Include complex carbs, such as the followin slice of whole-grain bread    1 ounce of dry cereal that does not contain added sugar    ½ cup of cooked oatmeal    2 ounces of cooked whole-grain pasta    ½ cup of cooked brown rice    Limit or do not have simple carbs, such as the following:      [employer], such as doughnuts, pastries, and cookies    Mixes for cornbread and biscuits    White rice and pasta mixes, such as boxed macaroni and cheese    Instant and cold cereals that contain sugar    Jelly, jam, and ice cream that contain sugar    Condiments such as ketchup    Drinks high in sugar, such as soft drinks, lemonade, and fruit juice    What you need to know about vegetables and fruits:  Vegetables and fruits can be fresh, frozen, or canned  If possible, try to choose low-sodium canned options    Include a variety of vegetables and fruits, such as the followin medium apple, pear, or peach (about ½ cup chopped)    ½ small banana    ½ cup berries, such as blueberries, strawberries, or blackberries    1 cup of raw leafy greens, such as lettuce, spinach, kale, or ca greens    ½ cup of frozen or canned (no added salt) vegetables, such as green beans    ½ cup of fresh, frozen, or canned fruit (canned in light syrup or fruit juice)    ½ cup of vegetable or fruit juice    Limit or do not have vegetables and fruits made in the following ways:      Frozen fruit such as cherries that have added sugar    Fruit in cream or butter sauce    Canned vegetables that are high in sodium    Sauerkraut, pickled vegetables, and other foods prepared in brine    Fried vegetables or vegetables in butter or high-fat sauces    What you need to know about protein foods: Include lean or low-fat protein foods, such as the following:      Poultry (chicken, turkey) with no skin    Fish (especially fatty fish, such as salmon, fresh tuna, or mackerel)    Lean beef and pork (loin, round, extra lean hamburger)    Egg whites and egg substitutes    1 cup of nonfat (skim) or 1% milk    1½ ounces of fat-free or low-fat cheese    6 ounces of nonfat or low-fat yogurt    Limit or do not have high-fat protein foods, such as the following:      Smoked or cured meat, such as corned beef, serna, ham, hot dogs, and sausage    Canned beans and canned meats or spreads, such as potted meats, sardines, anchovies, and imitation seafood    Deli or lunch meats, such as bologna, ham, turkey, and roast beef    High-fat meat (T-bone steak, regular hamburger, and ribs)    Whole eggs and egg yolks    Whole milk, 2% milk, and cream    Regular cheese and processed cheese    Other guidelines to follow:   Maintain a healthy weight  Your risk for heart disease is higher if you are overweight  Your healthcare provider may suggest that you lose weight if you are overweight  You can lose weight by eating fewer calories and foods that have added sugars and fat  The DASH meal plan can help you do this  Decrease calories by eating smaller portions at each meal and fewer snacks  Ask your healthcare provider for more information about how to lose weight  Exercise regularly  Regular exercise can help you reach or maintain a healthy weight  Regular exercise can also help decrease your blood pressure and improve your cholesterol levels   Get 30 minutes or more of moderate exercise each day of the week  To lose weight, get at least 60 minutes of exercise  Talk to your healthcare provider about the best exercise program for you  Limit alcohol  Women should limit alcohol to 1 drink a day  Men should limit alcohol to 2 drinks a day  A drink of alcohol is 12 ounces of beer, 5 ounces of wine, or 1½ ounces of liquor  For more information:   [employer] [facility] [address]  [address]  [place]aj [place] [id number]  Phone: [phone] [phone]  Web Address: [facility] no    © Copyright [employer] 2022 Information is for End User's use only and may not be sold, redistributed or otherwise used for commercial purposes  The above information is an  only  It is not intended as medical advice for individual conditions or treatments  Talk to your doctor, nurse or pharmacist before following any medical regimen to see if it is safe and effective for you

## 2023-06-12 NOTE — ASSESSMENT & PLAN NOTE
Wt Readings from Last 3 Encounters:   06/12/23 118 kg (260 lb)   06/01/23 117 kg (257 lb)   05/09/23 117 kg (257 lb)             Pt presents for chronic diastolic chf f/u  Dyspnea on exertion has been stable  LE edema is about the same  He gained 3 lbs since last visit  His   Lungs are clear but he does have b/l LE edema, varicose veins and stasis dermatitis    Continue same dose of bumex and metolazone  I'm reluctant to increase diuretic due to borderline BP and fear for making his kidney functions worse  We discussed adhering to low salt diet and elevating feet when sitting      I asked him to get blood tests done today to assess his electrolytes and renal fxn

## 2023-06-12 NOTE — ASSESSMENT & PLAN NOTE
Pt takes tramadol sparingly for LE pain  I reviewed papdmp   Last filled tramadol >1 month ago  He has continuous opioid dependence

## 2023-06-12 NOTE — PROGRESS NOTES
Assessment/Plan:    Chronic diastolic congestive heart failure (HCC)  Wt Readings from Last 3 Encounters:   06/12/23 118 kg (260 lb)   06/01/23 117 kg (257 lb)   05/09/23 117 kg (257 lb)             Pt presents for chronic diastolic chf f/u  Dyspnea on exertion has been stable  LE edema is about the same  He gained 3 lbs since last visit  His   Lungs are clear but he does have b/l LE edema, varicose veins and stasis dermatitis    Continue same dose of bumex and metolazone  I'm reluctant to increase diuretic due to borderline BP and fear for making his kidney functions worse  We discussed adhering to low salt diet and elevating feet when sitting  I asked him to get blood tests done today to assess his electrolytes and renal fxn    Essential hypertension  BP otherwise is stable  Continue labetalol    Venous stasis ulcer of other part of right lower leg limited to breakdown of skin, unspecified whether varicose veins present [facility])  He has venous stasis dermatitis and small venous ulcers  I found no abcsesses today and pt had no cellulitis  Review the image I took    Try betamethasone cream    Continuous opioid dependence ([facility] [address] )  Pt takes tramadol sparingly for LE pain  I reviewed papdmp  Last filled tramadol >1 month ago  He has continuous opioid dependence       Diagnoses and all orders for this visit:    Essential hypertension    Chronic diastolic congestive heart failure (HCC)    Venous stasis ulcer of other part of right lower leg limited to breakdown of skin, unspecified whether varicose veins present ([facility])  -     betamethasone dipropionate (DIPROSONE) 0 05 % cream; Apply topically 2 (two) times a day for 14 days Apply to the red skin of your lower legs    Continuous opioid dependence (HCC)          Subjective:      Patient ID: Fletcher Brown is a 81 y o  male  HPI    Patient presents for follow-up of chronic conditions as detailed in the assessment and plan        The following portions of the "patient's history were reviewed and updated as appropriate: current medications, past family history, past medical history, past social history, past surgical history and problem list     Review of Systems      Objective:    /60   Pulse 84   Temp (!) 97 2 °F (36 2 °C) (Tympanic)   Resp 16   Ht 5' 11\" (1 803 m)   Wt 118 kg (260 lb)   SpO2 95%   BMI 36 26 kg/m²      Physical Exam  Constitutional:       General: He is not in acute distress  Appearance: He is not toxic-appearing  Eyes:      Conjunctiva/sclera: Conjunctivae normal    Neck:      Comments: He had no JVD  Cardiovascular:      Rate and Rhythm: Normal rate and regular rhythm  Pulmonary:      Effort: No respiratory distress  Breath sounds: No wheezing, rhonchi or rales  Musculoskeletal:      Cervical back: Neck supple  Skin:     General: Skin is warm and dry  Findings: Erythema present  Comments: 1+ b/l LE edema was present     Neurological:      Mental Status: He is alert               Hue Eddy MD  "

## 2023-06-12 NOTE — ASSESSMENT & PLAN NOTE
He has venous stasis dermatitis and small venous ulcers  I found no abcsesses today and pt had no cellulitis  Review the image I took    Try betamethasone cream

## 2023-06-13 ENCOUNTER — TELEPHONE (OUTPATIENT)
Dept: NEPHROLOGY | Facility: CLINIC | Age: 88
End: 2023-06-13

## 2023-06-13 DIAGNOSIS — E11.22 TYPE 2 DIABETES MELLITUS WITH STAGE 4 CHRONIC KIDNEY DISEASE, WITHOUT LONG-TERM CURRENT USE OF INSULIN (HCC): ICD-10-CM

## 2023-06-13 DIAGNOSIS — I10 ESSENTIAL HYPERTENSION: ICD-10-CM

## 2023-06-13 DIAGNOSIS — N18.4 TYPE 2 DIABETES MELLITUS WITH STAGE 4 CHRONIC KIDNEY DISEASE, WITHOUT LONG-TERM CURRENT USE OF INSULIN (HCC): ICD-10-CM

## 2023-06-13 DIAGNOSIS — N18.4 CHRONIC RENAL DISEASE, STAGE IV (HCC): Primary | ICD-10-CM

## 2023-06-13 NOTE — TELEPHONE ENCOUNTER
----- Message from [address] sent at 6/12/2023  8:50 PM EDT -----  Please let patient know that BMP reviewed and creatinine remains stable over the last month  No change in treatment    Repeat BMP in one month

## 2023-06-13 NOTE — TELEPHONE ENCOUNTER
Called patient and went over the following information:     Patients BMP reviewed and creatinine remains stable over the last month  No change in treatment  Repeat BMP in one month  Patient verbally understood and had no further questions for me at this time  Labs have been mailed to the patients home address

## 2023-06-27 ENCOUNTER — TELEPHONE (OUTPATIENT)
Dept: NEPHROLOGY | Facility: HOSPITAL | Age: 88
End: 2023-06-27

## 2023-06-27 NOTE — TELEPHONE ENCOUNTER
Called and spoke with Patient to complete their bloodwork prior to their appointment on 7/6 with Demetria Katz at the [address] [place] [address]

## 2023-06-27 NOTE — PROGRESS NOTES
Assessment and Plan:    Diagnoses and all orders for this visit:    CKD (chronic kidney disease) stage 4, GFR 15-29 ml/min ([facility])  -     Renal function panel; Future  -     Urinalysis with microscopic;  Future    Secondary hyperparathyroidism of renal origin [facility])    Benign prostatic hyperplasia with urinary hesitancy    Hypokalemia    Heart failure with preserved ejection fraction (HCC)      CKD IV  -Baseline creatinine: 2.4-2.7  -Most recent creatinine: 2.71 from 7/25  -Etiology: Age associated nephron loss, diabetic nephropathy, CRS  -Managing Nephrologist: Dr. Mayda Magdaleno  -We will discuss advance care planning/renal placement with patient today at care  -Renal function is stable at his upper limit of his normal baseline, looking at prior lab work he has been in the 2.4-2.8 range with variation likely due to volume status  -He reports chronic exertional dyspnea which is unchanged in nature  -Reports he is compliant with his Bumex and metolazone and is working towards a low-sodium diet    Blood Pressure  -Office visit BP: 120/80  -Current antihypertensive medications: Bumex 2 mg twice daily, metolazone 2.5 mg 3 times weekly, Flomax, labetalol 200 mg twice daily  -No changes to medication regimen for now    Heart failure preserved ejection fraction  -7/2022 nuc med stress test with EF of 68%  -Diuretics as above  -Follows with cardiology  -Has persistent lower extremity edema, likely multifactorial  -Discussed low-salt diet, limiting fluid intake as able to 2 L daily, maintaining diuretics    Hypokalemia  -Diuretic induced on potassium 20 mill equivalents twice daily  -Recent potassium level is 3.5, discussed he can increase his potassium content in food and given dietary education    DM 2  -On glipizide  -Last A1c 6.3  -Has peripheral neuropathy    CKD-MBD  -Recent PTH is 631, prior was 694, on calcitriol 0.25 mcg daily for secondary hyperparathyroidism, trend  -Recent calcium is 9.7  -Recent phosphorus level is acceptable    Addition medical problems: Morbid obesity, gout on allopurinol    PATIENT INSTRUCTIONS  Follow up with Dr. Patt Joseph in 4 months with repeat labs in 3 months prior to next appt. Please call the office with any questions or concerns. AVOID NSAIDS INCLUDING MOTRIN, IBUPROFEN, ADVIL, ALEVE, NAPROXEN      Reason for Visit: No chief complaint on file. HPI: Ismael Garcia is a 81 y.o. male who is here for follow up of CKD 4, prior seen by Dr. Patt Joseph in February. Since that appointment he reports he has been well, his biggest complaint is exertional dyspnea and that he frequently requires breaks when walking due to shortness of breath. He denies any nausea/vomiting/diarrhea/chest pain/shortness of breath at rest.  He reports chronic lower extremity edema that is unchanged, he has some small wounds that he cares for himself at home he reports they do not appear infected and have no drainage. He denies any NSAID use. ROS:   Review of Systems   Constitutional: Positive for fatigue. Respiratory: Positive for shortness of breath (With exertion). Cardiovascular: Positive for leg swelling. Negative for chest pain. Gastrointestinal: Negative. Musculoskeletal: Negative. A complete 10 point review of systems was performed and found to be negative unless otherwise noted above or in the HPI. Allergies:   Patient has no known allergies.     Medications:     Current Outpatient Medications:   •  albuterol (2.5 mg/3 mL) 0.083 % nebulizer solution, Take 3 mL (2.5 mg total) by nebulization every 6 (six) hours as needed for wheezing or shortness of breath, Disp: 270 mL, Rfl: 5  •  albuterol (PROVENTIL HFA,VENTOLIN HFA) 90 mcg/act inhaler, TAKE 2 PUFFS BY MOUTH EVERY 4 HOURS AS NEEDED FOR WHEEZE, Disp: 8.5 g, Rfl: 3  •  Trelegy Ellipta 200-62.5-25 MCG/ACT AEPB inhaler, INHALE 1 PUFF DAILY RINSE MOUTH AFTER USE., Disp: , Rfl:   •  allopurinol (ZYLOPRIM) 100 mg tablet, TAKE 2 TABLETS (200 MG TOTAL) BY MOUTH DAILY 1 DAILY FOR 2 WEEKS THEN 2 DAILY, Disp: 180 tablet, Rfl: 2  •  betamethasone dipropionate (DIPROSONE) 0.05 % cream, Apply topically 2 (two) times a day for 14 days Apply to the red skin of your lower legs, Disp: 45 g, Rfl: 0  •  Blood Glucose Monitoring Suppl (OneTouch Verio) w/Device KIT, Use 2 (two) times a day Dx:E11.9, Disp: 1 kit, Rfl: 0  •  bumetanide (BUMEX) 2 mg tablet, Take 1 tablet (2 mg total) by mouth 2 (two) times a day, Disp: 180 tablet, Rfl: 2  •  calcitriol (ROCALTROL) 0.25 mcg capsule, Take 1 capsule (0.25 mcg total) by mouth daily, Disp: 90 capsule, Rfl: 3  •  cetirizine (ZyrTEC) 5 MG tablet, Take 1 tablet (5 mg total) by mouth daily, Disp: 90 tablet, Rfl: 3  •  fluticasone (FLONASE) 50 mcg/act nasal spray, Spray 1 spray into each nostril twice daily, Disp: 48 mL, Rfl: 3  •  fluticasone-umeclidinium-vilanterol (Trelegy Ellipta) 100-62.5-25 mcg/actuation inhaler, Inhale 1 puff daily Rinse mouth after use., Disp: 60 blister, Rfl: 5  •  glipiZIDE (GLUCOTROL) 5 mg tablet, TAKE 1 TABLET BY MOUTH 2 TIMES A DAY BEFORE MEALS., Disp: 180 tablet, Rfl: 2  •  glucose blood test strip, Test twice daily DX:  E11.9, Disp: 100 strip, Rfl: 3  •  Iron-Vitamin C (IRON 100/C PO), Take 325 mg by mouth, Disp: , Rfl:   •  labetalol (NORMODYNE) 200 mg tablet, Take 1 tablet (200 mg total) by mouth 2 (two) times a day, Disp: 180 tablet, Rfl: 3  •  Lancets (onetouch ultrasoft) lancets, Use 2 (two) times a day E11.9, Disp: 100 each, Rfl: 3  •  metolazone (ZAROXOLYN) 2.5 mg tablet, TAKE 1 TABLET BY MOUTH 30 MINUTES BEFORE TAKING BUMETANIDE IN THE MORNING ON M,W,F, Disp: 45 tablet, Rfl: 3  •  mupirocin (BACTROBAN) 2 % ointment, Apply topically in the morning, Disp: 30 g, Rfl: 5  •  ofloxacin (OCUFLOX) 0.3 % ophthalmic solution, Administer 1 drop into the left eye 4 (four) times a day, Disp: 5 mL, Rfl: 0  •  potassium chloride (K-DUR,KLOR-CON) 20 mEq tablet, Take 20 mEq twice a day Monday through Thursday, take an extra 20 mEq on  along with metolazone. , Disp: 193 tablet, Rfl: 3  •  tamsulosin (FLOMAX) 0.4 mg, TAKE 1 CAPSULE BY MOUTH EVERY DAY WITH DINNER, Disp: 90 capsule, Rfl: 3  •  traMADol (Ultram) 50 mg tablet, Take 1 tablet (50 mg total) by mouth 2 (two) times a day as needed for moderate pain, Disp: 30 tablet, Rfl: 0    Past Medical History:   Diagnosis Date   • Anxiety    • COPD (chronic obstructive pulmonary disease) (HCC)    • Coronary artery disease    • Depression    • Herpes zoster    • Hypertension    • Memory loss     possible   • Mycoplasma pneumonia    • Obesity    • Osteoarthritis    • Renal calculi      Past Surgical History:   Procedure Laterality Date   • CATARACT EXTRACTION      with insert intraoculat lens prosthesis   • HEMORRHOID SURGERY     • NECK SURGERY      for bone spur     Family History   Problem Relation Age of Onset   • Diabetes Mother    • Breast cancer Mother         She  in 12. Age 80   • Stroke Mother    • Pneumonia Father    • Substance Abuse Neg Hx    • Mental illness Neg Hx       reports that he quit smoking about 25 years ago. His smoking use included cigarettes. He started smoking about 67 years ago. He has a 84.00 pack-year smoking history. He has never used smokeless tobacco. He reports that he does not currently use alcohol after a past usage of about 1.0 standard drink of alcohol per week. He reports that he does not use drugs. Physical Exam:   Vitals:    23 1415   BP: 120/80   Pulse: 86   SpO2: 95%   Weight: 119 kg (263 lb)   Height: 5' 11" (1.803 m)     Body mass index is 36.68 kg/m². Physical Exam  Vitals reviewed. Constitutional:       General: He is not in acute distress. Appearance: Normal appearance. He is obese. He is not toxic-appearing or diaphoretic. HENT:      Head: Normocephalic and atraumatic.       Nose: Nose normal.      Mouth/Throat:      Mouth: Mucous membranes are moist.   Eyes:      General: No scleral icterus. Cardiovascular:      Rate and Rhythm: Normal rate and regular rhythm. Pulses: Normal pulses. Heart sounds: Normal heart sounds. Pulmonary:      Effort: Pulmonary effort is normal. No respiratory distress. Breath sounds: No wheezing or rales. Abdominal:      General: Abdomen is flat. Musculoskeletal:      Cervical back: Neck supple. Right lower leg: Edema present. Left lower leg: Edema present. Comments: Self applied Band-Aids bilaterally   Skin:     General: Skin is warm and dry. Capillary Refill: Capillary refill takes less than 2 seconds. Neurological:      General: No focal deficit present. Mental Status: He is alert and oriented to person, place, and time. Procedure:  No results found for this or any previous visit. Lab Results   Component Value Date    GLUCOSE 152 (H) 09/18/2015    CALCIUM 9.7 07/05/2023     09/18/2015    K 3.4 (L) 07/05/2023    CO2 26 07/05/2023     07/05/2023    BUN 74 (H) 07/05/2023    CREATININE 2.71 (H) 07/05/2023       Results from last 7 days   Lab Units 07/05/23  0709   POTASSIUM mmol/L 3.4*   CHLORIDE mmol/L 104   CO2 mmol/L 26   BUN mg/dL 74*   CREATININE mg/dL 2.71*   CALCIUM mg/dL 9.7       I have personally reviewed the blood work as stated above and in my note. I have personally reviewed prior nephrology and internal medicine note.

## 2023-07-03 PROBLEM — I50.30 HEART FAILURE WITH PRESERVED EJECTION FRACTION (HCC): Status: ACTIVE | Noted: 2023-07-03

## 2023-07-03 PROBLEM — N25.81 SECONDARY HYPERPARATHYROIDISM OF RENAL ORIGIN (HCC): Status: ACTIVE | Noted: 2023-07-03

## 2023-07-03 PROBLEM — E87.6 HYPOKALEMIA: Status: ACTIVE | Noted: 2023-07-03

## 2023-07-05 ENCOUNTER — APPOINTMENT (OUTPATIENT)
Dept: LAB | Facility: HOSPITAL | Age: 88
End: 2023-07-05
Payer: COMMERCIAL

## 2023-07-05 DIAGNOSIS — I10 ESSENTIAL HYPERTENSION: ICD-10-CM

## 2023-07-05 DIAGNOSIS — E11.22 TYPE 2 DIABETES MELLITUS WITH STAGE 4 CHRONIC KIDNEY DISEASE, WITHOUT LONG-TERM CURRENT USE OF INSULIN (HCC): ICD-10-CM

## 2023-07-05 DIAGNOSIS — N18.4 TYPE 2 DIABETES MELLITUS WITH STAGE 4 CHRONIC KIDNEY DISEASE, WITHOUT LONG-TERM CURRENT USE OF INSULIN (HCC): ICD-10-CM

## 2023-07-05 DIAGNOSIS — N18.4 CHRONIC RENAL DISEASE, STAGE IV (HCC): ICD-10-CM

## 2023-07-05 LAB
ANION GAP SERPL CALCULATED.3IONS-SCNC: 9 MMOL/L
BUN SERPL-MCNC: 74 MG/DL (ref 5–25)
CALCIUM SERPL-MCNC: 9.7 MG/DL (ref 8.3–10.1)
CHLORIDE SERPL-SCNC: 104 MMOL/L (ref 96–108)
CO2 SERPL-SCNC: 26 MMOL/L (ref 21–32)
CREAT SERPL-MCNC: 2.71 MG/DL (ref 0.6–1.3)
GFR SERPL CREATININE-BSD FRML MDRD: 20 ML/MIN/1.73SQ M
GLUCOSE P FAST SERPL-MCNC: 151 MG/DL (ref 65–99)
POTASSIUM SERPL-SCNC: 3.4 MMOL/L (ref 3.5–5.3)
SODIUM SERPL-SCNC: 139 MMOL/L (ref 135–147)

## 2023-07-05 PROCEDURE — 80048 BASIC METABOLIC PNL TOTAL CA: CPT

## 2023-07-05 PROCEDURE — 36415 COLL VENOUS BLD VENIPUNCTURE: CPT

## 2023-07-06 ENCOUNTER — OFFICE VISIT (OUTPATIENT)
Dept: NEPHROLOGY | Facility: HOSPITAL | Age: 88
End: 2023-07-06
Payer: COMMERCIAL

## 2023-07-06 VITALS
BODY MASS INDEX: 36.82 KG/M2 | WEIGHT: 263 LBS | HEART RATE: 86 BPM | OXYGEN SATURATION: 95 % | SYSTOLIC BLOOD PRESSURE: 120 MMHG | HEIGHT: 71 IN | DIASTOLIC BLOOD PRESSURE: 80 MMHG

## 2023-07-06 DIAGNOSIS — E87.6 HYPOKALEMIA: ICD-10-CM

## 2023-07-06 DIAGNOSIS — R39.11 BENIGN PROSTATIC HYPERPLASIA WITH URINARY HESITANCY: ICD-10-CM

## 2023-07-06 DIAGNOSIS — I50.30 HEART FAILURE WITH PRESERVED EJECTION FRACTION (HCC): ICD-10-CM

## 2023-07-06 DIAGNOSIS — N40.1 BENIGN PROSTATIC HYPERPLASIA WITH URINARY HESITANCY: ICD-10-CM

## 2023-07-06 DIAGNOSIS — N25.81 SECONDARY HYPERPARATHYROIDISM OF RENAL ORIGIN (HCC): ICD-10-CM

## 2023-07-06 DIAGNOSIS — Z71.89 ADVANCED CARE PLANNING/COUNSELING DISCUSSION: Primary | ICD-10-CM

## 2023-07-06 DIAGNOSIS — N18.4 CKD (CHRONIC KIDNEY DISEASE) STAGE 4, GFR 15-29 ML/MIN (HCC): Primary | ICD-10-CM

## 2023-07-06 PROCEDURE — 99213 OFFICE O/P EST LOW 20 MIN: CPT | Performed by: NURSE PRACTITIONER

## 2023-07-06 NOTE — PATIENT INSTRUCTIONS
PATIENT INSTRUCTIONS  Follow up with Dr. Terrie Jiménez in 4 months with repeat labs in three months. Please call the office with any questions or concerns.     AVOID NSAIDS INCLUDING MOTRIN, IBUPROFEN, ADVIL, ALEVE, NAPROXEN    Your kidney function is stable and at your baseline

## 2023-07-06 NOTE — ACP (ADVANCE CARE PLANNING)
Advanced Care Planning Progress Note    Serious Illness Conversation    No data filed       NEPHROLOGY ADVANCED GOALS OF CARE MEETING  Feng Orr 81 y.o. male MRN: [id number]  2023        ASSESSMENT and PLAN:    I had the pleasure of seeing Feng Orr today in the renal clinic for discussion of the patient's goals of care. Our discussion today will focus on helping Tony achieve their desired goals, long term goals of care and how best to achieve those goals. The participants during this visit include Ivana Cortes and myself . Kidney Smart Class: Declined Kidney Smart Class  Would Pursue Dialysis if Needed: Likely would not pursue dialysis but not 100% sure of this decision  Dialysis Access: No Access   Advance Directive: Completed  Five Wishes Packet: Not Completed  Code Status: Level 3: DNAR and DNI  POA: Wife/Son  Do they need to be referred to Palliative Care: No  Do they need to be referred to Hospice: No  Length/Time of Meetin Minutes  Outpatient Nephrologist: Dillankeaton Chávez MD    Discussion and Plan:    I met with Eddie Melendez today to discuss the role of renal replacement therapy given his history of CKD 4. I introduced Tony to the concept of renal replacement therapy and that we typically initiate these discussions as patient advance in age and reach CKD stage IV/V so that we can better meet their goals of care. I briefly discussed what hemodialysis/renal placement is with Tony including peritoneal dialysis and hemodialysis including the setting (home versus outpatient clinic) and time requirements for each modality. I discussed the risks and benefits of pursuing renal placement therapy. I discussed with Angelina Wasserman that currently he does not require renal replacement therapy however it would be beneficial to him and his family and the nephrology team to better understand his wishes in the event that his renal function was to worsen. We discussed different scenarios in which this can happen. Tony stated to me that he likely would not pursue hemodialysis as he felt he has led a " good long life". Tony did indicate that he was not 279% percent certain of this decision, but he felt that he would likely not pursue dialysis or renal replacement therapies if they were indicated. I did discuss with him that if he did not choose to pursue renal placement therapies at a time when it would be recommended or indicated, discussions will be had with hospice and palliative team as a way to better meet his end-of-life care. I also did discuss with Tony the role of CPR and resuscitation and intubation. He stated that he would not want CPR or aggressive medical interventions such as intubation in the event he was to suffer a cardiac arrest.  On multiple occasions he expressed me that he felt he had lived a good life and that he did not want to undergo aggressive measures and would rather die " at peace" at home with his family. REVIEW OF SYSTEMS:    Review of Systems   Respiratory: Positive for shortness of breath (With exertion). Cardiovascular: Positive for leg swelling. Negative for chest pain. Gastrointestinal: Negative. Genitourinary: Negative. All other systems reviewed and are negative.         Medications:    Current Outpatient Medications:   •  albuterol (2.5 mg/3 mL) 0.083 % nebulizer solution, Take 3 mL (2.5 mg total) by nebulization every 6 (six) hours as needed for wheezing or shortness of breath, Disp: 270 mL, Rfl: 5  •  albuterol (PROVENTIL HFA,VENTOLIN HFA) 90 mcg/act inhaler, TAKE 2 PUFFS BY MOUTH EVERY 4 HOURS AS NEEDED FOR WHEEZE, Disp: 8.5 g, Rfl: 3  •  allopurinol (ZYLOPRIM) 100 mg tablet, TAKE 2 TABLETS (200 MG TOTAL) BY MOUTH DAILY 1 DAILY FOR 2 WEEKS THEN 2 DAILY, Disp: 180 tablet, Rfl: 2  •  betamethasone dipropionate (DIPROSONE) 0.05 % cream, Apply topically 2 (two) times a day for 14 days Apply to the red skin of your lower legs, Disp: 45 g, Rfl: 0  •  Blood Glucose Monitoring Suppl (OneTouch Verio) w/Device KIT, Use 2 (two) times a day Dx:E11.9, Disp: 1 kit, Rfl: 0  •  bumetanide (BUMEX) 2 mg tablet, Take 1 tablet (2 mg total) by mouth 2 (two) times a day, Disp: 180 tablet, Rfl: 2  •  calcitriol (ROCALTROL) 0.25 mcg capsule, Take 1 capsule (0.25 mcg total) by mouth daily, Disp: 90 capsule, Rfl: 3  •  cetirizine (ZyrTEC) 5 MG tablet, Take 1 tablet (5 mg total) by mouth daily, Disp: 90 tablet, Rfl: 3  •  fluticasone (FLONASE) 50 mcg/act nasal spray, Spray 1 spray into each nostril twice daily, Disp: 48 mL, Rfl: 3  •  fluticasone-umeclidinium-vilanterol (Trelegy Ellipta) 100-62.5-25 mcg/actuation inhaler, Inhale 1 puff daily Rinse mouth after use., Disp: 60 blister, Rfl: 5  •  glipiZIDE (GLUCOTROL) 5 mg tablet, TAKE 1 TABLET BY MOUTH 2 TIMES A DAY BEFORE MEALS., Disp: 180 tablet, Rfl: 2  •  glucose blood test strip, Test twice daily DX:  E11.9, Disp: 100 strip, Rfl: 3  •  Iron-Vitamin C (IRON 100/C PO), Take 325 mg by mouth, Disp: , Rfl:   •  labetalol (NORMODYNE) 200 mg tablet, Take 1 tablet (200 mg total) by mouth 2 (two) times a day, Disp: 180 tablet, Rfl: 3  •  Lancets (onetouch ultrasoft) lancets, Use 2 (two) times a day E11.9, Disp: 100 each, Rfl: 3  •  metolazone (ZAROXOLYN) 2.5 mg tablet, TAKE 1 TABLET BY MOUTH 30 MINUTES BEFORE TAKING BUMETANIDE IN THE MORNING ON M,W,F, Disp: 45 tablet, Rfl: 3  •  mupirocin (BACTROBAN) 2 % ointment, Apply topically in the morning, Disp: 30 g, Rfl: 5  •  ofloxacin (OCUFLOX) 0.3 % ophthalmic solution, Administer 1 drop into the left eye 4 (four) times a day, Disp: 5 mL, Rfl: 0  •  potassium chloride (K-DUR,KLOR-CON) 20 mEq tablet, Take 20 mEq twice a day Monday through Thursday, take an extra 20 mEq on Fridays along with metolazone. , Disp: 193 tablet, Rfl: 3  •  tamsulosin (FLOMAX) 0.4 mg, TAKE 1 CAPSULE BY MOUTH EVERY DAY WITH DINNER, Disp: 90 capsule, Rfl: 3  •  traMADol (Ultram) 50 mg tablet, Take 1 tablet (50 mg total) by mouth 2 (two) times a day as needed for moderate pain, Disp: 30 tablet, Rfl: 0  •  Trelegy Ellipta 200-62.5-25 MCG/ACT AEPB inhaler, INHALE 1 PUFF DAILY RINSE MOUTH AFTER USE., Disp: , Rfl:       CARLOS Miller

## 2023-07-14 DIAGNOSIS — E11.22 TYPE 2 DIABETES MELLITUS WITH STAGE 4 CHRONIC KIDNEY DISEASE, WITHOUT LONG-TERM CURRENT USE OF INSULIN (HCC): ICD-10-CM

## 2023-07-14 DIAGNOSIS — N18.4 TYPE 2 DIABETES MELLITUS WITH STAGE 4 CHRONIC KIDNEY DISEASE, WITHOUT LONG-TERM CURRENT USE OF INSULIN (HCC): ICD-10-CM

## 2023-07-14 RX ORDER — GLIPIZIDE 5 MG/1
TABLET ORAL
Qty: 180 TABLET | Refills: 2 | Status: SHIPPED | OUTPATIENT
Start: 2023-07-14

## 2023-07-18 ENCOUNTER — OFFICE VISIT (OUTPATIENT)
Dept: PODIATRY | Facility: CLINIC | Age: 88
End: 2023-07-18
Payer: COMMERCIAL

## 2023-07-18 VITALS — BODY MASS INDEX: 36.82 KG/M2 | HEIGHT: 71 IN | WEIGHT: 263 LBS

## 2023-07-18 DIAGNOSIS — E11.40 TYPE 2 DIABETES MELLITUS WITH DIABETIC NEUROPATHY, WITHOUT LONG-TERM CURRENT USE OF INSULIN (HCC): ICD-10-CM

## 2023-07-18 DIAGNOSIS — I87.331 IDIOPATHIC CHRONIC VENOUS HYPERTENSION OF RIGHT LOWER EXTREMITY WITH ULCER AND INFLAMMATION (HCC): Primary | ICD-10-CM

## 2023-07-18 PROCEDURE — 99213 OFFICE O/P EST LOW 20 MIN: CPT | Performed by: PODIATRIST

## 2023-07-20 ENCOUNTER — OFFICE VISIT (OUTPATIENT)
Age: 88
End: 2023-07-20
Payer: COMMERCIAL

## 2023-07-20 VITALS
SYSTOLIC BLOOD PRESSURE: 120 MMHG | DIASTOLIC BLOOD PRESSURE: 60 MMHG | WEIGHT: 262.4 LBS | HEIGHT: 71 IN | BODY MASS INDEX: 36.73 KG/M2 | HEART RATE: 82 BPM | TEMPERATURE: 97.2 F | OXYGEN SATURATION: 96 %

## 2023-07-20 DIAGNOSIS — F32.89 OTHER DEPRESSION: ICD-10-CM

## 2023-07-20 DIAGNOSIS — I10 ESSENTIAL HYPERTENSION: ICD-10-CM

## 2023-07-20 DIAGNOSIS — G31.84 MILD COGNITIVE IMPAIRMENT: Primary | ICD-10-CM

## 2023-07-20 DIAGNOSIS — R26.2 AMBULATORY DYSFUNCTION: ICD-10-CM

## 2023-07-20 DIAGNOSIS — G47.01 INSOMNIA DUE TO MEDICAL CONDITION: ICD-10-CM

## 2023-07-20 DIAGNOSIS — F11.20 CONTINUOUS OPIOID DEPENDENCE (HCC): ICD-10-CM

## 2023-07-20 PROCEDURE — 99214 OFFICE O/P EST MOD 30 MIN: CPT | Performed by: NURSE PRACTITIONER

## 2023-07-20 RX ORDER — SERTRALINE HYDROCHLORIDE 25 MG/1
25 TABLET, FILM COATED ORAL DAILY
Qty: 30 TABLET | Refills: 0 | Status: SHIPPED | OUTPATIENT
Start: 2023-07-20

## 2023-07-20 NOTE — ASSESSMENT & PLAN NOTE
· Patient denies mood changes, son feels he has increase in anxiety and depression  · GDS 3  · Son feels his mood could be lifted, memory loss may be improved. · Discussed with patient that since he is not dementia diagnosis memory medication would not be next step, however improving mood may help to improve memory so would like to trial sertraline  · Patient is agreeable to try.   We will start sertraline 25 mg daily, monitor for changes in mood including confusion, insomnia, lethargy, dizziness and report to office if noticed  · We will check in 4 weeks to see how patient is doing  · Continue relaxation techniques, emotional support

## 2023-07-20 NOTE — PROGRESS NOTES
Assessment & Plan:   1. Mild cognitive impairment  Assessment & Plan:  · MOCa 21/30 - deficits in executive function, language, recall (0/5), orientation (4/6)  Pt's current cognitive level is consistent with MCI  Pt is independent w/adl/dependent w/iadls. Lives with wife  Mobility:  [place], no recent falls  Continue to stay active. Current activity level:  decreased. Not participating in much social, cognitive, physical activity. Monitor for changes in mood, sleep, pain control. Notify provider if any concerns   *Pt's son is concerned that mood is    Impacting memory - pt agreeable to trial  sertraline. Medication review:  Seem appropriate for current conditions  Check with pharmacist/provider before starting any new OTC/prescription medications for potential cognitive side effects  Optimize all acute and chronic conditions. Continue to follow-up with PCP and specialist as directed for management  Safety concerns:  none  Caregiver stress:  Per son, wife is having some caregiver stress - she does follow with [facility] geriatric[facility] and has supports from there  Ensure adequate hydration, good nutrition  Goal: to enjoy the rest of his days    Orders:  -     sertraline (Zoloft) 25 mg tablet; Take 1 tablet (25 mg total) by mouth daily    2. Other depression  Assessment & Plan:  · Patient denies mood changes, son feels he has increase in anxiety and depression  · GDS 3  · Son feels his mood could be lifted, memory loss may be improved. · Discussed with patient that since he is not dementia diagnosis memory medication would not be next step, however improving mood may help to improve memory so would like to trial sertraline  · Patient is agreeable to try.   We will start sertraline 25 mg daily, monitor for changes in mood including confusion, insomnia, lethargy, dizziness and report to office if noticed  · We will check in 4 weeks to see how patient is doing  · Continue relaxation techniques, emotional support    Orders:  - sertraline (Zoloft) 25 mg tablet; Take 1 tablet (25 mg total) by mouth daily    3. Ambulatory dysfunction  Assessment & Plan:  · Continues with cane, no recent fall  · Fall precautions      4. Insomnia due to medical condition  Assessment & Plan:  · Patient has chronic sleep disorder, sleeps in recliner and is okay. · Continue to keep active during the day, decrease at bedtime caffeine use, provided at bedtime routine and quiet and calm environment for rest      5. Essential hypertension  Assessment & Plan:  · BP stable without complaint of headache or dizziness  · Patient remains on labetalol, diuretic  · Continue dietary lifestyle interventions  · Continue regular follow-ups with PCP/cards for chronic management      6. Continuous opioid dependence ([address])  Assessment & Plan:  · Denies pain at this time. · Uses tramadol sparingly without any confusion  · Cont to use prn tylenol/topical analgesic. Cont non pharm methods of pain control. HPI:  We had the pleasure of evaluating Swetha Morillo who is a 81 y.o. male in Geriatric follow up today. Previous MOCA:  19/30. Comorbidities include MCI, ambulatory dysfunction, htn. He lives with wife  Mr. Liv Noyola is in the office with his son    Memory update per patient:  Doing ok. No change in memory from last visit. Walking any distance makes him out of breath. Does get winded with long distances. Does ok at rest.  Appetite is good. Not much exercise. Sleep is ok. Needs legs up. Doesn't feel memory much worse than last year. Cognitive:  Crossword puzzles,  Physical:  Not much,  Social:  Not getting out much. Enjoys being with family at home.         He has difficulty finding the right word while speaking: No  Patient requires repeat information or ask the same question repeatedly: Yes  Do you do your own bathing, dressing, feeding yourself Yes  Can you make your own meals and do light cleaning/chores Yes  Do you take care of your own medications Yes  Do you drive: Yes - not really driving - got rid of other car      Do you handle your own financial affairs such as balancing your checkbook, paying bills, investments: Yes. Can manage own account. Have you or your family noted any change in your mood or personality:Yes  Are you currently or have you been treated in the past for depression or anxiety: No  Have you noticed any gait or balance disorder: Yes  Uses :Cane: no fall. Uses walker if its a bad day  Any hallucination or delusion: No  Sleep Issues: Yes  Urinary/Stool Incontinence: No  Hearing and vision issue: Yes - readers, no HAs (is [facility])  ADL/IADL:  Independent/dependent  Appetite/swallow:  Good/good  Pain:  no    Past Medical, surgical, social, medication and allergy history and patients previous records reviewed. Memory update per family:  Mood is getting worse, memory is getting worse. Independent self care. Refuses to bathe, gets very angry with wife. Frequently urinates, has urinal, doesn't get up. Was doing good with exercise and diet, but now has given it up. Looking to see if patient needs memory care. Would like [facility] then will look at LT. Getting cleaning service. Memory has gotten much better. He forgets names, conversations, where grandchildren are going to college. Forgetting things that were important to him. Sleeping trouble is ongoing (sleeps in recliner). Not having a lot of pain - not using tramadol too much. Depression is getting worse. He doesn't go out. He eats. He uses walker at home, but cane out. Has ulcers on legs. He's not driving much (gave up car). Family Review of Behavior [facility]:    pacing. No    agressive/combative behavior. Yes - verbally   agitated. Yes   wandering. No   resistance to care. Yes   hoarding/hiding objects. No    suspicious  No  withdrawn Yes  personal hygiene problems. Yes  forgetfulness of actions Yes    ROS: Review of Systems   Constitutional: Negative for activity change, appetite change, chills and fatigue. HENT: Negative for congestion, hearing loss and trouble swallowing. Eyes: Negative for visual disturbance. Respiratory: Positive for shortness of breath (tyler). Negative for cough. Cardiovascular: Negative for chest pain. Gastrointestinal: Negative for abdominal pain, constipation, diarrhea, nausea and vomiting. Genitourinary: Negative for difficulty urinating. Musculoskeletal: Positive for gait problem. Negative for arthralgias and back pain. Neurological: Negative for dizziness and light-headedness. Psychiatric/Behavioral: Positive for decreased concentration (forgetful). Negative for dysphoric mood and sleep disturbance. The patient is not nervous/anxious.         Allergies:   No Known Allergies    Medications:      Current Outpatient Medications:   •  albuterol (2.5 mg/3 mL) 0.083 % nebulizer solution, Take 3 mL (2.5 mg total) by nebulization every 6 (six) hours as needed for wheezing or shortness of breath, Disp: 270 mL, Rfl: 5  •  albuterol (PROVENTIL HFA,VENTOLIN HFA) 90 mcg/act inhaler, TAKE 2 PUFFS BY MOUTH EVERY 4 HOURS AS NEEDED FOR WHEEZE, Disp: 8.5 g, Rfl: 3  •  allopurinol (ZYLOPRIM) 100 mg tablet, TAKE 2 TABLETS (200 MG TOTAL) BY MOUTH DAILY 1 DAILY FOR 2 WEEKS THEN 2 DAILY, Disp: 180 tablet, Rfl: 2  •  betamethasone dipropionate (DIPROSONE) 0.05 % cream, Apply topically 2 (two) times a day for 14 days Apply to the red skin of your lower legs, Disp: 45 g, Rfl: 0  •  Blood Glucose Monitoring Suppl (OneTouch Verio) w/Device KIT, Use 2 (two) times a day Dx:E11.9, Disp: 1 kit, Rfl: 0  •  bumetanide (BUMEX) 2 mg tablet, Take 1 tablet (2 mg total) by mouth 2 (two) times a day, Disp: 180 tablet, Rfl: 2  •  calcitriol (ROCALTROL) 0.25 mcg capsule, Take 1 capsule (0.25 mcg total) by mouth daily, Disp: 90 capsule, Rfl: 3  •  cetirizine (ZyrTEC) 5 MG tablet, Take 1 tablet (5 mg total) by mouth daily, Disp: 90 tablet, Rfl: 3  •  fluticasone (FLONASE) 50 mcg/act nasal spray, Spray 1 spray into each nostril twice daily, Disp: 48 mL, Rfl: 3  •  fluticasone-umeclidinium-vilanterol (Trelegy Ellipta) 100-62.5-25 mcg/actuation inhaler, Inhale 1 puff daily Rinse mouth after use., Disp: 60 blister, Rfl: 5  •  glipiZIDE (GLUCOTROL) 5 mg tablet, TAKE 1 TABLET BY MOUTH 2 TIMES A DAY BEFORE MEALS., Disp: 180 tablet, Rfl: 2  •  glucose blood test strip, Test twice daily DX:  E11.9, Disp: 100 strip, Rfl: 3  •  Iron-Vitamin C (IRON 100/C PO), Take 325 mg by mouth, Disp: , Rfl:   •  labetalol (NORMODYNE) 200 mg tablet, Take 1 tablet (200 mg total) by mouth 2 (two) times a day, Disp: 180 tablet, Rfl: 3  •  Lancets (onetouch ultrasoft) lancets, Use 2 (two) times a day E11.9, Disp: 100 each, Rfl: 3  •  metolazone (ZAROXOLYN) 2.5 mg tablet, TAKE 1 TABLET BY MOUTH 30 MINUTES BEFORE TAKING BUMETANIDE IN THE MORNING ON M,W,F, Disp: 45 tablet, Rfl: 3  •  mupirocin (BACTROBAN) 2 % ointment, Apply topically in the morning, Disp: 30 g, Rfl: 5  •  ofloxacin (OCUFLOX) 0.3 % ophthalmic solution, Administer 1 drop into the left eye 4 (four) times a day, Disp: 5 mL, Rfl: 0  •  potassium chloride (K-DUR,KLOR-CON) 20 mEq tablet, Take 20 mEq twice a day Monday through Thursday, take an extra 20 mEq on Fridays along with metolazone. , Disp: 193 tablet, Rfl: 3  •  sertraline (Zoloft) 25 mg tablet, Take 1 tablet (25 mg total) by mouth daily, Disp: 30 tablet, Rfl: 0  •  tamsulosin (FLOMAX) 0.4 mg, TAKE 1 CAPSULE BY MOUTH EVERY DAY WITH DINNER, Disp: 90 capsule, Rfl: 3  •  traMADol (Ultram) 50 mg tablet, Take 1 tablet (50 mg total) by mouth 2 (two) times a day as needed for moderate pain, Disp: 30 tablet, Rfl: 0  •  Trelegy Ellipta 200-62.5-25 MCG/ACT AEPB inhaler, INHALE 1 PUFF DAILY RINSE MOUTH AFTER USE., Disp: , Rfl:     Vitals:  Vitals:    07/20/23 0824   BP: 120/60   Pulse: 82   Temp: (!) 97.2 °F (36.2 °C)   SpO2: 96%       History:  Past Medical History:   Diagnosis Date   • Anxiety 2020   • COPD (chronic obstructive pulmonary disease) ([address] [facility])    • Coronary artery disease 2012   • Depression    • Herpes zoster    • Hypertension    • Memory loss     possible   • Mycoplasma pneumonia    • Obesity    • Osteoarthritis    • Renal calculi      Past Surgical History:   Procedure Laterality Date   • CATARACT EXTRACTION      with insert intraoculat lens prosthesis   • HEMORRHOID SURGERY     • NECK SURGERY      for bone spur     Family History   Problem Relation Age of Onset   • Diabetes Mother    • Breast cancer Mother         She  in 12.  Age 80   • Stroke Mother    • Pneumonia Father    • Substance Abuse Neg Hx    • Mental illness Neg Hx      Social History     Socioeconomic History   • Marital status: /Civil Union     Spouse name: Not on file   • Number of children: Not on file   • Years of education: Not on file   • Highest education level: Not on file   Occupational History   • Not on file   Tobacco Use   • Smoking status: Former     Packs/day: 2.00     Years: 42.00     Total pack years: 84.00     Types: Cigarettes     Start date: 1956     Quit date: 1998     Years since quittin.5   • Smokeless tobacco: Never   Vaping Use   • Vaping Use: Never used   Substance and Sexual Activity   • Alcohol use: Not Currently     Alcohol/week: 1.0 standard drink of alcohol     Types: 1 Cans of beer per week     Comment: 1-2 beers a months   • Drug use: No   • Sexual activity: Not Currently   Other Topics Concern   • Not on file   Social History Narrative    Active advance directive    Daily coffee consumption, 1 cup/day    Denied exercise habits    Good dental hygiene    Supportive and safe    Active family support     Social Determinants of Health     Financial Resource Strain: Not on file   Food Insecurity: No Food Insecurity (2022)    Hunger Vital Sign    • Worried About Running Out of Food in the Last Year: Never true    • Ran Out of Food in the Last Year: Never true   Transportation Needs: No Transportation Needs (11/4/2022)    PRAPARE - Transportation    • Lack of Transportation (Medical): No    • Lack of Transportation (Non-Medical): No   Physical Activity: Not on file   Stress: Not on file   Social Connections: Not on file   Intimate Partner Violence: Not on file   Housing Stability: Low Risk  (11/4/2022)    Housing Stability Vital Sign    • Unable to Pay for Housing in the Last Year: No    • Number of [address] in the Last Year: 1    • Unstable Housing in the Last Year: No     Past Surgical History:   Procedure Laterality Date   • CATARACT EXTRACTION      with insert intraoculat lens prosthesis   • HEMORRHOID SURGERY     • NECK SURGERY      for bone spur         Physical Exam:  Observed ambulation:  Use of cane, slower   Physical Exam  Vitals and nursing note reviewed. Constitutional:       General: He is not in acute distress. Appearance: Normal appearance. He is well-developed. He is not diaphoretic. HENT:      Head: Normocephalic. Cardiovascular:      Rate and Rhythm: Normal rate. Heart sounds: No murmur heard. No friction rub. No gallop. Pulmonary:      Effort: Pulmonary effort is normal. No respiratory distress. Breath sounds: Normal breath sounds. No wheezing or rales. Abdominal:      General: Bowel sounds are normal. There is no distension. Palpations: Abdomen is soft. Tenderness: There is no abdominal tenderness. Musculoskeletal:         General: Swelling (+1-2 bilat LE) present. Normal range of motion. Cervical back: Normal range of motion. Skin:     General: Skin is warm and dry. Comments: Few scattered dressings   Neurological:      General: No focal deficit present. Mental Status: He is alert and oriented to person, place, and time. Mental status is at baseline.    Psychiatric:         Mood and Affect: Mood normal.         Behavior: Behavior normal.

## 2023-07-20 NOTE — ASSESSMENT & PLAN NOTE
· Denies pain at this time. · Uses tramadol sparingly without any confusion  · Cont to use prn tylenol/topical analgesic. Cont non pharm methods of pain control.

## 2023-07-20 NOTE — ASSESSMENT & PLAN NOTE
· BP stable without complaint of headache or dizziness  · Patient remains on labetalol, diuretic  · Continue dietary lifestyle interventions  · Continue regular follow-ups with PCP/cards for chronic management

## 2023-07-20 NOTE — PROGRESS NOTES
[facility]  [address], [address], [address]  [phone]    Social Work Follow-Up    LSW met with patient's son-Kody. Son was looking into a homecare aide and also considering [facility]ARY placement. Son stated it has been difficult to motivate patient to do anything, which is also difficult on patient's wife who is very active. Son is hoping the anti-depressant that Eh Patterson suggested would help patient. Son wondering when a good time is to have a homecare aide come in or MARY. LSW stated that patient can benefit from homecare aide now to assist with daily care needs, companionship, etc. LSW stated that patient would also benefit from care home to have memory support, and assist with ADLs. Son stated they are working with Violetta Boo from [facility] and looking into [facility] in [place]. Son to start with a homecare aide to see how that goes. LSW to remain available as needed.

## 2023-07-20 NOTE — ASSESSMENT & PLAN NOTE
· Patient has chronic sleep disorder, sleeps in recliner and is okay.   · Continue to keep active during the day, decrease at bedtime caffeine use, provided at bedtime routine and quiet and calm environment for rest

## 2023-07-20 NOTE — PATIENT INSTRUCTIONS
Start sertaline 25mg daily - we will check in 4 weeks to see how you are tolerating  If you notice confusion, insomnia, sleepiness, or dizziness, notify our office.

## 2023-07-20 NOTE — ASSESSMENT & PLAN NOTE
· MOCa 21/30 - deficits in executive function, language, recall (0/5), orientation (4/6)  Pt's current cognitive level is consistent with MCI  Pt is independent w/adl/dependent w/iadls. Lives with wife  Mobility:  [employer] Hires, no recent falls  Continue to stay active. Current activity level:  decreased. Not participating in much social, cognitive, physical activity. Monitor for changes in mood, sleep, pain control. Notify provider if any concerns   *Pt's son is concerned that mood is    Impacting memory - pt agreeable to trial  sertraline. Medication review:  Seem appropriate for current conditions  Check with pharmacist/provider before starting any new OTC/prescription medications for potential cognitive side effects  Optimize all acute and chronic conditions.   Continue to follow-up with PCP and specialist as directed for management  Safety concerns:  none  Caregiver stress:  Per son, wife is having some caregiver stress - she does follow with [facility] geriatrics and has supports from there  Ensure adequate hydration, good nutrition  Goal: to enjoy the rest of his days

## 2023-07-31 NOTE — PROGRESS NOTES
Assessment/Plan:   Proper bandaging reviewed with patient in detail. Recommend Telfa gauze dressings with antibiotic ointment and compression. Recommend compression therapy elevating legs is much as possible to help facilitate healing. Follow-up as scheduled for diabetic foot care.,  Call if any signs of infection are noted. Diagnoses and all orders for this visit:    Idiopathic chronic venous hypertension of right lower extremity with ulcer and inflammation (HCC)    Type 2 diabetes mellitus with diabetic neuropathy, without long-term current use of insulin (HCC)          Subjective:     Patient ID: Laurel Mcdonald is a 81 y.o. male. HPI    Review of Systems   Constitutional: Negative. HENT: Negative. Eyes: Negative. Respiratory: Negative. Cardiovascular: Negative. Gastrointestinal: Negative. Endocrine: Negative. Genitourinary: Negative. Musculoskeletal: Negative. Skin: Positive for wound (Multiple partial depth breakdown areas right leg,). Allergic/Immunologic: Negative. Hematological: Negative. Psychiatric/Behavioral: Negative. Objective:     Physical Exam  Constitutional:       Appearance: Normal appearance. HENT:      Head: Normocephalic. Right Ear: Tympanic membrane normal.      Left Ear: Tympanic membrane normal.      Nose: No congestion. Mouth/Throat:      Pharynx: No oropharyngeal exudate or posterior oropharyngeal erythema. Eyes:      Conjunctiva/sclera: Conjunctivae normal.      Pupils: Pupils are equal, round, and reactive to light. Cardiovascular:      Rate and Rhythm: Normal rate and regular rhythm. Pulses:           Dorsalis pedis pulses are 1+ on the right side and 1+ on the left side. Posterior tibial pulses are 0 on the right side and 0 on the left side. Pulmonary:      Effort: Pulmonary effort is normal.   Musculoskeletal:      Right lower leg: Edema (+2) present. Left lower leg: Edema (+2) present.       Right foot: Decreased range of motion. Left foot: Decreased range of motion. Feet:      Right foot:      Protective Sensation: 10 sites tested. 6 sites sensed. Left foot:      Protective Sensation: 10 sites tested. 6 sites sensed. Skin:     General: Skin is warm and dry. Capillary Refill: Capillary refill takes 2 to 3 seconds. Coloration: Skin is not pale. Findings: Wound (Right leg: Multiple partial depth areas of breakdown with scant serosanguineous drainage. Patient had them patched with multiple Band-Aids.,  No signs of infection.) present. No bruising, erythema, lesion or rash. Neurological:      Mental Status: He is alert. Cranial Nerves: No cranial nerve deficit. Sensory: Sensory deficit (Diminished epicritic sensation bilaterally consistent with advanced diabetic neuropathy.) present. Motor: No weakness.       Gait: Gait normal.      Deep Tendon Reflexes: Reflexes normal.   Psychiatric:         Mood and Affect: Mood normal.         Behavior: Behavior normal.         Judgment: Judgment normal.

## 2023-08-11 DIAGNOSIS — M1A.0710 IDIOPATHIC CHRONIC GOUT OF RIGHT FOOT WITHOUT TOPHUS: ICD-10-CM

## 2023-08-11 RX ORDER — ALLOPURINOL 100 MG/1
TABLET ORAL
Qty: 180 TABLET | Refills: 2 | Status: SHIPPED | OUTPATIENT
Start: 2023-08-11

## 2023-08-12 DIAGNOSIS — G31.84 MILD COGNITIVE IMPAIRMENT: ICD-10-CM

## 2023-08-12 DIAGNOSIS — F32.89 OTHER DEPRESSION: ICD-10-CM

## 2023-08-14 RX ORDER — SERTRALINE HYDROCHLORIDE 25 MG/1
25 TABLET, FILM COATED ORAL DAILY
Qty: 90 TABLET | Refills: 1 | Status: SHIPPED | OUTPATIENT
Start: 2023-08-14

## 2023-08-17 ENCOUNTER — TELEPHONE (OUTPATIENT)
Age: 88
End: 2023-08-17

## 2023-08-17 NOTE — TELEPHONE ENCOUNTER
Called Pt and his wife Amelia Patterson, as per note from son Tremayne Marr. Medication Check Phone Call in regards to sertraline (ZOLOFT) 25 mg tablet. Here are the results:  - have you been able to sleep through the night? (is he having insomnia?)  No, I sleep just fine!  - are you feeling more sleepy while taking sertraline? Either way I don't feel sleepy. I'm sleeping just fine.  - Have you noticed being dizzy at all since you started taking it? No    Pt's wife, Amelia Patterson, was on the other line of their landline home phone. Pt admitted to being confused but medical assistant could not ask if he was experiencing "new or worsening" confusion because of this. Amelia Patterson only spoke up to clarify if Rich misunderstood a question (I.e. have you been up all night since taking the medication? What? I don't stay up all night taking the prescription! ~Shira would intervene and clarify what was asked) but otherwise did not participate. Pt was confused about if he should keep taking the medication as "the bottle says 0 refills" - system shows 1 refill but did mention to provider about this. A refill request has already been requested through [address] [place] [address].

## 2023-08-21 ENCOUNTER — TELEPHONE (OUTPATIENT)
Dept: FAMILY MEDICINE CLINIC | Facility: HOSPITAL | Age: 88
End: 2023-08-21

## 2023-08-21 DIAGNOSIS — L97.811 VENOUS STASIS ULCER OF OTHER PART OF RIGHT LOWER LEG LIMITED TO BREAKDOWN OF SKIN, UNSPECIFIED WHETHER VARICOSE VEINS PRESENT (HCC): ICD-10-CM

## 2023-08-21 DIAGNOSIS — I83.018 VENOUS STASIS ULCER OF OTHER PART OF RIGHT LOWER LEG LIMITED TO BREAKDOWN OF SKIN, UNSPECIFIED WHETHER VARICOSE VEINS PRESENT (HCC): ICD-10-CM

## 2023-08-21 DIAGNOSIS — M1A.0710 IDIOPATHIC CHRONIC GOUT OF RIGHT FOOT WITHOUT TOPHUS: ICD-10-CM

## 2023-08-21 RX ORDER — BETAMETHASONE DIPROPIONATE 0.5 MG/G
CREAM TOPICAL 2 TIMES DAILY
Qty: 45 G | Refills: 0 | Status: SHIPPED | OUTPATIENT
Start: 2023-08-21 | End: 2023-08-31

## 2023-08-21 RX ORDER — ALLOPURINOL 100 MG/1
200 TABLET ORAL DAILY
Qty: 180 TABLET | Refills: 2 | Status: SHIPPED | OUTPATIENT
Start: 2023-08-21 | End: 2023-08-28 | Stop reason: SDUPTHER

## 2023-08-22 ENCOUNTER — OFFICE VISIT (OUTPATIENT)
Dept: FAMILY MEDICINE CLINIC | Facility: HOSPITAL | Age: 88
End: 2023-08-22
Payer: COMMERCIAL

## 2023-08-22 VITALS
WEIGHT: 254 LBS | HEIGHT: 71 IN | OXYGEN SATURATION: 94 % | DIASTOLIC BLOOD PRESSURE: 68 MMHG | SYSTOLIC BLOOD PRESSURE: 112 MMHG | BODY MASS INDEX: 35.56 KG/M2 | HEART RATE: 100 BPM | RESPIRATION RATE: 20 BRPM

## 2023-08-22 DIAGNOSIS — J96.11 CHRONIC RESPIRATORY FAILURE WITH HYPOXIA (HCC): ICD-10-CM

## 2023-08-22 DIAGNOSIS — I50.32 CHRONIC DIASTOLIC CONGESTIVE HEART FAILURE (HCC): Primary | ICD-10-CM

## 2023-08-22 DIAGNOSIS — I27.20 PULMONARY HYPERTENSION (HCC): ICD-10-CM

## 2023-08-22 DIAGNOSIS — N18.4 CKD (CHRONIC KIDNEY DISEASE) STAGE 4, GFR 15-29 ML/MIN (HCC): ICD-10-CM

## 2023-08-22 DIAGNOSIS — K59.09 OTHER CONSTIPATION: ICD-10-CM

## 2023-08-22 DIAGNOSIS — J44.9 MODERATE COPD (CHRONIC OBSTRUCTIVE PULMONARY DISEASE) (HCC): ICD-10-CM

## 2023-08-22 LAB — SINUS: 105 CM

## 2023-08-22 PROCEDURE — 99214 OFFICE O/P EST MOD 30 MIN: CPT | Performed by: INTERNAL MEDICINE

## 2023-08-22 PROCEDURE — 93000 ELECTROCARDIOGRAM COMPLETE: CPT | Performed by: INTERNAL MEDICINE

## 2023-08-22 RX ORDER — SENNOSIDES A AND B 8.6 MG/1
1 TABLET, FILM COATED ORAL DAILY PRN
Qty: 30 TABLET | Refills: 1 | Status: SHIPPED | OUTPATIENT
Start: 2023-08-22

## 2023-08-22 NOTE — ASSESSMENT & PLAN NOTE
Wt Readings from Last 3 Encounters:   08/22/23 115 kg (254 lb)   07/20/23 119 kg (262 lb 6.4 oz)   07/18/23 119 kg (263 lb)       Pt has chronic diastolic chf, EF was 30% in 2022 with rvsp of 40mmHg. Patient lost weight over the last month: About 8 pounds. He has evidence of volume overload: LE edema but lungs are clear    Re-evaluate LV/RV fxn, reordered echo. Check cxr.     Continue current dose of bumetanide and Zaroxolyn    We will check electrolytes, renal function    I asked patient's wife who accompanied patient today for the visit to take patient to the ER if his breathing gets worse

## 2023-08-22 NOTE — PROGRESS NOTES
Assessment/Plan:    Chronic diastolic congestive heart failure (HCC)  Wt Readings from Last 3 Encounters:   08/22/23 115 kg (254 lb)   07/20/23 119 kg (262 lb 6.4 oz)   07/18/23 119 kg (263 lb)       Pt has chronic diastolic chf, EF was 82% in 2022 with rvsp of 40mmHg. Patient lost weight over the last month: About 8 pounds. He has evidence of volume overload: LE edema but lungs are clear    Re-evaluate LV/RV fxn, reordered echo. Check cxr. Continue current dose of bumetanide and Zaroxolyn    We will check electrolytes, renal function    I asked patient's wife who accompanied patient today for the visit to take patient to the ER if his breathing gets worse    Moderate COPD (chronic obstructive pulmonary disease) ([facility])  Pt has copd  I heard no wheezing today to suggest acute copd exacerbation  Continue trelegy    Chronic respiratory failure with hypoxia ([address])  Pt has chronic hypoxic respiratory failure and uses oxygen at home prn. O2 sat at rest today was normal.    Pt's dyspnea with slight exertion is multifactorial: due to diastolic chf, copd, pulmonary htn, obesity. Will check cxr    Pulmonary hypertension ([address])  Echogram showed pulmonary hypertension last year. This is most likely due to COPD, diastolic CHF    Recheck pulmonary hypertension to see if it has progressed since last year    CKD (chronic kidney disease) stage 4, GFR 15-29 ml/min ([facility])  Lab Results   Component Value Date    EGFR 20 07/05/2023    EGFR 19 06/12/2023    EGFR 20 05/08/2023    CREATININE 2.71 (H) 07/05/2023    CREATININE 2.74 (H) 06/12/2023    CREATININE 2.69 (H) 05/08/2023       He has stage IV CKD. He feels he is able to empty his bladder. Recheck renal function    Other constipation  Patient has constipation. Takes Colace without help.   I added Senokot       Diagnoses and all orders for this visit:    Chronic diastolic congestive heart failure (HCC)  -     POCT ECG  -     XR chest pa & lateral; Future  -     CBC; Future  -     Comprehensive metabolic panel; Future  -     B-Type Natriuretic Peptide(BNP); Future  -     Echo complete w/ contrast if indicated; Future    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)    Chronic respiratory failure with hypoxia (HCC)    CKD (chronic kidney disease) stage 4, GFR 15-29 ml/min ([facility])  -     Comprehensive metabolic panel; Future    Pulmonary hypertension ([address])  -     Echo complete w/ contrast if indicated; Future    Other constipation  -     senna (SENOKOT) 8.6 MG tablet; Take 1 tablet (8.6 mg total) by mouth daily as needed for constipation          Subjective:      Patient ID: Veronica Strauss is a 81 y.o. male. HPI    Patient presents for follow-up of chronic conditions as detailed in the assessment and plan. The following portions of the patient's history were reviewed and updated as appropriate: current medications, past family history, past medical history, past social history, past surgical history and problem list.    Review of Systems      Objective:    /68   Pulse 100   Resp 20   Ht 5' 11" (1.803 m)   Wt 115 kg (254 lb)   SpO2 94%   BMI 35.43 kg/m²      Physical Exam  Eyes:      Conjunctiva/sclera: Conjunctivae normal.   Cardiovascular:      Rate and Rhythm: Tachycardia present. Rhythm irregular. Pulmonary:      Effort: Respiratory distress (Patient is dyspneic when walking, breathing is calm when resting.) present. Breath sounds: No wheezing, rhonchi or rales. Abdominal:      General: Bowel sounds are normal.      Palpations: Abdomen is soft. Tenderness: There is no abdominal tenderness. Skin:     General: Skin is warm and dry. Comments: He has right lower leg ulcers, has bilateral ankle edema   Neurological:      Mental Status: He is alert. Mental status is at baseline. Motor: No weakness (Moves all extremities. ).              Jolene Chavez MD

## 2023-08-22 NOTE — TELEPHONE ENCOUNTER
Lm on am. CR
Patient scheduled to see dr Vance Lynch 8/22. Wife reports he is feeling so sick today that he couldn't go for his blood test and wondering if he should go in the morning before his appt?  (I don't see any labs in his chart to be done for dr Vance Lynch)    When asked how he was feeling sick - wife said that whatever he has been seeing dr Vance Lynch for is still going on. Patient intends to keep 8/22 appt. Please let wife know about any labs to be done.
16

## 2023-08-22 NOTE — ASSESSMENT & PLAN NOTE
Pt has chronic hypoxic respiratory failure and uses oxygen at home prn. O2 sat at rest today was normal.    Pt's dyspnea with slight exertion is multifactorial: due to diastolic chf, copd, pulmonary htn, obesity.     Will check cxr

## 2023-08-22 NOTE — ASSESSMENT & PLAN NOTE
Lab Results   Component Value Date    EGFR 20 07/05/2023    EGFR 19 06/12/2023    EGFR 20 05/08/2023    CREATININE 2.71 (H) 07/05/2023    CREATININE 2.74 (H) 06/12/2023    CREATININE 2.69 (H) 05/08/2023       He has stage IV CKD. He feels he is able to empty his bladder.   Recheck renal function

## 2023-08-22 NOTE — ASSESSMENT & PLAN NOTE
Echogram showed pulmonary hypertension last year.   This is most likely due to COPD, diastolic CHF    Recheck pulmonary hypertension to see if it has progressed since last year

## 2023-08-23 ENCOUNTER — HOSPITAL ENCOUNTER (OUTPATIENT)
Dept: RADIOLOGY | Facility: HOSPITAL | Age: 88
Discharge: HOME | End: 2023-08-23
Attending: INTERNAL MEDICINE
Payer: COMMERCIAL

## 2023-08-23 ENCOUNTER — LAB (OUTPATIENT)
Dept: LAB | Facility: HOSPITAL | Age: 88
End: 2023-08-23
Payer: COMMERCIAL

## 2023-08-23 DIAGNOSIS — I50.32 CHRONIC DIASTOLIC CONGESTIVE HEART FAILURE (HCC): ICD-10-CM

## 2023-08-23 DIAGNOSIS — N18.4 CKD (CHRONIC KIDNEY DISEASE) STAGE 4, GFR 15-29 ML/MIN (HCC): ICD-10-CM

## 2023-08-23 LAB
ALBUMIN SERPL BCP-MCNC: 4.2 G/DL (ref 3.5–5)
ALP SERPL-CCNC: 84 U/L (ref 34–104)
ALT SERPL W P-5'-P-CCNC: 10 U/L (ref 7–52)
ANION GAP SERPL CALCULATED.3IONS-SCNC: 12 MMOL/L
AST SERPL W P-5'-P-CCNC: 15 U/L (ref 13–39)
BILIRUB SERPL-MCNC: 0.71 MG/DL (ref 0.2–1)
BNP SERPL-MCNC: 40 PG/ML (ref 0–100)
BUN SERPL-MCNC: 81 MG/DL (ref 5–25)
CALCIUM SERPL-MCNC: 9.7 MG/DL (ref 8.4–10.2)
CHLORIDE SERPL-SCNC: 94 MMOL/L (ref 96–108)
CO2 SERPL-SCNC: 28 MMOL/L (ref 21–32)
CREAT SERPL-MCNC: 2.95 MG/DL (ref 0.6–1.3)
ERYTHROCYTE [DISTWIDTH] IN BLOOD BY AUTOMATED COUNT: 14.3 % (ref 11.6–15.1)
GFR SERPL CREATININE-BSD FRML MDRD: 18 ML/MIN/1.73SQ M
GLUCOSE P FAST SERPL-MCNC: 169 MG/DL (ref 65–99)
HCT VFR BLD AUTO: 42.8 % (ref 36.5–49.3)
HGB BLD-MCNC: 13.8 G/DL (ref 12–17)
MCH RBC QN AUTO: 28.7 PG (ref 26.8–34.3)
MCHC RBC AUTO-ENTMCNC: 32.2 G/DL (ref 31.4–37.4)
MCV RBC AUTO: 89 FL (ref 82–98)
PLATELET # BLD AUTO: 277 THOUSANDS/UL (ref 149–390)
PMV BLD AUTO: 10.2 FL (ref 8.9–12.7)
POTASSIUM SERPL-SCNC: 3.5 MMOL/L (ref 3.5–5.3)
PROT SERPL-MCNC: 7.3 G/DL (ref 6.4–8.4)
RBC # BLD AUTO: 4.81 MILLION/UL (ref 3.88–5.62)
SODIUM SERPL-SCNC: 134 MMOL/L (ref 135–147)
WBC # BLD AUTO: 11.94 THOUSAND/UL (ref 4.31–10.16)

## 2023-08-23 PROCEDURE — 71046 X-RAY EXAM CHEST 2 VIEWS: CPT

## 2023-08-23 PROCEDURE — 36415 COLL VENOUS BLD VENIPUNCTURE: CPT

## 2023-08-23 PROCEDURE — 80053 COMPREHEN METABOLIC PANEL: CPT

## 2023-08-23 PROCEDURE — 83880 ASSAY OF NATRIURETIC PEPTIDE: CPT

## 2023-08-23 PROCEDURE — 85027 COMPLETE CBC AUTOMATED: CPT

## 2023-08-23 NOTE — RESULT ENCOUNTER NOTE
Call patient: Eva Curtis normal potassium, renal fxn is at the upper end of his baseline, liver fxn is normal, redblood cell counts are normal

## 2023-08-26 ENCOUNTER — HOSPITAL ENCOUNTER (EMERGENCY)
Facility: HOSPITAL | Age: 88
Discharge: HOME | End: 2023-08-26
Attending: EMERGENCY MEDICINE
Payer: COMMERCIAL

## 2023-08-26 ENCOUNTER — APPOINTMENT (EMERGENCY)
Dept: RADIOLOGY | Facility: HOSPITAL | Age: 88
End: 2023-08-26
Payer: COMMERCIAL

## 2023-08-26 VITALS
HEART RATE: 79 BPM | DIASTOLIC BLOOD PRESSURE: 80 MMHG | RESPIRATION RATE: 17 BRPM | OXYGEN SATURATION: 97 % | SYSTOLIC BLOOD PRESSURE: 138 MMHG | TEMPERATURE: 97.9 F

## 2023-08-26 DIAGNOSIS — K59.00 CONSTIPATION: Primary | ICD-10-CM

## 2023-08-26 LAB
ALBUMIN SERPL BCP-MCNC: 4 G/DL (ref 3.5–5)
ALP SERPL-CCNC: 79 U/L (ref 34–104)
ALT SERPL W P-5'-P-CCNC: 10 U/L (ref 7–52)
ANION GAP SERPL CALCULATED.3IONS-SCNC: 14 MMOL/L
AST SERPL W P-5'-P-CCNC: 19 U/L (ref 13–39)
BASE EXCESS BLDA CALC-SCNC: 1 MMOL/L (ref -2–3)
BASOPHILS # BLD AUTO: 0.05 THOUSANDS/ÂΜL (ref 0–0.1)
BASOPHILS NFR BLD AUTO: 0 % (ref 0–1)
BILIRUB SERPL-MCNC: 0.7 MG/DL (ref 0.2–1)
BUN SERPL-MCNC: 92 MG/DL (ref 5–25)
CA-I BLD-SCNC: 1.03 MMOL/L (ref 1.12–1.32)
CALCIUM SERPL-MCNC: 9.2 MG/DL (ref 8.4–10.2)
CARDIAC TROPONIN I PNL SERPL HS: 17 NG/L
CHLORIDE SERPL-SCNC: 94 MMOL/L (ref 96–108)
CO2 SERPL-SCNC: 24 MMOL/L (ref 21–32)
CREAT SERPL-MCNC: 3.18 MG/DL (ref 0.6–1.3)
EOSINOPHIL # BLD AUTO: 0.16 THOUSAND/ÂΜL (ref 0–0.61)
EOSINOPHIL NFR BLD AUTO: 1 % (ref 0–6)
ERYTHROCYTE [DISTWIDTH] IN BLOOD BY AUTOMATED COUNT: 14.3 % (ref 11.6–15.1)
GFR SERPL CREATININE-BSD FRML MDRD: 16 ML/MIN/1.73SQ M
GLUCOSE SERPL-MCNC: 150 MG/DL (ref 65–140)
GLUCOSE SERPL-MCNC: 154 MG/DL (ref 65–140)
HCO3 BLDA-SCNC: 24.6 MMOL/L (ref 24–30)
HCT VFR BLD AUTO: 42.1 % (ref 36.5–49.3)
HCT VFR BLD CALC: 43 % (ref 36.5–49.3)
HGB BLD-MCNC: 13.6 G/DL (ref 12–17)
HGB BLDA-MCNC: 14.6 G/DL (ref 12–17)
IMM GRANULOCYTES # BLD AUTO: 0.08 THOUSAND/UL (ref 0–0.2)
IMM GRANULOCYTES NFR BLD AUTO: 1 % (ref 0–2)
LYMPHOCYTES # BLD AUTO: 1.3 THOUSANDS/ÂΜL (ref 0.6–4.47)
LYMPHOCYTES NFR BLD AUTO: 9 % (ref 14–44)
MCH RBC QN AUTO: 28.5 PG (ref 26.8–34.3)
MCHC RBC AUTO-ENTMCNC: 32.3 G/DL (ref 31.4–37.4)
MCV RBC AUTO: 88 FL (ref 82–98)
MONOCYTES # BLD AUTO: 0.95 THOUSAND/ÂΜL (ref 0.17–1.22)
MONOCYTES NFR BLD AUTO: 7 % (ref 4–12)
NEUTROPHILS # BLD AUTO: 11.58 THOUSANDS/ÂΜL (ref 1.85–7.62)
NEUTS SEG NFR BLD AUTO: 82 % (ref 43–75)
NRBC BLD AUTO-RTO: 0 /100 WBCS
PCO2 BLD: 26 MMOL/L (ref 21–32)
PCO2 BLD: 36.5 MM HG (ref 42–50)
PH BLD: 7.44 [PH] (ref 7.3–7.4)
PLATELET # BLD AUTO: 272 THOUSANDS/UL (ref 149–390)
PMV BLD AUTO: 10.1 FL (ref 8.9–12.7)
PO2 BLD: 43 MM HG (ref 35–45)
POTASSIUM BLD-SCNC: 3.1 MMOL/L (ref 3.5–5.3)
POTASSIUM SERPL-SCNC: 3.5 MMOL/L (ref 3.5–5.3)
PROT SERPL-MCNC: 6.9 G/DL (ref 6.4–8.4)
RBC # BLD AUTO: 4.78 MILLION/UL (ref 3.88–5.62)
SAO2 % BLD FROM PO2: 81 % (ref 60–85)
SODIUM BLD-SCNC: 133 MMOL/L (ref 136–145)
SODIUM SERPL-SCNC: 132 MMOL/L (ref 135–147)
SPECIMEN SOURCE: ABNORMAL
WBC # BLD AUTO: 14.12 THOUSAND/UL (ref 4.31–10.16)

## 2023-08-26 PROCEDURE — 71046 X-RAY EXAM CHEST 2 VIEWS: CPT

## 2023-08-26 PROCEDURE — 85014 HEMATOCRIT: CPT

## 2023-08-26 PROCEDURE — 36415 COLL VENOUS BLD VENIPUNCTURE: CPT | Performed by: EMERGENCY MEDICINE

## 2023-08-26 PROCEDURE — 84295 ASSAY OF SERUM SODIUM: CPT

## 2023-08-26 PROCEDURE — 94640 AIRWAY INHALATION TREATMENT: CPT

## 2023-08-26 PROCEDURE — 85025 COMPLETE CBC W/AUTO DIFF WBC: CPT | Performed by: EMERGENCY MEDICINE

## 2023-08-26 PROCEDURE — 93005 ELECTROCARDIOGRAM TRACING: CPT

## 2023-08-26 PROCEDURE — 84132 ASSAY OF SERUM POTASSIUM: CPT

## 2023-08-26 PROCEDURE — 80053 COMPREHEN METABOLIC PANEL: CPT | Performed by: EMERGENCY MEDICINE

## 2023-08-26 PROCEDURE — 84484 ASSAY OF TROPONIN QUANT: CPT | Performed by: EMERGENCY MEDICINE

## 2023-08-26 PROCEDURE — 99285 EMERGENCY DEPT VISIT HI MDM: CPT

## 2023-08-26 PROCEDURE — 82803 BLOOD GASES ANY COMBINATION: CPT

## 2023-08-26 PROCEDURE — 82947 ASSAY GLUCOSE BLOOD QUANT: CPT

## 2023-08-26 PROCEDURE — 99285 EMERGENCY DEPT VISIT HI MDM: CPT | Performed by: EMERGENCY MEDICINE

## 2023-08-26 PROCEDURE — 82330 ASSAY OF CALCIUM: CPT

## 2023-08-26 RX ORDER — IPRATROPIUM BROMIDE AND ALBUTEROL SULFATE 2.5; .5 MG/3ML; MG/3ML
3 SOLUTION RESPIRATORY (INHALATION) ONCE
Status: COMPLETED | OUTPATIENT
Start: 2023-08-26 | End: 2023-08-26

## 2023-08-26 RX ORDER — BISACODYL 10 MG
10 SUPPOSITORY, RECTAL RECTAL ONCE
Status: COMPLETED | OUTPATIENT
Start: 2023-08-26 | End: 2023-08-26

## 2023-08-26 RX ADMIN — IPRATROPIUM BROMIDE AND ALBUTEROL SULFATE 3 ML: 2.5; .5 SOLUTION RESPIRATORY (INHALATION) at 07:57

## 2023-08-26 RX ADMIN — BISACODYL 10 MG: 10 SUPPOSITORY RECTAL at 09:31

## 2023-08-26 NOTE — ED PROVIDER NOTES
History  Chief Complaint   Patient presents with   • Shortness of Breath     Pt has sob over night. Pt denies chest or back pain. Pt denies cough or fever. 67-year-old male with history of chronic diastolic heart failure with ejection fraction 60% in 2022, COPD on Trelegy, pulmonary hypertension, chronic respiratory failure with hypoxemia on home O2 as needed and chronic kidney disease states that his breathing has been slightly worse over the past 24 hours. Denies change in cough, fevers or chills, nasal congestion, chest pain and palpitations. He was seen by his PCP 4 days ago and found to have recent weight loss. He had labs and chest x-ray done 3 days ago that are essentially within his normal range. Denies any other new symptoms. He cannot fully describe the problem with his dyspnea. He does use his home oxygen concentrator as needed. He arrives from home by wife without supplemental oxygen. Pulse ox is 94% on room air. Prior to Admission Medications   Prescriptions Last Dose Informant Patient Reported? Taking?    Blood Glucose Monitoring Suppl (OneTouch Verio) w/Device KIT  Self No No   Sig: Use 2 (two) times a day Dx:E11.9   Iron-Vitamin C (IRON 100/C PO)  Self Yes No   Sig: Take 325 mg by mouth   Lancets (onetouch ultrasoft) lancets  Self No No   Sig: Use 2 (two) times a day E11.9   Trelegy Ellipta 200-62.5-25 MCG/ACT AEPB inhaler  Self Yes No   Sig: INHALE 1 PUFF DAILY RINSE MOUTH AFTER USE.   albuterol (2.5 mg/3 mL) 0.083 % nebulizer solution  Self No No   Sig: Take 3 mL (2.5 mg total) by nebulization every 6 (six) hours as needed for wheezing or shortness of breath   albuterol (PROVENTIL HFA,VENTOLIN HFA) 90 mcg/act inhaler  Self No No   Sig: TAKE 2 PUFFS BY MOUTH EVERY 4 HOURS AS NEEDED FOR WHEEZE   allopurinol (ZYLOPRIM) 100 mg tablet   No No   Sig: Take 2 tablets (200 mg total) by mouth daily   betamethasone dipropionate (DIPROSONE) 0.05 % cream   No No   Sig: APPLY TOPICALLY 2 (TWO) TIMES A DAY FOR 14 DAYS APPLY TO THE RED SKIN OF YOUR LOWER LEGS   bumetanide (BUMEX) 2 mg tablet   No No   Sig: Take 1 tablet (2 mg total) by mouth 2 (two) times a day   calcitriol (ROCALTROL) 0.25 mcg capsule   No No   Sig: Take 1 capsule (0.25 mcg total) by mouth daily   fluticasone (FLONASE) 50 mcg/act nasal spray  Self No No   Sig: Spray 1 spray into each nostril twice daily   fluticasone-umeclidinium-vilanterol (Trelegy Ellipta) 100-62.5-25 mcg/actuation inhaler   No No   Sig: Inhale 1 puff daily Rinse mouth after use. glipiZIDE (GLUCOTROL) 5 mg tablet   No No   Sig: TAKE 1 TABLET BY MOUTH 2 TIMES A DAY BEFORE MEALS.   glucose blood test strip  Self No No   Sig: Test twice daily DX:  E11.9   labetalol (NORMODYNE) 200 mg tablet  Self No No   Sig: Take 1 tablet (200 mg total) by mouth 2 (two) times a day   metolazone (ZAROXOLYN) 2.5 mg tablet   No No   Sig: TAKE 1 TABLET BY MOUTH 30 MINUTES BEFORE TAKING BUMETANIDE IN THE MORNING ON M,W,F   mupirocin (BACTROBAN) 2 % ointment  Self No No   Sig: Apply topically in the morning   potassium chloride (K-DUR,KLOR-CON) 20 mEq tablet  Self No No   Sig: Take 20 mEq twice a day Monday through Thursday, take an extra 20 mEq on Fridays along with metolazone. senna (SENOKOT) 8.6 MG tablet   No No   Sig: Take 1 tablet (8.6 mg total) by mouth daily as needed for constipation   sertraline (ZOLOFT) 25 mg tablet   No No   Sig: TAKE 1 TABLET (25 MG TOTAL) BY MOUTH DAILY.    tamsulosin (FLOMAX) 0.4 mg  Self No No   Sig: TAKE 1 CAPSULE BY MOUTH EVERY DAY WITH DINNER   traMADol (Ultram) 50 mg tablet   No No   Sig: Take 1 tablet (50 mg total) by mouth 2 (two) times a day as needed for moderate pain      Facility-Administered Medications: None       Past Medical History:   Diagnosis Date   • Anxiety 2020   • COPD (chronic obstructive pulmonary disease) ([address])    • Coronary artery disease 2012   • Depression 2020   • Herpes zoster    • Hypertension    • Memory loss     possible • Mycoplasma pneumonia    • Obesity    • Osteoarthritis    • Renal calculi        Past Surgical History:   Procedure Laterality Date   • CATARACT EXTRACTION      with insert intraoculat lens prosthesis   • HEMORRHOID SURGERY     • NECK SURGERY      for bone spur       Family History   Problem Relation Age of Onset   • Diabetes Mother    • Breast cancer Mother         She  in 12. Age 80   • Stroke Mother    • Pneumonia Father    • Substance Abuse Neg Hx    • Mental illness Neg Hx      I have reviewed and agree with the history as documented. E-Cigarette/Vaping   • E-Cigarette Use Never User      E-Cigarette/Vaping Substances   • Nicotine No    • THC No    • CBD No    • Flavoring No    • Other No    • Unknown No      Social History     Tobacco Use   • Smoking status: Former     Packs/day: 2.00     Years: 42.00     Total pack years: 84.00     Types: Cigarettes     Start date: 1956     Quit date: 1998     Years since quittin.6   • Smokeless tobacco: Never   Vaping Use   • Vaping Use: Never used   Substance Use Topics   • Alcohol use: Not Currently     Alcohol/week: 1.0 standard drink of alcohol     Types: 1 Cans of beer per week     Comment: 1-2 beers a months   • Drug use: No       Review of Systems   Constitutional: Negative for chills, diaphoresis and fever. HENT: Negative for congestion, rhinorrhea and sore throat. Respiratory: Positive for shortness of breath. Negative for cough ( no change in chronic cough). Cardiovascular: Positive for leg swelling ( chronic). Negative for chest pain and palpitations. Gastrointestinal: Negative for abdominal pain and blood in stool. Genitourinary: Negative for dysuria. Musculoskeletal: Positive for gait problem. Neurological: Negative for dizziness and syncope. Physical Exam  Physical Exam  Vitals and nursing note reviewed. Constitutional:       General: He is not in acute distress. Appearance: He is well-developed. He is obese. He is not ill-appearing or diaphoretic. HENT:      Head: Normocephalic and atraumatic. Right Ear: External ear normal.      Left Ear: External ear normal.   Eyes:      Conjunctiva/sclera: Conjunctivae normal.      Pupils: Pupils are equal, round, and reactive to light. Neck:      Vascular: No JVD. Cardiovascular:      Rate and Rhythm: Normal rate and regular rhythm. Heart sounds: Normal heart sounds. No murmur heard. Pulmonary:      Effort: Pulmonary effort is normal.      Breath sounds: Normal breath sounds. Chest:      Chest wall: No tenderness. Abdominal:      General: Bowel sounds are normal.      Palpations: Abdomen is soft. There is no mass. Tenderness: There is no abdominal tenderness. There is no guarding or rebound. Genitourinary:     Rectum: Guaiac result negative. Comments: Soft brown fecal impaction at the tip of the finger. Small amount was digitally disimpacted. No masses. Will try enema  Musculoskeletal:         General: No tenderness. Normal range of motion. Cervical back: Normal range of motion and neck supple. Right lower leg: No tenderness. Edema present. Left lower leg: No tenderness. Edema present. Lymphadenopathy:      Cervical: No cervical adenopathy. Skin:     General: Skin is warm and dry. Capillary Refill: Capillary refill takes less than 2 seconds. Findings: No rash. Neurological:      General: No focal deficit present. Mental Status: He is alert and oriented to person, place, and time. Cranial Nerves: No cranial nerve deficit. Coordination: Coordination normal.      Deep Tendon Reflexes: Reflexes are normal and symmetric. Psychiatric:         Mood and Affect: Mood is anxious.          Behavior: Behavior normal.         Vital Signs  ED Triage Vitals   Temperature Pulse Respirations Blood Pressure SpO2   08/26/23 0702 08/26/23 0701 08/26/23 0701 08/26/23 0701 08/26/23 0701   97.9 °F (36.6 °C) 99 (!) 24 137/74 92 %      Temp Source Heart Rate Source Patient Position - Orthostatic VS BP Location FiO2 (%)   08/26/23 0702 -- -- -- --   Oral          Pain Score       --                  Vitals:    08/26/23 0900 08/26/23 1000 08/26/23 1100 08/26/23 1200   BP: 140/76 134/76 140/82 138/80   Pulse: 87 86 82 79         Visual Acuity      ED Medications  Medications   ipratropium-albuterol (DUO-NEB) 0.5-2.5 mg/3 mL inhalation solution 3 mL (3 mL Nebulization Given 8/26/23 0757)   bisacodyl (DULCOLAX) rectal suppository 10 mg (10 mg Rectal Given 8/26/23 0931)       Diagnostic Studies  Results Reviewed     Procedure Component Value Units Date/Time    HS Troponin 0hr (reflex protocol) [[id number]]  (Normal) Collected: 08/26/23 0720    Lab Status: Final result Specimen: Blood from Arm, Right Updated: 08/26/23 0748     hs TnI 0hr 17 ng/L     Comprehensive metabolic panel [[id number]]  (Abnormal) Collected: 08/26/23 0720    Lab Status: Final result Specimen: Blood from Arm, Right Updated: 08/26/23 0743     Sodium 132 mmol/L      Potassium 3.5 mmol/L      Chloride 94 mmol/L      CO2 24 mmol/L      ANION GAP 14 mmol/L      BUN 92 mg/dL      Creatinine 3.18 mg/dL      Glucose 154 mg/dL      Calcium 9.2 mg/dL      AST 19 U/L      ALT 10 U/L      Alkaline Phosphatase 79 U/L      Total Protein 6.9 g/dL      Albumin 4.0 g/dL      Total Bilirubin 0.70 mg/dL      eGFR 16 ml/min/1.73sq m     Narrative:      [place] guidelines for Chronic Kidney Disease (CKD):   •  Stage 1 with normal or high GFR (GFR > 90 mL/min/1.73 square meters)  •  Stage 2 Mild CKD (GFR = 60-89 mL/min/1.73 square meters)  •  Stage 3A Moderate CKD (GFR = 45-59 mL/min/1.73 square meters)  •  Stage 3B Moderate CKD (GFR = 30-44 mL/min/1.73 square meters)  •  Stage 4 Severe CKD (GFR = 15-29 mL/min/1.73 square meters)  •  Stage 5 End Stage CKD (GFR <15 mL/min/1.73 square meters)  Note: GFR calculation is accurate only with a steady state creatinine    POCT Blood Gas (CG8+) [[id number]]  (Abnormal) Collected: 08/26/23 0729    Lab Status: Final result Specimen: Venous Updated: 08/26/23 0733     ph, James ISTAT 7.438     pCO2, James i-STAT 36.5 mm HG      pO2, James i-STAT 43.0 mm HG      BE, i-STAT 1 mmol/L      HCO3, James i-STAT 24.6 mmol/L      CO2, i-STAT 26 mmol/L      O2 Sat, i-STAT 81 %      SODIUM, I-STAT 133 mmol/l      Potassium, i-STAT 3.1 mmol/L      Calcium, Ionized i-STAT 1.03 mmol/L      Hct, i-STAT 43 %      Hgb, i-STAT 14.6 g/dl      Glucose, i-STAT 150 mg/dl      Specimen Type VENOUS    CBC and differential [[id number]]  (Abnormal) Collected: 08/26/23 0720    Lab Status: Final result Specimen: Blood from Arm, Right Updated: 08/26/23 0725     WBC 14.12 Thousand/uL      RBC 4.78 Million/uL      Hemoglobin 13.6 g/dL      Hematocrit 42.1 %      MCV 88 fL      MCH 28.5 pg      MCHC 32.3 g/dL      RDW 14.3 %      MPV 10.1 fL      Platelets 285 Thousands/uL      nRBC 0 /100 WBCs      Neutrophils Relative 82 %      Immat GRANS % 1 %      Lymphocytes Relative 9 %      Monocytes Relative 7 %      Eosinophils Relative 1 %      Basophils Relative 0 %      Neutrophils Absolute 11.58 Thousands/µL      Immature Grans Absolute 0.08 Thousand/uL      Lymphocytes Absolute 1.30 Thousands/µL      Monocytes Absolute 0.95 Thousand/µL      Eosinophils Absolute 0.16 Thousand/µL      Basophils Absolute 0.05 Thousands/µL                  XR chest 2 views   ED Interpretation by Kathryn Subramanian DO (08/26 3759)   No acute cardiopulmonary disease      Final Result by Pam Hines MD (08/26 1915)      No acute cardiopulmonary disease.                   Workstation performed: [id number]                    Procedures  ECG 12 Lead Documentation Only    Date/Time: 8/26/2023 7:09 AM    Performed by: Kathryn Subramanian DO  Authorized by: Kathryn Subramanian DO    ECG reviewed by me, the ED Provider: yes    Patient location:  ED  Previous ECG:     Previous ECG:  Compared to current Similarity:  No change    Comparison to cardiac monitor: Yes    Rate:     ECG rate assessment: normal    Rhythm:     Rhythm: sinus rhythm    Ectopy:     Ectopy: PAC and PVCs      PVCs:  Unifocal and infrequent  QRS:     QRS axis:  Normal    QRS intervals:  Normal  Conduction:     Conduction: normal    ST segments:     ST segments:  Normal  T waves:     T waves: non-specific               ED Course  ED Course as of 08/27/23 0719   Sat Aug 26, 2023   0806 The patient's wife arrived and states that he has had constipation for several days now. She believes this is perceived dyspnea is due to anxiety and need to go back and forth to the bathroom often. Patient denies abdominal pain but does have discomfort and rectal pressure. He again denies seeing any blood in his stool. He has gone to the bathroom approximately 4 times since arrival in the department but has been unable to pass stool. 8131 Patient's BUN and creatinine are slightly higher than his baseline. He and his wife admit he has not been eating or drinking much. He he had some water here and is thirsty. No nausea or vomiting recently. Elevated BNP due to chronic kidney disease. Denies chest pain. Nonischemic EKG. Is having minimal bowel movements after enema. 1122 Patient did pass some stool. Feels he is not fully evacuated. Vital signs are stable. Medical Decision Making  81-year-old-year-old male with history of chronic diastolic heart failure, COPD, pulmonary hypertension with chronic respiratory failure with hypoxemia complains of worsening dyspnea. Will do work-up to ensure there is no ACS, dysrhythmia, pneumonia, electrode abnormality, worsening kidney failure, acute cardiomyopathy with elevated biomarkers. Patient stable with no need for supplemental oxygen while here. Patient does confirm wife suspicion that constipation is his main issue.   He did finally have results with enema and is stable for discharge. Instructions regarding constipation were reviewed. Refer back to PCP as needed. Constipation: chronic illness or injury with exacerbation, progression, or side effects of treatment  Amount and/or Complexity of Data Reviewed  Independent Historian: spouse  External Data Reviewed: labs, ECG and notes. Labs: ordered. Radiology: ordered and independent interpretation performed. Decision-making details documented in ED Course. ECG/medicine tests: ordered and independent interpretation performed. Decision-making details documented in ED Course. Risk  OTC drugs. Prescription drug management. Disposition  Final diagnoses:   Constipation     Time reflects when diagnosis was documented in both MDM as applicable and the Disposition within this note     Time User Action Codes Description Comment    8/26/2023  8:43 AM Isabel Corporal Add [K59.00] Constipation       ED Disposition     ED Disposition   Discharge    Condition   Stable    Date/Time   Sat Aug 26, 2023 11:37 AM    Comment   Megha Ceballos discharge to home/self care.                Follow-up Information     Follow up With Specialties Details Why [address], MD Internal Medicine In 2 days As needed [address] [address]  [phone]            Discharge Medication List as of 8/26/2023 11:38 AM      CONTINUE these medications which have NOT CHANGED    Details   albuterol (2.5 mg/3 mL) 0.083 % nebulizer solution Take 3 mL (2.5 mg total) by nebulization every 6 (six) hours as needed for wheezing or shortness of breath, Starting Thu 7/21/2022, Normal      albuterol (PROVENTIL HFA,VENTOLIN HFA) 90 mcg/act inhaler TAKE 2 PUFFS BY MOUTH EVERY 4 HOURS AS NEEDED FOR WHEEZE, Normal      allopurinol (ZYLOPRIM) 100 mg tablet Take 2 tablets (200 mg total) by mouth daily, Starting Mon 8/21/2023, Normal      betamethasone dipropionate (DIPROSONE) 0.05 % cream APPLY TOPICALLY 2 (TWO) TIMES A DAY FOR 14 DAYS APPLY TO THE RED SKIN OF YOUR LOWER LEGS, Starting Mon 8/21/2023, Until Mon 9/4/2023, Normal      Blood Glucose Monitoring Suppl (OneTouch Verio) w/Device KIT Use 2 (two) times a day Dx:E11.9, Starting Tue 8/16/2022, Normal      bumetanide (BUMEX) 2 mg tablet Take 1 tablet (2 mg total) by mouth 2 (two) times a day, Starting Tue 5/9/2023, No Print      calcitriol (ROCALTROL) 0.25 mcg capsule Take 1 capsule (0.25 mcg total) by mouth daily, Starting Mon 2/27/2023, Normal      fluticasone (FLONASE) 50 mcg/act nasal spray Spray 1 spray into each nostril twice daily, Normal      fluticasone-umeclidinium-vilanterol (Trelegy Ellipta) 100-62.5-25 mcg/actuation inhaler Inhale 1 puff daily Rinse mouth after use., Starting Wed 11/16/2022, Until Tue 8/22/2023, No Print      glipiZIDE (GLUCOTROL) 5 mg tablet TAKE 1 TABLET BY MOUTH 2 TIMES A DAY BEFORE MEALS., Normal      glucose blood test strip Test twice daily DX:  E11.9, Normal      Iron-Vitamin C (IRON 100/C PO) Take 325 mg by mouth, Historical Med      labetalol (NORMODYNE) 200 mg tablet Take 1 tablet (200 mg total) by mouth 2 (two) times a day, Starting Thu 1/19/2023, Normal      Lancets (onetouch ultrasoft) lancets Use 2 (two) times a day E11.9, Starting Tue 8/16/2022, Normal      metolazone (ZAROXOLYN) 2.5 mg tablet TAKE 1 TABLET BY MOUTH 30 MINUTES BEFORE TAKING BUMETANIDE IN THE MORNING ON M,W,F, Normal      mupirocin (BACTROBAN) 2 % ointment Apply topically in the morning, Starting Thu 2/16/2023, Normal      potassium chloride (K-DUR,KLOR-CON) 20 mEq tablet Take 20 mEq twice a day Monday through Thursday, take an extra 20 mEq on Fridays along with metolazone., Normal      senna (SENOKOT) 8.6 MG tablet Take 1 tablet (8.6 mg total) by mouth daily as needed for constipation, Starting Tue 8/22/2023, Normal      sertraline (ZOLOFT) 25 mg tablet TAKE 1 TABLET (25 MG TOTAL) BY MOUTH DAILY. , Starting Mon 8/14/2023, Normal      tamsulosin (FLOMAX) 0.4 mg TAKE 1 CAPSULE BY MOUTH EVERY DAY WITH DINNER, Normal      traMADol (Ultram) 50 mg tablet Take 1 tablet (50 mg total) by mouth 2 (two) times a day as needed for moderate pain, Starting Tue 5/9/2023, Normal      Trelegy Ellipta 200-62.5-25 MCG/ACT AEPB inhaler INHALE 1 PUFF DAILY RINSE MOUTH AFTER USE., Historical Med             No discharge procedures on file.     PDMP Review       Value Time User    PDMP Reviewed  Yes 8/22/2023  2:14 PM Yogi Whitney MD          ED Provider  Electronically Signed by           Lauren Mathis DO  08/27/23 2185

## 2023-08-26 NOTE — DISCHARGE INSTRUCTIONS
Try MiraLax, one capful of this medicine in a glass of water or other drink, daily. Increase your fiber intake. Contact your PCP for follow up on Monday if not much better.

## 2023-08-28 ENCOUNTER — VBI (OUTPATIENT)
Dept: FAMILY MEDICINE CLINIC | Facility: HOSPITAL | Age: 88
End: 2023-08-28

## 2023-08-28 ENCOUNTER — TELEPHONE (OUTPATIENT)
Dept: FAMILY MEDICINE CLINIC | Facility: HOSPITAL | Age: 88
End: 2023-08-28

## 2023-08-28 DIAGNOSIS — M1A.0710 IDIOPATHIC CHRONIC GOUT OF RIGHT FOOT WITHOUT TOPHUS: ICD-10-CM

## 2023-08-28 RX ORDER — ALLOPURINOL 100 MG/1
200 TABLET ORAL DAILY
Qty: 180 TABLET | Refills: 2 | Status: ON HOLD | OUTPATIENT
Start: 2023-08-28

## 2023-08-28 NOTE — TELEPHONE ENCOUNTER
08/28/23 1:33 PM    Patient contacted post ED visit, [facility] department spoke with patient/caregiver and outreach was successful. Thank you.   Mara Chaudhary  [facility]

## 2023-08-30 LAB
ATRIAL RATE: 93 BPM
P AXIS: 94 DEGREES
PR INTERVAL: 196 MS
QRS AXIS: 33 DEGREES
QRSD INTERVAL: 84 MS
QT INTERVAL: 386 MS
QTC INTERVAL: 479 MS
T WAVE AXIS: 29 DEGREES
VENTRICULAR RATE: 93 BPM

## 2023-08-30 PROCEDURE — 93010 ELECTROCARDIOGRAM REPORT: CPT | Performed by: INTERNAL MEDICINE

## 2023-08-31 ENCOUNTER — OFFICE VISIT (OUTPATIENT)
Dept: FAMILY MEDICINE CLINIC | Facility: HOSPITAL | Age: 88
End: 2023-08-31
Payer: COMMERCIAL

## 2023-08-31 ENCOUNTER — LAB (OUTPATIENT)
Dept: LAB | Facility: HOSPITAL | Age: 88
End: 2023-08-31
Payer: COMMERCIAL

## 2023-08-31 ENCOUNTER — HOME HEALTH ADMISSION (OUTPATIENT)
Dept: HOME HEALTH SERVICES | Facility: HOME HEALTHCARE | Age: 88
End: 2023-08-31
Payer: COMMERCIAL

## 2023-08-31 VITALS
TEMPERATURE: 98.6 F | SYSTOLIC BLOOD PRESSURE: 102 MMHG | OXYGEN SATURATION: 95 % | HEART RATE: 71 BPM | RESPIRATION RATE: 16 BRPM | DIASTOLIC BLOOD PRESSURE: 70 MMHG | HEIGHT: 71 IN | BODY MASS INDEX: 35 KG/M2 | WEIGHT: 250 LBS

## 2023-08-31 DIAGNOSIS — I83.018 VENOUS STASIS ULCER OF OTHER PART OF RIGHT LOWER LEG LIMITED TO BREAKDOWN OF SKIN, UNSPECIFIED WHETHER VARICOSE VEINS PRESENT (HCC): ICD-10-CM

## 2023-08-31 DIAGNOSIS — N18.4 CKD (CHRONIC KIDNEY DISEASE) STAGE 4, GFR 15-29 ML/MIN (HCC): ICD-10-CM

## 2023-08-31 DIAGNOSIS — I50.32 CHRONIC DIASTOLIC CONGESTIVE HEART FAILURE (HCC): ICD-10-CM

## 2023-08-31 DIAGNOSIS — R26.2 AMBULATORY DYSFUNCTION: ICD-10-CM

## 2023-08-31 DIAGNOSIS — I50.33 ACUTE ON CHRONIC DIASTOLIC (CONGESTIVE) HEART FAILURE (HCC): ICD-10-CM

## 2023-08-31 DIAGNOSIS — L97.811 VENOUS STASIS ULCER OF OTHER PART OF RIGHT LOWER LEG LIMITED TO BREAKDOWN OF SKIN, UNSPECIFIED WHETHER VARICOSE VEINS PRESENT (HCC): ICD-10-CM

## 2023-08-31 DIAGNOSIS — F33.0 MILD EPISODE OF RECURRENT MAJOR DEPRESSIVE DISORDER (HCC): ICD-10-CM

## 2023-08-31 DIAGNOSIS — J44.9 MODERATE COPD (CHRONIC OBSTRUCTIVE PULMONARY DISEASE) (HCC): Primary | ICD-10-CM

## 2023-08-31 LAB
ALBUMIN SERPL BCP-MCNC: 3.9 G/DL (ref 3.5–5)
ALP SERPL-CCNC: 79 U/L (ref 34–104)
ALT SERPL W P-5'-P-CCNC: 11 U/L (ref 7–52)
ANION GAP SERPL CALCULATED.3IONS-SCNC: 17 MMOL/L
AST SERPL W P-5'-P-CCNC: 12 U/L (ref 13–39)
BASOPHILS # BLD AUTO: 0.07 THOUSANDS/ÂΜL (ref 0–0.1)
BASOPHILS NFR BLD AUTO: 1 % (ref 0–1)
BILIRUB SERPL-MCNC: 0.63 MG/DL (ref 0.2–1)
BUN SERPL-MCNC: 95 MG/DL (ref 5–25)
CALCIUM SERPL-MCNC: 9.4 MG/DL (ref 8.4–10.2)
CHLORIDE SERPL-SCNC: 94 MMOL/L (ref 96–108)
CO2 SERPL-SCNC: 25 MMOL/L (ref 21–32)
CREAT SERPL-MCNC: 2.73 MG/DL (ref 0.6–1.3)
EOSINOPHIL # BLD AUTO: 0.63 THOUSAND/ÂΜL (ref 0–0.61)
EOSINOPHIL NFR BLD AUTO: 5 % (ref 0–6)
ERYTHROCYTE [DISTWIDTH] IN BLOOD BY AUTOMATED COUNT: 14.5 % (ref 11.6–15.1)
GFR SERPL CREATININE-BSD FRML MDRD: 20 ML/MIN/1.73SQ M
GLUCOSE SERPL-MCNC: 168 MG/DL (ref 65–140)
HCT VFR BLD AUTO: 43.9 % (ref 36.5–49.3)
HGB BLD-MCNC: 14.6 G/DL (ref 12–17)
IMM GRANULOCYTES # BLD AUTO: 0.09 THOUSAND/UL (ref 0–0.2)
IMM GRANULOCYTES NFR BLD AUTO: 1 % (ref 0–2)
LYMPHOCYTES # BLD AUTO: 1.04 THOUSANDS/ÂΜL (ref 0.6–4.47)
LYMPHOCYTES NFR BLD AUTO: 8 % (ref 14–44)
MCH RBC QN AUTO: 29.4 PG (ref 26.8–34.3)
MCHC RBC AUTO-ENTMCNC: 33.3 G/DL (ref 31.4–37.4)
MCV RBC AUTO: 88 FL (ref 82–98)
MONOCYTES # BLD AUTO: 0.9 THOUSAND/ÂΜL (ref 0.17–1.22)
MONOCYTES NFR BLD AUTO: 7 % (ref 4–12)
NEUTROPHILS # BLD AUTO: 9.6 THOUSANDS/ÂΜL (ref 1.85–7.62)
NEUTS SEG NFR BLD AUTO: 78 % (ref 43–75)
NRBC BLD AUTO-RTO: 0 /100 WBCS
PLATELET # BLD AUTO: 305 THOUSANDS/UL (ref 149–390)
PMV BLD AUTO: 11 FL (ref 8.9–12.7)
POTASSIUM SERPL-SCNC: 3.3 MMOL/L (ref 3.5–5.3)
PROT SERPL-MCNC: 6.9 G/DL (ref 6.4–8.4)
RBC # BLD AUTO: 4.97 MILLION/UL (ref 3.88–5.62)
SODIUM SERPL-SCNC: 136 MMOL/L (ref 135–147)
WBC # BLD AUTO: 12.33 THOUSAND/UL (ref 4.31–10.16)

## 2023-08-31 PROCEDURE — 85025 COMPLETE CBC W/AUTO DIFF WBC: CPT

## 2023-08-31 PROCEDURE — 36415 COLL VENOUS BLD VENIPUNCTURE: CPT

## 2023-08-31 PROCEDURE — 80053 COMPREHEN METABOLIC PANEL: CPT

## 2023-08-31 PROCEDURE — 99214 OFFICE O/P EST MOD 30 MIN: CPT | Performed by: INTERNAL MEDICINE

## 2023-08-31 RX ORDER — FORMOTEROL FUMARATE 20 UG/2ML
20 SOLUTION RESPIRATORY (INHALATION) 2 TIMES DAILY
Qty: 120 ML | Refills: 5 | Status: ON HOLD | OUTPATIENT
Start: 2023-08-31

## 2023-08-31 RX ORDER — POTASSIUM CHLORIDE 20 MEQ/1
20 TABLET, EXTENDED RELEASE ORAL 2 TIMES DAILY
Qty: 60 TABLET | Refills: 3 | Status: ON HOLD | OUTPATIENT
Start: 2023-08-31

## 2023-08-31 RX ORDER — BUDESONIDE 0.5 MG/2ML
0.5 INHALANT ORAL 2 TIMES DAILY
Qty: 120 ML | Refills: 5 | Status: ON HOLD | OUTPATIENT
Start: 2023-08-31

## 2023-08-31 RX ORDER — BETAMETHASONE DIPROPIONATE 0.5 MG/G
CREAM TOPICAL 2 TIMES DAILY
Qty: 45 G | Refills: 0 | Status: ON HOLD | OUTPATIENT
Start: 2023-08-31 | End: 2023-09-14

## 2023-08-31 RX ORDER — POTASSIUM CHLORIDE 20 MEQ/1
TABLET, EXTENDED RELEASE ORAL
Qty: 193 TABLET | Refills: 3 | OUTPATIENT
Start: 2023-08-31

## 2023-08-31 NOTE — PROGRESS NOTES
Assessment/Plan: Moderate COPD (chronic obstructive pulmonary disease) ([address])  Patient was in the ER on August 26 for shortness of breath that presents today with his wife and son for ER follow-up appointment. His breathing was very bad at home after the ER visit: Even at rest he was out of breath and walking couple of steps made it worse. Chest x-ray showed no CHF, pneumonia, masses in the ER. BNP was normal    Patient's son told me that patient was not using his nebulizers or inhalers for couple of days. He has difficulty using the Trelegy. They resumed the albuterol nebulizers the last 24 hours and his breathing improved. Patient was sitting in a wheelchair in the office today and he had no shortness of breath. I heard no wheezing or crackles on examination of the lung. He has COPD and I believe that COPD is the most likely cause of patient's worsening of his chronic dyspnea. Chronic diastolic CHF also contributes to this but COPD was more prominent due to the inability to use Trelegy and not using albuterol. I replaced Trelegy with the Pulmicort and Perforomist nebulizers twice daily. They will continue as needed albuterol nebulizer. I requested home visiting nurses and home physical therapy. Services are medically necessary as patient has cognitive impairment, fatigues easily with walking couple of steps, is unable to leave the home alone, is deconditioned. Chronic diastolic congestive heart failure (HCC)  Wt Readings from Last 3 Encounters:   08/31/23 113 kg (250 lb)   08/22/23 115 kg (254 lb)   07/20/23 119 kg (262 lb 6.4 oz)       He also has chronic diastolic CHF. Lungs were clear to auscultation today he had no JVD. Edema was stable. I asked wife to get the echo done tomorrow to make sure that the EF has not deteriorated.     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 Renal function was worse in the ER couple days ago. Patient denies urinary obstruction or dysuria. I will recheck renal function and will discontinue Zaroxolyn if it is at the same level as couple days ago    Mild episode of recurrent major depressive disorder ([address])  Patient's cognitive impairment. He feels depressed because he is unable to do things. He was started on sertraline 25 mg about 2 weeks ago and family noticed no improvement. I increased the dose of Zoloft to 50 mg       Diagnoses and all orders for this visit:    Moderate COPD (chronic obstructive pulmonary disease) ([facility])  -     budesonide (Pulmicort) 0.5 mg/2 mL nebulizer solution; Take 2 mL (0.5 mg total) by nebulization 2 (two) times a day Rinse mouth after use. -     formoterol (PERFOROMIST) 20 MCG/2ML nebulizer solution; Take 2 mL (20 mcg total) by nebulization 2 (two) times a day  -     Referral to [address]; Future    Mild episode of recurrent major depressive disorder ([facility])  -     sertraline (ZOLOFT) 50 mg tablet; Take 1 tablet (50 mg total) by mouth every morning    Chronic diastolic congestive heart failure ([address])  -     Referral to [address]; Future  -     Cancel: Comprehensive metabolic panel; Future  -     Cancel: CBC and differential; Future  -     Comprehensive metabolic panel; Future  -     CBC and differential; Future    CKD (chronic kidney disease) stage 4, GFR 15-29 ml/min ([facility])  -     Cancel: Comprehensive metabolic panel; Future  -     Cancel: CBC and differential; Future  -     Comprehensive metabolic panel;  Future  -     CBC and differential; Future    Ambulatory dysfunction          Subjective:      Patient ID: Edin Salvador is a 81 y.o. male. HPI    Patient presents for follow-up of chronic conditions as detailed in the assessment and plan. The following portions of the patient's history were reviewed and updated as appropriate: current medications, past family history, past medical history, past social history, past surgical history and problem list.    Review of Systems      Objective:    /70   Pulse 71   Temp 98.6 °F (37 °C) (Tympanic)   Resp 16   Ht 5' 11" (1.803 m)   Wt 113 kg (250 lb) Comment: reported by pt---weak today , in wheelchair. SpO2 95%   BMI 34.87 kg/m²      Physical Exam  Constitutional:       General: He is not in acute distress. Appearance: He is not toxic-appearing. Cardiovascular:      Rate and Rhythm: Normal rate and regular rhythm. Pulmonary:      Effort: No respiratory distress. Breath sounds: No wheezing, rhonchi or rales. Abdominal:      General: Bowel sounds are normal. There is no distension. Palpations: Abdomen is soft. Tenderness: There is no abdominal tenderness. Skin:     General: Skin is warm and dry. Comments: Pedal edema is present still   Neurological:      Mental Status: He is alert.              Marie Briceño MD

## 2023-08-31 NOTE — ASSESSMENT & PLAN NOTE
Lab Results   Component Value Date    EGFR 16 08/26/2023    EGFR 18 08/23/2023    EGFR 20 07/05/2023    CREATININE 3.18 (H) 08/26/2023    CREATININE 2.95 (H) 08/23/2023    CREATININE 2.71 (H) 07/05/2023     Patient has stage IV CKD. Renal function was worse in the ER couple days ago. Patient denies urinary obstruction or dysuria.   I will recheck renal function and will discontinue Zaroxolyn if it is at the same level as couple days ago

## 2023-08-31 NOTE — RESULT ENCOUNTER NOTE
Call patient: Jenny Turk that his kidney function has improved and is closer to his baseline. His potassium is 3.3, low. I am asking him to take the potassium chloride pill 20 mEq twice a day.   Continue same dose of bumetanide and metolazone

## 2023-08-31 NOTE — ASSESSMENT & PLAN NOTE
Patient's cognitive impairment. He feels depressed because he is unable to do things. He was started on sertraline 25 mg about 2 weeks ago and family noticed no improvement.   I increased the dose of Zoloft to 50 mg

## 2023-08-31 NOTE — ASSESSMENT & PLAN NOTE
Patient was in the ER on August 26 for shortness of breath that presents today with his wife and son for ER follow-up appointment. His breathing was very bad at home after the ER visit: Even at rest he was out of breath and walking couple of steps made it worse. Chest x-ray showed no CHF, pneumonia, masses in the ER. BNP was normal    Patient's son told me that patient was not using his nebulizers or inhalers for couple of days. He has difficulty using the Trelegy. They resumed the albuterol nebulizers the last 24 hours and his breathing improved. Patient was sitting in a wheelchair in the office today and he had no shortness of breath. I heard no wheezing or crackles on examination of the lung. He has COPD and I believe that COPD is the most likely cause of patient's worsening of his chronic dyspnea. Chronic diastolic CHF also contributes to this but COPD was more prominent due to the inability to use Trelegy and not using albuterol. I replaced Trelegy with the Pulmicort and Perforomist nebulizers twice daily. They will continue as needed albuterol nebulizer. I requested home visiting nurses and home physical therapy. Services are medically necessary as patient has cognitive impairment, fatigues easily with walking couple of steps, is unable to leave the home alone, is deconditioned.

## 2023-08-31 NOTE — ASSESSMENT & PLAN NOTE
Wt Readings from Last 3 Encounters:   08/31/23 113 kg (250 lb)   08/22/23 115 kg (254 lb)   07/20/23 119 kg (262 lb 6.4 oz)       He also has chronic diastolic CHF. Lungs were clear to auscultation today he had no JVD. Edema was stable. I asked wife to get the echo done tomorrow to make sure that the EF has not deteriorated.     Continue same dose of Bumex and Zaroxolyn for now but I will check patient's renal function which was worse in the ER and will discontinue Zaroxolyn if renal function is at the same level

## 2023-09-01 ENCOUNTER — HOSPITAL ENCOUNTER (OUTPATIENT)
Dept: NON INVASIVE DIAGNOSTICS | Facility: HOSPITAL | Age: 88
Discharge: HOME | End: 2023-09-01
Attending: INTERNAL MEDICINE
Payer: COMMERCIAL

## 2023-09-01 ENCOUNTER — HOME CARE VISIT (OUTPATIENT)
Dept: HOME HEALTH SERVICES | Facility: HOME HEALTHCARE | Age: 88
End: 2023-09-01

## 2023-09-01 VITALS
WEIGHT: 254 LBS | SYSTOLIC BLOOD PRESSURE: 138 MMHG | HEART RATE: 92 BPM | DIASTOLIC BLOOD PRESSURE: 80 MMHG | BODY MASS INDEX: 35.56 KG/M2 | HEIGHT: 71 IN

## 2023-09-01 DIAGNOSIS — I50.32 CHRONIC DIASTOLIC CONGESTIVE HEART FAILURE (HCC): ICD-10-CM

## 2023-09-01 DIAGNOSIS — I27.20 PULMONARY HYPERTENSION (HCC): ICD-10-CM

## 2023-09-01 DIAGNOSIS — J44.9 MODERATE COPD (CHRONIC OBSTRUCTIVE PULMONARY DISEASE) (HCC): Primary | ICD-10-CM

## 2023-09-01 LAB
AORTIC VALVE MEAN VELOCITY: 9.5 M/S
AV LVOT MEAN GRADIENT: 1 MMHG
AV LVOT PEAK GRADIENT: 2 MMHG
AV MEAN GRADIENT: 4 MMHG
AV PEAK GRADIENT: 7 MMHG
DOP CALC AO PEAK VEL: 1.31 M/S
DOP CALC AO VTI: 21.39 CM
DOP CALC LVOT PEAK VEL VTI: 15.02 CM
DOP CALC LVOT PEAK VEL: 0.78 M/S
RIGHT ATRIAL 2D VOLUME: 45 ML
RIGHT ATRIUM AREA SYSTOLE A4C: 16.4 CM2
RIGHT VENTRICLE ID DIMENSION: 4.7 CM
TR MAX PG: 41 MMHG
TR PEAK VELOCITY: 3.2 M/S
TRICUSPID ANNULAR PLANE SYSTOLIC EXCURSION: 2.1 CM
TRICUSPID VALVE PEAK REGURGITATION VELOCITY: 3.22 M/S

## 2023-09-01 PROCEDURE — 93306 TTE W/DOPPLER COMPLETE: CPT

## 2023-09-01 PROCEDURE — 93306 TTE W/DOPPLER COMPLETE: CPT | Performed by: INTERNAL MEDICINE

## 2023-09-02 ENCOUNTER — HOME CARE VISIT (OUTPATIENT)
Dept: HOME HEALTH SERVICES | Facility: HOME HEALTHCARE | Age: 88
End: 2023-09-02
Payer: COMMERCIAL

## 2023-09-02 VITALS
SYSTOLIC BLOOD PRESSURE: 110 MMHG | OXYGEN SATURATION: 95 % | HEIGHT: 71 IN | TEMPERATURE: 97.5 F | BODY MASS INDEX: 35.43 KG/M2 | RESPIRATION RATE: 20 BRPM | HEART RATE: 78 BPM | DIASTOLIC BLOOD PRESSURE: 62 MMHG

## 2023-09-02 PROCEDURE — 400013 VN SOC

## 2023-09-02 PROCEDURE — G0299 HHS/HOSPICE OF RN EA 15 MIN: HCPCS

## 2023-09-02 NOTE — CASE COMMUNICATION
[facility] has admitted your patient to [facility] service with the following disciplines:      SN and PT  This report is informational only, no responses is needed  Primary focus of home health care: CP assess for COPD and CHF  Patient stated goals of care: "feel better"  Anticipated visit pattern and next visit date: 2w3, 1w6. next visit: 9/6/23. See medication list - meds in home differ from AVS: NA home meds reflect AVS. Pha rmacy cannot fill formeterol until tues. wife to  tues. will reassess at next visit. Please be advised, EMR (EPIC) identified drug interactions for this patient based on the current medication profile. Significant clinical findings: diabetic ulcers to dorsal aspect R 2nd and 3rd toes. see media for pictures. Potential barriers to goal achievement: cognitive deficit, comorbidities, depression, mobility deficit. Thank  you for allowing us to participate in the care of your patient.       Saran gates RN with [facility]

## 2023-09-05 ENCOUNTER — PATIENT OUTREACH (OUTPATIENT)
Dept: FAMILY MEDICINE CLINIC | Facility: HOSPITAL | Age: 88
End: 2023-09-05

## 2023-09-05 LAB
AORTIC VALVE MEAN VELOCITY: 9.5 M/S
AV LVOT MEAN GRADIENT: 1 MMHG
AV LVOT PEAK GRADIENT: 2 MMHG
AV MEAN GRADIENT: 4 MMHG
AV PEAK GRADIENT: 7 MMHG
AV VELOCITY RATIO: 0.6
DOP CALC AO PEAK VEL: 1.31 M/S
DOP CALC AO VTI: 21.39 CM
DOP CALC LVOT PEAK VEL VTI: 15.02 CM
DOP CALC LVOT PEAK VEL: 0.78 M/S
PA SYSTOLIC PRESSURE: 50 MMHG
RIGHT ATRIAL 2D VOLUME: 45 ML
RIGHT ATRIUM AREA SYSTOLE A4C: 16.4 CM2
RIGHT VENTRICLE ID DIMENSION: 4.7 CM
SL CV LV EF: 65
TR MAX PG: 41 MMHG
TR PEAK VELOCITY: 3.2 M/S
TRICUSPID ANNULAR PLANE SYSTOLIC EXCURSION: 2.1 CM
TRICUSPID VALVE PEAK REGURGITATION VELOCITY: 3.22 M/S

## 2023-09-05 NOTE — PROGRESS NOTES
RADHA VICENTE received a message from patient's PCP stating, "Please call son who seeking home health aide services and wishes to know if they are covered by pt's insurance." RADHA VICENTE reviewed patient's chart and confirmed that patient's son Kristopher Crenshaw is on patient's communication consent from 6/19/22. RADHA VICENTE called Kristopher Crenshaw ([phone]). Kody answered and RADHA VICENTE introduced SW CM role. RADHA VICENTE explained that [facility] is not a long term home care solution. Kristopher Crenshaw stated that he is a  but already explored those services and patient does not qualify for [address] [place] [address] services. Kristopher Crenshaw did state that they do have a private agency coming in twice a week. Patient also is established with [facility]. Kristopher Crenshaw also stated they applied for [facility] and is waiting a return call. Kristopher Crenshaw had no other questions, concerns or needs. RADHA VICENTE encouraged Kristopher Crenshaw to call RADHA  as needed. RADHA VICENTE will close. Please re consult RADHA VICENTE as needed.

## 2023-09-06 ENCOUNTER — APPOINTMENT (EMERGENCY)
Dept: CT IMAGING | Facility: HOSPITAL | Age: 88
DRG: 871 | End: 2023-09-06
Payer: COMMERCIAL

## 2023-09-06 ENCOUNTER — HOSPITAL ENCOUNTER (INPATIENT)
Facility: HOSPITAL | Age: 88
LOS: 9 days | Discharge: NURSING, REHAB OR LONG-TERM CARE | DRG: 871 | End: 2023-09-15
Attending: EMERGENCY MEDICINE | Admitting: STUDENT IN AN ORGANIZED HEALTH CARE EDUCATION/TRAINING PROGRAM
Payer: COMMERCIAL

## 2023-09-06 ENCOUNTER — HOME CARE VISIT (OUTPATIENT)
Dept: HOME HEALTH SERVICES | Facility: HOME HEALTHCARE | Age: 88
End: 2023-09-06
Payer: COMMERCIAL

## 2023-09-06 ENCOUNTER — APPOINTMENT (EMERGENCY)
Dept: RADIOLOGY | Facility: HOSPITAL | Age: 88
DRG: 871 | End: 2023-09-06
Payer: COMMERCIAL

## 2023-09-06 VITALS
RESPIRATION RATE: 16 BRPM | TEMPERATURE: 96.5 F | SYSTOLIC BLOOD PRESSURE: 100 MMHG | OXYGEN SATURATION: 98 % | HEART RATE: 102 BPM | DIASTOLIC BLOOD PRESSURE: 66 MMHG

## 2023-09-06 DIAGNOSIS — J18.9 PNEUMONIA: ICD-10-CM

## 2023-09-06 DIAGNOSIS — A41.9 SEPSIS (HCC): Primary | ICD-10-CM

## 2023-09-06 DIAGNOSIS — R93.89 ABNORMAL CT SCAN: ICD-10-CM

## 2023-09-06 DIAGNOSIS — N28.9 RENAL LESION: ICD-10-CM

## 2023-09-06 DIAGNOSIS — R77.8 ELEVATED TROPONIN: ICD-10-CM

## 2023-09-06 DIAGNOSIS — R91.1 PULMONARY NODULE: ICD-10-CM

## 2023-09-06 LAB
2HR DELTA HS TROPONIN: -38 NG/L
4HR DELTA HS TROPONIN: -83 NG/L
ALBUMIN SERPL BCP-MCNC: 4.1 G/DL (ref 3.5–5)
ALP SERPL-CCNC: 72 U/L (ref 34–104)
ALT SERPL W P-5'-P-CCNC: 10 U/L (ref 7–52)
ANION GAP SERPL CALCULATED.3IONS-SCNC: 15 MMOL/L
AST SERPL W P-5'-P-CCNC: 14 U/L (ref 13–39)
BASOPHILS # BLD AUTO: 0.03 THOUSANDS/ÂΜL (ref 0–0.1)
BASOPHILS NFR BLD AUTO: 0 % (ref 0–1)
BILIRUB SERPL-MCNC: 0.76 MG/DL (ref 0.2–1)
BILIRUB UR QL STRIP: NEGATIVE
BNP SERPL-MCNC: 103 PG/ML (ref 0–100)
BUN SERPL-MCNC: 112 MG/DL (ref 5–25)
CALCIUM SERPL-MCNC: 9.7 MG/DL (ref 8.4–10.2)
CARDIAC TROPONIN I PNL SERPL HS: 138 NG/L
CARDIAC TROPONIN I PNL SERPL HS: 183 NG/L
CARDIAC TROPONIN I PNL SERPL HS: 221 NG/L
CHLORIDE SERPL-SCNC: 91 MMOL/L (ref 96–108)
CLARITY UR: CLEAR
CO2 SERPL-SCNC: 27 MMOL/L (ref 21–32)
COLOR UR: YELLOW
CREAT SERPL-MCNC: 2.95 MG/DL (ref 0.6–1.3)
EOSINOPHIL # BLD AUTO: 0.04 THOUSAND/ÂΜL (ref 0–0.61)
EOSINOPHIL NFR BLD AUTO: 0 % (ref 0–6)
ERYTHROCYTE [DISTWIDTH] IN BLOOD BY AUTOMATED COUNT: 14 % (ref 11.6–15.1)
FLUAV RNA RESP QL NAA+PROBE: NEGATIVE
FLUBV RNA RESP QL NAA+PROBE: NEGATIVE
GFR SERPL CREATININE-BSD FRML MDRD: 18 ML/MIN/1.73SQ M
GLUCOSE SERPL-MCNC: 129 MG/DL (ref 65–140)
GLUCOSE SERPL-MCNC: 151 MG/DL (ref 65–140)
GLUCOSE SERPL-MCNC: 209 MG/DL (ref 65–140)
GLUCOSE UR STRIP-MCNC: NEGATIVE MG/DL
HCT VFR BLD AUTO: 44.2 % (ref 36.5–49.3)
HGB BLD-MCNC: 14.5 G/DL (ref 12–17)
HGB UR QL STRIP.AUTO: NEGATIVE
IMM GRANULOCYTES # BLD AUTO: 0.15 THOUSAND/UL (ref 0–0.2)
IMM GRANULOCYTES NFR BLD AUTO: 1 % (ref 0–2)
KETONES UR STRIP-MCNC: NEGATIVE MG/DL
L PNEUMO1 AG UR QL IA.RAPID: NEGATIVE
LACTATE SERPL-SCNC: 1.3 MMOL/L (ref 0.5–2)
LEUKOCYTE ESTERASE UR QL STRIP: NEGATIVE
LYMPHOCYTES # BLD AUTO: 0.94 THOUSANDS/ÂΜL (ref 0.6–4.47)
LYMPHOCYTES NFR BLD AUTO: 4 % (ref 14–44)
MCH RBC QN AUTO: 28.5 PG (ref 26.8–34.3)
MCHC RBC AUTO-ENTMCNC: 32.8 G/DL (ref 31.4–37.4)
MCV RBC AUTO: 87 FL (ref 82–98)
MONOCYTES # BLD AUTO: 1.39 THOUSAND/ÂΜL (ref 0.17–1.22)
MONOCYTES NFR BLD AUTO: 6 % (ref 4–12)
NEUTROPHILS # BLD AUTO: 19.11 THOUSANDS/ÂΜL (ref 1.85–7.62)
NEUTS SEG NFR BLD AUTO: 89 % (ref 43–75)
NITRITE UR QL STRIP: NEGATIVE
NRBC BLD AUTO-RTO: 0 /100 WBCS
PH UR STRIP.AUTO: 5 [PH]
PLATELET # BLD AUTO: 226 THOUSANDS/UL (ref 149–390)
PMV BLD AUTO: 11 FL (ref 8.9–12.7)
POTASSIUM SERPL-SCNC: 3.3 MMOL/L (ref 3.5–5.3)
PROCALCITONIN SERPL-MCNC: 0.34 NG/ML
PROT SERPL-MCNC: 7.5 G/DL (ref 6.4–8.4)
PROT UR STRIP-MCNC: NEGATIVE MG/DL
RBC # BLD AUTO: 5.09 MILLION/UL (ref 3.88–5.62)
RSV RNA RESP QL NAA+PROBE: NEGATIVE
S PNEUM AG UR QL: NEGATIVE
SARS-COV-2 RNA RESP QL NAA+PROBE: NEGATIVE
SODIUM SERPL-SCNC: 133 MMOL/L (ref 135–147)
SP GR UR STRIP.AUTO: 1.01 (ref 1–1.03)
UROBILINOGEN UR STRIP-ACNC: <2 MG/DL
WBC # BLD AUTO: 21.66 THOUSAND/UL (ref 4.31–10.16)

## 2023-09-06 PROCEDURE — 71045 X-RAY EXAM CHEST 1 VIEW: CPT

## 2023-09-06 PROCEDURE — 82948 REAGENT STRIP/BLOOD GLUCOSE: CPT

## 2023-09-06 PROCEDURE — 36415 COLL VENOUS BLD VENIPUNCTURE: CPT | Performed by: EMERGENCY MEDICINE

## 2023-09-06 PROCEDURE — 96361 HYDRATE IV INFUSION ADD-ON: CPT

## 2023-09-06 PROCEDURE — G1004 CDSM NDSC: HCPCS

## 2023-09-06 PROCEDURE — G0299 HHS/HOSPICE OF RN EA 15 MIN: HCPCS

## 2023-09-06 PROCEDURE — 85025 COMPLETE CBC W/AUTO DIFF WBC: CPT | Performed by: EMERGENCY MEDICINE

## 2023-09-06 PROCEDURE — 94640 AIRWAY INHALATION TREATMENT: CPT

## 2023-09-06 PROCEDURE — 0241U HB NFCT DS VIR RESP RNA 4 TRGT: CPT | Performed by: EMERGENCY MEDICINE

## 2023-09-06 PROCEDURE — 84484 ASSAY OF TROPONIN QUANT: CPT | Performed by: EMERGENCY MEDICINE

## 2023-09-06 PROCEDURE — 83880 ASSAY OF NATRIURETIC PEPTIDE: CPT | Performed by: EMERGENCY MEDICINE

## 2023-09-06 PROCEDURE — 84145 PROCALCITONIN (PCT): CPT | Performed by: EMERGENCY MEDICINE

## 2023-09-06 PROCEDURE — 87449 NOS EACH ORGANISM AG IA: CPT | Performed by: PHYSICIAN ASSISTANT

## 2023-09-06 PROCEDURE — 93005 ELECTROCARDIOGRAM TRACING: CPT

## 2023-09-06 PROCEDURE — 96365 THER/PROPH/DIAG IV INF INIT: CPT

## 2023-09-06 PROCEDURE — 74176 CT ABD & PELVIS W/O CONTRAST: CPT

## 2023-09-06 PROCEDURE — 87040 BLOOD CULTURE FOR BACTERIA: CPT | Performed by: EMERGENCY MEDICINE

## 2023-09-06 PROCEDURE — 80053 COMPREHEN METABOLIC PANEL: CPT | Performed by: EMERGENCY MEDICINE

## 2023-09-06 PROCEDURE — 99223 1ST HOSP IP/OBS HIGH 75: CPT | Performed by: PHYSICIAN ASSISTANT

## 2023-09-06 PROCEDURE — 83605 ASSAY OF LACTIC ACID: CPT | Performed by: EMERGENCY MEDICINE

## 2023-09-06 PROCEDURE — 99285 EMERGENCY DEPT VISIT HI MDM: CPT

## 2023-09-06 PROCEDURE — 96367 TX/PROPH/DG ADDL SEQ IV INF: CPT

## 2023-09-06 PROCEDURE — 81003 URINALYSIS AUTO W/O SCOPE: CPT | Performed by: EMERGENCY MEDICINE

## 2023-09-06 PROCEDURE — 96375 TX/PRO/DX INJ NEW DRUG ADDON: CPT

## 2023-09-06 PROCEDURE — 94760 N-INVAS EAR/PLS OXIMETRY 1: CPT

## 2023-09-06 PROCEDURE — 99285 EMERGENCY DEPT VISIT HI MDM: CPT | Performed by: EMERGENCY MEDICINE

## 2023-09-06 PROCEDURE — 71250 CT THORAX DX C-: CPT

## 2023-09-06 RX ORDER — MAGNESIUM HYDROXIDE/ALUMINUM HYDROXICE/SIMETHICONE 120; 1200; 1200 MG/30ML; MG/30ML; MG/30ML
30 SUSPENSION ORAL EVERY 6 HOURS PRN
Status: DISCONTINUED | OUTPATIENT
Start: 2023-09-06 | End: 2023-09-15 | Stop reason: HOSPADM

## 2023-09-06 RX ORDER — CALCITRIOL 0.25 UG/1
0.25 CAPSULE, LIQUID FILLED ORAL DAILY
Status: DISCONTINUED | OUTPATIENT
Start: 2023-09-07 | End: 2023-09-15 | Stop reason: HOSPADM

## 2023-09-06 RX ORDER — HEPARIN SODIUM 5000 [USP'U]/ML
5000 INJECTION, SOLUTION INTRAVENOUS; SUBCUTANEOUS EVERY 8 HOURS SCHEDULED
Status: DISCONTINUED | OUTPATIENT
Start: 2023-09-06 | End: 2023-09-15 | Stop reason: HOSPADM

## 2023-09-06 RX ORDER — TAMSULOSIN HYDROCHLORIDE 0.4 MG/1
0.4 CAPSULE ORAL
Status: DISCONTINUED | OUTPATIENT
Start: 2023-09-06 | End: 2023-09-15 | Stop reason: HOSPADM

## 2023-09-06 RX ORDER — CEFTRIAXONE 1 G/50ML
1000 INJECTION, SOLUTION INTRAVENOUS ONCE
Status: COMPLETED | OUTPATIENT
Start: 2023-09-06 | End: 2023-09-06

## 2023-09-06 RX ORDER — ALLOPURINOL 100 MG/1
200 TABLET ORAL DAILY
Status: DISCONTINUED | OUTPATIENT
Start: 2023-09-07 | End: 2023-09-15 | Stop reason: HOSPADM

## 2023-09-06 RX ORDER — POTASSIUM CHLORIDE 20 MEQ/1
20 TABLET, EXTENDED RELEASE ORAL 2 TIMES DAILY
Status: DISCONTINUED | OUTPATIENT
Start: 2023-09-06 | End: 2023-09-15 | Stop reason: HOSPADM

## 2023-09-06 RX ORDER — ENOXAPARIN SODIUM 100 MG/ML
40 INJECTION SUBCUTANEOUS DAILY
Status: DISCONTINUED | OUTPATIENT
Start: 2023-09-07 | End: 2023-09-06

## 2023-09-06 RX ORDER — METOLAZONE 2.5 MG/1
2.5 TABLET ORAL
Status: DISCONTINUED | OUTPATIENT
Start: 2023-09-08 | End: 2023-09-13

## 2023-09-06 RX ORDER — INSULIN LISPRO 100 [IU]/ML
1-6 INJECTION, SOLUTION INTRAVENOUS; SUBCUTANEOUS
Status: DISCONTINUED | OUTPATIENT
Start: 2023-09-06 | End: 2023-09-15 | Stop reason: HOSPADM

## 2023-09-06 RX ORDER — CEFTRIAXONE 2 G/50ML
2000 INJECTION, SOLUTION INTRAVENOUS EVERY 24 HOURS
Status: DISCONTINUED | OUTPATIENT
Start: 2023-09-07 | End: 2023-09-09

## 2023-09-06 RX ORDER — BUDESONIDE 0.5 MG/2ML
0.5 INHALANT ORAL 2 TIMES DAILY
Status: DISCONTINUED | OUTPATIENT
Start: 2023-09-06 | End: 2023-09-15 | Stop reason: HOSPADM

## 2023-09-06 RX ORDER — FLUTICASONE PROPIONATE 50 MCG
1 SPRAY, SUSPENSION (ML) NASAL DAILY
Status: DISCONTINUED | OUTPATIENT
Start: 2023-09-07 | End: 2023-09-15 | Stop reason: HOSPADM

## 2023-09-06 RX ORDER — ALBUTEROL SULFATE 2.5 MG/3ML
2.5 SOLUTION RESPIRATORY (INHALATION) EVERY 4 HOURS PRN
Status: DISCONTINUED | OUTPATIENT
Start: 2023-09-06 | End: 2023-09-15 | Stop reason: HOSPADM

## 2023-09-06 RX ORDER — ACETAMINOPHEN 325 MG/1
650 TABLET ORAL EVERY 6 HOURS PRN
Status: DISCONTINUED | OUTPATIENT
Start: 2023-09-06 | End: 2023-09-15 | Stop reason: HOSPADM

## 2023-09-06 RX ORDER — TRIAMCINOLONE ACETONIDE 1 MG/G
CREAM TOPICAL 2 TIMES DAILY
Status: DISCONTINUED | OUTPATIENT
Start: 2023-09-06 | End: 2023-09-15 | Stop reason: HOSPADM

## 2023-09-06 RX ORDER — METHYLPREDNISOLONE SODIUM SUCCINATE 125 MG/2ML
80 INJECTION, POWDER, LYOPHILIZED, FOR SOLUTION INTRAMUSCULAR; INTRAVENOUS ONCE
Status: COMPLETED | OUTPATIENT
Start: 2023-09-06 | End: 2023-09-06

## 2023-09-06 RX ORDER — BUMETANIDE 1 MG/1
2 TABLET ORAL 2 TIMES DAILY
Status: DISCONTINUED | OUTPATIENT
Start: 2023-09-06 | End: 2023-09-12

## 2023-09-06 RX ORDER — IPRATROPIUM BROMIDE AND ALBUTEROL SULFATE 2.5; .5 MG/3ML; MG/3ML
3 SOLUTION RESPIRATORY (INHALATION)
Status: DISCONTINUED | OUTPATIENT
Start: 2023-09-06 | End: 2023-09-06

## 2023-09-06 RX ORDER — GUAIFENESIN 600 MG/1
600 TABLET, EXTENDED RELEASE ORAL 2 TIMES DAILY
Status: DISCONTINUED | OUTPATIENT
Start: 2023-09-06 | End: 2023-09-15 | Stop reason: HOSPADM

## 2023-09-06 RX ORDER — TRAMADOL HYDROCHLORIDE 50 MG/1
50 TABLET ORAL 2 TIMES DAILY PRN
Status: DISCONTINUED | OUTPATIENT
Start: 2023-09-06 | End: 2023-09-15 | Stop reason: HOSPADM

## 2023-09-06 RX ORDER — POTASSIUM CHLORIDE 20 MEQ/1
20 TABLET, EXTENDED RELEASE ORAL ONCE
Status: COMPLETED | OUTPATIENT
Start: 2023-09-06 | End: 2023-09-06

## 2023-09-06 RX ORDER — SODIUM CHLORIDE 9 MG/ML
3 INJECTION INTRAVENOUS
Status: DISCONTINUED | OUTPATIENT
Start: 2023-09-06 | End: 2023-09-15 | Stop reason: HOSPADM

## 2023-09-06 RX ORDER — LEVALBUTEROL INHALATION SOLUTION 1.25 MG/3ML
1.25 SOLUTION RESPIRATORY (INHALATION)
Status: DISCONTINUED | OUTPATIENT
Start: 2023-09-06 | End: 2023-09-06

## 2023-09-06 RX ORDER — SENNOSIDES 8.6 MG
1 TABLET ORAL DAILY PRN
Status: DISCONTINUED | OUTPATIENT
Start: 2023-09-06 | End: 2023-09-14

## 2023-09-06 RX ORDER — LABETALOL 200 MG/1
200 TABLET, FILM COATED ORAL 2 TIMES DAILY
Status: DISCONTINUED | OUTPATIENT
Start: 2023-09-06 | End: 2023-09-15 | Stop reason: HOSPADM

## 2023-09-06 RX ORDER — ASPIRIN 300 MG/1
300 SUPPOSITORY RECTAL ONCE
Status: DISCONTINUED | OUTPATIENT
Start: 2023-09-06 | End: 2023-09-08

## 2023-09-06 RX ORDER — ASPIRIN 325 MG
325 TABLET ORAL ONCE
Status: DISCONTINUED | OUTPATIENT
Start: 2023-09-06 | End: 2023-09-06

## 2023-09-06 RX ORDER — FORMOTEROL FUMARATE 20 UG/2ML
20 SOLUTION RESPIRATORY (INHALATION)
Status: DISCONTINUED | OUTPATIENT
Start: 2023-09-06 | End: 2023-09-15 | Stop reason: HOSPADM

## 2023-09-06 RX ADMIN — GUAIFENESIN 600 MG: 600 TABLET ORAL at 18:40

## 2023-09-06 RX ADMIN — TAMSULOSIN HYDROCHLORIDE 0.4 MG: 0.4 CAPSULE ORAL at 18:43

## 2023-09-06 RX ADMIN — FORMOTEROL FUMARATE DIHYDRATE 20 MCG: 20 SOLUTION RESPIRATORY (INHALATION) at 20:01

## 2023-09-06 RX ADMIN — BUDESONIDE 0.5 MG: 0.5 INHALANT ORAL at 20:01

## 2023-09-06 RX ADMIN — TRIAMCINOLONE ACETONIDE: 1 CREAM TOPICAL at 18:51

## 2023-09-06 RX ADMIN — POTASSIUM CHLORIDE 20 MEQ: 1500 TABLET, EXTENDED RELEASE ORAL at 14:53

## 2023-09-06 RX ADMIN — HEPARIN SODIUM 5000 UNITS: 5000 INJECTION INTRAVENOUS; SUBCUTANEOUS at 21:26

## 2023-09-06 RX ADMIN — CEFTRIAXONE 1000 MG: 1 INJECTION, SOLUTION INTRAVENOUS at 14:53

## 2023-09-06 RX ADMIN — SODIUM CHLORIDE 500 ML: 0.9 INJECTION, SOLUTION INTRAVENOUS at 14:07

## 2023-09-06 RX ADMIN — IPRATROPIUM BROMIDE AND ALBUTEROL SULFATE 3 ML: .5; 3 SOLUTION RESPIRATORY (INHALATION) at 13:34

## 2023-09-06 RX ADMIN — POTASSIUM CHLORIDE 20 MEQ: 1500 TABLET, EXTENDED RELEASE ORAL at 18:40

## 2023-09-06 RX ADMIN — INSULIN LISPRO 2 UNITS: 100 INJECTION, SOLUTION INTRAVENOUS; SUBCUTANEOUS at 21:26

## 2023-09-06 RX ADMIN — AZITHROMYCIN MONOHYDRATE 500 MG: 500 INJECTION, POWDER, LYOPHILIZED, FOR SOLUTION INTRAVENOUS at 15:21

## 2023-09-06 RX ADMIN — METHYLPREDNISOLONE SODIUM SUCCINATE 80 MG: 125 INJECTION, POWDER, FOR SOLUTION INTRAMUSCULAR; INTRAVENOUS at 15:21

## 2023-09-06 NOTE — H&P
[facility]  H&P  Name: Ursula Selby 81 y.o. male I MRN: [id number]  Unit/Bed#: -Christina I Date of Admission: 9/6/2023   Date of Service: 9/6/2023 I Hospital Day: 0      Assessment/Plan   * Pneumonia  Assessment & Plan  Presents with worsening shortness of breath x2-3 days. (+) Cough  · Currently on 3L. Baseline has 3L prescribed, but only uses O2 about 50% of the time per family at bedside  · Procalcitonin 0.34 (in setting of CKD IV)  · WBC 22  · CT chest- Focal ill-defined right upper lobe consolidation with associated small cavitary space is suspicious for pneumonia. Recommend follow-up to resolution with chest CT in 4 to 6 weeks. · Started on ceftriaxone/azithro, continue  · Check urine strep and legionella  · Speech therapy    Sepsis ([address])  Assessment & Plan  SIRS: Tachycardia, tachypnea, leukocytosis  Source: Pulmonary  WBC 22  Procalcitonin 0.34  Lactic 1.3  On ceftriaxone/azithromycin  Follow blood culture    COPD with acute exacerbation (HCC)  Assessment & Plan  COPD exacerbated by PNA and inability to obtain nebulizer  · On 3L PRN at baseline, currently on 3L  · Recently switched to Performist and Budenoside nebs. Performist was not stocked and pt has only been utilizing Budesonide for last week.   · Start xoponex/atrovent, continue budesonide  · Can hold off on IV steroids for now given hyperglycemia, active infx, mild exacerbation    Pulmonary hypertension ([address])  Assessment & Plan  Secondary to COPD    Type 2 diabetes mellitus with diabetic neuropathy, without long-term current use of insulin [facility])  Assessment & Plan  Lab Results   Component Value Date    HGBA1C 6.3 (H) 05/02/2023       Recent Labs     09/06/23  1645   POCGLU 129       Blood Sugar Average: Last 72 hrs:  (P) 129   Regimen: Glipizide 5 mg twice daily  Stop oral antihyperglycemic's while inpatient  Start sliding scale insulin  Diabetic diet    Chronic diastolic congestive heart failure (HCC)  Assessment & Plan  Wt Readings from Last 3 Encounters:   09/01/23 115 kg (254 lb)   08/31/23 113 kg (250 lb)   08/22/23 115 kg (254 lb)     Appears close to euvolemia  Continue Bumex daily and metolazone MWF        CKD (chronic kidney disease) stage 4, GFR 15-29 ml/min [facility])  Assessment & Plan  Lab Results   Component Value Date    EGFR 18 09/06/2023    EGFR 20 08/31/2023    EGFR 16 08/26/2023    CREATININE 2.95 (H) 09/06/2023    CREATININE 2.73 (H) 08/31/2023    CREATININE 3.18 (H) 08/26/2023     At baseline      Benign prostatic hyperplasia with urinary hesitancy  Assessment & Plan  Continue Flomax    Essential hypertension  Assessment & Plan  Continue labetalol and diuretics         VTE Pharmacologic Prophylaxis: VTE Score: 8 High Risk (Score >/= 5) - Pharmacological DVT Prophylaxis Ordered: heparin. Sequential Compression Devices Ordered. Code Status: Level 3 - DNAR and DNI   Discussion with family: Updated  (son) at bedside. Anticipated Length of Stay: Patient will be admitted on an inpatient basis with an anticipated length of stay of greater than 2 midnights secondary to Pneumonia, COPD. Total Time Spent on Date of Encounter in care of patient: 65 minutes This time was spent on one or more of the following: performing physical exam; counseling and coordination of care; obtaining or reviewing history; documenting in the medical record; reviewing/ordering tests, medications or procedures; communicating with other healthcare professionals and discussing with patient's family/caregivers. Chief Complaint: Shortness of breath    History of Present Illness:  Ana Lilia Boggs is a 81 y.o. male with a PMH of HTN, CKD, T2DM, CHF, COPD who presents with worsening shortness of breath x2 days. Patient reports having oxygen at home, but only utilizing approximately 50% of the time.   States that he does have some chronic shortness of breath, however has been having some significant acute shortness of breath for last 2 days, particularly on exertion. Was recently transition from Trelegy to Perforomist and Pulmicort nebulizers as he was unable to properly use Trelegy, however patient was unable to  Perforomist due to [facility] not having it in stock and has been only utilizing Pulmicort nebulizers last week. Additionally, he has a nonproductive cough. Negative for COVID/flu/RSV. CT imaging demonstrated a right upper lobe solid Dacian consistent with pneumonia. Procalcitonin also mildly elevated, although in setting of CKD stage IV. Started on IV ceftriaxone/azithromycin. Also started on nebulizers for mild COPD exacerbation. Will be admitted for further antibiotics, nebulizers. Review of Systems:  Review of Systems   Constitutional: Negative for appetite change, chills, fatigue and fever. HENT: Negative for ear pain, sore throat and trouble swallowing. Eyes: Negative for visual disturbance. Respiratory: Positive for cough and shortness of breath. Negative for chest tightness and wheezing. Cardiovascular: Negative for chest pain, palpitations and leg swelling. Gastrointestinal: Negative for abdominal distention, abdominal pain, diarrhea, nausea and vomiting. Endocrine: Negative. Genitourinary: Negative for dysuria, flank pain and hematuria. Musculoskeletal: Negative for arthralgias, gait problem and myalgias. Skin: Negative for pallor. Allergic/Immunologic: Negative for immunocompromised state. Neurological: Negative for dizziness, syncope, light-headedness, numbness and headaches.        Past Medical and Surgical History:   Past Medical History:   Diagnosis Date   • Anxiety 2020   • COPD (chronic obstructive pulmonary disease) ([address])    • Coronary artery disease 2012   • Depression 2020   • Herpes zoster    • Hypertension    • Memory loss     possible   • Mycoplasma pneumonia    • Obesity    • Osteoarthritis    • Renal calculi        Past Surgical History:   Procedure Laterality Date   • CATARACT EXTRACTION      with insert intraoculat lens prosthesis   • HEMORRHOID SURGERY     • NECK SURGERY      for bone spur       Meds/Allergies:  Prior to Admission medications    Medication Sig Start Date End Date Taking? Authorizing Provider   albuterol (2.5 mg/3 mL) 0.083 % nebulizer solution Take 3 mL (2.5 mg total) by nebulization every 6 (six) hours as needed for wheezing or shortness of breath 7/21/22   Saray Artis DO   allopurinol (ZYLOPRIM) 100 mg tablet Take 2 tablets (200 mg total) by mouth daily 8/28/23   Kacy Obrien MD   betamethasone dipropionate (DIPROSONE) 0.05 % cream APPLY TOPICALLY 2 (TWO) TIMES A DAY FOR 14 DAYS APPLY TO THE RED SKIN OF YOUR LOWER LEGS 8/31/23 9/14/23  Kacy Obrien MD   Blood Glucose Monitoring Suppl (OneTouch Verio) w/Device KIT Use 2 (two) times a day Dx:E11.9 8/16/22   Jacqueline Segura MD   budesonide (Pulmicort) 0.5 mg/2 mL nebulizer solution Take 2 mL (0.5 mg total) by nebulization 2 (two) times a day Rinse mouth after use. 8/31/23   Kacy Obrien MD   bumetanide (BUMEX) 2 mg tablet Take 1 tablet (2 mg total) by mouth 2 (two) times a day 5/9/23   Kacy Obrien MD   calcitriol (ROCALTROL) 0.25 mcg capsule Take 1 capsule (0.25 mcg total) by mouth daily 2/27/23   Susana Medina DO   fluticasone (FLONASE) 50 mcg/act nasal spray Spray 1 spray into each nostril twice daily 2/16/23   Kacy Obrien MD   formoterol (PERFOROMIST) 20 MCG/2ML nebulizer solution Take 2 mL (20 mcg total) by nebulization 2 (two) times a day 8/31/23   Kacy Obrien MD   glipiZIDE (GLUCOTROL) 5 mg tablet TAKE 1 TABLET BY MOUTH 2 TIMES A DAY BEFORE MEALS.  7/14/23   Kacy Obrien MD   glucose blood test strip Test twice daily DX:  E11.9 8/16/22   Jacqueline Segura MD   Iron-Vitamin C (IRON 100/C PO) Take 325 mg by mouth    Historical Provider, MD   labetalol (NORMODYNE) 200 mg tablet Take 1 tablet (200 mg total) by mouth 2 (two) times a day 1/19/23   Hans Braga MD   Lancets (onetouch ultrasoft) lancets Use 2 (two) times a day E11.9 22   Henok Ca MD   metolazone (ZAROXOLYN) 2.5 mg tablet TAKE 1 TABLET BY MOUTH 30 MINUTES BEFORE TAKING BUMETANIDE IN THE MORNING ON M,W,F 23   Venus Beebe, MD   mupirocin Lorice Deann) 2 % ointment Apply topically in the morning 23   Venus Beebe, MD   potassium chloride (K-DUR,KLOR-CON) 20 mEq tablet Take 1 tablet (20 mEq total) by mouth 2 (two) times a day 23   Venus November, MD   senna (SENOKOT) 8.6 MG tablet Take 1 tablet (8.6 mg total) by mouth daily as needed for constipation 23   Venus November, MD   sertraline (ZOLOFT) 50 mg tablet Take 1 tablet (50 mg total) by mouth every morning 23  Venus Beebe, MD   tamsulosin (FLOMAX) 0.4 mg TAKE 1 CAPSULE BY MOUTH EVERY DAY WITH DINNER 12/15/22   Venus Beebe, MD   traMADol (Ultram) 50 mg tablet Take 1 tablet (50 mg total) by mouth 2 (two) times a day as needed for moderate pain 23   Venus Beebe, MD   umeclidinium bromide (INCRUSE ELLIPTA) 62.5 mcg/inh AEPB inhaler Inhale 1 puff  in the morning. 5/15/22 5/15/22  Alba Coronel PA-C     I have reviewed home medications with patient personally.     Allergies: No Known Allergies    Social History:  Marital Status: /Civil Union   Occupation: Noncontributory  Patient Pre-hospital Living Situation: Home  Patient Pre-hospital Level of Mobility: walks  Patient Pre-hospital Diet Restrictions: Diabetic  Substance Use History:   Social History     Substance and Sexual Activity   Alcohol Use Not Currently   • Alcohol/week: 1.0 standard drink of alcohol   • Types: 1 Cans of beer per week    Comment: 1-2 beers a months     Social History     Tobacco Use   Smoking Status Former   • Packs/day: 2.00   • Years: 42.00   • Total pack years: 84.00   • Types: Cigarettes   • Start date: 1956   • Quit date: 1998   • Years since quittin.6   Smokeless Tobacco Never     Social History     Substance and Sexual Activity   Drug Use No       Family History:  Family History   Problem Relation Age of Onset   • Diabetes Mother    • Breast cancer Mother         She  in 12. Age 80   • Stroke Mother    • Pneumonia Father    • Substance Abuse Neg Hx    • Mental illness Neg Hx        Physical Exam:     Vitals:   Blood Pressure: 102/66 (23 1641)  Pulse: 92 (23 1641)  Temperature: (!) 97.3 °F (36.3 °C) (23 164)  Temp Source: Oral (23 1320)  Respirations: 15 (23 164)  SpO2: 98 % (23 164)    Physical Exam  Vitals and nursing note reviewed. HENT:      Head: Normocephalic and atraumatic. Mouth/Throat:      Mouth: Mucous membranes are moist.      Pharynx: Oropharynx is clear. No oropharyngeal exudate. Eyes:      Extraocular Movements: Extraocular movements intact. Cardiovascular:      Rate and Rhythm: Normal rate and regular rhythm. Pulses: Normal pulses. Heart sounds: Normal heart sounds. No murmur heard. No friction rub. No gallop. Pulmonary:      Effort: No respiratory distress. Breath sounds: Normal breath sounds. No stridor. No wheezing or rales. Comments: Diminished breath sounds, tachypneic  Abdominal:      General: Abdomen is flat. Bowel sounds are normal. There is no distension. Palpations: Abdomen is soft. Tenderness: There is no abdominal tenderness. Musculoskeletal:      Right lower leg: No edema. Left lower leg: No edema. Skin:     General: Skin is warm and dry. Neurological:      General: No focal deficit present. Mental Status: He is alert and oriented to person, place, and time.          Additional Data:     Lab Results:  Results from last 7 days   Lab Units 23  1333   WBC Thousand/uL 21.66*   HEMOGLOBIN g/dL 14.5   HEMATOCRIT % 44.2   PLATELETS Thousands/uL 226   NEUTROS PCT % 89*   LYMPHS PCT % 4*   MONOS PCT % 6   EOS PCT % 0     Results from last 7 days   Lab Units 23  1333   SODIUM mmol/L 133*   POTASSIUM mmol/L 3.3*   CHLORIDE mmol/L 91*   CO2 mmol/L 27   BUN mg/dL 112*   CREATININE mg/dL 2.95*   ANION GAP mmol/L 15   CALCIUM mg/dL 9.7   ALBUMIN g/dL 4.1   TOTAL BILIRUBIN mg/dL 0.76   ALK PHOS U/L 72   ALT U/L 10   AST U/L 14   GLUCOSE RANDOM mg/dL 151*         Results from last 7 days   Lab Units 09/06/23  1645   POC GLUCOSE mg/dl 129         Results from last 7 days   Lab Units 09/06/23  1424   LACTIC ACID mmol/L 1.3   PROCALCITONIN ng/ml 0.34*       Lines/Drains:  Invasive Devices     Peripheral Intravenous Line  Duration           Peripheral IV 09/06/23 Left Antecubital <1 day                    Imaging: Reviewed radiology reports from this admission including: chest CT scan  CT chest abdomen pelvis wo contrast   Final Result by Karel Al MD (09/06 8738)      1. Focal ill-defined right upper lobe consolidation with associated small cavitary space is suspicious for pneumonia. Recommend follow-up to resolution with chest CT in 4 to 6 weeks. 2.  New 4 mm right upper lobe nodule. Attention on follow-up. 3.  Emphysema. 4.  Cholelithiasis without evidence of acute cholecystitis. 5.  Hepatic steatosis. 6.  Intermediate density partially exophytic lesion arising from the upper pole right kidney. Cannot distinguish hyperdense cyst from solid neoplasm on this noncontrast exam. Recommend further evaluation with renal ultrasound. The study was marked in [facility] for immediate notification. Workstation performed: [id number]         XR chest 1 view portable   Final Result by Lenore Boas, MD (09/06 4089)      Development of subpleural opacity in the lateral right midlung which could represent a mass or early infiltrate               Workstation performed: [id number]             EKG and Other Studies Reviewed on Admission:   · EKG: NSR. HR 87.    ** Please Note: This note has been constructed using a voice recognition system.  **

## 2023-09-06 NOTE — ASSESSMENT & PLAN NOTE
SIRS: Tachycardia, tachypnea, leukocytosis  Source: Pulmonary  WBC 22  Procalcitonin 0.34  Lactic 1.3  On ceftriaxone/azithromycin  Follow blood culture

## 2023-09-06 NOTE — ASSESSMENT & PLAN NOTE
COPD exacerbated by PNA and inability to obtain nebulizer  · On 3L PRN at baseline, currently on 3L  · Recently switched to Performist and Budenoside nebs. Performist was not stocked and pt has only been utilizing Budesonide for last week.   · Start xoponex/atrovent, continue budesonide  · Can hold off on IV steroids for now given hyperglycemia, active infx, mild exacerbation

## 2023-09-06 NOTE — ASSESSMENT & PLAN NOTE
Presents with worsening shortness of breath x2-3 days. (+) Cough  · Currently on 3L. Baseline has 3L prescribed, but only uses O2 about 50% of the time per family at bedside  · Procalcitonin 0.34 (in setting of CKD IV)  · WBC 22  · CT chest- Focal ill-defined right upper lobe consolidation with associated small cavitary space is suspicious for pneumonia. Recommend follow-up to resolution with chest CT in 4 to 6 weeks.   · Started on ceftriaxone/azithro, continue  · Check urine strep and legionella  · Speech therapy

## 2023-09-06 NOTE — ASSESSMENT & PLAN NOTE
Lab Results   Component Value Date    HGBA1C 6.3 (H) 05/02/2023       Recent Labs     09/06/23  1645   POCGLU 129       Blood Sugar Average: Last 72 hrs:  (P) 129   Regimen: Glipizide 5 mg twice daily  Stop oral antihyperglycemic's while inpatient  Start sliding scale insulin  Diabetic diet

## 2023-09-06 NOTE — PLAN OF CARE
Problem: PAIN - ADULT  Goal: Verbalizes/displays adequate comfort level or baseline comfort level  Description: Interventions:  - Encourage patient to monitor pain and request assistance  - Assess pain using appropriate pain scale  - Administer analgesics based on type and severity of pain and evaluate response  - Implement non-pharmacological measures as appropriate and evaluate response  - Consider cultural and social influences on pain and pain management  - Notify physician/advanced practitioner if interventions unsuccessful or patient reports new pain  Outcome: Progressing     Problem: INFECTION - ADULT  Goal: Absence or prevention of progression during hospitalization  Description: INTERVENTIONS:  - Assess and monitor for signs and symptoms of infection  - Monitor lab/diagnostic results  - Monitor all insertion sites, i.e. indwelling lines, tubes, and drains  - Monitor endotracheal if appropriate and nasal secretions for changes in amount and color  - [place] appropriate cooling/warming therapies per order  - Administer medications as ordered  - Instruct and encourage patient and family to use good hand hygiene technique  - Identify and instruct in appropriate isolation precautions for identified infection/condition  Outcome: Progressing  Goal: Absence of fever/infection during neutropenic period  Description: INTERVENTIONS:  - Monitor WBC    Outcome: Progressing

## 2023-09-06 NOTE — ASSESSMENT & PLAN NOTE
Lab Results   Component Value Date    EGFR 18 09/06/2023    EGFR 20 08/31/2023    EGFR 16 08/26/2023    CREATININE 2.95 (H) 09/06/2023    CREATININE 2.73 (H) 08/31/2023    CREATININE 3.18 (H) 08/26/2023     At baseline

## 2023-09-06 NOTE — RESPIRATORY THERAPY NOTE
RT Protocol Note  Yosef Curiel 81 y.o. male MRN: [id number]  Unit/Bed#: -01 Encounter: [id number]    Assessment    Principal Problem:    Pneumonia  Active Problems:    Essential hypertension    COPD with acute exacerbation ([address] [facility])    Benign prostatic hyperplasia with urinary hesitancy    CKD (chronic kidney disease) stage 4, GFR 15-29 ml/min ([facility])    Chronic diastolic congestive heart failure (HCC)    Type 2 diabetes mellitus with diabetic neuropathy, without long-term current use of insulin (HCC)    Pulmonary hypertension (HCC)    Sepsis ([facility])      Home Pulmonary Medications:  Pulmicort, Arformoterol, Albuterol  Home Devices/Therapy: (P) Other (Comment), Home O2 (Pulmicort, Aformterol, Albuterol)    Past Medical History:   Diagnosis Date    Anxiety     COPD (chronic obstructive pulmonary disease) ([address])     Coronary artery disease 2012    Depression     Herpes zoster     Hypertension     Memory loss     possible    Mycoplasma pneumonia     Obesity     Osteoarthritis     Renal calculi      Social History     Socioeconomic History    Marital status: /Civil Union     Spouse name: None    Number of children: None    Years of education: None    Highest education level: None   Occupational History    None   Tobacco Use    Smoking status: Former     Packs/day: 2.00     Years: 42.00     Total pack years: 84.00     Types: Cigarettes     Start date: 1956     Quit date: 1998     Years since quittin.6    Smokeless tobacco: Never   Vaping Use    Vaping Use: Never used   Substance and Sexual Activity    Alcohol use: Not Currently     Alcohol/week: 1.0 standard drink of alcohol     Types: 1 Cans of beer per week     Comment: 1-2 beers a months    Drug use: No    Sexual activity: Not Currently   Other Topics Concern    None   Social History Narrative    Active advance directive    Daily coffee consumption, 1 cup/day    Denied exercise habits    Good dental hygiene    Supportive and safe    Active family support     Social Determinants of Health     Financial Resource Strain: Not on file   Food Insecurity: No Food Insecurity (11/4/2022)    Hunger Vital Sign     Worried About Running Out of Food in the Last Year: Never true     Ran Out of Food in the Last Year: Never true   Transportation Needs: No Transportation Needs (11/4/2022)    PRAPARE - Transportation     Lack of Transportation (Medical): No     Lack of Transportation (Non-Medical): No   Physical Activity: Not on file   Stress: Not on file   Social Connections: Not on file   Intimate Partner Violence: Not on file   Housing Stability: Low Risk  (11/4/2022)    Housing Stability Vital Sign     Unable to Pay for Housing in the Last Year: No     Number of Places Lived in the Last Year: 1     Unstable Housing in the Last Year: No       Subjective         Objective    Physical Exam:   Assessment Type: (P) During-treatment  General Appearance: (P) Awake, Alert  Respiratory Pattern: (P) Normal  Chest Assessment: (P) Chest expansion symmetrical  Bilateral Breath Sounds: (P) Clear  Cough: (P) None    Vitals:  Blood pressure 102/66, pulse 92, temperature (!) 97.3 °F (36.3 °C), resp. rate 15, SpO2 98 %. Imaging and other studies: I have personally reviewed pertinent reports.             Plan    Respiratory Plan: (P) Home Bronchodilator Patient pathway  Airway Clearance Plan: (P) Incentive Spirometer

## 2023-09-07 ENCOUNTER — HOME CARE VISIT (OUTPATIENT)
Dept: HOME HEALTH SERVICES | Facility: HOME HEALTHCARE | Age: 88
End: 2023-09-07
Payer: COMMERCIAL

## 2023-09-07 LAB
ANION GAP SERPL CALCULATED.3IONS-SCNC: 16 MMOL/L
BASOPHILS # BLD AUTO: 0.01 THOUSANDS/ÂΜL (ref 0–0.1)
BASOPHILS NFR BLD AUTO: 0 % (ref 0–1)
BUN SERPL-MCNC: 110 MG/DL (ref 5–25)
CALCIUM SERPL-MCNC: 9.3 MG/DL (ref 8.4–10.2)
CHLORIDE SERPL-SCNC: 94 MMOL/L (ref 96–108)
CO2 SERPL-SCNC: 23 MMOL/L (ref 21–32)
CREAT SERPL-MCNC: 2.74 MG/DL (ref 0.6–1.3)
EOSINOPHIL # BLD AUTO: 0 THOUSAND/ÂΜL (ref 0–0.61)
EOSINOPHIL NFR BLD AUTO: 0 % (ref 0–6)
ERYTHROCYTE [DISTWIDTH] IN BLOOD BY AUTOMATED COUNT: 13.7 % (ref 11.6–15.1)
GFR SERPL CREATININE-BSD FRML MDRD: 19 ML/MIN/1.73SQ M
GLUCOSE SERPL-MCNC: 154 MG/DL (ref 65–140)
GLUCOSE SERPL-MCNC: 158 MG/DL (ref 65–140)
GLUCOSE SERPL-MCNC: 161 MG/DL (ref 65–140)
GLUCOSE SERPL-MCNC: 174 MG/DL (ref 65–140)
GLUCOSE SERPL-MCNC: 183 MG/DL (ref 65–140)
HCT VFR BLD AUTO: 39.9 % (ref 36.5–49.3)
HGB BLD-MCNC: 13.1 G/DL (ref 12–17)
IMM GRANULOCYTES # BLD AUTO: 0.1 THOUSAND/UL (ref 0–0.2)
IMM GRANULOCYTES NFR BLD AUTO: 1 % (ref 0–2)
LYMPHOCYTES # BLD AUTO: 0.56 THOUSANDS/ÂΜL (ref 0.6–4.47)
LYMPHOCYTES NFR BLD AUTO: 3 % (ref 14–44)
MCH RBC QN AUTO: 28.5 PG (ref 26.8–34.3)
MCHC RBC AUTO-ENTMCNC: 32.8 G/DL (ref 31.4–37.4)
MCV RBC AUTO: 87 FL (ref 82–98)
MONOCYTES # BLD AUTO: 0.34 THOUSAND/ÂΜL (ref 0.17–1.22)
MONOCYTES NFR BLD AUTO: 2 % (ref 4–12)
NEUTROPHILS # BLD AUTO: 16.68 THOUSANDS/ÂΜL (ref 1.85–7.62)
NEUTS SEG NFR BLD AUTO: 94 % (ref 43–75)
NRBC BLD AUTO-RTO: 0 /100 WBCS
PLATELET # BLD AUTO: 181 THOUSANDS/UL (ref 149–390)
PLATELET BLD QL SMEAR: ADEQUATE
PMV BLD AUTO: 11.2 FL (ref 8.9–12.7)
POTASSIUM SERPL-SCNC: 2.9 MMOL/L (ref 3.5–5.3)
PROCALCITONIN SERPL-MCNC: 0.55 NG/ML
RBC # BLD AUTO: 4.6 MILLION/UL (ref 3.88–5.62)
RBC MORPH BLD: NORMAL
SODIUM SERPL-SCNC: 133 MMOL/L (ref 135–147)
WBC # BLD AUTO: 17.69 THOUSAND/UL (ref 4.31–10.16)

## 2023-09-07 PROCEDURE — 94760 N-INVAS EAR/PLS OXIMETRY 1: CPT

## 2023-09-07 PROCEDURE — 80048 BASIC METABOLIC PNL TOTAL CA: CPT | Performed by: STUDENT IN AN ORGANIZED HEALTH CARE EDUCATION/TRAINING PROGRAM

## 2023-09-07 PROCEDURE — 92610 EVALUATE SWALLOWING FUNCTION: CPT

## 2023-09-07 PROCEDURE — 99232 SBSQ HOSP IP/OBS MODERATE 35: CPT | Performed by: STUDENT IN AN ORGANIZED HEALTH CARE EDUCATION/TRAINING PROGRAM

## 2023-09-07 PROCEDURE — 85025 COMPLETE CBC W/AUTO DIFF WBC: CPT | Performed by: STUDENT IN AN ORGANIZED HEALTH CARE EDUCATION/TRAINING PROGRAM

## 2023-09-07 PROCEDURE — 97167 OT EVAL HIGH COMPLEX 60 MIN: CPT

## 2023-09-07 PROCEDURE — 84145 PROCALCITONIN (PCT): CPT | Performed by: STUDENT IN AN ORGANIZED HEALTH CARE EDUCATION/TRAINING PROGRAM

## 2023-09-07 PROCEDURE — 94640 AIRWAY INHALATION TREATMENT: CPT

## 2023-09-07 PROCEDURE — 97163 PT EVAL HIGH COMPLEX 45 MIN: CPT

## 2023-09-07 PROCEDURE — 82948 REAGENT STRIP/BLOOD GLUCOSE: CPT

## 2023-09-07 RX ORDER — POTASSIUM CHLORIDE 20 MEQ/1
40 TABLET, EXTENDED RELEASE ORAL
Status: COMPLETED | OUTPATIENT
Start: 2023-09-07 | End: 2023-09-07

## 2023-09-07 RX ORDER — POTASSIUM CHLORIDE 20 MEQ/1
40 TABLET, EXTENDED RELEASE ORAL
Status: DISCONTINUED | OUTPATIENT
Start: 2023-09-07 | End: 2023-09-07

## 2023-09-07 RX ADMIN — ALLOPURINOL 200 MG: 100 TABLET ORAL at 09:07

## 2023-09-07 RX ADMIN — CALCITRIOL CAPSULES 0.25 MCG 0.25 MCG: 0.25 CAPSULE ORAL at 09:07

## 2023-09-07 RX ADMIN — BUDESONIDE 0.5 MG: 0.5 INHALANT ORAL at 19:48

## 2023-09-07 RX ADMIN — LABETALOL HYDROCHLORIDE 200 MG: 200 TABLET, FILM COATED ORAL at 17:17

## 2023-09-07 RX ADMIN — INSULIN LISPRO 1 UNITS: 100 INJECTION, SOLUTION INTRAVENOUS; SUBCUTANEOUS at 08:30

## 2023-09-07 RX ADMIN — LABETALOL HYDROCHLORIDE 200 MG: 200 TABLET, FILM COATED ORAL at 09:07

## 2023-09-07 RX ADMIN — SERTRALINE HYDROCHLORIDE 50 MG: 50 TABLET ORAL at 09:07

## 2023-09-07 RX ADMIN — BUMETANIDE 2 MG: 1 TABLET ORAL at 09:07

## 2023-09-07 RX ADMIN — HEPARIN SODIUM 5000 UNITS: 5000 INJECTION INTRAVENOUS; SUBCUTANEOUS at 14:27

## 2023-09-07 RX ADMIN — POTASSIUM CHLORIDE 40 MEQ: 1500 TABLET, EXTENDED RELEASE ORAL at 11:43

## 2023-09-07 RX ADMIN — FORMOTEROL FUMARATE DIHYDRATE 20 MCG: 20 SOLUTION RESPIRATORY (INHALATION) at 19:48

## 2023-09-07 RX ADMIN — BUDESONIDE 0.5 MG: 0.5 INHALANT ORAL at 08:02

## 2023-09-07 RX ADMIN — FORMOTEROL FUMARATE DIHYDRATE 20 MCG: 20 SOLUTION RESPIRATORY (INHALATION) at 08:02

## 2023-09-07 RX ADMIN — POTASSIUM CHLORIDE 40 MEQ: 1500 TABLET, EXTENDED RELEASE ORAL at 08:30

## 2023-09-07 RX ADMIN — INSULIN LISPRO 1 UNITS: 100 INJECTION, SOLUTION INTRAVENOUS; SUBCUTANEOUS at 17:18

## 2023-09-07 RX ADMIN — FLUTICASONE PROPIONATE 1 SPRAY: 50 SPRAY, METERED NASAL at 09:07

## 2023-09-07 RX ADMIN — INSULIN LISPRO 1 UNITS: 100 INJECTION, SOLUTION INTRAVENOUS; SUBCUTANEOUS at 22:23

## 2023-09-07 RX ADMIN — HEPARIN SODIUM 5000 UNITS: 5000 INJECTION INTRAVENOUS; SUBCUTANEOUS at 22:22

## 2023-09-07 RX ADMIN — GUAIFENESIN 600 MG: 600 TABLET ORAL at 09:07

## 2023-09-07 RX ADMIN — POTASSIUM CHLORIDE 20 MEQ: 1500 TABLET, EXTENDED RELEASE ORAL at 17:17

## 2023-09-07 RX ADMIN — TAMSULOSIN HYDROCHLORIDE 0.4 MG: 0.4 CAPSULE ORAL at 17:18

## 2023-09-07 RX ADMIN — POTASSIUM CHLORIDE 20 MEQ: 1500 TABLET, EXTENDED RELEASE ORAL at 09:07

## 2023-09-07 RX ADMIN — AZITHROMYCIN MONOHYDRATE 500 MG: 500 INJECTION, POWDER, LYOPHILIZED, FOR SOLUTION INTRAVENOUS at 15:01

## 2023-09-07 RX ADMIN — GUAIFENESIN 600 MG: 600 TABLET ORAL at 17:18

## 2023-09-07 RX ADMIN — INSULIN LISPRO 1 UNITS: 100 INJECTION, SOLUTION INTRAVENOUS; SUBCUTANEOUS at 12:26

## 2023-09-07 RX ADMIN — TRIAMCINOLONE ACETONIDE: 1 CREAM TOPICAL at 17:18

## 2023-09-07 RX ADMIN — CEFTRIAXONE 2000 MG: 2 INJECTION, SOLUTION INTRAVENOUS at 14:27

## 2023-09-07 RX ADMIN — HEPARIN SODIUM 5000 UNITS: 5000 INJECTION INTRAVENOUS; SUBCUTANEOUS at 05:27

## 2023-09-07 RX ADMIN — BUMETANIDE 2 MG: 1 TABLET ORAL at 17:17

## 2023-09-07 RX ADMIN — TRIAMCINOLONE ACETONIDE: 1 CREAM TOPICAL at 09:07

## 2023-09-07 NOTE — PLAN OF CARE
Problem: PHYSICAL THERAPY ADULT  Goal: Performs mobility at highest level of function for planned discharge setting. See evaluation for individualized goals. Description: Treatment/Interventions: Functional transfer training, LE strengthening/ROM, Elevations, Therapeutic exercise, Endurance training, Patient/family training, Equipment eval/education, Bed mobility, Gait training, Cognitive reorientation  Equipment Recommended: Cammie Malin       See flowsheet documentation for full assessment, interventions and recommendations. Note:    Problem List: Decreased strength, Decreased endurance, Impaired balance, Decreased mobility, Decreased safety awareness, Decreased cognition, Impaired judgement, Obesity  Assessment: Pt is a 81 y.o. male seen for PT evaluation s/p admit to [address] [place] [address] on 8/18/2022 w/ Pneumonia. Order placed for PT. Comorbidities affecting pt's physical performance at time of assessment include: DM, HTN, COPD and CHF. Personal factors affecting pt at time of IE include: limited home support, advanced age, inability to perform ADLs, inability to ambulate household distances and limited insight into impairments. Prior to admission, pt was was independent w/ all functional mobility w/ RW, lived in one floor environment, had 1+1 BOOGIE (-) railing and lived with wife. Upon evaluation: Pt requires min A for bed mobility, min A for sit to stand, and min A for steppage transfer with RW. Limited due to SOB/fatigue. (Please find full objective findings from PT assessment regarding body systems outlined above). Impairments and limitations also listed above, especially due to  weakness, impaired balance, decreased endurance, decreased activity tolerance, decreased safety awareness, fall risk, SOB upon exertion and decreased cognition.   Pt's clinical presentation is currently unstable/unpredictable seen in pt's presentation of fall risk, significant decline in functional mobility compared to baseline, and decreased insight into deficits. Pt to benefit from continued skilled PT tx while in hospital and upon DC to address deficits as defined above and maximize level of functional mobility. Recommend progression of ambulation and initiation of HEP as appropriate. See flowsheet documentation for full assessment.

## 2023-09-07 NOTE — SPEECH THERAPY NOTE
Speech Language/Pathology    Speech-Language Pathology Bedside Swallow Evaluation      Patient Name: Rayne Maier    Today's Date: 9/7/2023     Problem List  Principal Problem:    Pneumonia  Active Problems:    Essential hypertension    COPD with acute exacerbation ([address])    Benign prostatic hyperplasia with urinary hesitancy    CKD (chronic kidney disease) stage 4, GFR 15-29 ml/min ([facility])    Chronic diastolic congestive heart failure ([address])    Type 2 diabetes mellitus with diabetic neuropathy, without long-term current use of insulin ([address])    Pulmonary hypertension ([address])    Sepsis ([address])      Past Medical History  Past Medical History:   Diagnosis Date   • Anxiety 2020   • COPD (chronic obstructive pulmonary disease) ([address])    • Coronary artery disease 2012   • Depression 2020   • Herpes zoster    • Hypertension    • Memory loss     possible   • Mycoplasma pneumonia    • Obesity    • Osteoarthritis    • Renal calculi        Past Surgical History  Past Surgical History:   Procedure Laterality Date   • CATARACT EXTRACTION      with insert intraoculat lens prosthesis   • HEMORRHOID SURGERY     • NECK SURGERY      for bone spur       Summary   Pt presented with functional appearing oral and pharyngeal stage swallowing skills with materials administered today. Mastication and oral organization is timely and effective. Prompt transfers without significant residue. Swallows suspected fairly prompt w/ fair rise to palpation. No overt s/s aspiration across meal. Pt/pt's wife deny concerns for dysphagia/aspiration at this time - denies h/o s/s aspiration. Education provided on strategies to optimize swallow safety and s/s aspiration to notify medical team of shuold they arise.      Risk/s for Aspiration: Low      Recommended Diet: regular diet and thin liquids   Recommended Form of Meds: whole with liquid   Aspiration precautions and swallowing strategies: upright posture, only feed when fully alert, slow rate of feeding and small bites/sips  Other Recommendations: Continue frequent oral care, MBS if ongoing concerns for pna as related to aspiration         Current Medical Status  Pt is a 81 y.o. male who presented to [address] [place] [address] with worsening shortness of breath x2 days. On O2 at home, but only utilizing approximately 50% of the time. States that he does have some chronic shortness of breath, however has been having some significant acute shortness of breath for last 2 days, particularly on exertion.  Recently  transition from Trelegy to Perforomist and Pulmicort nebulizers as he was unable to properly use Trelegy, however pt was unable to  Perforomist due to [facility] not having it in stock and has been only utilizing Pulmicort nebulizers last week. Has a nonproductive cough. CT showed a right upper lobe solid [place] consistent with pneumonia.  Procalcitonin also mildly elevated, although in setting of CKD stage IV. Started Iv antibiotics and Iv Steriod. PMH for HTN, CKD, T2DM, CHF, COPD. Admit Inpatient level of care for Pneumonia, Sepsis, COPD with acute exacerbation. Currently on O2 3L NC. SIR; Tachycardia, tachypnea, leukocytosis. Baseline on 3L, but only uses O2 about 50% of the time per family at bedside. Wbc 22. Iv antibiotics. Bld urine. Check urine strep and legionella. Start xoponex/atrovent, continue budesonide. Speech therapy. On exam; diminished breath sounds, tachypneic. CT chest- Focal ill-defined right upper lobe consolidation with associated small cavitary space is suspicious for pneumonia. Recommend follow-up to resolution with chest CT in 4 to 6 weeks.   .    Current Precautions:  Aspiration     Allergies:  No known food allergies    Past medical history:  Please see H&P for details    Special Studies:  CT chest abdomen pelvis 9/6: 1. Focal ill-defined right upper lobe consolidation with associated small cavitary space is suspicious for pneumonia.  Recommend follow-up to resolution with chest CT in 4 to 6 weeks. 2.  New 4 mm right upper lobe nodule. Attention on follow-up. 3.  Emphysema. 4.  Cholelithiasis without evidence of acute cholecystitis. 5.  Hepatic steatosis. 6.  Intermediate density partially exophytic lesion arising from the upper pole right kidney. Cannot distinguish hyperdense cyst from solid neoplasm on this noncontrast exam. Recommend further evaluation with renal ultrasound. CXR 9/6: Development of subpleural opacity in the lateral right midlung which could represent a mass or early infiltrate    Social/Education/Vocational Hx:  Pt lives home w/ spouse    Swallow Information   Current Risks for Dysphagia & Aspiration: age  Current Symptoms/Concerns: change in respiratory status  Current Diet: regular diet and thin liquids   Baseline Diet: regular diet and thin liquids      Baseline Assessment   Behavior/Cognition: alert  Speech/Language Status: able to participate in conversation and able to follow commands  Patient Positioning: upright in chair  Pain Status/Interventions/Response to Interventions:  No report of or nonverbal indications of pain. Swallow Mechanism Exam  Facial: symmetrical  Labial: WFL  Lingual: WFL  Velum: symmetrical  Mandible: adequate ROM  Dentition: adequate  Vocal quality:clear/adequate   Volitional Cough: strong/productive   Respiratory Status: on 2L O2     Consistencies Assessed and Performance   Consistencies Administered: thin liquids and puree, regular texture solids  Materials administered included burger, pudding, tomato soup     Oral Stage: WFL  Mastication was adequate with the materials administered today. Bolus formation and transfer were functional with no significant oral residue noted. No overt s/s reduced oral control. Pharyngeal Stage:   Swallow Mechanics:  Swallowing initiation appeared prompt. Laryngeal rise was palpated and judged to be within functional limits.   No coughing, throat clearing, change in vocal quality or respiratory status noted today. Esophageal Concerns: none reported    Strategies and Efficacy: -    Summary and Recommendations (see above)    Results Reviewed with: patient, RN and MD     Treatment Recommended: Yes     Frequency of treatment: Brief f/u if MBS is medically indicated     Patient Stated Goal: "Am I gonna get lunch?"     Dysphagia LTG  -Patient will demonstrate safe and effective oral intake (without overt s/s significant oral/pharyngeal dysphagia including s/s penetration or aspiration) for the highest appropriate diet level.      Short Term Goals:  -Patient will comply with a Video/Modified Barium Swallow study for more complete assessment of swallowing anatomy/physiology/aspiration risk and to assess efficacy of treatment techniques so as to best guide treatment plan      Speech Therapy Prognosis   Prognosis: good    Prognosis Considerations: age, medical status, prior medical history and cognitive status

## 2023-09-07 NOTE — PLAN OF CARE
Problem: MOBILITY - ADULT  Goal: Maintain or return to baseline ADL function  Description: INTERVENTIONS:  -  Assess patient's ability to carry out ADLs; assess patient's baseline for ADL function and identify physical deficits which impact ability to perform ADLs (bathing, care of mouth/teeth, toileting, grooming, dressing, etc.)  - Assess/evaluate cause of self-care deficits   - Assess range of motion  - Assess patient's mobility; develop plan if impaired  - Assess patient's need for assistive devices and provide as appropriate  - Encourage maximum independence but intervene and supervise when necessary  - Involve family in performance of ADLs  - Assess for home care needs following discharge   - Consider OT consult to assist with ADL evaluation and planning for discharge  - Provide patient education as appropriate  Outcome: Progressing  Goal: Maintains/Returns to pre admission functional level  Description: INTERVENTIONS:  - Perform BMAT or MOVE assessment daily.   - Set and communicate daily mobility goal to care team and patient/family/caregiver. - Collaborate with rehabilitation services on mobility goals if consulted  - Perform Range of Motion x times a day. - Reposition patient every x hours.   - Dangle patient x times a day  - Stand patient x times a day  - Ambulate patient x times a day  - Out of bed to chair x times a day   - Out of bed for meals xxx times a day  - Out of bed for toileting  - Record patient progress and toleration of activity level   Outcome: Progressing     Problem: PAIN - ADULT  Goal: Verbalizes/displays adequate comfort level or baseline comfort level  Description: Interventions:  - Encourage patient to monitor pain and request assistance  - Assess pain using appropriate pain scale  - Administer analgesics based on type and severity of pain and evaluate response  - Implement non-pharmacological measures as appropriate and evaluate response  - Consider cultural and social influences on pain and pain management  - Notify physician/advanced practitioner if interventions unsuccessful or patient reports new pain  Outcome: Progressing     Problem: INFECTION - ADULT  Goal: Absence or prevention of progression during hospitalization  Description: INTERVENTIONS:  - Assess and monitor for signs and symptoms of infection  - Monitor lab/diagnostic results  - Monitor all insertion sites, i.e. indwelling lines, tubes, and drains  - Monitor endotracheal if appropriate and nasal secretions for changes in amount and color  - [place] appropriate cooling/warming therapies per order  - Administer medications as ordered  - Instruct and encourage patient and family to use good hand hygiene technique  - Identify and instruct in appropriate isolation precautions for identified infection/condition  Outcome: Progressing  Goal: Absence of fever/infection during neutropenic period  Description: INTERVENTIONS:  - Monitor WBC    Outcome: Progressing   x

## 2023-09-07 NOTE — PROGRESS NOTES
[address] [facility]  Progress Note  Name: Yandy Bray  MRN: [id number]  Unit/Bed#: -01 I Date of Admission: 9/6/2023   Date of Service: 9/7/2023 I Hospital Day: 1    Assessment/Plan   Sepsis [facility])  Assessment & Plan  · SIRS: Tachycardia, tachypnea, leukocytosis on admission. In setting of pneumonia. · Procalcitonin 0.34, Lactic 1.3  · On ceftriaxone/azithromycin  · Blood cultures pending. Pulmonary hypertension (HCC)  Assessment & Plan  Secondary to COPD. Type 2 diabetes mellitus with diabetic neuropathy, without long-term current use of insulin [facility])  Assessment & Plan  Lab Results   Component Value Date    HGBA1C 6.3 (H) 05/02/2023       Recent Labs     09/06/23  1645 09/06/23 2022 09/07/23  0703   POCGLU 129 209* 174*       Blood Sugar Average: Last 72 hrs:  (P) [id number]   Regimen: Glipizide 5 mg twice daily  Stop oral antihyperglycemic's while inpatient  Continue sliding scale insulin and hypoglycemia protocol. Diabetic diet    Chronic diastolic congestive heart failure (HCC)  Assessment & Plan  Wt Readings from Last 3 Encounters:   09/07/23 111 kg (244 lb 11.4 oz)   09/01/23 115 kg (254 lb)   08/31/23 113 kg (250 lb)     Appears close to euvolemia  Continue Bumex daily and metolazone MWF. Monitor daily weights. Monitor daily ins and outs. CKD (chronic kidney disease) stage 4, GFR 15-29 ml/min [facility])  Assessment & Plan  Lab Results   Component Value Date    EGFR 19 09/07/2023    EGFR 18 09/06/2023    EGFR 20 08/31/2023    CREATININE 2.74 (H) 09/07/2023    CREATININE 2.95 (H) 09/06/2023    CREATININE 2.73 (H) 08/31/2023     At baseline      Benign prostatic hyperplasia with urinary hesitancy  Assessment & Plan  Continue Flomax    COPD with acute exacerbation (HCC)  Assessment & Plan  COPD exacerbated by PNA and inability to obtain nebulizer  · On 3L PRN at baseline, currently on 3L  · Recently switched to Performist and Budenoside nebs.  Performist was not stocked and pt has only been utilizing Budesonide for last week. · Patient received Xopenex and Atrovent in the ED. · Started on Perforomist, continue budesonide. · Can hold off on IV steroids for now given hyperglycemia, active infx, mild exacerbation    Essential hypertension  Assessment & Plan  Continue labetalol and diuretics. Blood pressure within acceptable range. * Pneumonia  Assessment & Plan  Presents with worsening shortness of breath x2-3 days. (+) Cough  · Currently on 3L. Baseline has 3L prescribed, but only uses O2 about 50% of the time per family at bedside  · Procalcitonin 0.34 (in setting of CKD IV)  · WBC 22  · CT chest- Focal ill-defined right upper lobe consolidation with associated small cavitary space is suspicious for pneumonia. Recommend follow-up to resolution with chest CT in 4 to 6 weeks. · Started on ceftriaxone/azithro, continue same. · Urine strep and legionella negative. VTE Pharmacologic Prophylaxis: VTE Score: 8 Moderate Risk (Score 3-4) - Pharmacological DVT Prophylaxis Ordered: heparin. Patient Centered Rounds: I performed bedside rounds with nursing staff today. Discussions with Specialists or Other Care Team Provider: Cm    Education and Discussions with Family / Patient: Updated patient at bedside. .     Total Time Spent on Date of Encounter in care of patient: 35 minutes This time was spent on one or more of the following: performing physical exam; counseling and coordination of care; obtaining or reviewing history; documenting in the medical record; reviewing/ordering tests, medications or procedures; communicating with other healthcare professionals and discussing with patient's family/caregivers. Current Length of Stay: 1 day(s)  Current Patient Status: Inpatient   Certification Statement: The patient will continue to require additional inpatient hospital stay due to Continues to be on IV antibiotics.   Discharge Plan: Anticipate discharge in 24-48 hrs to discharge location to be determined pending rehab evaluations. Code Status: Level 3 - DNAR and DNI    Subjective:   Patient examined at bedside, reports feeling tired. He also reports mild discomfort in abdomen, denies any diarrhea or constipation. Objective:     Vitals:   Temp (24hrs), Av.5 °F (36.4 °C), Min:96.5 °F (35.8 °C), Max:97.9 °F (36.6 °C)    Temp:  [96.5 °F (35.8 °C)-97.9 °F (36.6 °C)] 97.9 °F (36.6 °C)  HR:  [] 94  Resp:  [15-25] 20  BP: ()/(58-81) 130/81  SpO2:  [92 %-100 %] 98 %  Body mass index is 34.13 kg/m². Input and Output Summary (last 24 hours): Intake/Output Summary (Last 24 hours) at 2023 2213  Last data filed at 2023 1829  Gross per 24 hour   Intake 790 ml   Output 150 ml   Net 640 ml       Physical Exam:   Physical Exam  Constitutional:       Appearance: Normal appearance. HENT:      Head: Normocephalic and atraumatic. Mouth/Throat:      Mouth: Mucous membranes are moist.      Pharynx: Oropharynx is clear. Eyes:      Conjunctiva/sclera: Conjunctivae normal.      Pupils: Pupils are equal, round, and reactive to light. Cardiovascular:      Rate and Rhythm: Normal rate and regular rhythm. Pulmonary:      Effort: Pulmonary effort is normal.      Comments: Dec breath sounds. Abdominal:      General: Abdomen is flat. Bowel sounds are normal.   Musculoskeletal:         General: Normal range of motion. Cervical back: Normal range of motion and neck supple. Skin:     General: Skin is warm and dry. Neurological:      General: No focal deficit present. Mental Status: He is alert and oriented to person, place, and time.           Additional Data:     Labs:  Results from last 7 days   Lab Units 23  0257   WBC Thousand/uL 17.69*   HEMOGLOBIN g/dL 13.1   HEMATOCRIT % 39.9   PLATELETS Thousands/uL 181   NEUTROS PCT % 94*   LYMPHS PCT % 3*   MONOS PCT % 2*   EOS PCT % 0     Results from last 7 days   Lab Units 09/07/23  0257 09/06/23  1333   SODIUM mmol/L 133* 133*   POTASSIUM mmol/L 2.9* 3.3*   CHLORIDE mmol/L 94* 91*   CO2 mmol/L 23 27   BUN mg/dL 110* 112*   CREATININE mg/dL 2.74* 2.95*   ANION GAP mmol/L 16 15   CALCIUM mg/dL 9.3 9.7   ALBUMIN g/dL  --  4.1   TOTAL BILIRUBIN mg/dL  --  0.76   ALK PHOS U/L  --  72   ALT U/L  --  10   AST U/L  --  14   GLUCOSE RANDOM mg/dL 183* 151*         Results from last 7 days   Lab Units 09/07/23  0703 09/06/23 2022 09/06/23  1645   POC GLUCOSE mg/dl 174* 209* 129         Results from last 7 days   Lab Units 09/07/23  0257 09/06/23  1424   LACTIC ACID mmol/L  --  1.3   PROCALCITONIN ng/ml 0.55* 0.34*       Lines/Drains:  Invasive Devices     Peripheral Intravenous Line  Duration           Peripheral IV 09/07/23 Right;Ventral (anterior) Forearm <1 day                  Telemetry:  Telemetry Orders (From admission, onward)             24 Hour Telemetry Monitoring  Continuous x 24 Hours (Telem)        Question:  Reason for 24 Hour Telemetry  Answer:  Decompensated CHF- and any one of the following: continuous diuretic infusion or total diuretic dose >200 mg daily, associated electrolyte derangement (I.e. K < 3.0), ionotropic drip (continuous infusion), hx of ventricular arrhythmia, or new EF < 35%                 Telemetry Reviewed: Normal Sinus Rhythm  Indication for Continued Telemetry Use: No indication for continued use. Will discontinue. Imaging: Reviewed radiology reports from this admission including: chest CT scan    Recent Cultures (last 7 days):   Results from last 7 days   Lab Units 09/06/23  1815 09/06/23  1449 09/06/23  1424   BLOOD CULTURE   --  Received in Microbiology Lab. Culture in Progress. Received in Microbiology Lab. Culture in Progress.    LEGIONELLA URINARY ANTIGEN  Negative  --   --        Last 24 Hours Medication List:   Current Facility-Administered Medications   Medication Dose Route Frequency Provider Last Rate   • acetaminophen  650 mg Oral Q6H PRN Annalisa Cobb PA-C     • albuterol  2.5 mg Nebulization Q4H PRN Juanito Moy MD     • allopurinol  200 mg Oral Daily Neha Dang PA-C     • aluminum-magnesium hydroxide-simethicone  30 mL Oral Q6H PRN Annalisa Cobb PA-C     • aspirin  300 mg Rectal Once Kenyatta Rivas MD     • azithromycin  500 mg Intravenous Q24H Neha Dang PA-C     • budesonide  0.5 mg Nebulization BID Annalisa Cobb PA-C     • bumetanide  2 mg Oral BID Annalisa Cobb PA-C     • calcitriol  0.25 mcg Oral Daily Neha Dang PA-C     • cefTRIAXone  2,000 mg Intravenous Q24H Neha Dang PA-C     • fluticasone  1 spray Each Nare Daily Annalisa Cobb PA-C     • formoterol  20 mcg Nebulization Q12H Rayray Rojo MD     • guaiFENesin  600 mg Oral BID Annalisa Cobb PA-C     • heparin (porcine)  5,000 Units Subcutaneous [facility] Neha Dang PA-C     • insulin lispro  1-6 Units Subcutaneous 4x Daily (AC & HS) Neha Lares PA-C     • labetalol  200 mg Oral BID Neha Dang PA-C     • [START ON 9/8/2023] metolazone  2.5 mg Oral Once per day on Mon Wed Fri Annalisa Cobb PA-C     • potassium chloride  20 mEq Oral BID Annalisa Cobb PA-C     • potassium chloride  40 mEq Oral Q2H Ben Gruber MD     • senna  1 tablet Oral Daily PRN Annalisa Cobb PA-C     • sertraline  50 mg Oral QAM Annalias Cobb PA-C     • sodium chloride (PF)  3 mL Intravenous Q1H PRN Kenyatta Rivas MD     • tamsulosin  0.4 mg Oral Daily With [employer]     • traMADol  50 mg Oral BID PRN Annalisa Cobb PA-C     • triamcinolone   Topical BID Annalisa Cobb PA-C          Today, Patient Was Seen By: Ben Gruber MD    **Please Note: This note may have been constructed using a voice recognition system. **

## 2023-09-07 NOTE — PLAN OF CARE
Problem: OCCUPATIONAL THERAPY ADULT  Goal: Performs self-care activities at highest level of function for planned discharge setting. See evaluation for individualized goals. Description:   Outcome: Progressing  Note: Limitation: Decreased ADL status, Decreased UE ROM, Decreased UE strength, Decreased Safe judgement during ADL, Decreased cognition, Decreased endurance, Decreased self-care trans, Decreased high-level ADLs  Prognosis: Good  Assessment: Pt is a 81 y.o. male seen for OT evaluation at [facility], admitted 9/6/2023 w/ Pneumonia. OT completed extensive review of pt's medical and social history. Comorbidities affecting pt's functional performance at time of assessment include: HTN, COPD, CKD, CHF, DM type 2, mild cognitive impairment, depression, memory loss, ambulatory dysfunction, pulmonary emphysema. Personal factors affecting pt at time of IE include:limited home support, difficulty performing ADLS, limited insight into deficits, decreased initiation and engagement  and health management . Prior to admission, pt was living [facility], 1+1STE with wife and was assisted with ADL, able to ambulate with RW. Upon evaluation, pt presents to OT below baseline due to the following performance deficits: weakness, decreased strength, decreased balance, decreased tolerance, impaired initiation, impaired memory, impaired sequencing, impaired problem solving and decreased safety awareness. Pt to benefit from continued skilled OT tx while in the hospital to address deficits as defined above and maximize level of functional independence w ADL's and functional mobility. Occupational Performance areas to address include: grooming, bathing/shower, toilet hygiene, dressing, functional mobility and functional transfers, bed mobility. The patient's raw score on the AM-PAC Daily Activity inpatient short form is 17, standardized score is 37.26, less than 39.4.  Patients at this level are likely to benefit from DC to post-acute rehabilitation services. Based on findings, pt is of high complexity, due to medical comorbidities. Pt seen as a co-eval with PT due to the patient's co-morbidities, clinically unstable presentation, and present impairments which are a regression from the patient's baseline. At this time, OT recommendations at time of discharge are level 2.

## 2023-09-07 NOTE — ASSESSMENT & PLAN NOTE
COPD exacerbated by PNA and inability to obtain nebulizer  · On 3L PRN at baseline, currently on 3L  · Recently switched to Performist and Budenoside nebs. Performist was not stocked and pt has only been utilizing Budesonide for last week. · Patient received Xopenex and Atrovent in the ED. · Started on Perforomist, continue budesonide.   · Can hold off on IV steroids for now given hyperglycemia, active infx, mild exacerbation

## 2023-09-07 NOTE — ASSESSMENT & PLAN NOTE
Lab Results   Component Value Date    EGFR 19 09/07/2023    EGFR 18 09/06/2023    EGFR 20 08/31/2023    CREATININE 2.74 (H) 09/07/2023    CREATININE 2.95 (H) 09/06/2023    CREATININE 2.73 (H) 08/31/2023     At baseline

## 2023-09-07 NOTE — CASE MANAGEMENT
Case Management Assessment & Discharge Planning Note    Patient name Hector Mallory  Location /-20 MRN [id number]  : 1935 Date 2023       Current Admission Date: 2023  Current Admission Diagnosis:Pneumonia   Patient Active Problem List    Diagnosis Date Noted   • Pneumonia 2023   • Sepsis ([address]) 2023   • Pulmonary hypertension ([address]) 2023   • Secondary hyperparathyroidism of renal origin ([address]) 2023   • Hypokalemia 2023   • Heart failure with preserved ejection fraction ([address]) 2023   • Continuous opioid dependence ([address]) 2023   • Chronic rhinitis 2023   • Venous stasis ulcer of other part of right lower leg limited to breakdown of skin, unspecified whether varicose veins present ([address]) 2023   • Type 2 diabetes mellitus with diabetic neuropathy, without long-term current use of insulin ([address]) 2023   • Idiopathic chronic gout of right foot without tophus 2022   • Pulmonary emphysema ([address]) 2022   • Memory loss    • Ambulatory dysfunction    • Mild cognitive impairment 2022   • Mild episode of recurrent major depressive disorder ([address]) 2022   • Other constipation 2022   • Insomnia due to medical condition 2022   • Chronic diastolic congestive heart failure ([address]) 2022   • CKD (chronic kidney disease) stage 4, GFR 15-29 ml/min ([address]) 03/15/2022   • Benign prostatic hyperplasia with urinary hesitancy 2020   • Moderate COPD (chronic obstructive pulmonary disease) ([address]) 12/10/2019   • Chronic respiratory failure with hypoxia ([address]) 11/15/2019   • COPD with acute exacerbation ([address]) 2019   • 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 disease, without long-term current use of insulin ([address]) 2016   • Essential hypertension 2015   • CHI (obstructive sleep apnea) 2014      LOS (days): 1  Geometric Mean LOS (GMLOS) (days): 5.00  Days to GMLOS:4     OBJECTIVE:    Risk of Unplanned Readmission Score: 28.75         Current admission status: Inpatient       Preferred Pharmacy:   [address], [place] - [address]  [address] [place] [address]  Phone: [phone] Fax: [phone]    Primary Care Provider: Meliton Wallis MD    Primary Insurance: [facility]  Secondary Insurance:     ASSESSMENT:  [address], [address] Anaya Del Cid Representative - Son   Primary Phone: [phone] (Mobile)               Advance Directives  Does patient have a [address]?: Yes  Does patient have Advance Directives?: Yes  Advance Directives: Living will, Power of  for health care  Primary Contact: Mono (son)    Patient Information  Admitted from[de-identified] Home  Mental Status: Alert  During Assessment patient was accompanied by: Spouse  Assessment information provided by[de-identified] Patient  Support Systems: Spouse/significant other, Son  Home entry access options. Select all that apply.: Stairs  Number of steps to enter home.: 2  Type of Current Residence: [place]  In the last 12 months, was there a time when you were not able to pay the mortgage or rent on time?: No  In the last 12 months, how many places have you lived?: 1  In the last 12 months, was there a time when you did not have a steady place to sleep or slept in a shelter (including now)?: No  Homeless/housing insecurity resource given?: N/A  Living Arrangements: Lives w/ Spouse/significant other    Activities of Daily Living Prior to Admission  Functional Status: Independent  Completes ADLs independently?: Yes  Ambulates independently?: Yes  Does patient use assisted devices?: Yes  Assisted Devices (DME) used: Che Donovan, Shower Chair, Home Oxygen concentrator  DME Company Name (respiratory supplies):  [employer]  O2 Rate(s): 3  Does patient currently own DME?: Yes  What DME does the patient currently own?: Terrell Ta  Does patient have a history of Outpatient Therapy (PT/OT)?: No  Does the patient have a history of Short-Term Rehab?: No  Does patient have a history of HHC?: Yes (-HHC)  Does patient currently have [address]?: Yes    Current Home Health Care  Type of Current Home Care Services: Home PT, Nurse visit  [address][de-identified] [facility] [facility] Provider[de-identified] PCP    Patient Information Continued  Does patient have prescription coverage?: Yes  Within the past 12 months, you worried that your food would run out before you got the money to buy more.: Never true  Within the past 12 months, the food you bought just didn't last and you didn't have money to get more.: Never true  Food insecurity resource given?: N/A  Does patient receive dialysis treatments?: No  Does patient have a history of substance abuse?: No  Does patient have a history of Mental Health Diagnosis?: No    Means of Transportation  [facility]] Family transport  In the past 12 months, has lack of transportation kept you from medical appointments or from getting medications?: No  In the past 12 months, has lack of transportation kept you from meetings, work, or from getting things needed for daily living?: No  Was application for public transport provided?: N/A    DISCHARGE DETAILS:    Discharge planning discussed with[de-identified] patient and wife Aditya Rowe)  Freedom of Choice: Yes  Comments - Freedom of Choice: Aditya Rowe is requesting pt continue [address] [facility]; referral sent. CM contacted family/caregiver?: Yes  Were Treatment Team discharge recommendations reviewed with patient/caregiver?: Yes  Did patient/caregiver verbalize understanding of patient care needs?: Yes  Were patient/caregiver advised of the risks associated with not following Treatment Team discharge recommendations?: Yes    Contacts  Patient Contacts: Aditya Rowe (wife)  Relationship to Patient[de-identified] Family  Contact Method:  In Person  Reason/Outcome: Discharge Planning, Referral    Requested [address]         Is the patient interested in [facility] [address] at discharge?: Yes  [facility] requested[de-identified] Nursing, Physical [address] Name[de-identified] [facility] External Referral Reason (only applicable if external HHA name selected): Patient has established relationship with provider  [address] Provider[de-identified] PCP  Home Health Services Needed[de-identified] Evaluate Functional Status and Safety, Gait/ADL Training, Strengthening/Theraputic Exercises to Improve Function, Oxygen Via Nasal Cannula  Oxygen LPM Ordered (if applicable based on home health services needed):: 3 LPM  Homebound Criteria Met[de-identified] Requires the Assistance of Another Person for Safe Ambulation or to Leave the Home, Uses an Assist Device (i.e. cane, walker, etc)  Supporting Clincal Findings[de-identified] Limited Endurance, Requires Oxygen    DME Referral Provided  Referral made for DME?: No    Other Referral/Resources/Interventions Provided:  Interventions: [facility]  Referral Comments: Referral to [facility]    Additional Comments: Met with pt and pt's wife Jailyn Garcia to discuss the role of CM and to discuss any help pt may need prior to dc. Pt lives with Jailyn Garcia in a 1st floor home with 2 BOOGIE. Pt indpt but recently requiring private services through [facility]. Pt uses a RW, 3L 02 through [employer], and shower chair. Pt is currently receiving services through [facility]; SN and PT. No hx of mental health or D&A treatment. No hx of rehab. Pt's preferred pharmacy [facility]. Pt's wife is preferring pt return home with [address] services as long as he is able to ambulate. CM discussed with therapy and was informed that they were limited during session today due to pt's shortness of breath.

## 2023-09-07 NOTE — PLAN OF CARE
Problem: PAIN - ADULT  Goal: Verbalizes/displays adequate comfort level or baseline comfort level  Description: Interventions:  - Encourage patient to monitor pain and request assistance  - Assess pain using appropriate pain scale  - Administer analgesics based on type and severity of pain and evaluate response  - Implement non-pharmacological measures as appropriate and evaluate response  - Consider cultural and social influences on pain and pain management  - Notify physician/advanced practitioner if interventions unsuccessful or patient reports new pain  Outcome: Progressing     Problem: INFECTION - ADULT  Goal: Absence or prevention of progression during hospitalization  Description: INTERVENTIONS:  - Assess and monitor for signs and symptoms of infection  - Monitor lab/diagnostic results  - Monitor all insertion sites, i.e. indwelling lines, tubes, and drains  - Monitor endotracheal if appropriate and nasal secretions for changes in amount and color  - [place] appropriate cooling/warming therapies per order  - Administer medications as ordered  - Instruct and encourage patient and family to use good hand hygiene technique  - Identify and instruct in appropriate isolation precautions for identified infection/condition  Outcome: Progressing  Goal: Absence of fever/infection during neutropenic period  Description: INTERVENTIONS:  - Monitor WBC    Outcome: Progressing

## 2023-09-07 NOTE — ASSESSMENT & PLAN NOTE
Lab Results   Component Value Date    HGBA1C 6.3 (H) 05/02/2023       Recent Labs     09/06/23  1645 09/06/23 2022 09/07/23  0703   POCGLU 129 209* 174*       Blood Sugar Average: Last 72 hrs:  (P) [id number]   Regimen: Glipizide 5 mg twice daily  Stop oral antihyperglycemic's while inpatient  Continue sliding scale insulin and hypoglycemia protocol.   Diabetic diet

## 2023-09-07 NOTE — PROGRESS NOTES
-- Patient: Michael Owen  -- MRN: [id number]  -- Aidin Request ID: [id number]  -- Level of care reserved: Katelyn Zamora  -- Partner Reserved: ELAN [facility], [place],  [facility] [phone]  -- Clinical needs requested:  -- Geography searched: [id number]  -- Start of Service:  -- Request sent: 3:17pm EDT on 9/7/2023 by Kraig Galeano  -- Partner reserved: 3:30pm EDT on 9/7/2023 by Kraig Galeano  -- Choice list shared: 3:22pm EDT on 9/7/2023 by Kraig Galeano

## 2023-09-07 NOTE — OCCUPATIONAL THERAPY NOTE
Occupational Therapy Evaluation      Severo Night    9/7/2023    Principal Problem:    Pneumonia  Active Problems:    Essential hypertension    COPD with acute exacerbation ([address])    Benign prostatic hyperplasia with urinary hesitancy    CKD (chronic kidney disease) stage 4, GFR 15-29 ml/min (Formerly [facility])    Chronic diastolic congestive heart failure ([address])    Type 2 diabetes mellitus with diabetic neuropathy, without long-term current use of insulin ([address])    Pulmonary hypertension ([address])    Sepsis ([address])      Past Medical History:   Diagnosis Date    Anxiety 2020    COPD (chronic obstructive pulmonary disease) ([address])     Coronary artery disease 2012    Depression 2020    Herpes zoster     Hypertension     Memory loss     possible    Mycoplasma pneumonia     Obesity     Osteoarthritis     Renal calculi        Past Surgical History:   Procedure Laterality Date    CATARACT EXTRACTION      with insert intraoculat lens prosthesis    HEMORRHOID SURGERY      NECK SURGERY      for bone spur        09/07/23 1115   OT Last Visit   OT Visit Date 09/07/23   Note Type   Note type Evaluation   Pain Assessment   Pain Assessment Tool 0-10   Pain Score No Pain   Restrictions/Precautions   Weight Bearing Precautions Per Order No   Other Precautions Chair Alarm; Bed Alarm; Fall Risk;O2   Home Living   Type of [address] [place] [address]  One level  (1+1STE)   Bathroom Shower/Tub   (spongebathes independently at baseline)   Jagdeep Durand   Additional Comments Pt sleeps in a recliner chair. Ambulates with mod I and RW at baseline. On 3 L O2 NC at home. Prior Function   Level of [place] Needs assistance with ADLs; Independent with functional mobility; Needs assistance with IADLS   Lives With Spouse   Receives Help From Family   IADLs Family/Friend/Other provides transportation; Family/Friend/Other provides meals; Family/Friend/Other provides medication management   Falls in the last 6 months 0   General   Family/Caregiver Present Yes  (wife)   ADL   Eating Assistance 7  Independent   Grooming Assistance 5  Supervision/Setup   UB Bathing Assistance 5  Supervision/Setup   LB Bathing Assistance 2  Maximal Assistance   UB Dressing Assistance 4  Minimal Assistance   LB [address] Final 2  Maximal [address]  4  Minimal Assistance   Additional Comments Sat at EOB to use urinal   Bed Mobility   Supine to Sit 4  Minimal assistance   Additional items Assist x 1   Sit to Supine 4  Minimal assistance   Additional items Assist x 1   Transfers   Sit to Stand 4  Minimal assistance   Additional items Assist x 1   Stand to Sit 4  Minimal assistance   Additional items Assist x 1   Stand pivot 4  Minimal assistance   Additional items Assist x 1  (RW)   Functional Mobility   Functional Mobility 4  Minimal assistance   Additional Comments x1   Additional items Rolling walker   Activity Tolerance   Activity Tolerance Patient tolerated treatment well   Medical Staff Made Aware PT [facility]   Nurse Made Aware HELEN Vital   RUE Assessment   RUE Assessment WFL   LUE Assessment   LUE Assessment WFL   Cognition   Overall Cognitive Status Impaired   Arousal/Participation Alert   Attention Difficulty attending to directions   Orientation Level Oriented to person;Oriented to place   Memory Decreased recall of precautions;Decreased recall of recent events;Decreased short term memory   Following Commands Follows one step commands with increased time or repetition   Assessment   Limitation Decreased ADL status; Decreased UE ROM; Decreased UE strength;Decreased Safe judgement during ADL;Decreased cognition;Decreased endurance;Decreased self-care trans;Decreased high-level ADLs   Prognosis Good   Assessment Pt is a 81 y.o. male seen for OT evaluation at [facility], admitted 9/6/2023 w/ Pneumonia. OT completed extensive review of pt's medical and social history.  Comorbidities affecting pt's functional performance at time of assessment include: HTN, COPD, CKD, CHF, DM type 2, mild cognitive impairment, depression, memory loss, ambulatory dysfunction, pulmonary emphysema. Personal factors affecting pt at time of IE include:limited home support, difficulty performing ADLS, limited insight into deficits, decreased initiation and engagement  and health management . Prior to admission, pt was living [facility], 1+1STE with wife and was assisted with ADL, able to ambulate with RW. Upon evaluation, pt presents to OT below baseline due to the following performance deficits: weakness, decreased strength, decreased balance, decreased tolerance, impaired initiation, impaired memory, impaired sequencing, impaired problem solving and decreased safety awareness. Pt to benefit from continued skilled OT tx while in the hospital to address deficits as defined above and maximize level of functional independence w ADL's and functional mobility. Occupational Performance areas to address include: grooming, bathing/shower, toilet hygiene, dressing, functional mobility and functional transfers, bed mobility. The patient's raw score on the -PAC Daily Activity inpatient short form is 17, standardized score is 37.26, less than 39.4. Patients at this level are likely to benefit from DC to post-acute rehabilitation services. Based on findings, pt is of high complexity, due to medical comorbidities. Pt seen as a co-eval with PT due to the patient's co-morbidities, clinically unstable presentation, and present impairments which are a regression from the patient's baseline. At this time, OT recommendations at time of discharge are level 2. Goals   Patient Goals use the urinal   Plan   Treatment Interventions ADL retraining;Functional transfer training;UE strengthening/ROM; Endurance training;Cognitive reorientation;Patient/family training;Equipment evaluation/education; Compensatory technique education;Continued evaluation; Energy conservation   Goal Expiration Date 09/17/23   OT Frequency 3-5x/wk Recommendation   UB Rehab Discharge Recommendation (PT/OT) Level 2   AM-PAC Daily Activity Inpatient   Lower Body Dressing 2   Bathing 2   Toileting 3   Upper Body Dressing 3   Grooming 3   Eating 4   Daily Activity Raw Score 17   Daily Activity Standardized Score (Calc for Raw Score >=11) 37.26   AM-PAC Applied Cognition Inpatient   Following a Speech/Presentation 2   Understanding Ordinary Conversation 3   Taking Medications 2   Remembering Where Things Are Placed or Put Away 3   Remembering List of 4-5 Errands 2   Taking Care of Complicated Tasks 2   Applied Cognition Raw Score 14   Applied Cognition Standardized Score 32.02     Pt will achieve the following goals within 10 days. *Pt will complete grooming with independence. *Pt will complete UB bathing and dressing with independence. *Pt will complete LB bathing and dressing with modified independence . *Pt will complete toileting (hygiene and clothing management) with modified independence    *Pt will complete bed mobility with independence, with bed flat and no side rail to prep for purposeful tasks    *Pt will perform functional transfers with modified independence in order to complete ADL routine. *Pt will increase standing tolerance to 3-5 minutes in order to complete ADL routine. *Pt will complete item retrieval and light home management with supervision while demonstrating good safety. *Pt will demonstrate increased activity tolerance in order to complete ADL routine. *Pt will participate in cognitive assessment to determine level of safety for returning home    *Pt will participate in UE therapeutic exercise in order to maximize strength for ADL transfers. *Pt will sit on EOB for 10+ minutes for increased safety with seated activity tolerance during ADL tasks. *Pt will identify 3-5 fall risks to ensure safety upon discharge.     [employer], MS, OTR/L

## 2023-09-07 NOTE — ASSESSMENT & PLAN NOTE
Presents with worsening shortness of breath x2-3 days. (+) Cough  · Currently on 3L. Baseline has 3L prescribed, but only uses O2 about 50% of the time per family at bedside  · Procalcitonin 0.34 (in setting of CKD IV)  · WBC 22  · CT chest- Focal ill-defined right upper lobe consolidation with associated small cavitary space is suspicious for pneumonia. Recommend follow-up to resolution with chest CT in 4 to 6 weeks. · Started on ceftriaxone/azithro, continue same. · Urine strep and legionella negative.

## 2023-09-07 NOTE — PHYSICAL THERAPY NOTE
PHYSICAL THERAPY EVALUATION  NAME: Radha Bell  AGE:   81 y.o. MRN:  [id number]  ADMIT DX: Chest pain [R07.9]  Pneumonia [J18.9]  Pulmonary nodule [R91.1]  Elevated troponin [R77.8]  Renal lesion [N28.9]  Sepsis ([address]) [A41.9]    PMH:   Past Medical History:   Diagnosis Date    Anxiety 2020    COPD (chronic obstructive pulmonary disease) ([address])     Coronary artery disease 2012    Depression 2020    Herpes zoster     Hypertension     Memory loss     possible    Mycoplasma pneumonia     Obesity     Osteoarthritis     Renal calculi      LENGTH OF STAY: 1       09/07/23 1116   PT Last Visit   PT Visit Date 09/07/23   Note Type   Note type Evaluation   Pain Assessment   Pain Assessment Tool 0-10   Pain Score No Pain   Restrictions/Precautions   Weight Bearing Precautions Per Order No   Other Precautions Chair Alarm; Bed Alarm; Fall Risk;O2  (2 L O2 NC)   Home Living   Type of [address] [place] [address]  One level  (1+1 BOOGIE)   Bathroom Shower/Tub   (spongebathes independently at baseline)   [place]  (uses a urinal at home)   Additional Comments Pt sleeps in a recliner chair. Ambulates with mod I and RW at baseline. On 3 L O2 NC at home. Prior Function   Level of [place] Needs assistance with ADLs; Independent with functional mobility; Needs assistance with IADLS   Lives With Spouse   Receives Help From Family   IADLs Family/Friend/Other provides transportation; Family/Friend/Other provides meals; Family/Friend/Other provides medication management   Falls in the last 6 months 0   General   Family/Caregiver Present Yes   Cognition   Overall Cognitive Status Impaired   Arousal/Participation Cooperative   Attention Difficulty attending to directions   Orientation Level Oriented to person;Oriented to place   Memory Decreased recall of precautions;Decreased recall of recent events;Decreased short term memory   Following Commands Follows one step commands with increased time or repetition   Comments Pt identified by name and . Subjective   Subjective Agrees to PT evaluation and is cooperative throughout session. ("You have to ask my wife, I'm having trouble remembering.")   RLE Assessment   RLE Assessment X   Strength RLE   RLE Overall Strength 4-/5  (functionally)   LLE Assessment   LLE Assessment X   Strength LLE   LLE Overall Strength 4-/5  (functionally)   Bed Mobility   Supine to Sit 4  Minimal assistance   Additional items Assist x 1;HOB elevated; Bedrails; Increased time required;Verbal cues   Sit to Supine Unable to assess  (left OOB in chair post session with alarm intact)   Transfers   Sit to Stand 4  Minimal assistance   Additional items Assist x 1; Increased time required;Verbal cues;Armrests   Stand to Sit 4  Minimal assistance   Additional items Assist x 1; Armrests; Increased time required;Verbal cues   Stand pivot 4  Minimal assistance   Additional items Assist x 1; Increased time required;Verbal cues  (with RW)   Additional Comments limited due to SOB and fatigue, pt declines further ambulation at this time   Balance   Static Sitting Fair +   Dynamic Sitting Fair   Static Standing Fair -   Dynamic Standing Fair -   Ambulatory Poor +   Endurance Deficit   Endurance Deficit Yes   Endurance Deficit Description limited ambulation/standing tolerance, fatigue/SOB   Activity Tolerance   Activity Tolerance Patient limited by fatigue   Medical Staff Made Aware OT [place]   Nurse Made Aware Per RN, pt appropriate to evaluate   Assessment   Problem List Decreased strength;Decreased endurance; Impaired balance;Decreased mobility; Decreased safety awareness;Decreased cognition; Impaired judgement;Obesity   Assessment Pt is a 81 y.o. male seen for PT evaluation s/p admit to [address] [place] [address] on 2022 w/ Pneumonia. Order placed for PT. Comorbidities affecting pt's physical performance at time of assessment include: DM, HTN, COPD and CHF.  Personal factors affecting pt at time of IE include: limited home support, advanced age, inability to perform ADLs, inability to ambulate household distances and limited insight into impairments. Prior to admission, pt was was independent w/ all functional mobility w/ RW, lived in one floor environment, had 1+1 BOOGIE (-) railing and lived with wife. Upon evaluation: Pt requires min A for bed mobility, min A for sit to stand, and min A for steppage transfer with RW. Limited due to SOB/fatigue. (Please find full objective findings from PT assessment regarding body systems outlined above). Impairments and limitations also listed above, especially due to  weakness, impaired balance, decreased endurance, decreased activity tolerance, decreased safety awareness, fall risk, SOB upon exertion and decreased cognition. Pt's clinical presentation is currently unstable/unpredictable seen in pt's presentation of fall risk, significant decline in functional mobility compared to baseline, and decreased insight into deficits. Pt to benefit from continued skilled PT tx while in hospital and upon DC to address deficits as defined above and maximize level of functional mobility. Recommend progression of ambulation and initiation of HEP as appropriate. Goals   Patient Goals to use the urinal   STG Expiration Date 09/17/23   Short Term Goal #1 Pt will be able to: (1) perform bed mobility with supervision to promote OOB activity (2) perform sit to stand with supervision to decrease burden of care (3) ambulate at least 200` with supervision and least restrictive AD to increase activity tolerance (4) increase standing balance by 1 grade to decrease risk of falls   PT Treatment Day 0   Plan   Treatment/Interventions Functional transfer training;LE strengthening/ROM; Elevations; Therapeutic exercise; Endurance training;Patient/family training;Equipment eval/education; Bed mobility;Gait training;Cognitive reorientation   PT Frequency 3-5x/wk   Recommendation   UB Rehab Discharge Recommendation (PT/OT) Level 2   Equipment Recommended [facility] Recommended Wheeled walker   AM-PAC Basic Mobility Inpatient   Turning in Flat Bed Without Bedrails 3   Lying on Back to Sitting on Edge of Flat Bed Without Bedrails 3   Moving Bed to Chair 3   Standing Up From Chair Using Arms 3   Walk in Room 3   Climb 3-5 Stairs With Railing 2   Basic Mobility Inpatient Raw Score 17   Basic Mobility Standardized Score 39.67   Highest Level Of Mobility   -HLM Goal 5: Stand one or more mins   JH-HLM Achieved 4: Move to chair/commode   End of Consult   Patient Position at End of Consult Bedside chair;Bed/Chair alarm activated; All needs within reach   Pt was seen for a co-eval with OT due to potential need for significant physical assist, poor pain control, impaired mental status, limiting behaviors, and poor adherence to precautions. The patient's AM-PAC Basic Mobility Inpatient Short Form Raw Score is 17, Standardized Score is 39.67. A Raw Score of greater than or equal to 16 suggests the patient may benefit from discharge to home. Adapted from [employer] of AM-PAC “6-Clicks” Basic Mobility and Daily Activity Scores With Discharge Destination. Physical Therapy, 2021;101:1-9.  DOI: [email or web address]      Spring Arvizu PT,DPT

## 2023-09-07 NOTE — PLAN OF CARE
Problem: PAIN - ADULT  Goal: Verbalizes/displays adequate comfort level or baseline comfort level  Description: Interventions:  - Encourage patient to monitor pain and request assistance  - Assess pain using appropriate pain scale  - Administer analgesics based on type and severity of pain and evaluate response  - Implement non-pharmacological measures as appropriate and evaluate response  - Consider cultural and social influences on pain and pain management  - Notify physician/advanced practitioner if interventions unsuccessful or patient reports new pain  9/7/2023 0734 by Janice Martin RN  Outcome: Progressing  9/7/2023 0723 by Janice Martin RN  Outcome: Progressing     Problem: INFECTION - ADULT  Goal: Absence or prevention of progression during hospitalization  Description: INTERVENTIONS:  - Assess and monitor for signs and symptoms of infection  - Monitor lab/diagnostic results  - Monitor all insertion sites, i.e. indwelling lines, tubes, and drains  - Monitor endotracheal if appropriate and nasal secretions for changes in amount and color  - [place] appropriate cooling/warming therapies per order  - Administer medications as ordered  - Instruct and encourage patient and family to use good hand hygiene technique  - Identify and instruct in appropriate isolation precautions for identified infection/condition  9/7/2023 0734 by Janice Martin RN  Outcome: Progressing  9/7/2023 0723 by Janice Martin RN  Outcome: Progressing  Goal: Absence of fever/infection during neutropenic period  Description: INTERVENTIONS:  - Monitor WBC    9/7/2023 0734 by Janice Martin RN  Outcome: Progressing  9/7/2023 0723 by Janice Martin RN  Outcome: Progressing

## 2023-09-07 NOTE — ASSESSMENT & PLAN NOTE
· SIRS: Tachycardia, tachypnea, leukocytosis on admission. In setting of pneumonia. · Procalcitonin 0.34, Lactic 1.3  · On ceftriaxone/azithromycin  · Blood cultures pending.

## 2023-09-07 NOTE — UTILIZATION REVIEW
Initial Clinical Review    Admission: Date/Time/Statement:   Admission Orders (From admission, onward)     Ordered        09/06/23 1557  INPATIENT ADMISSION  Once                      Orders Placed This Encounter   Procedures   • INPATIENT ADMISSION     Standing Status:   Standing     Number of Occurrences:   1     Order Specific Question:   Level of Care     Answer:   Med Surg [16]     Order Specific Question:   Estimated length of stay     Answer:   More than 2 Midnights     Order Specific Question:   Certification     Answer:   I certify that inpatient services are medically necessary for this patient for a duration of greater than two midnights. See H&P and MD Progress Notes for additional information about the patient's course of treatment. ED Arrival Information     Expected   -    Arrival   9/6/2023 13:17    Acuity   Urgent            Means of arrival   Ambulance    Escorted by   Dipika Hernandez [facility]    Service   Hospitalist    Admission type   Emergency            Arrival complaint   Chest pain              Chief Complaint   Patient presents with   • Chest Pain     Wife called EMS due to patient having complaints of chest pain and shortness of breath. Patient arrived via EMS on 3L NC (which he normally wears at home). Initial Presentation: 81 y.o. male to ED for worsening shortness of breath x2 days. On O2 at home, but only utilizing approximately 50% of the time. States that he does have some chronic shortness of breath, however has been having some significant acute shortness of breath for last 2 days, particularly on exertion. Recently  transition from Trelegy to Perforomist and Pulmicort nebulizers as he was unable to properly use Trelegy, however pt was unable to  Perforomist due to [facility] not having it in stock and has been only utilizing Pulmicort nebulizers last week. Has a nonproductive cough. CT showed a right upper lobe solid Dacian consistent with pneumonia.   Procalcitonin also mildly elevated, although in setting of CKD stage IV. Started Iv antibiotics and Iv Steriod. PMH for HTN, CKD, T2DM, CHF, COPD. Admit Inpatient level of care for Pneumonia, Sepsis, COPD with acute exacerbation. Currently on O2 3L NC. SIR; Tachycardia, tachypnea, leukocytosis. Baseline on 3L, but only uses O2 about 50% of the time per family at bedside. Wbc 22. Iv antibiotics. Bld urine. Check urine strep and legionella. Start xoponex/atrovent, continue budesonide. Speech therapy. On exam; diminished breath sounds, tachypneic. CT chest- Focal ill-defined right upper lobe consolidation with associated small cavitary space is suspicious for pneumonia. Recommend follow-up to resolution with chest CT in 4 to 6 weeks. Date: 9/7  Day 2:  Progress notes; Continues on Iv antibiotics. Bld cultures pending. Continue Perforomist, continue budesonide. Hold off on Iv Steroid. Pt c/o mild discomfort in abdomen. Date: 9/8    Day 3: Has surpassed a 2nd midnight with active treatments and services, which include continued Iv antibiotics, Bld and other cultures pending.        ED Triage Vitals [09/06/23 1320]   Temperature Pulse Respirations Blood Pressure SpO2   97.9 °F (36.6 °C) 87 18 94/58 100 %      Temp Source Heart Rate Source Patient Position - Orthostatic VS BP Location FiO2 (%)   Oral Monitor Lying Right arm --      Pain Score       1          Wt Readings from Last 1 Encounters:   09/07/23 111 kg (244 lb 11.4 oz)     Additional Vital Signs:   09/07/23 0802 -- -- -- -- -- 98 % 28 2 L/min Nasal cannula --   09/07/23 02:17:56 -- 94 20 130/81 97 94 % 32 3 L/min Nasal cannula Sitting   09/06/23 20:20:13 97.9 °F (36.6 °C) -- 20 127/78 94 -- -- -- -- --   09/06/23 20:19:45 97.9 °F (36.6 °C) -- -- 127/78 94 -- -- -- -- --   09/06/23 1900 -- -- -- -- -- 92 % -- -- -- --   09/06/23 16:41:07 97.3 °F (36.3 °C)   Abnormal  92 15 102/66 78 98 % -- -- -- --   09/06/23 1600 -- 91 20 106/62 78 98 % 32 3 L/min Nasal cannula Sitting 09/06/23 1530 -- 92 20 112/66 83 99 % 32 3 L/min Nasal cannula Sitting   09/06/23 1500 -- 94 20 120/62 85 97 % -- -- Nasal cannula Lying     Pertinent Labs/Diagnostic Test Results:   CT chest abdomen pelvis wo contrast   Final Result by Karel Al MD (09/06 1538)      1. Focal ill-defined right upper lobe consolidation with associated small cavitary space is suspicious for pneumonia. Recommend follow-up to resolution with chest CT in 4 to 6 weeks. 2.  New 4 mm right upper lobe nodule. Attention on follow-up. 3.  Emphysema. 4.  Cholelithiasis without evidence of acute cholecystitis. 5.  Hepatic steatosis. 6.  Intermediate density partially exophytic lesion arising from the upper pole right kidney. Cannot distinguish hyperdense cyst from solid neoplasm on this noncontrast exam. Recommend further evaluation with renal ultrasound. The study was marked in [facility] for immediate notification.                Workstation performed: [id number]         XR chest 1 view portable   Final Result by Lenore Boas, MD (09/06 1357)      Development of subpleural opacity in the lateral right midlung which could represent a mass or early infiltrate               Workstation performed: [id number]           Results from last 7 days   Lab Units 09/06/23  1337   SARS-COV-2  Negative     Results from last 7 days   Lab Units 09/07/23  0257 09/06/23  1333   WBC Thousand/uL 17.69* 21.66*   HEMOGLOBIN g/dL 13.1 14.5   HEMATOCRIT % 39.9 44.2   PLATELETS Thousands/uL 181 226   NEUTROS ABS Thousands/µL 16.68* 19.11*         Results from last 7 days   Lab Units 09/07/23  0257 09/06/23  1333   SODIUM mmol/L 133* 133*   POTASSIUM mmol/L 2.9* 3.3*   CHLORIDE mmol/L 94* 91*   CO2 mmol/L 23 27   ANION GAP mmol/L 16 15   BUN mg/dL 110* 112*   CREATININE mg/dL 2.74* 2.95*   EGFR ml/min/1.73sq m 19 18   CALCIUM mg/dL 9.3 9.7     Results from last 7 days   Lab Units 09/06/23  1333   AST U/L 14   ALT U/L 10   ALK PHOS U/L 72 TOTAL PROTEIN g/dL 7.5   ALBUMIN g/dL 4.1   TOTAL BILIRUBIN mg/dL 0.76     Results from last 7 days   Lab Units 09/07/23  1102 09/07/23  0703 09/06/23  2022 09/06/23  1645   POC GLUCOSE mg/dl 161* 174* 209* 129     Results from last 7 days   Lab Units 09/07/23  0257 09/06/23  1333   GLUCOSE RANDOM mg/dL 183* 151*       Results from last 7 days   Lab Units 09/06/23  1852 09/06/23  1547 09/06/23  1333   HS TNI 0HR ng/L  --   --  221*   HS TNI 2HR ng/L  --  183*  --    HSTNI D2 ng/L  --  -38  --    HS TNI 4HR ng/L 138*  --   --    HSTNI D4 ng/L -83  --   --                  Results from last 7 days   Lab Units 09/07/23  0257 09/06/23  1424   PROCALCITONIN ng/ml 0.55* 0.34*     Results from last 7 days   Lab Units 09/06/23  1424   LACTIC ACID mmol/L 1.3             Results from last 7 days   Lab Units 09/06/23  1333   BNP pg/mL 103*       Results from last 7 days   Lab Units 09/06/23  1519   CLARITY UA  Clear   COLOR UA  Yellow   SPEC GRAV UA  1.015   PH UA  5.0   GLUCOSE UA mg/dl Negative   KETONES UA mg/dl Negative   BLOOD UA  Negative   PROTEIN UA mg/dl Negative   NITRITE UA  Negative   BILIRUBIN UA  Negative   UROBILINOGEN UA (BE) mg/dl <2.0   LEUKOCYTES UA  Negative     Results from last 7 days   Lab Units 09/06/23  1815 09/06/23  1337   STREP PNEUMONIAE ANTIGEN, URINE  Negative  --    LEGIONELLA URINARY ANTIGEN  Negative  --    INFLUENZA A PCR   --  Negative   INFLUENZA B PCR   --  Negative   RSV PCR   --  Negative       Results from last 7 days   Lab Units 09/06/23  1449 09/06/23  1424   BLOOD CULTURE  Received in Microbiology Lab. Culture in Progress. Received in Microbiology Lab. Culture in Progress.        ED Treatment:   Medication Administration from 09/06/2023 1317 to 09/06/2023 1636       Date/Time Order Dose Route Action     09/06/2023 1334 EDT ipratropium-albuterol (DUO-NEB) 0.5-2.5 mg/3 mL inhalation solution 3 mL 3 mL Nebulization Given     09/06/2023 1407 EDT sodium chloride 0.9 % bolus 500 mL 500 mL Intravenous New Bag     09/06/2023 1453 EDT cefTRIAXone (ROCEPHIN) IVPB (premix in dextrose) 1,000 mg 50 mL 1,000 mg Intravenous New Bag     09/06/2023 1521 EDT azithromycin (ZITHROMAX) 500 mg in sodium chloride 0.9% 250mL IVPB 500 mg 500 mg Intravenous New Bag     09/06/2023 1453 EDT potassium chloride (K-DUR,KLOR-CON) CR tablet 20 mEq 20 mEq Oral Given     09/06/2023 1521 EDT methylPREDNISolone sodium succinate (Solu-MEDROL) injection 80 mg 80 mg Intravenous Given        Past Medical History:   Diagnosis Date   • Anxiety 2020   • COPD (chronic obstructive pulmonary disease) ([address] )    • Coronary artery disease 2012   • Depression 2020   • Herpes zoster    • Hypertension    • Memory loss     possible   • Mycoplasma pneumonia    • Obesity    • Osteoarthritis    • Renal calculi      Present on Admission:  • CKD (chronic kidney disease) stage 4, GFR 15-29 ml/min ([facility])  • COPD with acute exacerbation ([facility])  • Chronic diastolic congestive heart failure ([facility])  • Benign prostatic hyperplasia with urinary hesitancy  • Essential hypertension  • Pulmonary hypertension ([facility])  • Type 2 diabetes mellitus with diabetic neuropathy, without long-term current use of insulin ([facility])      Admitting Diagnosis: Chest pain [R07.9]  Pneumonia [J18.9]  Pulmonary nodule [R91.1]  Elevated troponin [R77.8]  Renal lesion [N28.9]  Sepsis ([address]) [A41.9]  Age/Sex: 81 y.o. male     Admission Orders:  Scheduled Medications:  allopurinol, 200 mg, Oral, Daily  aspirin, 300 mg, Rectal, Once  azithromycin, 500 mg, Intravenous, Q24H  budesonide, 0.5 mg, Nebulization, BID  bumetanide, 2 mg, Oral, BID  calcitriol, 0.25 mcg, Oral, Daily  cefTRIAXone, 2,000 mg, Intravenous, Q24H  fluticasone, 1 spray, Each Nare, Daily  formoterol, 20 mcg, Nebulization, Q12H  guaiFENesin, 600 mg, Oral, BID  heparin (porcine), 5,000 Units, Subcutaneous, Q8H [address]  insulin lispro, 1-6 Units, Subcutaneous, 4x Daily (AC & HS)  labetalol, 200 mg, Oral, BID  [START ON 9/8/2023] metolazone, 2.5 mg, Oral, Once per day on Mon Wed Fri  potassium chloride, 20 mEq, Oral, BID  potassium chloride, 40 mEq, Oral, Q2H  sertraline, 50 mg, Oral, QAM  tamsulosin, 0.4 mg, Oral, Daily With Dinner  triamcinolone, , Topical, BID      Continuous IV Infusions: None     PRN Meds:  acetaminophen, 650 mg, Oral, Q6H PRN  albuterol, 2.5 mg, Nebulization, Q4H PRN  aluminum-magnesium hydroxide-simethicone, 30 mL, Oral, Q6H PRN  senna, 1 tablet, Oral, Daily PRN  sodium chloride (PF), 3 mL, Intravenous, Q1H PRN  traMADol, 50 mg, Oral, BID PRN        None    Network Utilization Review Department  ATTENTION: Please call with any questions or concerns to [phone] and carefully listen to the prompts so that you are directed to the right person. All voicemails are confidential.  Celestina Mackenzie all requests for admission clinical reviews, approved or denied determinations and any other requests to dedicated fax number below belonging to the campus where the patient is receiving treatment.  List of dedicated fax numbers for the Facilities:  [place] DENIALS (Administrative/Medical Necessity) [phone]   [address] (Maternity/NICU/Pediatrics) [phone]   [address] [place] [address] [phone]   [facility] [phone]   [address] [phone]   [address] [place] [address]  [facility] [phone]

## 2023-09-07 NOTE — ASSESSMENT & PLAN NOTE
Wt Readings from Last 3 Encounters:   09/07/23 111 kg (244 lb 11.4 oz)   09/01/23 115 kg (254 lb)   08/31/23 113 kg (250 lb)     Appears close to euvolemia  Continue Bumex daily and metolazone MWF. Monitor daily weights. Monitor daily ins and outs.

## 2023-09-08 ENCOUNTER — APPOINTMENT (INPATIENT)
Dept: ULTRASOUND IMAGING | Facility: HOSPITAL | Age: 88
DRG: 871 | End: 2023-09-08
Payer: COMMERCIAL

## 2023-09-08 PROBLEM — R91.1 LUNG NODULE: Status: ACTIVE | Noted: 2023-09-08

## 2023-09-08 PROBLEM — R93.89 ABNORMAL CT SCAN: Status: ACTIVE | Noted: 2023-09-08

## 2023-09-08 PROBLEM — R94.31 ABNORMAL EKG: Status: ACTIVE | Noted: 2023-09-08

## 2023-09-08 LAB
ANION GAP SERPL CALCULATED.3IONS-SCNC: 17 MMOL/L
ATRIAL RATE: 87 BPM
ATRIAL RATE: 92 BPM
ATRIAL RATE: 92 BPM
ATRIAL RATE: 96 BPM
BUN SERPL-MCNC: 118 MG/DL (ref 5–25)
CALCIUM SERPL-MCNC: 9.2 MG/DL (ref 8.4–10.2)
CHLORIDE SERPL-SCNC: 97 MMOL/L (ref 96–108)
CO2 SERPL-SCNC: 20 MMOL/L (ref 21–32)
CREAT SERPL-MCNC: 2.76 MG/DL (ref 0.6–1.3)
ERYTHROCYTE [DISTWIDTH] IN BLOOD BY AUTOMATED COUNT: 14 % (ref 11.6–15.1)
GFR SERPL CREATININE-BSD FRML MDRD: 19 ML/MIN/1.73SQ M
GLUCOSE SERPL-MCNC: 168 MG/DL (ref 65–140)
GLUCOSE SERPL-MCNC: 168 MG/DL (ref 65–140)
GLUCOSE SERPL-MCNC: 169 MG/DL (ref 65–140)
GLUCOSE SERPL-MCNC: 185 MG/DL (ref 65–140)
GLUCOSE SERPL-MCNC: 199 MG/DL (ref 65–140)
GLUCOSE SERPL-MCNC: 216 MG/DL (ref 65–140)
HCT VFR BLD AUTO: 41 % (ref 36.5–49.3)
HGB BLD-MCNC: 13.1 G/DL (ref 12–17)
MCH RBC QN AUTO: 28.7 PG (ref 26.8–34.3)
MCHC RBC AUTO-ENTMCNC: 32 G/DL (ref 31.4–37.4)
MCV RBC AUTO: 90 FL (ref 82–98)
P AXIS: 112 DEGREES
P AXIS: 75 DEGREES
P AXIS: 83 DEGREES
P AXIS: 89 DEGREES
PLATELET # BLD AUTO: 182 THOUSANDS/UL (ref 149–390)
PMV BLD AUTO: 11.1 FL (ref 8.9–12.7)
POTASSIUM SERPL-SCNC: 3.2 MMOL/L (ref 3.5–5.3)
PR INTERVAL: 170 MS
PR INTERVAL: 174 MS
PR INTERVAL: 176 MS
PR INTERVAL: 188 MS
QRS AXIS: 25 DEGREES
QRS AXIS: 42 DEGREES
QRSD INTERVAL: 108 MS
QRSD INTERVAL: 88 MS
QRSD INTERVAL: 90 MS
QRSD INTERVAL: 98 MS
QT INTERVAL: 412 MS
QT INTERVAL: 450 MS
QT INTERVAL: 464 MS
QT INTERVAL: 484 MS
QTC INTERVAL: 509 MS
QTC INTERVAL: 558 MS
QTC INTERVAL: 568 MS
QTC INTERVAL: 598 MS
RBC # BLD AUTO: 4.56 MILLION/UL (ref 3.88–5.62)
SODIUM SERPL-SCNC: 134 MMOL/L (ref 135–147)
T WAVE AXIS: 44 DEGREES
T WAVE AXIS: 46 DEGREES
T WAVE AXIS: 67 DEGREES
T WAVE AXIS: 76 DEGREES
VENTRICULAR RATE: 87 BPM
VENTRICULAR RATE: 92 BPM
VENTRICULAR RATE: 92 BPM
VENTRICULAR RATE: 96 BPM
WBC # BLD AUTO: 20.87 THOUSAND/UL (ref 4.31–10.16)

## 2023-09-08 PROCEDURE — 76775 US EXAM ABDO BACK WALL LIM: CPT

## 2023-09-08 PROCEDURE — 85027 COMPLETE CBC AUTOMATED: CPT | Performed by: STUDENT IN AN ORGANIZED HEALTH CARE EDUCATION/TRAINING PROGRAM

## 2023-09-08 PROCEDURE — 93010 ELECTROCARDIOGRAM REPORT: CPT | Performed by: INTERNAL MEDICINE

## 2023-09-08 PROCEDURE — 99232 SBSQ HOSP IP/OBS MODERATE 35: CPT | Performed by: NURSE PRACTITIONER

## 2023-09-08 PROCEDURE — 97116 GAIT TRAINING THERAPY: CPT

## 2023-09-08 PROCEDURE — 94760 N-INVAS EAR/PLS OXIMETRY 1: CPT

## 2023-09-08 PROCEDURE — 80048 BASIC METABOLIC PNL TOTAL CA: CPT | Performed by: STUDENT IN AN ORGANIZED HEALTH CARE EDUCATION/TRAINING PROGRAM

## 2023-09-08 PROCEDURE — 97530 THERAPEUTIC ACTIVITIES: CPT

## 2023-09-08 PROCEDURE — 94640 AIRWAY INHALATION TREATMENT: CPT

## 2023-09-08 PROCEDURE — 82948 REAGENT STRIP/BLOOD GLUCOSE: CPT

## 2023-09-08 PROCEDURE — 92526 ORAL FUNCTION THERAPY: CPT

## 2023-09-08 PROCEDURE — 93005 ELECTROCARDIOGRAM TRACING: CPT

## 2023-09-08 RX ORDER — ONDANSETRON 2 MG/ML
4 INJECTION INTRAMUSCULAR; INTRAVENOUS EVERY 6 HOURS PRN
Status: DISCONTINUED | OUTPATIENT
Start: 2023-09-08 | End: 2023-09-08

## 2023-09-08 RX ORDER — POTASSIUM CHLORIDE 20 MEQ/1
20 TABLET, EXTENDED RELEASE ORAL ONCE
Status: COMPLETED | OUTPATIENT
Start: 2023-09-08 | End: 2023-09-08

## 2023-09-08 RX ORDER — NYSTATIN 100000 [USP'U]/G
POWDER TOPICAL 2 TIMES DAILY
Status: DISCONTINUED | OUTPATIENT
Start: 2023-09-08 | End: 2023-09-15 | Stop reason: HOSPADM

## 2023-09-08 RX ADMIN — HEPARIN SODIUM 5000 UNITS: 5000 INJECTION INTRAVENOUS; SUBCUTANEOUS at 21:48

## 2023-09-08 RX ADMIN — TRIAMCINOLONE ACETONIDE: 1 CREAM TOPICAL at 09:21

## 2023-09-08 RX ADMIN — INSULIN LISPRO 2 UNITS: 100 INJECTION, SOLUTION INTRAVENOUS; SUBCUTANEOUS at 09:19

## 2023-09-08 RX ADMIN — BUDESONIDE 0.5 MG: 0.5 INHALANT ORAL at 19:40

## 2023-09-08 RX ADMIN — SERTRALINE HYDROCHLORIDE 50 MG: 50 TABLET ORAL at 09:21

## 2023-09-08 RX ADMIN — CALCITRIOL CAPSULES 0.25 MCG 0.25 MCG: 0.25 CAPSULE ORAL at 09:18

## 2023-09-08 RX ADMIN — TRIAMCINOLONE ACETONIDE: 1 CREAM TOPICAL at 17:40

## 2023-09-08 RX ADMIN — HEPARIN SODIUM 5000 UNITS: 5000 INJECTION INTRAVENOUS; SUBCUTANEOUS at 05:22

## 2023-09-08 RX ADMIN — BUMETANIDE 2 MG: 1 TABLET ORAL at 17:36

## 2023-09-08 RX ADMIN — LABETALOL HYDROCHLORIDE 200 MG: 200 TABLET, FILM COATED ORAL at 09:20

## 2023-09-08 RX ADMIN — BUMETANIDE 2 MG: 1 TABLET ORAL at 09:18

## 2023-09-08 RX ADMIN — ALLOPURINOL 200 MG: 100 TABLET ORAL at 09:17

## 2023-09-08 RX ADMIN — NYSTATIN: 100000 POWDER TOPICAL at 17:40

## 2023-09-08 RX ADMIN — FORMOTEROL FUMARATE DIHYDRATE 20 MCG: 20 SOLUTION RESPIRATORY (INHALATION) at 07:14

## 2023-09-08 RX ADMIN — INSULIN LISPRO 2 UNITS: 100 INJECTION, SOLUTION INTRAVENOUS; SUBCUTANEOUS at 17:41

## 2023-09-08 RX ADMIN — CEFTRIAXONE 2000 MG: 2 INJECTION, SOLUTION INTRAVENOUS at 14:16

## 2023-09-08 RX ADMIN — INSULIN LISPRO 1 UNITS: 100 INJECTION, SOLUTION INTRAVENOUS; SUBCUTANEOUS at 14:14

## 2023-09-08 RX ADMIN — POTASSIUM CHLORIDE 20 MEQ: 1500 TABLET, EXTENDED RELEASE ORAL at 09:20

## 2023-09-08 RX ADMIN — LABETALOL HYDROCHLORIDE 200 MG: 200 TABLET, FILM COATED ORAL at 17:39

## 2023-09-08 RX ADMIN — GUAIFENESIN 600 MG: 600 TABLET ORAL at 09:21

## 2023-09-08 RX ADMIN — FLUTICASONE PROPIONATE 1 SPRAY: 50 SPRAY, METERED NASAL at 09:19

## 2023-09-08 RX ADMIN — NYSTATIN: 100000 POWDER TOPICAL at 14:13

## 2023-09-08 RX ADMIN — BUDESONIDE 0.5 MG: 0.5 INHALANT ORAL at 07:14

## 2023-09-08 RX ADMIN — POTASSIUM CHLORIDE 20 MEQ: 1500 TABLET, EXTENDED RELEASE ORAL at 14:13

## 2023-09-08 RX ADMIN — FORMOTEROL FUMARATE DIHYDRATE 20 MCG: 20 SOLUTION RESPIRATORY (INHALATION) at 19:40

## 2023-09-08 RX ADMIN — METOLAZONE 2.5 MG: 2.5 TABLET ORAL at 09:23

## 2023-09-08 RX ADMIN — HEPARIN SODIUM 5000 UNITS: 5000 INJECTION INTRAVENOUS; SUBCUTANEOUS at 14:14

## 2023-09-08 RX ADMIN — INSULIN LISPRO 1 UNITS: 100 INJECTION, SOLUTION INTRAVENOUS; SUBCUTANEOUS at 21:48

## 2023-09-08 RX ADMIN — TAMSULOSIN HYDROCHLORIDE 0.4 MG: 0.4 CAPSULE ORAL at 17:39

## 2023-09-08 NOTE — CASE MANAGEMENT
Case Management Discharge Planning Note    Patient name Jacqueline De La Cruz  Location /-65 MRN [id number]  : 1935 Date 2023       Current Admission Date: 2023  Current Admission Diagnosis:Pneumonia   Patient Active Problem List    Diagnosis Date Noted   • Abnormal EKG 2023   • Abnormal CT scan 2023   • Lung nodule 2023   • Pneumonia 2023   • Sepsis ([address]) 2023   • Pulmonary hypertension ([address]) 2023   • Secondary hyperparathyroidism of renal origin ([address]) 2023   • Hypokalemia 2023   • Heart failure with preserved ejection fraction ([address]) 2023   • Continuous opioid dependence ([address]) 2023   • Chronic rhinitis 2023   • Venous stasis ulcer of other part of right lower leg limited to breakdown of skin, unspecified whether varicose veins present ([address]) 2023   • Type 2 diabetes mellitus with diabetic neuropathy, without long-term current use of insulin ([address]) 2023   • Idiopathic chronic gout of right foot without tophus 2022   • Pulmonary emphysema ([address]) 2022   • Memory loss    • Ambulatory dysfunction    • Mild cognitive impairment 2022   • Mild episode of recurrent major depressive disorder ([address]) 2022   • Other constipation 2022   • Insomnia due to medical condition 2022   • Chronic diastolic congestive heart failure ([address]) 2022   • CKD (chronic kidney disease) stage 4, GFR 15-29 ml/min ([address]) 03/15/2022   • Benign prostatic hyperplasia with urinary hesitancy 2020   • Moderate COPD (chronic obstructive pulmonary disease) ([address]) 12/10/2019   • Chronic respiratory failure with hypoxia ([address]) 11/15/2019   • COPD with acute exacerbation ([address]) 2019   • 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 disease, without long-term current use of insulin ([address]) 2016   • Essential hypertension 2015   • CHI (obstructive sleep apnea) 2014      LOS (days): 2  Geometric Mean LOS (GMLOS) (days): 5.00  Days to GMLOS:3     OBJECTIVE:  Risk of Unplanned Readmission Score: 28.14         Current admission status: Inpatient   Preferred Pharmacy:   [id number] [place] [address]  Phone: [phone] Fax: [phone]    Primary Care Provider: Iraida Garcia MD    Primary Insurance: [facility]  Secondary Insurance:     DISCHARGE DETAILS:  Met with patient and his wife Che Dale, provided update on patient's progress in PT?OT. Che Dale feels she can have patient return home and [facility] following for PT/SN. She will also be using a private pay [address] who was to start services on Saturday. Patient has a RW and Home O2. His wife will transport him home. Was further informed by Che Dale , she was able to secure  Perforomist from her pharmacy and it was in their refrigerator  at home.

## 2023-09-08 NOTE — ASSESSMENT & PLAN NOTE
Lab Results   Component Value Date    HGBA1C 6.3 (H) 05/02/2023       Recent Labs     09/07/23  1102 09/07/23  1612 09/07/23  2120 09/08/23  0729   POCGLU 161* 158* 154* 199*     Home Regimen: Glipizide 5 mg twice daily, will hold for now.    · SSI  · Diabetic diet  · Monitor Accu-Cheks and adjust regimen as needed

## 2023-09-08 NOTE — ASSESSMENT & PLAN NOTE
Wt Readings from Last 3 Encounters:   09/08/23 111 kg (245 lb 2.4 oz)   09/01/23 115 kg (254 lb)   08/31/23 113 kg (250 lb)     Volume status stable. · Continue labetalol and Bumex daily as well as metolazone MWF.   · Low-sodium diet, daily weights, I's and O's  · Monitor volume status

## 2023-09-08 NOTE — PROGRESS NOTES
[address] [facility]  Progress Note  Name: Mansi Linda  MRN: [id number]  Unit/Bed#: -01 I Date of Admission: 9/6/2023   Date of Service: 9/8/2023 I Hospital Day: 2    Assessment/Plan   * Pneumonia  Assessment & Plan  Presents with worsening shortness of breath x2-3 days. (+) Cough. Met sepsis criteria as below. · CT chest- Focal ill-defined right upper lobe consolidation with associated small cavitary space is suspicious for pneumonia. Recommend follow-up to resolution with chest CT in 4 to 6 weeks. · Urine strep and legionella negative. · BC NGTD, follow. · Continue IV ceftriaxone, discontinue azithromycin. · Trend procal.  Remains afebrile, slight worsening in leukocytosis today however received steroids in the ED. We will trend for now. · Supportive cares  · PT/OT recommending home with [facility]. CM following. Sepsis ([address])  Assessment & Plan  POA as evidenced Tachycardia, tachypnea, leukocytosis on admission in setting of PNA  · Plan as above     COPD with acute exacerbation ([address])  Assessment & Plan  COPD with possible mild exacerbation on admission in setting of PNA and inability to obtain nebulizer. · Recently switched to Performist and Budenoside nebs. Performist was not stocked and pt has only been utilizing Budesonide for last week. · Started on Perforomist, continue budesonide. · Give IV steroid on admission--Can hold off on further for now given hyperglycemia, active infx, mild exacerbation, stable O2 requirements and no wheezing on exam.   · CM consult for nebulizer to ensure he has appropriate meds on discharge     Chronic respiratory failure with hypoxia (HCC)  Assessment & Plan  · On 3L at baseline, stable.      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 Baseline creatinine 2.4-2.7. Currently stable. · Avoid nephrotoxins and hypotension  · BMP in AM      Chronic diastolic congestive heart failure (HCC)  Assessment & Plan  Wt Readings from Last 3 Encounters:   09/08/23 111 kg (245 lb 2.4 oz)   09/01/23 115 kg (254 lb)   08/31/23 113 kg (250 lb)     Volume status stable. · Continue labetalol and Bumex daily as well as metolazone MWF. · Low-sodium diet, daily weights, I's and O's  · Monitor volume status         Abnormal EKG  Assessment & Plan  QTc 568 on admission. · Avoid QTc prolonging agents  · Optimize electrolytes  · Repeat EKG  · Monitor    Benign prostatic hyperplasia with urinary hesitancy  Assessment & Plan  · Continue Flomax    Essential hypertension  Assessment & Plan  Blood pressure acceptable. · Continue labetalol and diuretics  · Monitor      Abnormal CT scan  Assessment & Plan  Intermediate density partially exophytic lesion arising from the upper pole right kidney. Cannot distinguish hyperdense cyst from solid neoplasm on this noncontrast exam.   · Recommend further evaluation with renal ultrasound, ordered and pending. Pulmonary hypertension ([address])  Assessment & Plan  · On chronic O2 as above. Type 2 diabetes mellitus with diabetic neuropathy, without long-term current use of insulin [facility])  Assessment & Plan  Lab Results   Component Value Date    HGBA1C 6.3 (H) 05/02/2023       Recent Labs     09/07/23  1102 09/07/23  1612 09/07/23  2120 09/08/23  0729   POCGLU 161* 158* 154* 199*     Home Regimen: Glipizide 5 mg twice daily, will hold for now. · SSI  · Diabetic diet  · Monitor Accu-Cheks and adjust regimen as needed    Lung nodule  Assessment & Plan  New nodule noted on CT C/A/P  · Outpatient f/u           VTE Pharmacologic Prophylaxis: VTE Score: 8 Moderate Risk (Score 3-4) - Pharmacological DVT Prophylaxis Ordered: heparin. Patient Centered Rounds:  I performed bedside rounds with nursing staff today. Discussions with Specialists or Other Care Team Provider: nursing, case management     Education and Discussions with Family / Patient: Updated  (wife) at bedside. Total Time Spent on Date of Encounter in care of patient: 35 minutes This time was spent on one or more of the following: performing physical exam; counseling and coordination of care; obtaining or reviewing history; documenting in the medical record; reviewing/ordering tests, medications or procedures; communicating with other healthcare professionals and discussing with patient's family/caregivers. Current Length of Stay: 2 day(s)    Current Patient Status: Inpatient     Certification Statement: The patient will continue to require additional inpatient hospital stay due to IV abx, monitor respiratory status     Discharge Plan: Anticipate discharge in 48-72 hrs to home with home services. Code Status: Level 3 - DNAR and DNI    Subjective:   Complains of intermittent dry heaving and nausea, no vomiting. No fevers, chills. Complains of poor appetite. Had bowel movement today, has been passing gas. Also complains of groin rash which appears fungal.    Objective:     Vitals:   Temp (24hrs), Av.3 °F (36.3 °C), Min:97.2 °F (36.2 °C), Max:97.3 °F (36.3 °C)    Temp:  [97.2 °F (36.2 °C)-97.3 °F (36.3 °C)] 97.2 °F (36.2 °C)  HR:  [87-92] 87  Resp:  [16-17] 17  BP: (116-121)/(57-65) 121/57  SpO2:  [93 %-96 %] 93 %  Body mass index is 34.19 kg/m². Input and Output Summary (last 24 hours): Intake/Output Summary (Last 24 hours) at 2023 1046  Last data filed at 2023 0901  Gross per 24 hour   Intake 580 ml   Output 1100 ml   Net -520 ml       Physical Exam:   Physical Exam  Vitals and nursing note reviewed. Constitutional:       General: He is not in acute distress. Appearance: He is obese. Interventions: Nasal cannula in place.    Cardiovascular:      Rate and Rhythm: Normal rate. Pulmonary:      Breath sounds: Decreased breath sounds and rhonchi present. No wheezing or rales. Abdominal:      General: Bowel sounds are normal.      Palpations: Abdomen is soft. Tenderness: There is no abdominal tenderness. Musculoskeletal:         General: Swelling present. Skin:     General: Skin is warm. Coloration: Skin is pale. Findings: Rash (fungal rash to b/l groin) present. Neurological:      Mental Status: He is alert and oriented to person, place, and time. Mental status is at baseline. Psychiatric:         Mood and Affect: Mood normal.          Additional Data:     Labs:  Results from last 7 days   Lab Units 09/08/23  0512 09/07/23 0257   WBC Thousand/uL 20.87* 17.69*   HEMOGLOBIN g/dL 13.1 13.1   HEMATOCRIT % 41.0 39.9   PLATELETS Thousands/uL 182 181   NEUTROS PCT %  --  94*   LYMPHS PCT %  --  3*   MONOS PCT %  --  2*   EOS PCT %  --  0     Results from last 7 days   Lab Units 09/08/23  0512 09/07/23 0257 09/06/23  1333   SODIUM mmol/L 134*   < > 133*   POTASSIUM mmol/L 3.2*   < > 3.3*   CHLORIDE mmol/L 97   < > 91*   CO2 mmol/L 20*   < > 27   BUN mg/dL 118*   < > 112*   CREATININE mg/dL 2.76*   < > 2.95*   ANION GAP mmol/L 17   < > 15   CALCIUM mg/dL 9.2   < > 9.7   ALBUMIN g/dL  --   --  4.1   TOTAL BILIRUBIN mg/dL  --   --  0.76   ALK PHOS U/L  --   --  72   ALT U/L  --   --  10   AST U/L  --   --  14   GLUCOSE RANDOM mg/dL 168*   < > 151*    < > = values in this interval not displayed.          Results from last 7 days   Lab Units 09/08/23  0729 09/07/23  2120 09/07/23  1612 09/07/23  1102 09/07/23  0703 09/06/23  2022 09/06/23  1645   POC GLUCOSE mg/dl 199* 154* 158* 161* 174* 209* 129         Results from last 7 days   Lab Units 09/07/23 0257 09/06/23  1424   LACTIC ACID mmol/L  --  1.3   PROCALCITONIN ng/ml 0.55* 0.34*       Lines/Drains:  Invasive Devices     Peripheral Intravenous Line  Duration           Peripheral IV 09/07/23 Right;Ventral (anterior) Forearm 1 day                      Imaging: No pertinent imaging reviewed. Recent Cultures (last 7 days):   Results from last 7 days   Lab Units 09/06/23  1815 09/06/23  1449 09/06/23  1424   BLOOD CULTURE   --  No Growth at 24 hrs. No Growth at 24 hrs.    LEGIONELLA URINARY ANTIGEN  Negative  --   --        Last 24 Hours Medication List:   Current Facility-Administered Medications   Medication Dose Route Frequency Provider Last Rate   • acetaminophen  650 mg Oral Q6H PRN Terrell Comer PA-C     • albuterol  2.5 mg Nebulization Q4H PRN Jabier Gonzáles MD     • allopurinol  200 mg Oral Daily Henryn BAKARI Comer     • aluminum-magnesium hydroxide-simethicone  30 mL Oral Q6H PRN Terrell Comer PA-C     • budesonide  0.5 mg Nebulization BID Henryn BAKARI Comer     • bumetanide  2 mg Oral BID Henryn BAKARI Comer     • calcitriol  0.25 mcg Oral Daily Chuy Dang PA-C     • cefTRIAXone  2,000 mg Intravenous Q24H Chuy Dang PA-C 2,000 mg (09/07/23 1427)   • fluticasone  1 spray Each Nare Daily Terrell Comer PA-C     • formoterol  20 mcg Nebulization Q12H Rayray Rojo MD     • guaiFENesin  600 mg Oral BID Terrell Comer PA-C     • heparin (porcine)  5,000 Units Subcutaneous [facility] Chuy Dang PA-C     • insulin lispro  1-6 Units Subcutaneous 4x Daily (AC & HS) Chuy Lares PA-C     • labetalol  200 mg Oral BID Chuy Lares PA-C     • metolazone  2.5 mg Oral Once per day on Mon Wed Fri Terrell Comer PA-C     • nystatin   Topical BID Sangeeta Lacretia Spittle, CRNP     • potassium chloride  20 mEq Oral BID Terrell Comer PA-C     • potassium chloride  20 mEq Oral Once Sangeeta M FurlanMANGONP     • senna  1 tablet Oral Daily PRN Terrell Comer PA-C     • sertraline  50 mg Oral QAM Chuy Lares PA-C     • sodium chloride (PF)  3 mL Intravenous Q1H PRN Carmen Richard MD     • tamsulosin  0.4 mg Oral Daily With [address] Fu[address] PA-C     • traMADol  50 mg Oral BID PRN Clara Krabbe, PA-C     • triamcinolone   Topical BID Neha Dang PA-C     • trimethobenzamide  100 mg Intramuscular Q6H PRN CARLOS Solis          Today, Patient Was Seen By: CARLOS Connors    **Please Note: This note may have been constructed using a voice recognition system. **

## 2023-09-08 NOTE — PLAN OF CARE
Problem: PHYSICAL THERAPY ADULT  Goal: Performs mobility at highest level of function for planned discharge setting. See evaluation for individualized goals. Description: Treatment/Interventions: Functional transfer training, LE strengthening/ROM, Elevations, Therapeutic exercise, Endurance training, Patient/family training, Equipment eval/education, Bed mobility, Gait training, Cognitive reorientation  Equipment Recommended: Arelis Merlos       See flowsheet documentation for full assessment, interventions and recommendations. Note:    Problem List: Decreased strength, Decreased endurance, Impaired balance, Decreased mobility, Decreased safety awareness, Decreased cognition, Impaired judgement, Obesity  Assessment: Pt seen for PT intervention. Today pt requires supervision for all functional mobility, and is able to ambulate 15 ft. Pt begins to become SOB w/ mobility, however ? if this is truly due to respiratory function or anxiety as pt was reporting anxiety re: mobility and getting SOB quick. Once OOB in recliner chair, pt noted to be mouth breathing, states "well this thing is in my nose!" when told to inhale through the nose. Provided education on NC and led pt through diaphragmatic breathing. Discharge recommendation is for Level 2 resources - if pt's family feels they can care for him at home in his current level of function, then can return home w/ HHPT. However if they feel they cannot, would benefit from STR. See flowsheet documentation for full assessment.

## 2023-09-08 NOTE — SPEECH THERAPY NOTE
PT STATES REFILL FOR CLONAZEPAM WAS DENIED WOULD LIKE TO KNOW WHY   Speech Language/Pathology  Speech-Language Pathology Progress Note      Patient Name: Clotilde De Leon    POKFN'T Date: 9/8/2023      Subjective:  Pt was awake and alert. He was sitting up in chair at bedside. Patient stated "I can't stand that noise" (referring to IV). Objective:  Pt was seen today for dysphagia therapy. Current diet is regular textures and thin liquids. Pt was on room air. Focus of today's session was to maximize PO intake safety and determine if instrumentation is needed. Textures offered today included regular solid and thin liquid via straw. Swallow function:   Bolus retrieval and manipulation was [facility]. Mastication and Formation was timely/effective. Pharyngeal swallow initiation was timely. Hyolaryngeal excursion was adequate. No s/s aspiration occurred during session today. Pt denies concerns for dysphagia/aspiration, denies h/o dysphagia or aspiration. Education reviewed on strategies to optimize swallow safety and s/s aspiration/dysphagia to notify medical team of should they arise. Pt demonstrated understanding and denied questions/concerns at this time. Assessment:  Swallow function is stable with current diet. S/w SLIM provider,  [employer], PAANGÉLICA, regarding potential concerns for aspiration as source of current pna. Per PA, no medical indicators to suggest ongoing concerns for aspiration. S/w RN, RN denies any observed s/s aspiration or dysphagia w/ meals today or PO meds. Plan:  Continue regular and thin liquids. No additional ST f/u needed at this time. Please re consult with any new changes or concerns. If ongoing concerns for aspiration, please re-consult/order MBS. complains of pain/discomfort

## 2023-09-08 NOTE — PHYSICAL THERAPY NOTE
PHYSICAL THERAPY CANCELLATION NOTE    Patient Name: Marium Uriarte  HPRWI'R Date: 9/8/2023 09/08/23 1309   Note Type   Note type Cancelled Session   Cancel Reasons Patient off floor/test   Additional Comments Pt on PT caseload, seen yesterday and currently recommending Level 2 resources, attempted to see per CM request, however pt currently getting echo at bedside.        Emily Swanson, PT, DPT

## 2023-09-08 NOTE — ASSESSMENT & PLAN NOTE
Intermediate density partially exophytic lesion arising from the upper pole right kidney. Cannot distinguish hyperdense cyst from solid neoplasm on this noncontrast exam.   · Recommend further evaluation with renal ultrasound, ordered and pending.

## 2023-09-08 NOTE — ASSESSMENT & PLAN NOTE
POA as evidenced Tachycardia, tachypnea, leukocytosis on admission in setting of PNA  · Plan as above

## 2023-09-08 NOTE — ASSESSMENT & PLAN NOTE
COPD with possible mild exacerbation on admission in setting of PNA and inability to obtain nebulizer. · Recently switched to Performist and Budenoside nebs. Performist was not stocked and pt has only been utilizing Budesonide for last week. · Started on Perforomist, continue budesonide.   · Give IV steroid on admission--Can hold off on further for now given hyperglycemia, active infx, mild exacerbation, stable O2 requirements and no wheezing on exam.   · CM consult for nebulizer to ensure he has appropriate meds on discharge

## 2023-09-08 NOTE — PHYSICAL THERAPY NOTE
PHYSICAL THERAPY TREATMENT NOTE      Patient Name: Matt Echavarria  JGIHV'O Date: 9/8/2023 09/08/23 1415   PT Last Visit   PT Visit Date 09/08/23   Note Type   Note Type Treatment   Pain Assessment   Pain Assessment Tool 0-10   Pain Score No Pain   Restrictions/Precautions   Weight Bearing Precautions Per Order No   Other Precautions Cognitive; Bed Alarm; Chair Alarm;O2;Fall Risk  (2L NC O2)   General   Chart Reviewed Yes   Family/Caregiver Present No   Cognition   Overall Cognitive Status Impaired   Arousal/Participation Alert   Attention Difficulty attending to directions   Orientation Level Oriented to person;Oriented to place   Memory Decreased recall of precautions;Decreased recall of recent events;Decreased short term memory   Following Commands Follows one step commands with increased time or repetition   Comments Pt cooperative, intermittently irritable with therapist   Bed Mobility   Supine to Sit Unable to assess  (Pt already sitting EOB upon arrival)   Transfers   Sit to Stand 5  Supervision   Additional items Assist x 1   Stand to Sit 5  Supervision   Additional items Assist x 1   Additional Comments Pt sat EOB x 8 min w/ supervision prior to mobility   Ambulation/Elevation   Gait pattern Decreased foot clearance; Short stride   Gait Assistance 5  Supervision   Additional items Assist x 1   Assistive Device Rolling walker   Distance 15 ft   Ambulation/Elevation Additional Comments Once OOB in recliner chair, spent time reviewing diaphragmatic breathing.  Pt does not desat <88%, quickly improves >90%   Balance   Static Sitting Fair +   Static Standing Fair -   Ambulatory Fair -   Endurance Deficit   Endurance Deficit Yes   Endurance Deficit Description limited ambulation tolerance   Activity Tolerance   Activity Tolerance Patient limited by fatigue   Medical Staff Made Aware [address] [place] [address] Leticia   Nurse Made Aware RN Alix   Assessment   Problem List Decreased strength;Decreased endurance; Impaired balance;Decreased mobility; Decreased safety awareness;Decreased cognition; Impaired judgement;Obesity   Assessment Pt seen for PT intervention. Today pt requires supervision for all functional mobility, and is able to ambulate 15 ft. Pt begins to become SOB w/ mobility, however ? if this is truly due to respiratory function or anxiety as pt was reporting anxiety re: mobility and getting SOB quick. Once OOB in recliner chair, pt noted to be mouth breathing, states "well this thing is in my nose!" when told to inhale through the nose. Provided education on NC and led pt through diaphragmatic breathing. Discharge recommendation is for Level 2 resources - if pt's family feels they can care for him at home in his current level of function, then can return home w/ HHPT. However if they feel they cannot, would benefit from STR. Goals   Patient Goals to pee   STG Expiration Date 09/17/23   Short Term Goal #1 Pt will be able to: (1) perform bed mobility with supervision to promote OOB activity (2) perform sit to stand with supervision to decrease burden of care (3) ambulate at least 200` with supervision and least restrictive AD to increase activity tolerance (4) increase standing balance by 1 grade to decrease risk of falls   PT Treatment Day 0   Plan   Treatment/Interventions Functional transfer training;LE strengthening/ROM; Elevations; Therapeutic exercise; Endurance training;Cognitive reorientation;Patient/family training;Equipment eval/education; Bed mobility;Gait training   PT Frequency 3-5x/wk   Recommendation   UB Rehab Discharge Recommendation (PT/OT) Level 2  (pending level of A family is able to provide)   Equipment Recommended [address] walker   AM-PAC Basic Mobility Inpatient   Turning in Flat Bed Without Bedrails 3   Lying on Back to Sitting on Edge of Flat Bed Without Bedrails 3   Moving Bed to Chair 3   Standing Up From Chair Using Arms 3   Walk in Room 3   Climb 3-5 Stairs With Railing 2   Basic Mobility Inpatient Raw Score 17   Basic Mobility Standardized Score 39.67   Highest Level Of Mobility   -[facility] Goal 5: Stand one or more mins   Education   Education Provided Mobility training  (diaphragmatic breathing)   Patient Reinforcement needed   End of Consult   Patient Position at End of Consult Bedside chair;Bed/Chair alarm activated; All needs within reach       Pt would continue to benefit from skilled PT during this admission in order to progress patient towards goals to decrease risk of falls and maximize independence.      Can Montemayor, PT, DPT

## 2023-09-08 NOTE — OCCUPATIONAL THERAPY NOTE
Occupational Therapy Treatment Note    Patient Name: Jacqueline CHAUHAN'S Date: 9/8/2023 09/08/23 1400   OT Last Visit   OT Visit Date 09/08/23   Note Type   Note Type Treatment   Pain Assessment   Pain Assessment Tool 0-10   Pain Score No Pain   Restrictions/Precautions   Weight Bearing Precautions Per Order No   Other Precautions Chair Alarm; Bed Alarm; Fall Risk;O2   ADL   Toileting Comments Denies toileting or other ADL needs at this time   Bed Mobility   Additional Comments Seated at EOB upon arrival   Transfers   Sit to Stand 5  Supervision   Stand to Sit 5  Supervision   Stand pivot 4  Minimal assistance   Additional items Assist x 1  (RW)   Functional Mobility   Functional Mobility 4  Minimal assistance  (CGA)   Additional Comments x1   Additional items Rolling walker   Cognition   Overall Cognitive Status Impaired   Arousal/Participation Alert   Attention Difficulty attending to directions   Orientation Level Oriented X4   Memory Decreased recall of precautions;Decreased recall of recent events;Decreased short term memory   Following Commands Follows one step commands with increased time or repetition   Assessment   Assessment Patient participated in Skilled OT session this date with interventions consisting of  functional transfers, functional mobility*,  therapeutic activities to: increase activity tolerance and increase dynamic sit/ stand balance during functional activity  . Patient agreeable to OT treatment session, upon arrival patient was found seated at edge of bed. Treatment session as follows: Pt declines ADL needs. Pt requires SPV/CGA w RW for transfers & ambulation in room. Pt maintains O2 >85% with all activity. Pt also limited by cognition. Patient continues to be functioning below baseline level, occupational performance remains limited secondary to factors listed above and increased risk for falls and injury.   The patient's raw score on the AM-PAC Daily Activity inpatient short Right eye drainage 2-3 days ago. form is 17, standardized score is 37.26, less than 39.4. Patients at this level are likely to benefit from DC to post-acute rehabilitation services. From OT standpoint, recommendation at time of d/c would be level 2. Patient to benefit from continued Occupational Therapy treatment while in the hospital to address deficits as defined above and maximize level of functional independence with ADLs and functional mobility. Pt seen as a co-tx with PT due to the patient's co-morbidities, clinically unstable presentation, and present impairments which are a regression from the patient's baseline. Pt left with call bell in reach, tray table in reach, needs met, chair alarm activated, SCDs on.    Plan   OT Treatment Day 1   Recommendation   UB Rehab Discharge Recommendation (PT/OT) Level 2   Additional Comments  HHC vs STR pending if wife is able to provide for current level of care   AM-PAC Daily Activity Inpatient   Lower Body Dressing 2   Bathing 2   Toileting 3   Upper Body Dressing 3   Grooming 3   Eating 4   Daily Activity Raw Score 17   Daily Activity Standardized Score (Calc for Raw Score >=11) 37.26   AM-PAC Applied Cognition Inpatient   Following a Speech/Presentation 2   Understanding Ordinary Conversation 3   Taking Medications 2   Remembering Where Things Are Placed or Put Away 3   Remembering List of 4-5 Errands 2   Taking Care of Complicated Tasks 2   Applied Cognition Raw Score 14   Applied Cognition Standardized Score 32.02     Meedor, MS, OTR/L

## 2023-09-08 NOTE — PLAN OF CARE
Problem: MOBILITY - ADULT  Goal: Maintain or return to baseline ADL function  Description: INTERVENTIONS:  -  Assess patient's ability to carry out ADLs; assess patient's baseline for ADL function and identify physical deficits which impact ability to perform ADLs (bathing, care of mouth/teeth, toileting, grooming, dressing, etc.)  - Assess/evaluate cause of self-care deficits   - Assess range of motion  - Assess patient's mobility; develop plan if impaired  - Assess patient's need for assistive devices and provide as appropriate  - Encourage maximum independence but intervene and supervise when necessary  - Involve family in performance of ADLs  - Assess for home care needs following discharge   - Consider OT consult to assist with ADL evaluation and planning for discharge  - Provide patient education as appropriate  Outcome: Progressing  Goal: Maintains/Returns to pre admission functional level  Description: INTERVENTIONS:  - Perform BMAT or MOVE assessment daily.   - Set and communicate daily mobility goal to care team and patient/family/caregiver. - Collaborate with rehabilitation services on mobility goals if consulted  - Perform Range of Motion 2 times a day. - Reposition patient every 2 hours.   - Dangle patient 2 times a day  - Stand patient 2 times a day  - Ambulate patient 2 times a day  - Out of bed to chair 2 times a day   - Out of bed for meals 2 times a day  - Out of bed for toileting  - Record patient progress and toleration of activity level   Outcome: Progressing     Problem: PAIN - ADULT  Goal: Verbalizes/displays adequate comfort level or baseline comfort level  Description: Interventions:  - Encourage patient to monitor pain and request assistance  - Assess pain using appropriate pain scale  - Administer analgesics based on type and severity of pain and evaluate response  - Implement non-pharmacological measures as appropriate and evaluate response  - Consider cultural and social influences on pain and pain management  - Notify physician/advanced practitioner if interventions unsuccessful or patient reports new pain  Outcome: Progressing     Problem: INFECTION - ADULT  Goal: Absence or prevention of progression during hospitalization  Description: INTERVENTIONS:  - Assess and monitor for signs and symptoms of infection  - Monitor lab/diagnostic results  - Monitor all insertion sites, i.e. indwelling lines, tubes, and drains  - Monitor endotracheal if appropriate and nasal secretions for changes in amount and color  - [place] appropriate cooling/warming therapies per order  - Administer medications as ordered  - Instruct and encourage patient and family to use good hand hygiene technique  - Identify and instruct in appropriate isolation precautions for identified infection/condition  Outcome: Progressing  Goal: Absence of fever/infection during neutropenic period  Description: INTERVENTIONS:  - Monitor WBC    Outcome: Progressing     Problem: Potential for Falls  Goal: Patient will remain free of falls  Description: INTERVENTIONS:  - Educate patient/family on patient safety including physical limitations  - Instruct patient to call for assistance with activity   - Consult OT/PT to assist with strengthening/mobility   - Keep Call bell within reach  - Keep bed low and locked with side rails adjusted as appropriate  - Keep care items and personal belongings within reach  - Initiate and maintain comfort rounds  - Make Fall Risk Sign visible to staff  - Offer Toileting every 2 Hours, in advance of need  - Initiate/Maintain bed alarm  - Obtain necessary fall risk management equipment: socks  - Apply yellow socks and bracelet for high fall risk patients  - Consider moving patient to room near nurses station  Outcome: Progressing

## 2023-09-08 NOTE — CASE MANAGEMENT
Case Management Progress Note    Patient name Alfonso Weber  Location /-81 MRN [id number]  : 1935 Date 2023       LOS (days): 2  Geometric Mean LOS (GMLOS) (days): 5.00  Days to GMLOS:3        OBJECTIVE:        Current admission status: Inpatient  Preferred Pharmacy:   [id number] [place] [address]  Phone: [phone] Fax: [phone]    Primary Care Provider: Constantine Phalen, MD    Primary Insurance: [place] [employer] REP  Secondary Insurance:     PROGRESS NOTE:    CM was requested by Kain Rossi to check with pt's pharmacy about Perforomist for pt's nebulizer. CM called pt's [facility] pharmacy [phone] and spoke Daylin Griffin who states pt picked up the Perforomist on  and it was a 1 month supply. CM informed [employer] CRNP of same.

## 2023-09-08 NOTE — ASSESSMENT & PLAN NOTE
Presents with worsening shortness of breath x2-3 days. (+) Cough. Met sepsis criteria as below. · CT chest- Focal ill-defined right upper lobe consolidation with associated small cavitary space is suspicious for pneumonia. Recommend follow-up to resolution with chest CT in 4 to 6 weeks. · Urine strep and legionella negative. · BC NGTD, follow. · Continue IV ceftriaxone, discontinue azithromycin. · Trend procal.  Remains afebrile, slight worsening in leukocytosis today however received steroids in the ED. We will trend for now. · Supportive cares  · PT/OT recommending home with [facility]. CM following.

## 2023-09-08 NOTE — ASSESSMENT & PLAN NOTE
QTc 568 on admission.    · Avoid QTc prolonging agents  · Optimize electrolytes  · Repeat EKG  · Monitor

## 2023-09-08 NOTE — ASSESSMENT & PLAN NOTE
Lab Results   Component Value Date    EGFR 19 09/08/2023    EGFR 19 09/07/2023    EGFR 18 09/06/2023    CREATININE 2.76 (H) 09/08/2023    CREATININE 2.74 (H) 09/07/2023    CREATININE 2.95 (H) 09/06/2023   Follows with nephrology outpatient. Baseline creatinine 2.4-2.7. Currently stable.   · Avoid nephrotoxins and hypotension  · BMP in AM

## 2023-09-08 NOTE — PLAN OF CARE
Problem: MOBILITY - ADULT  Goal: Maintain or return to baseline ADL function  Description: INTERVENTIONS:  -  Assess patient's ability to carry out ADLs; assess patient's baseline for ADL function and identify physical deficits which impact ability to perform ADLs (bathing, care of mouth/teeth, toileting, grooming, dressing, etc.)  - Assess/evaluate cause of self-care deficits   - Assess range of motion  - Assess patient's mobility; develop plan if impaired  - Assess patient's need for assistive devices and provide as appropriate  - Encourage maximum independence but intervene and supervise when necessary  - Involve family in performance of ADLs  - Assess for home care needs following discharge   - Consider OT consult to assist with ADL evaluation and planning for discharge  - Provide patient education as appropriate  Outcome: Progressing  Goal: Maintains/Returns to pre admission functional level  Description: INTERVENTIONS:  - Perform BMAT or MOVE assessment daily.   - Set and communicate daily mobility goal to care team and patient/family/caregiver. - Collaborate with rehabilitation services on mobility goals if consulted  - Perform Range of Motion x times a day. - Reposition patient every x hours.   - Dangle patient x times a day  - Stand patient x times a day  - Ambulate patient x times a day  - Out of bed to chair x times a day   - Out of bed for meals x times a day  - Out of bed for toileting  - Record patient progress and toleration of activity level   Outcome: Progressing     Problem: PAIN - ADULT  Goal: Verbalizes/displays adequate comfort level or baseline comfort level  Description: Interventions:  - Encourage patient to monitor pain and request assistance  - Assess pain using appropriate pain scale  - Administer analgesics based on type and severity of pain and evaluate response  - Implement non-pharmacological measures as appropriate and evaluate response  - Consider cultural and social influences on pain and pain management  - Notify physician/advanced practitioner if interventions unsuccessful or patient reports new pain  Outcome: Progressing     Problem: INFECTION - ADULT  Goal: Absence or prevention of progression during hospitalization  Description: INTERVENTIONS:  - Assess and monitor for signs and symptoms of infection  - Monitor lab/diagnostic results  - Monitor all insertion sites, i.e. indwelling lines, tubes, and drains  - Monitor endotracheal if appropriate and nasal secretions for changes in amount and color  - [place] appropriate cooling/warming therapies per order  - Administer medications as ordered  - Instruct and encourage patient and family to use good hand hygiene technique  - Identify and instruct in appropriate isolation precautions for identified infection/condition  Outcome: Progressing  Goal: Absence of fever/infection during neutropenic period  Description: INTERVENTIONS:  - Monitor WBC    Outcome: Progressing     Problem: Potential for Falls  Goal: Patient will remain free of falls  Description: INTERVENTIONS:  - Educate patient/family on patient safety including physical limitations  - Instruct patient to call for assistance with activity   - Consult OT/PT to assist with strengthening/mobility   - Keep Call bell within reach  - Keep bed low and locked with side rails adjusted as appropriate  - Keep care items and personal belongings within reach  - Initiate and maintain comfort rounds  - Make Fall Risk Sign visible to staff  - Offer Toileting every x Hours, in advance of need  - Initiate/Maintain xalarm  - Obtain necessary fall risk management equipment: xxxx  - Apply yellow socks and bracelet for high fall risk patients  - Consider moving patient to room near nurses station  Outcome: Progressing

## 2023-09-09 ENCOUNTER — APPOINTMENT (INPATIENT)
Dept: MRI IMAGING | Facility: HOSPITAL | Age: 88
DRG: 871 | End: 2023-09-09
Payer: COMMERCIAL

## 2023-09-09 PROBLEM — F32.A DEPRESSION: Status: ACTIVE | Noted: 2023-09-09

## 2023-09-09 PROBLEM — E87.1 HYPONATREMIA: Status: ACTIVE | Noted: 2023-09-09

## 2023-09-09 LAB
ANION GAP SERPL CALCULATED.3IONS-SCNC: 17 MMOL/L
BASOPHILS # BLD AUTO: 0.03 THOUSANDS/ÂΜL (ref 0–0.1)
BASOPHILS NFR BLD AUTO: 0 % (ref 0–1)
BUN SERPL-MCNC: 116 MG/DL (ref 5–25)
CALCIUM SERPL-MCNC: 9.4 MG/DL (ref 8.4–10.2)
CHLORIDE SERPL-SCNC: 96 MMOL/L (ref 96–108)
CO2 SERPL-SCNC: 19 MMOL/L (ref 21–32)
CREAT SERPL-MCNC: 2.66 MG/DL (ref 0.6–1.3)
EOSINOPHIL # BLD AUTO: 0.01 THOUSAND/ÂΜL (ref 0–0.61)
EOSINOPHIL NFR BLD AUTO: 0 % (ref 0–6)
ERYTHROCYTE [DISTWIDTH] IN BLOOD BY AUTOMATED COUNT: 14 % (ref 11.6–15.1)
GFR SERPL CREATININE-BSD FRML MDRD: 20 ML/MIN/1.73SQ M
GLUCOSE SERPL-MCNC: 138 MG/DL (ref 65–140)
GLUCOSE SERPL-MCNC: 158 MG/DL (ref 65–140)
GLUCOSE SERPL-MCNC: 181 MG/DL (ref 65–140)
GLUCOSE SERPL-MCNC: 236 MG/DL (ref 65–140)
GLUCOSE SERPL-MCNC: 281 MG/DL (ref 65–140)
HCT VFR BLD AUTO: 42.5 % (ref 36.5–49.3)
HGB BLD-MCNC: 13.6 G/DL (ref 12–17)
IMM GRANULOCYTES # BLD AUTO: 0.16 THOUSAND/UL (ref 0–0.2)
IMM GRANULOCYTES NFR BLD AUTO: 1 % (ref 0–2)
LYMPHOCYTES # BLD AUTO: 0.72 THOUSANDS/ÂΜL (ref 0.6–4.47)
LYMPHOCYTES NFR BLD AUTO: 4 % (ref 14–44)
MCH RBC QN AUTO: 28.3 PG (ref 26.8–34.3)
MCHC RBC AUTO-ENTMCNC: 32 G/DL (ref 31.4–37.4)
MCV RBC AUTO: 89 FL (ref 82–98)
MONOCYTES # BLD AUTO: 1.33 THOUSAND/ÂΜL (ref 0.17–1.22)
MONOCYTES NFR BLD AUTO: 7 % (ref 4–12)
NEUTROPHILS # BLD AUTO: 16.7 THOUSANDS/ÂΜL (ref 1.85–7.62)
NEUTS SEG NFR BLD AUTO: 88 % (ref 43–75)
NRBC BLD AUTO-RTO: 0 /100 WBCS
PLATELET # BLD AUTO: 216 THOUSANDS/UL (ref 149–390)
PMV BLD AUTO: 11.4 FL (ref 8.9–12.7)
POTASSIUM SERPL-SCNC: 3.4 MMOL/L (ref 3.5–5.3)
PROCALCITONIN SERPL-MCNC: 0.7 NG/ML
RBC # BLD AUTO: 4.8 MILLION/UL (ref 3.88–5.62)
SODIUM SERPL-SCNC: 132 MMOL/L (ref 135–147)
WBC # BLD AUTO: 18.95 THOUSAND/UL (ref 4.31–10.16)

## 2023-09-09 PROCEDURE — 85025 COMPLETE CBC W/AUTO DIFF WBC: CPT | Performed by: STUDENT IN AN ORGANIZED HEALTH CARE EDUCATION/TRAINING PROGRAM

## 2023-09-09 PROCEDURE — 80048 BASIC METABOLIC PNL TOTAL CA: CPT | Performed by: STUDENT IN AN ORGANIZED HEALTH CARE EDUCATION/TRAINING PROGRAM

## 2023-09-09 PROCEDURE — 84145 PROCALCITONIN (PCT): CPT | Performed by: STUDENT IN AN ORGANIZED HEALTH CARE EDUCATION/TRAINING PROGRAM

## 2023-09-09 PROCEDURE — 82948 REAGENT STRIP/BLOOD GLUCOSE: CPT

## 2023-09-09 PROCEDURE — 99232 SBSQ HOSP IP/OBS MODERATE 35: CPT | Performed by: INTERNAL MEDICINE

## 2023-09-09 PROCEDURE — 74181 MRI ABDOMEN W/O CONTRAST: CPT

## 2023-09-09 PROCEDURE — 94640 AIRWAY INHALATION TREATMENT: CPT

## 2023-09-09 PROCEDURE — 94760 N-INVAS EAR/PLS OXIMETRY 1: CPT

## 2023-09-09 RX ORDER — LORAZEPAM 2 MG/ML
1 INJECTION INTRAMUSCULAR ONCE
Status: DISCONTINUED | OUTPATIENT
Start: 2023-09-09 | End: 2023-09-10

## 2023-09-09 RX ORDER — POTASSIUM CHLORIDE 20 MEQ/1
20 TABLET, EXTENDED RELEASE ORAL ONCE
Status: COMPLETED | OUTPATIENT
Start: 2023-09-09 | End: 2023-09-09

## 2023-09-09 RX ORDER — CEFTRIAXONE 2 G/50ML
2000 INJECTION, SOLUTION INTRAVENOUS EVERY 24 HOURS
Status: DISCONTINUED | OUTPATIENT
Start: 2023-09-09 | End: 2023-09-15 | Stop reason: HOSPADM

## 2023-09-09 RX ORDER — LANOLIN ALCOHOL/MO/W.PET/CERES
6 CREAM (GRAM) TOPICAL
Status: DISCONTINUED | OUTPATIENT
Start: 2023-09-09 | End: 2023-09-15 | Stop reason: HOSPADM

## 2023-09-09 RX ADMIN — NYSTATIN: 100000 POWDER TOPICAL at 18:14

## 2023-09-09 RX ADMIN — ALLOPURINOL 200 MG: 100 TABLET ORAL at 08:34

## 2023-09-09 RX ADMIN — HEPARIN SODIUM 5000 UNITS: 5000 INJECTION INTRAVENOUS; SUBCUTANEOUS at 21:49

## 2023-09-09 RX ADMIN — NYSTATIN: 100000 POWDER TOPICAL at 08:46

## 2023-09-09 RX ADMIN — BUMETANIDE 2 MG: 1 TABLET ORAL at 08:33

## 2023-09-09 RX ADMIN — POTASSIUM CHLORIDE 20 MEQ: 1500 TABLET, EXTENDED RELEASE ORAL at 08:34

## 2023-09-09 RX ADMIN — SERTRALINE HYDROCHLORIDE 50 MG: 50 TABLET ORAL at 08:34

## 2023-09-09 RX ADMIN — INSULIN LISPRO 1 UNITS: 100 INJECTION, SOLUTION INTRAVENOUS; SUBCUTANEOUS at 08:34

## 2023-09-09 RX ADMIN — LABETALOL HYDROCHLORIDE 200 MG: 200 TABLET, FILM COATED ORAL at 08:33

## 2023-09-09 RX ADMIN — GUAIFENESIN 600 MG: 600 TABLET ORAL at 17:20

## 2023-09-09 RX ADMIN — POTASSIUM CHLORIDE 20 MEQ: 1500 TABLET, EXTENDED RELEASE ORAL at 17:20

## 2023-09-09 RX ADMIN — FLUTICASONE PROPIONATE 1 SPRAY: 50 SPRAY, METERED NASAL at 08:36

## 2023-09-09 RX ADMIN — GUAIFENESIN 600 MG: 600 TABLET ORAL at 08:34

## 2023-09-09 RX ADMIN — TRIAMCINOLONE ACETONIDE: 1 CREAM TOPICAL at 18:14

## 2023-09-09 RX ADMIN — TAMSULOSIN HYDROCHLORIDE 0.4 MG: 0.4 CAPSULE ORAL at 17:20

## 2023-09-09 RX ADMIN — CEFTRIAXONE 2000 MG: 2 INJECTION, SOLUTION INTRAVENOUS at 14:33

## 2023-09-09 RX ADMIN — TRIMETHOBENZAMIDE HYDROCHLORIDE 100 MG: 100 INJECTION INTRAMUSCULAR at 10:27

## 2023-09-09 RX ADMIN — INSULIN LISPRO 3 UNITS: 100 INJECTION, SOLUTION INTRAVENOUS; SUBCUTANEOUS at 21:49

## 2023-09-09 RX ADMIN — Medication 6 MG: at 02:53

## 2023-09-09 RX ADMIN — CALCITRIOL CAPSULES 0.25 MCG 0.25 MCG: 0.25 CAPSULE ORAL at 08:33

## 2023-09-09 RX ADMIN — POTASSIUM CHLORIDE 20 MEQ: 1500 TABLET, EXTENDED RELEASE ORAL at 14:34

## 2023-09-09 RX ADMIN — HEPARIN SODIUM 5000 UNITS: 5000 INJECTION INTRAVENOUS; SUBCUTANEOUS at 14:33

## 2023-09-09 RX ADMIN — BUDESONIDE 0.5 MG: 0.5 INHALANT ORAL at 20:20

## 2023-09-09 RX ADMIN — BUDESONIDE 0.5 MG: 0.5 INHALANT ORAL at 07:48

## 2023-09-09 RX ADMIN — TRIAMCINOLONE ACETONIDE: 1 CREAM TOPICAL at 08:46

## 2023-09-09 RX ADMIN — FORMOTEROL FUMARATE DIHYDRATE 20 MCG: 20 SOLUTION RESPIRATORY (INHALATION) at 20:20

## 2023-09-09 RX ADMIN — INSULIN LISPRO 4 UNITS: 100 INJECTION, SOLUTION INTRAVENOUS; SUBCUTANEOUS at 11:55

## 2023-09-09 RX ADMIN — FORMOTEROL FUMARATE DIHYDRATE 20 MCG: 20 SOLUTION RESPIRATORY (INHALATION) at 07:48

## 2023-09-09 RX ADMIN — Medication 6 MG: at 21:52

## 2023-09-09 RX ADMIN — HEPARIN SODIUM 5000 UNITS: 5000 INJECTION INTRAVENOUS; SUBCUTANEOUS at 05:12

## 2023-09-09 NOTE — ASSESSMENT & PLAN NOTE
Baseline oxygen requirement of 3 L per nasal cannula  In the setting of underlying diastolic CHF and COPD

## 2023-09-09 NOTE — ASSESSMENT & PLAN NOTE
Initially presented with tachycardia/tachypnea and leukocytosis due to pneumonia (see above)  Monitor vitals and maintain hemodynamics

## 2023-09-09 NOTE — ASSESSMENT & PLAN NOTE
COPD with possible mild exacerbation on admission in setting of pneumonia and inability to obtain nebulizers prior to presentation  Recently switched to Performist and Pulmicort nebulizers - unable to obtain Performist, hence, has only been using budesonide for the last week  Continue Perforomist/Pulmicort dual nebulizer treatments  No need for the corticosteroids due to lack of wheezing on exam and oxygenation at baseline  Will appreciate case management assistance in ensuring resources for appropriate medications at discharge

## 2023-09-09 NOTE — ASSESSMENT & PLAN NOTE
Presents with worsening shortness of breath x 2-3 days and a recurrent cough, meeting sepsis criteria as below  CT chest -> Focal ill-defined right upper lobe consolidation with associated small cavitary space is suspicious for pneumonia. Recommend follow-up to resolution with chest CT in 4 to 6 weeks.   Urine Legionella/Streptococcus Ag negative  Continue IV Rocephin monotherapy  Procalcitonin trend: 0.34 -> 0.55 -> elevated 0.7 today (trend until peak)  Encourage incentive spirometry  PRN Robitussin on board  Supportive care otherwise  PT/OT recommending home home health services once medically stable

## 2023-09-09 NOTE — PLAN OF CARE
Problem: MOBILITY - ADULT  Goal: Maintain or return to baseline ADL function  Description: INTERVENTIONS:  -  Assess patient's ability to carry out ADLs; assess patient's baseline for ADL function and identify physical deficits which impact ability to perform ADLs (bathing, care of mouth/teeth, toileting, grooming, dressing, etc.)  - Assess/evaluate cause of self-care deficits   - Assess range of motion  - Assess patient's mobility; develop plan if impaired  - Assess patient's need for assistive devices and provide as appropriate  - Encourage maximum independence but intervene and supervise when necessary  - Involve family in performance of ADLs  - Assess for home care needs following discharge   - Consider OT consult to assist with ADL evaluation and planning for discharge  - Provide patient education as appropriate  Outcome: Progressing     Problem: MOBILITY - ADULT  Goal: Maintains/Returns to pre admission functional level  Description: INTERVENTIONS:  - Perform BMAT or MOVE assessment daily.   - Set and communicate daily mobility goal to care team and patient/family/caregiver. - Collaborate with rehabilitation services on mobility goals if consulted  - Perform Range of Motion 2 times a day. - Reposition patient every 2 hours.   - Dangle patient 2 times a day  - Stand patient 2 times a day  - Ambulate patient 2 times a day  - Out of bed to chair 2 times a day   - Out of bed for meals 2 times a day  - Out of bed for toileting  - Record patient progress and toleration of activity level   Outcome: Progressing

## 2023-09-09 NOTE — ASSESSMENT & PLAN NOTE
On diuresis with Bumex and Zaroxolyn 3x/weekly  Continue beta-blockade with Labetalol  EF of 65%  Monitor/replete electrolyte deficiencies of present

## 2023-09-09 NOTE — PROGRESS NOTES
[facility]  Progress Note  Name: Manny Alvarez  MRN: [id number]  Unit/Bed#: -01 I Date of Admission: 9/6/2023   Date of Service: 9/9/2023 I Hospital Day: 3      Assessment & Plan:    * Pneumonia  Assessment & Plan  Presents with worsening shortness of breath x 2-3 days and a recurrent cough, meeting sepsis criteria as below  CT chest -> Focal ill-defined right upper lobe consolidation with associated small cavitary space is suspicious for pneumonia. Recommend follow-up to resolution with chest CT in 4 to 6 weeks.   Urine Legionella/Streptococcus Ag negative  Continue IV Rocephin monotherapy  Procalcitonin trend: 0.34 -> 0.55 -> elevated 0.7 today (trend until peak)  Encourage incentive spirometry  PRN Robitussin on board  Supportive care otherwise  PT/OT recommending home home health services once medically stable     Abnormal CT scan  Assessment & Plan  CT imaging noting an intermediate density partially exophytic lesion from the upper pole of right kidney, with inability to distinguish hyperdense cyst from solid neoplasm  Subsequent renal ultrasound also indeterminate -> proceed with MRI for further classification    COPD with acute exacerbation (HCC)  Assessment & Plan  COPD with possible mild exacerbation on admission in setting of pneumonia and inability to obtain nebulizers prior to presentation  Recently switched to Performist and Pulmicort nebulizers - unable to obtain Performist, hence, has only been using budesonide for the last week  Continue Perforomist/Pulmicort dual nebulizer treatments  No need for the corticosteroids due to lack of wheezing on exam and oxygenation at baseline  Will appreciate case management assistance in ensuring resources for appropriate medications at discharge    Sepsis on admission [facility])  Assessment & Plan  Initially presented with tachycardia/tachypnea and leukocytosis due to pneumonia (see above)  Monitor vitals and maintain hemodynamics    Chronic respiratory failure with hypoxia ([facility])  Assessment & Plan  Baseline oxygen requirement of 3 L per nasal cannula  In the setting of underlying diastolic CHF and COPD    Chronic diastolic congestive heart failure ([facility])  Assessment & Plan  On diuresis with Bumex and Zaroxolyn 3x/weekly  Continue beta-blockade with Labetalol  EF of 65%  Monitor/replete electrolyte deficiencies of present    Benign prostatic hyperplasia with urinary hesitancy  Assessment & Plan  Continue Flomax    Essential hypertension  Assessment & Plan  Continue Labetalol/Bumex    CKD (chronic kidney disease) stage 4, GFR 15-29 ml/min ([facility])  Assessment & Plan  Baseline creatinine of approximately 2.5-2.7 -> currently stable at 2.66  Avoid nephrotoxins and hypotension - note current Bumex use  Monitor renal function and urine output    Depression  Assessment & Plan  Continue Zoloft    QT prolongation  Assessment & Plan  QTc 598 (peak) on admission day  Avoid QTc prolonging agents  Replete electrolyte deficiencies of present  Repeat EKG on 9/8 revealed improvement in QTc at 420    Type 2 diabetes mellitus with diabetic neuropathy, without long-term current use of insulin ([facility])  Assessment & Plan  Lab Results   Component Value Date    HGBA1C 6.3 (H) 05/02/2023     Continue SSI coverage per Accu-Cheks  Diabetic diet  Hypoglycemia protocol  Hold Glipizide while hospitalized    Lung nodule  Assessment & Plan  New nodule noted on CT C/A/P  Outpatient f/u with routine screening per guidelines    Hypokalemia  Assessment & Plan  Monitor/replace for potassium  Check magnesium level    Hyponatremia  Assessment & Plan  Serum sodium stable 132 currently  Continue to monitor      DVT Prophylaxis:  Heparin SC       Patient Centered Rounds:  I have performed bedside rounds and discussed plan of care with nursing today.   Discussions with Specialists or Other Care Team Provider:  see above assessments if applicable    Education and Discussions with Family / Patient: Patient, along with wife/daughter, at bedside today    Time Spent for Care:  35 minutes. More than 50% of total time spent on counseling and coordination of care, on one or more of the following: performing physical exam; counseling and coordination of care, obtaining or reviewing history, documenting in the medical record, reviewing/ordering tests/medications/procedures, and communicating with other healthcare professionals. Current Length of Stay: 3 day(s)  Current Patient Status: Inpatient   Certification Statement:  Patient will continue to require additional hospital stay due to assessments as noted above. Code Status: Level 3 - DNAR and DNI        Subjective:     Encountered earlier in the morning. Resting fairly comfortably, but, still complains of intermittent coughing. Also notes some mild nausea and an upset stomach today. Objective:     Vitals:   Temp (24hrs), Av.9 °F (36.1 °C), Min:95.9 °F (35.5 °C), Max:98.8 °F (37.1 °C)    Temp:  [95.9 °F (35.5 °C)-98.8 °F (37.1 °C)] 98.8 °F (37.1 °C)  HR:  [] 92  Resp:  [18-20] 20  BP: (109-118)/(57-70) 118/70  SpO2:  [92 %-96 %] 94 %  Body mass index is 34.19 kg/m². Input and Output Summary (last 24 hours):        Intake/Output Summary (Last 24 hours) at 2023 1256  Last data filed at 2023 1235  Gross per 24 hour   Intake 240 ml   Output 850 ml   Net -610 ml       Physical Exam:     GENERAL  weak/fatigued   HEAD   Normocephalic - atraumatic   EYES   PERRL - EOMI    MOUTH   Mucosa moist   NECK   Supple - full range of motion   CARDIAC  rate controlled - S1/S2 positive   PULMONARY    diminished bilaterally with trace expiratory wheezing - nonlabored respirations on nasal cannula oxygen   ABDOMEN   Soft - nontender/nondistended - active bowel sounds   MUSCULOSKELETAL   Motor strength/range of motion deconditioned   NEUROLOGIC  at baseline   SKIN   Chronic wrinkles/blemishes    PSYCHIATRIC   Mood/affect stable Additional Data:     Labs & Recent Cultures:    Results from last 7 days   Lab Units 09/09/23  0306   WBC Thousand/uL 18.95*   HEMOGLOBIN g/dL 13.6   HEMATOCRIT % 42.5   PLATELETS Thousands/uL 216   NEUTROS PCT % 88*   LYMPHS PCT % 4*   MONOS PCT % 7   EOS PCT % 0     Results from last 7 days   Lab Units 09/09/23  0306 09/07/23  0257 09/06/23  1333   POTASSIUM mmol/L 3.4*   < > 3.3*   CHLORIDE mmol/L 96   < > 91*   CO2 mmol/L 19*   < > 27   BUN mg/dL 116*   < > 112*   CREATININE mg/dL 2.66*   < > 2.95*   CALCIUM mg/dL 9.4   < > 9.7   ALK PHOS U/L  --   --  72   ALT U/L  --   --  10   AST U/L  --   --  14    < > = values in this interval not displayed. Results from last 7 days   Lab Units 09/09/23  1143 09/09/23  0715 09/08/23  2148 09/08/23  2059 09/08/23  1624 09/08/23  1206 09/08/23  0729 09/07/23  2120 09/07/23  1612 09/07/23  1102 09/07/23  0703 09/06/23  2022   POC GLUCOSE mg/dl 281* 181* 168* 185* 216* 169* 199* 154* 158* 161* 174* 209*         Results from last 7 days   Lab Units 09/09/23  0306 09/07/23  0257 09/06/23  1424   LACTIC ACID mmol/L  --   --  1.3   PROCALCITONIN ng/ml 0.70* 0.55* 0.34*         Results from last 7 days   Lab Units 09/06/23  1815 09/06/23  1449 09/06/23  1424   BLOOD CULTURE   --  No Growth at 48 hrs. No Growth at 48 hrs.    LEGIONELLA URINARY ANTIGEN  Negative  --   --          Lines/Drains:  Invasive Devices     Peripheral Intravenous Line  Duration           Peripheral IV 09/07/23 Right;Ventral (anterior) Forearm 2 days                  Last 24 Hours Medication List:   Current Facility-Administered Medications   Medication Dose Route Frequency Provider Last Rate   • acetaminophen  650 mg Oral Q6H PRN Hoyt Cowden, PA-C     • albuterol  2.5 mg Nebulization Q4H PRN Eduarda Haines MD     • allopurinol  200 mg Oral Daily Hoyt Cowden, PA-C     • aluminum-magnesium hydroxide-simethicone  30 mL Oral Q6H PRN Hoyt Cowden, PA-C     • budesonide  0.5 mg Nebulization BID Halina Melendez PA-C     • bumetanide  2 mg Oral BID Sherrie Dang PA-C     • calcitriol  0.25 mcg Oral Daily Radha Snowden     • cefTRIAXone  2,000 mg Intravenous Q24H Renetta Arias MD     • fluticasone  1 spray Each Nare Daily Halina Melendez PA-C     • formoterol  20 mcg Nebulization Q12H Santana Valenzuela MD     • guaiFENesin  600 mg Oral BID Halina Melendez PA-C     • heparin (porcine)  5,000 Units Subcutaneous [facility] Sherrie Dang PA-C     • insulin lispro  1-6 Units Subcutaneous 4x Daily (AC & HS) Sherrie Lares PA-C     • labetalol  200 mg Oral BID Halina Melendez PA-C     • melatonin  6 mg Oral HS Any New PA-C     • metolazone  2.5 mg Oral Once per day on Mon Wed Fri Halina Melendez PA-C     • nystatin   Topical BID CARLOS Elam     • potassium chloride  20 mEq Oral BID Halina Melendez PA-C     • potassium chloride  20 mEq Oral Once Renetta Arias MD     • senna  1 tablet Oral Daily PRN Halina Melendez PA-C     • sertraline  50 mg Oral QAM Halina Melendez PA-C     • sodium chloride (PF)  3 mL Intravenous Q1H PRN Kvng Fisher MD     • tamsulosin  0.4 mg Oral Daily With [email or web address]     • traMADol  50 mg Oral BID PRN Halina Melendez PA-C     • triamcinolone   Topical BID Sherrie Lares PA-C     • trimethobenzamide  100 mg Intramuscular Q6H PRN CARLOS Elam                    ** Please Note: This note is constructed using a voice recognition dictation system. An occasional wrong word/phrase or “sound-a-like” substitution may have been picked up by dictation device due to the inherent limitations of voice recognition software. Read the chart carefully and recognize, using reasonable context, where substitutions may have occurred. **

## 2023-09-09 NOTE — ASSESSMENT & PLAN NOTE
Lab Results   Component Value Date    HGBA1C 6.3 (H) 05/02/2023     Continue SSI coverage per Accu-Cheks  Diabetic diet  Hypoglycemia protocol  Hold Glipizide while hospitalized

## 2023-09-09 NOTE — PLAN OF CARE
Problem: MOBILITY - ADULT  Goal: Maintain or return to baseline ADL function  Description: INTERVENTIONS:  -  Assess patient's ability to carry out ADLs; assess patient's baseline for ADL function and identify physical deficits which impact ability to perform ADLs (bathing, care of mouth/teeth, toileting, grooming, dressing, etc.)  - Assess/evaluate cause of self-care deficits   - Assess range of motion  - Assess patient's mobility; develop plan if impaired  - Assess patient's need for assistive devices and provide as appropriate  - Encourage maximum independence but intervene and supervise when necessary  - Involve family in performance of ADLs  - Assess for home care needs following discharge   - Consider OT consult to assist with ADL evaluation and planning for discharge  - Provide patient education as appropriate  Outcome: Progressing  Goal: Maintains/Returns to pre admission functional level  Description: INTERVENTIONS:  - Perform BMAT or MOVE assessment daily.   - Set and communicate daily mobility goal to care team and patient/family/caregiver. - Collaborate with rehabilitation services on mobility goals if consulted  - Perform Range of Motion 2 times a day. - Reposition patient every 2 hours.   - Dangle patient 2 times a day  - Stand patient 2 times a day  - Ambulate patient 2 times a day  - Out of bed to chair 2 times a day   - Out of bed for meals 2 times a day  - Out of bed for toileting  - Record patient progress and toleration of activity level   Outcome: Progressing     Problem: PAIN - ADULT  Goal: Verbalizes/displays adequate comfort level or baseline comfort level  Description: Interventions:  - Encourage patient to monitor pain and request assistance  - Assess pain using appropriate pain scale  - Administer analgesics based on type and severity of pain and evaluate response  - Implement non-pharmacological measures as appropriate and evaluate response  - Consider cultural and social influences on pain and pain management  - Notify physician/advanced practitioner if interventions unsuccessful or patient reports new pain  Outcome: Progressing     Problem: INFECTION - ADULT  Goal: Absence or prevention of progression during hospitalization  Description: INTERVENTIONS:  - Assess and monitor for signs and symptoms of infection  - Monitor lab/diagnostic results  - Monitor all insertion sites, i.e. indwelling lines, tubes, and drains  - Monitor endotracheal if appropriate and nasal secretions for changes in amount and color  - [place] appropriate cooling/warming therapies per order  - Administer medications as ordered  - Instruct and encourage patient and family to use good hand hygiene technique  - Identify and instruct in appropriate isolation precautions for identified infection/condition  Outcome: Progressing  Goal: Absence of fever/infection during neutropenic period  Description: INTERVENTIONS:  - Monitor WBC    Outcome: Progressing     Problem: Potential for Falls  Goal: Patient will remain free of falls  Description: INTERVENTIONS:  - Educate patient/family on patient safety including physical limitations  - Instruct patient to call for assistance with activity   - Consult OT/PT to assist with strengthening/mobility   - Keep Call bell within reach  - Keep bed low and locked with side rails adjusted as appropriate  - Keep care items and personal belongings within reach  - Initiate and maintain comfort rounds  - Make Fall Risk Sign visible to staff  - Offer Toileting every 2 Hours, in advance of need  - Initiate/Maintain bed alarm  - Obtain necessary fall risk management equipment: socks  - Apply yellow socks and bracelet for high fall risk patients  - Consider moving patient to room near nurses station  Outcome: Progressing     Problem: Nutrition/Hydration-ADULT  Goal: Nutrient/Hydration intake appropriate for improving, restoring or maintaining nutritional needs  Description: Monitor and assess patient's nutrition/hydration status for malnutrition. Collaborate with interdisciplinary team and initiate plan and interventions as ordered. Monitor patient's weight and dietary intake as ordered or per policy. Utilize nutrition screening tool and intervene as necessary. Determine patient's food preferences and provide high-protein, high-caloric foods as appropriate.      INTERVENTIONS:  - Monitor oral intake, urinary output, labs, and treatment plans  - Assess nutrition and hydration status and recommend course of action  - Evaluate amount of meals eaten  - Assist patient with eating if necessary   - Allow adequate time for meals  - Recommend/ encourage appropriate diets, oral nutritional supplements, and vitamin/mineral supplements  - Order, calculate, and assess calorie counts as needed  - Recommend, monitor, and adjust tube feedings and TPN/PPN based on assessed needs  - Assess need for intravenous fluids  - Provide specific nutrition/hydration education as appropriate  - Include patient/family/caregiver in decisions related to nutrition  Outcome: Progressing

## 2023-09-09 NOTE — ASSESSMENT & PLAN NOTE
Baseline creatinine of approximately 2.5-2.7 -> currently stable at 2.66  Avoid nephrotoxins and hypotension - note current Bumex use  Monitor renal function and urine output

## 2023-09-09 NOTE — ASSESSMENT & PLAN NOTE
QTc 598 (peak) on admission day  Avoid QTc prolonging agents  Replete electrolyte deficiencies of present  Repeat EKG on 9/8 revealed improvement in QTc at 420

## 2023-09-09 NOTE — ASSESSMENT & PLAN NOTE
CT imaging noting an intermediate density partially exophytic lesion from the upper pole of right kidney, with inability to distinguish hyperdense cyst from solid neoplasm  Subsequent renal ultrasound also indeterminate -> proceed with MRI for further classification

## 2023-09-10 LAB
ANION GAP SERPL CALCULATED.3IONS-SCNC: 14 MMOL/L
ATRIAL RATE: 93 BPM
BASOPHILS # BLD AUTO: 0.02 THOUSANDS/ÂΜL (ref 0–0.1)
BASOPHILS NFR BLD AUTO: 0 % (ref 0–1)
BUN SERPL-MCNC: 118 MG/DL (ref 5–25)
CALCIUM SERPL-MCNC: 9.2 MG/DL (ref 8.4–10.2)
CHLORIDE SERPL-SCNC: 95 MMOL/L (ref 96–108)
CO2 SERPL-SCNC: 24 MMOL/L (ref 21–32)
CREAT SERPL-MCNC: 2.75 MG/DL (ref 0.6–1.3)
EOSINOPHIL # BLD AUTO: 0.03 THOUSAND/ÂΜL (ref 0–0.61)
EOSINOPHIL NFR BLD AUTO: 0 % (ref 0–6)
ERYTHROCYTE [DISTWIDTH] IN BLOOD BY AUTOMATED COUNT: 14.1 % (ref 11.6–15.1)
GFR SERPL CREATININE-BSD FRML MDRD: 19 ML/MIN/1.73SQ M
GLUCOSE SERPL-MCNC: 155 MG/DL (ref 65–140)
GLUCOSE SERPL-MCNC: 172 MG/DL (ref 65–140)
GLUCOSE SERPL-MCNC: 202 MG/DL (ref 65–140)
GLUCOSE SERPL-MCNC: 210 MG/DL (ref 65–140)
GLUCOSE SERPL-MCNC: 211 MG/DL (ref 65–140)
HCT VFR BLD AUTO: 39.4 % (ref 36.5–49.3)
HGB BLD-MCNC: 12.9 G/DL (ref 12–17)
IMM GRANULOCYTES # BLD AUTO: 0.12 THOUSAND/UL (ref 0–0.2)
IMM GRANULOCYTES NFR BLD AUTO: 1 % (ref 0–2)
LYMPHOCYTES # BLD AUTO: 0.71 THOUSANDS/ÂΜL (ref 0.6–4.47)
LYMPHOCYTES NFR BLD AUTO: 5 % (ref 14–44)
MAGNESIUM SERPL-MCNC: 2 MG/DL (ref 1.9–2.7)
MCH RBC QN AUTO: 28.3 PG (ref 26.8–34.3)
MCHC RBC AUTO-ENTMCNC: 32.7 G/DL (ref 31.4–37.4)
MCV RBC AUTO: 86 FL (ref 82–98)
MONOCYTES # BLD AUTO: 1.05 THOUSAND/ÂΜL (ref 0.17–1.22)
MONOCYTES NFR BLD AUTO: 7 % (ref 4–12)
NEUTROPHILS # BLD AUTO: 12.69 THOUSANDS/ÂΜL (ref 1.85–7.62)
NEUTS SEG NFR BLD AUTO: 87 % (ref 43–75)
NRBC BLD AUTO-RTO: 0 /100 WBCS
P AXIS: 88 DEGREES
PLATELET # BLD AUTO: 190 THOUSANDS/UL (ref 149–390)
PMV BLD AUTO: 11.4 FL (ref 8.9–12.7)
POTASSIUM SERPL-SCNC: 3.2 MMOL/L (ref 3.5–5.3)
PR INTERVAL: 176 MS
PROCALCITONIN SERPL-MCNC: 0.71 NG/ML
QRS AXIS: 30 DEGREES
QRSD INTERVAL: 90 MS
QT INTERVAL: 338 MS
QTC INTERVAL: 420 MS
RBC # BLD AUTO: 4.56 MILLION/UL (ref 3.88–5.62)
SODIUM SERPL-SCNC: 133 MMOL/L (ref 135–147)
T WAVE AXIS: 99 DEGREES
VENTRICULAR RATE: 93 BPM
WBC # BLD AUTO: 14.62 THOUSAND/UL (ref 4.31–10.16)

## 2023-09-10 PROCEDURE — 80048 BASIC METABOLIC PNL TOTAL CA: CPT | Performed by: INTERNAL MEDICINE

## 2023-09-10 PROCEDURE — 83735 ASSAY OF MAGNESIUM: CPT | Performed by: INTERNAL MEDICINE

## 2023-09-10 PROCEDURE — 99232 SBSQ HOSP IP/OBS MODERATE 35: CPT | Performed by: INTERNAL MEDICINE

## 2023-09-10 PROCEDURE — 94640 AIRWAY INHALATION TREATMENT: CPT

## 2023-09-10 PROCEDURE — 82948 REAGENT STRIP/BLOOD GLUCOSE: CPT

## 2023-09-10 PROCEDURE — 93010 ELECTROCARDIOGRAM REPORT: CPT | Performed by: INTERNAL MEDICINE

## 2023-09-10 PROCEDURE — 84145 PROCALCITONIN (PCT): CPT | Performed by: INTERNAL MEDICINE

## 2023-09-10 PROCEDURE — 85025 COMPLETE CBC W/AUTO DIFF WBC: CPT | Performed by: INTERNAL MEDICINE

## 2023-09-10 PROCEDURE — 94760 N-INVAS EAR/PLS OXIMETRY 1: CPT

## 2023-09-10 RX ORDER — POTASSIUM CHLORIDE 20 MEQ/1
40 TABLET, EXTENDED RELEASE ORAL ONCE
Status: COMPLETED | OUTPATIENT
Start: 2023-09-10 | End: 2023-09-10

## 2023-09-10 RX ORDER — HYDROXYZINE HYDROCHLORIDE 25 MG/1
25 TABLET, FILM COATED ORAL EVERY 6 HOURS PRN
Status: DISCONTINUED | OUTPATIENT
Start: 2023-09-10 | End: 2023-09-15 | Stop reason: HOSPADM

## 2023-09-10 RX ORDER — LORAZEPAM 2 MG/ML
1 INJECTION INTRAMUSCULAR ONCE AS NEEDED
Status: DISCONTINUED | OUTPATIENT
Start: 2023-09-10 | End: 2023-09-15 | Stop reason: HOSPADM

## 2023-09-10 RX ADMIN — INSULIN LISPRO 2 UNITS: 100 INJECTION, SOLUTION INTRAVENOUS; SUBCUTANEOUS at 16:20

## 2023-09-10 RX ADMIN — ALLOPURINOL 200 MG: 100 TABLET ORAL at 09:21

## 2023-09-10 RX ADMIN — INSULIN LISPRO 2 UNITS: 100 INJECTION, SOLUTION INTRAVENOUS; SUBCUTANEOUS at 08:25

## 2023-09-10 RX ADMIN — HEPARIN SODIUM 5000 UNITS: 5000 INJECTION INTRAVENOUS; SUBCUTANEOUS at 13:48

## 2023-09-10 RX ADMIN — CEFTRIAXONE 2000 MG: 2 INJECTION, SOLUTION INTRAVENOUS at 13:48

## 2023-09-10 RX ADMIN — TRIAMCINOLONE ACETONIDE: 1 CREAM TOPICAL at 18:06

## 2023-09-10 RX ADMIN — POTASSIUM CHLORIDE 40 MEQ: 1500 TABLET, EXTENDED RELEASE ORAL at 08:27

## 2023-09-10 RX ADMIN — HEPARIN SODIUM 5000 UNITS: 5000 INJECTION INTRAVENOUS; SUBCUTANEOUS at 05:35

## 2023-09-10 RX ADMIN — SERTRALINE HYDROCHLORIDE 50 MG: 50 TABLET ORAL at 09:21

## 2023-09-10 RX ADMIN — FORMOTEROL FUMARATE DIHYDRATE 20 MCG: 20 SOLUTION RESPIRATORY (INHALATION) at 20:17

## 2023-09-10 RX ADMIN — FORMOTEROL FUMARATE DIHYDRATE 20 MCG: 20 SOLUTION RESPIRATORY (INHALATION) at 07:57

## 2023-09-10 RX ADMIN — Medication 6 MG: at 20:57

## 2023-09-10 RX ADMIN — BUDESONIDE 0.5 MG: 0.5 INHALANT ORAL at 20:17

## 2023-09-10 RX ADMIN — BUMETANIDE 2 MG: 1 TABLET ORAL at 18:06

## 2023-09-10 RX ADMIN — LABETALOL HYDROCHLORIDE 200 MG: 200 TABLET, FILM COATED ORAL at 09:21

## 2023-09-10 RX ADMIN — BUMETANIDE 2 MG: 1 TABLET ORAL at 09:21

## 2023-09-10 RX ADMIN — POTASSIUM CHLORIDE 20 MEQ: 1500 TABLET, EXTENDED RELEASE ORAL at 18:06

## 2023-09-10 RX ADMIN — HEPARIN SODIUM 5000 UNITS: 5000 INJECTION INTRAVENOUS; SUBCUTANEOUS at 20:57

## 2023-09-10 RX ADMIN — FLUTICASONE PROPIONATE 1 SPRAY: 50 SPRAY, METERED NASAL at 08:26

## 2023-09-10 RX ADMIN — POTASSIUM CHLORIDE 20 MEQ: 1500 TABLET, EXTENDED RELEASE ORAL at 08:28

## 2023-09-10 RX ADMIN — TAMSULOSIN HYDROCHLORIDE 0.4 MG: 0.4 CAPSULE ORAL at 16:21

## 2023-09-10 RX ADMIN — TRIAMCINOLONE ACETONIDE: 1 CREAM TOPICAL at 08:33

## 2023-09-10 RX ADMIN — CALCITRIOL CAPSULES 0.25 MCG 0.25 MCG: 0.25 CAPSULE ORAL at 09:21

## 2023-09-10 RX ADMIN — INSULIN LISPRO 2 UNITS: 100 INJECTION, SOLUTION INTRAVENOUS; SUBCUTANEOUS at 12:34

## 2023-09-10 RX ADMIN — GUAIFENESIN 600 MG: 600 TABLET ORAL at 18:06

## 2023-09-10 RX ADMIN — INSULIN LISPRO 1 UNITS: 100 INJECTION, SOLUTION INTRAVENOUS; SUBCUTANEOUS at 20:57

## 2023-09-10 RX ADMIN — NYSTATIN: 100000 POWDER TOPICAL at 18:13

## 2023-09-10 RX ADMIN — LABETALOL HYDROCHLORIDE 200 MG: 200 TABLET, FILM COATED ORAL at 18:06

## 2023-09-10 RX ADMIN — GUAIFENESIN 600 MG: 600 TABLET ORAL at 09:21

## 2023-09-10 RX ADMIN — NYSTATIN: 100000 POWDER TOPICAL at 09:26

## 2023-09-10 RX ADMIN — BUDESONIDE 0.5 MG: 0.5 INHALANT ORAL at 07:57

## 2023-09-10 RX ADMIN — HYDROXYZINE HYDROCHLORIDE 25 MG: 25 TABLET, FILM COATED ORAL at 16:34

## 2023-09-10 RX ADMIN — ALBUTEROL SULFATE 2.5 MG: 2.5 SOLUTION RESPIRATORY (INHALATION) at 16:38

## 2023-09-10 NOTE — ASSESSMENT & PLAN NOTE
CT imaging noting an intermediate density partially exophytic lesion from the upper pole of right kidney, with inability to distinguish hyperdense cyst from solid neoplasm  Subsequent renal ultrasound also indeterminate -> proceed with MRI for further classification (will require Ativan for sedation prior to study)

## 2023-09-10 NOTE — PLAN OF CARE
Problem: MOBILITY - ADULT  Goal: Maintain or return to baseline ADL function  Description: INTERVENTIONS:  -  Assess patient's ability to carry out ADLs; assess patient's baseline for ADL function and identify physical deficits which impact ability to perform ADLs (bathing, care of mouth/teeth, toileting, grooming, dressing, etc.)  - Assess/evaluate cause of self-care deficits   - Assess range of motion  - Assess patient's mobility; develop plan if impaired  - Assess patient's need for assistive devices and provide as appropriate  - Encourage maximum independence but intervene and supervise when necessary  - Involve family in performance of ADLs  - Assess for home care needs following discharge   - Consider OT consult to assist with ADL evaluation and planning for discharge  - Provide patient education as appropriate  Outcome: Progressing  Goal: Maintains/Returns to pre admission functional level  Description: INTERVENTIONS:  - Perform BMAT or MOVE assessment daily.   - Set and communicate daily mobility goal to care team and patient/family/caregiver. - Collaborate with rehabilitation services on mobility goals if consulted  - Perform Range of Motion 2 times a day. - Reposition patient every 2 hours.   - Dangle patient 2 times a day  - Stand patient 2 times a day  - Ambulate patient 2 times a day  - Out of bed to chair 2 times a day   - Out of bed for meals 2 times a day  - Out of bed for toileting  - Record patient progress and toleration of activity level   Outcome: Progressing     Problem: PAIN - ADULT  Goal: Verbalizes/displays adequate comfort level or baseline comfort level  Description: Interventions:  - Encourage patient to monitor pain and request assistance  - Assess pain using appropriate pain scale  - Administer analgesics based on type and severity of pain and evaluate response  - Implement non-pharmacological measures as appropriate and evaluate response  - Consider cultural and social influences on pain and pain management  - Notify physician/advanced practitioner if interventions unsuccessful or patient reports new pain  Outcome: Progressing     Problem: INFECTION - ADULT  Goal: Absence or prevention of progression during hospitalization  Description: INTERVENTIONS:  - Assess and monitor for signs and symptoms of infection  - Monitor lab/diagnostic results  - Monitor all insertion sites, i.e. indwelling lines, tubes, and drains  - Monitor endotracheal if appropriate and nasal secretions for changes in amount and color  - [place] appropriate cooling/warming therapies per order  - Administer medications as ordered  - Instruct and encourage patient and family to use good hand hygiene technique  - Identify and instruct in appropriate isolation precautions for identified infection/condition  Outcome: Progressing  Goal: Absence of fever/infection during neutropenic period  Description: INTERVENTIONS:  - Monitor WBC    Outcome: Progressing     Problem: Potential for Falls  Goal: Patient will remain free of falls  Description: INTERVENTIONS:  - Educate patient/family on patient safety including physical limitations  - Instruct patient to call for assistance with activity   - Consult OT/PT to assist with strengthening/mobility   - Keep Call bell within reach  - Keep bed low and locked with side rails adjusted as appropriate  - Keep care items and personal belongings within reach  - Initiate and maintain comfort rounds  - Make Fall Risk Sign visible to staff  - Offer Toileting every 2 Hours, in advance of need  - Initiate/Maintain bed alarm  - Obtain necessary fall risk management equipment: socks  - Apply yellow socks and bracelet for high fall risk patients  - Consider moving patient to room near nurses station  Outcome: Progressing     Problem: Nutrition/Hydration-ADULT  Goal: Nutrient/Hydration intake appropriate for improving, restoring or maintaining nutritional needs  Description: Monitor and assess patient's nutrition/hydration status for malnutrition. Collaborate with interdisciplinary team and initiate plan and interventions as ordered. Monitor patient's weight and dietary intake as ordered or per policy. Utilize nutrition screening tool and intervene as necessary. Determine patient's food preferences and provide high-protein, high-caloric foods as appropriate.      INTERVENTIONS:  - Monitor oral intake, urinary output, labs, and treatment plans  - Assess nutrition and hydration status and recommend course of action  - Evaluate amount of meals eaten  - Assist patient with eating if necessary   - Allow adequate time for meals  - Recommend/ encourage appropriate diets, oral nutritional supplements, and vitamin/mineral supplements  - Order, calculate, and assess calorie counts as needed  - Recommend, monitor, and adjust tube feedings and TPN/PPN based on assessed needs  - Assess need for intravenous fluids  - Provide specific nutrition/hydration education as appropriate  - Include patient/family/caregiver in decisions related to nutrition  Outcome: Progressing     Problem: Prexisting or High Potential for Compromised Skin Integrity  Goal: Skin integrity is maintained or improved  Description: INTERVENTIONS:  - Identify patients at risk for skin breakdown  - Assess and monitor skin integrity  - Assess and monitor nutrition and hydration status  - Monitor labs   - Assess for incontinence   - Turn and reposition patient  - Assist with mobility/ambulation  - Relieve pressure over bony prominences  - Avoid friction and shearing  - Provide appropriate hygiene as needed including keeping skin clean and dry  - Evaluate need for skin moisturizer/barrier cream  - Collaborate with interdisciplinary team   - Patient/family teaching  - Consider wound care consult   Outcome: Progressing

## 2023-09-10 NOTE — ASSESSMENT & PLAN NOTE
Presents with worsening shortness of breath x 2-3 days and a recurrent cough, meeting sepsis criteria as below  CT chest -> Focal ill-defined right upper lobe consolidation with associated small cavitary space is suspicious for pneumonia. Recommend follow-up to resolution with chest CT in 4 to 6 weeks.   Urine Legionella/Streptococcus Ag negative  Continue IV Rocephin monotherapy  Procalcitonin trend: 0.34 -> 0.55 -> elevated 0.71 today (trend until peak)  Encourage incentive spirometry  PRN Robitussin on board  Supportive care otherwise  PT/OT recommending home home health services once medically stable

## 2023-09-10 NOTE — ASSESSMENT & PLAN NOTE
Baseline creatinine of approximately 2.5-2.7 -> currently stable at 2.75  Avoid nephrotoxins and hypotension - note current Bumex use  Monitor renal function and urine output

## 2023-09-10 NOTE — PLAN OF CARE
Problem: MOBILITY - ADULT  Goal: Maintain or return to baseline ADL function  Description: INTERVENTIONS:  -  Assess patient's ability to carry out ADLs; assess patient's baseline for ADL function and identify physical deficits which impact ability to perform ADLs (bathing, care of mouth/teeth, toileting, grooming, dressing, etc.)  - Assess/evaluate cause of self-care deficits   - Assess range of motion  - Assess patient's mobility; develop plan if impaired  - Assess patient's need for assistive devices and provide as appropriate  - Encourage maximum independence but intervene and supervise when necessary  - Involve family in performance of ADLs  - Assess for home care needs following discharge   - Consider OT consult to assist with ADL evaluation and planning for discharge  - Provide patient education as appropriate  Outcome: Progressing     Problem: PAIN - ADULT  Goal: Verbalizes/displays adequate comfort level or baseline comfort level  Description: Interventions:  - Encourage patient to monitor pain and request assistance  - Assess pain using appropriate pain scale  - Administer analgesics based on type and severity of pain and evaluate response  - Implement non-pharmacological measures as appropriate and evaluate response  - Consider cultural and social influences on pain and pain management  - Notify physician/advanced practitioner if interventions unsuccessful or patient reports new pain  Outcome: Progressing     Problem: INFECTION - ADULT  Goal: Absence or prevention of progression during hospitalization  Description: INTERVENTIONS:  - Assess and monitor for signs and symptoms of infection  - Monitor lab/diagnostic results  - Monitor all insertion sites, i.e. indwelling lines, tubes, and drains  - Monitor endotracheal if appropriate and nasal secretions for changes in amount and color  - [place] appropriate cooling/warming therapies per order  - Administer medications as ordered  - Instruct and encourage patient and family to use good hand hygiene technique  - Identify and instruct in appropriate isolation precautions for identified infection/condition  Outcome: Progressing

## 2023-09-10 NOTE — PROGRESS NOTES
[facility]  Progress Note  Name: Avinash Barragan  MRN: [id number]  Unit/Bed#: -01 I Date of Admission: 9/6/2023   Date of Service: 9/10/2023 I Hospital Day: 4      Assessment & Plan:    * Pneumonia  Assessment & Plan  Presents with worsening shortness of breath x 2-3 days and a recurrent cough, meeting sepsis criteria as below  CT chest -> Focal ill-defined right upper lobe consolidation with associated small cavitary space is suspicious for pneumonia. Recommend follow-up to resolution with chest CT in 4 to 6 weeks.   Urine Legionella/Streptococcus Ag negative  Continue IV Rocephin monotherapy  Procalcitonin trend: 0.34 -> 0.55 -> elevated 0.71 today (trend until peak)  Encourage incentive spirometry  PRN Robitussin on board  Supportive care otherwise  PT/OT recommending home home health services once medically stable     Abnormal CT scan  Assessment & Plan  CT imaging noting an intermediate density partially exophytic lesion from the upper pole of right kidney, with inability to distinguish hyperdense cyst from solid neoplasm  Subsequent renal ultrasound also indeterminate -> proceed with MRI for further classification (will require Ativan for sedation prior to study)    COPD with acute exacerbation (HCC)  Assessment & Plan  COPD with possible mild exacerbation on admission in setting of pneumonia and inability to obtain nebulizers prior to presentation  Recently switched to Performist and Pulmicort nebulizers - unable to obtain Performist, hence, has only been using Pulmicort for the last week  Continue Perforomist/Pulmicort dual nebulizer treatments  No need for the corticosteroids due to lack of wheezing on exam and oxygenation at baseline  Will appreciate case management assistance in ensuring resources for appropriate medications at discharge    Sepsis on admission [facility])  Assessment & Plan  Initially presented with tachycardia/tachypnea and leukocytosis due to pneumonia (see above)  Monitor vitals and maintain hemodynamics    Chronic respiratory failure with hypoxia ([facility])  Assessment & Plan  Baseline oxygen requirement of 3 L per nasal cannula  In the setting of underlying diastolic CHF and COPD    Chronic diastolic congestive heart failure ([facility])  Assessment & Plan  On diuresis with Bumex and Zaroxolyn 3x/weekly  Continue beta-blockade with Labetalol  EF of 65%  Monitor/replete electrolyte deficiencies of present    Benign prostatic hyperplasia with urinary hesitancy  Assessment & Plan  Continue Flomax    Essential hypertension  Assessment & Plan  Continue Labetalol/Bumex    CKD (chronic kidney disease) stage 4, GFR 15-29 ml/min ([facility])  Assessment & Plan  Baseline creatinine of approximately 2.5-2.7 -> currently stable at 2.75  Avoid nephrotoxins and hypotension - note current Bumex use  Monitor renal function and urine output    Depression  Assessment & Plan  Continue Zoloft    QT prolongation  Assessment & Plan  QTc 598 (peak) on admission day  Avoid QTc prolonging agents  Replete electrolyte deficiencies of present  Repeat EKG on 9/8 revealed improvement in QTc at 420    Type 2 diabetes mellitus with diabetic neuropathy, without long-term current use of insulin ([facility])  Assessment & Plan  Lab Results   Component Value Date    HGBA1C 6.3 (H) 05/02/2023     Continue SSI coverage per Accu-Cheks  Diabetic diet  Hypoglycemia protocol  Hold Glipizide while hospitalized    Lung nodule  Assessment & Plan  New nodule noted on CT C/A/P  Outpatient f/u with routine screening per guidelines    Hypokalemia  Assessment & Plan  Monitor/replace serum potassium as necessary  Serum magnesium normal    Hyponatremia  Assessment & Plan  Serum sodium stable 133 currently  Continue to monitor      DVT Prophylaxis:  Heparin SC    Patient Centered Rounds:  I have performed bedside rounds and discussed plan of care with nursing today.   Discussions with Specialists or Other Care Team Provider:  see above assessments if applicable    Education and Discussions with Family / Patient:  Patient, along with wife/son, at bedside today    Time Spent for Care:  35 minutes. More than 50% of total time spent on counseling and coordination of care, on one or more of the following: performing physical exam; counseling and coordination of care, obtaining or reviewing history, documenting in the medical record, reviewing/ordering tests/medications/procedures, and communicating with other healthcare professionals. Current Length of Stay: 4 day(s)  Current Patient Status: Inpatient   Certification Statement:  Patient will continue to require additional hospital stay due to assessments as noted above. Code Status: Level 3 - DNAR and DNI        Subjective:     Encountered earlier in the morning. States his breathing has improved, but still feels weak/fatigued. Objective:     Vitals:   Temp (24hrs), Av.4 °F (36.3 °C), Min:96.7 °F (35.9 °C), Max:98 °F (36.7 °C)    Temp:  [96.7 °F (35.9 °C)-98 °F (36.7 °C)] 96.7 °F (35.9 °C)  HR:  [] 98  Resp:  [20-22] 20  BP: ()/(51-56) 110/56  SpO2:  [93 %-99 %] 95 %  Body mass index is 34.59 kg/m². Input and Output Summary (last 24 hours):        Intake/Output Summary (Last 24 hours) at 9/10/2023 1205  Last data filed at 9/10/2023 5681  Gross per 24 hour   Intake 240 ml   Output 575 ml   Net -335 ml       Physical Exam:     GENERAL  weak/fatigued   HEAD   Normocephalic - atraumatic   EYES   PERRL - EOMI    MOUTH   Mucosa moist   NECK   Supple - full range of motion   CARDIAC  rate controlled - S1/S2 positive   PULMONARY    diminished bilaterally with trace expiratory wheezing - nonlabored respirations on nasal cannula oxygen   ABDOMEN   Soft - nontender/nondistended - active bowel sounds   MUSCULOSKELETAL   Motor strength/range of motion deconditioned   NEUROLOGIC  at baseline   SKIN   Chronic wrinkles/blemishes    PSYCHIATRIC   Mood/affect stable           Additional Data:     Labs & Recent Cultures:    Results from last 7 days   Lab Units 09/10/23  0311   WBC Thousand/uL 14.62*   HEMOGLOBIN g/dL 12.9   HEMATOCRIT % 39.4   PLATELETS Thousands/uL 190   NEUTROS PCT % 87*   LYMPHS PCT % 5*   MONOS PCT % 7   EOS PCT % 0     Results from last 7 days   Lab Units 09/10/23  0311 09/07/23  0257 09/06/23  1333   POTASSIUM mmol/L 3.2*   < > 3.3*   CHLORIDE mmol/L 95*   < > 91*   CO2 mmol/L 24   < > 27   BUN mg/dL 118*   < > 112*   CREATININE mg/dL 2.75*   < > 2.95*   CALCIUM mg/dL 9.2   < > 9.7   ALK PHOS U/L  --   --  72   ALT U/L  --   --  10   AST U/L  --   --  14    < > = values in this interval not displayed. Results from last 7 days   Lab Units 09/10/23  1132 09/10/23  0728 09/09/23  2008 09/09/23  1639 09/09/23  1143 09/09/23  0715 09/08/23  2148 09/08/23  2059 09/08/23  1624 09/08/23  1206 09/08/23  0729 09/07/23  2120   POC GLUCOSE mg/dl 210* 211* 236* 138 281* 181* 168* 185* 216* 169* 199* 154*         Results from last 7 days   Lab Units 09/10/23  0311 09/09/23  0306 09/07/23  0257 09/06/23  1424   LACTIC ACID mmol/L  --   --   --  1.3   PROCALCITONIN ng/ml 0.71* 0.70* 0.55* 0.34*         Results from last 7 days   Lab Units 09/06/23  1815 09/06/23  1449 09/06/23  1424   BLOOD CULTURE   --  No Growth at 72 hrs. No Growth at 72 hrs.    LEGIONELLA URINARY ANTIGEN  Negative  --   --          Lines/Drains:  Invasive Devices     Peripheral Intravenous Line  Duration           Peripheral IV 09/07/23 Right;Ventral (anterior) Forearm 3 days                  Last 24 Hours Medication List:   Current Facility-Administered Medications   Medication Dose Route Frequency Provider Last Rate   • acetaminophen  650 mg Oral Q6H PRN Charles Alfaro PA-C     • albuterol  2.5 mg Nebulization Q4H PRN Jovita Kaplan MD     • allopurinol  200 mg Oral Daily Charles Alfaro PA-C     • aluminum-magnesium hydroxide-simethicone  30 mL Oral Q6H PRN Charles Alfaro, BAKARI     • budesonide  0.5 mg Nebulization BID Jeanine Donovan PA-C     • bumetanide  2 mg Oral BID Grady Dang PA-C     • calcitriol  0.25 mcg Oral Daily Radha Judge     • cefTRIAXone  2,000 mg Intravenous Q24H Emmy Hair MD 2,000 mg (09/09/23 1433)   • fluticasone  1 spray Each Nare Daily Jeanine Donovan PA-C     • formoterol  20 mcg Nebulization Q12H Rayray Rojo MD     • guaiFENesin  600 mg Oral BID Jeanine Donovan PA-C     • heparin (porcine)  5,000 Units Subcutaneous [facility] Grady Dang PA-C     • insulin lispro  1-6 Units Subcutaneous 4x Daily (AC & HS) Grady Lares PA-C     • labetalol  200 mg Oral BID Grady Lares PA-C     • LORazepam  1 mg Intravenous Once PRN Emmy Hair MD     • melatonin  6 mg Oral HS Lisha Ojeda PA-C     • metolazone  2.5 mg Oral Once per day on Mon Wed Fri Jeanine Donovan PA-C     • nystatin   Topical BID CARLOS Taylor     • potassium chloride  20 mEq Oral BID Jeanine Donovan PA-C     • senna  1 tablet Oral Daily PRN Jeanine Donovan PA-C     • sertraline  50 mg Oral QAM Grady Dang PA-C     • sodium chloride (PF)  3 mL Intravenous Q1H PRN Robert Sever, MD     • tamsulosin  0.4 mg Oral Daily With Plan B MediaBAKARI     • traMADol  50 mg Oral BID PRN Jeanine Donovan PA-C     • triamcinolone   Topical BID Grady Lares PA-C     • trimethobenzamide  100 mg Intramuscular Q6H PRN CARLOS Taylor                    ** Please Note: This note is constructed using a voice recognition dictation system. An occasional wrong word/phrase or “sound-a-like” substitution may have been picked up by dictation device due to the inherent limitations of voice recognition software. Read the chart carefully and recognize, using reasonable context, where substitutions may have occurred. **

## 2023-09-10 NOTE — ASSESSMENT & PLAN NOTE
COPD with possible mild exacerbation on admission in setting of pneumonia and inability to obtain nebulizers prior to presentation  Recently switched to Performist and Pulmicort nebulizers - unable to obtain Performist, hence, has only been using Pulmicort for the last week  Continue Perforomist/Pulmicort dual nebulizer treatments  No need for the corticosteroids due to lack of wheezing on exam and oxygenation at baseline  Will appreciate case management assistance in ensuring resources for appropriate medications at discharge

## 2023-09-11 LAB
ANION GAP SERPL CALCULATED.3IONS-SCNC: 11 MMOL/L
BACTERIA BLD CULT: NORMAL
BACTERIA BLD CULT: NORMAL
BASOPHILS # BLD AUTO: 0.02 THOUSANDS/ÂΜL (ref 0–0.1)
BASOPHILS NFR BLD AUTO: 0 % (ref 0–1)
BUN SERPL-MCNC: 117 MG/DL (ref 5–25)
CALCIUM SERPL-MCNC: 9.2 MG/DL (ref 8.4–10.2)
CHLORIDE SERPL-SCNC: 95 MMOL/L (ref 96–108)
CO2 SERPL-SCNC: 26 MMOL/L (ref 21–32)
CREAT SERPL-MCNC: 2.47 MG/DL (ref 0.6–1.3)
EOSINOPHIL # BLD AUTO: 0.11 THOUSAND/ÂΜL (ref 0–0.61)
EOSINOPHIL NFR BLD AUTO: 1 % (ref 0–6)
ERYTHROCYTE [DISTWIDTH] IN BLOOD BY AUTOMATED COUNT: 14 % (ref 11.6–15.1)
GFR SERPL CREATININE-BSD FRML MDRD: 22 ML/MIN/1.73SQ M
GLUCOSE SERPL-MCNC: 164 MG/DL (ref 65–140)
GLUCOSE SERPL-MCNC: 167 MG/DL (ref 65–140)
GLUCOSE SERPL-MCNC: 172 MG/DL (ref 65–140)
GLUCOSE SERPL-MCNC: 196 MG/DL (ref 65–140)
GLUCOSE SERPL-MCNC: 242 MG/DL (ref 65–140)
HCT VFR BLD AUTO: 37.6 % (ref 36.5–49.3)
HGB BLD-MCNC: 12.6 G/DL (ref 12–17)
IMM GRANULOCYTES # BLD AUTO: 0.13 THOUSAND/UL (ref 0–0.2)
IMM GRANULOCYTES NFR BLD AUTO: 1 % (ref 0–2)
LYMPHOCYTES # BLD AUTO: 0.63 THOUSANDS/ÂΜL (ref 0.6–4.47)
LYMPHOCYTES NFR BLD AUTO: 5 % (ref 14–44)
MAGNESIUM SERPL-MCNC: 1.9 MG/DL (ref 1.9–2.7)
MCH RBC QN AUTO: 29 PG (ref 26.8–34.3)
MCHC RBC AUTO-ENTMCNC: 33.5 G/DL (ref 31.4–37.4)
MCV RBC AUTO: 86 FL (ref 82–98)
MONOCYTES # BLD AUTO: 1.11 THOUSAND/ÂΜL (ref 0.17–1.22)
MONOCYTES NFR BLD AUTO: 8 % (ref 4–12)
NEUTROPHILS # BLD AUTO: 11.72 THOUSANDS/ÂΜL (ref 1.85–7.62)
NEUTS SEG NFR BLD AUTO: 85 % (ref 43–75)
NRBC BLD AUTO-RTO: 0 /100 WBCS
PLATELET # BLD AUTO: 197 THOUSANDS/UL (ref 149–390)
PMV BLD AUTO: 10.9 FL (ref 8.9–12.7)
POTASSIUM SERPL-SCNC: 3.5 MMOL/L (ref 3.5–5.3)
PROCALCITONIN SERPL-MCNC: 0.66 NG/ML
RBC # BLD AUTO: 4.35 MILLION/UL (ref 3.88–5.62)
SODIUM SERPL-SCNC: 132 MMOL/L (ref 135–147)
WBC # BLD AUTO: 13.72 THOUSAND/UL (ref 4.31–10.16)

## 2023-09-11 PROCEDURE — 94760 N-INVAS EAR/PLS OXIMETRY 1: CPT

## 2023-09-11 PROCEDURE — 85025 COMPLETE CBC W/AUTO DIFF WBC: CPT | Performed by: INTERNAL MEDICINE

## 2023-09-11 PROCEDURE — 82948 REAGENT STRIP/BLOOD GLUCOSE: CPT

## 2023-09-11 PROCEDURE — 99232 SBSQ HOSP IP/OBS MODERATE 35: CPT | Performed by: INTERNAL MEDICINE

## 2023-09-11 PROCEDURE — 80048 BASIC METABOLIC PNL TOTAL CA: CPT | Performed by: INTERNAL MEDICINE

## 2023-09-11 PROCEDURE — 99222 1ST HOSP IP/OBS MODERATE 55: CPT | Performed by: PHYSICIAN ASSISTANT

## 2023-09-11 PROCEDURE — 83735 ASSAY OF MAGNESIUM: CPT | Performed by: INTERNAL MEDICINE

## 2023-09-11 PROCEDURE — 94640 AIRWAY INHALATION TREATMENT: CPT

## 2023-09-11 PROCEDURE — 84145 PROCALCITONIN (PCT): CPT | Performed by: INTERNAL MEDICINE

## 2023-09-11 RX ADMIN — SERTRALINE HYDROCHLORIDE 50 MG: 50 TABLET ORAL at 08:33

## 2023-09-11 RX ADMIN — BUDESONIDE 0.5 MG: 0.5 INHALANT ORAL at 19:57

## 2023-09-11 RX ADMIN — BUMETANIDE 2 MG: 1 TABLET ORAL at 17:08

## 2023-09-11 RX ADMIN — LABETALOL HYDROCHLORIDE 200 MG: 200 TABLET, FILM COATED ORAL at 08:33

## 2023-09-11 RX ADMIN — NYSTATIN: 100000 POWDER TOPICAL at 08:34

## 2023-09-11 RX ADMIN — HEPARIN SODIUM 5000 UNITS: 5000 INJECTION INTRAVENOUS; SUBCUTANEOUS at 14:58

## 2023-09-11 RX ADMIN — TAMSULOSIN HYDROCHLORIDE 0.4 MG: 0.4 CAPSULE ORAL at 17:08

## 2023-09-11 RX ADMIN — INSULIN LISPRO 2 UNITS: 100 INJECTION, SOLUTION INTRAVENOUS; SUBCUTANEOUS at 17:08

## 2023-09-11 RX ADMIN — TRIAMCINOLONE ACETONIDE: 1 CREAM TOPICAL at 08:34

## 2023-09-11 RX ADMIN — NYSTATIN: 100000 POWDER TOPICAL at 17:22

## 2023-09-11 RX ADMIN — FORMOTEROL FUMARATE DIHYDRATE 20 MCG: 20 SOLUTION RESPIRATORY (INHALATION) at 07:38

## 2023-09-11 RX ADMIN — INSULIN LISPRO 1 UNITS: 100 INJECTION, SOLUTION INTRAVENOUS; SUBCUTANEOUS at 22:50

## 2023-09-11 RX ADMIN — Medication 6 MG: at 21:00

## 2023-09-11 RX ADMIN — BUMETANIDE 2 MG: 1 TABLET ORAL at 08:33

## 2023-09-11 RX ADMIN — METOLAZONE 2.5 MG: 2.5 TABLET ORAL at 08:33

## 2023-09-11 RX ADMIN — CALCITRIOL CAPSULES 0.25 MCG 0.25 MCG: 0.25 CAPSULE ORAL at 08:33

## 2023-09-11 RX ADMIN — HEPARIN SODIUM 5000 UNITS: 5000 INJECTION INTRAVENOUS; SUBCUTANEOUS at 04:42

## 2023-09-11 RX ADMIN — FLUTICASONE PROPIONATE 1 SPRAY: 50 SPRAY, METERED NASAL at 08:34

## 2023-09-11 RX ADMIN — CEFTRIAXONE 2000 MG: 2 INJECTION, SOLUTION INTRAVENOUS at 14:58

## 2023-09-11 RX ADMIN — INSULIN LISPRO 1 UNITS: 100 INJECTION, SOLUTION INTRAVENOUS; SUBCUTANEOUS at 08:33

## 2023-09-11 RX ADMIN — FORMOTEROL FUMARATE DIHYDRATE 20 MCG: 20 SOLUTION RESPIRATORY (INHALATION) at 19:57

## 2023-09-11 RX ADMIN — HYDROXYZINE HYDROCHLORIDE 25 MG: 25 TABLET, FILM COATED ORAL at 08:33

## 2023-09-11 RX ADMIN — BUDESONIDE 0.5 MG: 0.5 INHALANT ORAL at 07:38

## 2023-09-11 RX ADMIN — HEPARIN SODIUM 5000 UNITS: 5000 INJECTION INTRAVENOUS; SUBCUTANEOUS at 21:00

## 2023-09-11 RX ADMIN — POTASSIUM CHLORIDE 20 MEQ: 1500 TABLET, EXTENDED RELEASE ORAL at 08:33

## 2023-09-11 RX ADMIN — TRIAMCINOLONE ACETONIDE: 1 CREAM TOPICAL at 17:23

## 2023-09-11 RX ADMIN — GUAIFENESIN 600 MG: 600 TABLET ORAL at 08:33

## 2023-09-11 RX ADMIN — LABETALOL HYDROCHLORIDE 200 MG: 200 TABLET, FILM COATED ORAL at 17:08

## 2023-09-11 RX ADMIN — POTASSIUM CHLORIDE 20 MEQ: 1500 TABLET, EXTENDED RELEASE ORAL at 17:08

## 2023-09-11 RX ADMIN — ALLOPURINOL 200 MG: 100 TABLET ORAL at 08:33

## 2023-09-11 RX ADMIN — INSULIN LISPRO 3 UNITS: 100 INJECTION, SOLUTION INTRAVENOUS; SUBCUTANEOUS at 11:48

## 2023-09-11 RX ADMIN — GUAIFENESIN 600 MG: 600 TABLET ORAL at 17:08

## 2023-09-11 NOTE — ASSESSMENT & PLAN NOTE
Presents with worsening shortness of breath x 2-3 days and a recurrent cough, meeting sepsis criteria as below  CT chest -> Focal ill-defined right upper lobe consolidation with associated small cavitary space is suspicious for pneumonia. Recommend follow-up to resolution with chest CT in 4 to 6 weeks.   Urine Legionella/Streptococcus Ag negative  Continue IV Rocephin monotherapy  Procalcitonin trend: 0.34 -> 0.55 ->0.71--0.66  Encourage incentive spirometry  PRN Robitussin on board  Supportive care otherwise  PT/OT recommending home home health services once medically stable

## 2023-09-11 NOTE — ASSESSMENT & PLAN NOTE
Baseline creatinine of approximately 2.5-2.7 -> currently stable at 2.47  Avoid nephrotoxins and hypotension - note current Bumex use  Monitor renal function and urine output

## 2023-09-11 NOTE — ASSESSMENT & PLAN NOTE
CT imaging noting an intermediate density partially exophytic lesion from the upper pole of right kidney, with inability to distinguish hyperdense cyst from solid neoplasm  Subsequent renal ultrasound also indeterminate -> proceed with MRI for further classification (will require Ativan for sedation prior to study)    MRI showed-" Examination was prematurely terminated due to the patient's claustrophobia and difficulty breathing.  Redemonstration of a 1.4 cm T2 isointense exophytic lesion in the upper pole right kidney without restricted diffusion, incompletely characterized without T1-weighted images and contrast.Consider repeat examination with premedication and contrast"  Urology consult appreciated  Urology recommended follow-up with them as outpatient in 3 to 4 weeks after discharge

## 2023-09-11 NOTE — PLAN OF CARE
Problem: MOBILITY - ADULT  Goal: Maintain or return to baseline ADL function  Description: INTERVENTIONS:  -  Assess patient's ability to carry out ADLs; assess patient's baseline for ADL function and identify physical deficits which impact ability to perform ADLs (bathing, care of mouth/teeth, toileting, grooming, dressing, etc.)  - Assess/evaluate cause of self-care deficits   - Assess range of motion  - Assess patient's mobility; develop plan if impaired  - Assess patient's need for assistive devices and provide as appropriate  - Encourage maximum independence but intervene and supervise when necessary  - Involve family in performance of ADLs  - Assess for home care needs following discharge   - Consider OT consult to assist with ADL evaluation and planning for discharge  - Provide patient education as appropriate  Outcome: Progressing  Goal: Maintains/Returns to pre admission functional level  Description: INTERVENTIONS:  - Perform BMAT or MOVE assessment daily.   - Set and communicate daily mobility goal to care team and patient/family/caregiver. - Collaborate with rehabilitation services on mobility goals if consulted  - Perform Range of Motion 2 times a day. - Reposition patient every 2 hours.   - Dangle patient 2 times a day  - Stand patient 2 times a day  - Ambulate patient 2 times a day  - Out of bed to chair 2 times a day   - Out of bed for meals 2 times a day  - Out of bed for toileting  - Record patient progress and toleration of activity level   Outcome: Progressing     Problem: PAIN - ADULT  Goal: Verbalizes/displays adequate comfort level or baseline comfort level  Description: Interventions:  - Encourage patient to monitor pain and request assistance  - Assess pain using appropriate pain scale  - Administer analgesics based on type and severity of pain and evaluate response  - Implement non-pharmacological measures as appropriate and evaluate response  - Consider cultural and social influences on pain and pain management  - Notify physician/advanced practitioner if interventions unsuccessful or patient reports new pain  Outcome: Progressing     Problem: INFECTION - ADULT  Goal: Absence or prevention of progression during hospitalization  Description: INTERVENTIONS:  - Assess and monitor for signs and symptoms of infection  - Monitor lab/diagnostic results  - Monitor all insertion sites, i.e. indwelling lines, tubes, and drains  - Monitor endotracheal if appropriate and nasal secretions for changes in amount and color  - [place] appropriate cooling/warming therapies per order  - Administer medications as ordered  - Instruct and encourage patient and family to use good hand hygiene technique  - Identify and instruct in appropriate isolation precautions for identified infection/condition  Outcome: Progressing  Goal: Absence of fever/infection during neutropenic period  Description: INTERVENTIONS:  - Monitor WBC    Outcome: Progressing     Problem: Potential for Falls  Goal: Patient will remain free of falls  Description: INTERVENTIONS:  - Educate patient/family on patient safety including physical limitations  - Instruct patient to call for assistance with activity   - Consult OT/PT to assist with strengthening/mobility   - Keep Call bell within reach  - Keep bed low and locked with side rails adjusted as appropriate  - Keep care items and personal belongings within reach  - Initiate and maintain comfort rounds  - Make Fall Risk Sign visible to staff  - Offer Toileting every 2 Hours, in advance of need  - Initiate/Maintain bed alarm  - Obtain necessary fall risk management equipment: socks  - Apply yellow socks and bracelet for high fall risk patients  - Consider moving patient to room near nurses station  Outcome: Progressing     Problem: Nutrition/Hydration-ADULT  Goal: Nutrient/Hydration intake appropriate for improving, restoring or maintaining nutritional needs  Description: Monitor and assess patient's nutrition/hydration status for malnutrition. Collaborate with interdisciplinary team and initiate plan and interventions as ordered. Monitor patient's weight and dietary intake as ordered or per policy. Utilize nutrition screening tool and intervene as necessary. Determine patient's food preferences and provide high-protein, high-caloric foods as appropriate.      INTERVENTIONS:  - Monitor oral intake, urinary output, labs, and treatment plans  - Assess nutrition and hydration status and recommend course of action  - Evaluate amount of meals eaten  - Assist patient with eating if necessary   - Allow adequate time for meals  - Recommend/ encourage appropriate diets, oral nutritional supplements, and vitamin/mineral supplements  - Order, calculate, and assess calorie counts as needed  - Recommend, monitor, and adjust tube feedings and TPN/PPN based on assessed needs  - Assess need for intravenous fluids  - Provide specific nutrition/hydration education as appropriate  - Include patient/family/caregiver in decisions related to nutrition  Outcome: Progressing     Problem: Prexisting or High Potential for Compromised Skin Integrity  Goal: Skin integrity is maintained or improved  Description: INTERVENTIONS:  - Identify patients at risk for skin breakdown  - Assess and monitor skin integrity  - Assess and monitor nutrition and hydration status  - Monitor labs   - Assess for incontinence   - Turn and reposition patient  - Assist with mobility/ambulation  - Relieve pressure over bony prominences  - Avoid friction and shearing  - Provide appropriate hygiene as needed including keeping skin clean and dry  - Evaluate need for skin moisturizer/barrier cream  - Collaborate with interdisciplinary team   - Patient/family teaching  - Consider wound care consult   Outcome: Progressing

## 2023-09-11 NOTE — CASE MANAGEMENT
Case Management Discharge Planning Note    Patient name Radha Bell  Location /-97 MRN [id number]  : 1935 Date 2023       Current Admission Date: 2023  Current Admission Diagnosis:Pneumonia   Patient Active Problem List    Diagnosis Date Noted   • Depression 2023   • Hyponatremia 2023   • QT prolongation 2023   • Abnormal CT scan 2023   • Lung nodule 2023   • Pneumonia 2023   • Sepsis on admission ([address]) 2023   • Pulmonary hypertension ([address]) 2023   • Secondary hyperparathyroidism of renal origin ([address]) 2023   • Hypokalemia 2023   • Heart failure with preserved ejection fraction ([address]) 2023   • Continuous opioid dependence ([address]) 2023   • Chronic rhinitis 2023   • Venous stasis ulcer of other part of right lower leg limited to breakdown of skin, unspecified whether varicose veins present ([address]) 2023   • Type 2 diabetes mellitus with diabetic neuropathy, without long-term current use of insulin ([address]) 2023   • Idiopathic chronic gout of right foot without tophus 2022   • Pulmonary emphysema ([address]) 2022   • Memory loss    • Ambulatory dysfunction    • Mild cognitive impairment 2022   • Mild episode of recurrent major depressive disorder ([address]) 2022   • Other constipation 2022   • Insomnia due to medical condition 2022   • Chronic diastolic congestive heart failure ([address]) 2022   • CKD (chronic kidney disease) stage 4, GFR 15-29 ml/min ([address]) 03/15/2022   • Benign prostatic hyperplasia with urinary hesitancy 2020   • Moderate COPD (chronic obstructive pulmonary disease) ([address]) 12/10/2019   • Chronic respiratory failure with hypoxia ([address]) 11/15/2019   • COPD with acute exacerbation ([address]) 2019   • 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 disease, without long-term current use of insulin ([address]) 2016   • Essential hypertension 2015   • CHI (obstructive sleep apnea) 07/09/2014      LOS (days): 5  Geometric Mean LOS (GMLOS) (days): 5.00  Days to GMLOS:0     OBJECTIVE:  Risk of Unplanned Readmission Score: 29.74      Current admission status: Inpatient   Preferred Pharmacy:   [address] [place] [address]  Phone: [phone] Fax: [phone]    Primary Care Provider: Yogi Whitney MD    Primary Insurance: [facility]  Secondary Insurance:     DISCHARGE DETAILS:    Discharge planning discussed with[de-identified] pt's son and pt  Freedom of Choice: Yes     CM contacted family/caregiver?: Yes  Were Treatment Team discharge recommendations reviewed with patient/caregiver?: Yes  Did patient/caregiver verbalize understanding of patient care needs?: Yes  Were patient/caregiver advised of the risks associated with not following Treatment Team discharge recommendations?: Yes    Contacts  Patient Contacts: Maine Muniz (pt's son)  Relationship to Patient[de-identified] Family  Contact Method: Phone  Reason/Outcome: Discharge Planning, Referral    Requested [address] [facility]         Is the patient interested in [address] at discharge?: No    DME Referral Provided  Referral made for DME?: No    Other Referral/Resources/Interventions Provided:  Interventions: Short Term Rehab  Referral Comments: Referrals to rehab. Treatment Team Recommendation: Short Term Rehab  Discharge Destination Plan[de-identified] Short Term Rehab     Additional Comments: Cm spoke to pt's son regarding d/c dispo. Pt had seemed confused and son is listed as first contact. Pt is recommended for rehab and pt's son feels pt does need rehab. Referrals sent on Aidin. Pt will need new therapy notes for authorization. D/c possibly tomorrow.

## 2023-09-11 NOTE — CONSULTS
Consultation - Marium Uriarte 81 y.o. male MRN: [id number]    Unit/Bed#: -01 Encounter: [id number]      Assessment/Plan     Assessment/Plan:  Right renal lesion  -CT:No hydronephrosis. Punctate nonobstructing right lower pole renal calculus versus vascular calcification. Posterior left upper pole simple cyst. 1.7 cm intermediate attenuation lesion arising from the right upper pole  -US: Right Kidney: Exophytic hypoechoic lesion is seen along the superior pole measuring 1.8 x 1.4 x 1.6 cm. Focal vascularity is seen along the periphery of this finding. There is no appreciable posterior acoustic enhancement. -MRI: Redemonstration of a 1.4 cm T2 isointense exophytic lesion in the upper pole right kidney without restricted diffusion, incompletely characterized without T1-weighted images and contrast.  -recommend out pt f/u with Urology in 3-4 weeks after discharge  -discussed with Uro AP  -will sign off and follow up outpt    Remainder of care per primary team    History of Present Illness     HPI: Marium Uriarte is a 81y.o. year old male with a past medical history for HTN, COPD, chronic respiratory failure, BPH, CKD 4, CHF, T2DM, emphysema, and memory loss who presents to the hospital for treatment of pneumonia. He denies any difficulty urinating. No abdominal pain. He states that he was unaware of having a lesion on his kidney previously. He does complain of shortness of breath. He states that he was a smoker. Inpatient consult to Urology  Consult performed by: Angelica Rivera PA-C  Consult ordered by: Mariama Doss MD          Review of Systems   Constitutional: Negative for chills and fever. HENT: Negative for ear pain and sore throat. Eyes: Negative for pain and visual disturbance. Respiratory: Positive for shortness of breath. Negative for cough. Cardiovascular: Negative for chest pain and palpitations. Gastrointestinal: Negative for abdominal pain and vomiting.    Genitourinary: Negative for difficulty urinating, dysuria, frequency, hematuria and urgency. Musculoskeletal: Negative for arthralgias and back pain. Skin: Negative for color change and rash. Neurological: Negative for seizures and syncope. All other systems reviewed and are negative. Historical Information   Past Medical History:   Diagnosis Date   • Anxiety    • COPD (chronic obstructive pulmonary disease) ([facility])    • Coronary artery disease 2012   • Depression    • Herpes zoster    • Hypertension    • Memory loss     possible   • Mycoplasma pneumonia    • Obesity    • Osteoarthritis    • Renal calculi      Past Surgical History:   Procedure Laterality Date   • CATARACT EXTRACTION      with insert intraoculat lens prosthesis   • HEMORRHOID SURGERY     • NECK SURGERY      for bone spur     Social History   Social History     Substance and Sexual Activity   Alcohol Use Not Currently   • Alcohol/week: 1.0 standard drink of alcohol   • Types: 1 Cans of beer per week    Comment: 1-2 beers a months     Social History     Substance and Sexual Activity   Drug Use No     Social History     Tobacco Use   Smoking Status Former   • Packs/day: 2.00   • Years: 42.00   • Total pack years: 84.00   • Types: Cigarettes   • Start date: 1956   • Quit date: 1998   • Years since quittin.7   Smokeless Tobacco Never     E-Cigarette/Vaping   • E-Cigarette Use Never User      E-Cigarette/Vaping Substances   • Nicotine No    • THC No    • CBD No    • Flavoring No    • Other No    • Unknown No       Family History: non-contributory    Meds/Allergies   all current active meds have been reviewed  No Known Allergies    Objective   Vitals: Blood pressure 119/61, pulse 94, temperature (!) 96.2 °F (35.7 °C), resp. rate 22, weight 112 kg (246 lb 14.6 oz), SpO2 94 %.     Intake/Output Summary (Last 24 hours) at 2023 1206  Last data filed at 2023 0925  Gross per 24 hour   Intake 1200 ml   Output 500 ml   Net 700 ml Invasive Devices     Peripheral Intravenous Line  Duration           Peripheral IV 09/11/23 Dorsal (posterior); Left Hand <1 day                Physical Exam  Vitals and nursing note reviewed. Constitutional:       General: He is not in acute distress. Appearance: He is well-developed. HENT:      Head: Normocephalic and atraumatic. Eyes:      Conjunctiva/sclera: Conjunctivae normal.   Cardiovascular:      Rate and Rhythm: Normal rate and regular rhythm. Heart sounds: No murmur heard. Pulmonary:      Effort: Pulmonary effort is normal. No respiratory distress. Breath sounds: Normal breath sounds. Abdominal:      Palpations: Abdomen is soft. Tenderness: There is no abdominal tenderness. Musculoskeletal:         General: No swelling. Cervical back: Neck supple. Skin:     General: Skin is warm and dry. Capillary Refill: Capillary refill takes less than 2 seconds. Neurological:      General: No focal deficit present. Mental Status: He is alert. Psychiatric:         Mood and Affect: Mood normal.         Behavior: Behavior normal.         Lab Results: I have personally reviewed pertinent reports. Imaging Studies: I have personally reviewed pertinent reports. EKG, Pathology, and Other Studies: I have personally reviewed pertinent reports. VTE Prophylaxis: Heparin    Code Status: Level 3 - DNAR and DNI  Advance Directive and Living Will:      Power of : Yes  POLST:      Counseling / Coordination of Care  Total floor / unit time spent today 30 minutes. Greater than 50% of total time was spent with the patient and / or family counseling and / or coordination of care. A description of the counseling / coordination of care: in my consultation note.

## 2023-09-11 NOTE — PROGRESS NOTES
[facility]  Progress Note  Name: Bernarda Richard I  MRN: [id number]  Unit/Bed#: -01 I Date of Admission: 9/6/2023   Date of Service: 9/11/2023 I Hospital Day: 5    Assessment/Plan   Hyponatremia  Assessment & Plan  Serum sodium stable 132 currently  Continue to monitor    Depression  Assessment & Plan  Continue Zoloft    Lung nodule  Assessment & Plan  New nodule noted on CT C/A/P  Outpatient f/u with routine screening per guidelines    Abnormal CT scan  Assessment & Plan  CT imaging noting an intermediate density partially exophytic lesion from the upper pole of right kidney, with inability to distinguish hyperdense cyst from solid neoplasm  Subsequent renal ultrasound also indeterminate -> proceed with MRI for further classification (will require Ativan for sedation prior to study)    MRI showed-" Examination was prematurely terminated due to the patient's claustrophobia and difficulty breathing.  Redemonstration of a 1.4 cm T2 isointense exophytic lesion in the upper pole right kidney without restricted diffusion, incompletely characterized without T1-weighted images and contrast.Consider repeat examination with premedication and contrast"  Urology consult appreciated  Urology recommended follow-up with them as outpatient in 3 to 4 weeks after discharge      QT prolongation  Assessment & Plan  QTc 598 (peak) on admission day  Avoid QTc prolonging agents  Replete electrolyte deficiencies of present  Repeat EKG on 9/8 revealed improvement in QTc at 420    Sepsis on admission [facility])  Assessment & Plan  Initially presented with tachycardia/tachypnea and leukocytosis due to pneumonia (see above)  Monitor vitals and maintain hemodynamics    Hypokalemia  Assessment & Plan  Monitor/replace serum potassium as necessary  Serum magnesium normal    Type 2 diabetes mellitus with diabetic neuropathy, without long-term current use of insulin [facility])  Assessment & Plan  Lab Results   Component Value Date    HGBA1C 6.3 (H) 05/02/2023     Continue SSI coverage per Accu-Cheks  Diabetic diet  Hypoglycemia protocol  Hold Glipizide while hospitalized    Chronic diastolic congestive heart failure ([address])  Assessment & Plan  On diuresis with Bumex and Zaroxolyn 3x/weekly  Continue beta-blockade with Labetalol  EF of 65%  Monitor/replete electrolyte deficiencies of present    CKD (chronic kidney disease) stage 4, GFR 15-29 ml/min ([facility])  Assessment & Plan  Baseline creatinine of approximately 2.5-2.7 -> currently stable at 2.47  Avoid nephrotoxins and hypotension - note current Bumex use  Monitor renal function and urine output    Benign prostatic hyperplasia with urinary hesitancy  Assessment & Plan  Continue Flomax    Chronic respiratory failure with hypoxia (HCC)  Assessment & Plan  Baseline oxygen requirement of 3 L per nasal cannula  In the setting of underlying diastolic CHF and COPD    COPD with acute exacerbation ([address])  Assessment & Plan  COPD with possible mild exacerbation on admission in setting of pneumonia and inability to obtain nebulizers prior to presentation  Recently switched to Performist and Pulmicort nebulizers - unable to obtain Performist, hence, has only been using Pulmicort for the last week  Continue Perforomist/Pulmicort dual nebulizer treatments  No need for the corticosteroids due to lack of wheezing on exam and oxygenation at baseline  Will appreciate case management assistance in ensuring resources for appropriate medications at discharge    Essential hypertension  Assessment & Plan  Continue Labetalol/Bumex    * Pneumonia  Assessment & Plan  Presents with worsening shortness of breath x 2-3 days and a recurrent cough, meeting sepsis criteria as below  CT chest -> Focal ill-defined right upper lobe consolidation with associated small cavitary space is suspicious for pneumonia. Recommend follow-up to resolution with chest CT in 4 to 6 weeks.   Urine Legionella/Streptococcus Ag negative  Continue IV Rocephin monotherapy  Procalcitonin trend: 0.34 -> 0.55 ->0.71--0.66  Encourage incentive spirometry  PRN Robitussin on board  Supportive care otherwise  PT/OT recommending home home health services once medically stable              Labs & Imaging: I have personally reviewed pertinent reports. VTE Prophylaxis: in place. Code Status:   Level 3 - DNAR and DNI    Patient Centered Rounds: I have performed bedside rounds with nursing staff today. Discussions with Specialists or Other Care Team Provider: JULES    Education and Discussions with Family / Patient: Wife at bedside    Current Length of Stay: 5 day(s)    Current Patient Status: Inpatient   Certification Statement: The patient will continue to require additional inpatient hospital stay due to see my assessment and plan. Subjective:   Patient is seen and examined at bedside. Denies any new complaints. Afebrile  All other ROS are negative. Objective:    Vitals: Blood pressure 119/61, pulse 94, temperature (!) 96.2 °F (35.7 °C), resp. rate 22, weight 112 kg (246 lb 14.6 oz), SpO2 94 %. ,Body mass index is 34.44 kg/m². SPO2 RA Rest    Flowsheet Row ED to Hosp-Admission (Current) from 9/6/2023 in [address] Med Surg [facility]   SpO2 94 %   SpO2 Activity At Rest   O2 Device Nasal cannula   O2 Flow Rate --        I&O:     Intake/Output Summary (Last 24 hours) at 9/11/2023 1346  Last data filed at 9/11/2023 0925  Gross per 24 hour   Intake 1200 ml   Output 500 ml   Net 700 ml       Physical Exam:    General- Alert, sitting comfortably in chair . Not in any acute distress. Neck- Supple, No JVD  CVS- regular, S1 and S2 normal  Chest- Bilateral Air entry, decreased at bases with no wheezing  Abdomen- soft, nontender, not distended, no guarding or rigidity, BS+  Extremities-  No pedal edema, No calf tenderness                         Normal ROM in all extremities. CNS-   Alert, awake and orientedx3.  No focal deficits present. Invasive Devices     Peripheral Intravenous Line  Duration           Peripheral IV 23 Dorsal (posterior); Left Hand <1 day                      Social History  reviewed  Family History   Problem Relation Age of Onset   • Diabetes Mother    • Breast cancer Mother         She  in 12.  Age 80   • Stroke Mother    • Pneumonia Father    • Substance Abuse Neg Hx    • Mental illness Neg Hx     reviewed    Meds:  Current Facility-Administered Medications   Medication Dose Route Frequency Provider Last Rate Last Admin   • acetaminophen (TYLENOL) tablet 650 mg  650 mg Oral Q6H PRN Ashley Guerrero PA-C       • albuterol inhalation solution 2.5 mg  2.5 mg Nebulization Q4H PRN Rayray Rojo MD   2.5 mg at 09/10/23 1638   • allopurinol (ZYLOPRIM) tablet 200 mg  200 mg Oral Daily Nir Lares PA-C   200 mg at 23 4591   • aluminum-magnesium hydroxide-simethicone (MAALOX) oral suspension 30 mL  30 mL Oral Q6H PRN Ashley Guerrero PA-C       • budesonide (PULMICORT) inhalation solution 0.5 mg  0.5 mg Nebulization BID Nir Lares PA-C   0.5 mg at 23 5681   • bumetanide (BUMEX) tablet 2 mg  2 mg Oral BID Nir Lares PA-C   2 mg at 23 3779   • calcitriol (ROCALTROL) capsule 0.25 mcg  0.25 mcg Oral Daily Nir Lares PA-C   0.25 mcg at 23 4321   • cefTRIAXone (ROCEPHIN) IVPB (premix in dextrose) 2,000 mg 50 mL  2,000 mg Intravenous Q24H Rohan Mckoy  mL/hr at 09/10/23 1348 2,000 mg at 09/10/23 1348   • fluticasone (FLONASE) 50 mcg/act nasal spray 1 spray  1 spray Each Nare Daily Ashley Guerrero PA-C   1 spray at 23 0834   • formoterol (PERFOROMIST) nebulizer solution 20 mcg  20 mcg Nebulization Q12H Rayray Rojo MD   20 mcg at 23 0738   • guaiFENesin (MUCINEX) 12 hr tablet 600 mg  600 mg Oral BID Nir Lares PA-C   600 mg at 23 7465   • heparin (porcine) subcutaneous injection 5,000 Units  5,000 Units Subcutaneous [facility]   5,000 Units at 09/11/23 0884   • hydrOXYzine HCL (ATARAX) tablet 25 mg  25 mg Oral Q6H PRN Carey Paez MD   25 mg at 09/11/23 6606   • insulin lispro (HumaLOG) 100 units/mL subcutaneous injection 1-6 Units  1-6 Units Subcutaneous 4x Daily (AC & HS) Dedra Hendricks PA-C   3 Units at 09/11/23 1148   • labetalol (NORMODYNE) tablet 200 mg  200 mg Oral BID Michael Lares PA-C   200 mg at 09/11/23 9178   • LORazepam (ATIVAN) injection 1 mg  1 mg Intravenous Once PRN Carey Paez MD       • melatonin tablet 6 mg  6 mg Oral HS Khris Handy PA-C   6 mg at 09/10/23 2057   • metolazone (ZAROXOLYN) tablet 2.5 mg  2.5 mg Oral Once per day on Mon Wed Fri Dedra Hendricks PA-C   2.5 mg at 09/11/23 4447   • nystatin (MYCOSTATIN) powder   Topical BID CARLOS Kumari   Given at 09/11/23 4084   • potassium chloride (K-DUR,KLOR-CON) CR tablet 20 mEq  20 mEq Oral BID Dedra Hendricks PA-C   20 mEq at 09/11/23 7643   • senna (SENOKOT) tablet 8.6 mg  1 tablet Oral Daily PRN Dedra Hendricks PA-C       • sertraline (ZOLOFT) tablet 50 mg  50 mg Oral QAM Michael Lares PA-C   50 mg at 09/11/23 0530   • sodium chloride (PF) 0.9 % injection 3 mL  3 mL Intravenous Q1H PRN Jeremy Reid MD       • tamsulosin [facility]) capsule 0.4 mg  0.4 mg Oral Daily With Outline AppBAKARI   0.4 mg at 09/10/23 1621   • traMADol (ULTRAM) tablet 50 mg  50 mg Oral BID PRN Dedra Hendricks PA-C       • triamcinolone (KENALOG) 0.1 % cream   Topical BID Dedra Hendricks PA-C   Given at 09/11/23 4069   • trimethobenzamide (TIGAN) IM injection 100 mg  100 mg Intramuscular Q6H PRN CARLOS Chopra   100 mg at 09/09/23 1027      Medications Prior to Admission   Medication   • albuterol (2.5 mg/3 mL) 0.083 % nebulizer solution   • allopurinol (ZYLOPRIM) 100 mg tablet   • betamethasone dipropionate (DIPROSONE) 0.05 % cream   • Blood Glucose Monitoring Suppl (OneTouch Verio) w/Device KIT   • budesonide (Pulmicort) 0.5 mg/2 mL nebulizer solution   • bumetanide (BUMEX) 2 mg tablet   • calcitriol (ROCALTROL) 0.25 mcg capsule   • fluticasone (FLONASE) 50 mcg/act nasal spray   • formoterol (PERFOROMIST) 20 MCG/2ML nebulizer solution   • glipiZIDE (GLUCOTROL) 5 mg tablet   • glucose blood test strip   • Iron-Vitamin C (IRON 100/C PO)   • labetalol (NORMODYNE) 200 mg tablet   • Lancets (onetouch ultrasoft) lancets   • metolazone (ZAROXOLYN) 2.5 mg tablet   • mupirocin (BACTROBAN) 2 % ointment   • potassium chloride (K-DUR,KLOR-CON) 20 mEq tablet   • senna (SENOKOT) 8.6 MG tablet   • sertraline (ZOLOFT) 50 mg tablet   • tamsulosin (FLOMAX) 0.4 mg   • traMADol (Ultram) 50 mg tablet       Labs:  Results from last 7 days   Lab Units 09/11/23  0822 09/10/23  0311 09/09/23  0306   WBC Thousand/uL 13.72* 14.62* 18.95*   HEMOGLOBIN g/dL 12.6 12.9 13.6   HEMATOCRIT % 37.6 39.4 42.5   PLATELETS Thousands/uL 197 190 216   NEUTROS PCT % 85* 87* 88*   LYMPHS PCT % 5* 5* 4*   MONOS PCT % 8 7 7   EOS PCT % 1 0 0     Results from last 7 days   Lab Units 09/11/23  0822 09/10/23  0311 09/09/23  0306 09/07/23  0257 09/06/23  1333   POTASSIUM mmol/L 3.5 3.2* 3.4*   < > 3.3*   CHLORIDE mmol/L 95* 95* 96   < > 91*   CO2 mmol/L 26 24 19*   < > 27   BUN mg/dL 117* 118* 116*   < > 112*   CREATININE mg/dL 2.47* 2.75* 2.66*   < > 2.95*   CALCIUM mg/dL 9.2 9.2 9.4   < > 9.7   ALK PHOS U/L  --   --   --   --  72   ALT U/L  --   --   --   --  10   AST U/L  --   --   --   --  14    < > = values in this interval not displayed. Lab Results   Component Value Date    TROPONINI <0.02 10/21/2021    TROPONINI <0.02 11/15/2019         Lab Results   Component Value Date    BLOODCX No Growth After 4 Days. 09/06/2023    BLOODCX No Growth After 4 Days.  09/06/2023         Imaging:  Results for orders placed during the hospital encounter of 09/06/23    XR chest 1 view portable    Narrative  CHEST    INDICATION: dyspnea. COMPARISON: 8/26/2023    EXAM PERFORMED/VIEWS:  XR CHEST PORTABLE  Single view    FINDINGS:    Cardiomediastinal silhouette appears unremarkable. A peripheral subpleural opacity has developed in the lateral right midlung which is nonspecific but could represent peripheral infiltrate or mass. Further assessment via CT should be considered if clinically appropriate    No pneumothorax or pleural effusion. Osseous structures appear within normal limits for patient age. Impression  Development of subpleural opacity in the lateral right midlung which could represent a mass or early infiltrate          Workstation performed: [id number]    Results for orders placed during the hospital encounter of 08/26/23    XR chest 2 views    Narrative  CHEST    INDICATION:   Increased dyspnea. History of pulmonary hypertension, chronic diastolic heart failure, COPD. COMPARISON: PA and lateral chest August 23, 2023. CT chest [place]irm[place] 10, 2022. EXAM PERFORMED/VIEWS:  XR CHEST PA & LATERAL      FINDINGS:    Cardiomediastinal silhouette appears unremarkable. Small calcified granuloma redemonstrated left lung base. The lungs are otherwise clear. No pneumothorax or pleural effusion. Osseous structures appear within normal limits for patient age. Impression  No acute cardiopulmonary disease.             Workstation performed: [id number]      Last 24 Hours Medication List:   Current Facility-Administered Medications   Medication Dose Route Frequency Provider Last Rate   • acetaminophen  650 mg Oral Q6H PRN Florin Reyes PA-C     • albuterol  2.5 mg Nebulization Q4H PRN Raven Cortez MD     • allopurinol  200 mg Oral Daily Renetta Dang PA-C     • aluminum-magnesium hydroxide-simethicone  30 mL Oral Q6H PRN Florin Reyes PA-C     • budesonide  0.5 mg Nebulization BID Florin Reyes PA-C     • bumetanide  2 mg Oral BID Florin Reyes PA-C     • calcitriol  0.25 mcg Oral Daily Carloz Owusu Vasyl Jaffe PA-C     • cefTRIAXone  2,000 mg Intravenous Q24H Sami Irene MD 2,000 mg (09/10/23 1348)   • fluticasone  1 spray Each Nare Daily Annalisa Cobb PA-C     • formoterol  20 mcg Nebulization Q12H Rayray Rojo MD     • guaiFENesin  600 mg Oral BID Annalisa Cobb PA-C     • heparin (porcine)  5,000 Units Subcutaneous [facility] Neha Dang PA-C     • hydrOXYzine HCL  25 mg Oral Q6H PRN Sami Irene MD     • insulin lispro  1-6 Units Subcutaneous 4x Daily (AC & HS) Annalisa Cobb PA-C     • labetalol  200 mg Oral BID Neha Lares PA-C     • LORazepam  1 mg Intravenous Once PRN Sami Irene MD     • melatonin  6 mg Oral HS Amanda Steward PA-C     • metolazone  2.5 mg Oral Once per day on Mon Wed Fri Annalisa Cobb PA-C     • nystatin   Topical BID CARLOS Chopra     • potassium chloride  20 mEq Oral BID Annalisa Cobb PA-C     • senna  1 tablet Oral Daily PRN Annalisa Cobb PA-C     • sertraline  50 mg Oral QAM Neha Dang PA-C     • sodium chloride (PF)  3 mL Intravenous Q1H PRN Kenyatta Rivas MD     • tamsulosin  0.4 mg Oral Daily With Acid LabsBAKARI     • traMADol  50 mg Oral BID PRN Annalisa Cobb PA-C     • triamcinolone   Topical BID Annalisa Cobb PA-C     • trimethobenzamide  100 mg Intramuscular Q6H PRN CARLOS Diaz          Today, Patient Was Seen By: Yvon Ceron MD    ** Please Note: Dictation voice to text software may have been used in the creation of this document.  **

## 2023-09-11 NOTE — PLAN OF CARE
Problem: MOBILITY - ADULT  Goal: Maintain or return to baseline ADL function  Description: INTERVENTIONS:  -  Assess patient's ability to carry out ADLs; assess patient's baseline for ADL function and identify physical deficits which impact ability to perform ADLs (bathing, care of mouth/teeth, toileting, grooming, dressing, etc.)  - Assess/evaluate cause of self-care deficits   - Assess range of motion  - Assess patient's mobility; develop plan if impaired  - Assess patient's need for assistive devices and provide as appropriate  - Encourage maximum independence but intervene and supervise when necessary  - Involve family in performance of ADLs  - Assess for home care needs following discharge   - Consider OT consult to assist with ADL evaluation and planning for discharge  - Provide patient education as appropriate  Outcome: Progressing     Problem: INFECTION - ADULT  Goal: Absence or prevention of progression during hospitalization  Description: INTERVENTIONS:  - Assess and monitor for signs and symptoms of infection  - Monitor lab/diagnostic results  - Monitor all insertion sites, i.e. indwelling lines, tubes, and drains  - Monitor endotracheal if appropriate and nasal secretions for changes in amount and color  - [place] appropriate cooling/warming therapies per order  - Administer medications as ordered  - Instruct and encourage patient and family to use good hand hygiene technique  - Identify and instruct in appropriate isolation precautions for identified infection/condition  Outcome: Progressing

## 2023-09-12 ENCOUNTER — APPOINTMENT (INPATIENT)
Dept: RADIOLOGY | Facility: HOSPITAL | Age: 88
DRG: 871 | End: 2023-09-12
Payer: COMMERCIAL

## 2023-09-12 LAB
ANION GAP SERPL CALCULATED.3IONS-SCNC: 13 MMOL/L
BASOPHILS # BLD AUTO: 0.03 THOUSANDS/ÂΜL (ref 0–0.1)
BASOPHILS NFR BLD AUTO: 0 % (ref 0–1)
BUN SERPL-MCNC: 128 MG/DL (ref 5–25)
CALCIUM SERPL-MCNC: 9.4 MG/DL (ref 8.4–10.2)
CHLORIDE SERPL-SCNC: 94 MMOL/L (ref 96–108)
CO2 SERPL-SCNC: 26 MMOL/L (ref 21–32)
CREAT SERPL-MCNC: 2.63 MG/DL (ref 0.6–1.3)
EOSINOPHIL # BLD AUTO: 0.19 THOUSAND/ÂΜL (ref 0–0.61)
EOSINOPHIL NFR BLD AUTO: 1 % (ref 0–6)
ERYTHROCYTE [DISTWIDTH] IN BLOOD BY AUTOMATED COUNT: 14.1 % (ref 11.6–15.1)
GFR SERPL CREATININE-BSD FRML MDRD: 20 ML/MIN/1.73SQ M
GLUCOSE SERPL-MCNC: 158 MG/DL (ref 65–140)
GLUCOSE SERPL-MCNC: 162 MG/DL (ref 65–140)
GLUCOSE SERPL-MCNC: 188 MG/DL (ref 65–140)
GLUCOSE SERPL-MCNC: 202 MG/DL (ref 65–140)
GLUCOSE SERPL-MCNC: 234 MG/DL (ref 65–140)
HCT VFR BLD AUTO: 37.5 % (ref 36.5–49.3)
HGB BLD-MCNC: 12.3 G/DL (ref 12–17)
IMM GRANULOCYTES # BLD AUTO: 0.27 THOUSAND/UL (ref 0–0.2)
IMM GRANULOCYTES NFR BLD AUTO: 2 % (ref 0–2)
LYMPHOCYTES # BLD AUTO: 0.87 THOUSANDS/ÂΜL (ref 0.6–4.47)
LYMPHOCYTES NFR BLD AUTO: 5 % (ref 14–44)
MAGNESIUM SERPL-MCNC: 2 MG/DL (ref 1.9–2.7)
MCH RBC QN AUTO: 28.6 PG (ref 26.8–34.3)
MCHC RBC AUTO-ENTMCNC: 32.8 G/DL (ref 31.4–37.4)
MCV RBC AUTO: 87 FL (ref 82–98)
MONOCYTES # BLD AUTO: 1.25 THOUSAND/ÂΜL (ref 0.17–1.22)
MONOCYTES NFR BLD AUTO: 8 % (ref 4–12)
NEUTROPHILS # BLD AUTO: 13.68 THOUSANDS/ÂΜL (ref 1.85–7.62)
NEUTS SEG NFR BLD AUTO: 84 % (ref 43–75)
NRBC BLD AUTO-RTO: 0 /100 WBCS
PLATELET # BLD AUTO: 207 THOUSANDS/UL (ref 149–390)
PMV BLD AUTO: 11 FL (ref 8.9–12.7)
POTASSIUM SERPL-SCNC: 3.6 MMOL/L (ref 3.5–5.3)
PROCALCITONIN SERPL-MCNC: 0.75 NG/ML
RBC # BLD AUTO: 4.3 MILLION/UL (ref 3.88–5.62)
SODIUM SERPL-SCNC: 133 MMOL/L (ref 135–147)
WBC # BLD AUTO: 16.29 THOUSAND/UL (ref 4.31–10.16)

## 2023-09-12 PROCEDURE — 71045 X-RAY EXAM CHEST 1 VIEW: CPT

## 2023-09-12 PROCEDURE — 82948 REAGENT STRIP/BLOOD GLUCOSE: CPT

## 2023-09-12 PROCEDURE — 80048 BASIC METABOLIC PNL TOTAL CA: CPT | Performed by: INTERNAL MEDICINE

## 2023-09-12 PROCEDURE — 94640 AIRWAY INHALATION TREATMENT: CPT

## 2023-09-12 PROCEDURE — 94760 N-INVAS EAR/PLS OXIMETRY 1: CPT

## 2023-09-12 PROCEDURE — 97530 THERAPEUTIC ACTIVITIES: CPT

## 2023-09-12 PROCEDURE — 84145 PROCALCITONIN (PCT): CPT | Performed by: INTERNAL MEDICINE

## 2023-09-12 PROCEDURE — 85025 COMPLETE CBC W/AUTO DIFF WBC: CPT | Performed by: INTERNAL MEDICINE

## 2023-09-12 PROCEDURE — 83735 ASSAY OF MAGNESIUM: CPT | Performed by: INTERNAL MEDICINE

## 2023-09-12 PROCEDURE — 99232 SBSQ HOSP IP/OBS MODERATE 35: CPT | Performed by: INTERNAL MEDICINE

## 2023-09-12 RX ORDER — ECHINACEA PURPUREA EXTRACT 125 MG
1 TABLET ORAL
Status: DISCONTINUED | OUTPATIENT
Start: 2023-09-12 | End: 2023-09-15 | Stop reason: HOSPADM

## 2023-09-12 RX ORDER — BUMETANIDE 1 MG/1
2 TABLET ORAL 2 TIMES DAILY
Status: DISCONTINUED | OUTPATIENT
Start: 2023-09-13 | End: 2023-09-13

## 2023-09-12 RX ORDER — SODIUM CHLORIDE 9 MG/ML
75 INJECTION, SOLUTION INTRAVENOUS CONTINUOUS
Status: DISPENSED | OUTPATIENT
Start: 2023-09-12 | End: 2023-09-12

## 2023-09-12 RX ADMIN — NYSTATIN: 100000 POWDER TOPICAL at 17:14

## 2023-09-12 RX ADMIN — ACETAMINOPHEN 650 MG: 325 TABLET, FILM COATED ORAL at 06:23

## 2023-09-12 RX ADMIN — CEFTRIAXONE 2000 MG: 2 INJECTION, SOLUTION INTRAVENOUS at 14:08

## 2023-09-12 RX ADMIN — Medication 6 MG: at 21:54

## 2023-09-12 RX ADMIN — BUDESONIDE 0.5 MG: 0.5 INHALANT ORAL at 19:39

## 2023-09-12 RX ADMIN — INSULIN LISPRO 1 UNITS: 100 INJECTION, SOLUTION INTRAVENOUS; SUBCUTANEOUS at 08:01

## 2023-09-12 RX ADMIN — INSULIN LISPRO 2 UNITS: 100 INJECTION, SOLUTION INTRAVENOUS; SUBCUTANEOUS at 21:55

## 2023-09-12 RX ADMIN — FLUTICASONE PROPIONATE 1 SPRAY: 50 SPRAY, METERED NASAL at 08:01

## 2023-09-12 RX ADMIN — INSULIN LISPRO 3 UNITS: 100 INJECTION, SOLUTION INTRAVENOUS; SUBCUTANEOUS at 11:56

## 2023-09-12 RX ADMIN — FORMOTEROL FUMARATE DIHYDRATE 20 MCG: 20 SOLUTION RESPIRATORY (INHALATION) at 07:47

## 2023-09-12 RX ADMIN — GUAIFENESIN 600 MG: 600 TABLET ORAL at 08:00

## 2023-09-12 RX ADMIN — GUAIFENESIN 600 MG: 600 TABLET ORAL at 17:07

## 2023-09-12 RX ADMIN — HYDROXYZINE HYDROCHLORIDE 25 MG: 25 TABLET, FILM COATED ORAL at 23:28

## 2023-09-12 RX ADMIN — HEPARIN SODIUM 5000 UNITS: 5000 INJECTION INTRAVENOUS; SUBCUTANEOUS at 04:57

## 2023-09-12 RX ADMIN — TAMSULOSIN HYDROCHLORIDE 0.4 MG: 0.4 CAPSULE ORAL at 17:07

## 2023-09-12 RX ADMIN — TRIAMCINOLONE ACETONIDE: 1 CREAM TOPICAL at 08:01

## 2023-09-12 RX ADMIN — SODIUM CHLORIDE 75 ML/HR: 0.9 INJECTION, SOLUTION INTRAVENOUS at 14:46

## 2023-09-12 RX ADMIN — SERTRALINE HYDROCHLORIDE 50 MG: 50 TABLET ORAL at 08:00

## 2023-09-12 RX ADMIN — INSULIN LISPRO 1 UNITS: 100 INJECTION, SOLUTION INTRAVENOUS; SUBCUTANEOUS at 17:07

## 2023-09-12 RX ADMIN — NYSTATIN: 100000 POWDER TOPICAL at 08:01

## 2023-09-12 RX ADMIN — LABETALOL HYDROCHLORIDE 200 MG: 200 TABLET, FILM COATED ORAL at 17:07

## 2023-09-12 RX ADMIN — POTASSIUM CHLORIDE 20 MEQ: 1500 TABLET, EXTENDED RELEASE ORAL at 08:00

## 2023-09-12 RX ADMIN — CALCITRIOL CAPSULES 0.25 MCG 0.25 MCG: 0.25 CAPSULE ORAL at 08:00

## 2023-09-12 RX ADMIN — BUDESONIDE 0.5 MG: 0.5 INHALANT ORAL at 07:47

## 2023-09-12 RX ADMIN — HEPARIN SODIUM 5000 UNITS: 5000 INJECTION INTRAVENOUS; SUBCUTANEOUS at 14:08

## 2023-09-12 RX ADMIN — POTASSIUM CHLORIDE 20 MEQ: 1500 TABLET, EXTENDED RELEASE ORAL at 17:07

## 2023-09-12 RX ADMIN — FORMOTEROL FUMARATE DIHYDRATE 20 MCG: 20 SOLUTION RESPIRATORY (INHALATION) at 19:39

## 2023-09-12 RX ADMIN — TRIAMCINOLONE ACETONIDE: 1 CREAM TOPICAL at 17:14

## 2023-09-12 RX ADMIN — HEPARIN SODIUM 5000 UNITS: 5000 INJECTION INTRAVENOUS; SUBCUTANEOUS at 21:54

## 2023-09-12 RX ADMIN — ALLOPURINOL 200 MG: 100 TABLET ORAL at 08:00

## 2023-09-12 NOTE — PLAN OF CARE
Problem: OCCUPATIONAL THERAPY ADULT  Goal: Performs self-care activities at highest level of function for planned discharge setting. See evaluation for individualized goals. Description:  Outcome: Progressing  Note: Limitation: Decreased ADL status, Decreased UE ROM, Decreased UE strength, Decreased Safe judgement during ADL, Decreased cognition, Decreased endurance, Decreased self-care trans, Decreased high-level ADLs  Prognosis: Good  Assessment: Patient participated in Skilled OT session this date with interventions consisting of safety awareness and fall prevention techniques and  bed mobility, functional transfers* . Patient agreeable to OT treatment session, upon arrival patient was found supine in bed. Treatment session as follows: Pt agreeable to session. Mod A x1 to sit at EOB; upon sitting up, pt reports feeling dizzy. BP 99/45. After stabilizing, pt able to transfer to chair with CGA and RW. BP at end of session 122/60. Patient continues to be functioning below baseline level, occupational performance remains limited secondary to factors listed above and increased risk for falls and injury. The patient's raw score on the AM-PAC Daily Activity inpatient short form is 17, standardized score is 37.26, less than 39.4. Patients at this level are likely to benefit from DC to post-acute rehabilitation services. From OT standpoint, recommendation at time of d/c would level 2. Patient to benefit from continued Occupational Therapy treatment while in the hospital to address deficits as defined above and maximize level of functional independence with ADLs and functional mobility. Pt left with call bell in reach, tray table in reach, needs met, chair alarm activated, SCDs on.

## 2023-09-12 NOTE — OCCUPATIONAL THERAPY NOTE
Occupational Therapy Treatment Note    Patient Name: Laurence LUCIOV'H Date: 9/12/2023 09/12/23 1049   OT Last Visit   OT Visit Date 09/12/23   Note Type   Note Type Treatment   Pain Assessment   Pain Assessment Tool 0-10   Pain Score No Pain   Restrictions/Precautions   Weight Bearing Precautions Per Order No   Other Precautions Cognitive; Bed Alarm; Chair Alarm;O2;Fall Risk  (2L O2)   Bed Mobility   Supine to Sit 3  Moderate assistance   Additional items Assist x 1;Bedrails;HOB elevated   Transfers   Sit to Stand 5  Supervision   Additional items Assist x 1   Stand to Sit 5  Supervision   Additional items Assist x 1   Stand pivot 4  Minimal assistance   Additional items Assist x 1  (RW)   Cognition   Overall Cognitive Status Impaired   Arousal/Participation Alert   Attention Difficulty attending to directions   Orientation Level Oriented X4   Memory Decreased recall of precautions;Decreased recall of recent events;Decreased short term memory   Following Commands Follows one step commands with increased time or repetition   Activity Tolerance   Activity Tolerance Treatment limited secondary to medical complications (Comment)  (BP 99/45 upon sitting up at EOB. Recovered. /60 at end of session.)   Assessment   Assessment Patient participated in Skilled OT session this date with interventions consisting of safety awareness and fall prevention techniques and  bed mobility, functional transfers* . Patient agreeable to OT treatment session, upon arrival patient was found supine in bed. Treatment session as follows: Pt agreeable to session. Mod A x1 to sit at EOB; upon sitting up, pt reports feeling dizzy. BP 99/45. After stabilizing, pt able to transfer to chair with CGA and RW. BP at end of session 122/60. Patient continues to be functioning below baseline level, occupational performance remains limited secondary to factors listed above and increased risk for falls and injury.   The patient's raw score on the AM-PAC Daily Activity inpatient short form is 17, standardized score is 37.26, less than 39.4. Patients at this level are likely to benefit from DC to post-acute rehabilitation services. From OT standpoint, recommendation at time of d/c would level 2. Patient to benefit from continued Occupational Therapy treatment while in the hospital to address deficits as defined above and maximize level of functional independence with ADLs and functional mobility. Pt left with call bell in reach, tray table in reach, needs met, chair alarm activated, SCDs on.    Plan   OT Treatment Day 2   Recommendation   UB Rehab Discharge Recommendation (PT/OT) Level 2   AM-PAC Daily Activity Inpatient   Lower Body Dressing 2   Bathing 2   Toileting 3   Upper Body Dressing 3   Grooming 3   Eating 4   Daily Activity Raw Score 17   Daily Activity Standardized Score (Calc for Raw Score >=11) 37.26   AM-PAC Applied Cognition Inpatient   Following a Speech/Presentation 2   Understanding Ordinary Conversation 3   Taking Medications 2   Remembering Where Things Are Placed or Put Away 3   Remembering List of 4-5 Errands 2   Taking Care of Complicated Tasks 2   Applied Cognition Raw Score 14   Applied Cognition Standardized Score 32.02     [employer], MS, OTR/L

## 2023-09-12 NOTE — ASSESSMENT & PLAN NOTE
Baseline creatinine of approximately 2.5-2.7 -> currently stable at 2.63  Avoid nephrotoxins and hypotension - note current Bumex use  Monitor renal function and urine output

## 2023-09-12 NOTE — PLAN OF CARE
Problem: MOBILITY - ADULT  Goal: Maintain or return to baseline ADL function  Description: INTERVENTIONS:  -  Assess patient's ability to carry out ADLs; assess patient's baseline for ADL function and identify physical deficits which impact ability to perform ADLs (bathing, care of mouth/teeth, toileting, grooming, dressing, etc.)  - Assess/evaluate cause of self-care deficits   - Assess range of motion  - Assess patient's mobility; develop plan if impaired  - Assess patient's need for assistive devices and provide as appropriate  - Encourage maximum independence but intervene and supervise when necessary  - Involve family in performance of ADLs  - Assess for home care needs following discharge   - Consider OT consult to assist with ADL evaluation and planning for discharge  - Provide patient education as appropriate  Outcome: Progressing  Goal: Maintains/Returns to pre admission functional level  Description: INTERVENTIONS:  - Perform BMAT or MOVE assessment daily.   - Set and communicate daily mobility goal to care team and patient/family/caregiver. - Collaborate with rehabilitation services on mobility goals if consulted  - Perform Range of Motion 2 times a day. - Reposition patient every 2 hours.   - Dangle patient 2 times a day  - Stand patient 2 times a day  - Ambulate patient 2 times a day  - Out of bed to chair 2 times a day   - Out of bed for meals 2 times a day  - Out of bed for toileting  - Record patient progress and toleration of activity level   Outcome: Progressing     Problem: PAIN - ADULT  Goal: Verbalizes/displays adequate comfort level or baseline comfort level  Description: Interventions:  - Encourage patient to monitor pain and request assistance  - Assess pain using appropriate pain scale  - Administer analgesics based on type and severity of pain and evaluate response  - Implement non-pharmacological measures as appropriate and evaluate response  - Consider cultural and social influences on pain and pain management  - Notify physician/advanced practitioner if interventions unsuccessful or patient reports new pain  Outcome: Progressing     Problem: INFECTION - ADULT  Goal: Absence or prevention of progression during hospitalization  Description: INTERVENTIONS:  - Assess and monitor for signs and symptoms of infection  - Monitor lab/diagnostic results  - Monitor all insertion sites, i.e. indwelling lines, tubes, and drains  - Monitor endotracheal if appropriate and nasal secretions for changes in amount and color  - Campus appropriate cooling/warming therapies per order  - Administer medications as ordered  - Instruct and encourage patient and family to use good hand hygiene technique  - Identify and instruct in appropriate isolation precautions for identified infection/condition  Outcome: Progressing  Goal: Absence of fever/infection during neutropenic period  Description: INTERVENTIONS:  - Monitor WBC    Outcome: Progressing     Problem: Potential for Falls  Goal: Patient will remain free of falls  Description: INTERVENTIONS:  - Educate patient/family on patient safety including physical limitations  - Instruct patient to call for assistance with activity   - Consult OT/PT to assist with strengthening/mobility   - Keep Call bell within reach  - Keep bed low and locked with side rails adjusted as appropriate  - Keep care items and personal belongings within reach  - Initiate and maintain comfort rounds  - Make Fall Risk Sign visible to staff  - Offer Toileting every 2 Hours, in advance of need  - Initiate/Maintain bed alarm  - Obtain necessary fall risk management equipment: socks  - Apply yellow socks and bracelet for high fall risk patients  - Consider moving patient to room near nurses station  Outcome: Progressing     Problem: Nutrition/Hydration-ADULT  Goal: Nutrient/Hydration intake appropriate for improving, restoring or maintaining nutritional needs  Description: Monitor and assess patient's nutrition/hydration status for malnutrition. Collaborate with interdisciplinary team and initiate plan and interventions as ordered. Monitor patient's weight and dietary intake as ordered or per policy. Utilize nutrition screening tool and intervene as necessary. Determine patient's food preferences and provide high-protein, high-caloric foods as appropriate.      INTERVENTIONS:  - Monitor oral intake, urinary output, labs, and treatment plans  - Assess nutrition and hydration status and recommend course of action  - Evaluate amount of meals eaten  - Assist patient with eating if necessary   - Allow adequate time for meals  - Recommend/ encourage appropriate diets, oral nutritional supplements, and vitamin/mineral supplements  - Order, calculate, and assess calorie counts as needed  - Recommend, monitor, and adjust tube feedings and TPN/PPN based on assessed needs  - Assess need for intravenous fluids  - Provide specific nutrition/hydration education as appropriate  - Include patient/family/caregiver in decisions related to nutrition  Outcome: Progressing     Problem: Prexisting or High Potential for Compromised Skin Integrity  Goal: Skin integrity is maintained or improved  Description: INTERVENTIONS:  - Identify patients at risk for skin breakdown  - Assess and monitor skin integrity  - Assess and monitor nutrition and hydration status  - Monitor labs   - Assess for incontinence   - Turn and reposition patient  - Assist with mobility/ambulation  - Relieve pressure over bony prominences  - Avoid friction and shearing  - Provide appropriate hygiene as needed including keeping skin clean and dry  - Evaluate need for skin moisturizer/barrier cream  - Collaborate with interdisciplinary team   - Patient/family teaching  - Consider wound care consult   Outcome: Progressing

## 2023-09-12 NOTE — PROGRESS NOTES
[facility]  Progress Note  Name: Fletcher Tamez I  MRN: [id number]  Unit/Bed#: -01 I Date of Admission: 9/6/2023   Date of Service: 9/12/2023 I Hospital Day: 6    Assessment/Plan   Hyponatremia  Assessment & Plan  Serum sodium stable 133 currently  Continue to monitor    Depression  Assessment & Plan  Continue Zoloft    Lung nodule  Assessment & Plan  New nodule noted on CT C/A/P  Outpatient f/u with routine screening per guidelines    Abnormal CT scan  Assessment & Plan  CT imaging noting an intermediate density partially exophytic lesion from the upper pole of right kidney, with inability to distinguish hyperdense cyst from solid neoplasm  Subsequent renal ultrasound also indeterminate -> proceed with MRI for further classification (will require Ativan for sedation prior to study)    MRI showed-" Examination was prematurely terminated due to the patient's claustrophobia and difficulty breathing.  Redemonstration of a 1.4 cm T2 isointense exophytic lesion in the upper pole right kidney without restricted diffusion, incompletely characterized without T1-weighted images and contrast.Consider repeat examination with premedication and contrast"  Urology consult appreciated  Urology recommended follow-up with them as outpatient in 3 to 4 weeks after discharge      QT prolongation  Assessment & Plan  QTc 598 (peak) on admission day  Avoid QTc prolonging agents  Replete electrolyte deficiencies of present  Repeat EKG on 9/8 revealed improvement in QTc at 420    Sepsis on admission [facility])  Assessment & Plan  Initially presented with tachycardia/tachypnea and leukocytosis due to pneumonia (see above)  Monitor vitals and maintain hemodynamics    Hypokalemia  Assessment & Plan  Monitor/replace serum potassium as necessary  Serum magnesium normal    Type 2 diabetes mellitus with diabetic neuropathy, without long-term current use of insulin [facility])  Assessment & Plan  Lab Results   Component Value Date    HGBA1C 6.3 (H) 05/02/2023     Continue SSI coverage per Accu-Cheks  Diabetic diet  Hypoglycemia protocol  Hold Glipizide while hospitalized    Chronic diastolic congestive heart failure ([address])  Assessment & Plan  On diuresis with Bumex and Zaroxolyn 3x/weekly  Continue beta-blockade with Labetalol  EF of 65%  Monitor/replete electrolyte deficiencies of present    CKD (chronic kidney disease) stage 4, GFR 15-29 ml/min ([facility])  Assessment & Plan  Baseline creatinine of approximately 2.5-2.7 -> currently stable at 2.63  Avoid nephrotoxins and hypotension - note current Bumex use  Monitor renal function and urine output    Benign prostatic hyperplasia with urinary hesitancy  Assessment & Plan  Continue Flomax    Chronic respiratory failure with hypoxia (HCC)  Assessment & Plan  Baseline oxygen requirement of 3 L per nasal cannula  In the setting of underlying diastolic CHF and COPD    COPD with acute exacerbation ([address])  Assessment & Plan  COPD with possible mild exacerbation on admission in setting of pneumonia and inability to obtain nebulizers prior to presentation  Recently switched to Performist and Pulmicort nebulizers - unable to obtain Performist, hence, has only been using Pulmicort for the last week  Continue Perforomist/Pulmicort dual nebulizer treatments  No need for the corticosteroids due to lack of wheezing on exam and oxygenation at baseline  Will appreciate case management assistance in ensuring resources for appropriate medications at discharge    Essential hypertension  Assessment & Plan  Continue Labetalol/Bumex    * Pneumonia  Assessment & Plan  Presents with worsening shortness of breath x 2-3 days and a recurrent cough, meeting sepsis criteria as below  CT chest -> Focal ill-defined right upper lobe consolidation with associated small cavitary space is suspicious for pneumonia. Recommend follow-up to resolution with chest CT in 4 to 6 weeks.   Urine Legionella/Streptococcus Ag negative  Continue IV Rocephin monotherapy  Procalcitonin trend: 0.34 -> 0.55 ->0.71--0.66  Encourage incentive spirometry  PRN Robitussin on board  Supportive care otherwise  PT/OT recommending home home health services once medically stable          Labs & Imaging: I have personally reviewed pertinent reports. VTE Prophylaxis: in place. Code Status:   Level 3 - DNAR and DNI    Patient Centered Rounds: I have performed bedside rounds with nursing staff today. Discussions with Specialists or Other Care Team Provider: JULES    Education and Discussions with Family / Patient: Son on phone    Current Length of Stay: 6 day(s)    Current Patient Status: Inpatient   Certification Statement: The patient will continue to require additional inpatient hospital stay due to see my assessment and plan. Subjective:   Patient is seen and examined at bedside. Complains of feeling weak. Denies any other complaints. Afebrile  All other ROS are negative. Objective:    Vitals: Blood pressure 103/60, pulse 98, temperature (!) 96.9 °F (36.1 °C), resp. rate 16, weight 116 kg (255 lb 4.7 oz), SpO2 96 %. ,Body mass index is 35.61 kg/m². SPO2 RA Rest    Flowsheet Row ED to Hosp-Admission (Current) from 9/6/2023 in [facility]   SpO2 96 %   SpO2 Activity At Rest   O2 Device Nasal cannula   O2 Flow Rate --        I&O:     Intake/Output Summary (Last 24 hours) at 9/12/2023 1449  Last data filed at 9/12/2023 1001  Gross per 24 hour   Intake 900 ml   Output 1005 ml   Net -105 ml       Physical Exam:    General- Alert, sitting comfortably in chair. Not in any acute distress. Neck- Supple, No JVD  CVS- regular, S1 and S2 normal  Chest- Bilateral Air entry, decreased at bases. Abdomen- soft, nontender, not distended, no guarding or rigidity, BS+  Extremities-  No pedal edema, No calf tenderness                         Normal ROM in all extremities. CNS-   Alert, awake and orientedx3.  No focal deficits present. Invasive Devices     Peripheral Intravenous Line  Duration           Peripheral IV 23 Dorsal (posterior); Left Hand 1 day                      Social History  reviewed  Family History   Problem Relation Age of Onset   • Diabetes Mother    • Breast cancer Mother         She  in 12.  Age 80   • Stroke Mother    • Pneumonia Father    • Substance Abuse Neg Hx    • Mental illness Neg Hx     reviewed    Meds:  Current Facility-Administered Medications   Medication Dose Route Frequency Provider Last Rate Last Admin   • acetaminophen (TYLENOL) tablet 650 mg  650 mg Oral Q6H PRN Florin Reyes PA-C   650 mg at 23 8782   • albuterol inhalation solution 2.5 mg  2.5 mg Nebulization Q4H PRN Rayray Rojo MD   2.5 mg at 09/10/23 1638   • allopurinol (ZYLOPRIM) tablet 200 mg  200 mg Oral Daily Renetta Lares PA-C   200 mg at 23 0800   • aluminum-magnesium hydroxide-simethicone (MAALOX) oral suspension 30 mL  30 mL Oral Q6H PRN Florin Reyes PA-C       • budesonide (PULMICORT) inhalation solution 0.5 mg  0.5 mg Nebulization BID Renetta Laers PA-C   0.5 mg at 23 0747   • [START ON 2023] bumetanide (BUMEX) tablet 2 mg  2 mg Oral BID Andrew Alvarez MD       • calcitriol (ROCALTROL) capsule 0.25 mcg  0.25 mcg Oral Daily Renetta Lraes PA-C   0.25 mcg at 23 0800   • cefTRIAXone (ROCEPHIN) IVPB (premix in dextrose) 2,000 mg 50 mL  2,000 mg Intravenous Q24H Octavio Wilkinson  mL/hr at 23 1408 2,000 mg at 23 1408   • fluticasone (FLONASE) 50 mcg/act nasal spray 1 spray  1 spray Each Nare Daily Florin Reyes PA-C   1 spray at 23 0801   • formoterol (PERFOROMIST) nebulizer solution 20 mcg  20 mcg Nebulization Q12H Rayray Rojo MD   20 mcg at 23 0747   • guaiFENesin (MUCINEX) 12 hr tablet 600 mg  600 mg Oral BID Renetta Lares PA-C   600 mg at 23 0800   • heparin (porcine) subcutaneous injection 5,000 Units 5,000 Units Subcutaneous [facility] Brooklynn Dang PA-C   5,000 Units at 09/12/23 1408   • hydrOXYzine HCL (ATARAX) tablet 25 mg  25 mg Oral Q6H PRN Flaca Ny MD   25 mg at 09/11/23 3332   • insulin lispro (HumaLOG) 100 units/mL subcutaneous injection 1-6 Units  1-6 Units Subcutaneous 4x Daily (AC & HS) Shala Benton PA-C   3 Units at 09/12/23 1156   • labetalol (NORMODYNE) tablet 200 mg  200 mg Oral BID Brooklynn Lares PA-C   200 mg at 09/11/23 1708   • LORazepam (ATIVAN) injection 1 mg  1 mg Intravenous Once PRN Flaca Ny MD       • melatonin tablet 6 mg  6 mg Oral HS Brittnee Mercedes PA-C   6 mg at 09/11/23 2100   • metolazone (ZAROXOLYN) tablet 2.5 mg  2.5 mg Oral Once per day on Mon Wed Fri Shala Benton PA-C   2.5 mg at 09/11/23 6891   • nystatin (MYCOSTATIN) powder   Topical BID Green Lis, CRNP   Given at 09/12/23 0801   • potassium chloride (K-DUR,KLOR-CON) CR tablet 20 mEq  20 mEq Oral BID Brooklynn Lares PA-C   20 mEq at 09/12/23 0800   • senna (SENOKOT) tablet 8.6 mg  1 tablet Oral Daily PRN Shala Benton PA-C       • sertraline (ZOLOFT) tablet 50 mg  50 mg Oral QAM Brooklynn Lares PA-C   50 mg at 09/12/23 0800   • sodium chloride (PF) 0.9 % injection 3 mL  3 mL Intravenous Q1H PRN Roverto Rowland MD       • sodium chloride 0.9 % infusion  75 mL/hr Intravenous Continuous Neal Ratliff MD 75 mL/hr at 09/12/23 1446 75 mL/hr at 09/12/23 1446   • tamsulosin (FLOMAX) capsule 0.4 mg  0.4 mg Oral Daily With Zeayd Simmons PA-C   0.4 mg at 09/11/23 1708   • traMADol (ULTRAM) tablet 50 mg  50 mg Oral BID PRN Shala Benton PA-C       • triamcinolone (KENALOG) 0.1 % cream   Topical BID Shala Benton PA-C   Given at 09/12/23 0801   • trimethobenzamide (TIGAN) IM injection 100 mg  100 mg Intramuscular Q6H PRN CARLOS Chopra   100 mg at 09/09/23 1027      Medications Prior to Admission   Medication   • albuterol (2.5 mg/3 mL) 0.083 % nebulizer solution   • allopurinol (ZYLOPRIM) 100 mg tablet   • betamethasone dipropionate (DIPROSONE) 0.05 % cream   • Blood Glucose Monitoring Suppl (OneTouch Verio) w/Device KIT   • budesonide (Pulmicort) 0.5 mg/2 mL nebulizer solution   • bumetanide (BUMEX) 2 mg tablet   • calcitriol (ROCALTROL) 0.25 mcg capsule   • fluticasone (FLONASE) 50 mcg/act nasal spray   • formoterol (PERFOROMIST) 20 MCG/2ML nebulizer solution   • glipiZIDE (GLUCOTROL) 5 mg tablet   • glucose blood test strip   • Iron-Vitamin C (IRON 100/C PO)   • labetalol (NORMODYNE) 200 mg tablet   • Lancets (onetouch ultrasoft) lancets   • metolazone (ZAROXOLYN) 2.5 mg tablet   • mupirocin (BACTROBAN) 2 % ointment   • potassium chloride (K-DUR,KLOR-CON) 20 mEq tablet   • senna (SENOKOT) 8.6 MG tablet   • sertraline (ZOLOFT) 50 mg tablet   • tamsulosin (FLOMAX) 0.4 mg   • traMADol (Ultram) 50 mg tablet       Labs:  Results from last 7 days   Lab Units 09/12/23  0346 09/11/23  0822 09/10/23  0311   WBC Thousand/uL 16.29* 13.72* 14.62*   HEMOGLOBIN g/dL 12.3 12.6 12.9   HEMATOCRIT % 37.5 37.6 39.4   PLATELETS Thousands/uL 207 197 190   NEUTROS PCT % 84* 85* 87*   LYMPHS PCT % 5* 5* 5*   MONOS PCT % 8 8 7   EOS PCT % 1 1 0     Results from last 7 days   Lab Units 09/12/23  0346 09/11/23  0822 09/10/23  0311 09/07/23  0257 09/06/23  1333   POTASSIUM mmol/L 3.6 3.5 3.2*   < > 3.3*   CHLORIDE mmol/L 94* 95* 95*   < > 91*   CO2 mmol/L 26 26 24   < > 27   BUN mg/dL 128* 117* 118*   < > 112*   CREATININE mg/dL 2.63* 2.47* 2.75*   < > 2.95*   CALCIUM mg/dL 9.4 9.2 9.2   < > 9.7   ALK PHOS U/L  --   --   --   --  72   ALT U/L  --   --   --   --  10   AST U/L  --   --   --   --  14    < > = values in this interval not displayed. Lab Results   Component Value Date    TROPONINI <0.02 10/21/2021    TROPONINI <0.02 11/15/2019         Lab Results   Component Value Date    BLOODCX No Growth After 5 Days. 09/06/2023    BLOODCX No Growth After 5 Days.  09/06/2023 Imaging:  Results for orders placed during the hospital encounter of 09/06/23    XR chest 1 view portable    Narrative  CHEST    INDICATION:   dyspnea. COMPARISON: 8/26/2023    EXAM PERFORMED/VIEWS:  XR CHEST PORTABLE  Single view    FINDINGS:    Cardiomediastinal silhouette appears unremarkable. A peripheral subpleural opacity has developed in the lateral right midlung which is nonspecific but could represent peripheral infiltrate or mass. Further assessment via CT should be considered if clinically appropriate    No pneumothorax or pleural effusion. Osseous structures appear within normal limits for patient age. Impression  Development of subpleural opacity in the lateral right midlung which could represent a mass or early infiltrate          Workstation performed: [id number]    Results for orders placed during the hospital encounter of 08/26/23    XR chest 2 views    Narrative  CHEST    INDICATION:   Increased dyspnea. History of pulmonary hypertension, chronic diastolic heart failure, COPD. COMPARISON: PA and lateral chest August 23, 2023. CT chest Nirmala 10, 2022. EXAM PERFORMED/VIEWS:  XR CHEST PA & LATERAL      FINDINGS:    Cardiomediastinal silhouette appears unremarkable. Small calcified granuloma redemonstrated left lung base. The lungs are otherwise clear. No pneumothorax or pleural effusion. Osseous structures appear within normal limits for patient age. Impression  No acute cardiopulmonary disease.             Workstation performed: [id number]      Last 24 Hours Medication List:   Current Facility-Administered Medications   Medication Dose Route Frequency Provider Last Rate   • acetaminophen  650 mg Oral Q6H PRN Faisal Prado PA-C     • albuterol  2.5 mg Nebulization Q4H PRN Shari Gastelum MD     • allopurinol  200 mg Oral Daily Shamika Dang PA-C     • aluminum-magnesium hydroxide-simethicone  30 mL Oral Q6H PRN Faisal Prado PA-C     • budesonide  0.5 mg Nebulization BID Nir Dang PA-C     • [START ON 9/13/2023] bumetanide  2 mg Oral BID Bhargav Ortiz MD     • calcitriol  0.25 mcg Oral Daily Nir Dang PA-C     • cefTRIAXone  2,000 mg Intravenous Q24H Rohan Mckoy MD 2,000 mg (09/12/23 1408)   • fluticasone  1 spray Each Nare Daily Ashley Guerrero PA-C     • formoterol  20 mcg Nebulization Q12H Rayray oRjo MD     • guaiFENesin  600 mg Oral BID Ashley Guerrero PA-C     • heparin (porcine)  5,000 Units Subcutaneous [facility] Nir Dang PA-C     • hydrOXYzine HCL  25 mg Oral Q6H PRN Rohan Mckoy MD     • insulin lispro  1-6 Units Subcutaneous 4x Daily (AC & HS) Ashley Guerrero PA-C     • labetalol  200 mg Oral BID Nir Lares PA-C     • LORazepam  1 mg Intravenous Once PRN Rohan Mckoy MD     • melatonin  6 mg Oral HS Liam Krishnan PA-C     • metolazone  2.5 mg Oral Once per day on Mon Wed Fri Ashley Guerrero PA-C     • nystatin   Topical BID CARLOS Carlisle     • potassium chloride  20 mEq Oral BID Ashley Guerrero PA-C     • senna  1 tablet Oral Daily PRN Ashley Guerrero PA-C     • sertraline  50 mg Oral QAM Nir Dang PA-C     • sodium chloride (PF)  3 mL Intravenous Q1H PRN Dallas Muller MD     • sodium chloride  75 mL/hr Intravenous Continuous Bhargav Ortiz MD 75 mL/hr (09/12/23 1446)   • tamsulosin  0.4 mg Oral Daily With [email or web address]     • traMADol  50 mg Oral BID PRN Ashley Guerrero PA-C     • triamcinolone   Topical BID Ashley Guerrero PA-C     • trimethobenzamide  100 mg Intramuscular Q6H PRN CARLOS Carlisle          Today, Patient Was Seen By: Bhargav Ortiz MD    ** Please Note: Dictation voice to text software may have been used in the creation of this document.  **

## 2023-09-12 NOTE — PROGRESS NOTES
Pts son expressed concerns about pts wife ability to care for pt when time to go home. RN walked pt around room and showed son that pt is capable of getting up and moving around with minimal assistance. Son expressed how the pt was doing better than he expected. CANDICE.

## 2023-09-12 NOTE — ED PROVIDER NOTES
History  Chief Complaint   Patient presents with   • Chest Pain     Wife called EMS due to patient having complaints of chest pain and shortness of breath. Patient arrived via EMS on 3L NC (which he normally wears at home). Patient is an 60-year-old male with history of COPD, CHF, intermittent oxygen use at home presents for evaluation of dyspnea. He endorses moderate worsening of his chronic dyspnea over the past 2 to 3 days. He is also had a nonproductive cough. He denies any choking episodes. No known fevers. Denies nausea vomiting chest pain or abdominal pain at this time. He has been using his at home COPD treatments without much improvement. Prior to Admission Medications   Prescriptions Last Dose Informant Patient Reported? Taking? Blood Glucose Monitoring Suppl (OneTouch Verio) w/Device KIT  Self No No   Sig: Use 2 (two) times a day Dx:E11.9   Iron-Vitamin C (IRON 100/C PO)  Self Yes No   Sig: Take 325 mg by mouth   Lancets (onetouch ultrasoft) lancets  Self No No   Sig: Use 2 (two) times a day E11.9   albuterol (2.5 mg/3 mL) 0.083 % nebulizer solution  Self No No   Sig: Take 3 mL (2.5 mg total) by nebulization every 6 (six) hours as needed for wheezing or shortness of breath   allopurinol (ZYLOPRIM) 100 mg tablet   No No   Sig: Take 2 tablets (200 mg total) by mouth daily   betamethasone dipropionate (DIPROSONE) 0.05 % cream   No No   Sig: APPLY TOPICALLY 2 (TWO) TIMES A DAY FOR 14 DAYS APPLY TO THE RED SKIN OF YOUR LOWER LEGS   budesonide (Pulmicort) 0.5 mg/2 mL nebulizer solution   No No   Sig: Take 2 mL (0.5 mg total) by nebulization 2 (two) times a day Rinse mouth after use.    bumetanide (BUMEX) 2 mg tablet   No No   Sig: Take 1 tablet (2 mg total) by mouth 2 (two) times a day   calcitriol (ROCALTROL) 0.25 mcg capsule   No No   Sig: Take 1 capsule (0.25 mcg total) by mouth daily   fluticasone (FLONASE) 50 mcg/act nasal spray  Self No No   Sig: Spray 1 spray into each nostril twice daily   formoterol (PERFOROMIST) 20 MCG/2ML nebulizer solution   No No   Sig: Take 2 mL (20 mcg total) by nebulization 2 (two) times a day   glipiZIDE (GLUCOTROL) 5 mg tablet   No No   Sig: TAKE 1 TABLET BY MOUTH 2 TIMES A DAY BEFORE MEALS.   glucose blood test strip  Self No No   Sig: Test twice daily DX:  E11.9   labetalol (NORMODYNE) 200 mg tablet  Self No No   Sig: Take 1 tablet (200 mg total) by mouth 2 (two) times a day   metolazone (ZAROXOLYN) 2.5 mg tablet   No No   Sig: TAKE 1 TABLET BY MOUTH 30 MINUTES BEFORE TAKING BUMETANIDE IN THE MORNING ON M,W,F   mupirocin (BACTROBAN) 2 % ointment  Self No No   Sig: Apply topically in the morning   oxygen gas   Yes No   Sig: Inhale 2 L/min as needed (hypoxia).  Indications: hypoxia   potassium chloride (K-DUR,KLOR-CON) 20 mEq tablet   No No   Sig: Take 1 tablet (20 mEq total) by mouth 2 (two) times a day   senna (SENOKOT) 8.6 MG tablet   No No   Sig: Take 1 tablet (8.6 mg total) by mouth daily as needed for constipation   sertraline (ZOLOFT) 50 mg tablet   No No   Sig: Take 1 tablet (50 mg total) by mouth every morning   tamsulosin (FLOMAX) 0.4 mg  Self No No   Sig: TAKE 1 CAPSULE BY MOUTH EVERY DAY WITH DINNER   traMADol (Ultram) 50 mg tablet   No No   Sig: Take 1 tablet (50 mg total) by mouth 2 (two) times a day as needed for moderate pain      Facility-Administered Medications: None       Past Medical History:   Diagnosis Date   • Anxiety 2020   • COPD (chronic obstructive pulmonary disease) ([address])    • Coronary artery disease 2012   • Depression 2020   • Herpes zoster    • Hypertension    • Memory loss     possible   • Mycoplasma pneumonia    • Obesity    • Osteoarthritis    • Renal calculi        Past Surgical History:   Procedure Laterality Date   • CATARACT EXTRACTION      with insert intraoculat lens prosthesis   • HEMORRHOID SURGERY     • NECK SURGERY      for bone spur       Family History   Problem Relation Age of Onset   • Diabetes Mother    • Breast cancer Mother         She  in 12. Age 80   • Stroke Mother    • Pneumonia Father    • Substance Abuse Neg Hx    • Mental illness Neg Hx      I have reviewed and agree with the history as documented. E-Cigarette/Vaping   • E-Cigarette Use Never User      E-Cigarette/Vaping Substances   • Nicotine No    • THC No    • CBD No    • Flavoring No    • Other No    • Unknown No      Social History     Tobacco Use   • Smoking status: Former     Packs/day: 2.00     Years: 42.00     Total pack years: 84.00     Types: Cigarettes     Start date: 1956     Quit date: 1998     Years since quittin.7   • Smokeless tobacco: Never   Vaping Use   • Vaping Use: Never used   Substance Use Topics   • Alcohol use: Not Currently     Alcohol/week: 1.0 standard drink of alcohol     Types: 1 Cans of beer per week     Comment: 1-2 beers a months   • Drug use: No       Review of Systems   Constitutional: Negative for fever. HENT: Negative for sore throat. Eyes: Negative for photophobia. Respiratory: Positive for cough, chest tightness, shortness of breath and wheezing. Cardiovascular: Negative for chest pain. Gastrointestinal: Negative for abdominal pain. Genitourinary: Negative for dysuria. Musculoskeletal: Negative for back pain. Skin: Negative for rash. Neurological: Negative for light-headedness. Hematological: Negative for adenopathy. Psychiatric/Behavioral: Negative for agitation. All other systems reviewed and are negative. Physical Exam  Physical Exam  Vitals reviewed. Constitutional:       General: He is not in acute distress. Appearance: He is well-developed. HENT:      Head: Normocephalic. Eyes:      Pupils: Pupils are equal, round, and reactive to light. Cardiovascular:      Rate and Rhythm: Normal rate and regular rhythm. Heart sounds: Normal heart sounds. No murmur heard. No friction rub. No gallop.    Pulmonary:      Comments: Mild tachypnea, expiratory wheezing heard throughout  Abdominal:      General: Bowel sounds are normal. There is no distension. Palpations: Abdomen is soft. Tenderness: There is no abdominal tenderness. There is no guarding. Musculoskeletal:         General: Normal range of motion. Cervical back: Normal range of motion and neck supple. Skin:     Capillary Refill: Capillary refill takes less than 2 seconds. Neurological:      Mental Status: He is alert and oriented to person, place, and time. Cranial Nerves: No cranial nerve deficit. Sensory: No sensory deficit. Motor: No abnormal muscle tone. Psychiatric:         Behavior: Behavior normal.         Thought Content:  Thought content normal.         Judgment: Judgment normal.         Vital Signs  ED Triage Vitals [09/06/23 1320]   Temperature Pulse Respirations Blood Pressure SpO2   97.9 °F (36.6 °C) 87 18 94/58 100 %      Temp Source Heart Rate Source Patient Position - Orthostatic VS BP Location FiO2 (%)   Oral Monitor Lying Right arm --      Pain Score       1           Vitals:    09/12/23 0729 09/12/23 0802 09/12/23 0804 09/12/23 1049   BP: 109/62 109/61 107/59 122/60   Pulse: 77 81 74 81   Patient Position - Orthostatic VS:             Visual Acuity  Visual Acuity    Flowsheet Row Most Recent Value   L Pupil Size (mm) 3   R Pupil Size (mm) 3          ED Medications  Medications   sodium chloride (PF) 0.9 % injection 3 mL (has no administration in time range)   allopurinol (ZYLOPRIM) tablet 200 mg (200 mg Oral Given 9/12/23 0800)   triamcinolone (KENALOG) 0.1 % cream ( Topical Given 9/12/23 0801)   budesonide (PULMICORT) inhalation solution 0.5 mg (0.5 mg Nebulization Given 9/12/23 0747)   bumetanide (BUMEX) tablet 2 mg (2 mg Oral Not Given 9/12/23 0800)   calcitriol (ROCALTROL) capsule 0.25 mcg (0.25 mcg Oral Given 9/12/23 0800)   fluticasone (FLONASE) 50 mcg/act nasal spray 1 spray (1 spray Each Nare Given 9/12/23 0801)   labetalol (NORMODYNE) tablet 200 mg (200 mg Oral Not Given 9/12/23 0800)   metolazone (ZAROXOLYN) tablet 2.5 mg (2.5 mg Oral Given 9/11/23 0833)   potassium chloride (K-DUR,KLOR-CON) CR tablet 20 mEq (20 mEq Oral Given 9/12/23 0800)   senna (SENOKOT) tablet 8.6 mg (has no administration in time range)   sertraline (ZOLOFT) tablet 50 mg (50 mg Oral Given 9/12/23 0800)   tamsulosin (FLOMAX) capsule 0.4 mg (0.4 mg Oral Given 9/11/23 1708)   traMADol (ULTRAM) tablet 50 mg (has no administration in time range)   acetaminophen (TYLENOL) tablet 650 mg (650 mg Oral Given 9/12/23 0623)   guaiFENesin (MUCINEX) 12 hr tablet 600 mg (600 mg Oral Given 9/12/23 0800)   aluminum-magnesium hydroxide-simethicone (MAALOX) oral suspension 30 mL (has no administration in time range)   heparin (porcine) subcutaneous injection 5,000 Units (5,000 Units Subcutaneous Given 9/12/23 0457)   insulin lispro (HumaLOG) 100 units/mL subcutaneous injection 1-6 Units (3 Units Subcutaneous Given 9/12/23 1156)   formoterol (PERFOROMIST) nebulizer solution 20 mcg (20 mcg Nebulization Given 9/12/23 0747)   albuterol inhalation solution 2.5 mg (2.5 mg Nebulization Given 9/10/23 1638)   trimethobenzamide (TIGAN) IM injection 100 mg (100 mg Intramuscular Given 9/9/23 1027)   nystatin (MYCOSTATIN) powder ( Topical Given 9/12/23 0801)   melatonin tablet 6 mg (6 mg Oral Given 9/11/23 2100)   cefTRIAXone (ROCEPHIN) IVPB (premix in dextrose) 2,000 mg 50 mL (2,000 mg Intravenous New Bag 9/11/23 1458)   LORazepam (ATIVAN) injection 1 mg (has no administration in time range)   hydrOXYzine HCL (ATARAX) tablet 25 mg (25 mg Oral Given 9/11/23 0833)   sodium chloride 0.9 % bolus 500 mL (0 mL Intravenous Stopped 9/6/23 1621)   cefTRIAXone (ROCEPHIN) IVPB (premix in dextrose) 1,000 mg 50 mL (0 mg Intravenous Stopped 9/6/23 1520)   azithromycin (ZITHROMAX) 500 mg in sodium chloride 0.9% 250mL IVPB 500 mg (500 mg Intravenous New Bag 9/6/23 1521)   potassium chloride (K-DUR,KLOR-CON) CR tablet 20 mEq (20 mEq Oral Given 9/6/23 1453)   methylPREDNISolone sodium succinate (Solu-MEDROL) injection 80 mg (80 mg Intravenous Given 9/6/23 1521)   potassium chloride (K-DUR,KLOR-CON) CR tablet 40 mEq (40 mEq Oral Given 9/7/23 1143)   potassium chloride (K-DUR,KLOR-CON) CR tablet 20 mEq (20 mEq Oral Given 9/8/23 1413)   potassium chloride (K-DUR,KLOR-CON) CR tablet 20 mEq (20 mEq Oral Given 9/9/23 1434)   potassium chloride (K-DUR,KLOR-CON) CR tablet 40 mEq (40 mEq Oral Given 9/10/23 0827)       Diagnostic Studies  Results Reviewed     Procedure Component Value Units Date/Time    Blood culture #2 [[id number]] Collected: 09/06/23 1449    Lab Status: Final result Specimen: Blood from Arm, Left Updated: 09/11/23 2301     Blood Culture No Growth After 5 Days. Blood culture #1 [[id number]] Collected: 09/06/23 1424    Lab Status: Final result Specimen: Blood from Arm, Right Updated: 09/11/23 2301     Blood Culture No Growth After 5 Days.     HS Troponin I 4hr [[id number]]  (Abnormal) Collected: 09/06/23 1852    Lab Status: Final result Specimen: Blood from Arm, Right Updated: 09/06/23 1920     hs TnI 4hr 138 ng/L      Delta 4hr hsTnI -83 ng/L     HS Troponin I 2hr [[id number]]  (Abnormal) Collected: 09/06/23 1547    Lab Status: Final result Specimen: Blood from Arm, Left Updated: 09/06/23 1618     hs TnI 2hr 183 ng/L      Delta 2hr hsTnI -38 ng/L     UA w Reflex to Microscopic w Reflex to Culture [[id number]] Collected: 09/06/23 1519    Lab Status: Final result Specimen: Urine, Clean Catch Updated: 09/06/23 1540     Color, UA Yellow     Clarity, UA Clear     Specific Gravity, UA 1.015     pH, UA 5.0     Leukocytes, UA Negative     Nitrite, UA Negative     Protein, UA Negative mg/dl      Glucose, UA Negative mg/dl      Ketones, UA Negative mg/dl      Urobilinogen, UA <2.0 mg/dl      Bilirubin, UA Negative     Occult Blood, UA Negative    Procalcitonin [[id number]]  (Abnormal) Collected: 09/06/23 1424    Lab Status: Final result Specimen: Blood from Arm, Right Updated: 09/06/23 1501     Procalcitonin 0.34 ng/ml     Lactic acid, plasma (w/reflex if result > 2.0) [[id number]]  (Normal) Collected: 09/06/23 1424    Lab Status: Final result Specimen: Blood from Arm, Right Updated: 09/06/23 1450     LACTIC ACID 1.3 mmol/L     Narrative:      Result may be elevated if tourniquet was used during collection. B-Type Natriuretic Peptide(BNP) [[id number]]  (Abnormal) Collected: 09/06/23 1333    Lab Status: Final result Specimen: Blood from Arm, Left Updated: 09/06/23 1436      pg/mL     FLU/RSV/COVID - if FLU/RSV clinically relevant [[id number]]  (Normal) Collected: 09/06/23 1337    Lab Status: Final result Specimen: Nares from Nose Updated: 09/06/23 1427     SARS-CoV-2 Negative     INFLUENZA A PCR Negative     INFLUENZA B PCR Negative     RSV PCR Negative    Narrative:      FOR PEDIATRIC PATIENTS - copy/paste COVID Guidelines URL to browser: [email or web address]    SARS-CoV-2 assay is a Nucleic Acid Amplification assay intended for the  qualitative detection of nucleic acid from SARS-CoV-2 in nasopharyngeal  swabs. Results are for the presumptive identification of SARS-CoV-2 RNA. Positive results are indicative of infection with SARS-CoV-2, the virus  causing COVID-19, but do not rule out bacterial infection or co-infection  with other viruses. Laboratories within the [facility] and its  territories are required to report all positive results to the appropriate  public health authorities. Negative results do not preclude SARS-CoV-2  infection and should not be used as the sole basis for treatment or other  patient management decisions. Negative results must be combined with  clinical observations, patient history, and epidemiological information. This test has not been FDA cleared or approved. This test has been authorized by FDA under an Emergency Use Authorization  (EUA).  This test is only authorized for the duration of time the  declaration that circumstances exist justifying the authorization of the  emergency use of an in vitro diagnostic tests for detection of SARS-CoV-2  virus and/or diagnosis of COVID-19 infection under section 564(b)(1) of  the Act, 21 U. S.C. 065EYU-4(S)(2), unless the authorization is terminated  or revoked sooner. The test has been validated but independent review by FDA  and CLIA is pending. Test performed using [employer]: This RT-PCR assay targets N2,  a region unique to SARS-CoV-2. A conserved region in the E-gene was chosen  for pan-Sarbecovirus detection which includes SARS-CoV-2. According to CMS-2020-01-R, this platform meets the definition of high-throughput technology.     HS Troponin 0hr (reflex protocol) [[id number]]  (Abnormal) Collected: 09/06/23 1333    Lab Status: Final result Specimen: Blood from Arm, Left Updated: 09/06/23 1406     hs TnI 0hr 221 ng/L     Comprehensive metabolic panel [[id number]]  (Abnormal) Collected: 09/06/23 1333    Lab Status: Final result Specimen: Blood from Arm, Left Updated: 09/06/23 1358     Sodium 133 mmol/L      Potassium 3.3 mmol/L      Chloride 91 mmol/L      CO2 27 mmol/L      ANION GAP 15 mmol/L       mg/dL      Creatinine 2.95 mg/dL      Glucose 151 mg/dL      Calcium 9.7 mg/dL      AST 14 U/L      ALT 10 U/L      Alkaline Phosphatase 72 U/L      Total Protein 7.5 g/dL      Albumin 4.1 g/dL      Total Bilirubin 0.76 mg/dL      eGFR 18 ml/min/1.73sq m     Narrative:      [place] guidelines for Chronic Kidney Disease (CKD):   •  Stage 1 with normal or high GFR (GFR > 90 mL/min/1.73 square meters)  •  Stage 2 Mild CKD (GFR = 60-89 mL/min/1.73 square meters)  •  Stage 3A Moderate CKD (GFR = 45-59 mL/min/1.73 square meters)  •  Stage 3B Moderate CKD (GFR = 30-44 mL/min/1.73 square meters)  •  Stage 4 Severe CKD (GFR = 15-29 mL/min/1.73 square meters)  •  Stage 5 End Stage CKD (GFR <15 mL/min/1.73 square meters)  Note: GFR calculation is accurate only with a steady state creatinine    CBC and differential [[id number]]  (Abnormal) Collected: 09/06/23 1333    Lab Status: Final result Specimen: Blood from Arm, Left Updated: 09/06/23 1343     WBC 21.66 Thousand/uL      RBC 5.09 Million/uL      Hemoglobin 14.5 g/dL      Hematocrit 44.2 %      MCV 87 fL      MCH 28.5 pg      MCHC 32.8 g/dL      RDW 14.0 %      MPV 11.0 fL      Platelets 596 Thousands/uL      nRBC 0 /100 WBCs      Neutrophils Relative 89 %      Immat GRANS % 1 %      Lymphocytes Relative 4 %      Monocytes Relative 6 %      Eosinophils Relative 0 %      Basophils Relative 0 %      Neutrophils Absolute 19.11 Thousands/µL      Immature Grans Absolute 0.15 Thousand/uL      Lymphocytes Absolute 0.94 Thousands/µL      Monocytes Absolute 1.39 Thousand/µL      Eosinophils Absolute 0.04 Thousand/µL      Basophils Absolute 0.03 Thousands/µL                  MRI abdomen wo contrast   Final Result by Kinnie Olszewski, MD (09/11 0820)      Examination was prematurely terminated due to the patient's claustrophobia and difficulty breathing. Redemonstration of a 1.4 cm T2 isointense exophytic lesion in the upper pole right kidney without restricted diffusion, incompletely characterized without T1-weighted images and contrast.      Consider repeat examination with premedication and contrast.         Workstation performed: [id number]         US kidney and bladder   Final Result by Edda Cutler DO (09/08 1523)      1. Hypoechoic lesion along the superior pole of the right kidney measuring 1.8 cm with focal Doppler signal along its periphery. Finding remains indeterminate with differential including complex cyst versus hypovascular solid lesion. Further    characterization with CT or MR renal protocol is recommended. 2. Prostatomegaly. The study was marked in EPIC for significant notification.       Workstation performed: [id number] CT chest abdomen pelvis wo contrast   Final Result by Wali Bishop MD (09/06 1211)      1. Focal ill-defined right upper lobe consolidation with associated small cavitary space is suspicious for pneumonia. Recommend follow-up to resolution with chest CT in 4 to 6 weeks. 2.  New 4 mm right upper lobe nodule. Attention on follow-up. 3.  Emphysema. 4.  Cholelithiasis without evidence of acute cholecystitis. 5.  Hepatic steatosis. 6.  Intermediate density partially exophytic lesion arising from the upper pole right kidney. Cannot distinguish hyperdense cyst from solid neoplasm on this noncontrast exam. Recommend further evaluation with renal ultrasound. The study was marked in [facility] for immediate notification. Workstation performed: [id number]         XR chest 1 view portable   Final Result by Mirza Reed MD (09/06 6877)      Development of subpleural opacity in the lateral right midlung which could represent a mass or early infiltrate               Workstation performed: [id number]                    Procedures  Procedures         ED Course                               SBIRT 20yo+    Flowsheet Row Most Recent Value   Initial Alcohol Screen: US AUDIT-C     1. How often do you have a drink containing alcohol? 0 Filed at: 09/06/2023 1323   2. How many drinks containing alcohol do you have on a typical day you are drinking? 0 Filed at: 09/06/2023 1323   3a. Male UNDER 65: How often do you have five or more drinks on one occasion? 0 Filed at: 09/06/2023 1323   3b. FEMALE Any Age, or MALE 65+: How often do you have 4 or more drinks on one occassion? 0 Filed at: 09/06/2023 1323   Audit-C Score 0 Filed at: 09/06/2023 1323   LINH: How many times in the past year have you. .. Used an illegal drug or used a prescription medication for non-medical reasons?  Never Filed at: 09/06/2023 1323                    Medical Decision Making  Patient is an 50-year-old male who presents for evaluation of increased dyspnea and cough. Initial concern for pneumonia which was confirmed on CT scan. Patient does meet SIRS criteria, broad-spectrum antibiotics initiated with Rocephin and azithromycin. Discussed with cardiology because patient has slightly elevated troponin, plan to continue to trend, not thought to be ACS at this time. Patient found to have 2 incidental findings of renal lesion and pulmonary nodule, both the patient and family were made aware that he needs to follow-up for these findings. Amount and/or Complexity of Data Reviewed  Labs: ordered. Radiology: ordered. Risk  Prescription drug management. Decision regarding hospitalization. Disposition  Final diagnoses:   Sepsis ([address])   Pneumonia   Elevated troponin   Pulmonary nodule   Renal lesion     Time reflects when diagnosis was documented in both MDM as applicable and the Disposition within this note     Time User Action Codes Description Comment    9/6/2023  3:56 PM Hoover Montiel Add [A41.9] Sepsis ([address])     9/6/2023  3:56 PM Hoover Montiel Add [J18.9] Pneumonia     9/6/2023  3:56 PM Shugamelissa, Aubree Medal Add [R77.8] Elevated troponin     9/6/2023  3:56 PM Hoover Montiel Add [R91.1] Pulmonary nodule     9/6/2023  3:57 PM Hoover Montiel Add [N28.9] Renal lesion     9/11/2023 10:51 AM Randy Lacy Add [R93.89] Abnormal CT scan       ED Disposition     ED Disposition   Admit    Condition   Stable    Date/Time   Wed Sep 6, 2023 1556    Comment   Case was discussed with SHIRA and the patient's admission status was agreed to be Admission Status: inpatient status to the service of Dr. Temitope Ashton            Follow-up Information     Follow up With Specialties Details Why Contact Info Additional Information    [address] [facility]  Follow up  [address] [facility] [address]  [address] [facility] DOCTORS [facility] Schedule an appointment as soon as possible for a visit in 3 week(s)  [facility] [address] [id number]  [address] [facility] [address] [facility], [facility], [address] [place] [address], [address] [place], [place], [address]          Current Discharge Medication List      START taking these medications    Details   oxygen gas Inhale 2 L/min as needed (hypoxia). Indications: hypoxia         CONTINUE these medications which have NOT CHANGED    Details   albuterol (2.5 mg/3 mL) 0.083 % nebulizer solution Take 3 mL (2.5 mg total) by nebulization every 6 (six) hours as needed for wheezing or shortness of breath  Qty: 270 mL, Refills: 5    Associated Diagnoses: Moderate COPD (chronic obstructive pulmonary disease) ([facility])      allopurinol (ZYLOPRIM) 100 mg tablet Take 2 tablets (200 mg total) by mouth daily  Qty: 180 tablet, Refills: 2    Associated Diagnoses: Idiopathic chronic gout of right foot without tophus      betamethasone dipropionate (DIPROSONE) 0.05 % cream APPLY TOPICALLY 2 (TWO) TIMES A DAY FOR 14 DAYS APPLY TO THE RED SKIN OF YOUR LOWER LEGS  Qty: 45 g, Refills: 0    Associated Diagnoses: Venous stasis ulcer of other part of right lower leg limited to breakdown of skin, unspecified whether varicose veins present ([facility])      Blood Glucose Monitoring Suppl (OneTouch Verio) w/Device KIT Use 2 (two) times a day Dx:E11.9  Qty: 1 kit, Refills: 0    Comments: Please provide machine of Insurance preference  Associated Diagnoses: 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 disease, without long-term current use of insulin ([facility])      budesonide (Pulmicort) 0.5 mg/2 mL nebulizer solution Take 2 mL (0.5 mg total) by nebulization 2 (two) times a day Rinse mouth after use. Qty: 120 mL, Refills: 5    Comments: D/c trelegy  Associated Diagnoses:  Moderate COPD (chronic obstructive pulmonary disease) ([facility])      bumetanide (BUMEX) 2 mg tablet Take 1 tablet (2 mg total) by mouth 2 (two) times a day  Qty: 180 tablet, Refills: 2    Associated Diagnoses: Chronic diastolic congestive heart failure ([facility])      calcitriol (ROCALTROL) 0.25 mcg capsule Take 1 capsule (0.25 mcg total) by mouth daily  Qty: 90 capsule, Refills: 3    Associated Diagnoses: Secondary hyperparathyroidism of renal origin ([facility])      fluticasone (FLONASE) 50 mcg/act nasal spray Spray 1 spray into each nostril twice daily  Qty: 48 mL, Refills: 3    Associated Diagnoses: Chronic rhinitis      formoterol (PERFOROMIST) 20 MCG/2ML nebulizer solution Take 2 mL (20 mcg total) by nebulization 2 (two) times a day  Qty: 120 mL, Refills: 5    Comments: D/c trelegy  Associated Diagnoses: Moderate COPD (chronic obstructive pulmonary disease) ([facility])      glipiZIDE (GLUCOTROL) 5 mg tablet TAKE 1 TABLET BY MOUTH 2 TIMES A DAY BEFORE MEALS.   Qty: 180 tablet, Refills: 2    Associated Diagnoses: 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 disease, without long-term current use of insulin ([facility])      glucose blood test strip Test twice daily DX:  E11.9  Qty: 100 strip, Refills: 3    Associated Diagnoses: 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 disease, without long-term current use of insulin ([facility])      Iron-Vitamin C (IRON 100/C PO) Take 325 mg by mouth      labetalol (NORMODYNE) 200 mg tablet Take 1 tablet (200 mg total) by mouth 2 (two) times a day  Qty: 180 tablet, Refills: 3    Associated Diagnoses: Essential hypertension      Lancets (onetouch ultrasoft) lancets Use 2 (two) times a day E11.9  Qty: 100 each, Refills: 3    Associated Diagnoses: 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 disease, without long-term current use of insulin ([facility])      metolazone (ZAROXOLYN) 2.5 mg tablet TAKE 1 TABLET BY MOUTH 30 MINUTES BEFORE TAKING BUMETANIDE IN THE MORNING ON M,W,F  Qty: 45 tablet, Refills: 3    Associated Diagnoses: Chronic diastolic congestive heart failure ([facility])      mupirocin (BACTROBAN) 2 % ointment Apply topically in the morning  Qty: 30 g, Refills: 5    Associated Diagnoses: Venous stasis ulcer of other part of right lower leg limited to breakdown of skin, unspecified whether varicose veins present (HCC)      potassium chloride (K-DUR,KLOR-CON) 20 mEq tablet Take 1 tablet (20 mEq total) by mouth 2 (two) times a day  Qty: 60 tablet, Refills: 3    Comments: Increased the dose to bid  Associated Diagnoses: Acute on chronic diastolic (congestive) heart failure (HCC)      senna (SENOKOT) 8.6 MG tablet Take 1 tablet (8.6 mg total) by mouth daily as needed for constipation  Qty: 30 tablet, Refills: 1    Associated Diagnoses: Other constipation      sertraline (ZOLOFT) 50 mg tablet Take 1 tablet (50 mg total) by mouth every morning  Qty: 30 tablet, Refills: 5    Associated Diagnoses: Mild episode of recurrent major depressive disorder (HCC)      tamsulosin (FLOMAX) 0.4 mg TAKE 1 CAPSULE BY MOUTH EVERY DAY WITH DINNER  Qty: 90 capsule, Refills: 3    Associated Diagnoses: Benign prostatic hyperplasia with urinary hesitancy      traMADol (Ultram) 50 mg tablet Take 1 tablet (50 mg total) by mouth 2 (two) times a day as needed for moderate pain  Qty: 30 tablet, Refills: 0    Associated Diagnoses: Chronic diastolic congestive heart failure (HCC)             No discharge procedures on file.     PDMP Review       Value Time User    PDMP Reviewed  Yes 8/22/2023  2:14 PM Sasha Burgos MD          ED Provider  Electronically Signed by           Rob Lovett MD  09/12/23 7639

## 2023-09-13 ENCOUNTER — APPOINTMENT (INPATIENT)
Dept: RADIOLOGY | Facility: HOSPITAL | Age: 88
DRG: 871 | End: 2023-09-13
Payer: COMMERCIAL

## 2023-09-13 LAB
ANION GAP SERPL CALCULATED.3IONS-SCNC: 12 MMOL/L
BASOPHILS # BLD AUTO: 0.03 THOUSANDS/ÂΜL (ref 0–0.1)
BASOPHILS NFR BLD AUTO: 0 % (ref 0–1)
BUN SERPL-MCNC: 123 MG/DL (ref 5–25)
CALCIUM SERPL-MCNC: 9.1 MG/DL (ref 8.4–10.2)
CHLORIDE SERPL-SCNC: 98 MMOL/L (ref 96–108)
CO2 SERPL-SCNC: 24 MMOL/L (ref 21–32)
CREAT SERPL-MCNC: 2.34 MG/DL (ref 0.6–1.3)
EOSINOPHIL # BLD AUTO: 0.33 THOUSAND/ÂΜL (ref 0–0.61)
EOSINOPHIL NFR BLD AUTO: 2 % (ref 0–6)
ERYTHROCYTE [DISTWIDTH] IN BLOOD BY AUTOMATED COUNT: 14.2 % (ref 11.6–15.1)
GFR SERPL CREATININE-BSD FRML MDRD: 24 ML/MIN/1.73SQ M
GLUCOSE SERPL-MCNC: 159 MG/DL (ref 65–140)
GLUCOSE SERPL-MCNC: 168 MG/DL (ref 65–140)
GLUCOSE SERPL-MCNC: 173 MG/DL (ref 65–140)
GLUCOSE SERPL-MCNC: 176 MG/DL (ref 65–140)
GLUCOSE SERPL-MCNC: 245 MG/DL (ref 65–140)
HCT VFR BLD AUTO: 38.8 % (ref 36.5–49.3)
HGB BLD-MCNC: 12.6 G/DL (ref 12–17)
IMM GRANULOCYTES # BLD AUTO: 0.25 THOUSAND/UL (ref 0–0.2)
IMM GRANULOCYTES NFR BLD AUTO: 2 % (ref 0–2)
LYMPHOCYTES # BLD AUTO: 0.65 THOUSANDS/ÂΜL (ref 0.6–4.47)
LYMPHOCYTES NFR BLD AUTO: 4 % (ref 14–44)
MCH RBC QN AUTO: 28.7 PG (ref 26.8–34.3)
MCHC RBC AUTO-ENTMCNC: 32.5 G/DL (ref 31.4–37.4)
MCV RBC AUTO: 88 FL (ref 82–98)
MONOCYTES # BLD AUTO: 1.02 THOUSAND/ÂΜL (ref 0.17–1.22)
MONOCYTES NFR BLD AUTO: 7 % (ref 4–12)
NEUTROPHILS # BLD AUTO: 12.9 THOUSANDS/ÂΜL (ref 1.85–7.62)
NEUTS SEG NFR BLD AUTO: 85 % (ref 43–75)
NRBC BLD AUTO-RTO: 0 /100 WBCS
PLATELET # BLD AUTO: 204 THOUSANDS/UL (ref 149–390)
PMV BLD AUTO: 10.7 FL (ref 8.9–12.7)
POTASSIUM SERPL-SCNC: 3.6 MMOL/L (ref 3.5–5.3)
RBC # BLD AUTO: 4.39 MILLION/UL (ref 3.88–5.62)
SODIUM SERPL-SCNC: 134 MMOL/L (ref 135–147)
WBC # BLD AUTO: 15.18 THOUSAND/UL (ref 4.31–10.16)

## 2023-09-13 PROCEDURE — 94760 N-INVAS EAR/PLS OXIMETRY 1: CPT

## 2023-09-13 PROCEDURE — 92611 MOTION FLUOROSCOPY/SWALLOW: CPT

## 2023-09-13 PROCEDURE — 74230 X-RAY XM SWLNG FUNCJ C+: CPT

## 2023-09-13 PROCEDURE — 85025 COMPLETE CBC W/AUTO DIFF WBC: CPT | Performed by: INTERNAL MEDICINE

## 2023-09-13 PROCEDURE — 80048 BASIC METABOLIC PNL TOTAL CA: CPT | Performed by: INTERNAL MEDICINE

## 2023-09-13 PROCEDURE — 94640 AIRWAY INHALATION TREATMENT: CPT

## 2023-09-13 PROCEDURE — 92526 ORAL FUNCTION THERAPY: CPT

## 2023-09-13 PROCEDURE — 99223 1ST HOSP IP/OBS HIGH 75: CPT | Performed by: INTERNAL MEDICINE

## 2023-09-13 PROCEDURE — 92610 EVALUATE SWALLOWING FUNCTION: CPT

## 2023-09-13 PROCEDURE — 99232 SBSQ HOSP IP/OBS MODERATE 35: CPT | Performed by: INTERNAL MEDICINE

## 2023-09-13 PROCEDURE — 82948 REAGENT STRIP/BLOOD GLUCOSE: CPT

## 2023-09-13 RX ORDER — ONDANSETRON 2 MG/ML
4 INJECTION INTRAMUSCULAR; INTRAVENOUS EVERY 6 HOURS PRN
Status: DISCONTINUED | OUTPATIENT
Start: 2023-09-13 | End: 2023-09-13 | Stop reason: SDUPTHER

## 2023-09-13 RX ORDER — METRONIDAZOLE 500 MG/100ML
500 INJECTION, SOLUTION INTRAVENOUS EVERY 8 HOURS
Status: DISCONTINUED | OUTPATIENT
Start: 2023-09-13 | End: 2023-09-15 | Stop reason: HOSPADM

## 2023-09-13 RX ADMIN — METRONIDAZOLE 500 MG: 500 INJECTION, SOLUTION INTRAVENOUS at 10:21

## 2023-09-13 RX ADMIN — TRIAMCINOLONE ACETONIDE: 1 CREAM TOPICAL at 08:26

## 2023-09-13 RX ADMIN — FORMOTEROL FUMARATE DIHYDRATE 20 MCG: 20 SOLUTION RESPIRATORY (INHALATION) at 07:14

## 2023-09-13 RX ADMIN — METRONIDAZOLE 500 MG: 500 INJECTION, SOLUTION INTRAVENOUS at 17:13

## 2023-09-13 RX ADMIN — GUAIFENESIN 600 MG: 600 TABLET ORAL at 08:25

## 2023-09-13 RX ADMIN — HEPARIN SODIUM 5000 UNITS: 5000 INJECTION INTRAVENOUS; SUBCUTANEOUS at 04:41

## 2023-09-13 RX ADMIN — ALLOPURINOL 200 MG: 100 TABLET ORAL at 08:26

## 2023-09-13 RX ADMIN — HYDROXYZINE HYDROCHLORIDE 25 MG: 25 TABLET, FILM COATED ORAL at 22:05

## 2023-09-13 RX ADMIN — SERTRALINE HYDROCHLORIDE 50 MG: 50 TABLET ORAL at 08:25

## 2023-09-13 RX ADMIN — HEPARIN SODIUM 5000 UNITS: 5000 INJECTION INTRAVENOUS; SUBCUTANEOUS at 15:00

## 2023-09-13 RX ADMIN — HEPARIN SODIUM 5000 UNITS: 5000 INJECTION INTRAVENOUS; SUBCUTANEOUS at 22:04

## 2023-09-13 RX ADMIN — CALCITRIOL CAPSULES 0.25 MCG 0.25 MCG: 0.25 CAPSULE ORAL at 08:25

## 2023-09-13 RX ADMIN — TAMSULOSIN HYDROCHLORIDE 0.4 MG: 0.4 CAPSULE ORAL at 16:31

## 2023-09-13 RX ADMIN — INSULIN LISPRO 3 UNITS: 100 INJECTION, SOLUTION INTRAVENOUS; SUBCUTANEOUS at 11:27

## 2023-09-13 RX ADMIN — HYDROXYZINE HYDROCHLORIDE 25 MG: 25 TABLET, FILM COATED ORAL at 08:49

## 2023-09-13 RX ADMIN — GUAIFENESIN 600 MG: 600 TABLET ORAL at 17:13

## 2023-09-13 RX ADMIN — NYSTATIN: 100000 POWDER TOPICAL at 17:14

## 2023-09-13 RX ADMIN — Medication 6 MG: at 22:05

## 2023-09-13 RX ADMIN — FORMOTEROL FUMARATE DIHYDRATE 20 MCG: 20 SOLUTION RESPIRATORY (INHALATION) at 19:56

## 2023-09-13 RX ADMIN — POTASSIUM CHLORIDE 20 MEQ: 1500 TABLET, EXTENDED RELEASE ORAL at 08:25

## 2023-09-13 RX ADMIN — CEFTRIAXONE 2000 MG: 2 INJECTION, SOLUTION INTRAVENOUS at 15:00

## 2023-09-13 RX ADMIN — LABETALOL HYDROCHLORIDE 200 MG: 200 TABLET, FILM COATED ORAL at 08:26

## 2023-09-13 RX ADMIN — INSULIN LISPRO 1 UNITS: 100 INJECTION, SOLUTION INTRAVENOUS; SUBCUTANEOUS at 22:04

## 2023-09-13 RX ADMIN — NYSTATIN: 100000 POWDER TOPICAL at 08:26

## 2023-09-13 RX ADMIN — FLUTICASONE PROPIONATE 1 SPRAY: 50 SPRAY, METERED NASAL at 08:26

## 2023-09-13 RX ADMIN — BUDESONIDE 0.5 MG: 0.5 INHALANT ORAL at 19:56

## 2023-09-13 RX ADMIN — POTASSIUM CHLORIDE 20 MEQ: 1500 TABLET, EXTENDED RELEASE ORAL at 17:13

## 2023-09-13 RX ADMIN — BUDESONIDE 0.5 MG: 0.5 INHALANT ORAL at 07:14

## 2023-09-13 RX ADMIN — INSULIN LISPRO 1 UNITS: 100 INJECTION, SOLUTION INTRAVENOUS; SUBCUTANEOUS at 16:31

## 2023-09-13 RX ADMIN — LABETALOL HYDROCHLORIDE 200 MG: 200 TABLET, FILM COATED ORAL at 17:13

## 2023-09-13 RX ADMIN — INSULIN LISPRO 1 UNITS: 100 INJECTION, SOLUTION INTRAVENOUS; SUBCUTANEOUS at 07:53

## 2023-09-13 RX ADMIN — TRIAMCINOLONE ACETONIDE: 1 CREAM TOPICAL at 17:14

## 2023-09-13 NOTE — PLAN OF CARE
Problem: MOBILITY - ADULT  Goal: Maintain or return to baseline ADL function  Description: INTERVENTIONS:  -  Assess patient's ability to carry out ADLs; assess patient's baseline for ADL function and identify physical deficits which impact ability to perform ADLs (bathing, care of mouth/teeth, toileting, grooming, dressing, etc.)  - Assess/evaluate cause of self-care deficits   - Assess range of motion  - Assess patient's mobility; develop plan if impaired  - Assess patient's need for assistive devices and provide as appropriate  - Encourage maximum independence but intervene and supervise when necessary  - Involve family in performance of ADLs  - Assess for home care needs following discharge   - Consider OT consult to assist with ADL evaluation and planning for discharge  - Provide patient education as appropriate  Outcome: Progressing  Goal: Maintains/Returns to pre admission functional level  Description: INTERVENTIONS:  - Perform BMAT or MOVE assessment daily.   - Set and communicate daily mobility goal to care team and patient/family/caregiver. - Collaborate with rehabilitation services on mobility goals if consulted  - Perform Range of Motion 2 times a day. - Reposition patient every 2 hours.   - Dangle patient 2 times a day  - Stand patient 2 times a day  - Ambulate patient 2 times a day  - Out of bed to chair 2 times a day   - Out of bed for meals 2 times a day  - Out of bed for toileting  - Record patient progress and toleration of activity level   Outcome: Progressing     Problem: PAIN - ADULT  Goal: Verbalizes/displays adequate comfort level or baseline comfort level  Description: Interventions:  - Encourage patient to monitor pain and request assistance  - Assess pain using appropriate pain scale  - Administer analgesics based on type and severity of pain and evaluate response  - Implement non-pharmacological measures as appropriate and evaluate response  - Consider cultural and social influences on pain and pain management  - Notify physician/advanced practitioner if interventions unsuccessful or patient reports new pain  Outcome: Progressing     Problem: INFECTION - ADULT  Goal: Absence or prevention of progression during hospitalization  Description: INTERVENTIONS:  - Assess and monitor for signs and symptoms of infection  - Monitor lab/diagnostic results  - Monitor all insertion sites, i.e. indwelling lines, tubes, and drains  - Monitor endotracheal if appropriate and nasal secretions for changes in amount and color  - [place] appropriate cooling/warming therapies per order  - Administer medications as ordered  - Instruct and encourage patient and family to use good hand hygiene technique  - Identify and instruct in appropriate isolation precautions for identified infection/condition  Outcome: Progressing  Goal: Absence of fever/infection during neutropenic period  Description: INTERVENTIONS:  - Monitor WBC    Outcome: Progressing     Problem: Potential for Falls  Goal: Patient will remain free of falls  Description: INTERVENTIONS:  - Educate patient/family on patient safety including physical limitations  - Instruct patient to call for assistance with activity   - Consult OT/PT to assist with strengthening/mobility   - Keep Call bell within reach  - Keep bed low and locked with side rails adjusted as appropriate  - Keep care items and personal belongings within reach  - Initiate and maintain comfort rounds  - Make Fall Risk Sign visible to staff  - Offer Toileting every 2 Hours, in advance of need  - Initiate/Maintain bed alarm  - Obtain necessary fall risk management equipment: socks  - Apply yellow socks and bracelet for high fall risk patients  - Consider moving patient to room near nurses station  Outcome: Progressing     Problem: Nutrition/Hydration-ADULT  Goal: Nutrient/Hydration intake appropriate for improving, restoring or maintaining nutritional needs  Description: Monitor and assess patient's nutrition/hydration status for malnutrition. Collaborate with interdisciplinary team and initiate plan and interventions as ordered. Monitor patient's weight and dietary intake as ordered or per policy. Utilize nutrition screening tool and intervene as necessary. Determine patient's food preferences and provide high-protein, high-caloric foods as appropriate.      INTERVENTIONS:  - Monitor oral intake, urinary output, labs, and treatment plans  - Assess nutrition and hydration status and recommend course of action  - Evaluate amount of meals eaten  - Assist patient with eating if necessary   - Allow adequate time for meals  - Recommend/ encourage appropriate diets, oral nutritional supplements, and vitamin/mineral supplements  - Order, calculate, and assess calorie counts as needed  - Recommend, monitor, and adjust tube feedings and TPN/PPN based on assessed needs  - Assess need for intravenous fluids  - Provide specific nutrition/hydration education as appropriate  - Include patient/family/caregiver in decisions related to nutrition  Outcome: Progressing     Problem: Prexisting or High Potential for Compromised Skin Integrity  Goal: Skin integrity is maintained or improved  Description: INTERVENTIONS:  - Identify patients at risk for skin breakdown  - Assess and monitor skin integrity  - Assess and monitor nutrition and hydration status  - Monitor labs   - Assess for incontinence   - Turn and reposition patient  - Assist with mobility/ambulation  - Relieve pressure over bony prominences  - Avoid friction and shearing  - Provide appropriate hygiene as needed including keeping skin clean and dry  - Evaluate need for skin moisturizer/barrier cream  - Collaborate with interdisciplinary team   - Patient/family teaching  - Consider wound care consult   Outcome: Progressing

## 2023-09-13 NOTE — ASSESSMENT & PLAN NOTE
Presents with worsening shortness of breath x 2-3 days and a recurrent cough, meeting sepsis criteria as below  CT chest -> Focal ill-defined right upper lobe consolidation with associated small cavitary space is suspicious for pneumonia. Recommend follow-up to resolution with chest CT in 4 to 6 weeks. Urine Legionella/Streptococcus Ag negative  Continue IV Rocephin monotherapy  Procalcitonin trend: 0.34 -> 0.55 ->0.71--0.66  Encourage incentive spirometry  Chest x-ray shows worsening pneumonia  Willll add Flagyl.   Continue Rocephin  Pulmonary and speech consult  PRN Robitussin on board  Supportive care otherwise  PT/OT recommending home home health services once medically stable

## 2023-09-13 NOTE — CONSULTS
Consultation - Pulmonary Medicine   Jacqueline De La Cruz 81 y.o. male MRN: [id number]  Unit/Bed#: -01 Encounter: [id number]      Assessment & Plan:   Abnormal CT scan with lung nodule and evidence of pneumonia  Chronic respiratory failure with hypoxia  COPD  Diastolic CHF  CKD    · Patient 98% on 2 L. Continue to monitor. · Discussed with patient and his wife do not expect to see radiographic improvement in pneumonia for 6 to 8 weeks. Follow-up imaging as outpatient  · Leukocytosis trending down. Procal minimally elevated possibly due to kidney function. Continue antibiotics for total 7-day course. Patient unable to provide sputum culture  · Continue nebulizer treatments. No need for steroids  · Diuretics per primary  · Follow-up VBS    Discussed with patient and his wife at bedside, speech therapy, internal medicine    History of Present Illness   Physician Requesting Consult: Stan Kemp MD  Reason for Consult / Principal Problem: Pneumonia  Hx and PE limited by: None  HPI: Jacqueline De La Cruz is a 81y.o. year old male who presents with shortness of breath. Patient was on 9/6. CT scan revealed cavitary pneumonia and was started on ceftriaxone and azithromycin. Therapy was later on transition to ceftriaxone monotherapy. Chest x-ray today showing possible worsening infiltrate. Reviewed in PACS with Dr. Akilah Gurrola, likely minimal change. Internal medicine has added Flagyl to cover for anaerobes. Speech therapy to further evaluate patient for aspiration today. Patient reports he is still short of breath. He has minimal cough and no sputum production. His wife feels that his breathing seems to be better. Today he mainly complains of constipation, which can be affecting his breathing. Consults    Review of Systems   Constitutional: Positive for fatigue. Respiratory: Positive for shortness of breath. Gastrointestinal: Positive for constipation.    All other systems reviewed and are negative. Historical Information   Past Medical History:   Diagnosis Date   • Anxiety    • COPD (chronic obstructive pulmonary disease) ([address] )    • Coronary artery disease 2012   • Depression    • Herpes zoster    • Hypertension    • Memory loss     possible   • Mycoplasma pneumonia    • Obesity    • Osteoarthritis    • Renal calculi      Past Surgical History:   Procedure Laterality Date   • CATARACT EXTRACTION      with insert intraoculat lens prosthesis   • HEMORRHOID SURGERY     • NECK SURGERY      for bone spur     Social History   Social History     Substance and Sexual Activity   Alcohol Use Not Currently   • Alcohol/week: 1.0 standard drink of alcohol   • Types: 1 Cans of beer per week    Comment: 1-2 beers a months     Social History     Substance and Sexual Activity   Drug Use No     E-Cigarette/Vaping   • E-Cigarette Use Never User      E-Cigarette/Vaping Substances   • Nicotine No    • THC No    • CBD No    • Flavoring No    • Other No    • Unknown No      Social History     Tobacco Use   Smoking Status Former   • Packs/day: 2.00   • Years: 42.00   • Total pack years: 84.00   • Types: Cigarettes   • Start date: 1956   • Quit date: 1998   • Years since quittin.7   Smokeless Tobacco Never       Family History:   Family History   Problem Relation Age of Onset   • Diabetes Mother    • Breast cancer Mother         She  in 12. Age 80   • Stroke Mother    • Pneumonia Father    • Substance Abuse Neg Hx    • Mental illness Neg Hx        Meds/Allergies   all current active meds have been reviewed    No Known Allergies    Objective   Vitals: Blood pressure 119/63, pulse 93, temperature (!) 97.1 °F (36.2 °C), temperature source Oral, resp. rate 16, weight 113 kg (249 lb 1.9 oz), SpO2 97 %. ,Body mass index is 34.75 kg/m².     Intake/Output Summary (Last 24 hours) at 2023 1217  Last data filed at 2023 1101  Gross per 24 hour   Intake 300 ml   Output 1575 ml   Net -1275 ml Invasive Devices     Peripheral Intravenous Line  Duration           Peripheral IV 09/13/23 Dorsal (posterior); Left Forearm <1 day                Physical Exam  Vitals reviewed. Constitutional:       General: He is not in acute distress. Appearance: He is not toxic-appearing. HENT:      Head: Normocephalic and atraumatic. Eyes:      General: No scleral icterus. Cardiovascular:      Rate and Rhythm: Normal rate and regular rhythm. Pulmonary:      Effort: Pulmonary effort is normal.      Comments: Decreased breath sounds, crackles on R  Abdominal:      Tenderness: There is no guarding. Musculoskeletal:         General: No signs of injury. Right lower leg: Edema present. Left lower leg: Edema present. Skin:     General: Skin is warm and dry. Neurological:      General: No focal deficit present. Mental Status: He is alert. Mental status is at baseline. Lab Results: I have personally reviewed pertinent lab results. Imaging Studies: I have personally reviewed pertinent reports. and I have personally reviewed pertinent films in PACS  EKG, Pathology, and Other Studies: I have personally reviewed pertinent reports.     VTE Prophylaxis: Heparin    Code Status: Level 3 - DNAR and DNI  Advance Directive and Living Will:      Power of : Yes  POLST:

## 2023-09-13 NOTE — PHYSICAL THERAPY NOTE
PHYSICAL THERAPY CANCELLATION NOTE      Patient Name: Eddie Adam  HBBLR'C Date: 9/13/2023 09/13/23 5477   Note Type   Note type Cancelled Session   Cancel Reasons Patient off floor/test   Additional Comments Pt on PT caseload, attempted to see for PT treatment however pt is currently down at [facility] for VBS.  DC Recommendation is currently for Level 2 resources, but could return w/ Level 3 pending level of A family feels they are able to provide       Ginny Alejo, PT, DPT

## 2023-09-13 NOTE — PLAN OF CARE
Problem: MOBILITY - ADULT  Goal: Maintain or return to baseline ADL function  Description: INTERVENTIONS:  -  Assess patient's ability to carry out ADLs; assess patient's baseline for ADL function and identify physical deficits which impact ability to perform ADLs (bathing, care of mouth/teeth, toileting, grooming, dressing, etc.)  - Assess/evaluate cause of self-care deficits   - Assess range of motion  - Assess patient's mobility; develop plan if impaired  - Assess patient's need for assistive devices and provide as appropriate  - Encourage maximum independence but intervene and supervise when necessary  - Involve family in performance of ADLs  - Assess for home care needs following discharge   - Consider OT consult to assist with ADL evaluation and planning for discharge  - Provide patient education as appropriate  Outcome: Progressing  Goal: Maintains/Returns to pre admission functional level  Description: INTERVENTIONS:  - Perform BMAT or MOVE assessment daily.   - Set and communicate daily mobility goal to care team and patient/family/caregiver. - Collaborate with rehabilitation services on mobility goals if consulted  - Perform Range of Motion 2 times a day. - Reposition patient every 2 hours.   - Dangle patient 2 times a day  - Stand patient 2 times a day  - Ambulate patient 2 times a day  - Out of bed to chair 2 times a day for meals  - Out of bed for toileting  - Record patient progress and toleration of activity level   Outcome: Progressing     Problem: PAIN - ADULT  Goal: Verbalizes/displays adequate comfort level or baseline comfort level  Description: Interventions:  - Encourage patient to monitor pain and request assistance  - Assess pain using appropriate pain scale  - Administer analgesics based on type and severity of pain and evaluate response  - Implement non-pharmacological measures as appropriate and evaluate response  - Consider cultural and social influences on pain and pain management  - Notify physician/advanced practitioner if interventions unsuccessful or patient reports new pain  Outcome: Progressing     Problem: INFECTION - ADULT  Goal: Absence or prevention of progression during hospitalization  Description: INTERVENTIONS:  - Assess and monitor for signs and symptoms of infection  - Monitor lab/diagnostic results  - Monitor all insertion sites, i.e. indwelling lines, tubes, and drains  - Monitor endotracheal if appropriate and nasal secretions for changes in amount and color  - [place] appropriate cooling/warming therapies per order  - Administer medications as ordered  - Instruct and encourage patient and family to use good hand hygiene technique  - Identify and instruct in appropriate isolation precautions for identified infection/condition  Outcome: Progressing     Problem: Potential for Falls  Goal: Patient will remain free of falls  Description: INTERVENTIONS:  - Educate patient/family on patient safety including physical limitations  - Instruct patient to call for assistance with activity   - Consult OT/PT to assist with strengthening/mobility   - Keep Call bell within reach  - Keep bed low and locked with side rails adjusted as appropriate  - Keep care items and personal belongings within reach  - Initiate and maintain comfort rounds  - Make Fall Risk Sign visible to staff  - Offer Toileting every 2 Hours, in advance of need  - Initiate/Maintain bed/chair alarm  - Obtain necessary fall risk management equipment: non-slip socks, walker  - Apply yellow socks and bracelet for high fall risk patients  - Consider moving patient to room near nurses station  Outcome: Progressing     Problem: Nutrition/Hydration-ADULT  Goal: Nutrient/Hydration intake appropriate for improving, restoring or maintaining nutritional needs  Description: Monitor and assess patient's nutrition/hydration status for malnutrition. Collaborate with interdisciplinary team and initiate plan and interventions as ordered. Monitor patient's weight and dietary intake as ordered or per policy. Utilize nutrition screening tool and intervene as necessary. Determine patient's food preferences and provide high-protein, high-caloric foods as appropriate.      INTERVENTIONS:  - Monitor oral intake, urinary output, labs, and treatment plans  - Assess nutrition and hydration status and recommend course of action  - Evaluate amount of meals eaten  - Assist patient with eating if necessary   - Allow adequate time for meals  - Recommend/ encourage appropriate diets, oral nutritional supplements, and vitamin/mineral supplements  - Order, calculate, and assess calorie counts as needed  - Assess need for intravenous fluids  - Provide specific nutrition/hydration education as appropriate  - Include patient/family/caregiver in decisions related to nutrition  Outcome: Progressing     Problem: Prexisting or High Potential for Compromised Skin Integrity  Goal: Skin integrity is maintained or improved  Description: INTERVENTIONS:  - Identify patients at risk for skin breakdown  - Assess and monitor skin integrity  - Assess and monitor nutrition and hydration status  - Monitor labs   - Assess for incontinence   - Turn and reposition patient  - Assist with mobility/ambulation  - Relieve pressure over bony prominences  - Avoid friction and shearing  - Provide appropriate hygiene as needed including keeping skin clean and dry  - Evaluate need for skin moisturizer/barrier cream  - Collaborate with interdisciplinary team   - Patient/family teaching  - Consider wound care consult   Outcome: Progressing     Problem: GASTROINTESTINAL - ADULT  Goal: Minimal or absence of nausea and/or vomiting  Description: INTERVENTIONS:  - Administer IV fluids if ordered to ensure adequate hydration  - Maintain NPO status until nausea and vomiting are resolved  - Nasogastric tube if ordered  - Administer ordered antiemetic medications as needed  - Provide nonpharmacologic comfort measures as appropriate  - Advance diet as tolerated, if ordered  - Consider nutrition services referral to assist patient with adequate nutrition and appropriate food choices  Outcome: Progressing     Problem: DISCHARGE PLANNING  Goal: Discharge to home or other facility with appropriate resources  Description: INTERVENTIONS:  - Identify barriers to discharge w/patient and caregiver  - Arrange for needed discharge resources and transportation as appropriate  - Identify discharge learning needs (meds, wound care, etc.)  - Arrange for interpretive services to assist at discharge as needed  - Refer to Case Management Department for coordinating discharge planning if the patient needs post-hospital services based on physician/advanced practitioner order or complex needs related to functional status, cognitive ability, or social support system  Outcome: Progressing     Problem: Knowledge Deficit  Goal: Patient/family/caregiver demonstrates understanding of disease process, treatment plan, medications, and discharge instructions  Description: Complete learning assessment and assess knowledge base.   Interventions:  - Provide teaching at level of understanding  - Provide teaching via preferred learning methods  Outcome: Progressing

## 2023-09-13 NOTE — PROGRESS NOTES
[facility]  Progress Note  Name: Shiela Tipton I  MRN: [id number]  Unit/Bed#: -01 I Date of Admission: 9/6/2023   Date of Service: 9/13/2023 I Hospital Day: 7    Assessment/Plan   Hyponatremia  Assessment & Plan  Serum sodium stable 134 currently  Continue to monitor    Depression  Assessment & Plan  Continue Zoloft    Lung nodule  Assessment & Plan  New nodule noted on CT C/A/P  Outpatient f/u with routine screening per guidelines    Abnormal CT scan  Assessment & Plan  CT imaging noting an intermediate density partially exophytic lesion from the upper pole of right kidney, with inability to distinguish hyperdense cyst from solid neoplasm  Subsequent renal ultrasound also indeterminate -> proceed with MRI for further classification (will require Ativan for sedation prior to study)    MRI showed-" Examination was prematurely terminated due to the patient's claustrophobia and difficulty breathing.  Redemonstration of a 1.4 cm T2 isointense exophytic lesion in the upper pole right kidney without restricted diffusion, incompletely characterized without T1-weighted images and contrast.Consider repeat examination with premedication and contrast"  Urology consult appreciated  Urology recommended follow-up with them as outpatient in 3 to 4 weeks after discharge      QT prolongation  Assessment & Plan  QTc 598 (peak) on admission day  Avoid QTc prolonging agents  Replete electrolyte deficiencies of present  Repeat EKG on 9/8 revealed improvement in QTc at 420    Sepsis on admission [facility])  Assessment & Plan  Initially presented with tachycardia/tachypnea and leukocytosis due to pneumonia (see above)  Monitor vitals and maintain hemodynamics    Hypokalemia  Assessment & Plan  Monitor/replace serum potassium as necessary  Serum magnesium normal    Type 2 diabetes mellitus with diabetic neuropathy, without long-term current use of insulin [facility])  Assessment & Plan  Lab Results   Component Value Date    HGBA1C 6.3 (H) 05/02/2023     Continue SSI coverage per Accu-Cheks  Diabetic diet  Hypoglycemia protocol  Hold Glipizide while hospitalized    Chronic diastolic congestive heart failure ([address])  Assessment & Plan  On diuresis with Bumex and Zaroxolyn 3x/weekly  Continue beta-blockade with Labetalol  EF of 65%  Monitor/replete electrolyte deficiencies of present  Hold diuretics    CKD (chronic kidney disease) stage 4, GFR 15-29 ml/min ([facility])  Assessment & Plan  Baseline creatinine of approximately 2.5-2.7 -> currently stable at 2.34  Avoid nephrotoxins and hypotension   Monitor renal function and urine output  Hold Bumex and Zaroxolyn  Trend BMP    Benign prostatic hyperplasia with urinary hesitancy  Assessment & Plan  Continue Flomax    Chronic respiratory failure with hypoxia (HCC)  Assessment & Plan  Baseline oxygen requirement of 3 L per nasal cannula  In the setting of underlying diastolic CHF and COPD    COPD with acute exacerbation ([address])  Assessment & Plan  COPD with possible mild exacerbation on admission in setting of pneumonia and inability to obtain nebulizers prior to presentation  Recently switched to Performist and Pulmicort nebulizers - unable to obtain Performist, hence, has only been using Pulmicort for the last week  Continue Perforomist/Pulmicort dual nebulizer treatments  No need for the corticosteroids due to lack of wheezing on exam and oxygenation at baseline  Pulmonary consult  Will appreciate case management assistance in ensuring resources for appropriate medications at discharge    Essential hypertension  Assessment & Plan  Continue Labetalol  Hold Bumex and Zaroxolyn  Hydralazine as needed    * Pneumonia  Assessment & Plan  Presents with worsening shortness of breath x 2-3 days and a recurrent cough, meeting sepsis criteria as below  CT chest -> Focal ill-defined right upper lobe consolidation with associated small cavitary space is suspicious for pneumonia.  Recommend follow-up to resolution with chest CT in 4 to 6 weeks. Urine Legionella/Streptococcus Ag negative  Continue IV Rocephin monotherapy  Procalcitonin trend: 0.34 -> 0.55 ->0.71--0.66  Encourage incentive spirometry  Chest x-ray shows worsening pneumonia  Willll add Flagyl. Continue Rocephin  Pulmonary and speech consult  PRN Robitussin on board  Supportive care otherwise  PT/OT recommending home home health services once medically stable              Labs & Imaging: I have personally reviewed pertinent reports. VTE Prophylaxis: in place. Code Status:   Level 3 - DNAR and DNI    Patient Centered Rounds: I have performed bedside rounds with nursing staff today. Discussions with Specialists or Other Care Team Provider: Pulmonary    Education and Discussions with Family / Patient: Son at bedside    Current Length of Stay: 7 day(s)    Current Patient Status: Inpatient   Certification Statement: The patient will continue to require additional inpatient hospital stay due to see my assessment and plan. Subjective:   Patient is seen and examined at bedside. Patient is having coughing spells. Made n.p.o. speech and swallow evaluation noted. All other ROS are negative. Objective:    Vitals: Blood pressure 119/63, pulse 93, temperature (!) 97.1 °F (36.2 °C), temperature source Oral, resp. rate 16, weight 113 kg (249 lb 1.9 oz), SpO2 97 %. ,Body mass index is 34.75 kg/m². SPO2 RA Rest    Flowsheet Row ED to Hosp-Admission (Current) from 9/6/2023 in [facility]   SpO2 97 %   SpO2 Activity At Rest   O2 Device None (Room air)   O2 Flow Rate --        I&O:     Intake/Output Summary (Last 24 hours) at 9/13/2023 0946  Last data filed at 9/13/2023 0801  Gross per 24 hour   Intake 300 ml   Output 1600 ml   Net -1300 ml       Physical Exam:    General- Alert, lying comfortably in bed. Not in any acute distress.   Neck- Supple, No JVD  CVS- regular, S1 and S2 normal  Chest- Bilateral Air entry, decreased at bases. Abdomen- soft, nontender, not distended, no guarding or rigidity, BS+  Extremities-  No pedal edema, No calf tenderness                         Normal ROM in all extremities. CNS-   Alert, awake and orientedx3. No focal deficits present. Invasive Devices     Peripheral Intravenous Line  Duration           Peripheral IV 23 Dorsal (posterior); Left Hand 2 days                      Social History  reviewed  Family History   Problem Relation Age of Onset   • Diabetes Mother    • Breast cancer Mother         She  in 12.  Age 80   • Stroke Mother    • Pneumonia Father    • Substance Abuse Neg Hx    • Mental illness Neg Hx     reviewed    Meds:  Current Facility-Administered Medications   Medication Dose Route Frequency Provider Last Rate Last Admin   • acetaminophen (TYLENOL) tablet 650 mg  650 mg Oral Q6H PRN Florin Reyes PA-C   650 mg at 23 0314   • albuterol inhalation solution 2.5 mg  2.5 mg Nebulization Q4H PRN Rayray Rojo MD   2.5 mg at 09/10/23 1638   • allopurinol (ZYLOPRIM) tablet 200 mg  200 mg Oral Daily Renetta Lares PA-C   200 mg at 23 1545   • aluminum-magnesium hydroxide-simethicone (MAALOX) oral suspension 30 mL  30 mL Oral Q6H PRN Florin Reyes PA-C       • budesonide (PULMICORT) inhalation solution 0.5 mg  0.5 mg Nebulization BID Renetta Lares PA-C   0.5 mg at 23 0202   • calcitriol (ROCALTROL) capsule 0.25 mcg  0.25 mcg Oral Daily Renetta Lares PA-C   0.25 mcg at 23 0825   • cefTRIAXone (ROCEPHIN) IVPB (premix in dextrose) 2,000 mg 50 mL  2,000 mg Intravenous Q24H Octavio Wilkinson MD   Stopped at 23 0343   • fluticasone (FLONASE) 50 mcg/act nasal spray 1 spray  1 spray Each Nare Daily Florin Reyes PA-C   1 spray at 23 0826   • formoterol (PERFOROMIST) nebulizer solution 20 mcg  20 mcg Nebulization Q12H Rayray Rojo MD   20 mcg at 23 0714   • guaiFENesin (MUCINEX) 12 hr tablet 600 mg  600 mg Oral BID Alberto Lares PA-C   600 mg at 09/13/23 0825   • heparin (porcine) subcutaneous injection 5,000 Units  5,000 Units Subcutaneous [facility] Alberto Dang PA-C   5,000 Units at 09/13/23 0441   • hydrOXYzine HCL (ATARAX) tablet 25 mg  25 mg Oral Q6H PRN Conrad Lopez MD   25 mg at 09/13/23 0849   • insulin lispro (HumaLOG) 100 units/mL subcutaneous injection 1-6 Units  1-6 Units Subcutaneous 4x Daily (AC & HS) Miley Wood PA-C   1 Units at 09/13/23 7114   • labetalol (NORMODYNE) tablet 200 mg  200 mg Oral BID Alberto Lares PA-C   200 mg at 09/13/23 4796   • LORazepam (ATIVAN) injection 1 mg  1 mg Intravenous Once PRN Conrad Lopez MD       • melatonin tablet 6 mg  6 mg Oral HS Talha Segura PA-C   6 mg at 09/12/23 2154   • metroNIDAZOLE (FLAGYL) IVPB (premix) 500 mg 100 mL  500 mg Intravenous Q8H Jose G Brito MD       • nystatin (MYCOSTATIN) powder   Topical BID Sangeeta CARLOS Terry   Given at 09/13/23 2627   • ondansetron (ZOFRAN) injection 4 mg  4 mg Intravenous Q6H PRN Jose G Brito MD       • potassium chloride (K-DUR,KLOR-CON) CR tablet 20 mEq  20 mEq Oral BID Alberto Lares PA-C   20 mEq at 09/13/23 0825   • senna (SENOKOT) tablet 8.6 mg  1 tablet Oral Daily PRN Miley Wood PA-C       • sertraline (ZOLOFT) tablet 50 mg  50 mg Oral QAM Alberto Lares PA-C   50 mg at 09/13/23 0825   • sodium chloride (OCEAN) 0.65 % nasal spray 1 spray  1 spray Each Nare Q1H PRN Morenita Carlos PA-C       • sodium chloride (PF) 0.9 % injection 3 mL  3 mL Intravenous Q1H PRN Merle Jeffers MD       • tamsulosin (FLOMAX) capsule 0.4 mg  0.4 mg Oral Daily With Unisycynthia Nelson PA-C   0.4 mg at 09/12/23 1707   • traMADol (ULTRAM) tablet 50 mg  50 mg Oral BID PRN Miley Wood PA-C       • triamcinolone (KENALOG) 0.1 % cream   Topical BID Miley Wood PA-C   Given at 09/13/23 0370   • trimethobenzamide (TIGAN) IM injection 100 mg  100 mg Intramuscular Q6H PRN CARLOS Kumari   100 mg at 09/09/23 1027      Medications Prior to Admission   Medication   • albuterol (2.5 mg/3 mL) 0.083 % nebulizer solution   • allopurinol (ZYLOPRIM) 100 mg tablet   • betamethasone dipropionate (DIPROSONE) 0.05 % cream   • Blood Glucose Monitoring Suppl (OneTouch Verio) w/Device KIT   • budesonide (Pulmicort) 0.5 mg/2 mL nebulizer solution   • bumetanide (BUMEX) 2 mg tablet   • calcitriol (ROCALTROL) 0.25 mcg capsule   • fluticasone (FLONASE) 50 mcg/act nasal spray   • formoterol (PERFOROMIST) 20 MCG/2ML nebulizer solution   • glipiZIDE (GLUCOTROL) 5 mg tablet   • glucose blood test strip   • Iron-Vitamin C (IRON 100/C PO)   • labetalol (NORMODYNE) 200 mg tablet   • Lancets (onetouch ultrasoft) lancets   • metolazone (ZAROXOLYN) 2.5 mg tablet   • mupirocin (BACTROBAN) 2 % ointment   • potassium chloride (K-DUR,KLOR-CON) 20 mEq tablet   • senna (SENOKOT) 8.6 MG tablet   • sertraline (ZOLOFT) 50 mg tablet   • tamsulosin (FLOMAX) 0.4 mg   • traMADol (Ultram) 50 mg tablet       Labs:  Results from last 7 days   Lab Units 09/13/23  0440 09/12/23 0346 09/11/23  0822   WBC Thousand/uL 15.18* 16.29* 13.72*   HEMOGLOBIN g/dL 12.6 12.3 12.6   HEMATOCRIT % 38.8 37.5 37.6   PLATELETS Thousands/uL 204 207 197   NEUTROS PCT % 85* 84* 85*   LYMPHS PCT % 4* 5* 5*   MONOS PCT % 7 8 8   EOS PCT % 2 1 1     Results from last 7 days   Lab Units 09/13/23  0440 09/12/23  0346 09/11/23  0822 09/07/23  0257 09/06/23  1333   POTASSIUM mmol/L 3.6 3.6 3.5   < > 3.3*   CHLORIDE mmol/L 98 94* 95*   < > 91*   CO2 mmol/L 24 26 26   < > 27   BUN mg/dL 123* 128* 117*   < > 112*   CREATININE mg/dL 2.34* 2.63* 2.47*   < > 2.95*   CALCIUM mg/dL 9.1 9.4 9.2   < > 9.7   ALK PHOS U/L  --   --   --   --  72   ALT U/L  --   --   --   --  10   AST U/L  --   --   --   --  14    < > = values in this interval not displayed.      Lab Results   Component Value Date    TROPONINI <0.02 10/21/2021    TROPONINI <0.02 11/15/2019         Lab Results   Component Value Date    BLOODCX No Growth After 5 Days. 09/06/2023    BLOODCX No Growth After 5 Days. 09/06/2023         Imaging:  Results for orders placed during the hospital encounter of 09/06/23    XR chest portable    Narrative  CHEST    INDICATION:   SOB.    COMPARISON: CXR and CT 9/6/2023    EXAM PERFORMED/VIEWS:  XR CHEST PORTABLE      FINDINGS:    Cardiomediastinal silhouette appears unremarkable. Peripheral right upper lung increasing pleural-based airspace opacity. Left hemidiaphragm chronic elevation. No pneumothorax or pleural effusion. Osseous structures appear within normal limits for patient age. Impression  Peripheral right upper lung axillary segment worsening infiltrate. The study was marked in [facility] for immediate notification. Workstation performed: [id number]    Results for orders placed during the hospital encounter of 08/26/23    XR chest 2 views    Narrative  CHEST    INDICATION:   Increased dyspnea. History of pulmonary hypertension, chronic diastolic heart failure, COPD. COMPARISON: PA and lateral chest August 23, 2023. CT chest Nirmala 10, 2022. EXAM PERFORMED/VIEWS:  XR CHEST PA & LATERAL      FINDINGS:    Cardiomediastinal silhouette appears unremarkable. Small calcified granuloma redemonstrated left lung base. The lungs are otherwise clear. No pneumothorax or pleural effusion. Osseous structures appear within normal limits for patient age. Impression  No acute cardiopulmonary disease.             Workstation performed: [id number]      Last 24 Hours Medication List:   Current Facility-Administered Medications   Medication Dose Route Frequency Provider Last Rate   • acetaminophen  650 mg Oral Q6H PRN Chris Siu PA-C     • albuterol  2.5 mg Nebulization Q4H PRN Haroldo Platt MD     • allopurinol  200 mg Oral Daily Benedict Crigler Vila, PA-C     • aluminum-magnesium hydroxide-simethicone  30 mL Oral Q6H PRN Lencho Elias PA-C     • budesonide  0.5 mg Nebulization BID Lencho Elias PA-C     • calcitriol  0.25 mcg Oral Daily Radha Isabel     • cefTRIAXone  2,000 mg Intravenous Q24H Carey Paez MD Stopped (09/13/23 6291)   • fluticasone  1 spray Each Nare Daily Lencho Elias PA-C     • formoterol  20 mcg Nebulization Q12H Keyla Luna MD     • guaiFENesin  600 mg Oral BID Lencho Elias PA-C     • heparin (porcine)  5,000 Units Subcutaneous [facility] Michael Dang PA-C     • hydrOXYzine HCL  25 mg Oral Q6H PRN Carey Paez MD     • insulin lispro  1-6 Units Subcutaneous 4x Daily (AC & HS) Lencho Elias PA-C     • labetalol  200 mg Oral BID Michael Lares PA-C     • LORazepam  1 mg Intravenous Once PRN Carey Paez MD     • melatonin  6 mg Oral HS Khris Handy PA-C     • metroNIDAZOLE  500 mg Intravenous Q8H Stan Kemp MD     • nystatin   Topical BID CARLOS Cohpra     • ondansetron  4 mg Intravenous Q6H PRN Stan Kemp MD     • potassium chloride  20 mEq Oral BID Lencho Elias PA-C     • senna  1 tablet Oral Daily PRN Lencho Elias PA-C     • sertraline  50 mg Oral QAM Lencho Elias PA-C     • sodium chloride  1 spray Each Nare Q1H PRN Isrrael Poe PA-C     • sodium chloride (PF)  3 mL Intravenous Q1H PRN Jeremy Reid MD     • tamsulosin  0.4 mg Oral Daily With EncapBAKARI     • traMADol  50 mg Oral BID PRN Lencho Elias PA-C     • triamcinolone   Topical BID Lencho Elias PA-C     • trimethobenzamide  100 mg Intramuscular Q6H PRN CARLOS Jenkins          Today, Patient Was Seen By: Stan Kemp MD    ** Please Note: Dictation voice to text software may have been used in the creation of this document.  **

## 2023-09-13 NOTE — PROGRESS NOTES
-- Patient: Ursula Selby  -- MRN: [id number]  -- Aidin Request ID: [id number]  -- Level of care reserved: [address]  -- Partner Reserved: [place], [place], [address] [place] [address] [phone]  -- Clinical needs requested:  -- Geography searched: 15 miles around [phone]  -- Start of Service:  -- Request sent: 4:27pm EDT on 9/11/2023 by Ramo Fregoso  -- Partner reserved: 4:13pm EDT on 9/13/2023 by Iain Abel  -- Choice list shared:

## 2023-09-13 NOTE — ASSESSMENT & PLAN NOTE
COPD with possible mild exacerbation on admission in setting of pneumonia and inability to obtain nebulizers prior to presentation  Recently switched to Performist and Pulmicort nebulizers - unable to obtain Performist, hence, has only been using Pulmicort for the last week  Continue Perforomist/Pulmicort dual nebulizer treatments  No need for the corticosteroids due to lack of wheezing on exam and oxygenation at baseline  Pulmonary consult  Will appreciate case management assistance in ensuring resources for appropriate medications at discharge

## 2023-09-13 NOTE — PROCEDURES
Video Swallow Study      Patient Name: Shiela PLAZA Date: 9/13/2023        Past Medical History  Past Medical History:   Diagnosis Date   • Anxiety 2020   • COPD (chronic obstructive pulmonary disease) ([address])    • Coronary artery disease 2012   • Depression 2020   • Herpes zoster    • Hypertension    • Memory loss     possible   • Mycoplasma pneumonia    • Obesity    • Osteoarthritis    • Renal calculi         Past Surgical History  Past Surgical History:   Procedure Laterality Date   • CATARACT EXTRACTION      with insert intraoculat lens prosthesis   • HEMORRHOID SURGERY     • NECK SURGERY      for bone spur         Modified (Video) Barium Swallow Study    Summary:  Images are on PACS for review. Pt presents min-mild oropharyngeal dysphagia. Slowed lingual rocking transport of all material. Reduced oral control w/ loss posteriorly into pharynx and delayed swallow initiation w/ all material reaching the pyriforms prior to reduced hyo-laryngeal elevation. Diminished pharyngeal stripping wave and subsequent mild pharyngeal retention. Transient penetration w/ nectar thick and thin liquids, no residual undercoating in vestibule. Penetration to the vocal cords while taking barium tablet w/ thin. No gross aspiration on today's study. Recommendations:  Diet: Regular  Liquids: Thin   Meds: Crush in puree  Strategies: Single sips, alternate liquids/solids   Frequent oral care  Upright position  F/u ST tx: Yes   Therapy Prognosis: Good   Prognosis considerations: Age, current medical, past medical   Full/Intermittent Supervision  Aspiration Precautions  Reflux Precautions  Consider consult with: -  Results reviewed with: pt, nursing, physician, spouse   Repeat MBS as necessary  If a dedicated assessment of the esophagus is desired, consider esophagram/barium swallow or EGD.       Goals:  Pt will tolerate least restrictive diet w/out s/s aspiration or oral/pharyngeal difficulties. Patient's goal: "I don't like this machine on my arm"       H&P/pertinent provider notes: (PMH noted above)  Pt is a 81 y.o. male who presented to [address] [place] [address] who Presents with worsening shortness of breath x 2-3 days and a recurrent cough, meeting sepsis criteria as below  CT chest -> Focal ill-defined right upper lobe consolidation with associated small cavitary space is suspicious for pneumonia. Recommend follow-up to resolution with chest CT in 4 to 6 weeks. Urine Legionella/Streptococcus Ag negative  Continue IV Rocephin monotherapy  Procalcitonin trend: 0.34 -> 0.55 ->0.71--0.66  Encourage incentive spirometry  Chest x-ray shows worsening pneumonia  Willll add Flagyl.  Continue Rocephin  Pulmonary and speech consult  PRN Robitussin on board  Supportive care otherwise  PT/OT recommending home home health services once medically stable       Special Studies:  CXR 9/12: Peripheral right upper lung axillary segment worsening infiltrate.     CT chest abdomen pelvis 9/6: 1.  Focal ill-defined right upper lobe consolidation with associated small cavitary space is suspicious for pneumonia. Recommend follow-up to resolution with chest CT in 4 to 6 weeks. 2.  New 4 mm right upper lobe nodule. Attention on follow-up. 3.  Emphysema. 4.  Cholelithiasis without evidence of acute cholecystitis. 5.  Hepatic steatosis. 6.  Intermediate density partially exophytic lesion arising from the upper pole right kidney. Cannot distinguish hyperdense cyst from solid neoplasm on this noncontrast exam. Recommend further evaluation with renal ultrasound.     CXR 9/6: Development of subpleural opacity in the lateral right midlung which could represent a mass or early infiltrate    Previous VBS:  No     Does the pt have pain? No   If yes, was nursing made aware/was it addressed?  N/A     Swallow Mechanism Exam  Facial: symmetrical  Labial: WFL  Lingual: WFL  Velum: symmetrical  Mandible: adequate ROM  Dentition: adequate  Vocal quality:clear/adequate   Volitional Cough: strong/productive   Respiratory Status: on RA      Swallow Information   Current Risks for Dysphagia & Aspiration: age, cognitive status   Current Symptoms/Concerns: change in respiratory status and worsening pna   Current Diet: NPO   Baseline Diet: regular diet and thin liquids      Consistencies Administered:  Pt was viewed sitting upright in the lateral and AP positions. Due to patient refusal, trials provided deviated from the [facility] Validated Protocol. Trials administered: 5-mL thin liquid x2, 20-mL cup sip thin, 40-mL sequential swallow thin, 5-mL nectar thick, 20-mL cup sip nectar thick, 40-mL sequential swallow nectar thick, 5-mL Honey thick, 5-mL pudding, ½ cookie coated with 3-mL pudding. Pt was also given thin liquids by straw, as well as a barium tablet with thin liquids.      Oral Impairment:  Lip Closure: complete   Tongue Control During Bolus Hold: reduced w/ posterior spill into pharynx and throughout oral cavity   Bolus Preparation/Mastication: timely/effective  Bolus Transport/Lingual Motion: slowed, lingual rocking   Oral Residue: mild throughout   Initiation of the Pharyngeal Swallow: mild delay w/ bolus head to the valleculae     Pharyngeal Impairment:  Soft Palate Elevation: complete  Laryngeal Elevation: reduced/incomplete  Anterior Hyoid Excursion: reduced   Epiglottic Movement: complete   Laryngeal Vestibular Closure: mild incomplete   Pharyngeal Stripping Wave: diminished   PES Opening: complete  Tongue Base Retraction: reduced   Pharyngeal Residue: mild     Screening of Esophageal Impairment   Esophageal Clearance: complete       Penetration/Aspiration:  Thin: transient penetration w/ sequential sips (PAS 2), penetration to the cords w/ barium tablet (PAS 5)   Nectar: transient penetration w/ single cup sip (PAS 2)   Honey: no penetration/aspiration (PAS 1)   Puree: no penetration/aspiration (PAS 1)   Solid: no penetration/aspiration (PAS 1)   Response to Aspiration: -   Strategies/Efficacy: N/A     8-Point Penetration-Aspiration Scale   1 Material does not enter the airway   2 Material enters the airway, remains above the vocal folds, and is ejected  from the  airway    3 Material enters the airway, remains above the vocal folds, and is not ejected from the airway   4 Material enters the airway, contacts the vocal folds, and is ejected from the airway   5 Material enters the airway, contacts the vocal folds, and is not ejected from the airway    6 Material enters the airway, passes below the vocal folds and is ejected into the larynx or out of the airway    7 Material enters the airway, passes below the vocal folds, and is not ejected from the trachea despite effort    8 Material enters the airway, passes below the vocal folds, and no effort is made to eject

## 2023-09-13 NOTE — PROGRESS NOTES
-- Patient: Laurence Chirinos  -- MRN: [id number]  -- Aidin Request ID: [id number]  -- Level of care reserved: [address]  -- Partner Reserved: [place], [place], [address] [place] [address] [phone]  -- Clinical needs requested:  -- Geography searched: 15 miles around [phone]  -- Start of Service:  -- Request sent: 4:27pm EDT on 9/11/2023 by Lukasz Sapp  -- Partner reserved: 4:13pm EDT on 9/13/2023 by Luz Maria Finch  -- Choice list shared:

## 2023-09-13 NOTE — SPEECH THERAPY NOTE
Speech Language/Pathology    Speech-Language Pathology Bedside Swallow Evaluation      Patient Name: Suman Hawkins    Today's Date: 9/13/2023     Problem List  Principal Problem:    Pneumonia  Active Problems:    Essential hypertension    COPD with acute exacerbation ([address])    Chronic respiratory failure with hypoxia ([facility])    Benign prostatic hyperplasia with urinary hesitancy    CKD (chronic kidney disease) stage 4, GFR 15-29 ml/min ([facility])    Chronic diastolic congestive heart failure ([address])    Type 2 diabetes mellitus with diabetic neuropathy, without long-term current use of insulin ([facility])    Hypokalemia    Sepsis on admission ([address])    QT prolongation    Abnormal CT scan    Lung nodule    Depression    Hyponatremia      Past Medical History  Past Medical History:   Diagnosis Date   • Anxiety 2020   • COPD (chronic obstructive pulmonary disease) ([facility])    • Coronary artery disease 2012   • Depression 2020   • Herpes zoster    • Hypertension    • Memory loss     possible   • Mycoplasma pneumonia    • Obesity    • Osteoarthritis    • Renal calculi        Past Surgical History  Past Surgical History:   Procedure Laterality Date   • CATARACT EXTRACTION      with insert intraoculat lens prosthesis   • HEMORRHOID SURGERY     • NECK SURGERY      for bone spur       Summary   Pt recently evaluated and d/c from  caseload 2/2 functional appear oropharyngeal swallow skill and no medical indicators to suggest need for instrumentation per attending provider at that time. This am, pt w/ noted difficulty taking PO med w/ thin liquid and provider w/ concerns for worsening pna. Pt assessed w/ thin liquids and puree at this time. Good appear oral control w/ prompt transfer. No overt s/s aspiration, though pt w/ weak vocal quality, and ? Subsequent increased risk for aspiration. Education provided to and pt's wife regarding rationale for MBS to further assess given current medical concerns and to rule-out silent aspiration.  Pt/spouse agreeable. [facility] scheduled for 1330 today. Risk/s for Aspiration: At least mild risk, age, cognitive status, respiratory status      Recommended Diet: defer diet recs until MBS completed this afternoon    Recommended Form of Meds: non-oral if possible   Aspiration precautions and swallowing strategies: -  Other Recommendations: Continue frequent oral care        Current Medical Status  Pt is a 81 y.o. male who presented to [facility] who Presents with worsening shortness of breath x 2-3 days and a recurrent cough, meeting sepsis criteria as below  CT chest -> Focal ill-defined right upper lobe consolidation with associated small cavitary space is suspicious for pneumonia. Recommend follow-up to resolution with chest CT in 4 to 6 weeks. Urine Legionella/Streptococcus Ag negative  Continue IV Rocephin monotherapy  Procalcitonin trend: 0.34 -> 0.55 ->0.71--0.66  Encourage incentive spirometry  Chest x-ray shows worsening pneumonia  Willll add Flagyl. Continue Rocephin  Pulmonary and speech consult  PRN Robitussin on board  Supportive care otherwise  PT/OT recommending home home health services once medically stable        Current Precautions:   Aspiration      Allergies:  No known food allergies    Past medical history:  Please see H&P for details    Special Studies:  CXR 9/12: Peripheral right upper lung axillary segment worsening infiltrate. CT chest abdomen pelvis 9/6: 1. Focal ill-defined right upper lobe consolidation with associated small cavitary space is suspicious for pneumonia. Recommend follow-up to resolution with chest CT in 4 to 6 weeks. 2.  New 4 mm right upper lobe nodule. Attention on follow-up. 3.  Emphysema. 4.  Cholelithiasis without evidence of acute cholecystitis. 5.  Hepatic steatosis. 6.  Intermediate density partially exophytic lesion arising from the upper pole right kidney.  Cannot distinguish hyperdense cyst from solid neoplasm on this noncontrast exam. Recommend further evaluation with renal ultrasound. CXR 9/6: Development of subpleural opacity in the lateral right midlung which could represent a mass or early infiltrate         Social/Education/Vocational Hx:  Pt lives home w/ spouse    Swallow Information   Current Risks for Dysphagia & Aspiration: respiratory status   Current Symptoms/Concerns: change in respiratory status  Current Diet: regular diet and thin liquids   Baseline Diet: regular diet and thin liquids      Baseline Assessment   Behavior/Cognition: alert  Speech/Language Status: able to participate in conversation and able to follow commands  Patient Positioning: upright in bed  Pain Status/Interventions/Response to Interventions:  No report of or nonverbal indications of pain. Swallow Mechanism Exam  Facial: symmetrical  Labial: WFL  Lingual: WFL  Velum: symmetrical  Mandible: adequate ROM  Dentition: adequate  Vocal quality:weak and hoarse   Volitional Cough: strong/productive   Respiratory Status: on RA      Consistencies Assessed and Performance   Consistencies Administered: thin liquids and puree    Oral Stage:   Bolus formation and transfer were functional with no significant oral residue noted. No overt s/s reduced oral control. Pharyngeal Stage:   Swallow Mechanics:  Swallowing initiation appeared fairly prompt, ?mild delay. Laryngeal rise was palpated and judged to be fair. No coughing, throat clearing, change in vocal quality or respiratory status noted today.      Esophageal Concerns: none reported    Strategies and Efficacy: -     Summary and Recommendations (see above)    Results Reviewed with: patient, RN, MD and family     Treatment Recommended: Yes     Frequency of treatment: As able/appropriate, MBS     Patient Stated Goal: none stated    Dysphagia LTG  -Patient will demonstrate safe and effective oral intake (without overt s/s significant oral/pharyngeal dysphagia including s/s penetration or aspiration) for the highest appropriate diet level.      Short Term Goals:  -Patient will comply with a Video/Modified Barium Swallow study for more complete assessment of swallowing anatomy/physiology/aspiration risk and to assess efficacy of treatment techniques so as to best guide treatment plan      Speech Therapy Prognosis   Prognosis: deferred    Prognosis Considerations: age, medical status, prior medical history and cognitive status

## 2023-09-13 NOTE — ASSESSMENT & PLAN NOTE
On diuresis with Bumex and Zaroxolyn 3x/weekly  Continue beta-blockade with Labetalol  EF of 65%  Monitor/replete electrolyte deficiencies of present  Hold diuretics

## 2023-09-13 NOTE — CASE MANAGEMENT
Case Management Discharge Planning Note    Patient name Radha Bell  Location /-91 MRN [id number]  : 1935 Date 2023       Current Admission Date: 2023  Current Admission Diagnosis:Pneumonia   Patient Active Problem List    Diagnosis Date Noted   • Depression 2023   • Hyponatremia 2023   • QT prolongation 2023   • Abnormal CT scan 2023   • Lung nodule 2023   • Pneumonia 2023   • Sepsis on admission ([address]) 2023   • Pulmonary hypertension ([address]) 2023   • Secondary hyperparathyroidism of renal origin ([address]) 2023   • Hypokalemia 2023   • Heart failure with preserved ejection fraction ([address]) 2023   • Continuous opioid dependence ([address]) 2023   • Chronic rhinitis 2023   • Venous stasis ulcer of other part of right lower leg limited to breakdown of skin, unspecified whether varicose veins present ([address]) 2023   • Type 2 diabetes mellitus with diabetic neuropathy, without long-term current use of insulin ([address]) 2023   • Idiopathic chronic gout of right foot without tophus 2022   • Pulmonary emphysema ([address]) 2022   • Memory loss    • Ambulatory dysfunction    • Mild cognitive impairment 2022   • Mild episode of recurrent major depressive disorder ([address]) 2022   • Other constipation 2022   • Insomnia due to medical condition 2022   • Chronic diastolic congestive heart failure ([address]) 2022   • CKD (chronic kidney disease) stage 4, GFR 15-29 ml/min ([address]) 03/15/2022   • Benign prostatic hyperplasia with urinary hesitancy 2020   • Moderate COPD (chronic obstructive pulmonary disease) ([address]) 12/10/2019   • Chronic respiratory failure with hypoxia ([address]) 11/15/2019   • COPD with acute exacerbation ([address]) 2019   • 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 disease, without long-term current use of insulin ([address]) 2016   • Essential hypertension 2015   • CHI (obstructive sleep apnea) 07/09/2014      LOS (days): 7  Geometric Mean LOS (GMLOS) (days): 5.00  Days to GMLOS:-2     OBJECTIVE:  Risk of Unplanned Readmission Score: 31.01         Current admission status: Inpatient   Preferred Pharmacy:   [address] [address]  Phone: [phone] Fax: [phone]    Primary Care Provider: Bryan Lopez MD    Primary Insurance: [facility]  Secondary Insurance:     DISCHARGE DETAILS:     Met with patient's son and Jaydenflores Alvares to discuss [facility] assessment  for patient to go there. As per Humza Diss at [facility]. patient needs rehab before going to assisted living. He needs assistance to exit the building. Ramírez Kowalski wants patient to receive rehab at [facility] as that is part of  [facility]. Referral to [facility] was made, bed reserved on Aidin. Patient will need updated PT/OT notes to obtain auth from [facility].  BLS to transport to SNF                                                                          IMM Given (Date):: 09/13/23  IMM Given to[de-identified] Family

## 2023-09-13 NOTE — ASSESSMENT & PLAN NOTE
Baseline creatinine of approximately 2.5-2.7 -> currently stable at 2.34  Avoid nephrotoxins and hypotension   Monitor renal function and urine output  Hold Bumex and Zaroxolyn  Trend BMP

## 2023-09-13 NOTE — SPEECH THERAPY NOTE
Speech Language/Pathology    Speech/Language Pathology Progress Note    Patient Name: Michael Delaney  Today's Date: 9/13/2023     Problem List  Principal Problem:    Pneumonia  Active Problems:    Essential hypertension    COPD with acute exacerbation ([address])    Chronic respiratory failure with hypoxia ([facility])    Benign prostatic hyperplasia with urinary hesitancy    CKD (chronic kidney disease) stage 4, GFR 15-29 ml/min ([facility])    Chronic diastolic congestive heart failure ([address])    Type 2 diabetes mellitus with diabetic neuropathy, without long-term current use of insulin ([facility])    Hypokalemia    Sepsis on admission ([address])    QT prolongation    Abnormal CT scan    Lung nodule    Depression    Hyponatremia       Past Medical History  Past Medical History:   Diagnosis Date   • Anxiety 2020   • COPD (chronic obstructive pulmonary disease) ([address])    • Coronary artery disease 2012   • Depression 2020   • Herpes zoster    • Hypertension    • Memory loss     possible   • Mycoplasma pneumonia    • Obesity    • Osteoarthritis    • Renal calculi         Past Surgical History  Past Surgical History:   Procedure Laterality Date   • CATARACT EXTRACTION      with insert intraoculat lens prosthesis   • HEMORRHOID SURGERY     • NECK SURGERY      for bone spur         Subjective:  Pt's spouse arrived s/p MBS to review findings/recommendations. Objective:  Pt seen for dx dysphagia tx. MBS findings and recommendations thoroughly reviewed w/ pt and pt's spouse w/ use of images to aid in understanding. Education provided on anatomy/physiology of the swallow and identified oropharyngeal impairments. Described penetration to the cords w/ barium tablet and potential risk of passive aspiration/gross aspiration. Education provided on strategies to optimize swallow safety, including slow rate and single sips to reduce demand on respiratory system/minimize risk. Pt/spouse verbalized understanding of all and denied questions at this time. Pt requesting sips of thin liquids. Pt assessed w/ single cup sips, needs cueing for single sips at slowed rate. No overt s/s aspiration across trials. Pt would benefit from further education/training of recommended strategy use. Assessment:  Pt/pt's spouse w/ good understanding of MBS findings, oropharyngeal findings, and recommendations at this time. Pt requires cueing for slowed rate/single sips.      Plan/Recommendations:  Regular/thin   Crush meds as able (messaged provider regarding potential alt forms of meds that are unable to be crushed)  Frequent/thorough oral care  ST to f/u as able/appropriate

## 2023-09-14 LAB
ANION GAP SERPL CALCULATED.3IONS-SCNC: 11 MMOL/L
BASOPHILS # BLD AUTO: 0.05 THOUSANDS/ÂΜL (ref 0–0.1)
BASOPHILS NFR BLD AUTO: 0 % (ref 0–1)
BUN SERPL-MCNC: 123 MG/DL (ref 5–25)
CALCIUM SERPL-MCNC: 9.2 MG/DL (ref 8.4–10.2)
CHLORIDE SERPL-SCNC: 99 MMOL/L (ref 96–108)
CO2 SERPL-SCNC: 25 MMOL/L (ref 21–32)
CREAT SERPL-MCNC: 2.28 MG/DL (ref 0.6–1.3)
EOSINOPHIL # BLD AUTO: 0.27 THOUSAND/ÂΜL (ref 0–0.61)
EOSINOPHIL NFR BLD AUTO: 2 % (ref 0–6)
ERYTHROCYTE [DISTWIDTH] IN BLOOD BY AUTOMATED COUNT: 14.4 % (ref 11.6–15.1)
GFR SERPL CREATININE-BSD FRML MDRD: 24 ML/MIN/1.73SQ M
GLUCOSE SERPL-MCNC: 148 MG/DL (ref 65–140)
GLUCOSE SERPL-MCNC: 159 MG/DL (ref 65–140)
GLUCOSE SERPL-MCNC: 160 MG/DL (ref 65–140)
GLUCOSE SERPL-MCNC: 178 MG/DL (ref 65–140)
GLUCOSE SERPL-MCNC: 179 MG/DL (ref 65–140)
HCT VFR BLD AUTO: 37.2 % (ref 36.5–49.3)
HGB BLD-MCNC: 11.9 G/DL (ref 12–17)
IMM GRANULOCYTES # BLD AUTO: 0.35 THOUSAND/UL (ref 0–0.2)
IMM GRANULOCYTES NFR BLD AUTO: 2 % (ref 0–2)
LYMPHOCYTES # BLD AUTO: 0.71 THOUSANDS/ÂΜL (ref 0.6–4.47)
LYMPHOCYTES NFR BLD AUTO: 5 % (ref 14–44)
MAGNESIUM SERPL-MCNC: 2 MG/DL (ref 1.9–2.7)
MCH RBC QN AUTO: 28.1 PG (ref 26.8–34.3)
MCHC RBC AUTO-ENTMCNC: 32 G/DL (ref 31.4–37.4)
MCV RBC AUTO: 88 FL (ref 82–98)
MONOCYTES # BLD AUTO: 1 THOUSAND/ÂΜL (ref 0.17–1.22)
MONOCYTES NFR BLD AUTO: 7 % (ref 4–12)
NEUTROPHILS # BLD AUTO: 12.96 THOUSANDS/ÂΜL (ref 1.85–7.62)
NEUTS SEG NFR BLD AUTO: 84 % (ref 43–75)
NRBC BLD AUTO-RTO: 0 /100 WBCS
PLATELET # BLD AUTO: 214 THOUSANDS/UL (ref 149–390)
PMV BLD AUTO: 10.5 FL (ref 8.9–12.7)
POTASSIUM SERPL-SCNC: 3.9 MMOL/L (ref 3.5–5.3)
PROCALCITONIN SERPL-MCNC: 0.7 NG/ML
RBC # BLD AUTO: 4.23 MILLION/UL (ref 3.88–5.62)
SODIUM SERPL-SCNC: 135 MMOL/L (ref 135–147)
WBC # BLD AUTO: 15.34 THOUSAND/UL (ref 4.31–10.16)

## 2023-09-14 PROCEDURE — 84145 PROCALCITONIN (PCT): CPT | Performed by: INTERNAL MEDICINE

## 2023-09-14 PROCEDURE — 99232 SBSQ HOSP IP/OBS MODERATE 35: CPT | Performed by: INTERNAL MEDICINE

## 2023-09-14 PROCEDURE — 82948 REAGENT STRIP/BLOOD GLUCOSE: CPT

## 2023-09-14 PROCEDURE — 97116 GAIT TRAINING THERAPY: CPT

## 2023-09-14 PROCEDURE — 94640 AIRWAY INHALATION TREATMENT: CPT

## 2023-09-14 PROCEDURE — 85025 COMPLETE CBC W/AUTO DIFF WBC: CPT | Performed by: INTERNAL MEDICINE

## 2023-09-14 PROCEDURE — 83735 ASSAY OF MAGNESIUM: CPT | Performed by: INTERNAL MEDICINE

## 2023-09-14 PROCEDURE — 97530 THERAPEUTIC ACTIVITIES: CPT

## 2023-09-14 PROCEDURE — 94760 N-INVAS EAR/PLS OXIMETRY 1: CPT

## 2023-09-14 PROCEDURE — 80048 BASIC METABOLIC PNL TOTAL CA: CPT | Performed by: INTERNAL MEDICINE

## 2023-09-14 RX ORDER — DOCUSATE SODIUM 100 MG/1
100 CAPSULE, LIQUID FILLED ORAL 2 TIMES DAILY
Status: DISCONTINUED | OUTPATIENT
Start: 2023-09-14 | End: 2023-09-15 | Stop reason: HOSPADM

## 2023-09-14 RX ORDER — SENNOSIDES 8.6 MG
2 TABLET ORAL
Status: DISCONTINUED | OUTPATIENT
Start: 2023-09-14 | End: 2023-09-15 | Stop reason: HOSPADM

## 2023-09-14 RX ORDER — SIMETHICONE 80 MG
80 TABLET,CHEWABLE ORAL EVERY 6 HOURS PRN
Status: DISCONTINUED | OUTPATIENT
Start: 2023-09-14 | End: 2023-09-15 | Stop reason: HOSPADM

## 2023-09-14 RX ORDER — LEVALBUTEROL INHALATION SOLUTION 1.25 MG/3ML
1.25 SOLUTION RESPIRATORY (INHALATION)
Status: DISCONTINUED | OUTPATIENT
Start: 2023-09-14 | End: 2023-09-15 | Stop reason: HOSPADM

## 2023-09-14 RX ORDER — POLYETHYLENE GLYCOL 3350 17 G/17G
17 POWDER, FOR SOLUTION ORAL DAILY
Status: DISCONTINUED | OUTPATIENT
Start: 2023-09-14 | End: 2023-09-15 | Stop reason: HOSPADM

## 2023-09-14 RX ADMIN — INSULIN LISPRO 1 UNITS: 100 INJECTION, SOLUTION INTRAVENOUS; SUBCUTANEOUS at 11:52

## 2023-09-14 RX ADMIN — HYDROXYZINE HYDROCHLORIDE 25 MG: 25 TABLET, FILM COATED ORAL at 08:17

## 2023-09-14 RX ADMIN — BUDESONIDE 0.5 MG: 0.5 INHALANT ORAL at 07:44

## 2023-09-14 RX ADMIN — HEPARIN SODIUM 5000 UNITS: 5000 INJECTION INTRAVENOUS; SUBCUTANEOUS at 21:16

## 2023-09-14 RX ADMIN — Medication 6 MG: at 21:16

## 2023-09-14 RX ADMIN — LABETALOL HYDROCHLORIDE 200 MG: 200 TABLET, FILM COATED ORAL at 08:12

## 2023-09-14 RX ADMIN — FORMOTEROL FUMARATE DIHYDRATE 20 MCG: 20 SOLUTION RESPIRATORY (INHALATION) at 20:15

## 2023-09-14 RX ADMIN — POLYETHYLENE GLYCOL 3350 17 G: 17 POWDER, FOR SOLUTION ORAL at 11:05

## 2023-09-14 RX ADMIN — SIMETHICONE 80 MG: 80 TABLET, CHEWABLE ORAL at 11:05

## 2023-09-14 RX ADMIN — METRONIDAZOLE 500 MG: 500 INJECTION, SOLUTION INTRAVENOUS at 09:19

## 2023-09-14 RX ADMIN — INSULIN LISPRO 1 UNITS: 100 INJECTION, SOLUTION INTRAVENOUS; SUBCUTANEOUS at 22:04

## 2023-09-14 RX ADMIN — GUAIFENESIN 600 MG: 600 TABLET ORAL at 08:12

## 2023-09-14 RX ADMIN — IPRATROPIUM BROMIDE 0.5 MG: 0.5 SOLUTION RESPIRATORY (INHALATION) at 13:49

## 2023-09-14 RX ADMIN — ALBUTEROL SULFATE 2.5 MG: 2.5 SOLUTION RESPIRATORY (INHALATION) at 07:44

## 2023-09-14 RX ADMIN — FORMOTEROL FUMARATE DIHYDRATE 20 MCG: 20 SOLUTION RESPIRATORY (INHALATION) at 07:45

## 2023-09-14 RX ADMIN — LEVALBUTEROL HYDROCHLORIDE 1.25 MG: 1.25 SOLUTION RESPIRATORY (INHALATION) at 20:15

## 2023-09-14 RX ADMIN — SENNOSIDES 17.2 MG: 8.6 TABLET, FILM COATED ORAL at 21:16

## 2023-09-14 RX ADMIN — INSULIN LISPRO 1 UNITS: 100 INJECTION, SOLUTION INTRAVENOUS; SUBCUTANEOUS at 08:13

## 2023-09-14 RX ADMIN — ALLOPURINOL 200 MG: 100 TABLET ORAL at 08:12

## 2023-09-14 RX ADMIN — NYSTATIN 1 APPLICATION: 100000 POWDER TOPICAL at 08:13

## 2023-09-14 RX ADMIN — POTASSIUM CHLORIDE 20 MEQ: 1500 TABLET, EXTENDED RELEASE ORAL at 08:12

## 2023-09-14 RX ADMIN — TAMSULOSIN HYDROCHLORIDE 0.4 MG: 0.4 CAPSULE ORAL at 16:58

## 2023-09-14 RX ADMIN — GUAIFENESIN 600 MG: 600 TABLET ORAL at 18:04

## 2023-09-14 RX ADMIN — POTASSIUM CHLORIDE 20 MEQ: 1500 TABLET, EXTENDED RELEASE ORAL at 18:04

## 2023-09-14 RX ADMIN — CALCITRIOL CAPSULES 0.25 MCG 0.25 MCG: 0.25 CAPSULE ORAL at 08:12

## 2023-09-14 RX ADMIN — HEPARIN SODIUM 5000 UNITS: 5000 INJECTION INTRAVENOUS; SUBCUTANEOUS at 05:05

## 2023-09-14 RX ADMIN — IPRATROPIUM BROMIDE 0.5 MG: 0.5 SOLUTION RESPIRATORY (INHALATION) at 20:15

## 2023-09-14 RX ADMIN — LABETALOL HYDROCHLORIDE 200 MG: 200 TABLET, FILM COATED ORAL at 18:04

## 2023-09-14 RX ADMIN — INSULIN LISPRO 1 UNITS: 100 INJECTION, SOLUTION INTRAVENOUS; SUBCUTANEOUS at 16:59

## 2023-09-14 RX ADMIN — DOCUSATE SODIUM 100 MG: 100 CAPSULE, LIQUID FILLED ORAL at 11:05

## 2023-09-14 RX ADMIN — BUDESONIDE 0.5 MG: 0.5 INHALANT ORAL at 20:15

## 2023-09-14 RX ADMIN — SERTRALINE HYDROCHLORIDE 50 MG: 50 TABLET ORAL at 08:12

## 2023-09-14 RX ADMIN — SIMETHICONE 80 MG: 80 TABLET, CHEWABLE ORAL at 18:03

## 2023-09-14 RX ADMIN — DOCUSATE SODIUM 100 MG: 100 CAPSULE, LIQUID FILLED ORAL at 18:04

## 2023-09-14 RX ADMIN — LEVALBUTEROL HYDROCHLORIDE 1.25 MG: 1.25 SOLUTION RESPIRATORY (INHALATION) at 13:49

## 2023-09-14 RX ADMIN — HEPARIN SODIUM 5000 UNITS: 5000 INJECTION INTRAVENOUS; SUBCUTANEOUS at 13:09

## 2023-09-14 RX ADMIN — METRONIDAZOLE 500 MG: 500 INJECTION, SOLUTION INTRAVENOUS at 18:13

## 2023-09-14 RX ADMIN — TRIAMCINOLONE ACETONIDE 1 APPLICATION: 1 CREAM TOPICAL at 08:13

## 2023-09-14 RX ADMIN — CEFTRIAXONE 2000 MG: 2 INJECTION, SOLUTION INTRAVENOUS at 13:09

## 2023-09-14 RX ADMIN — METRONIDAZOLE 500 MG: 500 INJECTION, SOLUTION INTRAVENOUS at 02:12

## 2023-09-14 RX ADMIN — FLUTICASONE PROPIONATE 1 SPRAY: 50 SPRAY, METERED NASAL at 08:13

## 2023-09-14 NOTE — CASE MANAGEMENT
Case Management Discharge Planning Note    Patient name Marium Uriarte  Location /-52 MRN [id number]  : 1935 Date 2023       Current Admission Date: 2023  Current Admission Diagnosis:Pneumonia   Patient Active Problem List    Diagnosis Date Noted   • Depression 2023   • Hyponatremia 2023   • QT prolongation 2023   • Abnormal CT scan 2023   • Lung nodule 2023   • Pneumonia 2023   • Sepsis on admission ([address]) 2023   • Pulmonary hypertension ([address]) 2023   • Secondary hyperparathyroidism of renal origin ([address]) 2023   • Hypokalemia 2023   • Heart failure with preserved ejection fraction ([address]) 2023   • Continuous opioid dependence ([address]) 2023   • Chronic rhinitis 2023   • Venous stasis ulcer of other part of right lower leg limited to breakdown of skin, unspecified whether varicose veins present ([address]) 2023   • Type 2 diabetes mellitus with diabetic neuropathy, without long-term current use of insulin ([address]) 2023   • Idiopathic chronic gout of right foot without tophus 2022   • Pulmonary emphysema ([address]) 2022   • Memory loss    • Ambulatory dysfunction    • Mild cognitive impairment 2022   • Mild episode of recurrent major depressive disorder ([address]) 2022   • Other constipation 2022   • Insomnia due to medical condition 2022   • Chronic diastolic congestive heart failure ([address]) 2022   • CKD (chronic kidney disease) stage 4, GFR 15-29 ml/min ([address]) 03/15/2022   • Benign prostatic hyperplasia with urinary hesitancy 2020   • Moderate COPD (chronic obstructive pulmonary disease) ([address]) 12/10/2019   • Chronic respiratory failure with hypoxia ([address]) 11/15/2019   • COPD with acute exacerbation ([address]) 2019   • 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 disease, without long-term current use of insulin ([address]) 2016   • Essential hypertension 2015   • CHI (obstructive sleep apnea) 07/09/2014      LOS (days): 8  Geometric Mean LOS (GMLOS) (days): 5.00  Days to GMLOS:-3     OBJECTIVE:  Risk of Unplanned Readmission Score: 34.27      Current admission status: Inpatient   Preferred Pharmacy:   [address], [address] [place] [address]  Phone: [phone] Fax: [phone]    Primary Care Provider: Mario Alberto Garcia MD    Primary Insurance: [address]  Secondary Insurance:     DISCHARGE DETAILS:    Discharge planning discussed with[de-identified] pt's son  Freedom of Choice: Yes     CM contacted family/caregiver?: Yes  Were Treatment Team discharge recommendations reviewed with patient/caregiver?: Yes  Did patient/caregiver verbalize understanding of patient care needs?: Yes  Were patient/caregiver advised of the risks associated with not following Treatment Team discharge recommendations?: Yes    Contacts  Patient Contacts: Radha Boo (pt's son)  Relationship to Patient[de-identified] Family  Contact Method: In Person  Reason/Outcome: Discharge Planning, Referral    Other Referral/Resources/Interventions Provided:  Interventions: Short Term Rehab    Treatment Team Recommendation: Short Term Rehab  Discharge Destination Plan[de-identified] Short Term Rehab  Transport at Discharge : [facility]  Dispatcher Contacted: Yes  Number/Name of Dispatcher: Ella Duran and Unit #): TBD  ETA of Transport (Date): 09/15/23  ETA of Transport (Time): 1811     Transfer Mode: Stretcher  Accompanied by: EMS personnel  Transfer Equipment: BLS devices     Additional Comments: Authorization has been submitted by [facility] DC Support. [facility] scheduled BLS transport request for tomorrow at 3:45 pm to [facility] pending Tabatha Ferris approval. OOH DNR and Medical Necessity are in the chart. Pt does not need auth for BLS transport. At time of d/c, report can be called to Phone: [phone]  Fax: [phone].

## 2023-09-14 NOTE — ASSESSMENT & PLAN NOTE
Presents with worsening shortness of breath x 2-3 days and a recurrent cough, meeting sepsis criteria as below  CT chest -> Focal ill-defined right upper lobe consolidation with associated small cavitary space is suspicious for pneumonia. Recommend follow-up to resolution with chest CT in 4 to 6 weeks.   Urine Legionella/Streptococcus Ag negative  Procalcitonin trend: 0.34 -> 0.55 ->0.71--0.66  Encourage incentive spirometry  Chest x-ray shows worsening pneumonia  Continue Rocephin and Flagyl  Pulmonary and speech consult  PRN Robitussin on board  Supportive care otherwise  Patient had video barium swallow done yesterday  PT/OT recommending rehab once medically stable

## 2023-09-14 NOTE — CASE MANAGEMENT
Case Management Progress Note    Patient name Matt Echavarria  Location /-47 MRN [id number]  : 1935 Date 2023       LOS (days): 8  Geometric Mean LOS (GMLOS) (days): 5.00  Days to GMLOS:-2.9        OBJECTIVE:     Current admission status: Inpatient  Pref[address] Pharmacy:   [address] [place], [place] [address]  Phone: [phone] Fax: [phone]    Primary Care Provider: Olga Lidia Amos MD    Primary Insurance: [address] [place] [address]  Secondary Insurance:     PROGRESS NOTE:    PT/OT saw pt and recommend rehab. Cm submitted request via Aidin to [address] [place] [address] support to obtain SNF auth for [facility] and for [facility] transport for tomorrow.

## 2023-09-14 NOTE — PLAN OF CARE
Problem: OCCUPATIONAL THERAPY ADULT  Goal: Performs self-care activities at highest level of function for planned discharge setting. See evaluation for individualized goals. Description: Treatment Interventions: ADL retraining, Functional transfer training, UE strengthening/ROM, Endurance training, Patient/family training, Equipment evaluation/education, Compensatory technique education, Continued evaluation          See flowsheet documentation for full assessment, interventions and recommendations. Outcome: Progressing  Note: Limitation: Decreased ADL status, Decreased UE ROM, Decreased UE strength, Decreased Safe judgement during ADL, Decreased cognition, Decreased endurance, Decreased self-care trans, Decreased high-level ADLs  Prognosis: Good  Assessment: Pt seen for OT tx session with focus on functional balance, functional mobility, sitting tolerance, and transfer safety. Patient agreeable to OT treatment session. Pt received supine in bed. Pt required Mod Ax1 for supine > sit. Pt performed sit <> stand & functional mobility with Min Ax1 & RW. Pt required VCs for transfer techniques & hand placement. Pt easily fatigued s/p mobility. Patient continues to be functioning below baseline level, occupational performance remains limited secondary to factors listed above, and pt at increased risk for falls and injury. The patient's raw score on the AM-PAC Daily Activity inpatient short form is 17, standardized score is 37.26, less than 39.4. Patients at this level are likely to benefit from DC to post-acute rehabilitation services. Please refer to the recommendation of the Occupational Therapist for safe DC planning. From OT standpoint, recommendation at time of D/C would be DC with Level II resources. Patient to benefit from continued Occupational Therapy treatment while in the hospital to address deficits as defined above and maximize level of functional independence with ADLs and functional mobility.  Pt left seated OOB to Recliner with call bell in reach, tray table in reach, needs met, chair alarm activated, RN informed.

## 2023-09-14 NOTE — PROGRESS NOTES
Progress Note - Pulmonary   Ursula Selby 81 y.o. male MRN: [id number]  Unit/Bed#: -Christina Encounter: [id number]    Assessment:  Abnormal CT scan with lung nodule and evidence of pneumonia  RUL pneumonia   Chronic respiratory failure with hypoxia  COPD  Diastolic CHF  CKD       Plan:  The patient is completing antibiotic coverage today, plans for discharge tomorrow with internal medicine team to a long-term rehab facility  Speech therapy cleared the patient based on VBS yesterday  Added Xopenex and Atrovent every 8 hours for more pulmonary toilet    Chief Complaint:   Shortness of breath    Subjective:   Feels better    Objective:     Vitals: Blood pressure 127/63, pulse 83, temperature 97.7 °F (36.5 °C), resp. rate 20, weight 116 kg (255 lb 11.7 oz), SpO2 93 %. ,Body mass index is 35.67 kg/m². Intake/Output Summary (Last 24 hours) at 9/14/2023 1348  Last data filed at 9/14/2023 1108  Gross per 24 hour   Intake 110 ml   Output 425 ml   Net -315 ml       Invasive Devices     Peripheral Intravenous Line  Duration           Peripheral IV 09/14/23 Dorsal (posterior); Left;Proximal Forearm <1 day                Physical Exam: /63   Pulse 83   Temp 97.7 °F (36.5 °C)   Resp 20   Wt 116 kg (255 lb 11.7 oz)   SpO2 93%   BMI 35.67 kg/m²   General appearance: appears stated age  Head: Normocephalic, without obvious abnormality, atraumatic  Back: symmetric, no curvature. ROM normal. No CVA tenderness. Lungs: diminished breath sounds  Heart: regular rate and rhythm, S1, S2 normal, no murmur, click, rub or gallop  Skin: Skin color, texture, turgor normal. No rashes or lesions  Neurologic: Grossly normal     Labs: I have personally reviewed pertinent lab results. , ABG: No results found for: "PHART", "MMG1UXF", "PO2ART", "DFM5RKD", "J4EFNUFD", "BEART", "SOURCE", BNP: No results found for: "BNP", CBC:   Lab Results   Component Value Date    WBC 15.34 (H) 09/14/2023    HGB 11.9 (L) 09/14/2023    HCT 37.2 09/14/2023    MCV 88 09/14/2023     09/14/2023    RBC 4.23 09/14/2023    MCH 28.1 09/14/2023    MCHC 32.0 09/14/2023    RDW 14.4 09/14/2023    MPV 10.5 09/14/2023    NRBC 0 09/14/2023   , CMP:   Lab Results   Component Value Date    SODIUM 135 09/14/2023    K 3.9 09/14/2023    CL 99 09/14/2023    CO2 25 09/14/2023     (H) 09/14/2023    CREATININE 2.28 (H) 09/14/2023    CALCIUM 9.2 09/14/2023    EGFR 24 09/14/2023   , PT/INR: No results found for: "PT", "INR", Troponin: No results found for: "TROPONINI"  Imaging and other studies: I have personally reviewed pertinent films in PACS

## 2023-09-14 NOTE — OCCUPATIONAL THERAPY NOTE
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 ml/min (HCC)    Chronic diastolic congestive heart failure (HCC)    Type 2 diabetes mellitus with diabetic neuropathy, without long-term current use of insulin (HCC)    Hypokalemia    Sepsis on admission ([address])    QT prolongation    Abnormal CT scan    Lung nodule    Depression    Hyponatremia            09/14/23 0952   OT Last Visit   OT Visit Date 09/14/23   Note Type   Note Type Treatment for insurance authorization   Pain Assessment   Pain Assessment Tool 0-10   Pain Score No Pain   Restrictions/Precautions   Weight Bearing Precautions Per Order No   Other Precautions Cognitive; Chair Alarm; Bed Alarm; Fall Risk;Multiple lines;Telemetry;O2  (3L NC)   ADL   Toileting Comments No ADL needs this session   Bed Mobility   Supine to Sit 3  Moderate assistance   Additional items Assist x 1; Increased time required;Verbal cues;LE management   Additional Comments Sat EOB x5 min with supervision. Pt able to reposition hip closer to EOB for proper positioning with supervision   Transfers   Sit to Stand 4  Minimal assistance   Additional items Assist x 1; Increased time required;Verbal cues   Stand to Sit 4  Minimal assistance   Additional items Assist x 1; Increased time required;Verbal cues;Armrests   Functional Mobility   Functional Mobility 4  Minimal assistance   Additional Comments x1 - SpO2 remained above 95% on 3L NC   Additional items Rolling walker   Subjective   Subjective Pt cooperative throughout.  Pt eager to DC from hospital   Cognition   Overall Cognitive Status Impaired   Arousal/Participation Alert   Attention Within functional limits Orientation Level Oriented to person;Oriented to place;Oriented to situation   Memory Decreased recall of precautions;Decreased recall of recent events;Decreased short term memory   Following Commands Follows one step commands with increased time or repetition   Activity Tolerance   Activity Tolerance Patient tolerated treatment well   Medical Staff Made Aware PT HELEN Guerrero Omer   Assessment   Assessment Pt seen for OT tx session with focus on functional balance, functional mobility, sitting tolerance, and transfer safety. Patient agreeable to OT treatment session. Pt received supine in bed. Pt required Mod Ax1 for supine > sit. Pt performed sit <> stand & functional mobility with Min Ax1 & RW. Pt required VCs for transfer techniques & hand placement. Pt easily fatigued s/p mobility. Patient continues to be functioning below baseline level, occupational performance remains limited secondary to factors listed above, and pt at increased risk for falls and injury. The patient's raw score on the AM-PAC Daily Activity inpatient short form is 17, standardized score is 37.26, less than 39.4. Patients at this level are likely to benefit from DC to post-acute rehabilitation services. Please refer to the recommendation of the Occupational Therapist for safe DC planning. From OT standpoint, recommendation at time of D/C would be DC with Level II resources. Patient to benefit from continued Occupational Therapy treatment while in the hospital to address deficits as defined above and maximize level of functional independence with ADLs and functional mobility. Pt left seated OOB to Recliner with call bell in reach, tray table in reach, needs met, chair alarm activated, RN informed. Plan   Treatment Interventions ADL retraining;Functional transfer training;UE strengthening/ROM; Endurance training;Patient/family training;Equipment evaluation/education; Compensatory technique education;Continued evaluation   Goal Expiration Date 09/17/23   OT Treatment Day 3   OT Frequency 3-5x/wk   Recommendation   UB Rehab Discharge Recommendation (PT/OT) Level 2   AM-PAC Daily Activity Inpatient   Lower Body Dressing 2   Bathing 2   Toileting 3   Upper Body Dressing 3   Grooming 3   Eating 4   Daily Activity Raw Score 17   Daily Activity Standardized Score (Calc for Raw Score >=11) 37.26   AM-PAC Applied Cognition Inpatient   Following a Speech/Presentation 2   Understanding Ordinary Conversation 4   Taking Medications 3   Remembering Where Things Are Placed or Put Away 3   Remembering List of 4-5 Errands 2   Taking Care of Complicated Tasks 2   Applied Cognition Raw Score 16   Applied Cognition Standardized Score 35.03   End of Consult   Education Provided Yes;Family or social support of family present for education by provider   Patient Position at End of Consult Bedside chair;Bed/Chair alarm activated; All needs within reach   Nurse Communication Nurse aware of consult       Juan Roberson OTR/CALE

## 2023-09-14 NOTE — ASSESSMENT & PLAN NOTE
Baseline creatinine of approximately 2.5-2.7 -> currently stable at 2.28  Avoid nephrotoxins and hypotension   Monitor renal function and urine output  Hold Bumex and Zaroxolyn  We will restart in a.m.   Trend BMP

## 2023-09-14 NOTE — PLAN OF CARE
Problem: MOBILITY - ADULT  Goal: Maintain or return to baseline ADL function  Description: INTERVENTIONS:  -  Assess patient's ability to carry out ADLs; assess patient's baseline for ADL function and identify physical deficits which impact ability to perform ADLs (bathing, care of mouth/teeth, toileting, grooming, dressing, etc.)  - Assess/evaluate cause of self-care deficits   - Assess range of motion  - Assess patient's mobility; develop plan if impaired  - Assess patient's need for assistive devices and provide as appropriate  - Encourage maximum independence but intervene and supervise when necessary  - Involve family in performance of ADLs  - Assess for home care needs following discharge   - Consider OT consult to assist with ADL evaluation and planning for discharge  - Provide patient education as appropriate  Outcome: Progressing  Goal: Maintains/Returns to pre admission functional level  Description: INTERVENTIONS:  - Perform BMAT or MOVE assessment daily.   - Set and communicate daily mobility goal to care team and patient/family/caregiver. - Collaborate with rehabilitation services on mobility goals if consulted  - Perform Range of Motion 2 times a day. - Reposition patient every 2 hours.   - Dangle patient 2 times a day  - Stand patient 2 times a day  - Ambulate patient 2 times a day  - Out of bed to chair 2 times a day   - Out of bed for meals 2 times a day  - Out of bed for toileting  - Record patient progress and toleration of activity level   Outcome: Progressing     Problem: PAIN - ADULT  Goal: Verbalizes/displays adequate comfort level or baseline comfort level  Description: Interventions:  - Encourage patient to monitor pain and request assistance  - Assess pain using appropriate pain scale  - Administer analgesics based on type and severity of pain and evaluate response  - Implement non-pharmacological measures as appropriate and evaluate response  - Consider cultural and social influences on pain and pain management  - Notify physician/advanced practitioner if interventions unsuccessful or patient reports new pain  Outcome: Progressing     Problem: INFECTION - ADULT  Goal: Absence or prevention of progression during hospitalization  Description: INTERVENTIONS:  - Assess and monitor for signs and symptoms of infection  - Monitor lab/diagnostic results  - Monitor all insertion sites, i.e. indwelling lines, tubes, and drains  - Monitor endotracheal if appropriate and nasal secretions for changes in amount and color  - [place] appropriate cooling/warming therapies per order  - Administer medications as ordered  - Instruct and encourage patient and family to use good hand hygiene technique  - Identify and instruct in appropriate isolation precautions for identified infection/condition  Outcome: Progressing     Problem: Potential for Falls  Goal: Patient will remain free of falls  Description: INTERVENTIONS:  - Educate patient/family on patient safety including physical limitations  - Instruct patient to call for assistance with activity   - Consult OT/PT to assist with strengthening/mobility   - Keep Call bell within reach  - Keep bed low and locked with side rails adjusted as appropriate  - Keep care items and personal belongings within reach  - Initiate and maintain comfort rounds  - Make Fall Risk Sign visible to staff  - Offer Toileting every 2 Hours, in advance of need  - Initiate/Maintain bed alarm  - Obtain necessary fall risk management equipment: socks  - Apply yellow socks and bracelet for high fall risk patients  - Consider moving patient to room near nurses station  Outcome: Progressing     Problem: Nutrition/Hydration-ADULT  Goal: Nutrient/Hydration intake appropriate for improving, restoring or maintaining nutritional needs  Description: Monitor and assess patient's nutrition/hydration status for malnutrition.  Collaborate with interdisciplinary team and initiate plan and interventions as ordered. Monitor patient's weight and dietary intake as ordered or per policy. Utilize nutrition screening tool and intervene as necessary. Determine patient's food preferences and provide high-protein, high-caloric foods as appropriate.      INTERVENTIONS:  - Monitor oral intake, urinary output, labs, and treatment plans  - Assess nutrition and hydration status and recommend course of action  - Evaluate amount of meals eaten  - Assist patient with eating if necessary   - Allow adequate time for meals  - Recommend/ encourage appropriate diets, oral nutritional supplements, and vitamin/mineral supplements  - Order, calculate, and assess calorie counts as needed  - Recommend, monitor, and adjust tube feedings and TPN/PPN based on assessed needs  - Assess need for intravenous fluids  - Provide specific nutrition/hydration education as appropriate  - Include patient/family/caregiver in decisions related to nutrition  Outcome: Progressing     Problem: Prexisting or High Potential for Compromised Skin Integrity  Goal: Skin integrity is maintained or improved  Description: INTERVENTIONS:  - Identify patients at risk for skin breakdown  - Assess and monitor skin integrity  - Assess and monitor nutrition and hydration status  - Monitor labs   - Assess for incontinence   - Turn and reposition patient  - Assist with mobility/ambulation  - Relieve pressure over bony prominences  - Avoid friction and shearing  - Provide appropriate hygiene as needed including keeping skin clean and dry  - Evaluate need for skin moisturizer/barrier cream  - Collaborate with interdisciplinary team   - Patient/family teaching  - Consider wound care consult   Outcome: Progressing     Problem: GASTROINTESTINAL - ADULT  Goal: Minimal or absence of nausea and/or vomiting  Description: INTERVENTIONS:  - Administer IV fluids if ordered to ensure adequate hydration  - Maintain NPO status until nausea and vomiting are resolved  - Nasogastric tube if ordered  - Administer ordered antiemetic medications as needed  - Provide nonpharmacologic comfort measures as appropriate  - Advance diet as tolerated, if ordered  - Consider nutrition services referral to assist patient with adequate nutrition and appropriate food choices  Outcome: Progressing  Goal: Maintains or returns to baseline bowel function  Description: INTERVENTIONS:  - Assess bowel function  - Encourage oral fluids to ensure adequate hydration  - Administer IV fluids if ordered to ensure adequate hydration  - Administer ordered medications as needed  - Encourage mobilization and activity  - Consider nutritional services referral to assist patient with adequate nutrition and appropriate food choices  Outcome: Progressing  Goal: Maintains adequate nutritional intake  Description: INTERVENTIONS:  - Monitor percentage of each meal consumed  - Identify factors contributing to decreased intake, treat as appropriate  - Assist with meals as needed  - Monitor I&O, weight, and lab values if indicated  - Obtain nutrition services referral as needed  Outcome: Progressing     Problem: DISCHARGE PLANNING  Goal: Discharge to home or other facility with appropriate resources  Description: INTERVENTIONS:  - Identify barriers to discharge w/patient and caregiver  - Arrange for needed discharge resources and transportation as appropriate  - Identify discharge learning needs (meds, wound care, etc.)  - Arrange for interpretive services to assist at discharge as needed  - Refer to Case Management Department for coordinating discharge planning if the patient needs post-hospital services based on physician/advanced practitioner order or complex needs related to functional status, cognitive ability, or social support system  Outcome: Progressing     Problem: Knowledge Deficit  Goal: Patient/family/caregiver demonstrates understanding of disease process, treatment plan, medications, and discharge instructions  Description: Complete learning assessment and assess knowledge base.   Interventions:  - Provide teaching at level of understanding  - Provide teaching via preferred learning methods  Outcome: Progressing     Problem: SAFETY ADULT  Goal: Patient will remain free of falls  Description: INTERVENTIONS:  - Educate patient/family on patient safety including physical limitations  - Instruct patient to call for assistance with activity   - Consult OT/PT to assist with strengthening/mobility   - Keep Call bell within reach  - Keep bed low and locked with side rails adjusted as appropriate  - Keep care items and personal belongings within reach  - Initiate and maintain comfort rounds  - Make Fall Risk Sign visible to staff  - Offer Toileting every 2 Hours, in advance of need  - Initiate/Maintain bed alarm  - Obtain necessary fall risk management equipment: socks  - Apply yellow socks and bracelet for high fall risk patients  - Consider moving patient to room near nurses station  Outcome: Progressing     Problem: RESPIRATORY - ADULT  Goal: Achieves optimal ventilation and oxygenation  Description: INTERVENTIONS:  - Assess for changes in respiratory status  - Assess for changes in mentation and behavior  - Position to facilitate oxygenation and minimize respiratory effort  - Oxygen administered by appropriate delivery if ordered  - Initiate smoking cessation education as indicated  - Encourage broncho-pulmonary hygiene including cough, deep breathe, Incentive Spirometry  - Assess the need for suctioning and aspirate as needed  - Assess and instruct to report SOB or any respiratory difficulty  - Respiratory Therapy support as indicated  Outcome: Progressing     Problem: SKIN/TISSUE INTEGRITY - ADULT  Goal: Skin Integrity remains intact(Skin Breakdown Prevention)  Description: Assess:  -Perform Craig assessment every x  -Clean and moisturize skin every x  -Inspect skin when repositioning, toileting, and assisting with ADLS  -Assess under medical devices such as x every x  -Assess extremities for adequate circulation and sensation     Bed Management:  -Have minimal linens on bed & keep smooth, unwrinkled  -Change linens as needed when moist or perspiring  -Avoid sitting or lying in one position for more than x hours while in bed  -Keep HOB at [facility]     Toileting:  -Offer bedside commode  -Assess for incontinence every x  -Use incontinent care products after each incontinent episode such as x    Activity:  -Mobilize patient x times a day  -Encourage activity and walks on unit  -Encourage or provide ROM exercises   -Turn and reposition patient every x Hours  -Use appropriate equipment to lift or move patient in bed  -Instruct/ Assist with weight shifting every x when out of bed in chair  -Consider limitation of chair time x hour intervals    Skin Care:  -Avoid use of baby powder, tape, friction and shearing, hot water or constrictive clothing  -Relieve pressure over bony prominences using x  -Do not massage red bony areas    Next Steps:  -Teach patient strategies to minimize risks such as x   -Consider consults to  interdisciplinary teams such as x  Outcome: Progressing  Goal: Incision(s), wounds(s) or drain site(s) healing without S/S of infection  Description: INTERVENTIONS  - Assess and document dressing, incision, wound bed, drain sites and surrounding tissue  - Provide patient and family education  - Perform skin care/dressing changes every x  Outcome: Progressing     Problem: MUSCULOSKELETAL - ADULT  Goal: Maintain or return mobility to safest level of function  Description: INTERVENTIONS:  - Assess patient's ability to carry out ADLs; assess patient's baseline for ADL function and identify physical deficits which impact ability to perform ADLs (bathing, care of mouth/teeth, toileting, grooming, dressing, etc.)  - Assess/evaluate cause of self-care deficits   - Assess range of motion  - Assess patient's mobility  - Assess patient's need for assistive devices and provide as appropriate  - Encourage maximum independence but intervene and supervise when necessary  - Involve family in performance of ADLs  - Assess for home care needs following discharge   - Consider OT consult to assist with ADL evaluation and planning for discharge  - Provide patient education as appropriate  Outcome: Progressing

## 2023-09-14 NOTE — PLAN OF CARE
Problem: MOBILITY - ADULT  Goal: Maintain or return to baseline ADL function  Description: INTERVENTIONS:  -  Assess patient's ability to carry out ADLs; assess patient's baseline for ADL function and identify physical deficits which impact ability to perform ADLs (bathing, care of mouth/teeth, toileting, grooming, dressing, etc.)  - Assess/evaluate cause of self-care deficits   - Assess range of motion  - Assess patient's mobility; develop plan if impaired  - Assess patient's need for assistive devices and provide as appropriate  - Encourage maximum independence but intervene and supervise when necessary  - Involve family in performance of ADLs  - Assess for home care needs following discharge   - Consider OT consult to assist with ADL evaluation and planning for discharge  - Provide patient education as appropriate  Outcome: Progressing  Goal: Maintains/Returns to pre admission functional level  Description: INTERVENTIONS:  - Perform BMAT or MOVE assessment daily.   - Set and communicate daily mobility goal to care team and patient/family/caregiver. - Collaborate with rehabilitation services on mobility goals if consulted  - Perform Range of Motion 2 times a day. - Reposition patient every 2 hours. - Dangle patient 2 times a day  - Stand patient 2 times a day  - Ambulate patient 2 times a day  - Out of bed to chair 2 times a day   - Out of bed for meals 2 times a day  - Out of bed for toileting  - Record patient progress and toleration of activity level   Outcome: Progressing     Problem: Knowledge Deficit  Goal: Patient/family/caregiver demonstrates understanding of disease process, treatment plan, medications, and discharge instructions  Description: Complete learning assessment and assess knowledge base.   Interventions:  - Provide teaching at level of understanding  - Provide teaching via preferred learning methods  Outcome: Progressing     Problem: Prexisting or High Potential for Compromised Skin Integrity  Goal: Skin integrity is maintained or improved  Description: INTERVENTIONS:  - Identify patients at risk for skin breakdown  - Assess and monitor skin integrity  - Assess and monitor nutrition and hydration status  - Monitor labs   - Assess for incontinence   - Turn and reposition patient  - Assist with mobility/ambulation  - Relieve pressure over bony prominences  - Avoid friction and shearing  - Provide appropriate hygiene as needed including keeping skin clean and dry  - Evaluate need for skin moisturizer/barrier cream  - Collaborate with interdisciplinary team   - Patient/family teaching  - Consider wound care consult   Outcome: Progressing

## 2023-09-14 NOTE — PLAN OF CARE
Problem: PHYSICAL THERAPY ADULT  Goal: Performs mobility at highest level of function for planned discharge setting. See evaluation for individualized goals. Description: Treatment/Interventions: Functional transfer training, LE strengthening/ROM, Elevations, Therapeutic exercise, Endurance training, Patient/family training, Equipment eval/education, Bed mobility, Gait training, Cognitive reorientation  Equipment Recommended: Da Hines       See flowsheet documentation for full assessment, interventions and recommendations. Note:    Problem List: Decreased strength, Decreased endurance, Impaired balance, Decreased mobility, Decreased safety awareness, Decreased cognition, Impaired judgement, Obesity  Assessment: Pt seen for PT intervention His SpO2 is maintained >90% throughout session today, and while anxiety is still present, it is improved from previous sessions. Pt continues to require most physical A for bed mobility. He does  benefit from cues to assist w/ set up and optimal positioning. Pt is able to ambulate 8 ft and seated OOB in recliner chair,. noted to be visibly SOB but SpO2 maintained. Pt mouth breathing, provided education on diaphragmatic breathing, pt states "there's already something in my nose", provided education on role of NC O2 and how it can assist w/ recovery. Pt seated OOB in recliner chair at end of session w/ Son, Yessica Del At bedside             See flowsheet documentation for full assessment.

## 2023-09-14 NOTE — ASSESSMENT & PLAN NOTE
COPD with possible mild exacerbation on admission in setting of pneumonia and inability to obtain nebulizers prior to presentation  Recently switched to Performist and Pulmicort nebulizers - unable to obtain Performist, hence, has only been using Pulmicort for the last week  Continue Perforomist/Pulmicort dual nebulizer treatments  No need for the corticosteroids due to lack of wheezing on exam and oxygenation at baseline  Pulmonary consult appreciated  PT OT recommended rehab  Possible discharge in a.m. if stable

## 2023-09-14 NOTE — PHYSICAL THERAPY NOTE
PHYSICAL THERAPY TREATMENT NOTE    Patient Name: Severo Night  HBRDJ'W Date: 9/14/2023 09/14/23 0955   PT Last Visit   PT Visit Date 09/14/23   Note Type   Note Type Treatment   Pain Assessment   Pain Assessment Tool 0-10   Pain Score No Pain   Restrictions/Precautions   Weight Bearing Precautions Per Order No   Other Precautions Cognitive; Chair Alarm; Bed Alarm;Telemetry;O2;Fall Risk  (3L NC O2)   General   Chart Reviewed Yes   Family/Caregiver Present Yes  (Son, Mono)   Cognition   Overall Cognitive Status Impaired   Arousal/Participation Alert   Attention Attends with cues to redirect   Orientation Level Oriented to person;Oriented to place;Oriented to situation   Memory Decreased recall of precautions;Decreased recall of recent events;Decreased short term memory   Following Commands Follows one step commands with increased time or repetition   Comments Pt cooperative, continues to be highly anxious but is redirectable. Bed Mobility   Supine to Sit 3  Moderate assistance   Additional items Assist x 1; Increased time required;Verbal cues   Additional Comments Pt is able to sit EOB x 5 min w/ supervision, able to reposition self to EOB w/ supervision   Transfers   Sit to Stand 4  Minimal assistance   Additional items Assist x 1; Increased time required   Stand to Sit 4  Minimal assistance   Additional items Assist x 1   Additional Comments Pt is able to perform STS from lowest height of bed   Ambulation/Elevation   Gait pattern Decreased foot clearance; Short stride; Excessively slow   Gait Assistance 5  Supervision   Additional items Assist x 1   Assistive Device Rolling walker   Distance 8 ft   Balance   Static Sitting Fair +   Static Standing Fair -   Ambulatory Poor +   Endurance Deficit   Endurance Deficit Yes   Endurance Deficit Description Limits activity tolerance, increased WOB (due to anxiety)   Activity Tolerance Activity Tolerance Patient limited by fatigue   Medical Staff Made Aware OT Cori   Nurse Made Aware RN Omer   Assessment   Problem List Decreased strength;Decreased endurance; Impaired balance;Decreased mobility; Decreased safety awareness;Decreased cognition; Impaired judgement;Obesity   Assessment Pt seen for PT intervention His SpO2 is maintained >90% throughout session today, and while anxiety is still present, it is improved from previous sessions. Pt continues to require most physical A for bed mobility. He does  benefit from cues to assist w/ set up and optimal positioning. Pt is able to ambulate 8 ft and seated OOB in recliner chair,. noted to be visibly SOB but SpO2 maintained. Pt mouth breathing, provided education on diaphragmatic breathing, pt states "there's already something in my nose", provided education on role of NC O2 and how it can assist w/ recovery. Pt seated OOB in recliner chair at end of session w/ Yovani Palencia Cadet At bedside   Goals   Patient Goals to get to chair, to shave   STG Expiration Date 09/17/23   Short Term Goal #1 Pt will be able to: (1) perform bed mobility with supervision to promote OOB activity (2) perform sit to stand with supervision to decrease burden of care (3) ambulate at least 200` with supervision and least restrictive AD to increase activity tolerance (4) increase standing balance by 1 grade to decrease risk of falls   PT Treatment Day 3   Plan   Treatment/Interventions Functional transfer training;LE strengthening/ROM; Therapeutic exercise; Endurance training;Cognitive reorientation;Patient/family training;Equipment eval/education; Bed mobility;Gait training   PT Frequency 3-5x/wk   Recommendation   UB Rehab Discharge Recommendation (PT/OT) Level 2   Equipment Recommended [facility] Recommended Wheeled walker   AM-PAC Basic Mobility Inpatient   Turning in Flat Bed Without Bedrails 3   Lying on Back to Sitting on Edge of Flat Bed Without Bedrails 2   Moving Bed to Chair 3   Standing Up From Chair Using Arms 3   Walk in Room 3   Climb 3-5 Stairs With Railing 1   Basic Mobility Inpatient Raw Score 15   Basic Mobility Standardized Score 36.97   Highest Level Of Mobility   -HL Goal 4: Move to chair/commode   -HLM Achieved 6: Walk 10 steps or more   Education   Education Provided Mobility training  (diaphragmatic breathing)   Patient Reinforcement needed   End of Consult   Patient Position at End of Consult Bedside chair; All needs within reach;Bed/Chair alarm activated       Patient requires PT/OT co-treat due to cognitive-behavioral impairments,significantly limited endurance and to challenge activity tolerance. Both PT and OT goals were addressed separately during this session. The patient's AM-PAC Basic Mobility Inpatient Short Form Raw Score is 15. A Raw score of less than or equal to 16 suggests the patient may benefit from discharge to post-acute rehabilitation services. Please also refer to the recommendation of the Physical Therapist for safe discharge planning. Pt would continue to benefit from skilled PT during this admission in order to progress patient towards goals to decrease risk of falls and maximize independence.      Forrest Anderson, PT, DPT

## 2023-09-14 NOTE — CASE MANAGEMENT
[address] [facility] received request for authorization from Care Manager.   Authorization request for: SNF  Facility Name: [facility] NPI: [id number]  Facility MD: Dr. Christa Bingham NPI:  [id number]  Authorization initiated by contacting insurance: [facility] Via: Phone  Pending Reference #: [id number]  Clinicals submitted via: Fax  *Per pt's insurance, prior auth not required for BLS

## 2023-09-14 NOTE — UTILIZATION REVIEW
PATIENT STILL IN HOUSE   This is a Notification of Discharge from [facility] [address] [facility] [address]. Please be advised that this patient has been discharge from our facility. Below you will find the admission and discharge date and time including the patient’s disposition. UTILIZATION REVIEW CONTACT:  Zeyad Drew  Utilization   Network Utilization Review Department  Phone: [phone] carefully listen to the prompts. All voicemails are confidential.  Email: [email or web address]     ADMISSION INFORMATION  PRESENTATION DATE: 9/6/2023  1:18 PM  OBERVATION ADMISSION DATE:   INPATIENT ADMISSION DATE: 9/6/23  3:57 PM   DISCHARGE DATE: No discharge date for patient encounter. DISPOSITION:Home/Self Care    IMPORTANT INFORMATION:  Send all requests for admission clinical reviews, approved or denied determinations and any other requests to dedicated fax number below belonging to the campus where the patient is receiving treatment.  List of dedicated fax numbers:  [place] DENIALS (Administrative/Medical Necessity) [phone] [address] [facility] (Maternity/NICU/Pediatrics) [phone]   [facility] [phone]   [facility] [phone] [address] [facility] [phone]   [address] [place] [address] [phone]   [facility] [phone]   [address] [place], [place] [address]e [facility] [phone]   [address] [place] [address] [phone] [address] [facility] [phone] 27[address] [facility] 3000 32[facility] [phone]

## 2023-09-15 VITALS
HEART RATE: 92 BPM | TEMPERATURE: 97 F | SYSTOLIC BLOOD PRESSURE: 110 MMHG | RESPIRATION RATE: 18 BRPM | DIASTOLIC BLOOD PRESSURE: 59 MMHG | OXYGEN SATURATION: 95 % | BODY MASS INDEX: 36.59 KG/M2 | WEIGHT: 262.35 LBS

## 2023-09-15 LAB
ANION GAP SERPL CALCULATED.3IONS-SCNC: 12 MMOL/L
BASOPHILS # BLD AUTO: 0.03 THOUSANDS/ÂΜL (ref 0–0.1)
BASOPHILS # BLD AUTO: 0.04 THOUSANDS/ÂΜL (ref 0–0.1)
BASOPHILS NFR BLD AUTO: 0 % (ref 0–1)
BASOPHILS NFR BLD AUTO: 0 % (ref 0–1)
BUN SERPL-MCNC: 118 MG/DL (ref 5–25)
CALCIUM SERPL-MCNC: 9.3 MG/DL (ref 8.4–10.2)
CHLORIDE SERPL-SCNC: 99 MMOL/L (ref 96–108)
CO2 SERPL-SCNC: 22 MMOL/L (ref 21–32)
CREAT SERPL-MCNC: 2.47 MG/DL (ref 0.6–1.3)
EOSINOPHIL # BLD AUTO: 0.22 THOUSAND/ÂΜL (ref 0–0.61)
EOSINOPHIL # BLD AUTO: 0.24 THOUSAND/ÂΜL (ref 0–0.61)
EOSINOPHIL NFR BLD AUTO: 1 % (ref 0–6)
EOSINOPHIL NFR BLD AUTO: 1 % (ref 0–6)
ERYTHROCYTE [DISTWIDTH] IN BLOOD BY AUTOMATED COUNT: 14.4 % (ref 11.6–15.1)
ERYTHROCYTE [DISTWIDTH] IN BLOOD BY AUTOMATED COUNT: 14.5 % (ref 11.6–15.1)
GFR SERPL CREATININE-BSD FRML MDRD: 22 ML/MIN/1.73SQ M
GLUCOSE SERPL-MCNC: 141 MG/DL (ref 65–140)
GLUCOSE SERPL-MCNC: 172 MG/DL (ref 65–140)
GLUCOSE SERPL-MCNC: 176 MG/DL (ref 65–140)
HCT VFR BLD AUTO: 37.8 % (ref 36.5–49.3)
HCT VFR BLD AUTO: 38.9 % (ref 36.5–49.3)
HGB BLD-MCNC: 12 G/DL (ref 12–17)
HGB BLD-MCNC: 12.1 G/DL (ref 12–17)
IMM GRANULOCYTES # BLD AUTO: 0.3 THOUSAND/UL (ref 0–0.2)
IMM GRANULOCYTES # BLD AUTO: 0.38 THOUSAND/UL (ref 0–0.2)
IMM GRANULOCYTES NFR BLD AUTO: 2 % (ref 0–2)
IMM GRANULOCYTES NFR BLD AUTO: 2 % (ref 0–2)
LYMPHOCYTES # BLD AUTO: 0.61 THOUSANDS/ÂΜL (ref 0.6–4.47)
LYMPHOCYTES # BLD AUTO: 0.76 THOUSANDS/ÂΜL (ref 0.6–4.47)
LYMPHOCYTES NFR BLD AUTO: 4 % (ref 14–44)
LYMPHOCYTES NFR BLD AUTO: 4 % (ref 14–44)
MCH RBC QN AUTO: 28.1 PG (ref 26.8–34.3)
MCH RBC QN AUTO: 28.3 PG (ref 26.8–34.3)
MCHC RBC AUTO-ENTMCNC: 30.8 G/DL (ref 31.4–37.4)
MCHC RBC AUTO-ENTMCNC: 32 G/DL (ref 31.4–37.4)
MCV RBC AUTO: 89 FL (ref 82–98)
MCV RBC AUTO: 91 FL (ref 82–98)
MONOCYTES # BLD AUTO: 0.82 THOUSAND/ÂΜL (ref 0.17–1.22)
MONOCYTES # BLD AUTO: 1.04 THOUSAND/ÂΜL (ref 0.17–1.22)
MONOCYTES NFR BLD AUTO: 5 % (ref 4–12)
MONOCYTES NFR BLD AUTO: 6 % (ref 4–12)
NEUTROPHILS # BLD AUTO: 13.68 THOUSANDS/ÂΜL (ref 1.85–7.62)
NEUTROPHILS # BLD AUTO: 14.78 THOUSANDS/ÂΜL (ref 1.85–7.62)
NEUTS SEG NFR BLD AUTO: 87 % (ref 43–75)
NEUTS SEG NFR BLD AUTO: 88 % (ref 43–75)
NRBC BLD AUTO-RTO: 0 /100 WBCS
NRBC BLD AUTO-RTO: 0 /100 WBCS
PLATELET # BLD AUTO: 218 THOUSANDS/UL (ref 149–390)
PLATELET # BLD AUTO: 247 THOUSANDS/UL (ref 149–390)
PMV BLD AUTO: 10.5 FL (ref 8.9–12.7)
PMV BLD AUTO: 11 FL (ref 8.9–12.7)
POTASSIUM SERPL-SCNC: 4.1 MMOL/L (ref 3.5–5.3)
RBC # BLD AUTO: 4.27 MILLION/UL (ref 3.88–5.62)
RBC # BLD AUTO: 4.27 MILLION/UL (ref 3.88–5.62)
SODIUM SERPL-SCNC: 133 MMOL/L (ref 135–147)
WBC # BLD AUTO: 15.66 THOUSAND/UL (ref 4.31–10.16)
WBC # BLD AUTO: 17.24 THOUSAND/UL (ref 4.31–10.16)

## 2023-09-15 PROCEDURE — 92526 ORAL FUNCTION THERAPY: CPT

## 2023-09-15 PROCEDURE — 99239 HOSP IP/OBS DSCHRG MGMT >30: CPT | Performed by: INTERNAL MEDICINE

## 2023-09-15 PROCEDURE — 85025 COMPLETE CBC W/AUTO DIFF WBC: CPT | Performed by: INTERNAL MEDICINE

## 2023-09-15 PROCEDURE — 82948 REAGENT STRIP/BLOOD GLUCOSE: CPT

## 2023-09-15 PROCEDURE — 94640 AIRWAY INHALATION TREATMENT: CPT

## 2023-09-15 PROCEDURE — 94760 N-INVAS EAR/PLS OXIMETRY 1: CPT

## 2023-09-15 PROCEDURE — 80048 BASIC METABOLIC PNL TOTAL CA: CPT | Performed by: INTERNAL MEDICINE

## 2023-09-15 RX ORDER — AMOXICILLIN AND CLAVULANATE POTASSIUM 250; 125 MG/1; MG/1
1 TABLET, FILM COATED ORAL EVERY 12 HOURS SCHEDULED
Qty: 10 TABLET | Refills: 0
Start: 2023-09-15 | End: 2023-09-20

## 2023-09-15 RX ORDER — POLYETHYLENE GLYCOL 3350 17 G/17G
17 POWDER, FOR SOLUTION ORAL DAILY
Refills: 0
Start: 2023-09-16

## 2023-09-15 RX ORDER — GUAIFENESIN 600 MG/1
600 TABLET, EXTENDED RELEASE ORAL 2 TIMES DAILY
Qty: 10 TABLET | Refills: 0
Start: 2023-09-15 | End: 2023-09-20

## 2023-09-15 RX ORDER — BUMETANIDE 1 MG/1
2 TABLET ORAL 2 TIMES DAILY
Status: DISCONTINUED | OUTPATIENT
Start: 2023-09-15 | End: 2023-09-15 | Stop reason: HOSPADM

## 2023-09-15 RX ADMIN — TRIAMCINOLONE ACETONIDE: 1 CREAM TOPICAL at 11:10

## 2023-09-15 RX ADMIN — BUDESONIDE 0.5 MG: 0.5 INHALANT ORAL at 07:07

## 2023-09-15 RX ADMIN — INSULIN LISPRO 1 UNITS: 100 INJECTION, SOLUTION INTRAVENOUS; SUBCUTANEOUS at 11:08

## 2023-09-15 RX ADMIN — IPRATROPIUM BROMIDE 0.5 MG: 0.5 SOLUTION RESPIRATORY (INHALATION) at 07:06

## 2023-09-15 RX ADMIN — LEVALBUTEROL HYDROCHLORIDE 1.25 MG: 1.25 SOLUTION RESPIRATORY (INHALATION) at 07:07

## 2023-09-15 RX ADMIN — METRONIDAZOLE 500 MG: 500 INJECTION, SOLUTION INTRAVENOUS at 02:26

## 2023-09-15 RX ADMIN — FORMOTEROL FUMARATE DIHYDRATE 20 MCG: 20 SOLUTION RESPIRATORY (INHALATION) at 07:07

## 2023-09-15 RX ADMIN — METRONIDAZOLE 500 MG: 500 INJECTION, SOLUTION INTRAVENOUS at 10:03

## 2023-09-15 RX ADMIN — CALCITRIOL CAPSULES 0.25 MCG 0.25 MCG: 0.25 CAPSULE ORAL at 09:51

## 2023-09-15 RX ADMIN — DOCUSATE SODIUM 100 MG: 100 CAPSULE, LIQUID FILLED ORAL at 09:51

## 2023-09-15 RX ADMIN — SERTRALINE HYDROCHLORIDE 50 MG: 50 TABLET ORAL at 09:51

## 2023-09-15 RX ADMIN — POTASSIUM CHLORIDE 20 MEQ: 1500 TABLET, EXTENDED RELEASE ORAL at 09:50

## 2023-09-15 RX ADMIN — LABETALOL HYDROCHLORIDE 200 MG: 200 TABLET, FILM COATED ORAL at 09:51

## 2023-09-15 RX ADMIN — NYSTATIN 1 APPLICATION: 100000 POWDER TOPICAL at 09:10

## 2023-09-15 RX ADMIN — ALLOPURINOL 200 MG: 100 TABLET ORAL at 09:50

## 2023-09-15 RX ADMIN — POLYETHYLENE GLYCOL 3350 17 G: 17 POWDER, FOR SOLUTION ORAL at 09:49

## 2023-09-15 RX ADMIN — GUAIFENESIN 600 MG: 600 TABLET ORAL at 09:51

## 2023-09-15 RX ADMIN — BUMETANIDE 2 MG: 1 TABLET ORAL at 12:25

## 2023-09-15 RX ADMIN — HEPARIN SODIUM 5000 UNITS: 5000 INJECTION INTRAVENOUS; SUBCUTANEOUS at 04:25

## 2023-09-15 RX ADMIN — FLUTICASONE PROPIONATE 1 SPRAY: 50 SPRAY, METERED NASAL at 09:38

## 2023-09-15 RX ADMIN — INSULIN LISPRO 1 UNITS: 100 INJECTION, SOLUTION INTRAVENOUS; SUBCUTANEOUS at 08:59

## 2023-09-15 NOTE — ASSESSMENT & PLAN NOTE
Baseline creatinine of approximately 2.5-2.7 -> currently stable at 2.47  Avoid nephrotoxins and hypotension   Monitor renal function and urine output  Restart Bumex and Zaroxolyn

## 2023-09-15 NOTE — ASSESSMENT & PLAN NOTE
COPD with possible mild exacerbation on admission in setting of pneumonia and inability to obtain nebulizers prior to presentation  Recently switched to Performist and Pulmicort nebulizers - unable to obtain Performist, hence, has only been using Pulmicort for the last week  Continue Perforomist/Pulmicort dual nebulizer treatments  No need for the corticosteroids due to lack of wheezing on exam and oxygenation at baseline  Pulmonary consult appreciated  PT OT recommended rehab  Patient will be discharged to rehab and outpatient follow-up with pulmonary

## 2023-09-15 NOTE — CASE MANAGEMENT
Case Management Discharge Planning Note    Patient name Shiela Tpiton  Location /-35 MRN [id number]  : 1935 Date 9/15/2023       Current Admission Date: 2023  Current Admission Diagnosis:Pneumonia   Patient Active Problem List    Diagnosis Date Noted   • Depression 2023   • Hyponatremia 2023   • QT prolongation 2023   • Abnormal CT scan 2023   • Lung nodule 2023   • Pneumonia 2023   • Sepsis on admission ([address]) 2023   • Pulmonary hypertension ([address]) 2023   • Secondary hyperparathyroidism of renal origin ([address]) 2023   • Hypokalemia 2023   • Heart failure with preserved ejection fraction ([address]) 2023   • Continuous opioid dependence ([address]) 2023   • Chronic rhinitis 2023   • Venous stasis ulcer of other part of right lower leg limited to breakdown of skin, unspecified whether varicose veins present ([address]) 2023   • Type 2 diabetes mellitus with diabetic neuropathy, without long-term current use of insulin ([address]) 2023   • Idiopathic chronic gout of right foot without tophus 2022   • Pulmonary emphysema ([address]) 2022   • Memory loss    • Ambulatory dysfunction    • Mild cognitive impairment 2022   • Mild episode of recurrent major depressive disorder ([address]) 2022   • Other constipation 2022   • Insomnia due to medical condition 2022   • Chronic diastolic congestive heart failure ([address]) 2022   • CKD (chronic kidney disease) stage 4, GFR 15-29 ml/min ([address]) 03/15/2022   • Benign prostatic hyperplasia with urinary hesitancy 2020   • Moderate COPD (chronic obstructive pulmonary disease) ([address]) 12/10/2019   • Chronic respiratory failure with hypoxia ([address]) 11/15/2019   • COPD with acute exacerbation ([address]) 2019   • 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 disease, without long-term current use of insulin ([address]) 2016   • Essential hypertension 2015   • CHI (obstructive sleep apnea) 07/09/2014      LOS (days): 9  Geometric Mean LOS (GMLOS) (days): 5.00  Days to GMLOS:-3.7     OBJECTIVE:  Risk of Unplanned Readmission Score: 32.45         Current admission status: Inpatient   Preferred Pharmacy:   [address] [address]  Phone: [phone] Fax: [phone]    Primary Care Provider: Marnie Gomez MD    Primary Insurance: [address] [place] [address] Road:     [address] [place] [address] Number: [id number]

## 2023-09-15 NOTE — NURSING NOTE
Report given to SUSAN AMBROSE [facility] at [facility]

## 2023-09-15 NOTE — DISCHARGE INSTR - AVS FIRST PAGE
Follow-up with PCP in 1 week  Repeat CT chest in 4 to 6 weeks to assess for resolution of right upper lobe consolidation  Follow-up with urology 2 to 4 weeks as outpatient for further evaluation of upper lobe right kidney lesion  Follow-up with pulmonary as outpatient

## 2023-09-15 NOTE — ASSESSMENT & PLAN NOTE
On diuresis with Bumex and Zaroxolyn 3x/weekly  Continue beta-blockade with Labetalol  EF of 65%  Monitor/replete electrolyte deficiencies of present  Restarted on diuretics

## 2023-09-15 NOTE — PLAN OF CARE
Problem: MOBILITY - ADULT  Goal: Maintain or return to baseline ADL function  Description: INTERVENTIONS:  -  Assess patient's ability to carry out ADLs; assess patient's baseline for ADL function and identify physical deficits which impact ability to perform ADLs (bathing, care of mouth/teeth, toileting, grooming, dressing, etc.)  - Assess/evaluate cause of self-care deficits   - Assess range of motion  - Assess patient's mobility; develop plan if impaired  - Assess patient's need for assistive devices and provide as appropriate  - Encourage maximum independence but intervene and supervise when necessary  - Involve family in performance of ADLs  - Assess for home care needs following discharge   - Consider OT consult to assist with ADL evaluation and planning for discharge  - Provide patient education as appropriate  Outcome: Progressing  Goal: Maintains/Returns to pre admission functional level  Description: INTERVENTIONS:  - Perform BMAT or MOVE assessment daily.   - Set and communicate daily mobility goal to care team and patient/family/caregiver. - Collaborate with rehabilitation services on mobility goals if consulted  - Perform Range of Motion 2 times a day. - Reposition patient every 2 hours.   - Dangle patient 2 times a day  - Stand patient 2 times a day  - Ambulate patient 2 times a day  - Out of bed to chair 2 times a day   - Out of bed for meals 2 times a day  - Out of bed for toileting  - Record patient progress and toleration of activity level   Outcome: Progressing     Problem: PAIN - ADULT  Goal: Verbalizes/displays adequate comfort level or baseline comfort level  Description: Interventions:  - Encourage patient to monitor pain and request assistance  - Assess pain using appropriate pain scale  - Administer analgesics based on type and severity of pain and evaluate response  - Implement non-pharmacological measures as appropriate and evaluate response  - Consider cultural and social influences on pain and pain management  - Notify physician/advanced practitioner if interventions unsuccessful or patient reports new pain  Outcome: Progressing     Problem: INFECTION - ADULT  Goal: Absence or prevention of progression during hospitalization  Description: INTERVENTIONS:  - Assess and monitor for signs and symptoms of infection  - Monitor lab/diagnostic results  - Monitor all insertion sites, i.e. indwelling lines, tubes, and drains  - Monitor endotracheal if appropriate and nasal secretions for changes in amount and color  - [place] appropriate cooling/warming therapies per order  - Administer medications as ordered  - Instruct and encourage patient and family to use good hand hygiene technique  - Identify and instruct in appropriate isolation precautions for identified infection/condition  Outcome: Progressing     Problem: Potential for Falls  Goal: Patient will remain free of falls  Description: INTERVENTIONS:  - Educate patient/family on patient safety including physical limitations  - Instruct patient to call for assistance with activity   - Consult OT/PT to assist with strengthening/mobility   - Keep Call bell within reach  - Keep bed low and locked with side rails adjusted as appropriate  - Keep care items and personal belongings within reach  - Initiate and maintain comfort rounds  - Make Fall Risk Sign visible to staff  - Offer Toileting every 2 Hours, in advance of need  - Initiate/Maintain bed alarm  - Obtain necessary fall risk management equipment: socks  - Apply yellow socks and bracelet for high fall risk patients  - Consider moving patient to room near nurses station  Outcome: Progressing     Problem: Nutrition/Hydration-ADULT  Goal: Nutrient/Hydration intake appropriate for improving, restoring or maintaining nutritional needs  Description: Monitor and assess patient's nutrition/hydration status for malnutrition.  Collaborate with interdisciplinary team and initiate plan and interventions as ordered. Monitor patient's weight and dietary intake as ordered or per policy. Utilize nutrition screening tool and intervene as necessary. Determine patient's food preferences and provide high-protein, high-caloric foods as appropriate.      INTERVENTIONS:  - Monitor oral intake, urinary output, labs, and treatment plans  - Assess nutrition and hydration status and recommend course of action  - Evaluate amount of meals eaten  - Assist patient with eating if necessary   - Allow adequate time for meals  - Recommend/ encourage appropriate diets, oral nutritional supplements, and vitamin/mineral supplements  - Order, calculate, and assess calorie counts as needed  - Recommend, monitor, and adjust tube feedings and TPN/PPN based on assessed needs  - Assess need for intravenous fluids  - Provide specific nutrition/hydration education as appropriate  - Include patient/family/caregiver in decisions related to nutrition  Outcome: Progressing     Problem: Prexisting or High Potential for Compromised Skin Integrity  Goal: Skin integrity is maintained or improved  Description: INTERVENTIONS:  - Identify patients at risk for skin breakdown  - Assess and monitor skin integrity  - Assess and monitor nutrition and hydration status  - Monitor labs   - Assess for incontinence   - Turn and reposition patient  - Assist with mobility/ambulation  - Relieve pressure over bony prominences  - Avoid friction and shearing  - Provide appropriate hygiene as needed including keeping skin clean and dry  - Evaluate need for skin moisturizer/barrier cream  - Collaborate with interdisciplinary team   - Patient/family teaching  - Consider wound care consult   Outcome: Progressing     Problem: GASTROINTESTINAL - ADULT  Goal: Minimal or absence of nausea and/or vomiting  Description: INTERVENTIONS:  - Administer IV fluids if ordered to ensure adequate hydration  - Maintain NPO status until nausea and vomiting are resolved  - Nasogastric tube if ordered  - Administer ordered antiemetic medications as needed  - Provide nonpharmacologic comfort measures as appropriate  - Advance diet as tolerated, if ordered  - Consider nutrition services referral to assist patient with adequate nutrition and appropriate food choices  Outcome: Progressing  Goal: Maintains or returns to baseline bowel function  Description: INTERVENTIONS:  - Assess bowel function  - Encourage oral fluids to ensure adequate hydration  - Administer IV fluids if ordered to ensure adequate hydration  - Administer ordered medications as needed  - Encourage mobilization and activity  - Consider nutritional services referral to assist patient with adequate nutrition and appropriate food choices  Outcome: Progressing  Goal: Maintains adequate nutritional intake  Description: INTERVENTIONS:  - Monitor percentage of each meal consumed  - Identify factors contributing to decreased intake, treat as appropriate  - Assist with meals as needed  - Monitor I&O, weight, and lab values if indicated  - Obtain nutrition services referral as needed  Outcome: Progressing     Problem: DISCHARGE PLANNING  Goal: Discharge to home or other facility with appropriate resources  Description: INTERVENTIONS:  - Identify barriers to discharge w/patient and caregiver  - Arrange for needed discharge resources and transportation as appropriate  - Identify discharge learning needs (meds, wound care, etc.)  - Arrange for interpretive services to assist at discharge as needed  - Refer to Case Management Department for coordinating discharge planning if the patient needs post-hospital services based on physician/advanced practitioner order or complex needs related to functional status, cognitive ability, or social support system  Outcome: Progressing     Problem: Knowledge Deficit  Goal: Patient/family/caregiver demonstrates understanding of disease process, treatment plan, medications, and discharge instructions  Description: Complete learning assessment and assess knowledge base.   Interventions:  - Provide teaching at level of understanding  - Provide teaching via preferred learning methods  Outcome: Progressing     Problem: SAFETY ADULT  Goal: Patient will remain free of falls  Description: INTERVENTIONS:  - Educate patient/family on patient safety including physical limitations  - Instruct patient to call for assistance with activity   - Consult OT/PT to assist with strengthening/mobility   - Keep Call bell within reach  - Keep bed low and locked with side rails adjusted as appropriate  - Keep care items and personal belongings within reach  - Initiate and maintain comfort rounds  - Make Fall Risk Sign visible to staff  - Offer Toileting every 2 Hours, in advance of need  - Initiate/Maintain bed alarm  - Obtain necessary fall risk management equipment: socks  - Apply yellow socks and bracelet for high fall risk patients  - Consider moving patient to room near nurses station  Outcome: Progressing     Problem: RESPIRATORY - ADULT  Goal: Achieves optimal ventilation and oxygenation  Description: INTERVENTIONS:  - Assess for changes in respiratory status  - Assess for changes in mentation and behavior  - Position to facilitate oxygenation and minimize respiratory effort  - Oxygen administered by appropriate delivery if ordered  - Initiate smoking cessation education as indicated  - Encourage broncho-pulmonary hygiene including cough, deep breathe, Incentive Spirometry  - Assess the need for suctioning and aspirate as needed  - Assess and instruct to report SOB or any respiratory difficulty  - Respiratory Therapy support as indicated  Outcome: Progressing     Problem: SKIN/TISSUE INTEGRITY - ADULT  Goal: Skin Integrity remains intact(Skin Breakdown Prevention)  Description: Assess:  -Perform Craig assessment every   -Clean and moisturize skin every   -Inspect skin when repositioning, toileting, and assisting with ADLS  -Assess under medical devices such as every   -Assess extremities for adequate circulation and sensation     Bed Management:  -Have minimal linens on bed & keep smooth, unwrinkled  -Change linens as needed when moist or perspiring  -Avoid sitting or lying in one position for more than  hours while in bed  -Keep HOB at degrees     Toileting:  -Offer bedside commode  -Assess for incontinence every  -Use incontinent care products after each incontinent episode such as     Activity:  -Mobilize patient  times a day  -Encourage activity and walks on unit  -Encourage or provide ROM exercises   -Turn and reposition patient every Hours  -Use appropriate equipment to lift or move patient in bed  -Instruct/ Assist with weight shifting every  when out of bed in chair  -Consider limitation of chair time  hour intervals    Skin Care:  -Avoid use of baby powder, tape, friction and shearing, hot water or constrictive clothing  -Relieve pressure over bony prominences using  -Do not massage red bony areas    Next Steps:  -Teach patient strategies to minimize risks such as    -Consider consults to  interdisciplinary teams such as  Outcome: Progressing  Goal: Incision(s), wounds(s) or drain site(s) healing without S/S of infection  Description: INTERVENTIONS  - Assess and document dressing, incision, wound bed, drain sites and surrounding tissue  - Provide patient and family education  - Perform skin care/dressing changes rohini  Outcome: Progressing     Problem: MUSCULOSKELETAL - ADULT  Goal: Maintain or return mobility to safest level of function  Description: INTERVENTIONS:  - Assess patient's ability to carry out ADLs; assess patient's baseline for ADL function and identify physical deficits which impact ability to perform ADLs (bathing, care of mouth/teeth, toileting, grooming, dressing, etc.)  - Assess/evaluate cause of self-care deficits   - Assess range of motion  - Assess patient's mobility  - Assess patient's need for assistive devices and provide as appropriate  - Encourage maximum independence but intervene and supervise when necessary  - Involve family in performance of ADLs  - Assess for home care needs following discharge   - Consider OT consult to assist with ADL evaluation and planning for discharge  - Provide patient education as appropriate  Outcome: Progressing

## 2023-09-15 NOTE — DISCHARGE SUMMARY
[facility]  Discharge- Yosef Curiel 1935, 81 y.o. male MRN: [id number]  Unit/Bed#: -Christina Encounter: [id number]  Primary Care Provider: Reece Martinez MD   Date and time admitted to hospital: 9/6/2023  1:18 PM    Hyponatremia  Assessment & Plan  Serum sodium stable 135 currently  Continue to monitor    Depression  Assessment & Plan  Continue Zoloft    Lung nodule  Assessment & Plan  New nodule noted on CT C/A/P  Outpatient f/u with routine screening per guidelines    Abnormal CT scan  Assessment & Plan  CT imaging noting an intermediate density partially exophytic lesion from the upper pole of right kidney, with inability to distinguish hyperdense cyst from solid neoplasm  Subsequent renal ultrasound also indeterminate -> proceed with MRI for further classification (will require Ativan for sedation prior to study)    MRI showed-" Examination was prematurely terminated due to the patient's claustrophobia and difficulty breathing.  Redemonstration of a 1.4 cm T2 isointense exophytic lesion in the upper pole right kidney without restricted diffusion, incompletely characterized without T1-weighted images and contrast.Consider repeat examination with premedication and contrast"  Urology consult appreciated  Urology recommended follow-up with them as outpatient in 3 to 4 weeks after discharge      QT prolongation  Assessment & Plan  QTc 598 (peak) on admission day  Avoid QTc prolonging agents  Replete electrolyte deficiencies of present  Repeat EKG on 9/8 revealed improvement in QTc at 420    Sepsis on admission [facility])  Assessment & Plan  Initially presented with tachycardia/tachypnea and leukocytosis due to pneumonia (see above)  Monitor vitals and maintain hemodynamics    Hypokalemia  Assessment & Plan  Monitor/replace serum potassium as necessary  Serum magnesium normal    Type 2 diabetes mellitus with diabetic neuropathy, without long-term current use of insulin ([address])  Assessment & Plan  Lab Results   Component Value Date    HGBA1C 6.3 (H) 05/02/2023     Continue SSI coverage per Accu-Cheks  Diabetic diet  Hypoglycemia protocol  Hold Glipizide while hospitalized    Chronic diastolic congestive heart failure ([address])  Assessment & Plan  On diuresis with Bumex and Zaroxolyn 3x/weekly  Continue beta-blockade with Labetalol  EF of 65%  Monitor/replete electrolyte deficiencies of present  Restarted on diuretics    CKD (chronic kidney disease) stage 4, GFR 15-29 ml/min ([facility])  Assessment & Plan  Baseline creatinine of approximately 2.5-2.7 -> currently stable at 2.47  Avoid nephrotoxins and hypotension   Monitor renal function and urine output  Restart Bumex and Zaroxolyn      Benign prostatic hyperplasia with urinary hesitancy  Assessment & Plan  Continue Flomax    Chronic respiratory failure with hypoxia ([facility])  Assessment & Plan  Baseline oxygen requirement of 3 L per nasal cannula  In the setting of underlying diastolic CHF and COPD    COPD with acute exacerbation ([address])  Assessment & Plan  COPD with possible mild exacerbation on admission in setting of pneumonia and inability to obtain nebulizers prior to presentation  Recently switched to Performist and Pulmicort nebulizers - unable to obtain Performist, hence, has only been using Pulmicort for the last week  Continue Perforomist/Pulmicort dual nebulizer treatments  No need for the corticosteroids due to lack of wheezing on exam and oxygenation at baseline  Pulmonary consult appreciated  PT OT recommended rehab  Patient will be discharged to rehab and outpatient follow-up with pulmonary    Essential hypertension  Assessment & Plan  Continue Labetalol   will discharge on Bumex and Zaroxolyn  Hydralazine as needed    * Pneumonia  Assessment & Plan  Presents with worsening shortness of breath x 2-3 days and a recurrent cough, meeting sepsis criteria as below  CT chest -> Focal ill-defined right upper lobe consolidation with associated small cavitary space is suspicious for pneumonia. Recommend follow-up to resolution with chest CT in 4 to 6 weeks. Urine Legionella/Streptococcus Ag negative  Procalcitonin trend: 0.34 -> 0.55 ->0.71--0.66  Encourage incentive spirometry  Chest x-ray shows worsening pneumonia  Continue Rocephin and Flagyl  Pulmonary and speech consult  PRN Robitussin on board  Supportive care otherwise  Patient had video barium swallow done during this admission  Will be discharged on Augmentin to complete the course  PT/OT recommending rehab  Patient will be discharged to [facility] for skilled nursing rehab      [facility] [place] Course:     Ellen Scott is a 81 y.o. male patient who originally presented to the hospital on   Admission Orders (From admission, onward)     Ordered        09/06/23 [address] [facility] [address]  Once                     due to shortness of breath. Patient was admitted with sepsis secondary to pneumonia/COPD exacerbation. Patient has chronic respiratory failure with hypoxia is on 3 L of supplemental oxygen at baseline  Patient was started on Rocephin and Zithromax. CT chest-Focal ill-defined right upper lobe consolidation with associated small cavitary space is suspicious for pneumonia. Recommend follow-up to resolution with chest CT in 4 to 6 weeks. Urine Legionella/Streptococcus Ag negative  Patient was seen by pulmonary and speech and swallow. Patient underwent MRI abdomen for renal lesion and was seen by urology. Urology recommended outpatient follow-up with them  Patient is cleared by pulmonary and will be discharged on oral Augmentin to complete the course. Patient was seen by physical therapy and  will be discharged to [facility] for skilled nursing rehab  On Exam-  Chest-bilateral air entry, clear to auscultation  Abdomen-soft, nontender  Heart-S1 S2 regular  Extremities-no pedal edema or calf tenderness  Neuro-alert awake oriented x3.   No focal deficits    Please see above list of diagnoses and related plan for additional information. Follow-up with PCP in 1 week  Repeat CT chest in 4 to 6 weeks to assess for resolution of right upper lobe consolidation  Follow-up with urology 2 to 4 weeks as outpatient for further evaluation of upper lobe right kidney lesion  Follow-up with pulmonary as outpatient    Condition at Discharge:  good      Discharge instructions/Information to patient and family:   See after visit summary for information provided to patient and family. Provisions for Follow-Up Care:  See after visit summary for information related to follow-up care and any pertinent home health orders. Disposition:     Other [facility] skilled nursing rehab       Discharge Statement:  I spent 40 minutes discharging the patient. This time was spent on the day of discharge. I had direct contact with the patient on the day of discharge. Greater than 50% of the total time was spent examining patient, answering all patient questions, arranging and discussing plan of care with patient as well as directly providing post-discharge instructions. Additional time then spent on discharge activities. Discharge Medications:  See after visit summary for reconciled discharge medications provided to patient and family.       ** Please Note: This note has been constructed using a voice recognition system **

## 2023-09-15 NOTE — ASSESSMENT & PLAN NOTE
Presents with worsening shortness of breath x 2-3 days and a recurrent cough, meeting sepsis criteria as below  CT chest -> Focal ill-defined right upper lobe consolidation with associated small cavitary space is suspicious for pneumonia. Recommend follow-up to resolution with chest CT in 4 to 6 weeks.   Urine Legionella/Streptococcus Ag negative  Procalcitonin trend: 0.34 -> 0.55 ->0.71--0.66  Encourage incentive spirometry  Chest x-ray shows worsening pneumonia  Continue Rocephin and Flagyl  Pulmonary and speech consult  PRN Robitussin on board  Supportive care otherwise  Patient had video barium swallow done during this admission  Will be discharged on Augmentin to complete the course  PT/OT recommending rehab  Patient will be discharged to [facility] for skilled nursing rehab

## 2023-09-15 NOTE — SPEECH THERAPY NOTE
Speech Language/Pathology    Speech-Language Pathology Progress Note      Patient Name: Manju Chaparro    EJTYT'L Date: 9/15/2023      Subjective:  Pt was awake and alert. He was repositioned for optimal PO intake safety. Patient stated "I don't feel good and I don't want want to eat a lot". Objective:  Pt was seen today for dysphagia therapy. Current diet is regular with thin liquids. Pt was on room air. Focus of today's session was to maximize PO intake safety, improve pt's self monitoring, improve use of strategies to minimize risk of aspiration and to educate pt/family on results of MBS and strategies top optimize swallow safety. Textures offered today included regular solid and thin liquid via cup and via straw. Swallow function:   Bolus retrieval was adequate. Manipulation and Mastication were fairly prompt . Pharyngeal swallow initiation was absent. Hyolaryngeal excursion was reduced. No s/s aspiration occurred during session today. SLP provided min cueing/feedback throughout session. Pt required decreased cueing as session progressed and demonstrated good understanding and follow through. Education reviewed on recommendations for single sips, slow rate, and taking PO meds in puree to maximize safety. Pt demonstrated understanding and denied questions/concerns at this time. Assessment:  Swallow function is stable with current diet. Diet texture and Liquid consistency remains appropriate at this time. Plan:  Continue regular and thin liquids. No additional ST f/u needed at this time.  Please re consult with any new changes or concerns

## 2023-09-15 NOTE — CASE MANAGEMENT
[facility] has received approved authorization from insurance:  [address] received for: SNF  Facility: [facility] #: [id number]  Start of Care: 9/15  Next Review Date: 9/19  Continued Stay Care Coordinator: Cristino MERRITT#: [phone]  Submit next review to: [phone]   Care Manager notified: Mark Neil

## 2023-09-15 NOTE — CASE MANAGEMENT
Case Management Discharge Planning Note    Patient name Foster Old  Location /-64 MRN [id number]  : 1935 Date 9/15/2023       Current Admission Date: 2023  Current Admission Diagnosis:Pneumonia   Patient Active Problem List    Diagnosis Date Noted   • Depression 2023   • Hyponatremia 2023   • QT prolongation 2023   • Abnormal CT scan 2023   • Lung nodule 2023   • Pneumonia 2023   • Sepsis on admission ([address]) 2023   • Pulmonary hypertension ([address]) 2023   • Secondary hyperparathyroidism of renal origin ([address]) 2023   • Hypokalemia 2023   • Heart failure with preserved ejection fraction ([address]) 2023   • Continuous opioid dependence ([address]) 2023   • Chronic rhinitis 2023   • Venous stasis ulcer of other part of right lower leg limited to breakdown of skin, unspecified whether varicose veins present ([address]) 2023   • Type 2 diabetes mellitus with diabetic neuropathy, without long-term current use of insulin ([address]) 2023   • Idiopathic chronic gout of right foot without tophus 2022   • Pulmonary emphysema ([address]) 2022   • Memory loss    • Ambulatory dysfunction    • Mild cognitive impairment 2022   • Mild episode of recurrent major depressive disorder ([address]) 2022   • Other constipation 2022   • Insomnia due to medical condition 2022   • Chronic diastolic congestive heart failure ([address]) 2022   • CKD (chronic kidney disease) stage 4, GFR 15-29 ml/min ([address]) 03/15/2022   • Benign prostatic hyperplasia with urinary hesitancy 2020   • Moderate COPD (chronic obstructive pulmonary disease) ([address]) 12/10/2019   • Chronic respiratory failure with hypoxia ([address]) 11/15/2019   • COPD with acute exacerbation ([address]) 2019   • 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 disease, without long-term current use of insulin ([address]) 2016   • Essential hypertension 2015   • CHI (obstructive sleep apnea) 07/09/2014      LOS (days): 9  Geometric Mean LOS (GMLOS) (days): 5.00  Days to GMLOS:-3.8     OBJECTIVE:  Risk of Unplanned Readmission Score: 33.63      Current admission status: Inpatient   Preferred Pharmacy:   [address] [address]  Phone: [phone] Fax: [phone]    Primary Care Provider: Malena Ngo MD    Primary Insurance: [facility]  Secondary Insurance:     DISCHARGE DETAILS:    Discharge planning discussed with[de-identified] pt's son and pt's spouse  Freedom of Choice: Yes     CM contacted family/caregiver?: Yes  Were Treatment Team discharge recommendations reviewed with patient/caregiver?: Yes  Did patient/caregiver verbalize understanding of patient care needs?: Yes  Were patient/caregiver advised of the risks associated with not following Treatment Team discharge recommendations?: Yes    Contacts  Patient Contacts: Celsa Garcia (pt's son)  Relationship to Patient[de-identified] Family  Contact Method: Phone  Reason/Outcome: Discharge Planning, Referral    Requested [facility]         Is the patient interested in [facility] at discharge?: No    DME Referral Provided  Referral made for DME?: No    Other Referral/Resources/Interventions Provided:  Interventions: Short Term Rehab    Treatment Team Recommendation: Short Term Rehab  Discharge Destination Plan[de-identified] Short Term Rehab  Transport at Discharge : \[facility]"" [facility]  Dispatcher Contacted: Yes  Number/Name of Dispatcher: Roundtrip  Transported by [employer] and Unit #): [facility]  ETA of Transport (Date): 09/15/23  ETA of Transport (Time): 1300     Transfer Mode: Stretcher  Accompanied by: EMS personnel  Transfer Equipment: BLS devices     Additional Comments: Pt is medically stable for d/c. He will be transport at 1:00 pm via [facility] BLS to [facility] for [employer]. Report can be called to Phone:  [phone] and fax: [phone].     Accepting Facility Name, [address] [place] [address] : [facility]  Receiving Facility/Agency Phone Number: [phone]  Facility/Agency Fax Number: [phone]

## 2023-09-18 ENCOUNTER — NURSING HOME VISIT (OUTPATIENT)
Dept: FAMILY MEDICINE CLINIC | Facility: CLINIC | Age: 88
End: 2023-09-18
Payer: COMMERCIAL

## 2023-09-18 ENCOUNTER — TELEPHONE (OUTPATIENT)
Age: 88
End: 2023-09-18

## 2023-09-18 DIAGNOSIS — J44.1 COPD WITH ACUTE EXACERBATION (HCC): ICD-10-CM

## 2023-09-18 DIAGNOSIS — R26.2 AMBULATORY DYSFUNCTION: ICD-10-CM

## 2023-09-18 DIAGNOSIS — R41.3 MEMORY LOSS: ICD-10-CM

## 2023-09-18 DIAGNOSIS — J96.01 SEPSIS WITH ACUTE HYPOXIC RESPIRATORY FAILURE AND SEPTIC SHOCK, DUE TO UNSPECIFIED ORGANISM (HCC): Primary | ICD-10-CM

## 2023-09-18 DIAGNOSIS — J96.11 CHRONIC RESPIRATORY FAILURE WITH HYPOXIA (HCC): ICD-10-CM

## 2023-09-18 DIAGNOSIS — N18.4 CKD (CHRONIC KIDNEY DISEASE) STAGE 4, GFR 15-29 ML/MIN (HCC): ICD-10-CM

## 2023-09-18 DIAGNOSIS — A41.9 SEPSIS WITH ACUTE HYPOXIC RESPIRATORY FAILURE AND SEPTIC SHOCK, DUE TO UNSPECIFIED ORGANISM (HCC): Primary | ICD-10-CM

## 2023-09-18 DIAGNOSIS — N40.1 BENIGN PROSTATIC HYPERPLASIA WITH URINARY HESITANCY: ICD-10-CM

## 2023-09-18 DIAGNOSIS — J18.9 PNEUMONIA OF RIGHT LUNG DUE TO INFECTIOUS ORGANISM, UNSPECIFIED PART OF LUNG: ICD-10-CM

## 2023-09-18 DIAGNOSIS — R65.21 SEPSIS WITH ACUTE HYPOXIC RESPIRATORY FAILURE AND SEPTIC SHOCK, DUE TO UNSPECIFIED ORGANISM (HCC): Primary | ICD-10-CM

## 2023-09-18 DIAGNOSIS — E11.40 TYPE 2 DIABETES MELLITUS WITH DIABETIC NEUROPATHY, WITHOUT LONG-TERM CURRENT USE OF INSULIN (HCC): ICD-10-CM

## 2023-09-18 DIAGNOSIS — R39.11 BENIGN PROSTATIC HYPERPLASIA WITH URINARY HESITANCY: ICD-10-CM

## 2023-09-18 DIAGNOSIS — I50.33 ACUTE ON CHRONIC HEART FAILURE WITH PRESERVED EJECTION FRACTION (HCC): ICD-10-CM

## 2023-09-18 PROCEDURE — 99306 1ST NF CARE HIGH MDM 50: CPT | Performed by: FAMILY MEDICINE

## 2023-09-18 NOTE — PROGRESS NOTES
[facility], [address] [place] [address]  Facility: [facility]    NAME: Brian Tran  AGE: 81 y.o. SEX: male    DATE OF ENCOUNTER: 9/18/2023    Code status:  DNR w/ Hospitalization    Assessment and Plan     1. Sepsis with acute hypoxic respiratory failure and septic shock, due to unspecified organism ([address])    2. Ambulatory dysfunction    3. Memory loss    4. CKD (chronic kidney disease) stage 4, GFR 15-29 ml/min ([facility])    5. Benign prostatic hyperplasia with urinary hesitancy    6. Acute on chronic heart failure with preserved ejection fraction ([address])    7. Pneumonia of right lung due to infectious organism, unspecified part of lung    8. COPD with acute exacerbation ([address] )    9. Chronic respiratory failure with hypoxia ([facility])    10. Type 2 diabetes mellitus with diabetic neuropathy, without long-term current use of insulin ([facility])        All medications and routine orders were reviewed and updated as needed. Plan discussed with: Family member    Chief Complaint     Seen for admission at [facility]    History of Present Illness     81-year-old male here after hospitalization for sepsis secondary to pneumonia. This resulted in an exacerbation of his COPD. The patient is quite weak and debilitated. He normally lives at home with his wife who provides most care to him. He was interviewed and examined with his son who relates difficulty with memory recently. He has limited mobility and ambulates up to 10 feet. He has oxygen at home but does not always require it. His conditioning has worsened over the last 6 months. His cognitive impairment is escalating. He notes a poor appetite. His bowels are sluggish. He gets urinary frequency without dysuria. He has a nebulizer machine which she uses at home.     HISTORY:  Past Medical History:   Diagnosis Date   • Anxiety 2020   • COPD (chronic obstructive pulmonary disease) ([address])    • Coronary artery disease 2012   • Depression 2020   • Herpes zoster    • Hypertension    • Memory loss     possible   • Mycoplasma pneumonia    • Obesity    • Osteoarthritis    • Renal calculi      Past Surgical History:   Procedure Laterality Date   • CATARACT EXTRACTION      with insert intraoculat lens prosthesis   • HEMORRHOID SURGERY     • NECK SURGERY      for bone spur     Family History   Problem Relation Age of Onset   • Diabetes Mother    • Breast cancer Mother         She  in 12. Age 80   • Stroke Mother    • Pneumonia Father    • Substance Abuse Neg Hx    • Mental illness Neg Hx      Social History     Socioeconomic History   • Marital status: /Civil Union     Spouse name: None   • Number of children: None   • Years of education: None   • Highest education level: None   Occupational History   • None   Tobacco Use   • Smoking status: Former     Packs/day: 2.00     Years: 42.00     Total pack years: 84.00     Types: Cigarettes     Start date: 1956     Quit date: 1998     Years since quittin.7   • Smokeless tobacco: Never   Vaping Use   • Vaping Use: Never used   Substance and Sexual Activity   • Alcohol use: Not Currently     Alcohol/week: 1.0 standard drink of alcohol     Types: 1 Cans of beer per week     Comment: 1-2 beers a months   • Drug use: No   • Sexual activity: Not Currently   Other Topics Concern   • None   Social History Narrative    Active advance directive    Daily coffee consumption, 1 cup/day    Denied exercise habits    Good dental hygiene    Supportive and safe    Active family support     Social Determinants of Health     Financial Resource Strain: Not on file   Food Insecurity: No Food Insecurity (2023)    Hunger Vital Sign    • Worried About Running Out of Food in the Last Year: Never true    • Ran Out of Food in the Last Year: Never true   Transportation Needs: No Transportation Needs (2023)    PRAPARE - Transportation    • Lack of Transportation (Medical):  No    • Lack of Transportation (Non-Medical): No   Physical Activity: Not on file   Stress: Not on file   Social Connections: Not on file   Intimate Partner Violence: Not on file   Housing Stability: Low Risk  (9/7/2023)    Housing Stability Vital Sign    • Unable to Pay for Housing in the Last Year: No    • Number of Places Lived in the Last Year: 1    • Unstable Housing in the Last Year: No       Allergies:  No Known Allergies    Review of Systems     Review of Systems   Constitutional: Negative for activity change, appetite change, chills, diaphoresis, fatigue and unexpected weight change. HENT: Negative for congestion, ear discharge, ear pain, hearing loss, nosebleeds and rhinorrhea. Eyes: Negative for pain, redness, itching and visual disturbance. Respiratory: Positive for cough, shortness of breath and wheezing. Negative for choking and chest tightness. Cardiovascular: Positive for leg swelling. Negative for chest pain. Gastrointestinal: Negative for abdominal pain, blood in stool, constipation, diarrhea and nausea. Endocrine: Negative for cold intolerance, polydipsia and polyphagia. Genitourinary: Negative for dysuria, frequency, hematuria and urgency. Musculoskeletal: Positive for gait problem. Negative for arthralgias, back pain, joint swelling, neck pain and neck stiffness. Skin: Negative for color change and rash. Allergic/Immunologic: Negative for environmental allergies and food allergies. Neurological: Positive for weakness. Negative for dizziness, tremors, seizures, speech difficulty, numbness and headaches. Hematological: Negative for adenopathy. Does not bruise/bleed easily. Psychiatric/Behavioral: Negative for behavioral problems, dysphoric mood, hallucinations and self-injury. Medications and orders     All medications reviewed and updated in [facility] EMR. Objective     Vitals: per nursing home record    Physical Exam  Constitutional:       General: He is not in acute distress.      Appearance: He is well-developed. He is not diaphoretic. HENT:      Head: Normocephalic and atraumatic. Right Ear: External ear normal.      Left Ear: External ear normal.      Nose: Nose normal.      Mouth/Throat:      Pharynx: No oropharyngeal exudate. Eyes:      General: No scleral icterus. Right eye: No discharge. Left eye: No discharge. Conjunctiva/sclera: Conjunctivae normal.      Pupils: Pupils are equal, round, and reactive to light. Neck:      Thyroid: No thyromegaly. Cardiovascular:      Rate and Rhythm: Normal rate and regular rhythm. Heart sounds: Normal heart sounds. No murmur heard. Pulmonary:      Effort: Pulmonary effort is normal.      Breath sounds: Wheezing present. No rales. Abdominal:      General: Bowel sounds are normal.      Palpations: Abdomen is soft. There is no mass. Tenderness: There is no abdominal tenderness. There is no guarding. Musculoskeletal:         General: No tenderness. Normal range of motion. Cervical back: Normal range of motion and neck supple. Lymphadenopathy:      Cervical: No cervical adenopathy. Skin:     General: Skin is warm and dry. Neurological:      Mental Status: He is alert. He is disoriented. Motor: Weakness present. Gait: Gait abnormal.      Deep Tendon Reflexes: Reflexes are normal and symmetric. Psychiatric:         Thought Content: Thought content normal.         Judgment: Judgment normal.         Pertinent Laboratory/Diagnostic Studies: The following labs/studies were reviewed please see chart or hospital paperwork for details. Diagnostic studies from the hospital were reviewed    - Admit for PT OT and medical therapy. We will add a low-dose of lorazepam to help with his oxygen anxiety. We will follow his labs. We will increase his bowel regimen.     Reza Huitron DO  9/18/2023 1:45 PM

## 2023-09-18 NOTE — UTILIZATION REVIEW
NOTIFICATION OF ADMISSION DISCHARGE   This is a Notification of Discharge from [facility]. Please be advised that this patient has been discharge from our facility. Below you will find the admission and discharge date and time including the patient’s disposition. UTILIZATION REVIEW CONTACT:  Reinier Tobar  Utilization   Network Utilization Review Department  Phone: [phone] carefully listen to the prompts. All voicemails are confidential.  Email: [email or web address]     ADMISSION INFORMATION  PRESENTATION DATE: 9/6/2023  1:18 PM  OBERVATION ADMISSION DATE:   INPATIENT ADMISSION DATE: 9/6/23  3:57 PM   DISCHARGE DATE: 9/15/2023  1:45 PM   DISPOSITION:Non [facility] SNF/TCU/SNU    IMPORTANT INFORMATION:  Send all requests for admission clinical reviews, approved or denied determinations and any other requests to dedicated fax number below belonging to the campus where the patient is receiving treatment.  List of dedicated fax numbers:  [place] DENIALS (Administrative/Medical Necessity) [phone] [facility] (Maternity/NICU/Pediatrics) [phone]   [facility] [phone]   [facility] [phone] [facility] [phone] [facility] [phone]   [facility] [phone]   [address] [place] [address] [address] [facility] [phone] [facility] [phone] [address] [facility] [phone] [address] [address] [facility] [phone]

## 2023-09-18 NOTE — TELEPHONE ENCOUNTER
Christopher Little from [facility] calling to schedule NP appt. Patient being referred for further evaluation of upper lobe right kidney lesion. Patient had a MRI done on 9/9/23    Scheduled patient for first available appt on 11/9 at 11:45 with Dr. Mariam Leung in [facility].       Christopher Little from [facility] can be reached at [phone] if any appt changes need to be made

## 2023-09-21 ENCOUNTER — NURSING HOME VISIT (OUTPATIENT)
Dept: FAMILY MEDICINE CLINIC | Facility: CLINIC | Age: 88
End: 2023-09-21
Payer: COMMERCIAL

## 2023-09-21 DIAGNOSIS — R65.21 SEPSIS WITH ACUTE HYPOXIC RESPIRATORY FAILURE AND SEPTIC SHOCK, DUE TO UNSPECIFIED ORGANISM (HCC): Primary | ICD-10-CM

## 2023-09-21 DIAGNOSIS — J44.1 COPD WITH ACUTE EXACERBATION (HCC): ICD-10-CM

## 2023-09-21 DIAGNOSIS — J96.01 SEPSIS WITH ACUTE HYPOXIC RESPIRATORY FAILURE AND SEPTIC SHOCK, DUE TO UNSPECIFIED ORGANISM (HCC): Primary | ICD-10-CM

## 2023-09-21 DIAGNOSIS — N18.4 CKD (CHRONIC KIDNEY DISEASE) STAGE 4, GFR 15-29 ML/MIN (HCC): ICD-10-CM

## 2023-09-21 DIAGNOSIS — J18.9 PNEUMONIA OF RIGHT LUNG DUE TO INFECTIOUS ORGANISM, UNSPECIFIED PART OF LUNG: ICD-10-CM

## 2023-09-21 DIAGNOSIS — A41.9 SEPSIS WITH ACUTE HYPOXIC RESPIRATORY FAILURE AND SEPTIC SHOCK, DUE TO UNSPECIFIED ORGANISM (HCC): Primary | ICD-10-CM

## 2023-09-21 DIAGNOSIS — E11.40 TYPE 2 DIABETES MELLITUS WITH DIABETIC NEUROPATHY, WITHOUT LONG-TERM CURRENT USE OF INSULIN (HCC): ICD-10-CM

## 2023-09-21 DIAGNOSIS — G31.84 MILD COGNITIVE IMPAIRMENT: ICD-10-CM

## 2023-09-21 DIAGNOSIS — I50.32 CHRONIC DIASTOLIC CONGESTIVE HEART FAILURE (HCC): ICD-10-CM

## 2023-09-21 PROCEDURE — 99309 SBSQ NF CARE MODERATE MDM 30: CPT | Performed by: FAMILY MEDICINE

## 2023-09-21 NOTE — PROGRESS NOTES
[address]  [address] [facility], [address] [place] [address]  Facility: [facility]    NAME: Raymundo Leiva  AGE: 81 y.o. SEX: male    DATE OF ENCOUNTER: 9/21/2023    Code status:  DNR w/ Hospitalization    Assessment and Plan     1. Sepsis with acute hypoxic respiratory failure and septic shock, due to unspecified organism ([address])    2. CKD (chronic kidney disease) stage 4, GFR 15-29 ml/min ([facility])    3. Mild cognitive impairment    4. Pneumonia of right lung due to infectious organism, unspecified part of lung    5. COPD with acute exacerbation ([address])    6. Type 2 diabetes mellitus with diabetic neuropathy, without long-term current use of insulin ([address])    7. Chronic diastolic congestive heart failure (HCC)        All medications and routine orders were reviewed and updated as needed. Plan discussed with: Family member    Chief Complaint     Interim evaluation    History of Present Illness     The patient is seen with his son this morning. He is concerned that his father appetite is poor. He continues with a cough. This is been present for a while however and is likely lingering from his pneumonia. He is remained afebrile. His labs reflect an acute kidney injury. We reduced his diuretics and he is taking sufficient fluids. We will recheck that tomorrow. Patient denies pain. The following portions of the patient's history were reviewed and updated as appropriate: current medications, past family history, past medical history, past social history, past surgical history and problem list.    Allergies:  No Known Allergies    Review of Systems     Review of Systems   Constitutional: Negative for activity change, appetite change, chills, diaphoresis, fatigue and unexpected weight change. HENT: Negative for congestion, ear discharge, ear pain, hearing loss, nosebleeds and rhinorrhea. Eyes: Negative for pain, redness, itching and visual disturbance. Respiratory: Positive for choking. Negative for cough, chest tightness and shortness of breath. Cardiovascular: Positive for leg swelling. Negative for chest pain. Gastrointestinal: Negative for abdominal pain, blood in stool, constipation, diarrhea and nausea. Endocrine: Negative for cold intolerance, polydipsia and polyphagia. Genitourinary: Negative for dysuria, frequency, hematuria and urgency. Musculoskeletal: Positive for gait problem. Negative for arthralgias, back pain, joint swelling, neck pain and neck stiffness. Skin: Negative for color change and rash. Allergic/Immunologic: Negative for environmental allergies and food allergies. Neurological: Positive for weakness. Negative for dizziness, tremors, seizures, speech difficulty, numbness and headaches. Hematological: Negative for adenopathy. Does not bruise/bleed easily. Psychiatric/Behavioral: Positive for confusion. Negative for behavioral problems, dysphoric mood, hallucinations and self-injury. Medications and orders     All medications reviewed and updated in [facility] EMR. Objective     Vitals: per nursing home records    Physical Exam  Constitutional:       General: He is not in acute distress. Appearance: He is well-developed. He is not diaphoretic. HENT:      Head: Normocephalic and atraumatic. Right Ear: External ear normal.      Left Ear: External ear normal.      Nose: Nose normal.      Mouth/Throat:      Pharynx: No oropharyngeal exudate. Eyes:      General: No scleral icterus. Right eye: No discharge. Left eye: No discharge. Conjunctiva/sclera: Conjunctivae normal.      Pupils: Pupils are equal, round, and reactive to light. Neck:      Thyroid: No thyromegaly. Cardiovascular:      Rate and Rhythm: Normal rate and regular rhythm. Heart sounds: Normal heart sounds. No murmur heard. Pulmonary:      Effort: Pulmonary effort is normal.      Breath sounds: Rhonchi present. No wheezing or rales. Abdominal:      General: Bowel sounds are normal.      Palpations: Abdomen is soft. There is no mass. Tenderness: There is no abdominal tenderness. There is no guarding. Musculoskeletal:         General: No tenderness. Normal range of motion. Cervical back: Normal range of motion and neck supple. Right lower leg: Edema present. Left lower leg: Edema present. Lymphadenopathy:      Cervical: No cervical adenopathy. Skin:     General: Skin is warm and dry. Neurological:      Mental Status: He is alert. He is disoriented. Coordination: Coordination abnormal.      Deep Tendon Reflexes: Reflexes are normal and symmetric. Pertinent Laboratory/Diagnostic Studies: The following labs were reviewed please see chart or hospital paperwork for details. Space for lab dictation Labs reflect an acute kidney injury    - Add Mucinex. Decrease diuretics.   Increase lorazepam.  Recheck ANGELA Peña DO  9/21/2023 10:20 AM

## 2023-09-24 DIAGNOSIS — F33.0 MILD EPISODE OF RECURRENT MAJOR DEPRESSIVE DISORDER (HCC): ICD-10-CM

## 2023-09-24 DIAGNOSIS — J44.9 MODERATE COPD (CHRONIC OBSTRUCTIVE PULMONARY DISEASE) (HCC): ICD-10-CM

## 2023-09-24 RX ORDER — BUDESONIDE 0.5 MG/2ML
INHALANT ORAL
Qty: 360 ML | Refills: 2 | Status: SHIPPED | OUTPATIENT
Start: 2023-09-24

## 2023-09-25 ENCOUNTER — NURSING HOME VISIT (OUTPATIENT)
Dept: FAMILY MEDICINE CLINIC | Facility: CLINIC | Age: 88
End: 2023-09-25
Payer: COMMERCIAL

## 2023-09-25 DIAGNOSIS — A41.9 SEPSIS WITH ACUTE HYPOXIC RESPIRATORY FAILURE AND SEPTIC SHOCK, DUE TO UNSPECIFIED ORGANISM (HCC): ICD-10-CM

## 2023-09-25 DIAGNOSIS — R65.21 SEPSIS WITH ACUTE HYPOXIC RESPIRATORY FAILURE AND SEPTIC SHOCK, DUE TO UNSPECIFIED ORGANISM (HCC): ICD-10-CM

## 2023-09-25 DIAGNOSIS — J96.01 SEPSIS WITH ACUTE HYPOXIC RESPIRATORY FAILURE AND SEPTIC SHOCK, DUE TO UNSPECIFIED ORGANISM (HCC): ICD-10-CM

## 2023-09-25 DIAGNOSIS — J44.1 COPD WITH ACUTE EXACERBATION (HCC): ICD-10-CM

## 2023-09-25 DIAGNOSIS — J96.11 CHRONIC RESPIRATORY FAILURE WITH HYPOXIA (HCC): Primary | ICD-10-CM

## 2023-09-25 DIAGNOSIS — I50.32 CHRONIC DIASTOLIC CONGESTIVE HEART FAILURE (HCC): ICD-10-CM

## 2023-09-25 DIAGNOSIS — J18.9 PNEUMONIA OF RIGHT LUNG DUE TO INFECTIOUS ORGANISM, UNSPECIFIED PART OF LUNG: ICD-10-CM

## 2023-09-25 DIAGNOSIS — R26.2 AMBULATORY DYSFUNCTION: ICD-10-CM

## 2023-09-25 PROCEDURE — 99309 SBSQ NF CARE MODERATE MDM 30: CPT | Performed by: FAMILY MEDICINE

## 2023-10-26 ENCOUNTER — HOME CARE VISIT (OUTPATIENT)
Dept: HOME HEALTH SERVICES | Facility: HOME HEALTHCARE | Age: 88
End: 2023-10-26

## 2023-11-08 PROBLEM — A41.9 SEPSIS (HCC): Status: RESOLVED | Noted: 2023-01-01 | Resolved: 2023-11-08

## 2023-11-08 PROBLEM — J18.9 PNEUMONIA: Status: RESOLVED | Noted: 2023-01-01 | Resolved: 2023-11-08

## 2024-05-31 NOTE — PROGRESS NOTES
[facility]  Progress Note  Name: Alfonso Weber I  MRN: [id number]  Unit/Bed#: -01 I Date of Admission: 9/6/2023   Date of Service: 9/14/2023 I Hospital Day: 8    Assessment/Plan   Hyponatremia  Assessment & Plan  Serum sodium stable 135 currently  Continue to monitor    Depression  Assessment & Plan  Continue Zoloft    Lung nodule  Assessment & Plan  New nodule noted on CT C/A/P  Outpatient f/u with routine screening per guidelines    Abnormal CT scan  Assessment & Plan  CT imaging noting an intermediate density partially exophytic lesion from the upper pole of right kidney, with inability to distinguish hyperdense cyst from solid neoplasm  Subsequent renal ultrasound also indeterminate -> proceed with MRI for further classification (will require Ativan for sedation prior to study)    MRI showed-" Examination was prematurely terminated due to the patient's claustrophobia and difficulty breathing.  Redemonstration of a 1.4 cm T2 isointense exophytic lesion in the upper pole right kidney without restricted diffusion, incompletely characterized without T1-weighted images and contrast.Consider repeat examination with premedication and contrast"  Urology consult appreciated  Urology recommended follow-up with them as outpatient in 3 to 4 weeks after discharge      QT prolongation  Assessment & Plan  QTc 598 (peak) on admission day  Avoid QTc prolonging agents  Replete electrolyte deficiencies of present  Repeat EKG on 9/8 revealed improvement in QTc at 420    Sepsis on admission [facility])  Assessment & Plan  Initially presented with tachycardia/tachypnea and leukocytosis due to pneumonia (see above)  Monitor vitals and maintain hemodynamics    Hypokalemia  Assessment & Plan  Monitor/replace serum potassium as necessary  Serum magnesium normal    Type 2 diabetes mellitus with diabetic neuropathy, without long-term current use of insulin [facility])  Assessment & Plan  Lab Results   Component Please see the attached refill request.   Value Date    HGBA1C 6.3 (H) 05/02/2023     Continue SSI coverage per Accu-Cheks  Diabetic diet  Hypoglycemia protocol  Hold Glipizide while hospitalized    Chronic diastolic congestive heart failure ([address])  Assessment & Plan  On diuresis with Bumex and Zaroxolyn 3x/weekly  Continue beta-blockade with Labetalol  EF of 65%  Monitor/replete electrolyte deficiencies of present  Hold diuretics    CKD (chronic kidney disease) stage 4, GFR 15-29 ml/min ([facility])  Assessment & Plan  Baseline creatinine of approximately 2.5-2.7 -> currently stable at 2.28  Avoid nephrotoxins and hypotension   Monitor renal function and urine output  Hold Bumex and Zaroxolyn  We will restart in a.m. Trend BMP    Benign prostatic hyperplasia with urinary hesitancy  Assessment & Plan  Continue Flomax    Chronic respiratory failure with hypoxia ([facility])  Assessment & Plan  Baseline oxygen requirement of 3 L per nasal cannula  In the setting of underlying diastolic CHF and COPD    COPD with acute exacerbation ([address])  Assessment & Plan  COPD with possible mild exacerbation on admission in setting of pneumonia and inability to obtain nebulizers prior to presentation  Recently switched to Performist and Pulmicort nebulizers - unable to obtain Performist, hence, has only been using Pulmicort for the last week  Continue Perforomist/Pulmicort dual nebulizer treatments  No need for the corticosteroids due to lack of wheezing on exam and oxygenation at baseline  Pulmonary consult appreciated  PT OT recommended rehab  Possible discharge in a.m. if stable    Essential hypertension  Assessment & Plan  Continue Labetalol  Hold Bumex and Zaroxolyn  Hydralazine as needed    * Pneumonia  Assessment & Plan  Presents with worsening shortness of breath x 2-3 days and a recurrent cough, meeting sepsis criteria as below  CT chest -> Focal ill-defined right upper lobe consolidation with associated small cavitary space is suspicious for pneumonia.  Recommend follow-up to resolution with chest CT in 4 to 6 weeks. Urine Legionella/Streptococcus Ag negative  Procalcitonin trend: 0.34 -> 0.55 ->0.71--0.66  Encourage incentive spirometry  Chest x-ray shows worsening pneumonia  Continue Rocephin and Flagyl  Pulmonary and speech consult  PRN Robitussin on board  Supportive care otherwise  Patient had video barium swallow done yesterday  PT/OT recommending rehab once medically stable              Labs & Imaging: I have personally reviewed pertinent reports. VTE Prophylaxis: in place. Code Status:   Level 3 - DNAR and DNI    Patient Centered Rounds: I have performed bedside rounds with nursing staff today. Discussions with Specialists or Other Care Team Provider: Pulmonary    Education and Discussions with Family / Patient: Son and wife at bedside    Current Length of Stay: 8 day(s)    Current Patient Status: Inpatient   Certification Statement: The patient will continue to require additional inpatient hospital stay due to see my assessment and plan. Subjective:   Patient is seen and examined at bedside. Denies any new complaints. Afebrile  All other ROS are negative. Objective:    Vitals: Blood pressure 127/63, pulse 83, temperature 97.7 °F (36.5 °C), resp. rate 20, weight 116 kg (255 lb 11.7 oz), SpO2 93 %. ,Body mass index is 35.67 kg/m². SPO2 RA Rest    Flowsheet Row ED to Hosp-Admission (Current) from 9/6/2023 in [facility]   SpO2 93 %   SpO2 Activity At Rest   O2 Device Nasal cannula   O2 Flow Rate --        I&O:     Intake/Output Summary (Last 24 hours) at 9/14/2023 1236  Last data filed at 9/14/2023 1108  Gross per 24 hour   Intake 110 ml   Output 425 ml   Net -315 ml       Physical Exam:    General- Alert, sitting comfortably in chair. Not in any acute distress. Neck- Supple, No JVD  CVS- regular, S1 and S2 normal  Chest- Bilateral Air entry, No rhochi, crackles or wheezing present.   Abdomen- soft, nontender, not distended, no guarding or rigidity, BS+  Extremities-  No pedal edema, No calf tenderness                         Normal ROM in all extremities. CNS-   Alert, awake and orientedx3. No focal deficits present. Invasive Devices     Peripheral Intravenous Line  Duration           Peripheral IV 23 Dorsal (posterior); Left Forearm 1 day                      Social History  reviewed  Family History   Problem Relation Age of Onset   • Diabetes Mother    • Breast cancer Mother         She  in 12.  Age 80   • Stroke Mother    • Pneumonia Father    • Substance Abuse Neg Hx    • Mental illness Neg Hx     reviewed    Meds:  Current Facility-Administered Medications   Medication Dose Route Frequency Provider Last Rate Last Admin   • acetaminophen (TYLENOL) tablet 650 mg  650 mg Oral Q6H PRN Miley Wood PA-C   650 mg at 23 8761   • albuterol inhalation solution 2.5 mg  2.5 mg Nebulization Q4H PRN Katrin Rojo MD   2.5 mg at 23 0744   • allopurinol (ZYLOPRIM) tablet 200 mg  200 mg Oral Daily [place] Sylvia Lares PA-C   200 mg at 23 7966   • aluminum-magnesium hydroxide-simethicone (MAALOX) oral suspension 30 mL  30 mL Oral Q6H PRN Miley Wood PA-C       • budesonide (PULMICORT) inhalation solution 0.5 mg  0.5 mg Nebulization BID [place] Sylvia Lares PA-C   0.5 mg at 23 8379   • calcitriol (ROCALTROL) capsule 0.25 mcg  0.25 mcg Oral Daily [place]ery Sylvia Lares PA-C   0.25 mcg at 23 7961   • cefTRIAXone (ROCEPHIN) IVPB (premix in dextrose) 2,000 mg 50 mL  2,000 mg Intravenous Q24H Conrad Lopez  mL/hr at 23 1500 2,000 mg at 23 1500   • docusate sodium (COLACE) capsule 100 mg  100 mg Oral BID Jose G Brito MD   100 mg at 23 1105   • fluticasone (FLONASE) 50 mcg/act nasal spray 1 spray  1 spray Each Nare Daily Miley Wood PA-C   1 spray at 23 0813   • formoterol (PERFOROMIST) nebulizer solution 20 mcg  20 mcg Nebulization Q12H Diane Epps MD   20 mcg at 09/14/23 0745   • guaiFENesin (MUCINEX) 12 hr tablet 600 mg  600 mg Oral BID Nain Lares PA-C   600 mg at 09/14/23 3614   • heparin (porcine) subcutaneous injection 5,000 Units  5,000 Units Subcutaneous [facility] Nain Dang PA-C   5,000 Units at 09/14/23 0505   • hydrOXYzine HCL (ATARAX) tablet 25 mg  25 mg Oral Q6H PRN Nani Mason MD   25 mg at 09/14/23 0817   • insulin lispro (HumaLOG) 100 units/mL subcutaneous injection 1-6 Units  1-6 Units Subcutaneous 4x Daily (AC & HS) [place] PA-C   1 Units at 09/14/23 1152   • labetalol (NORMODYNE) tablet 200 mg  200 mg Oral BID Nain Lares PA-C   200 mg at 09/14/23 6682   • LORazepam (ATIVAN) injection 1 mg  1 mg Intravenous Once PRN Nani Mason MD       • melatonin tablet 6 mg  6 mg Oral HS Mariela Wright PA-C   6 mg at 09/13/23 2205   • metroNIDAZOLE (FLAGYL) IVPB (premix) 500 mg 100 mL  500 mg Intravenous Q8H Howard Kruger  mL/hr at 09/14/23 0919 500 mg at 09/14/23 0919   • nystatin (MYCOSTATIN) powder   Topical BID [employer], CRNP   1 Application at 54/62/93 0813   • polyethylene glycol (MIRALAX) packet 17 g  17 g Oral Daily Howard Kruger MD   17 g at 09/14/23 1105   • potassium chloride (K-DUR,KLOR-CON) CR tablet 20 mEq  20 mEq Oral BID Nain Lares PA-C   20 mEq at 09/14/23 3638   • senna (SENOKOT) tablet 17.2 mg  2 tablet Oral HS Howard Kruger MD       • sertraline (ZOLOFT) tablet 50 mg  50 mg Oral QAM Nain Lares PA-C   50 mg at 09/14/23 3878   • simethicone (MYLICON) chewable tablet 80 mg  80 mg Oral Q6H PRN Howard Kruger MD   80 mg at 09/14/23 1105   • sodium chloride (OCEAN) 0.65 % nasal spray 1 spray  1 spray Each Nare Q1H PRN Ismael Keane PA-C       • sodium chloride (PF) 0.9 % injection 3 mL  3 mL Intravenous Q1H PRN Juliana Espinoza MD       • tamsulosin (FLOMAX) capsule 0.4 mg  0.4 mg Oral Daily With [employer]ARI   0.4 mg at 09/13/23 7954 • traMADol (ULTRAM) tablet 50 mg  50 mg Oral BID PRN Stoney Bocanegra PA-C       • triamcinolone (KENALOG) 0.1 % cream   Topical BID Stoney Bocanegra PA-C   1 Application at 29/35/10 8571   • trimethobenzamide (TIGAN) IM injection 100 mg  100 mg Intramuscular Q6H PRN CAROLS Chopra   100 mg at 09/09/23 1027      Medications Prior to Admission   Medication   • albuterol (2.5 mg/3 mL) 0.083 % nebulizer solution   • allopurinol (ZYLOPRIM) 100 mg tablet   • betamethasone dipropionate (DIPROSONE) 0.05 % cream   • Blood Glucose Monitoring Suppl (OneTouch Verio) w/Device KIT   • budesonide (Pulmicort) 0.5 mg/2 mL nebulizer solution   • bumetanide (BUMEX) 2 mg tablet   • calcitriol (ROCALTROL) 0.25 mcg capsule   • fluticasone (FLONASE) 50 mcg/act nasal spray   • formoterol (PERFOROMIST) 20 MCG/2ML nebulizer solution   • glipiZIDE (GLUCOTROL) 5 mg tablet   • glucose blood test strip   • Iron-Vitamin C (IRON 100/C PO)   • labetalol (NORMODYNE) 200 mg tablet   • Lancets (onetouch ultrasoft) lancets   • metolazone (ZAROXOLYN) 2.5 mg tablet   • mupirocin (BACTROBAN) 2 % ointment   • potassium chloride (K-DUR,KLOR-CON) 20 mEq tablet   • senna (SENOKOT) 8.6 MG tablet   • sertraline (ZOLOFT) 50 mg tablet   • tamsulosin (FLOMAX) 0.4 mg   • traMADol (Ultram) 50 mg tablet       Labs:  Results from last 7 days   Lab Units 09/14/23  0621 09/13/23  0440 09/12/23  0346   WBC Thousand/uL 15.34* 15.18* 16.29*   HEMOGLOBIN g/dL 11.9* 12.6 12.3   HEMATOCRIT % 37.2 38.8 37.5   PLATELETS Thousands/uL 214 204 207   NEUTROS PCT % 84* 85* 84*   LYMPHS PCT % 5* 4* 5*   MONOS PCT % 7 7 8   EOS PCT % 2 2 1     Results from last 7 days   Lab Units 09/14/23  0621 09/13/23  0440 09/12/23  0346   POTASSIUM mmol/L 3.9 3.6 3.6   CHLORIDE mmol/L 99 98 94*   CO2 mmol/L 25 24 26   BUN mg/dL 123* 123* 128*   CREATININE mg/dL 2.28* 2.34* 2.63*   CALCIUM mg/dL 9.2 9.1 9.4     Lab Results   Component Value Date    TROPONINI <0.02 10/21/2021 TROPONINI <0.02 11/15/2019         Lab Results   Component Value Date    BLOODCX No Growth After 5 Days. 09/06/2023    BLOODCX No Growth After 5 Days. 09/06/2023         Imaging:  Results for orders placed during the hospital encounter of 09/06/23    XR chest portable    Narrative  CHEST    INDICATION:   SOB.    COMPARISON: CXR and CT 9/6/2023    EXAM PERFORMED/VIEWS:  XR CHEST PORTABLE      FINDINGS:    Cardiomediastinal silhouette appears unremarkable. Peripheral right upper lung increasing pleural-based airspace opacity. Left hemidiaphragm chronic elevation. No pneumothorax or pleural effusion. Osseous structures appear within normal limits for patient age. Impression  Peripheral right upper lung axillary segment worsening infiltrate. The study was marked in [facility] for immediate notification. Workstation performed: [id number]    Results for orders placed during the hospital encounter of 08/26/23    XR chest 2 views    Narrative  CHEST    INDICATION:   Increased dyspnea. History of pulmonary hypertension, chronic diastolic heart failure, COPD. COMPARISON: PA and lateral chest August 23, 2023. CT chest Nirmala 10, 2022. EXAM PERFORMED/VIEWS:  XR CHEST PA & LATERAL      FINDINGS:    Cardiomediastinal silhouette appears unremarkable. Small calcified granuloma redemonstrated left lung base. The lungs are otherwise clear. No pneumothorax or pleural effusion. Osseous structures appear within normal limits for patient age. Impression  No acute cardiopulmonary disease.             Workstation performed: [id number]      Last 24 Hours Medication List:   Current Facility-Administered Medications   Medication Dose Route Frequency Provider Last Rate   • acetaminophen  650 mg Oral Q6H PRN Rob Pritchett PA-C     • albuterol  2.5 mg Nebulization Q4H PRN Kyrie Sweet MD     • allopurinol  200 mg Oral Daily Jose Lares PA-C     • aluminum-magnesium hydroxide-simethicone  30 mL Oral Q6H PRN Clara Krabbe, PA-C     • budesonide  0.5 mg Nebulization BID Neha Dang PA-C     • calcitriol  0.25 mcg Oral Daily Radha Perez     • cefTRIAXone  2,000 mg Intravenous Q24H Sherwood Soulier, MD 2,000 mg (09/13/23 1500)   • docusate sodium  100 mg Oral BID Heather Hart MD     • fluticasone  1 spray Each Nare Daily Clara Krabbe, PA-C     • formoterol  20 mcg Nebulization Q12H Terrie Danielle MD     • guaiFENesin  600 mg Oral BID Clara Krabbe, PA-C     • heparin (porcine)  5,000 Units Subcutaneous [facility] Neha Dang PA-C     • hydrOXYzine HCL  25 mg Oral Q6H PRN Sherwood Soulier, MD     • insulin lispro  1-6 Units Subcutaneous 4x Daily (AC & HS) Clara Krabbe, PA-C     • labetalol  200 mg Oral BID Neha Lares PA-C     • LORazepam  1 mg Intravenous Once PRN Sherwood Soulier, MD     • melatonin  6 mg Oral HS Barbara Waterman PA-C     • metroNIDAZOLE  500 mg Intravenous Q8H Heather Hart  mg (09/14/23 0919)   • nystatin   Topical BID CARLOS Chopra     • polyethylene glycol  17 g Oral Daily Heather Hart MD     • potassium chloride  20 mEq Oral BID Clara Krabbe, PA-C     • senna  2 tablet Oral HS Heather Hart MD     • sertraline  50 mg Oral QAM Clara Krabbe, PA-C     • simethicone  80 mg Oral Q6H PRN Heather Hart MD     • sodium chloride  1 spray Each Nare Q1H PRN Juliet Morrissey PA-C     • sodium chloride (PF)  3 mL Intravenous Q1H PRN Shoaib Weaver MD     • tamsulosin  0.4 mg Oral Daily With [employer]ema LabsBAKARI     • traMADol  50 mg Oral BID PRN Clara Krabbe, PA-C     • triamcinolone   Topical BID Clara Krabbe, PA-C     • trimethobenzamide  100 mg Intramuscular Q6H PRN Sangeeta Huggins, [facility]          Today, Patient Was Seen By: Heather Hart MD    ** Please Note: Dictation voice to text software may have been used in the creation of this document.  **

## 2024-11-07 NOTE — ASSESSMENT & PLAN NOTE
Wt Readings from Last 3 Encounters:   05/12/22 130 kg (285 lb 15 oz)   04/20/22 132 kg (290 lb)   04/14/22 131 kg (289 lb)     · Increased SOB x months and worse in last weeks, increasing BLE edema and lack of improvement with PO diuretics, PND and orthopnea    CXR:  Suspicious for new myalgias right lower lobe pneumonia  o No signs or symptoms of PNA - stop ABX   Troponins negative    ECHO 85/39: EF 01%, diastolic function mildly abnormal-grade 1 relaxation  o Repeat ECHO ordered   Home diuretic regimen: torsemide 40 mg in AM/20 mg in PM, metazolone 2 5 mg on Fridays  o Sometimes misses PM dose of torsemide  Metaozolone added for last 2-3 months   Continue on lasix 80 mg IV TID      Continue to monitor electrolytes and replete as needed   Daily weights, Strict I & Os   Cardiac diet, low sodium <2g, fluid restriction <1500  [facility] Cardiology consult Her/She